# Patient Record
Sex: MALE | URBAN - METROPOLITAN AREA
[De-identification: names, ages, dates, MRNs, and addresses within clinical notes are randomized per-mention and may not be internally consistent; named-entity substitution may affect disease eponyms.]

---

## 2022-09-21 ENCOUNTER — LAB REQUISITION (OUTPATIENT)
Dept: LAB | Facility: CLINIC | Age: 62
End: 2022-09-21

## 2022-09-21 PROCEDURE — 80197 ASSAY OF TACROLIMUS: CPT

## 2022-09-22 LAB
TACROLIMUS BLD-MCNC: 6.6 UG/L (ref 5–15)
TME LAST DOSE: NORMAL H
TME LAST DOSE: NORMAL H

## 2022-09-26 ENCOUNTER — LAB REQUISITION (OUTPATIENT)
Dept: LAB | Facility: CLINIC | Age: 62
End: 2022-09-26

## 2022-09-26 PROCEDURE — 80197 ASSAY OF TACROLIMUS: CPT

## 2022-09-27 LAB
TACROLIMUS BLD-MCNC: 9.8 UG/L (ref 5–15)
TME LAST DOSE: NORMAL H
TME LAST DOSE: NORMAL H

## 2022-10-03 PROCEDURE — 80197 ASSAY OF TACROLIMUS: CPT

## 2022-10-04 ENCOUNTER — LAB REQUISITION (OUTPATIENT)
Dept: LAB | Facility: CLINIC | Age: 62
End: 2022-10-04

## 2022-10-05 LAB
TACROLIMUS BLD-MCNC: 6.5 UG/L (ref 5–15)
TME LAST DOSE: NORMAL H
TME LAST DOSE: NORMAL H

## 2022-10-11 ENCOUNTER — LAB REQUISITION (OUTPATIENT)
Dept: LAB | Facility: CLINIC | Age: 62
End: 2022-10-11

## 2022-10-11 PROCEDURE — 80197 ASSAY OF TACROLIMUS: CPT

## 2022-10-12 LAB
TACROLIMUS BLD-MCNC: 6.9 UG/L (ref 5–15)
TME LAST DOSE: NORMAL H
TME LAST DOSE: NORMAL H

## 2022-10-17 ENCOUNTER — LAB REQUISITION (OUTPATIENT)
Dept: LAB | Facility: CLINIC | Age: 62
End: 2022-10-17

## 2022-10-17 PROCEDURE — 80197 ASSAY OF TACROLIMUS: CPT

## 2022-10-18 LAB
TACROLIMUS BLD-MCNC: 10.2 UG/L (ref 5–15)
TME LAST DOSE: NORMAL H
TME LAST DOSE: NORMAL H

## 2022-10-24 ENCOUNTER — LAB REQUISITION (OUTPATIENT)
Dept: LAB | Facility: CLINIC | Age: 62
End: 2022-10-24

## 2022-10-24 LAB
TACROLIMUS BLD-MCNC: 9 UG/L (ref 5–15)
TME LAST DOSE: NORMAL H
TME LAST DOSE: NORMAL H

## 2022-10-24 PROCEDURE — 80197 ASSAY OF TACROLIMUS: CPT

## 2022-11-01 ENCOUNTER — LAB REQUISITION (OUTPATIENT)
Dept: LAB | Facility: CLINIC | Age: 62
End: 2022-11-01

## 2022-11-01 PROCEDURE — 80197 ASSAY OF TACROLIMUS: CPT

## 2022-11-02 LAB
TACROLIMUS BLD-MCNC: 8.1 UG/L (ref 5–15)
TME LAST DOSE: NORMAL H
TME LAST DOSE: NORMAL H

## 2022-11-07 ENCOUNTER — LAB REQUISITION (OUTPATIENT)
Dept: LAB | Facility: CLINIC | Age: 62
End: 2022-11-07

## 2022-11-07 LAB
TACROLIMUS BLD-MCNC: 6.8 UG/L (ref 5–15)
TME LAST DOSE: NORMAL H
TME LAST DOSE: NORMAL H

## 2022-11-07 PROCEDURE — 80197 ASSAY OF TACROLIMUS: CPT

## 2022-11-14 ENCOUNTER — LAB REQUISITION (OUTPATIENT)
Dept: LAB | Facility: CLINIC | Age: 62
End: 2022-11-14

## 2022-11-14 LAB
TACROLIMUS BLD-MCNC: 12.5 UG/L (ref 5–15)
TME LAST DOSE: NORMAL H
TME LAST DOSE: NORMAL H

## 2022-11-14 PROCEDURE — 80197 ASSAY OF TACROLIMUS: CPT

## 2022-11-21 ENCOUNTER — LAB REQUISITION (OUTPATIENT)
Dept: LAB | Facility: CLINIC | Age: 62
End: 2022-11-21

## 2022-11-21 PROCEDURE — 80197 ASSAY OF TACROLIMUS: CPT

## 2022-11-22 LAB
TACROLIMUS BLD-MCNC: 14 UG/L (ref 5–15)
TME LAST DOSE: NORMAL H
TME LAST DOSE: NORMAL H

## 2022-11-28 ENCOUNTER — LAB REQUISITION (OUTPATIENT)
Dept: LAB | Facility: CLINIC | Age: 62
End: 2022-11-28

## 2022-11-28 LAB
TACROLIMUS BLD-MCNC: 9 UG/L (ref 5–15)
TME LAST DOSE: NORMAL H
TME LAST DOSE: NORMAL H

## 2022-11-28 PROCEDURE — 80197 ASSAY OF TACROLIMUS: CPT

## 2022-12-05 ENCOUNTER — LAB REQUISITION (OUTPATIENT)
Dept: LAB | Facility: CLINIC | Age: 62
End: 2022-12-05

## 2022-12-05 LAB
TACROLIMUS BLD-MCNC: 6.2 UG/L (ref 5–15)
TME LAST DOSE: NORMAL H
TME LAST DOSE: NORMAL H

## 2022-12-05 PROCEDURE — 80197 ASSAY OF TACROLIMUS: CPT

## 2022-12-12 ENCOUNTER — LAB REQUISITION (OUTPATIENT)
Dept: LAB | Facility: CLINIC | Age: 62
End: 2022-12-12

## 2022-12-12 LAB
TACROLIMUS BLD-MCNC: 8 UG/L (ref 5–15)
TME LAST DOSE: NORMAL H
TME LAST DOSE: NORMAL H

## 2022-12-12 PROCEDURE — 80197 ASSAY OF TACROLIMUS: CPT

## 2022-12-19 ENCOUNTER — LAB REQUISITION (OUTPATIENT)
Dept: LAB | Facility: CLINIC | Age: 62
End: 2022-12-19

## 2022-12-19 PROCEDURE — 80197 ASSAY OF TACROLIMUS: CPT

## 2022-12-20 LAB
TACROLIMUS BLD-MCNC: 5.4 UG/L (ref 5–15)
TME LAST DOSE: NORMAL H
TME LAST DOSE: NORMAL H

## 2022-12-27 ENCOUNTER — LAB REQUISITION (OUTPATIENT)
Dept: LAB | Facility: CLINIC | Age: 62
End: 2022-12-27

## 2022-12-27 PROCEDURE — 80197 ASSAY OF TACROLIMUS: CPT

## 2022-12-28 LAB
TACROLIMUS BLD-MCNC: 5.2 UG/L (ref 5–15)
TME LAST DOSE: NORMAL H
TME LAST DOSE: NORMAL H

## 2023-01-03 ENCOUNTER — LAB REQUISITION (OUTPATIENT)
Dept: LAB | Facility: CLINIC | Age: 63
End: 2023-01-03

## 2023-01-03 LAB
TACROLIMUS BLD-MCNC: 6.4 UG/L (ref 5–15)
TME LAST DOSE: NORMAL H
TME LAST DOSE: NORMAL H

## 2023-01-03 PROCEDURE — 80197 ASSAY OF TACROLIMUS: CPT

## 2023-01-09 ENCOUNTER — LAB REQUISITION (OUTPATIENT)
Dept: LAB | Facility: CLINIC | Age: 63
End: 2023-01-09

## 2023-01-09 PROCEDURE — 80197 ASSAY OF TACROLIMUS: CPT

## 2023-01-10 LAB
TACROLIMUS BLD-MCNC: 7.9 UG/L (ref 5–15)
TME LAST DOSE: NORMAL H
TME LAST DOSE: NORMAL H

## 2023-01-17 ENCOUNTER — LAB REQUISITION (OUTPATIENT)
Dept: LAB | Facility: CLINIC | Age: 63
End: 2023-01-17

## 2023-01-17 PROCEDURE — 80197 ASSAY OF TACROLIMUS: CPT

## 2023-01-18 LAB
TACROLIMUS BLD-MCNC: 6.1 UG/L (ref 5–15)
TME LAST DOSE: NORMAL H
TME LAST DOSE: NORMAL H

## 2023-01-30 ENCOUNTER — LAB REQUISITION (OUTPATIENT)
Dept: LAB | Facility: CLINIC | Age: 63
End: 2023-01-30

## 2023-01-30 LAB
TACROLIMUS BLD-MCNC: 6.5 UG/L (ref 5–15)
TME LAST DOSE: NORMAL H
TME LAST DOSE: NORMAL H

## 2023-01-30 PROCEDURE — 80197 ASSAY OF TACROLIMUS: CPT

## 2023-02-06 ENCOUNTER — LAB REQUISITION (OUTPATIENT)
Dept: LAB | Facility: CLINIC | Age: 63
End: 2023-02-06

## 2023-02-06 PROCEDURE — 80197 ASSAY OF TACROLIMUS: CPT

## 2023-02-07 LAB
TACROLIMUS BLD-MCNC: 9.1 UG/L (ref 5–15)
TME LAST DOSE: NORMAL H
TME LAST DOSE: NORMAL H

## 2023-02-27 ENCOUNTER — LAB REQUISITION (OUTPATIENT)
Dept: LAB | Facility: CLINIC | Age: 63
End: 2023-02-27

## 2023-02-27 LAB
TACROLIMUS BLD-MCNC: 8 UG/L (ref 5–15)
TME LAST DOSE: NORMAL H
TME LAST DOSE: NORMAL H

## 2023-02-27 PROCEDURE — 80197 ASSAY OF TACROLIMUS: CPT

## 2023-03-20 ENCOUNTER — LAB REQUISITION (OUTPATIENT)
Dept: LAB | Facility: CLINIC | Age: 63
End: 2023-03-20

## 2023-03-20 PROCEDURE — 80197 ASSAY OF TACROLIMUS: CPT

## 2023-03-21 LAB
TACROLIMUS BLD-MCNC: 7.1 UG/L (ref 5–15)
TME LAST DOSE: NORMAL H
TME LAST DOSE: NORMAL H

## 2023-04-17 ENCOUNTER — LAB REQUISITION (OUTPATIENT)
Dept: LAB | Facility: CLINIC | Age: 63
End: 2023-04-17

## 2023-04-17 PROCEDURE — 80197 ASSAY OF TACROLIMUS: CPT

## 2023-04-18 LAB
TACROLIMUS BLD-MCNC: 9.6 UG/L (ref 5–15)
TME LAST DOSE: NORMAL H
TME LAST DOSE: NORMAL H

## 2023-05-15 ENCOUNTER — LAB REQUISITION (OUTPATIENT)
Dept: LAB | Facility: CLINIC | Age: 63
End: 2023-05-15

## 2023-05-15 PROCEDURE — 80197 ASSAY OF TACROLIMUS: CPT

## 2023-05-16 LAB
TACROLIMUS BLD-MCNC: 11.9 UG/L (ref 5–15)
TME LAST DOSE: NORMAL H
TME LAST DOSE: NORMAL H

## 2023-06-12 ENCOUNTER — LAB REQUISITION (OUTPATIENT)
Dept: LAB | Facility: CLINIC | Age: 63
End: 2023-06-12

## 2023-06-12 PROCEDURE — 80197 ASSAY OF TACROLIMUS: CPT

## 2023-06-13 LAB
TACROLIMUS BLD-MCNC: 6.7 UG/L (ref 5–15)
TME LAST DOSE: NORMAL H
TME LAST DOSE: NORMAL H

## 2023-07-24 ENCOUNTER — LAB REQUISITION (OUTPATIENT)
Dept: LAB | Facility: CLINIC | Age: 63
End: 2023-07-24

## 2023-07-24 PROCEDURE — 80197 ASSAY OF TACROLIMUS: CPT

## 2023-07-25 LAB
TACROLIMUS BLD-MCNC: 5.8 UG/L (ref 5–15)
TME LAST DOSE: NORMAL H
TME LAST DOSE: NORMAL H

## 2023-08-21 ENCOUNTER — LAB REQUISITION (OUTPATIENT)
Dept: LAB | Facility: CLINIC | Age: 63
End: 2023-08-21

## 2023-08-21 PROCEDURE — 80197 ASSAY OF TACROLIMUS: CPT

## 2023-08-22 LAB
TACROLIMUS BLD-MCNC: 11 UG/L (ref 5–15)
TME LAST DOSE: NORMAL H
TME LAST DOSE: NORMAL H

## 2023-08-31 ENCOUNTER — LAB REQUISITION (OUTPATIENT)
Dept: LAB | Facility: CLINIC | Age: 63
End: 2023-08-31

## 2023-08-31 PROCEDURE — 80197 ASSAY OF TACROLIMUS: CPT

## 2023-09-01 LAB
TACROLIMUS BLD-MCNC: 10.4 UG/L (ref 5–15)
TME LAST DOSE: NORMAL H
TME LAST DOSE: NORMAL H

## 2023-09-28 ENCOUNTER — LAB REQUISITION (OUTPATIENT)
Dept: LAB | Facility: CLINIC | Age: 63
End: 2023-09-28

## 2023-09-28 LAB
TACROLIMUS BLD-MCNC: 6.3 UG/L (ref 5–15)
TME LAST DOSE: NORMAL H
TME LAST DOSE: NORMAL H

## 2023-09-28 PROCEDURE — 80197 ASSAY OF TACROLIMUS: CPT

## 2023-10-26 ENCOUNTER — LAB REQUISITION (OUTPATIENT)
Dept: LAB | Facility: CLINIC | Age: 63
End: 2023-10-26

## 2023-10-26 PROCEDURE — 80197 ASSAY OF TACROLIMUS: CPT

## 2023-10-27 LAB
TACROLIMUS BLD-MCNC: 6.8 UG/L (ref 5–15)
TME LAST DOSE: NORMAL H
TME LAST DOSE: NORMAL H

## 2023-11-30 ENCOUNTER — LAB REQUISITION (OUTPATIENT)
Dept: LAB | Facility: CLINIC | Age: 63
End: 2023-11-30

## 2023-11-30 PROCEDURE — 80197 ASSAY OF TACROLIMUS: CPT

## 2023-12-01 LAB
TACROLIMUS BLD-MCNC: 6.4 UG/L (ref 5–15)
TME LAST DOSE: NORMAL H
TME LAST DOSE: NORMAL H

## 2024-01-31 ENCOUNTER — LAB REQUISITION (OUTPATIENT)
Dept: LAB | Facility: CLINIC | Age: 64
End: 2024-01-31

## 2024-01-31 LAB
TACROLIMUS BLD-MCNC: 5.6 UG/L (ref 5–15)
TME LAST DOSE: NORMAL H
TME LAST DOSE: NORMAL H

## 2024-01-31 PROCEDURE — 80197 ASSAY OF TACROLIMUS: CPT

## 2024-04-02 ENCOUNTER — LAB REQUISITION (OUTPATIENT)
Dept: LAB | Facility: CLINIC | Age: 64
End: 2024-04-02

## 2024-04-02 LAB
TACROLIMUS BLD-MCNC: 6.3 UG/L (ref 5–15)
TME LAST DOSE: NORMAL H
TME LAST DOSE: NORMAL H

## 2024-04-02 PROCEDURE — 80197 ASSAY OF TACROLIMUS: CPT

## 2024-05-06 ENCOUNTER — LAB REQUISITION (OUTPATIENT)
Dept: LAB | Facility: CLINIC | Age: 64
End: 2024-05-06

## 2024-05-06 PROCEDURE — 80197 ASSAY OF TACROLIMUS: CPT

## 2024-05-07 LAB
TACROLIMUS BLD-MCNC: 8.3 UG/L (ref 5–15)
TME LAST DOSE: NORMAL H
TME LAST DOSE: NORMAL H

## 2024-07-31 ENCOUNTER — LAB REQUISITION (OUTPATIENT)
Dept: LAB | Facility: CLINIC | Age: 64
End: 2024-07-31

## 2024-07-31 PROCEDURE — 80197 ASSAY OF TACROLIMUS: CPT

## 2024-08-01 LAB
TACROLIMUS BLD-MCNC: 4.3 UG/L (ref 5–15)
TME LAST DOSE: ABNORMAL H
TME LAST DOSE: ABNORMAL H

## 2024-10-01 ENCOUNTER — LAB REQUISITION (OUTPATIENT)
Dept: LAB | Facility: CLINIC | Age: 64
End: 2024-10-01

## 2024-10-01 LAB
TACROLIMUS BLD-MCNC: 5.9 UG/L (ref 5–15)
TME LAST DOSE: NORMAL H
TME LAST DOSE: NORMAL H

## 2024-10-01 PROCEDURE — 80197 ASSAY OF TACROLIMUS: CPT

## 2024-10-29 ENCOUNTER — LAB REQUISITION (OUTPATIENT)
Dept: LAB | Facility: CLINIC | Age: 64
End: 2024-10-29

## 2024-10-29 LAB
TACROLIMUS BLD-MCNC: 2 UG/L (ref 5–15)
TME LAST DOSE: ABNORMAL H
TME LAST DOSE: ABNORMAL H

## 2024-10-29 PROCEDURE — 80197 ASSAY OF TACROLIMUS: CPT

## 2025-01-19 PROCEDURE — 80197 ASSAY OF TACROLIMUS: CPT

## 2025-01-21 ENCOUNTER — LAB REQUISITION (OUTPATIENT)
Dept: LAB | Facility: CLINIC | Age: 65
End: 2025-01-21

## 2025-01-21 LAB
TACROLIMUS BLD-MCNC: 3.9 UG/L (ref 5–15)
TME LAST DOSE: ABNORMAL H
TME LAST DOSE: ABNORMAL H

## 2025-01-25 ENCOUNTER — LAB REQUISITION (OUTPATIENT)
Dept: LAB | Facility: CLINIC | Age: 65
End: 2025-01-25

## 2025-01-27 ENCOUNTER — LAB REQUISITION (OUTPATIENT)
Dept: LAB | Facility: CLINIC | Age: 65
End: 2025-01-27

## 2025-01-27 LAB
CMV DNA SPEC NAA+PROBE-ACNC: NOT DETECTED IU/ML
SPECIMEN TYPE: NORMAL

## 2025-01-27 PROCEDURE — 80197 ASSAY OF TACROLIMUS: CPT

## 2025-01-28 ENCOUNTER — TRANSFERRED RECORDS (OUTPATIENT)
Dept: HEALTH INFORMATION MANAGEMENT | Facility: CLINIC | Age: 65
End: 2025-01-28
Payer: MEDICARE

## 2025-01-28 ENCOUNTER — LAB REQUISITION (OUTPATIENT)
Dept: LAB | Facility: CLINIC | Age: 65
End: 2025-01-28

## 2025-01-28 LAB
CMV DNA SPEC NAA+PROBE-ACNC: NOT DETECTED IU/ML
SPECIMEN TYPE: NORMAL
TACROLIMUS BLD-MCNC: 5.8 UG/L (ref 5–15)
TACROLIMUS BLD-MCNC: 8.2 UG/L (ref 5–15)
TME LAST DOSE: NORMAL H

## 2025-01-28 PROCEDURE — 80197 ASSAY OF TACROLIMUS: CPT

## 2025-01-29 ENCOUNTER — TRANSFERRED RECORDS (OUTPATIENT)
Dept: HEALTH INFORMATION MANAGEMENT | Facility: CLINIC | Age: 65
End: 2025-01-29
Payer: MEDICARE

## 2025-01-29 ENCOUNTER — LAB REQUISITION (OUTPATIENT)
Dept: LAB | Facility: CLINIC | Age: 65
End: 2025-01-29

## 2025-01-29 PROCEDURE — 80197 ASSAY OF TACROLIMUS: CPT

## 2025-01-30 LAB
TACROLIMUS BLD-MCNC: 6.2 UG/L (ref 5–15)
TME LAST DOSE: NORMAL H
TME LAST DOSE: NORMAL H

## 2025-02-25 ENCOUNTER — HOSPITAL ENCOUNTER (INPATIENT)
Facility: CLINIC | Age: 65
End: 2025-02-25
Attending: EMERGENCY MEDICINE | Admitting: INTERNAL MEDICINE
Payer: MEDICARE

## 2025-02-25 ENCOUNTER — APPOINTMENT (OUTPATIENT)
Dept: GENERAL RADIOLOGY | Facility: CLINIC | Age: 65
DRG: 981 | End: 2025-02-25
Attending: EMERGENCY MEDICINE
Payer: MEDICARE

## 2025-02-25 DIAGNOSIS — N40.1 BENIGN LOCALIZED PROSTATIC HYPERPLASIA WITH LOWER URINARY TRACT SYMPTOMS (LUTS): ICD-10-CM

## 2025-02-25 DIAGNOSIS — I73.9 PAD (PERIPHERAL ARTERY DISEASE): ICD-10-CM

## 2025-02-25 DIAGNOSIS — Z94.0 RENAL TRANSPLANT, STATUS POST: ICD-10-CM

## 2025-02-25 DIAGNOSIS — R41.82 ALTERED MENTAL STATUS, UNSPECIFIED ALTERED MENTAL STATUS TYPE: ICD-10-CM

## 2025-02-25 DIAGNOSIS — R53.1 WEAKNESS GENERALIZED: ICD-10-CM

## 2025-02-25 DIAGNOSIS — E11.29 TYPE 2 DIABETES MELLITUS WITH OTHER DIABETIC KIDNEY COMPLICATION, WITHOUT LONG-TERM CURRENT USE OF INSULIN (H): ICD-10-CM

## 2025-02-25 DIAGNOSIS — T33.90XA FROSTBITE, INITIAL ENCOUNTER: Primary | ICD-10-CM

## 2025-02-25 DIAGNOSIS — D50.9 IRON DEFICIENCY ANEMIA, UNSPECIFIED IRON DEFICIENCY ANEMIA TYPE: ICD-10-CM

## 2025-02-25 DIAGNOSIS — Z01.818 PRE-OP EVALUATION: ICD-10-CM

## 2025-02-25 DIAGNOSIS — Z98.890 POSTOPERATIVE STATE: ICD-10-CM

## 2025-02-25 DIAGNOSIS — I96 GANGRENOUS TOE (H): ICD-10-CM

## 2025-02-25 LAB
ALBUMIN SERPL BCG-MCNC: 3.9 G/DL (ref 3.5–5.2)
ALP SERPL-CCNC: 122 U/L (ref 40–150)
ALT SERPL W P-5'-P-CCNC: 13 U/L (ref 0–70)
ANION GAP SERPL CALCULATED.3IONS-SCNC: 12 MMOL/L (ref 7–15)
AST SERPL W P-5'-P-CCNC: 19 U/L (ref 0–45)
ATRIAL RATE - MUSE: 277 BPM
BASOPHILS # BLD AUTO: 0 10E3/UL (ref 0–0.2)
BASOPHILS NFR BLD AUTO: 0 %
BILIRUB SERPL-MCNC: 0.4 MG/DL
BUN SERPL-MCNC: 83.5 MG/DL (ref 8–23)
CALCIUM SERPL-MCNC: 10.5 MG/DL (ref 8.8–10.4)
CHLORIDE SERPL-SCNC: 100 MMOL/L (ref 98–107)
CREAT SERPL-MCNC: 2.26 MG/DL (ref 0.67–1.17)
CYSTATIN C (ROCHE): 2 MG/L (ref 0.6–1)
DIASTOLIC BLOOD PRESSURE - MUSE: NORMAL MMHG
EGFRCR SERPLBLD CKD-EPI 2021: 31 ML/MIN/1.73M2
EOSINOPHIL # BLD AUTO: 0.1 10E3/UL (ref 0–0.7)
EOSINOPHIL NFR BLD AUTO: 1 %
ERYTHROCYTE [DISTWIDTH] IN BLOOD BY AUTOMATED COUNT: 14.6 % (ref 10–15)
FLUAV RNA SPEC QL NAA+PROBE: NEGATIVE
FLUBV RNA RESP QL NAA+PROBE: NEGATIVE
GFR/BSA.PRED SERPLBLD CYS-BASED-ARV: 30 ML/MIN/1.73M2
GLUCOSE SERPL-MCNC: 189 MG/DL (ref 70–99)
HCO3 SERPL-SCNC: 25 MMOL/L (ref 22–29)
HCT VFR BLD AUTO: 39.8 % (ref 40–53)
HGB BLD-MCNC: 12.3 G/DL (ref 13.3–17.7)
HOLD SPECIMEN: NORMAL
HOLD SPECIMEN: NORMAL
IMM GRANULOCYTES # BLD: 0.1 10E3/UL
IMM GRANULOCYTES NFR BLD: 1 %
INTERPRETATION ECG - MUSE: NORMAL
LACTATE SERPL-SCNC: 1.7 MMOL/L (ref 0.7–2)
LYMPHOCYTES # BLD AUTO: 0.5 10E3/UL (ref 0.8–5.3)
LYMPHOCYTES NFR BLD AUTO: 5 %
MCH RBC QN AUTO: 26.3 PG (ref 26.5–33)
MCHC RBC AUTO-ENTMCNC: 30.9 G/DL (ref 31.5–36.5)
MCV RBC AUTO: 85 FL (ref 78–100)
MONOCYTES # BLD AUTO: 0.9 10E3/UL (ref 0–1.3)
MONOCYTES NFR BLD AUTO: 9 %
NEUTROPHILS # BLD AUTO: 8.6 10E3/UL (ref 1.6–8.3)
NEUTROPHILS NFR BLD AUTO: 84 %
NRBC # BLD AUTO: 0 10E3/UL
NRBC BLD AUTO-RTO: 0 /100
NT-PROBNP SERPL-MCNC: ABNORMAL PG/ML (ref 0–900)
P AXIS - MUSE: NORMAL DEGREES
PLATELET # BLD AUTO: 350 10E3/UL (ref 150–450)
POTASSIUM SERPL-SCNC: 5.3 MMOL/L (ref 3.4–5.3)
PR INTERVAL - MUSE: NORMAL MS
PROT SERPL-MCNC: 7.7 G/DL (ref 6.4–8.3)
QRS DURATION - MUSE: 74 MS
QT - MUSE: 370 MS
QTC - MUSE: 452 MS
R AXIS - MUSE: -3 DEGREES
RBC # BLD AUTO: 4.67 10E6/UL (ref 4.4–5.9)
RSV RNA SPEC NAA+PROBE: NEGATIVE
SARS-COV-2 RNA RESP QL NAA+PROBE: NEGATIVE
SODIUM SERPL-SCNC: 137 MMOL/L (ref 135–145)
SYSTOLIC BLOOD PRESSURE - MUSE: NORMAL MMHG
T AXIS - MUSE: 213 DEGREES
TROPONIN T SERPL HS-MCNC: 218 NG/L
TROPONIN T SERPL HS-MCNC: 267 NG/L
VENTRICULAR RATE- MUSE: 90 BPM
WBC # BLD AUTO: 10.1 10E3/UL (ref 4–11)

## 2025-02-25 PROCEDURE — 71046 X-RAY EXAM CHEST 2 VIEWS: CPT | Mod: 26 | Performed by: RADIOLOGY

## 2025-02-25 PROCEDURE — 93010 ELECTROCARDIOGRAM REPORT: CPT | Performed by: INTERNAL MEDICINE

## 2025-02-25 PROCEDURE — 99285 EMERGENCY DEPT VISIT HI MDM: CPT | Performed by: EMERGENCY MEDICINE

## 2025-02-25 PROCEDURE — 82610 CYSTATIN C: CPT | Performed by: PHYSICIAN ASSISTANT

## 2025-02-25 PROCEDURE — 83605 ASSAY OF LACTIC ACID: CPT | Performed by: INTERNAL MEDICINE

## 2025-02-25 PROCEDURE — G0378 HOSPITAL OBSERVATION PER HR: HCPCS

## 2025-02-25 PROCEDURE — 36415 COLL VENOUS BLD VENIPUNCTURE: CPT | Performed by: EMERGENCY MEDICINE

## 2025-02-25 PROCEDURE — 71046 X-RAY EXAM CHEST 2 VIEWS: CPT

## 2025-02-25 PROCEDURE — 250N000013 HC RX MED GY IP 250 OP 250 PS 637: Performed by: PHYSICIAN ASSISTANT

## 2025-02-25 PROCEDURE — 93005 ELECTROCARDIOGRAM TRACING: CPT

## 2025-02-25 PROCEDURE — 83880 ASSAY OF NATRIURETIC PEPTIDE: CPT | Performed by: EMERGENCY MEDICINE

## 2025-02-25 PROCEDURE — 93005 ELECTROCARDIOGRAM TRACING: CPT | Performed by: EMERGENCY MEDICINE

## 2025-02-25 PROCEDURE — 36415 COLL VENOUS BLD VENIPUNCTURE: CPT | Performed by: INTERNAL MEDICINE

## 2025-02-25 PROCEDURE — 85025 COMPLETE CBC W/AUTO DIFF WBC: CPT | Performed by: EMERGENCY MEDICINE

## 2025-02-25 PROCEDURE — 99223 1ST HOSP IP/OBS HIGH 75: CPT | Performed by: PHYSICIAN ASSISTANT

## 2025-02-25 PROCEDURE — 82040 ASSAY OF SERUM ALBUMIN: CPT | Performed by: EMERGENCY MEDICINE

## 2025-02-25 PROCEDURE — 85018 HEMOGLOBIN: CPT | Performed by: EMERGENCY MEDICINE

## 2025-02-25 PROCEDURE — 250N000012 HC RX MED GY IP 250 OP 636 PS 637: Performed by: PHYSICIAN ASSISTANT

## 2025-02-25 PROCEDURE — 80197 ASSAY OF TACROLIMUS: CPT | Performed by: PHYSICIAN ASSISTANT

## 2025-02-25 PROCEDURE — 99418 PROLNG IP/OBS E/M EA 15 MIN: CPT | Performed by: PHYSICIAN ASSISTANT

## 2025-02-25 PROCEDURE — 87637 SARSCOV2&INF A&B&RSV AMP PRB: CPT | Performed by: EMERGENCY MEDICINE

## 2025-02-25 PROCEDURE — 84484 ASSAY OF TROPONIN QUANT: CPT | Performed by: EMERGENCY MEDICINE

## 2025-02-25 PROCEDURE — 87040 BLOOD CULTURE FOR BACTERIA: CPT | Performed by: EMERGENCY MEDICINE

## 2025-02-25 RX ORDER — AMOXICILLIN 250 MG
1 CAPSULE ORAL 2 TIMES DAILY PRN
Status: DISCONTINUED | OUTPATIENT
Start: 2025-02-25 | End: 2025-03-26 | Stop reason: HOSPADM

## 2025-02-25 RX ORDER — PANTOPRAZOLE SODIUM 40 MG/1
40 TABLET, DELAYED RELEASE ORAL
Status: DISCONTINUED | OUTPATIENT
Start: 2025-02-26 | End: 2025-02-28

## 2025-02-25 RX ORDER — ONDANSETRON 4 MG/1
4 TABLET, ORALLY DISINTEGRATING ORAL EVERY 6 HOURS PRN
Status: DISCONTINUED | OUTPATIENT
Start: 2025-02-25 | End: 2025-03-26 | Stop reason: HOSPADM

## 2025-02-25 RX ORDER — TORSEMIDE 10 MG/1
10 TABLET ORAL
Status: DISCONTINUED | OUTPATIENT
Start: 2025-02-25 | End: 2025-02-28

## 2025-02-25 RX ORDER — TORSEMIDE 10 MG/1
10 TABLET ORAL 2 TIMES DAILY
COMMUNITY
Start: 2025-02-20

## 2025-02-25 RX ORDER — SODIUM BICARBONATE 650 MG/1
1300 TABLET ORAL 2 TIMES DAILY
Status: ON HOLD | COMMUNITY
Start: 2024-04-29 | End: 2025-03-25

## 2025-02-25 RX ORDER — ATORVASTATIN CALCIUM 20 MG/1
20 TABLET, FILM COATED ORAL EVERY EVENING
Status: DISCONTINUED | OUTPATIENT
Start: 2025-02-25 | End: 2025-03-13

## 2025-02-25 RX ORDER — METOPROLOL SUCCINATE 25 MG/1
75 TABLET, EXTENDED RELEASE ORAL DAILY
Status: DISCONTINUED | OUTPATIENT
Start: 2025-02-26 | End: 2025-03-17

## 2025-02-25 RX ORDER — ACETAMINOPHEN 325 MG/1
650 TABLET ORAL EVERY 4 HOURS PRN
Status: DISCONTINUED | OUTPATIENT
Start: 2025-02-25 | End: 2025-03-20

## 2025-02-25 RX ORDER — PANTOPRAZOLE SODIUM 40 MG/1
40 TABLET, DELAYED RELEASE ORAL DAILY
COMMUNITY
Start: 2025-01-07

## 2025-02-25 RX ORDER — ATORVASTATIN CALCIUM 20 MG/1
1 TABLET, FILM COATED ORAL AT BEDTIME
Status: ON HOLD | COMMUNITY
Start: 2024-10-22 | End: 2025-03-25

## 2025-02-25 RX ORDER — MYCOPHENOLATE MOFETIL 500 MG/1
500 TABLET ORAL 2 TIMES DAILY
COMMUNITY

## 2025-02-25 RX ORDER — ACETAMINOPHEN 650 MG/1
650 SUPPOSITORY RECTAL EVERY 4 HOURS PRN
Status: DISCONTINUED | OUTPATIENT
Start: 2025-02-25 | End: 2025-03-20

## 2025-02-25 RX ORDER — SODIUM BICARBONATE 650 MG/1
1300 TABLET ORAL 2 TIMES DAILY
Status: DISCONTINUED | OUTPATIENT
Start: 2025-02-25 | End: 2025-03-21

## 2025-02-25 RX ORDER — METOPROLOL TARTRATE 25 MG/1
25 TABLET, FILM COATED ORAL 2 TIMES DAILY
Status: DISCONTINUED | OUTPATIENT
Start: 2025-02-25 | End: 2025-02-25

## 2025-02-25 RX ORDER — ONDANSETRON 2 MG/ML
4 INJECTION INTRAMUSCULAR; INTRAVENOUS EVERY 6 HOURS PRN
Status: DISCONTINUED | OUTPATIENT
Start: 2025-02-25 | End: 2025-03-26 | Stop reason: HOSPADM

## 2025-02-25 RX ORDER — TACROLIMUS 0.5 MG/1
1.5 CAPSULE ORAL 2 TIMES DAILY
Status: ON HOLD | COMMUNITY
End: 2025-03-25

## 2025-02-25 RX ORDER — MYCOPHENOLATE MOFETIL 500 MG/1
500 TABLET ORAL
Status: DISCONTINUED | OUTPATIENT
Start: 2025-02-25 | End: 2025-03-26 | Stop reason: HOSPADM

## 2025-02-25 RX ORDER — LIDOCAINE 40 MG/G
CREAM TOPICAL
Status: DISCONTINUED | OUTPATIENT
Start: 2025-02-25 | End: 2025-03-20

## 2025-02-25 RX ORDER — CALCITRIOL 0.25 UG/1
0.25 CAPSULE, LIQUID FILLED ORAL DAILY
Status: DISCONTINUED | OUTPATIENT
Start: 2025-02-25 | End: 2025-03-26 | Stop reason: HOSPADM

## 2025-02-25 RX ORDER — AMOXICILLIN 250 MG
2 CAPSULE ORAL 2 TIMES DAILY PRN
Status: DISCONTINUED | OUTPATIENT
Start: 2025-02-25 | End: 2025-03-26 | Stop reason: HOSPADM

## 2025-02-25 RX ORDER — METOPROLOL SUCCINATE 25 MG/1
75 TABLET, EXTENDED RELEASE ORAL DAILY
COMMUNITY
Start: 2025-02-20

## 2025-02-25 RX ORDER — CALCIUM CARBONATE 500 MG/1
1000 TABLET, CHEWABLE ORAL 4 TIMES DAILY PRN
Status: DISCONTINUED | OUTPATIENT
Start: 2025-02-25 | End: 2025-03-26 | Stop reason: HOSPADM

## 2025-02-25 RX ORDER — CEFDINIR 300 MG/1
300 CAPSULE ORAL 2 TIMES DAILY
Status: ON HOLD | COMMUNITY
Start: 2025-02-20 | End: 2025-03-25

## 2025-02-25 RX ORDER — CALCITRIOL 0.25 UG/1
0.25 CAPSULE, LIQUID FILLED ORAL
COMMUNITY
Start: 2024-08-01

## 2025-02-25 RX ORDER — LATANOPROST 50 UG/ML
1 SOLUTION/ DROPS OPHTHALMIC AT BEDTIME
Status: DISCONTINUED | OUTPATIENT
Start: 2025-02-25 | End: 2025-02-28

## 2025-02-25 RX ORDER — LIDOCAINE 4 G/G
1 PATCH TOPICAL
Status: DISCONTINUED | OUTPATIENT
Start: 2025-02-25 | End: 2025-03-26 | Stop reason: HOSPADM

## 2025-02-25 RX ADMIN — ATORVASTATIN CALCIUM 20 MG: 20 TABLET, FILM COATED ORAL at 21:05

## 2025-02-25 RX ADMIN — LIDOCAINE 4% 1 PATCH: 40 PATCH TOPICAL at 21:04

## 2025-02-25 RX ADMIN — LATANOPROST 1 DROP: 50 SOLUTION OPHTHALMIC at 22:14

## 2025-02-25 RX ADMIN — SODIUM BICARBONATE 650 MG TABLET 1300 MG: at 21:05

## 2025-02-25 RX ADMIN — MYCOPHENOLATE MOFETIL 500 MG: 500 TABLET, FILM COATED ORAL at 22:13

## 2025-02-25 RX ADMIN — METOPROLOL TARTRATE 25 MG: 25 TABLET, FILM COATED ORAL at 21:05

## 2025-02-25 ASSESSMENT — ACTIVITIES OF DAILY LIVING (ADL)
ADLS_ACUITY_SCORE: 41

## 2025-02-25 NOTE — ED NOTES
Bed: UUEDH-I  Expected date: 2/25/25  Expected time: 11:34 AM  Means of arrival: Ambulance  Comments:  [address]

## 2025-02-25 NOTE — LETTER
[facility] MED SURG  [facility] [id number] [phone]    FACSIMILE TRANSMITTAL SHEET    TO: Records  COMPANY: [facility]  FAX NUMBER: [phone]  PHONE NUMBER:      FROM: JOHN Martínez  PHONE: [phone] (unit phone [phone])  DATE: 02/26/25  NUMBER OF PAGES:     _____URGENT _____REVIEW ONLY _____PLEASE COMMENT____PLEASE REPLY    NOTES/COMMENTS: Patient is admitted to our hospital. We are requesting the most recent CT and MRI (any imaging) records from the last month. Please fax to [phone]. Or if you are able to push to [place] that would be extra helpful.    Thank you!  JOHN Martínez  [phone]                              IF YOU DID NOT RECEIVE THE CORRECT NUMBER OF PAGES OR THE FAX DID NOT COME THROUGH CLEARLY, PLEASE CALL THE SENDER     CONFIDENTIALITY STATEMENT: Confidential information that may accompany this transmission contains protected health information under state and federal law and is legally privileged. This information is intended only for the use of the individual or entity named above and may be used only for carrying out treatment, payment or other healthcare operations. The recipient or person responsible for delivering this information is prohibited by law from disclosing this information without proper authorization to any other party, unless required to do so by law or regulation. If you are not the intended recipient, you are hereby notified that any review, dissemination, distribution, or copying of this message is strictly prohibited. If you have received this communication in error, please destroy the materials and contact us immediately by calling the number listed above. No response indicates that the information was received by the appropriate authorized party

## 2025-02-25 NOTE — ED PROVIDER NOTES
[place] EMERGENCY DEPARTMENT ([facility])    2/25/25       ED PROVIDER NOTE   Hallway I  History     Chief Complaint   Patient presents with    Generalized Weakness     The history is provided by medical records, a relative and a significant other (Daughter via telephone). History limited by: Alert but noncontributory to history, preferring to have family/significant other to give history.     Tad Zaman is a 64 year old male with history of diabetes, hypertension, renal transplant 9/5/22 through [facility], left below-knee amputation who was brought to the emergency department by significant other with altered mental status.  Bulk of history was provided by patient's daughter via telephone.  Daughter states that she had noticed patient declined markedly from 1/13/25 onwards.  She brought him to the [facility] ED multiple times for evaluation, states that they were dismissive of her concerns.  Ultimately she told them that she would not take him back home which led to further workup and admission.  Per Care Everywhere records, patient was admitted at [facility] from 1/20/2025-2/21/25 and was evaluated with labs, EKG, lumbar puncture, EEG, imaging studies.  He was found to have heart failure exacerbation with 25-30 lbs fluid excess and was on Lasix for this, this was complicated by acute kidney injury.  States he also had a UTI while inpatient, just finished antibiotics yesterday morning.     Daughter states that at time of discharge patient was deemed to be recovered from acute kidney injury standpoint and was discharged back to home.  Daughter was not satisfied by his evaluation at [facility], states that he was diagnosed with failure to thrive and did not give a definitive diagnosis for why he suddenly became forgetful and unable to participate in daily life and activities. At home he has been withdrawn and not participating in self cares. Hasn't gone to  "restroom by himself and at times was confused, has been pressing on things like they're a button. Since he has been at home he has been \"increasingly aggressive,\" that his \"agitation is very high.\" Daughter states that they neglected to arrange for home health nurse follow-up, and today home health nurse came to evaluate patient.  His O2 sats per health nurse was 77%.  He was also not interactive with cares, was not answering questions, wouldn't talk to home health nurse.  Home health nurse did not feel that he was appropriate for their program.  Patient was brought in for further evaluation.    Daughter thinks he needs to be back in the hospital and that they came here instead of [facility] because she doesn't trust them and that she wanted a second opinion. Daughter asks us to evaluate him as if he is a brand-new patient to us.  Patient complains of back pain, which has been ongoing for a while. Patient's other daughter will be bringing medication list.     Jarrod (daughter) [phone]      CareEverywhere records reviewed including discharge summary from 2/21/2025:   #Altered mental status  #Cognitive decline  PCP 12/17/24 referred for cognitive screening 2/2 concern for dementia, does  not appear this occured. Patient poor historian, presenting with confusion.  Strange behaviors such as pouring milk into his salad, however oriented to  person, place, situation. Collateral per daughter is this was an acute   change, as he was completely independent and caring for his brother with   developmental disability and grandson prior to this admission. OT cognitive   assessment score 6/30. Neuro and ID consulted. No signs of infection:   syphilis, HIV, MAGNUS virus, serum crypto, CMV, EBV negative. No acute   electrolyte abnormalities, baseline Cr, elevated B12 and TSH. No acute   findings on CTH or MRI, although terminated early d/t pt grabbing coils. MRI   notable for mild microvascular disease, no acute infarct, " abscess, PML,   Wernicke's. LP  nonspecific. EEG w nonspecific encephalopathy.   Unlikely 2/2 tacrolimus toxicity as serum levels were not drastically high   and would expect peripheral neuropathy, seizure, PRES rather than   encephalopathy. Now over the past 2 weeks with increasingly avoidant and   withdrawn behavior, refusing PT and other cares. Re-evaluated by Psych ,   concern for depression but pt denies and refuses MH medications. Additionally   determined to lack capacity for abode, as unable to express   reason for hospitalization, nature of his deficits, or risks of going  home. Suspect overall cognitive decline with element of depression contributing.  - AMS stable but now with increasing withdrawn behavior   - Psych consult : lacks capacity for abode     2025 01:47 PM MRI-BRAIN (P):  TERESITA GRANDA  -MAY 18, 1960 M  Exm Date: 2025@13:47  Req Phys: KAREN LOPEZ Pat Loc: @15:35  Img Loc: MRI IMAGING  Service: PRIMARY CARE - [place] [address]  [phone]      (Case [id number] COMPLETE) MRI BRAINBRAINSTEM W & W/O CONTRA(MRI Detailed) CPT:07503  Contrast Media : Gadolinium  Reason for Study: MRI head for AMS    Clinical History:  Per Joint Commission Standards, by signing this diagnostic  imaging request the ordering provider confirms they have  considered patients age and recent imaging history.    Did the ordering provider speak with a consultant regarding this  imaging exam? No    Acute heart failure exacerbation, admission c/b worsening AMS  of unclear cause, LP with elevated, repeat MRI as contrast study  was not completed upon last scan Responsible provider name and  phone number to notify for critical findings if other than user  placing the order and pager listed below: User placing orders  pager: [phone]        LAST CREATININE 2.4 H (25)    Allergies:    PENICILLIN (Aug 08, 2018)    Report Status:  Verified Date Reported: JAN 29, 2025  Date Verified: JAN 29, 2025  [facility] E-Sig:/ES/AILEEN GARCIA MD    Report:  MRI of the brain with contrast    Comparison:MRI the brain on January 24, 2025..    HISTORY: Acute heart failure exacerbation and worsening acute  mental status    Technique: Routine multiplanar multisequence imaging precontrast  and routine multiplanar postcontrast imaging.    Contrast: 7.5 cc of Gadovist    Findings:    Brain parenchyma: No evidence of acute ischemia.    Punctate and patchy areas of abnormal white matter consistent  with chronic small vessel ischemia. This is stable.    Small chronic infarct at the right cerebellar hemisphere.    Remaining brain parenchyma is within normal limits.    No evidence of intracranial hemorrhage or intracranial mass.    CSF spaces: Within normal limits for patient's stated age. No  hydrocephalus.    Major intracranial arteries: Relatively unremarkable.    Major intracranial veins: Within normal limits.    Internal auditory canals: Within normal limits.    Orbits: Bilateral lens replacements but otherwise unremarkable.    Perinasal sinuses: Within normal limits.    Sella: Within normal limits.    Craniocervical junction: Within normal limits.    Imaged face: Within normal limits        Impression:        1. Stable mild amount of chronic small vessel ischemia.    2. Small chronic right cerebellar infarct.    Report Sign Date/Time: 1/29/2025 3:32 PM    Primary Interpreting Staff:  AILEEN GARCIA MD, RADIOLOGIST ([facility])  /LOLA       Past Medical History  Past Medical History:   Diagnosis Date    Chronic kidney disease     Diabetes (H)     Hypertension     Myocardial infarction (H)      History reviewed. No pertinent surgical history.  No current outpatient medications on file.    Allergies   Allergen Reactions    Penicillins Hives     Family History  History reviewed. No pertinent family history.  Social History       Past medical history, past  surgical history, medications, allergies, family history, and social history were reviewed with the patient. No additional pertinent items.   A medically appropriate review of systems was performed with pertinent positives and negatives noted in the HPI, and all other systems negative.    Physical Exam      Physical Exam  Constitutional:       General: He is not in acute distress.     Appearance: He is well-developed. He is not ill-appearing, toxic-appearing or diaphoretic.   HENT:      Head: Normocephalic and atraumatic.   Cardiovascular:      Rate and Rhythm: Normal rate. Rhythm irregular.      Heart sounds: Normal heart sounds.   Pulmonary:      Effort: Pulmonary effort is normal. No respiratory distress.      Breath sounds: Normal breath sounds. No stridor. No wheezing, rhonchi or rales.   Abdominal:      General: There is no distension.      Palpations: Abdomen is soft.      Tenderness: There is no abdominal tenderness. There is no rebound.   Musculoskeletal:      Cervical back: Normal range of motion and neck supple.      Comments: BKA; left   Skin:     General: Skin is warm.      Coloration: Skin is not jaundiced.      Findings: No bruising.   Neurological:      General: No focal deficit present.      Mental Status: He is alert and oriented to person, place, and time.   Psychiatric:         Behavior: Behavior normal.         Thought Content: Thought content normal.           ED Course, Procedures, & Data      Procedures         Results for orders placed or performed during the hospital encounter of 02/25/25   Comprehensive metabolic panel     Status: Abnormal   Result Value Ref Range    Sodium 137 135 - 145 mmol/L    Potassium 5.3 3.4 - 5.3 mmol/L    Carbon Dioxide (CO2) 25 22 - 29 mmol/L    Anion Gap 12 7 - 15 mmol/L    Urea Nitrogen 83.5 (H) 8.0 - 23.0 mg/dL    Creatinine 2.26 (H) 0.67 - 1.17 mg/dL    GFR Estimate 31 (L) >60 mL/min/1.73m2    Calcium 10.5 (H) 8.8 - 10.4 mg/dL    Chloride 100 98 - 107 mmol/L     Glucose 189 (H) 70 - 99 mg/dL    Alkaline Phosphatase 122 40 - 150 U/L    AST 19 0 - 45 U/L    ALT 13 0 - 70 U/L    Protein Total 7.7 6.4 - 8.3 g/dL    Albumin 3.9 3.5 - 5.2 g/dL    Bilirubin Total 0.4 <=1.2 mg/dL   CBC with platelets and differential     Status: Abnormal   Result Value Ref Range    WBC Count 10.1 4.0 - 11.0 10e3/uL    RBC Count 4.67 4.40 - 5.90 10e6/uL    Hemoglobin 12.3 (L) 13.3 - 17.7 g/dL    Hematocrit 39.8 (L) 40.0 - 53.0 %    MCV 85 78 - 100 fL    MCH 26.3 (L) 26.5 - 33.0 pg    MCHC 30.9 (L) 31.5 - 36.5 g/dL    RDW 14.6 10.0 - 15.0 %    Platelet Count 350 150 - 450 10e3/uL    % Neutrophils 84 %    % Lymphocytes 5 %    % Monocytes 9 %    % Eosinophils 1 %    % Basophils 0 %    % Immature Granulocytes 1 %    NRBCs per 100 WBC 0 <1 /100    Absolute Neutrophils 8.6 (H) 1.6 - 8.3 10e3/uL    Absolute Lymphocytes 0.5 (L) 0.8 - 5.3 10e3/uL    Absolute Monocytes 0.9 0.0 - 1.3 10e3/uL    Absolute Eosinophils 0.1 0.0 - 0.7 10e3/uL    Absolute Basophils 0.0 0.0 - 0.2 10e3/uL    Absolute Immature Granulocytes 0.1 <=0.4 10e3/uL    Absolute NRBCs 0.0 10e3/uL   [place] Draw     Status: None    Narrative    The following orders were created for panel order [place] Draw.  Procedure                               Abnormality         Status                     ---------                               -----------         ------                     Extra Blue Top Tube[[id number]]                              Final result               Extra Red Top Tube[[id number]]                               Final result                 Please view results for these tests on the individual orders.   Extra Blue Top Tube     Status: None   Result Value Ref Range    Hold Specimen JIC    Extra Red Top Tube     Status: None   Result Value Ref Range    Hold Specimen JIC    CBC with platelets differential     Status: Abnormal    Narrative    The following orders were created for panel order CBC with platelets differential.  Procedure                                Abnormality         Status                     ---------                               -----------         ------                     CBC with platelets and d...[[id number]]  Abnormal            Final result                 Please view results for these tests on the individual orders.     Medications   sodium chloride 0.9% BOLUS 250 mL (has no administration in time range)            No results found for any visits on 02/25/25.  Medications - No data to display  Labs Ordered and Resulted from Time of ED Arrival to Time of ED Departure - No data to display  No orders to display          Critical care was not performed.     Medical Decision Making  The patient's presentation was of moderate complexity (a chronic illness mild to moderate exacerbation, progression, or side effect of treatment).    The patient's evaluation involved:  review of external note(s) from [facility] ([facility])  review of 3+ test result(s) ordered prior to this encounter ([facility] from [facility]-CBC, BMP, MRI brain, )  ordering and/or review of 3+ test(s) in this encounter (see separate area of note for details)    The patient's management necessitated moderate risk (prescription drug management including medications given in the ED) and high risk (a decision regarding hospitalization).    Assessment & Plan    Patient is a 64-year-old male with a known history of a kidney transplant as well as a history of hypertension and diabetes who presents to the ER with daughters due to concern for his medical status.  They say for the past 4 to 5 weeks patient is not been acting at his baseline.  [facility] and some congestive heart failure.  They were able to diurese him.  Patient was recently discharged from the hospital 3 to 4 days ago.  They said that his home health nurse came and evaluated him today they found him to be hypoxic so they sent  him to the ER.  [facility] because they wanted to get a second opinion.  They state that patient gets agitated with her trying to change his dressing for him.  Patient denies.  Patient says that he is not having any acute pain and feels well.  I did review patient's MRI from 1 month prior.  Patient has mild MOY here but this is similar to his prior creatinine.  No signs of infection.  Afebrile.  Sats are 99 to 100% on room air.  I spoke to the patient after the CBC and CMP results are back.  Family is adamant that they cannot take him home because they are worried that there is something wrong.  Patient did notably have a lumbar puncture as well while [facility] last [facility].  This was also negative.  Appears that patient might be having some dementia or having sundowning which causes him to get agitated.  Since family do not feel comfortable discharging him home we will admit him to observation.  We will do a further workup looking for possible causes of heart failure.  Patient's most recent kidney ultrasound was reviewed from 2 weeks prior and it was stable.  Will be admitted to internal medicine for further care.    I have reviewed the nursing notes. I have reviewed the findings, diagnosis, plan and need for follow up with the patient.    New Prescriptions    No medications on file       Final diagnoses:   Weakness generalized   Altered mental status, unspecified altered mental status type       Alesia Flannery MD  [facility] EMERGENCY DEPARTMENT  2/25/2025     Alesia Flannery MD  02/25/25 [id number]

## 2025-02-25 NOTE — H&P
[facility]    History and Physical - Hospitalist Service, GOLD TEAM        Date of Admission:  2/25/2025    Assessment & Plan   Tad Zaman is a 64 year old male admitted on 2/25/2025 for AMS. He has a history of diabetes, HTN, CAD, HF, and renal transplant 9/2022, left AKA, CKD.      # AMS  Patient was brought in by family for AMS x 1-2 months.  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  Starting late December, daughters noticed that he had seemed confused and some texts to them.  Starting 1/13/2025, patient's family noticed a dramatic decline in patient's cognitive status.  He was confused, had impaired memory, and was unable to perform ADLs. He would frequently be pressing objects as if they had buttons to push, he would pour milk into his salad, he was withdrawn.   -- He was evaluated at the [facility] multiple times in ED then was evaluated during an admission 1/20-2/21/25 with very broad workup (see below) that did not amount to any type of notable etiology of his symptoms. During this stay he was also treated for HF exacerbation, MOY, and UTI which were all stable at time of discharge. He finished course of cefdinir for e coli UTI on 2/24. He was discharged with family being told that he has failure to thrive. He was discharged home, required 24 hr cares by daughter including changing him and preparation of food. He has also has had intermittent agitation and aggression. Home health nurse stated that he was not appropriate for their program.   -- [facility]:   [facility] head 1/18: no acute pathology  MRI 1/29: Stable mild amount of chronic small vessel ischemia. Small chronic R cerebellar infarct.   CT A/P without contrast 1/28/25: see below re renal findings. Read for emphysema and probable impacted secretions in distal small airways, cholelithiasis.   Psych consult 2/13: recommended  removing capacity for medical decision making, recommended palliative care. No medication changes recommended.   Neurology and ID also assessed, no notes available to review.   OT UMS 6/30  EEG: nonspecific encephalopathy.   Noted hx of seizure like activity in 2016 was prescribed Keppra, not a current medication; unclear reason for stopping.   LP: protein 173, glucose 110. Cell count with 21 WBC, 41 RBC, 52 lymph. Hazy appearance  Encephalopathy paraneoplastic evaluation (including Purkinje, ADI, antiglial nucl antibody, NMDA, BOSSMAN) 1/30/25 negative.   CSF flow cytometry: suboptimal specimen with low cellularity. No immunophenotypic evidence of involvement by lymphoma. [place] light chain 64.8, lambda light chain 35.1, kappa/lambda 1.85 all elevated. No monoclonals detected (1/22)  ELP/Immunofix, serum panel: total protein low, beta 1 fraction low (1/22/25)  Polyoma virus DNA of CSF, MAGNUS virus DNA (1/28) negative.   Meningitis / encephalitis panel (1/28) negative  CSF angiotensin converting enzyme (1/28) negative negative  Tac level during hospitalization checked 5 times - 3.9-8.9  CMV (1/28, 1/25) negative   BK virus (1/28) negative  EBV (1/25) negative  Cryptococcus (1/26) negative; Cryptococcus neoformans (1/28) negative  Syphilis (1/25) negative  HIV (1/25) negative  B12 (1/18) WNL  TSH (1/18) high side of normal 4.94  Drug screen negative. Patient has distant history of what sounds to be mild to moderate alcohol intake.   E coli UTI s/p cefdinir treatment. Enteric panel negative. Respiratory viral panel negative. Procalcitonin not elevated. CRP (1/18) 11.65, ESR (1/18) low at 4. CK (1/28) WNL  EKG, trops - not noted to be abnormal  -- Workup here: basic labwork not concerning. Fluvid RSV negative. CXR noncontributory  -- Exam: patient alert and interactive, appears tired. Answers ROS questions, but answers are unreliable per daughter. Oriented x 2 - knows birthday, location. States Sharan [place] is the  president and it is 2010, it is April and it is autumn. He states we are lying when we discuss his care. He is actively trying to push buttons on his bed. 5/5 strength to all 4 extremities symmetrically. No focal deficits. Pupils equal, constricted (difficult to evaluate for reactivity being in bright hallway).   Plan:   Neurology consult, they will see in am  Transplant nephrology consult  Specific question to help evaluate for tacrolimus toxicity  Checked cystatin C  Tacro level ordered for am  RNCC consult to help get imaging (MRI, CT head) sent here from the [facility]  UA  UDS  PT/OT    # Renal transplant 9/2022   # Presumed CKD  Had the transplant at the [facility]. [facility]. 1/27 renal US with doppler showed no stenosis of vessels. Resistive indices were increased, felt could be related to ureteric obstruction but not specific. Mild dilation of intrarenal collecting system and ureter was also seen. CT a/p 1/28 W/O contrast suggested mild hydro. Renal ultrasound 2/3/25 at [facility] showed no hydro or other abnormalities. FENA 4.3 (1/28) at [facility]. Creat 2.26 today. Creat recent baseline appears to be 1.8-2.3. Peak with recent MOY was up to 2.7, improved to 2.3 at time of discharge 2/21.  - transplant neph consult  - tac level in am  - PTA tacrolimus 1.5 mg BID  - PTA mycophenolate 500 mg BID  - PTA calcitriol and sodium bicarb  - PTA was patiromer 8.4 g once a week - will monitor K levels here daily    # Troponinemia, stable  # Lateral lead T wave inversions  # Atrial flutter  # HF  # HTN  # Hx MI  Limited cardiac history available. Per patient's daughter he may have had a stent placed at 1 point but is unsure.  Patient and daughters deny any history of cardiac arrhythmia.  However on exam today and on EKG evidence of atrial flutter, and patient also on Eliquis at least since December.  During [facility] recent [facility], he was found to have heart failure exacerbation  with 25-30 lbs fluid excess. BNP during hospitalization was 2868. His Lasix was switched to Torsemide. He has been taking his medications per his daughter.   - BNP 54453 here.   - trop 267>>218  - EKG with T wave inversions and mild ST depressions in lateral leads  - denies recent CP or SOB, no peripheral or abdominal edema  Plan:   - repeat EKG   - curbsided cardiology, recommend ECHO in am and holding Eliquis. If any abnormalities in am, would reach out to cardiology again  - ECHO in am  - cont PTA atorvastatin 20 mg nightly  - hold PTA Eliquis in case of procedure  - cont PTA Jardiance 12.5 mg daily  - continue PTA metoprolol 75 mg daily  - continue PTA torsemide 10 mg BID    # T2DM  Not on long-term insulin. Hemoglobin A1c 6.9% as of 7/2024  - cont PTA Jardiance 12.5 mg daily    # Mild normocytic anemia  # Hx hepatitis C  # Peripheral vascular disease  # S/p L AKA  # Glaucoma - cont PTA eyedrops  # Sickle cell trait          Diet: Combination Diet Regular Diet Adult  DVT Prophylaxis: Pneumatic Compression Devices while holding Eliquis  Zaman Catheter: Not present  Lines: None     Cardiac Monitoring: ACTIVE order. Indication: Tachyarrhythmias, acute (48 hours)  Code Status: Full Code    Clinically Significant Risk Factors Present on Admission                # Drug Induced Coagulation Defect: home medication list includes an anticoagulant medication                         Disposition Plan     Medically Ready for Discharge: Anticipated in 2-4 Days         The patient's care was discussed with the Attending Physician, Dr. Conner, Bedside Nurse, Patient, Patient's Family, and cardiology and neurology Consultant(s).    Becca Nino PA-C  Hospitalist Service, [facility]  Securely message with Context app (more info)  Text page via [facility] Paging/Directory   See signed in provider for up to date coverage  information    ______________________________________________________________________    Chief Complaint   AMS    History is obtained from the patient and the patient's family.     History of Present Illness   Tad Zaman is a 64 year old male who is seen for a medical evaluation.  For pertinent details of history, see above under each condition.  Patient denies any recent chest pain, shortness of breath, fevers, chills, abdominal pain, vomiting, diarrhea, headaches.  This is set in agreement with daughter.      Past Medical History    Past Medical History:   Diagnosis Date    Chronic kidney disease     Diabetes (H)     Hypertension     Myocardial infarction (H)        Past Surgical History   History reviewed. No pertinent surgical history.    Prior to Admission Medications   Prior to Admission Medications   Prescriptions Last Dose Informant Patient Reported? Taking?   Latanoprost PF 0.005 % SOLN   Yes Yes   Sig: Apply to eye at bedtime. INSTILL 1 DROP IN BOTH EYES AT BEDTIME FOR GLAUCOMA   apixaban ANTICOAGULANT (ELIQUIS) 5 MG tablet 2/25/2025 Morning  Yes Yes   Sig: Take 5 mg by mouth 2 times daily.   atorvastatin (LIPITOR) 20 MG tablet 2/25/2025 Morning  Yes Yes   Sig: Take 1 tablet by mouth at bedtime.   brinzolamide-brimonidine (SIMBRINZA) 1-0.2 % ophthalmic suspension   Yes Yes   Sig: Apply 1 drop to eye 2 times daily.   calcitRIOL (ROCALTROL) 0.25 MCG capsule   Yes Yes   Sig: Take 0.25 mcg by mouth. TAKE ONE CAPSULE BY MOUTH MONDAY THROUGH FRIDAY FOR BONE HEALTH ** DOSE INCREASE   cefdinir (OMNICEF) 300 MG capsule   Yes Yes   Sig: Take 300 mg by mouth 2 times daily.   empagliflozin (JARDIANCE) 25 MG TABS tablet   Yes Yes   Sig: Take 12.5 mg by mouth every morning. TAKE ONE-HALF TABLET BY MOUTH EVERY MORNING FOR DIABETES AND KIDNEY PROTECTION   metoprolol succinate ER (TOPROL XL) 25 MG 24 hr tablet 2/25/2025 Morning  Yes Yes   Sig: Take 75 mg by mouth daily. TAKE THREE TABLETS BY MOUTH EVERY DAY FOR HEART  RATE   mycophenolate (GENERIC EQUIVALENT) 500 MG tablet   Yes Yes   Sig: Take 500 mg by mouth 2 times daily.   pantoprazole (PROTONIX) 40 MG EC tablet   Yes Yes   Sig: Take 40 mg by mouth daily.  TAKE ONE TABLET BY MOUTH EVERY DAY PREVENT GI BLEED   patiromer (VELTASSA) 8.4 g packet   Yes Yes   Sig: Take 8.4 g by mouth once a week. Take 1 packet (8.4 grams) by mouth once weekly for high potassium   sodium bicarbonate 650 MG tablet   Yes Yes   Sig: Take 1,300 mg by mouth 2 times daily.   tacrolimus (GENERIC EQUIVALENT) 0.5 MG capsule   Yes Yes   Sig: Take 1.5 mg by mouth 2 times daily. Take three capsules by mouth twice a day for kidney transplant   torsemide (DEMADEX) 10 MG tablet 2/25/2025 Morning  Yes Yes   Sig: Take 10 mg by mouth 2 times daily.      Facility-Administered Medications: None        Review of Systems    The 5 point Review of Systems is negative other than noted in the HPI.     Physical Exam   Vital Signs: Temp: 98.4  F (36.9  C) Temp src: Oral BP: 112/66 Pulse: 81   Resp: 16 SpO2: 97 % O2 Device: None (Room air)    Weight: 135 lbs 12.85 oz    GENERAL: adult male seen resting supine in bed. NAD.   NEURO / PSYCH: Alert, interactive but does appear somewhat tired.  Answers some questions appropriately, others with evidence of confusion.  Seen trying to push buttons around bed.  Cranial nerves II through XII grossly intact.  5/5 strength to all 4 extremities symmetrically.  Pupils constricted bilaterally, difficult to fully assess for reactivity with being in bright hallway.  HEENT: Anicteric sclera.   CV: Irregularly irregular. S1, S2. No murmurs appreciated.  2+ left radial pulse.  RESPIRATORY: Effort normal. Lungs CTAB with no wheezing, rales, rhonchi.   GI: Abdomen soft and non distended with bowel sounds rare. No tenderness or guarding to palpation.   MSK: no gross deformities  EXTREMITIES: nonedematous  SKIN: No jaundice. No rashes or lesions to exposed areas.       Medical Decision Making        120 MINUTES SPENT BY ME on the date of service doing chart review, history, exam, documentation & further activities per the note.      Data     I have personally reviewed the following data over the past 24 hrs:    10.1  \   12.3 (L)   / 350     137 100 83.5 (H) /  189 (H)   5.3 25 2.26 (H) \     ALT: 13 AST: 19 AP: 122 TBILI: 0.4   ALB: 3.9 TOT PROTEIN: 7.7 LIPASE: N/A     Trop: 218 (HH) BNP: 21,239 (H)

## 2025-02-25 NOTE — LETTER
[facility]C MED SURG  [address]  [place] [address]  [phone]     FACSIMILE TRANSMITTAL SHEET    TO: Intake    COMPANY: [facility]  FAX NUMBER: [phone]  PHONE NUMBER: [phone]     FROM: JOHN Cardoza  PHONE: [phone]  DATE: 03/26/25  NUMBER OF PAGES: n/a    _____URGENT __x__REVIEW ONLY _____PLEASE COMMENT____PLEASE REPLY    NOTES/COMMENTS: VIRGEN Zaman ([birth year])    **For continued service/payer:  [facility]  [address] [place] [address]  [facility] liaison - [phone]   PCP - Dr. Ashley Garcia RN - [place] - [phone]                        IF YOU DID NOT RECEIVE THE CORRECT NUMBER OF PAGES OR THE FAX DID NOT COME THROUGH CLEARLY, PLEASE CALL THE SENDER     CONFIDENTIALITY STATEMENT: Confidential information that may accompany this transmission contains protected health information under state and federal law and is legally privileged. This information is intended only for the use of the individual or entity named above and may be used only for carrying out treatment, payment or other healthcare operations. The recipient or person responsible for delivering this information is prohibited by law from disclosing this information without proper authorization to any other party, unless required to do so by law or regulation. If you are not the intended recipient, you are hereby notified that any review, dissemination, distribution, or copying of this message is strictly prohibited. If you have received this communication in error, please destroy the materials and contact us immediately by calling the number listed above. No response indicates that the information was received by the appropriate authorized party

## 2025-02-26 ENCOUNTER — APPOINTMENT (OUTPATIENT)
Dept: CARDIOLOGY | Facility: CLINIC | Age: 65
DRG: 981 | End: 2025-02-26
Attending: PHYSICIAN ASSISTANT
Payer: MEDICARE

## 2025-02-26 LAB
ANION GAP SERPL CALCULATED.3IONS-SCNC: 14 MMOL/L (ref 7–15)
ATRIAL RATE - MUSE: 250 BPM
BUN SERPL-MCNC: 77.6 MG/DL (ref 8–23)
CALCIUM SERPL-MCNC: 10.3 MG/DL (ref 8.8–10.4)
CHLORIDE SERPL-SCNC: 99 MMOL/L (ref 98–107)
CREAT SERPL-MCNC: 2.13 MG/DL (ref 0.67–1.17)
DIASTOLIC BLOOD PRESSURE - MUSE: NORMAL MMHG
EGFRCR SERPLBLD CKD-EPI 2021: 34 ML/MIN/1.73M2
ERYTHROCYTE [DISTWIDTH] IN BLOOD BY AUTOMATED COUNT: 14.5 % (ref 10–15)
GLUCOSE SERPL-MCNC: 167 MG/DL (ref 70–99)
HCO3 SERPL-SCNC: 23 MMOL/L (ref 22–29)
HCT VFR BLD AUTO: 38.5 % (ref 40–53)
HGB BLD-MCNC: 12.4 G/DL (ref 13.3–17.7)
INTERPRETATION ECG - MUSE: NORMAL
LVEF ECHO: NORMAL
MCH RBC QN AUTO: 27 PG (ref 26.5–33)
MCHC RBC AUTO-ENTMCNC: 32.2 G/DL (ref 31.5–36.5)
MCV RBC AUTO: 84 FL (ref 78–100)
P AXIS - MUSE: NORMAL DEGREES
PLATELET # BLD AUTO: 312 10E3/UL (ref 150–450)
POTASSIUM SERPL-SCNC: 5.7 MMOL/L (ref 3.4–5.3)
PR INTERVAL - MUSE: NORMAL MS
QRS DURATION - MUSE: 76 MS
QT - MUSE: 388 MS
QTC - MUSE: 469 MS
R AXIS - MUSE: -23 DEGREES
RBC # BLD AUTO: 4.59 10E6/UL (ref 4.4–5.9)
SODIUM SERPL-SCNC: 136 MMOL/L (ref 135–145)
SYSTOLIC BLOOD PRESSURE - MUSE: NORMAL MMHG
T AXIS - MUSE: 180 DEGREES
TACROLIMUS BLD-MCNC: 7.8 UG/L (ref 5–15)
TME LAST DOSE: NORMAL H
TME LAST DOSE: NORMAL H
VENTRICULAR RATE- MUSE: 88 BPM
WBC # BLD AUTO: 10.8 10E3/UL (ref 4–11)

## 2025-02-26 PROCEDURE — C8929 TTE W OR WO FOL WCON,DOPPLER: HCPCS

## 2025-02-26 PROCEDURE — 255N000002 HC RX 255 OP 636: Performed by: INTERNAL MEDICINE

## 2025-02-26 PROCEDURE — 120N000002 HC R&B MED SURG/OB UMMC

## 2025-02-26 PROCEDURE — 93306 TTE W/DOPPLER COMPLETE: CPT | Mod: 26 | Performed by: INTERNAL MEDICINE

## 2025-02-26 PROCEDURE — 250N000011 HC RX IP 250 OP 636: Performed by: PHYSICIAN ASSISTANT

## 2025-02-26 PROCEDURE — 999N000208 ECHOCARDIOGRAM COMPLETE

## 2025-02-26 PROCEDURE — 85014 HEMATOCRIT: CPT | Performed by: PHYSICIAN ASSISTANT

## 2025-02-26 PROCEDURE — 99418 PROLNG IP/OBS E/M EA 15 MIN: CPT | Mod: FS | Performed by: STUDENT IN AN ORGANIZED HEALTH CARE EDUCATION/TRAINING PROGRAM

## 2025-02-26 PROCEDURE — 96374 THER/PROPH/DIAG INJ IV PUSH: CPT

## 2025-02-26 PROCEDURE — 250N000012 HC RX MED GY IP 250 OP 636 PS 637: Performed by: PHYSICIAN ASSISTANT

## 2025-02-26 PROCEDURE — 99233 SBSQ HOSP IP/OBS HIGH 50: CPT | Performed by: INTERNAL MEDICINE

## 2025-02-26 PROCEDURE — 250N000013 HC RX MED GY IP 250 OP 250 PS 637: Performed by: PHYSICIAN ASSISTANT

## 2025-02-26 PROCEDURE — 99223 1ST HOSP IP/OBS HIGH 75: CPT | Mod: FS | Performed by: STUDENT IN AN ORGANIZED HEALTH CARE EDUCATION/TRAINING PROGRAM

## 2025-02-26 PROCEDURE — 99223 1ST HOSP IP/OBS HIGH 75: CPT | Performed by: STUDENT IN AN ORGANIZED HEALTH CARE EDUCATION/TRAINING PROGRAM

## 2025-02-26 PROCEDURE — 80048 BASIC METABOLIC PNL TOTAL CA: CPT | Performed by: PHYSICIAN ASSISTANT

## 2025-02-26 PROCEDURE — 84295 ASSAY OF SERUM SODIUM: CPT | Performed by: PHYSICIAN ASSISTANT

## 2025-02-26 PROCEDURE — 82435 ASSAY OF BLOOD CHLORIDE: CPT | Performed by: PHYSICIAN ASSISTANT

## 2025-02-26 PROCEDURE — 93306 TTE W/DOPPLER COMPLETE: CPT

## 2025-02-26 PROCEDURE — G0378 HOSPITAL OBSERVATION PER HR: HCPCS

## 2025-02-26 PROCEDURE — 36415 COLL VENOUS BLD VENIPUNCTURE: CPT | Performed by: PHYSICIAN ASSISTANT

## 2025-02-26 RX ADMIN — CALCITRIOL CAPSULES 0.25 MCG 0.25 MCG: 0.25 CAPSULE ORAL at 08:22

## 2025-02-26 RX ADMIN — TACROLIMUS 1.5 MG: 1 CAPSULE ORAL at 17:37

## 2025-02-26 RX ADMIN — SODIUM BICARBONATE 650 MG TABLET 1300 MG: at 08:19

## 2025-02-26 RX ADMIN — TORSEMIDE 10 MG: 10 TABLET ORAL at 17:36

## 2025-02-26 RX ADMIN — ONDANSETRON 4 MG: 2 INJECTION INTRAMUSCULAR; INTRAVENOUS at 11:15

## 2025-02-26 RX ADMIN — TORSEMIDE 10 MG: 10 TABLET ORAL at 08:22

## 2025-02-26 RX ADMIN — METOPROLOL SUCCINATE 75 MG: 25 TABLET, EXTENDED RELEASE ORAL at 08:19

## 2025-02-26 RX ADMIN — TACROLIMUS 1.5 MG: 1 CAPSULE ORAL at 09:00

## 2025-02-26 RX ADMIN — MYCOPHENOLATE MOFETIL 500 MG: 500 TABLET, FILM COATED ORAL at 17:36

## 2025-02-26 RX ADMIN — PANTOPRAZOLE SODIUM 40 MG: 40 TABLET, DELAYED RELEASE ORAL at 08:19

## 2025-02-26 RX ADMIN — MYCOPHENOLATE MOFETIL 500 MG: 500 TABLET, FILM COATED ORAL at 08:20

## 2025-02-26 RX ADMIN — EMPAGLIFLOZIN 25 MG: 25 TABLET, FILM COATED ORAL at 08:19

## 2025-02-26 RX ADMIN — HUMAN ALBUMIN MICROSPHERES AND PERFLUTREN 6 ML: 10; .22 INJECTION, SOLUTION INTRAVENOUS at 14:36

## 2025-02-26 ASSESSMENT — ACTIVITIES OF DAILY LIVING (ADL)
ADLS_ACUITY_SCORE: 67
ADLS_ACUITY_SCORE: 41
ADLS_ACUITY_SCORE: 67
ADLS_ACUITY_SCORE: 67
ADLS_ACUITY_SCORE: 41
ADLS_ACUITY_SCORE: 67
DEPENDENT_IADLS:: CLEANING;COOKING;LAUNDRY;SHOPPING;MEAL PREPARATION;MEDICATION MANAGEMENT;TRANSPORTATION;INCONTINENCE
ADLS_ACUITY_SCORE: 67
ADLS_ACUITY_SCORE: 67
ADLS_ACUITY_SCORE: 41
ADLS_ACUITY_SCORE: 67
ADLS_ACUITY_SCORE: 67
ADLS_ACUITY_SCORE: 41
ADLS_ACUITY_SCORE: 41

## 2025-02-26 NOTE — MEDICATION SCRIBE - ADMISSION MEDICATION HISTORY
Medication Scribe Admission Medication History    Admission medication history is complete. The information provided in this note is only as accurate as the sources available at the time of the update.    Information Source(s): Patient via in-person    Pertinent Information: Tad reports having a batch of medications which include aspirin 81 mg tablets that he took this morning. Unable  Copy of medication list was obtained and list indicates that the aspirin 81 mg tablets are no longer needed. Unable to complete medication review as transportation came to take him over to [place].    Changes made to PTA medication list:  Added:   apixaban ANTICOAGULANT (ELIQUIS) 5 MG tablet  atorvastatin (LIPITOR) 20 MG tablet  brinzolamide-brimonidine (SIMBRINZA) 1-0.2 % ophthalmic suspension  calcitRIOL (ROCALTROL) 0.25 MCG capsule  cefdinir (OMNICEF) 300 MG capsule  empagliflozin (JARDIANCE) 25 MG TABS tablet  Latanoprost PF 0.005 % SOLN  metoprolol succinate ER (TOPROL XL) 25 MG 24 hr tablet  mycophenolate (GENERIC EQUIVALENT) 500 MG tablet  pantoprazole (PROTONIX) 40 MG EC tablet  patiromer (VELTASSA) 8.4 g packet  sodium bicarbonate 650 MG tablet  tacrolimus (GENERIC EQUIVALENT) 0.5 MG capsule  torsemide (DEMADEX) 10 MG tablet  Deleted: None  Changed: None    Allergies reviewed with patient and updates made in EHR: yes    Medication History Completed By: Monica Espinoza 2/25/2025 7:42 PM    PTA Med List   Medication Sig Last Dose/Taking    apixaban ANTICOAGULANT (ELIQUIS) 5 MG tablet Take 5 mg by mouth 2 times daily. 2/25/2025 Morning    atorvastatin (LIPITOR) 20 MG tablet Take 1 tablet by mouth at bedtime. 2/25/2025 Morning    brinzolamide-brimonidine (SIMBRINZA) 1-0.2 % ophthalmic suspension Apply 1 drop to eye 2 times daily. Taking    calcitRIOL (ROCALTROL) 0.25 MCG capsule Take 0.25 mcg by mouth. TAKE ONE CAPSULE BY MOUTH MONDAY THROUGH FRIDAY FOR BONE HEALTH ** DOSE INCREASE Taking    cefdinir (OMNICEF) 300 MG capsule  Take 300 mg by mouth 2 times daily. Taking    empagliflozin (JARDIANCE) 25 MG TABS tablet Take 12.5 mg by mouth every morning. TAKE ONE-HALF TABLET BY MOUTH EVERY MORNING FOR DIABETES AND KIDNEY PROTECTION Taking    Latanoprost PF 0.005 % SOLN Apply to eye at bedtime. INSTILL 1 DROP IN BOTH EYES AT BEDTIME FOR GLAUCOMA Taking    metoprolol succinate ER (TOPROL XL) 25 MG 24 hr tablet Take 75 mg by mouth daily. TAKE THREE TABLETS BY MOUTH EVERY DAY FOR HEART RATE 2/25/2025 Morning    mycophenolate (GENERIC EQUIVALENT) 500 MG tablet Take 500 mg by mouth 2 times daily. Taking    pantoprazole (PROTONIX) 40 MG EC tablet Take 40 mg by mouth daily.  TAKE ONE TABLET BY MOUTH EVERY DAY PREVENT GI BLEED Taking    patiromer (VELTASSA) 8.4 g packet Take 8.4 g by mouth once a week. Take 1 packet (8.4 grams) by mouth once weekly for high potassium Taking    sodium bicarbonate 650 MG tablet Take 1,300 mg by mouth 2 times daily. Taking    tacrolimus (GENERIC EQUIVALENT) 0.5 MG capsule Take 1.5 mg by mouth 2 times daily. Take three capsules by mouth twice a day for kidney transplant Taking    torsemide (DEMADEX) 10 MG tablet Take 10 mg by mouth 2 times daily. 2/25/2025 Morning

## 2025-02-26 NOTE — CONSULTS
[facility]  Neurology Consultation    Patient Name:  Tad Zaman  MRN:  [id number]    :  1960  Date of Service:  2025  Primary care provider:  Julius Cesar      Neurology consultation service was asked to see Tad Zaman by Dr. Martines to evaluate persistent encephalopathy.    Chief Complaint:  persistent encephalopathy    History of Present Illness:   Tad Zaman is a 64 year old male with history of diabetes, HTN, CAD, HF, and renal transplant 2022, CKD who presents with altered mental status of 1-2 months.     Collateral obtained from daughter. Last time functioning normal wason . At this time he was driving, living independently, and performing own and family members ADL's at this time. On the , daughter noted frostbite on patients finger as well some confusion and shortness of breath. [facility] were [facility] and instructed to follow up with primary care provider. Over the next couple of days, he started to become less active, speaking nonsense, and sleeping more. He started to improve some from a mentation aspect after receiving lasix though one day he again worsened where he unable to care for himself.     Patient has very broad workup (as noted below) completed at [facility] during admission from -25.   CT head : no acute pathology  MRI : Stable mild amount of chronic small vessel ischemia. Small chronic R cerebellar infarct.   CT A/P without contrast 25: see below re renal findings. Read for emphysema and probable impacted secretions in distal small airways, cholelithiasis.   Psych consult : recommended removing capacity for medical decision making, recommended palliative care. No medication changes recommended.   Neurology and ID also assessed, no notes available to review.   OT SLUMS   EEG: nonspecific encephalopathy.   Noted hx of seizure like activity in  was  prescribed Keppra, not a current medication; unclear reason for stopping.   LP: protein 173, glucose 110. Cell count with 21 WBC, 41 RBC, 52 lymph. Hazy appearance  Encephalopathy paraneoplastic evaluation (including Purkinje, ADI, antiglial nucl antibody, NMDA, BOSSMAN) 1/30/25 negative.   CSF flow cytometry: suboptimal specimen with low cellularity. No immunophenotypic evidence of involvement by lymphoma. Hysham light chain 64.8, lambda light chain 35.1, kappa/lambda 1.85 all elevated. No monoclonals detected (1/22)  ELP/Immunofix, serum panel: total protein low, beta 1 fraction low (1/22/25)  Polyoma virus DNA of CSF, MAGNUS virus DNA (1/28) negative.   Meningitis / encephalitis panel (1/28) negative  CSF angiotensin converting enzyme (1/28) negative negative  Tac level during hospitalization checked 5 times - 3.9-8.9  CMV (1/28, 1/25) negative   BK virus (1/28) negative  EBV (1/25) negative  Cryptococcus (1/26) negative; Cryptococcus neoformans (1/28) negative  Syphilis (1/25) negative  HIV (1/25) negative  B12 (1/18) WNL  TSH (1/18) high side of normal 4.94  Drug screen negative. Patient has distant history of what sounds to be mild to moderate alcohol intake.   E coli UTI s/p cefdinir treatment. Enteric panel negative. Respiratory viral panel negative. Procalcitonin not elevated. CRP (1/18) 11.65, ESR (1/18) low at 4. CK (1/28) WNL  EKG, trops - not noted to be abnormal    [facility] failure to [facility]. There was never a clear answer to why patient cognition has declined. Family has been caring for patient as he needs 24-hour care. Daughter feels that patient is more agitated and verbally aggressive. Feels that his thinking is more delusional. Daughter denies any visual or auditory hallucinations for patient.     ROS  A comprehensive ROS was performed and pertinent findings were included in HPI.     PMH  Past Medical History:   Diagnosis Date    Chronic kidney disease      "Diabetes (H)     Hypertension     Myocardial infarction (H)      History reviewed. No pertinent surgical history.    Medications   I have personally reviewed the patient's medication list.     Allergies  I have personally reviewed the patient's allergy list.       Physical Examination   Vitals: /80 (BP Location: Right arm)   Pulse 81   Temp 98.5  F (36.9  C) (Oral)   Resp 16   Ht 1.829 m (6')   Wt 61.6 kg (135 lb 12.9 oz)   SpO2 97%   BMI 18.42 kg/m    General: Lying in bed, NAD  Head: NC/AT  Eyes: no icterus, op pink and moist  Cardiac: Extremities warm, no edema.   Respiratory: non-labored on RA  Skin: No rash or lesion on exposed skin  Psych: Mood pleasant, affect congruent  Neuro:  Mental status: Awake, alert, attentive, oriented to self. Told me it was 2060 and the \"10th month\", states we are at [facility], and believes he is in hospital for frostbite. Believes Bryan Valentin is the president. Naming of thumb, knuckles, and badge are intact. Repetition intact. Can calculate quarters in $1.75. Some trouble with following simple and two-step commands as he seems easily distractible but able to complete with coercing. There was a instance (~5 seconds) during the exam where patient seemed to zone off into space and was not responding despite verbal stimuli, then returned back to tracking me.   Cranial nerves: PERRL, conjugate gaze, EOMI, facial sensation intact, face symmetric, shoulder shrug strong, tongue/uvula midline, no dysarthria.   Motor: Normal bulk and tone. No abnormal movements. 5/5 strength bilaterally in deltoids, biceps, triceps, hand , hip flexors, right knee flexion, right knee extension, right plantarflexion, right dorsiflexion.   Reflexes: Normo-reflexic and symmetric biceps, brachioradialis, right patellae and right achilles. Right toe mute.   Sensory: Deferred as patient refused exam  Coordination: FNF without ataxia or dysmetria.  Gait: Deferred given concern for " ambulation    Investigations   I have personally reviewed pertinent labs, tests, and radiological imaging. Discussion of notable findings is included under Impression.     Was patient transferred from outside hospital?   No    Impression  Tad Zaman is a 64 year old male with history of diabetes, HTN, CAD, HF, and renal transplant 9/2022, CKD who presents with worsening altered mental status over the last 1-2 months. Extensive workup completed at [facility]. His previous LP and CSF studies are notable for elevated protein (173), 21 WBC, and 55 lymph.There is concern right now for previous viral encephalitis with previous LP results vs rapidly progressive dementia. I am currently unable to view images of MR Brain therefore would recommend repeating here, would be useful in evaluating whether or not there is change from 1/29. Recommend also repeating LP to evaluate for improvement or worsening of CSF studies. Will continue to follow and give recommendations pending results.     Recommendations  -Recommend repeat MR Brain w/wo  -Recommend repeat LP   >CSF studies - cell count, flow cytometry, glucose, protein, RT-QuIC, meningitis/encephalitis panel  -Will continue to follow    Thank you for involving Neurology in the care of Tad Zaman.  Please do not hesitate to call with questions/concerns (consult pager [phone]).      Patient was seen and discussed with Dr. Quezada.    Gudelia Hernandez PA-C    70 MINUTES SPENT BY ME on the date of service doing chart review, history, exam, documentation & further activities per the note.

## 2025-02-26 NOTE — PROGRESS NOTES
[facility]    Medicine Progress Note - Hospitalist Service, GOLD TEAM 16    Date of Admission:  2/25/2025    Assessment & Plan     Tad Zaman is a 64 year old male admitted on 2/25/2025 for AMS. He has a history of diabetes, HTN, CAD, HF, and renal transplant 9/2022, left AKA, CKD.    Patient was brought in by family for AMS x 1-2 months.   He was evaluated at the [facility] multiple times in ED then was evaluated during an admission 1/20-2/21/25 with very broad workup (see below) that did not amount to any type of notable etiology of his symptoms. During this stay he was also treated for HF exacerbation, MOY, and UTI which were all stable at time of discharge. He finished course of cefdinir for e coli UTI on 2/24. He was discharged with family being told that he has failure to thrive. He was discharged home, required 24 hr cares by daughter including changing him and preparation of food. He has also has had intermittent agitation and aggression. Home health nurse stated that he was not appropriate for their program.   No clear etiology for confusion.     Today's update   -No acute events overnight.   -Patient was laying on bed and looked comfortable.   -As soon as I entered the room he closed his eyes and didn't want to answer my questions. He refused physical examination. I was not able to obtain ROS or significant hx.   -No family at bedside.   -Waiting for Neurology evaluation.     AMS  Plan:   Waiting for Neurology evaluation.   Transplant nephrology consult  Specific question to help evaluate for tacrolimus toxicity  Checked cystatin C  Tacro level ordered for am  RNCC consult to help get imaging (MRI, CT head) [facility]  UA  UDS  PT/OT     # Renal transplant 9/2022   # Presumed CKD  Had the transplant at [facility]. [facility]. 1/27 renal US with doppler showed no stenosis of vessels.  Resistive indices were increased, felt could be related to ureteric obstruction but not specific. Mild dilation of intrarenal collecting system and ureter was also seen. CT a/p 1/28 W/O contrast suggested mild hydro. [facility] at [facility] showed no hydro or other abnormalities. FENA 4.3 (1/28) at [facility]. Creat 2.26 today. Creat recent baseline appears to be 1.8-2.3. Peak with recent MOY was up to 2.7, improved to 2.3 at time of discharge 2/21.  - transplant neph consult  - tac level in am  - PTA tacrolimus 1.5 mg BID  - PTA mycophenolate 500 mg BID  - PTA calcitriol and sodium bicarb  - PTA was patiromer 8.4 g once a week - will monitor K levels here daily     # Troponinemia, stable  # Lateral lead T wave inversions  # Atrial flutter  # HF  # HTN  # Hx MI  Limited cardiac history available. Per patient's daughter he may have had a stent placed at 1 point but is unsure.  Patient and daughters deny any history of cardiac arrhythmia.  However on exam today and on EKG evidence of atrial flutter, and patient also on Eliquis at least since December.  [facility], he was found to have heart failure exacerbation with 25-30 lbs fluid excess. BNP during hospitalization was 2868. His Lasix was switched to Torsemide. He has been taking his medications per his daughter.   - BNP 34357 here.   - trop 267>>218  - EKG with T wave inversions and mild ST depressions in lateral leads  - denies recent CP or SOB, no peripheral or abdominal edema  Plan:   - repeat EKG   - waiting for new TTE  - cont PTA atorvastatin 20 mg nightly  - hold PTA Eliquis in case of procedure  - cont PTA Jardiance 12.5 mg daily  - continue PTA metoprolol 75 mg daily  - continue PTA torsemide 10 mg BID     # T2DM  Not on long-term insulin. Hemoglobin A1c 6.9% as of 7/2024  - cont PTA Jardiance 12.5 mg daily     # Mild normocytic anemia  # Hx hepatitis C  # Peripheral vascular disease  # S/p L AKA  # Glaucoma - cont PTA  eyedrops  # Sickle cell trait          Diet: Combination Diet Regular Diet Adult    DVT Prophylaxis: Pneumatic Compression Devices and Anti-embolisim stockings (TEDs)  Zaman Catheter: Not present  Lines: None     Cardiac Monitoring: ACTIVE order. Indication: Tachyarrhythmias, acute (48 hours)  Code Status: Full Code      Clinically Significant Risk Factors Present on Admission        # Hyperkalemia: Highest K = 5.7 mmol/L in last 2 days, will monitor as appropriate         # Drug Induced Coagulation Defect: home medication list includes an anticoagulant medication                         Social Drivers of Health    Tobacco Use: Medium Risk (7/26/2024)    Received from [employer]    Patient History     Smoking Tobacco Use: Former     Smokeless Tobacco Use: Never          Disposition Plan     Medically Ready for Discharge: Anticipated in 5+ Days             Leif Francis MD  Hospitalist Service, GOLD TEAM 16  [facility]  Securely message with [employer] (more info)  Text page via [employer] Paging/Directory   See signed in provider for up to date coverage information  ______________________________________________________________________    Interval History     Acute events as above.     Physical Exam   Vital Signs: Temp: 97.8  F (36.6  C) Temp src: Oral BP: 103/73 Pulse: 87   Resp: 18 SpO2: 96 % O2 Device: None (Room air)    Weight: 135 lbs 12.85 oz    General Appearance: Alert, eyes closed, no acute distress.   Patient refused rest of physical examination    Medical Decision Making       55 MINUTES SPENT BY ME on the date of service doing chart review, history, exam, documentation & further activities per the note.      Data     I have personally reviewed the following data over the past 24 hrs:    10.8  \   12.4 (L)   / 312     136 99 77.6 (H) /  167 (H)   5.7 (H) 23 2.13 (H) \     ALT: N/A AST: N/A AP: N/A TBILI: N/A   ALB: N/A TOT PROTEIN: N/A LIPASE: N/A     Trop:  218 () BNP: N/A     Procal: N/A CRP: N/A Lactic Acid: 1.7         Imaging results reviewed over the past 24 hrs:   Recent Results (from the past 24 hours)   XR Chest 2 Views    Narrative    EXAM: XR CHEST 2 VIEWS  LOCATION: [facility]  DATE: 2/25/2025    INDICATION: sob  COMPARISON: None.      Impression    IMPRESSION: Prominent right nipple shadow. Shallow inspiration. Negative chest.

## 2025-02-26 NOTE — PLAN OF CARE
Goal Outcome Evaluation:      Plan of Care Reviewed With: patient    Overall Patient Progress: no changeOverall Patient Progress: no change    Discharge plan: TBD       VS: /66 (BP Location: Left arm)   Pulse 77   Temp 98.1  F (36.7  C) (Oral)   Resp 16   Ht 1.829 m (6')   Wt 61.6 kg (135 lb 12.9 oz)   SpO2 97%   BMI 18.42 kg/m       O2: Sating >95% on RA, denies SOB/Chest pain. C/o nausea, prn iv Zofran given. No emesis. Afebrile this shift.    Output: Voids spontaneously in bathroom   Last BM: LBM 2/26, bowel sounds normoactive x4   Activity: Not oob, trun/repo   Up for meals? No   Skin: All visible skin intact.    Pain: Denies pain    CMS: AO to self,   Dressing: None   Diet: Regular diet, thin liquids    LDA: L. PIV/SL   Equipment:  IV pole, personal belongings   Plan: Call light within reach, bed in a low position. Able to make needs known. Continue to monitor with POC.   Additional Info:           MRI safety sheet completed. Pt's daughter answer the questions

## 2025-02-26 NOTE — CONSULTS
"Care Management Initial Consult    General Information  Assessment completed with: Patient, Children, Tad Zaman; Nadine Zaman (daughter)  Type of CM/SW Visit: Initial Assessment    Primary Care Provider verified and updated as needed: Yes   Readmission within the last 30 days: no previous admission in last 30 days      Reason for Consult: discharge planning  Advance Care Planning: Advance Care Planning Reviewed: no concerns identified (Patient states that he has an advanced directive at home. Writer requested a copy from daughter.)          Communication Assessment  Patient's communication style: spoken language (English or Bilingual)             Cognitive  Cognitive/Neuro/Behavioral: .WDL except, orientation  Level of Consciousness: alert, confused  Arousal Level: opens eyes spontaneously  Orientation: disoriented to, place, time, situation (stated \" i don't know\")  Mood/Behavior: flat affect  Best Language: 0 - No aphasia  Speech: clear, spontaneous    Living Environment:   People in home: alone     Current living Arrangements: house      Able to return to prior arrangements: yes       Family/Social Support:  Care provided by: child(gina)  Provides care for: no one, unable/limited ability to care for self  Marital Status:   Support system: Children          Description of Support System: Supportive, Involved    Support Assessment: Adequate family and caregiver support, Adequate social supports    Current Resources:   Patient receiving home care services: No (Was set up with home care services but home care declined d/t pt's level of care needed.)        Community Resources: None  Equipment currently used at home: cane, straight, grab bar, tub/shower, prosthesis, walker, rolling, wheelchair, manual  Supplies currently used at home: Incontinence Supplies    Employment/Financial:  Employment Status: retired        Financial Concerns: none   Referral to Financial Worker: No       Does the patient's " insurance plan have a 3 day qualifying hospital stay waiver?  No    Lifestyle & Psychosocial Needs:  Social Drivers of Health     Food Insecurity: Not on file   Depression: Not at risk (7/26/2024)    Received from [employer]    PHQ-2     PHQ-2 Score: 0   Housing Stability: Not on file   Tobacco Use: Medium Risk (7/26/2024)    Received from [employer]    Patient History     Smoking Tobacco Use: Former     Smokeless Tobacco Use: Never     Passive Exposure: Not on file   Financial Resource Strain: Not on file   Alcohol Use: Not on file   Transportation Needs: Not on file   Physical Activity: Not on file   Interpersonal Safety: Not on file   Stress: Not on file   Social Connections: Not on file   Health Literacy: Not on file       Functional Status:  Prior to admission patient needed assistance:   Dependent ADLs:: Ambulation-cane, Ambulation-walker, Bathing, Dressing, Eating, Grooming, Incontinence, Transfers, Wheelchair-with assist, Toileting  Dependent IADLs:: Cleaning, Cooking, Laundry, Shopping, Meal Preparation, Medication Management, Transportation, Incontinence  Assesssment of Functional Status: Not at baseline with ADL Functioning, Not at baseline with mobility, Not at  functional baseline    Mental Health Status:  Mental Health Status: No Current Concerns  Mental Health Management:  (N/A)    Chemical Dependency Status:  Chemical Dependency Status: No Current Concerns  Chemical Dependency Management:  (N/A)          Values/Beliefs:  Spiritual, Cultural Beliefs, Cheondoism Practices, Values that affect care: no       Cultural/Cheondoism Practices Patient Routinely Participates In:  (N/A)       Discussed  Partnership in Safe Discharge Planning  document with patient/family: No    Additional Information:  Writer met with patient in room to discuss discharge planning. Introduced self and RNCC role. Verified address, phone number, PCP, patient contacts, and health insurance. Added pt's home number to his  "contacts and updated his marital status. Pt states that he has an advanced directive, which can be obtained from his daughter, Nadine. He requested that writer continue this conversation with his daughter.    Writer called daughter, Nadine. She states that she is the pt's POA. Requested copy of advanced directive for our records. She also explained that pt has [facility] insurance. She will look for this information so that we can update our records.    Patient lives alone in a house. He was independent of all ADLs and IADLs up until 1/13/25. He was hospitalized at the [facility]. Since January his altered mental status worsened and he became dependent of all ADLs and IADLs. Pt's daughters have been caring for him. Nadine states that he was set up with home care [facility] but they did not sign him on d/t level of care needed.    Nadine explained that pt's cognition has significantly changed and he was diagnosed with \"a failure to thrive.\" She does not believe that pt fully understands what is happening and she is \"concerned that he will get worse.\"    Family to provide transportation at time of discharge.    Care management was also consulted to find imaging records from the [facility]. Writer called [facility] lia[facility] ([phone]). She advised that writer fax a request to [phone]. Received 1 month worth of imaging results via fax. Also, requested that they push the images to [place]. Writer notified the film room to accept the pushed images. Writer updated the provider. Hard copy of [facility] the [facility] sent to HIM and placed in the paper chart.    Next Steps:   Care management to follow for any discharge needs.    Shelbi Mccloud, MSN, APRN, [facility]   Helping 6MS [phone]  Nurse Coordinator  Securely message with [facility], [facility] Med Surg [facility]  "

## 2025-02-26 NOTE — PLAN OF CARE
Goal Outcome Evaluation:      Plan of Care Reviewed With: patient    Overall Patient Progress: no changeOverall Patient Progress: no change     Shift: 2300 - 0730     VS: Blood pressure 122/80, pulse 81, temperature 98.5  F (36.9  C), temperature source Oral, resp. rate 16, height 1.829 m (6'), weight 61.6 kg (135 lb 12.9 oz), SpO2 97%.     Pain:  Pt denies any pain this shift.   Neuro: Alert with intermittent confusion   Resp: Pt refused   Diet: Regular diet  Skin: Left AKA  LDA: L-PIV  Activity: Assist of 1  Output: Uses urinal at bedside   Additional Info/shift updates: Pt is on cardiac monitoring. Pt refused all assessments and wanted to sleep. Patient was Afib most of the night. Pt HR got up to 122 and provider was notified.   Call light within reach     Plan of care ongoing

## 2025-02-26 NOTE — PROGRESS NOTES
6MS ADMISSION    D: Patient admitted/transferred from [facility] via stretcher for generalized weakness.     I: Upon arrival to the unit patient was oriented to room, unit, and call light. Patient s height, weight, and vital signs were obtained. Allergies reviewed and allergy band applied. Provider notified of patient s arrival on the unit. Adult AVS completed. Head to toe assessment completed. Education assessment completed. Care plan initiated.    A: Vital signs stable upon admission. Patient rates pain at 2/10. Two RN skin assessment completed Yes. Second RN was Cess B. Significant Skin Findings include Left AKA. [facility] Nurse Consult Ordered  No. Bed Algorithm can be found in PCS flow sheets (Support Surface Algorithm) and on IP [facility] NURSE RESOURCE TAB, was this used during this assessment?  Yes. Was a pulsate mattress ordered No.    P: Continue to monitor patient and intervene as needed. Continue with plan of care. Notify provider with any concerns or changes in patient status.

## 2025-02-26 NOTE — CONSULTS
[facility]  Transplant Nephrology Consult Note  Date of Admission:  2/25/2025  Today's Date: 02/26/2025  Requesting physician: Basilio Martines MD    Reason for Consult:  Management of IS    Recommendations:   - Continue mycophenolate 500 mg twice a day.  - Continue tacrolimus 1.5 mg twice a day.  - Repeat a level tomorrow morning.  - With up-trend in K, can start Lokelma 10 G three times a week.  - Hold calcitriol given borderline high calcium.  - Urinalysis and UPCR.  - [facility], I would repeat it inpatient given audible bruit.   - Check hepatitis panel.   - Accurate I/O and daily weight given recent heart failure exacerbation.    Assessment & Plan   # DDKT: Stable from presumed baseline of 1.8 to 2 mg/dL.    - Baseline Creatinine: ~ 1.8-2 as per inpatient notes.    - Proteinuria:  Unknown   - DSA Hx: Unknown due to being transplanted at another Transplant Center   - Last cPRA: Unknown/   - BK Viremia: Not checked recently due to time from transplant   - Kidney Tx Biopsy Hx:  Unknown .    # Immunosuppression: Tacrolimus immediate release (goal 4-6) and Mycophenolate mofetil (dose 500 mg every 12 hours)   - Induction with Recent Transplant:  Not known due to time from transplant   - Continue with intensive monitoring of immunosuppression for efficacy and toxicity.   - Historical Changes in Immunosuppression: None   - Changes: No    # Infection Prevention:   Last CD4 Level: Unknown  - PJP: None      - CMV IgG Ab High Risk Discordance (D+/R-) at time of transplant: Unknown  Present CMV Serostatus: Unknown  - EBV IgG Ab High Risk Discordance (D+/R-) at time of transplant: Unknown  Present EBV Serostatus: Unknown    # Blood Pressure / volume: Controlled;  Goal BP: > 100, but < 130 systolic   - Euvolemic on exam, though with RAP of 8 and mild pulmonary hypertension.   - Continue Toprol, Jardiance and Torsemide.   - Changes: No    #  Diabetes: Unknown control  Last HbA1c: Unknown.  - Glucose at goal for inpatient.    # Anemia in Chronic Renal Disease: Hgb: Stable, near normal      SIOBHAN: No   - Iron studies: Not checked recently    # Mineral Bone Disorder:    - Secondary renal hyperparathyroidism; PTH level: Not checked recently        On treatment: Calcitriol  - Vitamin D; level: Not checked recently        On supplement: No  - Calcium; level: High normal        On supplement: No  - Phosphorus; level: Not checked recently        On supplement: No    # Electrolytes:   - Potassium; level: High        On binder: No  - Magnesium; level: Not checked recently        On supplement: No  - Bicarbonate; level: Normal        On supplement: No    # AMS: [facility] as [facility]y's note. Plan for MRI brain today.    # Other Significant PMH:   - Atrial flutter: On Eliquis.   - HFrEF: presumed ischemic given past maricel pre-transplant with area of ischemia and possible history of stent. On Toprol, jardiance and torsemide. With new drop in LVEF to ~ 45%, he may need further work-up.    - CAD: Possible history of stent.    - PVD s/p left AKA   - Sickle cell trait   - History of hepatitis C.     # Transplant History:  Etiology of Kidney Failure: Hypertension and diabetes.  Tx: DDKT  Transplant: N/A  Significant transplant-related complications: None    Recommendations were communicated to the primary team via this note.    Milind Piper MD  Transplant Nephrology  Contact information via Vocera Web Console or via [facility] Paging/Directory    History of Present Illness  History obtained from daughter at bedside.  Mr. Zaman is a 64-year-old gentleman with end-stage kidney disease from presumed hypertension and diabetes status post DDKT in September 2022 who [facility].    Daughter reports that in December he was independent.  He cooked dinner for Ravi, was able to shovel the snow in the drive  [address], manage his finances and drive.  The only noticeable change was a decrease in weight compared to prior months.  Afterwards, there was a progressive decline with weeks where he was unable to take care of himself for which she was taken to the ER multiple times in mid January.  Additionally, he presented with worsening fluid retention, with possible increase of 10-20 pounds.  As per daughter, [facility], he was on diuretics after which she developed an MOY, that resolved after decreasing the dose of diuretics.  He was also treated for a transplant UTI, finishing antibiotics this Monday.    In regard to his kidney transplant, she reports no past history of rejection or issues with viral infections.  She does not know patient's baseline creatinine, but as per notes it ranges from 1.8-2 mg/dL.  I am unable to open the [facility] notes to confirm this.    Review of Systems   Unable because patient is confused    MEDICATIONS:  Current Facility-Administered Medications   Medication Dose Route Frequency Provider Last Rate Last Admin    [Held by provider] apixaban ANTICOAGULANT (ELIQUIS) tablet 5 mg  5 mg Oral BID Becca Nino PA-C        atorvastatin (LIPITOR) tablet 20 mg  20 mg Oral QPM Becca Nino PA-C   20 mg at 02/25/25 2105    calcitRIOL (ROCALTROL) capsule 0.25 mcg  0.25 mcg Oral Daily Becca Nnio PA-C   0.25 mcg at 02/26/25 0822    empagliflozin (JARDIANCE) tablet 25 mg  25 mg Oral Daily Becca Nino PA-C   25 mg at 02/26/25 0819    latanoprost (XALATAN) 0.005 % ophthalmic solution 1 drop  1 drop Both Eyes At Bedtime Becca Nino PA-C   1 drop at 02/25/25 2214    Lidocaine (LIDOCARE) 4 % Patch 1 patch  1 patch Transdermal Q24h Becca Nino PA-C   1 patch at 02/25/25 2104    metoprolol succinate ER (TOPROL XL) 24 hr tablet 75 mg  75 mg Oral Daily Becca Nino PA-C   75 mg at 02/26/25 0819    mycophenolate (GENERIC EQUIVALENT) tablet 500 mg  500 mg Oral BID IS Becca Nino PA-C   500 mg at 02/26/25 0820    pantoprazole  (PROTONIX) EC tablet 40 mg  40 mg Oral QAM AC Becca Nino PA-C   40 mg at 25 0819    sodium bicarbonate tablet 1,300 mg  1,300 mg Oral BID Becca Nino PA-C   1,300 mg at 25 0819    sodium chloride (PF) 0.9% PF flush 3 mL  3 mL Intracatheter Q8H Becca Nino PA-C   3 mL at 25 0901    sodium chloride 0.9% BOLUS 250 mL  250 mL Intravenous Once Becca Nino PA-C        tacrolimus (GENERIC EQUIVALENT) capsule 1.5 mg  1.5 mg Oral BID IS Becca Nino PA-C   1.5 mg at 25 0900    torsemide (DEMADEX) tablet 10 mg  10 mg Oral BID Becca Nino PA-C   10 mg at 25 0822     Current Facility-Administered Medications   Medication Dose Route Frequency Provider Last Rate Last Admin    Patient is already receiving anticoagulation with heparin, enoxaparin (LOVENOX), warfarin (COUMADIN)  or other anticoagulant medication   Does not apply Continuous PRN Becac Nino PA-C           Physical Exam   Temp  Av.2  F (36.8  C)  Min: 97.8  F (36.6  C)  Max: 98.5  F (36.9  C)      Pulse  Av.2  Min: 81  Max: 95 Resp  Av.7  Min: 16  Max: 18  SpO2  Av.3 %  Min: 96 %  Max: 99 %     /73 (BP Location: Left arm, Patient Position: Left side)   Pulse 91   Temp 97.8  F (36.6  C) (Oral)   Resp 18   Ht 1.829 m (6')   Wt 61.6 kg (135 lb 12.9 oz)   SpO2 97%   BMI 18.42 kg/m     Date 25 0700 - 25 0659   Shift 9806-3960 1660-9353 6177-5119 24 Hour Total   INTAKE   P.O. 480   480   Shift Total(mL/kg) 480(7.79)   480(7.79)   OUTPUT   Shift Total(mL/kg)       Weight (kg) 61.6 61.6 61.6 61.6      Admit Weight: 61.6 kg (135 lb 12.9 oz)     GENERAL APPEARANCE: alert and no distress  HENT: mouth without ulcers or lesions  RESP: lungs clear to auscultation - no rales, rhonchi or wheezes  CV: regular rhythm, normal rate, no rub, no murmur  EDEMA: no LE edema bilaterally  ABDOMEN: soft, nondistended, nontender, bowel sounds normal  MS: LEFT AKA. Cold hands.  Vascular: faint radial pulses.  Bilateral femoral bruit.  SKIN: no rash  TX KIDNEY: with audible bruit.  DIALYSIS ACCESS:  RUE AV fistula with bruit and thrill.    Data   All labs reviewed by me.  CMP  Recent Labs   Lab 02/26/25  0723 02/25/25  1232    137   POTASSIUM 5.7* 5.3   CHLORIDE 99 100   CO2 23 25   ANIONGAP 14 12   * 189*   BUN 77.6* 83.5*   CR 2.13* 2.26*   GFRESTIMATED 34* 31*   QUE 10.3 10.5*   PROTTOTAL  --  7.7   ALBUMIN  --  3.9   BILITOTAL  --  0.4   ALKPHOS  --  122   AST  --  19   ALT  --  13     CBC  Recent Labs   Lab 02/26/25  0723 02/25/25  1232   HGB 12.4* 12.3*   WBC 10.8 10.1   RBC 4.59 4.67   HCT 38.5* 39.8*   MCV 84 85   MCH 27.0 26.3*   MCHC 32.2 30.9*   RDW 14.5 14.6    350     INRNo lab results found in last 7 days.  ABGNo lab results found in last 7 days.   Urine Studies  No lab results found.  No lab results found.  PTH  No lab results found.  Iron Studies  No lab results found.    IMAGING:  All imaging studies reviewed by me.    Past Medical History    I have reviewed this patient's medical history and updated it with pertinent information if needed.   Past Medical History:   Diagnosis Date    Chronic kidney disease     Diabetes (H)     Hypertension     Myocardial infarction (H)        Past Surgical History   I have reviewed this patient's surgical history and updated it with pertinent information if needed.  History reviewed. No pertinent surgical history.    Family History   I have reviewed this patient's family history and updated it with pertinent information if needed.   History reviewed. No pertinent family history.    Social History   I have reviewed this patient's social history and updated it with pertinent information if needed. Tad Zaman      Prior to Admission Medications   Medications Prior to Admission   Medication Sig Dispense Refill Last Dose/Taking    apixaban ANTICOAGULANT (ELIQUIS) 5 MG tablet Take 5 mg by mouth 2 times daily.   2/25/2025 Morning    atorvastatin  (LIPITOR) 20 MG tablet Take 1 tablet by mouth at bedtime.   2/25/2025 Morning    brinzolamide-brimonidine (SIMBRINZA) 1-0.2 % ophthalmic suspension Apply 1 drop to eye 2 times daily.   Taking    calcitRIOL (ROCALTROL) 0.25 MCG capsule Take 0.25 mcg by mouth. TAKE ONE CAPSULE BY MOUTH MONDAY THROUGH FRIDAY FOR BONE HEALTH ** DOSE INCREASE   Taking    cefdinir (OMNICEF) 300 MG capsule Take 300 mg by mouth 2 times daily.   Taking    empagliflozin (JARDIANCE) 25 MG TABS tablet Take 12.5 mg by mouth every morning. TAKE ONE-HALF TABLET BY MOUTH EVERY MORNING FOR DIABETES AND KIDNEY PROTECTION   Taking    Latanoprost PF 0.005 % SOLN Apply to eye at bedtime. INSTILL 1 DROP IN BOTH EYES AT BEDTIME FOR GLAUCOMA   Taking    metoprolol succinate ER (TOPROL XL) 25 MG 24 hr tablet Take 75 mg by mouth daily. TAKE THREE TABLETS BY MOUTH EVERY DAY FOR HEART RATE   2/25/2025 Morning    mycophenolate (GENERIC EQUIVALENT) 500 MG tablet Take 500 mg by mouth 2 times daily.   Taking    pantoprazole (PROTONIX) 40 MG EC tablet Take 40 mg by mouth daily.  TAKE ONE TABLET BY MOUTH EVERY DAY PREVENT GI BLEED   Taking    patiromer (VELTASSA) 8.4 g packet Take 8.4 g by mouth once a week. Take 1 packet (8.4 grams) by mouth once weekly for high potassium   Taking    sodium bicarbonate 650 MG tablet Take 1,300 mg by mouth 2 times daily.   Taking    tacrolimus (GENERIC EQUIVALENT) 0.5 MG capsule Take 1.5 mg by mouth 2 times daily. Take three capsules by mouth twice a day for kidney transplant   Taking    torsemide (DEMADEX) 10 MG tablet Take 10 mg by mouth 2 times daily.   2/25/2025 Morning       PA Yañez

## 2025-02-26 NOTE — PLAN OF CARE
Goal Outcome Evaluation:      Plan of Care Reviewed With: patient, child    Overall Patient Progress: no changeOverall Patient Progress: no change    Outcome Evaluation: Writer spoke to patient's daughter, Nadine, on phone to discuss discharge planning.    Shelbi Mccloud, MSN, APRN, [facility]-BC   Assisting 6MS [phone]  Nurse Coordinator  Securely message with Rodriguez, 5 Med Surg Vocera

## 2025-02-27 ENCOUNTER — APPOINTMENT (OUTPATIENT)
Dept: PHYSICAL THERAPY | Facility: CLINIC | Age: 65
DRG: 981 | End: 2025-02-27
Payer: MEDICARE

## 2025-02-27 VITALS
HEART RATE: 82 BPM | TEMPERATURE: 97.8 F | HEIGHT: 72 IN | SYSTOLIC BLOOD PRESSURE: 104 MMHG | WEIGHT: 135.8 LBS | OXYGEN SATURATION: 93 % | RESPIRATION RATE: 16 BRPM | DIASTOLIC BLOOD PRESSURE: 62 MMHG | BODY MASS INDEX: 18.39 KG/M2

## 2025-02-27 LAB
ANION GAP SERPL CALCULATED.3IONS-SCNC: 15 MMOL/L (ref 7–15)
BACTERIA BLD CULT: NORMAL
BUN SERPL-MCNC: 84.6 MG/DL (ref 8–23)
CALCIUM SERPL-MCNC: 9.8 MG/DL (ref 8.8–10.4)
CHLORIDE SERPL-SCNC: 98 MMOL/L (ref 98–107)
CREAT SERPL-MCNC: 2.5 MG/DL (ref 0.67–1.17)
EGFRCR SERPLBLD CKD-EPI 2021: 28 ML/MIN/1.73M2
ERYTHROCYTE [DISTWIDTH] IN BLOOD BY AUTOMATED COUNT: 14.4 % (ref 10–15)
GLUCOSE SERPL-MCNC: 305 MG/DL (ref 70–99)
HCO3 SERPL-SCNC: 22 MMOL/L (ref 22–29)
HCT VFR BLD AUTO: 34.2 % (ref 40–53)
HGB BLD-MCNC: 10.6 G/DL (ref 13.3–17.7)
MCH RBC QN AUTO: 25.9 PG (ref 26.5–33)
MCHC RBC AUTO-ENTMCNC: 31 G/DL (ref 31.5–36.5)
MCV RBC AUTO: 84 FL (ref 78–100)
PLATELET # BLD AUTO: 295 10E3/UL (ref 150–450)
POTASSIUM SERPL-SCNC: 4.6 MMOL/L (ref 3.4–5.3)
RBC # BLD AUTO: 4.09 10E6/UL (ref 4.4–5.9)
SODIUM SERPL-SCNC: 135 MMOL/L (ref 135–145)
WBC # BLD AUTO: 11.4 10E3/UL (ref 4–11)

## 2025-02-27 PROCEDURE — 250N000012 HC RX MED GY IP 250 OP 636 PS 637: Performed by: PHYSICIAN ASSISTANT

## 2025-02-27 PROCEDURE — 99232 SBSQ HOSP IP/OBS MODERATE 35: CPT | Performed by: INTERNAL MEDICINE

## 2025-02-27 PROCEDURE — 120N000002 HC R&B MED SURG/OB UMMC

## 2025-02-27 PROCEDURE — 999N000007 HC SITE CHECK

## 2025-02-27 PROCEDURE — 36415 COLL VENOUS BLD VENIPUNCTURE: CPT

## 2025-02-27 PROCEDURE — 85027 COMPLETE CBC AUTOMATED: CPT | Performed by: INTERNAL MEDICINE

## 2025-02-27 PROCEDURE — 36415 COLL VENOUS BLD VENIPUNCTURE: CPT | Performed by: INTERNAL MEDICINE

## 2025-02-27 PROCEDURE — 97530 THERAPEUTIC ACTIVITIES: CPT | Mod: GP

## 2025-02-27 PROCEDURE — 250N000013 HC RX MED GY IP 250 OP 250 PS 637: Performed by: PHYSICIAN ASSISTANT

## 2025-02-27 PROCEDURE — 82310 ASSAY OF CALCIUM: CPT | Performed by: INTERNAL MEDICINE

## 2025-02-27 PROCEDURE — 97162 PT EVAL MOD COMPLEX 30 MIN: CPT | Mod: GP

## 2025-02-27 PROCEDURE — 80048 BASIC METABOLIC PNL TOTAL CA: CPT | Performed by: INTERNAL MEDICINE

## 2025-02-27 RX ADMIN — SODIUM BICARBONATE 650 MG TABLET 1300 MG: at 19:53

## 2025-02-27 RX ADMIN — MYCOPHENOLATE MOFETIL 500 MG: 500 TABLET, FILM COATED ORAL at 17:26

## 2025-02-27 RX ADMIN — LIDOCAINE 4% 1 PATCH: 40 PATCH TOPICAL at 19:51

## 2025-02-27 RX ADMIN — ACETAMINOPHEN 650 MG: 325 TABLET, FILM COATED ORAL at 16:27

## 2025-02-27 RX ADMIN — EMPAGLIFLOZIN 25 MG: 25 TABLET, FILM COATED ORAL at 09:09

## 2025-02-27 RX ADMIN — TACROLIMUS 1.5 MG: 1 CAPSULE ORAL at 17:26

## 2025-02-27 RX ADMIN — LATANOPROST 1 DROP: 50 SOLUTION OPHTHALMIC at 21:05

## 2025-02-27 RX ADMIN — TORSEMIDE 10 MG: 10 TABLET ORAL at 09:09

## 2025-02-27 RX ADMIN — TORSEMIDE 10 MG: 10 TABLET ORAL at 16:27

## 2025-02-27 RX ADMIN — PANTOPRAZOLE SODIUM 40 MG: 40 TABLET, DELAYED RELEASE ORAL at 09:10

## 2025-02-27 RX ADMIN — TACROLIMUS 1.5 MG: 1 CAPSULE ORAL at 09:09

## 2025-02-27 RX ADMIN — SODIUM BICARBONATE 650 MG TABLET 1300 MG: at 09:09

## 2025-02-27 RX ADMIN — METOPROLOL SUCCINATE 75 MG: 25 TABLET, EXTENDED RELEASE ORAL at 09:10

## 2025-02-27 RX ADMIN — MYCOPHENOLATE MOFETIL 500 MG: 500 TABLET, FILM COATED ORAL at 09:09

## 2025-02-27 RX ADMIN — ATORVASTATIN CALCIUM 20 MG: 20 TABLET, FILM COATED ORAL at 19:50

## 2025-02-27 ASSESSMENT — ACTIVITIES OF DAILY LIVING (ADL)
ADLS_ACUITY_SCORE: 67
ADLS_ACUITY_SCORE: 66
ADLS_ACUITY_SCORE: 67
ADLS_ACUITY_SCORE: 63
ADLS_ACUITY_SCORE: 67
ADLS_ACUITY_SCORE: 63
ADLS_ACUITY_SCORE: 67
ADLS_ACUITY_SCORE: 66
ADLS_ACUITY_SCORE: 63
ADLS_ACUITY_SCORE: 67
ADLS_ACUITY_SCORE: 63
ADLS_ACUITY_SCORE: 67
ADLS_ACUITY_SCORE: 66
ADLS_ACUITY_SCORE: 63
ADLS_ACUITY_SCORE: 67

## 2025-02-27 NOTE — PLAN OF CARE
[id number]    Goal Outcome Evaluation:     Plan of Care Reviewed With: patient     Overall Patient Progress: no change     Outcome Evaluation:     VS: Blood pressure 104/62, pulse 82, temperature 97.8  F (36.6  C), temperature source Oral, resp. rate 16, height 1.829 m (6'), weight 61.6 kg (135 lb 12.9 oz), SpO2 93%.   O2: SpO2 > 93% and stable on RA. LS clear and equal bilaterally. Denies chest pain and SOB.    Output: Voids spontaneously without difficulty to bathroom.   Last BM:  denies abdominal discomfort. BS active / passing flatus.    Activity:  Assist of one with transfer   Skin: WDL    Pain: Pain managed with Tylenol   CMS: Intact, AOx4. Denies numbness and tingling.   Dressing: NA   Diet: Regular diet. Denies nausea/vomiting.    LDA:  L PIV SL   Equipment: IV pole, personal belongings,    Plan: Contact  precautions maintained / Continue with plan of care. Call light within reach, pt able to make needs known.    Additional Info: Patient potassium value 5.7 provider aware.

## 2025-02-27 NOTE — PLAN OF CARE
Goal Outcome Evaluation:      Plan of Care Reviewed With: patient    Overall Patient Progress: no changeOverall Patient Progress: no change     Pt is alert to self, and was very aggressive, abusive and rude this shift, refused all his meds and even MRI procedure and cares this shift. Pt did not was anyone in his room. Pt is continent of bowel and bladder, last BM was on the 2/26/25, on a regular diet, med whole, on a continuous pulse and Tele monitoring. We were not able to know anything concerning pt's pain and how he felt because he refused to cooperate with anything or anybody, even the charge nurse. Continue pt's plan of care.

## 2025-02-27 NOTE — PROGRESS NOTES
"[facility]    Medicine Progress Note - Hospitalist Service, GOLD TEAM 16    Date of Admission:  2/25/2025    Assessment & Plan     Tad Zaman is a 64 year old male admitted on 2/25/2025 for AMS. He has a history of diabetes, HTN, CAD, HF, and renal transplant 9/2022, left AKA, CKD.    Patient was brought in by family for AMS x 1-2 months.   He was evaluated at the [facility] multiple times in ED then was evaluated during an admission 1/20-2/21/25 with very broad workup (see below) that did not amount to any type of notable etiology of his symptoms. During this stay he was also treated for HF exacerbation, MOY, and UTI which were all stable at time of discharge. He finished course of cefdinir for e coli UTI on 2/24. He was discharged with family being told that he has failure to thrive. He was discharged home, required 24 hr cares by daughter including changing him and preparation of food. He has also has had intermittent agitation and aggression. Home health nurse stated that he was not appropriate for their program.   No clear etiology for confusion.     Today's update   - Patient continues with behavior issues. When I entered his room he refused to answer questions and told me \"do what you have to do\" and refused to answer other questions. I tried to contact his daughter multiple time but sent me to voice mail.     AMS  Plan:   Neurology following.      # Renal transplant 9/2022   # Presumed CKD  [facility] 1[facility] renal US with doppler showed no stenosis of vessels. Resistive indices were increased, felt could be related to ureteric obstruction but not specific. Mild dilation of intrarenal collecting system and ureter was also seen. CT a/p 1/28 W/O contrast suggested mild hydro. [facility] showed no hydro or other abnormalities. FENA 4.3 (1/28) at [facility]. " Creat 2.26 today. Creat recent baseline appears to be 1.8-2.3. Peak with recent MOY was up to 2.7, improved to 2.3 at time of discharge 2/21.  - transplant neph consult  - tac level in am  - PTA tacrolimus 1.5 mg BID  - PTA mycophenolate 500 mg BID  - PTA calcitriol and sodium bicarb  - PTA was patiromer 8.4 g once a week - will monitor K levels here daily     # Troponinemia, stable  # Lateral lead T wave inversions  # Atrial flutter  # HF  # HTN  # Hx MI  Limited cardiac history available. Per patient's daughter he may have had a stent placed at 1 point but is unsure.  Patient and daughters deny any history of cardiac arrhythmia.  However on exam today and on EKG evidence of atrial flutter, and patient also on Eliquis at least since December.  [facility], he was found to have heart failure exacerbation with 25-30 lbs fluid excess. BNP during hospitalization was 2868. His Lasix was switched to Torsemide. He has been taking his medications per his daughter.   - BNP 16245 here.   - trop 267>>218  - EKG with T wave inversions and mild ST depressions in lateral leads  - denies recent CP or SOB, no peripheral or abdominal edema  Plan:   - repeat EKG   - waiting for new TTE  - cont PTA atorvastatin 20 mg nightly  - hold PTA Eliquis in case of procedure  - cont PTA Jardiance 12.5 mg daily  - continue PTA metoprolol 75 mg daily  - continue PTA torsemide 10 mg BID     # T2DM  Not on long-term insulin. Hemoglobin A1c 6.9% as of 7/2024  - cont PTA Jardiance 12.5 mg daily     # Mild normocytic anemia  # Hx hepatitis C  # Peripheral vascular disease  # S/p L AKA  # Glaucoma - cont PTA eyedrops  # Sickle cell trait          Diet: Combination Diet Regular Diet Adult    DVT Prophylaxis: Pneumatic Compression Devices and Anti-embolisim stockings (TEDs)  Zaman Catheter: Not present  Lines: None     Cardiac Monitoring: ACTIVE order. Indication: Tachyarrhythmias, acute (48 hours)  Code Status: Full Code       Clinically Significant Risk Factors Present on Admission        # Hyperkalemia: Highest K = 5.7 mmol/L in last 2 days, will monitor as appropriate           # Drug Induced Coagulation Defect: home medication list includes an anticoagulant medication                  # Financial/Environmental Concerns: none         Social Drivers of Health    Tobacco Use: Medium Risk (2024)    Received from [employer]    Patient History     Smoking Tobacco Use: Former     Smokeless Tobacco Use: Never          Disposition Plan     Medically Ready for Discharge: Anticipated in 5+ Days             Leif Francis MD  Hospitalist Service, GOLD TEAM 16  [facility]  Securely message with Send the Trend (more info)  Text page via [facility] Paging/Directory   See signed in provider for up to date coverage information  ______________________________________________________________________    Interval History     Acute events as above.     Physical Exam   Vital Signs: Temp: 98.7  F (37.1  C) Temp src: Oral BP: 101/66 Pulse: 76   Resp: 17 SpO2: 96 % O2 Device: None (Room air)    Weight: 135 lbs 12.85 oz    General Appearance: Alert, eyes closed, no acute distress.   Patient refused rest of physical examination    Medical Decision Making       55 MINUTES SPENT BY ME on the date of service doing chart review, history, exam, documentation & further activities per the note.      Data         Imaging results reviewed over the past 24 hrs:   Recent Results (from the past 24 hours)   Echo Complete   Result Value    LVEF  40-45% (mildly reduced)    Narrative    [id number]  [id number]  [id number]  [id number]^AMBER^ELIZABETH^WIN     [facility],[place]  Echocardiography Laboratory  [address] [place] [id number]     Name: TERESITA GRANDA  MRN: [id number]  : 1960  Study Date: 2025 02:02 PM  Age: 64 yrs  Gender: Male  Patient Location: [facility]  Reason For Study: Abn  EKG  Ordering Physician: ELIZABETH CLARK  Performed By: Ashlee Bullard     BSA: 1.8 m2  Height: 72 in  Weight: 135 lb  HR: 99  BP: 122/80 mmHg  ______________________________________________________________________________  Procedure  Echocardiogram with two-dimensional, color and spectral Doppler. Contrast  Optison. Optison (NDC #7951-3791-49) given intravenously. Patient was given 6  ml mixture of 3 ml Optison and 6 ml saline. 3 ml wasted.  ______________________________________________________________________________  Interpretation Summary  Left ventricular function is decreased. The ejection fraction is 40-45%  (mildly reduced).  Diastolic function not assessed due to arrhythmia.  Right ventricular function, chamber size, wall motion, and thickness are  normal.  Mild tricuspid insufficiency is present.  Mild (pulmonary artery systolic pressure<50mmHg) pulmonary hypertension is  present.  IVC diameter and respiratory changes fall into an intermediate range  suggesting an RA pressure of 8 mmHg.  Trivial pericardial effusion is present.  There is no prior study for direct comparison.  ______________________________________________________________________________  Left Ventricle  Left ventricular size is normal. Left ventricular wall thickness is normal.  Left ventricular function is decreased. The ejection fraction is 40-45%  (mildly reduced). Diastolic function not assessed due to arrhythmia. No  regional wall motion abnormalities are seen.     Right Ventricle  Right ventricular function, chamber size, wall motion, and thickness are  normal.     Atria  Mild to moderate biatrial enlargement is present. The atrial septum is intact  as assessed by color Doppler .     Mitral Valve  Mild mitral annular calcification is present.     Aortic Valve  Aortic valve sclerosis is present. The mean AoV pressure gradient is 1.7 mmHg.  The calculated aortic valve are is 2.8 cm^2.     Tricuspid Valve  Mild tricuspid insufficiency is  present. The right ventricular systolic  pressure is approximated at 33.9 mmHg plus the right atrial pressure. Mild  (pulmonary artery systolic pressure<50mmHg) pulmonary hypertension is present.     Pulmonic Valve  The pulmonic valve is normal. Trace pulmonic insufficiency is present.     Vessels  The thoracic aorta is normal. The pulmonary artery is normal. IVC diameter and  respiratory changes fall into an intermediate range suggesting an RA pressure  of 8 mmHg.     Pericardium  Trivial pericardial effusion is present.     Compared to Previous Study  There is no prior study for direct comparison.  ______________________________________________________________________________  MMode/2D Measurements & Calculations     IVSd: 1.2 cm  LVIDd: 4.9 cm  LVIDs: 3.7 cm  LVPWd: 1.1 cm  FS: 23.4 %  LV mass(C)d: 204.2 grams  LV mass(C)dI: 113.3 grams/m2  Ao root diam: 3.4 cm  asc Aorta Diam: 3.4 cm  LVOT diam: 2.2 cm  LVOT area: 3.8 cm2     EF(MOD-bp): 38.3 %  Ao root diam index Ht(cm/m): 1.9  Ao root diam index BSA (cm/m2): 1.9  Asc Ao diam index BSA (cm/m2): 1.9  Asc Ao diam index Ht(cm/m): 1.9  RWT: 0.45  TAPSE: 1.7 cm     Doppler Measurements & Calculations  Ao V2 max: 102.0 cm/sec  Ao max P.2 mmHg  Ao V2 mean: 66.4 cm/sec  Ao mean P.7 mmHg  Ao V2 VTI: 16.9 cm  AZALIA(I,D): 2.8 cm2  AZALIA(V,D): 2.7 cm2  LV V1 max P.2 mmHg  LV V1 max: 73.7 cm/sec  LV V1 VTI: 12.6 cm  SV(LVOT): 47.9 ml  SI(LVOT): 26.6 ml/m2  TR max vitaly: 291.0 cm/sec  TR max P.9 mmHg  AV Vitaly Ratio (DI): 0.72  AZALIA Index (cm2/m2): 1.6     ______________________________________________________________________________  Report approved by: YUMIKO Darby MD on 2025 03:07 PM

## 2025-02-27 NOTE — PROGRESS NOTES
"CARE MANAGEMENT QUICK NOTE  Writer received in-basket message from Emiliana Cerda with [facility]. Pt was supposed to start home care SNV/PT/OT/HHA/HM services with [facility] after his stay at [facility]. But they visited him and sent him to the ER. \"We are happy to take him back once he is ready for dc. Please give us a call if you have any questions.\"    Accepting Home Care Agency  [facility]  Phone: [phone]    Shelbi Mccloud, MSN, APRN, AGCNS-BC   Assisting 6MS [phone]  Nurse Coordinator  Securely message with Vocera, 5 Med Surg Vocera  "

## 2025-02-27 NOTE — PROGRESS NOTES
02/27/25 1123   Appointment Info   Signing Clinician's Name / Credentials (PT) Sandy Devine DPT   Rehab Comments (PT) monitor cognition, assess need for OT orders; L BKA, prosthesis in room   Living Environment   People in Home child(gina), dependent   Current Living Arrangements house   Home Accessibility no concerns   Transportation Anticipated family or friend will provide   Living Environment Comments Pt lives in home, has ramp access through back door, then all needs met on main level.   Self-Care   Usual Activity Tolerance moderate   Current Activity Tolerance fair   Equipment Currently Used at Home cane, straight;grab bar, tub/shower;prosthesis;walker, rolling   Fall history within last six months yes   Number of times patient has fallen within last six months 1  (reports one fall when working in the yard; was able to get up without assist)   Activity/Exercise/Self-Care Comment Pt baseline prior to Jan hospitalization, pt was completely IND with ADLs, lived alone, used SEC for mobility, no cog concerns per daughter. On recent return home from hospital, pt able to sit EOB and stand with walker, but unable to walk, dependent for cares by daughters. Daughters would provide 24/7 assist, able to continue 24/7 assist at d/c. Notes does not have w[facility]c, but [facility]   General Information   Onset of Illness/Injury or Date of Surgery 02/25/25   Referring Physician Becca Nino, RAYMOND   Pertinent History of Current Problem (include personal factors and/or comorbidities that impact the POC) per chart; Tad Zaman is a 64 year old male admitted on 2/25/2025 for AMS. He has a history of diabetes, HTN, CAD, HF, and renal transplant 9/2022, left AKA, CKD.    Patient was brought in by family for AMS x 1-2 months.   He was evaluated at the [facility] multiple times in ED then was evaluated during an admission 1/20-2/21/25 with very broad workup (see below) that did not amount to any type of  notable etiology of his symptoms. During this stay he was also treated for HF exacerbation, MOY, and UTI which were all stable at time of discharge. He finished course of cefdinir for e coli UTI on 2/24. He was discharged with family being told that he has failure to thrive. He was discharged home, required 24 hr cares by daughter including changing him and preparation of food. He has also has had intermittent agitation and aggression. Home health nurse stated that he was not appropriate for their program.   No clear etiology for confusion.   Existing Precautions/Restrictions fall   Weight-Bearing Status - LLE other (see comments)  (L BKA)   Weight-Bearing Status - RLE full weight-bearing   General Observations Supine in bed, agreeable to PT eval   Cognition   Affect/Mental Status (Cognition) WFL   Orientation Status (Cognition) disoriented to;time;place  (reports august of 2020, states location is [facility] [place])   Follows Commands (Cognition) follows multi-step commands;75-90% accuracy;increased processing time needed;delayed response/completion;repetition of directions required;verbal cues/prompting required   Cognitive Status Comments Pt with demo of impaired cogition and mild confusion. Unable to accurately recall date or location. Pt demo signficant need for increased processing with poor motor planning/sequencing. Needed tactile and v/c for donning prosthesis - unable to sequence without assistance. Pt dgtr reports that this is new to the patient as he was IND prior to most recent admission   Pain Assessment   Patient Currently in Pain Yes, see Vital Sign flowsheet   Integumentary/Edema   Integumentary/Edema Comments L BKA   Posture    Posture Not impaired   Range of Motion (ROM)   Range of Motion ROM is WFL   Strength (Manual Muscle Testing)   Strength (Manual Muscle Testing) strength is WFL   Bed Mobility   Comment, (Bed Mobility) close SBA d/t confusion   Transfers   Comment, (Transfers) STS from EOB  without prosthesis donned with close SBA, pt using bed rail and table for support   Gait/Stairs (Locomotion)   Comment, (Gait/Stairs) NT - unsafe d/t impaired cognition and pt impuslively returning to EOB   Balance   Balance Comments impaired dyn balance   Coordination   Coordination Comments significant impairments demo with inability to sequence donning of prosthesis, pt unable to problem solve steps, pt also demo significant impairment with coordinating reutrning to supine   Clinical Impression   Criteria for Skilled Therapeutic Intervention Yes, treatment indicated   PT Diagnosis (PT) impaired functional mobility   Influenced by the following impairments impaired cognition, motor planning, impaired coordination, deconditioning, increased risk for falls   Functional limitations due to impairments bed mobility, transfers, ambulation, stairs, community integration   Clinical Presentation (PT Evaluation Complexity) evolving   Clinical Presentation Rationale clinical reasoning   Clinical Decision Making (Complexity) moderate complexity   Planned Therapy Interventions (PT) balance training;bed mobility training;gait training;home exercise program;manual therapy techniques;neuromuscular re-education;patient/family education;prosthetic fitting/training;ROM (range of motion);stair training;strengthening;transfer training;wheelchair management/propulsion training;progressive activity/exercise;risk factor education;home program guidelines   Risk & Benefits of therapy have been explained evaluation/treatment results reviewed;care plan/treatment goals reviewed;current/potential barriers reviewed;risks/benefits reviewed;participants voiced agreement with care plan;participants included;patient;daughter   Clinical Impression Comments Pt with significant change from dgtr report; per conversation with dgtr will continue to be able to provide pt with 24/7 support. Pt dgtr does not appear interested in [facility] at this time and states  "\"I do not want to take my dad from his home, its his house\".   PT Total Evaluation Time   PT Eval, Moderate Complexity Minutes (16500) 10   Physical Therapy Goals   PT Frequency 5x/week   PT Predicted Duration/Target Date for Goal Attainment 03/22/25   PT Goals Bed Mobility;Transfers;Gait;Wheelchair Mobility   PT: Bed Mobility Independent;Supine to/from sit   PT: Transfers Modified independent;Sit to/from stand;Assistive device  (LRAD and prothesis donned)   PT: Gait Supervision/stand-by assist;Assistive device;100 feet  (LRAD and prosthesis donned)   PT: Wheelchair Mobility 50 feet;Caregiver SBA;manual wheelchair   Interventions   Interventions Quick Adds Therapeutic Activity   Therapeutic Activity   Therapeutic Activities: dynamic activities to improve functional performance Minutes (79607) 23   Symptoms Noted During/After Treatment Fatigue   Treatment Detail/Skilled Intervention treatment indicated; pt needing assist with orientation; reoriented to location in hospital and the date. Pt with increased time throughout session for sequencing and motor planning. Once at EOB, provided pt with prothesis. Pt placing near his RLE, not donning on the L like intended. Pt completed STS from EOB leaning onto the L prosthesis and reaching for table. Pt stating \"I was going to use it like a cane\" - redirected that this is his prothesis and writer wanted him to put it on to try walking. Pt asks to get into recliner for easier donning. Placed recliner on R for pivot to strong side. Pt completed SPT with SBA and good use of arm rest vs bed rail for support. Once in recliner, pt still unable to sequence donning. Assist with donning base layer then handing prothesis to pt, able to place onto residual limb with set up assist. Needed DEP for donning R shoe. STS with limb donned with close SBA, pt reaching for additional support from bed rail in stance, then pivoting impulsively back to EOB. Assist with doffing prosthesis. Pt with " increased time attempting to return to supine, in partial sidelying with inability to sequence bringing RLE onto bed and repositioning - declining assist from writer for this. Family entering at end of session, and pt agreeable to have them assist with returning to bed, bed alarm set.   PT Discharge Planning   PT Plan motor planning with donning L prosthesis, ambulation with prothesis and FWW, bed mobility   PT Discharge Recommendation (DC Rec) home with assist;home with home care physical therapy   PT Rationale for DC Rec Pt is below his baseline; demo impaired cognition and impaired motor planning. Pt has been recieveing increased assist from daughters since [facility]. Per conversation with pt dgtr, they remain onboard for assisting with 24/7 assist. Would recommend HH PT/OT for home safety assessment and assist with remaining deficits. Will update recs pending pt progress   PT Brief overview of current status close SBA with pivot to R; needs bed/chair alarm   PT Total Distance Amb During Session (feet) 0   Physical Therapy Time and Intention   Timed Code Treatment Minutes 23   Total Session Time (sum of timed and untimed services) 33

## 2025-02-27 NOTE — PROGRESS NOTES
Overall no new change.    Pt is alert orient , uncooperative with care. Refused all assessment and MRI

## 2025-02-28 LAB
ANION GAP SERPL CALCULATED.3IONS-SCNC: 12 MMOL/L (ref 7–15)
BUN SERPL-MCNC: 80.6 MG/DL (ref 8–23)
CALCIUM SERPL-MCNC: 10.1 MG/DL (ref 8.8–10.4)
CHLORIDE SERPL-SCNC: 99 MMOL/L (ref 98–107)
CREAT SERPL-MCNC: 2.29 MG/DL (ref 0.67–1.17)
EGFRCR SERPLBLD CKD-EPI 2021: 31 ML/MIN/1.73M2
ERYTHROCYTE [DISTWIDTH] IN BLOOD BY AUTOMATED COUNT: 14.3 % (ref 10–15)
GLUCOSE SERPL-MCNC: 217 MG/DL (ref 70–99)
HCO3 SERPL-SCNC: 24 MMOL/L (ref 22–29)
HCT VFR BLD AUTO: 34.3 % (ref 40–53)
HGB BLD-MCNC: 10.9 G/DL (ref 13.3–17.7)
MCH RBC QN AUTO: 26.3 PG (ref 26.5–33)
MCHC RBC AUTO-ENTMCNC: 31.8 G/DL (ref 31.5–36.5)
MCV RBC AUTO: 83 FL (ref 78–100)
PLATELET # BLD AUTO: 282 10E3/UL (ref 150–450)
POTASSIUM SERPL-SCNC: 4.6 MMOL/L (ref 3.4–5.3)
RBC # BLD AUTO: 4.14 10E6/UL (ref 4.4–5.9)
SODIUM SERPL-SCNC: 135 MMOL/L (ref 135–145)
TACROLIMUS BLD-MCNC: 5.7 UG/L (ref 5–15)
TME LAST DOSE: NORMAL H
TME LAST DOSE: NORMAL H
WBC # BLD AUTO: 10.6 10E3/UL (ref 4–11)

## 2025-02-28 PROCEDURE — 120N000002 HC R&B MED SURG/OB UMMC

## 2025-02-28 PROCEDURE — 250N000013 HC RX MED GY IP 250 OP 250 PS 637: Performed by: PHYSICIAN ASSISTANT

## 2025-02-28 PROCEDURE — 80197 ASSAY OF TACROLIMUS: CPT | Performed by: INTERNAL MEDICINE

## 2025-02-28 PROCEDURE — 80048 BASIC METABOLIC PNL TOTAL CA: CPT | Performed by: INTERNAL MEDICINE

## 2025-02-28 PROCEDURE — 99232 SBSQ HOSP IP/OBS MODERATE 35: CPT | Performed by: INTERNAL MEDICINE

## 2025-02-28 PROCEDURE — 36415 COLL VENOUS BLD VENIPUNCTURE: CPT | Performed by: INTERNAL MEDICINE

## 2025-02-28 PROCEDURE — 85014 HEMATOCRIT: CPT | Performed by: INTERNAL MEDICINE

## 2025-02-28 PROCEDURE — 250N000013 HC RX MED GY IP 250 OP 250 PS 637: Performed by: STUDENT IN AN ORGANIZED HEALTH CARE EDUCATION/TRAINING PROGRAM

## 2025-02-28 PROCEDURE — 250N000012 HC RX MED GY IP 250 OP 636 PS 637: Performed by: PHYSICIAN ASSISTANT

## 2025-02-28 PROCEDURE — 84132 ASSAY OF SERUM POTASSIUM: CPT | Performed by: INTERNAL MEDICINE

## 2025-02-28 RX ORDER — LATANOPROST 50 UG/ML
1 SOLUTION/ DROPS OPHTHALMIC AT BEDTIME
Status: DISCONTINUED | OUTPATIENT
Start: 2025-03-01 | End: 2025-03-26 | Stop reason: HOSPADM

## 2025-02-28 RX ORDER — DIAZEPAM 5 MG/1
5 TABLET ORAL ONCE
Status: DISCONTINUED | OUTPATIENT
Start: 2025-02-28 | End: 2025-02-28

## 2025-02-28 RX ORDER — DIAZEPAM 5 MG/1
5 TABLET ORAL
Status: COMPLETED | OUTPATIENT
Start: 2025-02-28 | End: 2025-03-01

## 2025-02-28 RX ORDER — TORSEMIDE 10 MG/1
10 TABLET ORAL
Status: DISCONTINUED | OUTPATIENT
Start: 2025-03-01 | End: 2025-03-23

## 2025-02-28 RX ORDER — PANTOPRAZOLE SODIUM 40 MG/1
40 TABLET, DELAYED RELEASE ORAL
Status: DISCONTINUED | OUTPATIENT
Start: 2025-03-01 | End: 2025-03-26 | Stop reason: HOSPADM

## 2025-02-28 RX ADMIN — MYCOPHENOLATE MOFETIL 500 MG: 500 TABLET, FILM COATED ORAL at 13:06

## 2025-02-28 RX ADMIN — TACROLIMUS 1.5 MG: 1 CAPSULE ORAL at 13:08

## 2025-02-28 RX ADMIN — ACETAMINOPHEN 650 MG: 325 TABLET, FILM COATED ORAL at 14:42

## 2025-02-28 RX ADMIN — EMPAGLIFLOZIN 10 MG: 10 TABLET, FILM COATED ORAL at 13:06

## 2025-02-28 RX ADMIN — TACROLIMUS 1.5 MG: 1 CAPSULE ORAL at 17:44

## 2025-02-28 RX ADMIN — TORSEMIDE 10 MG: 10 TABLET ORAL at 13:07

## 2025-02-28 RX ADMIN — SODIUM BICARBONATE 650 MG TABLET 1300 MG: at 13:07

## 2025-02-28 RX ADMIN — PANTOPRAZOLE SODIUM 40 MG: 40 TABLET, DELAYED RELEASE ORAL at 13:07

## 2025-02-28 RX ADMIN — MYCOPHENOLATE MOFETIL 500 MG: 500 TABLET, FILM COATED ORAL at 17:44

## 2025-02-28 RX ADMIN — METOPROLOL SUCCINATE 75 MG: 25 TABLET, EXTENDED RELEASE ORAL at 13:07

## 2025-02-28 RX ADMIN — TORSEMIDE 10 MG: 10 TABLET ORAL at 17:44

## 2025-02-28 ASSESSMENT — ACTIVITIES OF DAILY LIVING (ADL)
ADLS_ACUITY_SCORE: 67
ADLS_ACUITY_SCORE: 67
ADLS_ACUITY_SCORE: 77
ADLS_ACUITY_SCORE: 77
ADLS_ACUITY_SCORE: 67
ADLS_ACUITY_SCORE: 77
ADLS_ACUITY_SCORE: 67
ADLS_ACUITY_SCORE: 77
ADLS_ACUITY_SCORE: 67
ADLS_ACUITY_SCORE: 67
ADLS_ACUITY_SCORE: 77
ADLS_ACUITY_SCORE: 77
ADLS_ACUITY_SCORE: 67
ADLS_ACUITY_SCORE: 77
ADLS_ACUITY_SCORE: 67
ADLS_ACUITY_SCORE: 77
ADLS_ACUITY_SCORE: 77
ADLS_ACUITY_SCORE: 67
ADLS_ACUITY_SCORE: 77
ADLS_ACUITY_SCORE: 77

## 2025-02-28 NOTE — PROGRESS NOTES
[facility]    Medicine Progress Note - Hospitalist Service, GOLD TEAM 16    Date of Admission:  2/25/2025    Assessment & Plan   Tad Zaman is a 64 year old male admitted on 2/25/2025 for AMS. He has a history of diabetes, HTN, CAD, HF, and renal transplant 9/2022, left AKA, CKD.    Patient was brought in by family for AMS x 1-2 months.   He was evaluated at the [facility] multiple times in ED then was evaluated during an admission 1/20-2/21/25 with very broad workup (see below) that did not amount to any type of notable etiology of his symptoms. During this stay he was also treated for HF exacerbation, MOY, and UTI which were all stable at time of discharge. He finished course of cefdinir for e coli UTI on 2/24. He was discharged with family being told that he has failure to thrive. He was discharged home, required 24 hr cares by daughter including changing him and preparation of food. He has also has had intermittent agitation and aggression. Home health nurse stated that he was not appropriate for their program.   No clear etiology for confusion.     Today's update   - No acute events overnight. Pt's daughter at bedside. Patient is still refusing to talk with me and he covered his head with his blanket as soon as I entered his room. I had an extensive conversation with his daughter at bedside. Daughter will talk with the patient and encouraged him to let us continue with workup that includes Brain MRI and LP.   I will order one dose of Valium to be given before MRI.     AMS  Plan:   Neurology following:       Mr. Zaman appears to be continuing to refuse bedside examinations as well as medical workup.      From the neurology perspective we continue to recommend MRI brain and LP outlined in the note from 2/26.            # Renal transplant 9/2022   # CKD Stage 3b   Had the transplant at [facility]. [facility] 1/27 renal US with doppler showed no stenosis of vessels. Resistive indices were increased, felt could be related to ureteric obstruction but not specific. Mild dilation of intrarenal collecting system and ureter was also seen. CT a/p 1/28 W/O contrast suggested mild hydro. [facility]/25 at [facility] showed no hydro or other abnormalities. FENA 4.3 (1/28) at [facility]. Creat 2.26 today. Creat recent baseline appears to be 1.8-2.3. Peak with recent MOY was up to 2.7, improved to 2.3 at time of discharge 2/21.  - transplant neph following the patient  - tac level 5.7  - PTA tacrolimus 1.5 mg BID  - PTA mycophenolate 500 mg BID  - PTA calcitriol and sodium bicarb  - PTA was patiromer 8.4 g once a week - will monitor K levels here daily     # Troponinemia, stable  # Lateral lead T wave inversions  # Atrial flutter  # HF  # HTN  # Hx MI  Limited cardiac history available. Per patient's daughter he may have had a stent placed at 1 point but is unsure.  Patient and daughters deny any history of cardiac arrhythmia.  However on exam today and on EKG evidence of atrial flutter, and patient also on Eliquis at least since December.  [facility], he was found to have heart failure exacerbation with 25-30 lbs fluid excess. BNP during hospitalization was 2868. His Lasix was switched to Torsemide. He has been taking his medications per his daughter.   - BNP 67413 here.   - trop 267>>218  - EKG with T wave inversions and mild ST depressions in lateral leads  - denies recent CP or SOB, no peripheral or abdominal edema  Plan:   - TTE as below.   - No report of chest pain. Troponin trended down   - cont PTA atorvastatin 20 mg nightly  - hold PTA Eliquis in case of procedure  - cont PTA Jardiance 12.5 mg daily  - continue PTA metoprolol 75 mg daily  - continue PTA torsemide 10 mg BID    TTE  Interpretation Summary  Left ventricular function is decreased. The ejection fraction is 40-45%  (mildly  reduced).  Diastolic function not assessed due to arrhythmia.  Right ventricular function, chamber size, wall motion, and thickness are  normal.  Mild tricuspid insufficiency is present.  Mild (pulmonary artery systolic pressure<50mmHg) pulmonary hypertension is  present.  IVC diameter and respiratory changes fall into an intermediate range  suggesting an RA pressure of 8 mmHg.  Trivial pericardial effusion is present.  There is no prior study for direct comparison.     # T2DM  Not on long-term insulin. Hemoglobin A1c 6.9% as of 7/2024  - cont PTA Jardiance 12.5 mg daily     # Mild normocytic anemia  # Hx hepatitis C  # Peripheral vascular disease  # S/p L AKA  # Glaucoma - cont PTA eyedrops  # Sickle cell trait          Diet: Combination Diet Regular Diet Adult    DVT Prophylaxis: Pneumatic Compression Devices and Anti-embolisim stockings (TEDs)  Zaman Catheter: Not present  Lines: None     Cardiac Monitoring: None  Code Status: Full Code      Clinically Significant Risk Factors                                    # Financial/Environmental Concerns: none         Social Drivers of Health    Tobacco Use: Medium Risk (7/26/2024)    Received from [employer]    Patient History     Smoking Tobacco Use: Former     Smokeless Tobacco Use: Never          Disposition Plan     Medically Ready for Discharge: Anticipated in 5+ Days             Leif Francis MD  Hospitalist Service, GOLD TEAM 16  [facility]  Securely message with [employer] (more info)  Text page via [employer] Paging/Directory   See signed in provider for up to date coverage information  ______________________________________________________________________    Interval History     Acute events as above.     Physical Exam   Vital Signs: Temp: 97.6  F (36.4  C) Temp src: Oral BP: 107/73 Pulse: 82   Resp: 16 SpO2: 99 % O2 Device: None (Room air)    Weight: 152 lbs 8.93 oz    General Appearance: Alert, room air, no  acute distress.   Patient refused rest of physical examination    Medical Decision Making       55 MINUTES SPENT BY ME on the date of service doing chart review, history, exam, documentation & further activities per the note.      Data     I have personally reviewed the following data over the past 24 hrs:    11.4 (H)  \   10.6 (L)   / 295     135 98 84.6 (H) /  305 (H)   4.6 22 2.50 (H) \       Imaging results reviewed over the past 24 hrs:   No results found for this or any previous visit (from the past 24 hours).

## 2025-02-28 NOTE — PLAN OF CARE
Problem: Delirium  Goal: Improved Attention and Thought Clarity  Outcome: Not Progressing    VS:  Temp: 97.6  F (36.4  C) Temp src: Oral BP: 107/73 Pulse: 82   Resp: 16 SpO2: 99 % O2 Device: None (Room air)      O2: SpO2 > 99 and stable on RA.    Output:  Incontinent   Last BM: 02/28 - incontinent, denies abdominal discomfort. BS active / passing flatus.    Activity:  On bed, assist of 1 per report   Skin:  Refused skin assessment   Pain:  0/10    CMS:  Confused   Aloof  PATIENT TURNED HIS BACK DURING CONVERSATION WITH THE NURSE   Dressing:  None    Diet: Combination Regular diet. Denies nausea/vomiting.    LDA: L PIV SL    Equipment: IV pole, personal belongings,    Plan: CONTACT precautions maintained / Continue with plan of care. Call light within reach, pt able to make needs known.   Plan: MRI    Additional Info:  Known to refuse cares     0730 explained to the patient that MRI would like to schedule him today but patient refused. Relayed to Carole that patient would like to do the MRI tomorrow instead    0904 check the patient four times already. Patient refusing cares      BED ALARM ON      1052 asked help from Aman the daughter to help in explaining             Refused diaper changed from the nurse   1312 patient kept under the sheets during the conversation with the

## 2025-02-28 NOTE — PLAN OF CARE
[id number] - [id number]    Problem: Adult Inpatient Plan of Care  Goal: Plan of Care Review  Description: The Plan of Care Review/Shift note should be completed every shift.  The Outcome Evaluation is a brief statement about your assessment that the patient is improving, declining, or no change.  This information will be displayed automatically on your shift  note.  Flowsheets (Taken 2/28/2025 0208)  Plan of Care Reviewed With: patient  Overall Patient Progress: no change  Goal: Absence of Hospital-Acquired Illness or Injury  Intervention: Identify and Manage Fall Risk  Recent Flowsheet Documentation  Taken 2/28/2025 0200 by Amparo Tidwell, RN  Safety Promotion/Fall Prevention:   activity supervised   clutter free environment maintained   nonskid shoes/slippers when out of bed   increased rounding and observation   room near nurse's station   increase visualization of patient  Intervention: Prevent Skin Injury  Recent Flowsheet Documentation  Taken 2/28/2025 0200 by Amparo Tidwell RN  Body Position:   left   turned   legs elevated   position changed independently  Goal: Optimal Comfort and Wellbeing  Outcome: Progressing     Problem: Delirium  Goal: Improved Sleep  Outcome: Progressing       Goal Outcome Evaluation:    Plan of Care Reviewed With: patient  Overall Patient Progress: no change       Outcome Evaluation:     O2: stable on RA.   Denies chest pain and SOB.    Output: Incontinent   Last BM: 5/28   denies abdominal discomfort.    Activity: Up with Assist   Skin: Visible skin intact   Pain: Denies   CMS: AO to self  Denies numbness and tingling.   Dressing:    Diet: Regular diet.   Denies nausea/vomiting.    LDA: L PIV SL   Equipment: IV pole, personal belongings,    Plan: Contact precautions maintained  Call light within reach   Continue with plan of care.   Additional Info: Frequent check done

## 2025-03-01 ENCOUNTER — APPOINTMENT (OUTPATIENT)
Dept: MRI IMAGING | Facility: CLINIC | Age: 65
DRG: 981 | End: 2025-03-01
Attending: INTERNAL MEDICINE
Payer: MEDICARE

## 2025-03-01 PROCEDURE — 70551 MRI BRAIN STEM W/O DYE: CPT | Mod: 26 | Performed by: RADIOLOGY

## 2025-03-01 PROCEDURE — 99232 SBSQ HOSP IP/OBS MODERATE 35: CPT | Performed by: INTERNAL MEDICINE

## 2025-03-01 PROCEDURE — 250N000013 HC RX MED GY IP 250 OP 250 PS 637: Performed by: INTERNAL MEDICINE

## 2025-03-01 PROCEDURE — 250N000013 HC RX MED GY IP 250 OP 250 PS 637: Performed by: STUDENT IN AN ORGANIZED HEALTH CARE EDUCATION/TRAINING PROGRAM

## 2025-03-01 PROCEDURE — 70551 MRI BRAIN STEM W/O DYE: CPT

## 2025-03-01 PROCEDURE — 120N000002 HC R&B MED SURG/OB UMMC

## 2025-03-01 PROCEDURE — 250N000012 HC RX MED GY IP 250 OP 636 PS 637: Performed by: PHYSICIAN ASSISTANT

## 2025-03-01 PROCEDURE — 250N000013 HC RX MED GY IP 250 OP 250 PS 637: Performed by: PHYSICIAN ASSISTANT

## 2025-03-01 RX ADMIN — TACROLIMUS 1.5 MG: 1 CAPSULE ORAL at 20:50

## 2025-03-01 RX ADMIN — TORSEMIDE 10 MG: 10 TABLET ORAL at 20:50

## 2025-03-01 RX ADMIN — TORSEMIDE 10 MG: 10 TABLET ORAL at 09:10

## 2025-03-01 RX ADMIN — MYCOPHENOLATE MOFETIL 500 MG: 500 TABLET, FILM COATED ORAL at 09:10

## 2025-03-01 RX ADMIN — EMPAGLIFLOZIN 10 MG: 10 TABLET, FILM COATED ORAL at 09:10

## 2025-03-01 RX ADMIN — SENNOSIDES AND DOCUSATE SODIUM 2 TABLET: 50; 8.6 TABLET ORAL at 22:17

## 2025-03-01 RX ADMIN — MYCOPHENOLATE MOFETIL 500 MG: 500 TABLET, FILM COATED ORAL at 20:50

## 2025-03-01 RX ADMIN — TACROLIMUS 1.5 MG: 1 CAPSULE ORAL at 09:10

## 2025-03-01 RX ADMIN — SODIUM BICARBONATE 650 MG TABLET 1300 MG: at 09:10

## 2025-03-01 RX ADMIN — DIAZEPAM 5 MG: 5 TABLET ORAL at 08:10

## 2025-03-01 RX ADMIN — PANTOPRAZOLE SODIUM 40 MG: 40 TABLET, DELAYED RELEASE ORAL at 09:10

## 2025-03-01 RX ADMIN — ATORVASTATIN CALCIUM 20 MG: 20 TABLET, FILM COATED ORAL at 20:50

## 2025-03-01 RX ADMIN — SODIUM BICARBONATE 650 MG TABLET 1300 MG: at 20:50

## 2025-03-01 ASSESSMENT — ACTIVITIES OF DAILY LIVING (ADL)
ADLS_ACUITY_SCORE: 78
ADLS_ACUITY_SCORE: 77
ADLS_ACUITY_SCORE: 78
ADLS_ACUITY_SCORE: 77
ADLS_ACUITY_SCORE: 78
ADLS_ACUITY_SCORE: 77
ADLS_ACUITY_SCORE: 78

## 2025-03-01 NOTE — PLAN OF CARE
Problem: Adult Inpatient Plan of Care  Goal: Plan of Care Review  Description: The Plan of Care Review/Shift note should be completed every shift.  The Outcome Evaluation is a brief statement about your assessment that the patient is improving, declining, or no change.  This information will be displayed automatically on your shift  note.  Outcome: Not Progressing  Goal: Readiness for Transition of Care  Outcome: Not Progressing  Flowsheets (Taken 3/1/2025 0351)  Transportation Anticipated: family or friend will provide  Concerns to be Addressed: discharge planning  Intervention: Mutually Develop Transition Plan  Recent Flowsheet Documentation  Taken 3/1/2025 0351 by Kobe Trejo RN  Transportation Anticipated: family or friend will provide  Concerns to be Addressed: discharge planning     Problem: Delirium  Goal: Optimal Coping  Outcome: Not Progressing  Goal: Improved Behavioral Control  Outcome: Not Progressing  Intervention: Minimize Safety Risk  Recent Flowsheet Documentation  Taken 2/28/2025 2130 by Kobe Trejo RN  Trust Relationship/Rapport: care explained  Goal: Improved Attention and Thought Clarity  Outcome: Not Progressing  Goal: Improved Sleep  Outcome: Not Progressing     Problem: Adult Inpatient Plan of Care  Goal: Absence of Hospital-Acquired Illness or Injury  Outcome: Progressing  Intervention: Prevent Skin Injury  Recent Flowsheet Documentation  Taken 3/1/2025 0000 by Kobe Trejo RN  Body Position: position changed independently  Taken 2/28/2025 2130 by Kobe Trejo RN  Body Position: position changed independently  Goal: Optimal Comfort and Wellbeing  Outcome: Progressing  Intervention: Provide Person-Centered Care  Recent Flowsheet Documentation  Taken 2/28/2025 2130 by Kobe Trejo RN  Trust Relationship/Rapport: care explained   Goal Outcome Evaluation: Alert, unable to assess orientation Mr. Zaman refused to answer any questions. Patient does easily awake to voice.  Very irritable and hostile, would not allow full assessment and refused evening medications. Provider and family notified. The daughter suggested we change the scheduling of his medication to match his home routine because then family could plan to be here at those times to help with communication. Used the call light to asked to be changed after urinary incontinence. No acute changes over night, plan of care on going.

## 2025-03-01 NOTE — PLAN OF CARE
Problem: Delirium  Goal: Improved Behavioral Control  Outcome: Not Progressing        VS:  Temp: 97.7  F (36.5  C) Temp src: Oral BP: 101/70 Pulse: 58   Resp: 16 SpO2: 95 % O2 Device: None (Room air)     O2: SpO2 > 95 and stable on RA.    Output:  Incontinent   Last BM: 02/28 - incontinent, denies abdominal discomfort. BS active / passing flatus.    Activity:  assist of 1 per report - could pivot   Could use thw walker - Assist of 1   Skin:  Refused skin assessment   Pain:  0/10    CMS:  Confused   Aloof  PATIENT TURNED HIS BACK DURING CONVERSATION WITH THE NURSE; DOES NOT WANT TO HAVE CONVERSATION WITH THE NURSE    Dressing:  None    Diet: Combination Regular diet. Denies nausea/vomiting.    LDA: L PIV SL    Equipment: IV pole, personal belongings,    Plan: CONTACT precautions maintained / Continue with plan of care. Call light within reach, pt able to make needs known.   Plan: MRI    Additional Info:  Known to refuse cares      Rude to the nurse, patient turns his back whenever nurse starts to have conversation      BED ALARM ON            1457 refused PT    8113 ambulated the hallway with the help with the daughters and used walker

## 2025-03-01 NOTE — PROGRESS NOTES
"[facility]    Medicine Progress Note - Hospitalist Service, GOLD TEAM 16    Date of Admission:  2/25/2025    Assessment & Plan   Tad Zaman is a 64 year old male admitted on 2/25/2025 for AMS. He has a history of diabetes, HTN, CAD, HF, and renal transplant 9/2022, left AKA, CKD.    Patient was brought in by family for AMS x 1-2 months.   He was evaluated at the [facility] multiple times in ED then was evaluated during an admission 1/20-2/21/25 with very broad workup (see below) that did not amount to any type of notable etiology of his symptoms. During this stay he was also treated for HF exacerbation, MOY, and UTI which were all stable at time of discharge. He finished course of cefdinir for e coli UTI on 2/24. He was discharged with family being told that he has failure to thrive. He was discharged home, required 24 hr cares by daughter including changing him and preparation of food. He has also has had intermittent agitation and aggression. Home health nurse stated that he was not appropriate for their program.   No clear etiology for confusion.     Today's update   - No acute events overnight. Patient had Brain MRI this morning, follow-up results.   - No change is behavior. He refused to talk with me and just said \"talk to Nella\".     AMS  Plan:   Neurology following:       Mr. Zaman appears to be continuing to refuse bedside examinations as well as medical workup.      From the neurology perspective we continue to recommend MRI brain and LP outlined in the note from 2/26.            # Renal transplant 9/2022   # CKD Stage 3b   Had the transplant at the [facility]. [facility]. 1/27 renal US with doppler showed no stenosis of vessels. Resistive indices were increased, felt could be related to ureteric obstruction but not specific. Mild dilation of intrarenal collecting system and ureter was also seen. " CT a/p 1/28 W/O contrast suggested mild hydro. Renal ultrasound 2/3/25 at [facility] showed no hydro or other abnormalities. FENA 4.3 (1/28) at [facility]. Creat 2.26 today. Creat recent baseline appears to be 1.8-2.3. Peak with recent MOY was up to 2.7, improved to 2.3 at time of discharge 2/21.  - transplant neph following the patient  - tac level 5.7  - PTA tacrolimus 1.5 mg BID  - PTA mycophenolate 500 mg BID  - PTA calcitriol and sodium bicarb  - PTA was patiromer 8.4 g once a week - will monitor K levels here daily     # Troponinemia, stable  # Lateral lead T wave inversions  # Atrial flutter  # HF  # HTN  # Hx MI  Limited cardiac history available. Per patient's daughter he may have had a stent placed at 1 point but is unsure.  Patient and daughters deny any history of cardiac arrhythmia.  However on exam today and on EKG evidence of atrial flutter, and patient also on Eliquis at least since December.  [facility], he was found to have heart failure exacerbation with 25-30 lbs fluid excess. BNP during hospitalization was 2868. His Lasix was switched to Torsemide. He has been taking his medications per his daughter.   - BNP 61971 here.   - trop 267>>218  - EKG with T wave inversions and mild ST depressions in lateral leads  - denies recent CP or SOB, no peripheral or abdominal edema  Plan:   - TTE as below.   - No report of chest pain. Troponin trended down   - cont PTA atorvastatin 20 mg nightly  - hold PTA Eliquis in case of procedure  - cont PTA Jardiance 12.5 mg daily  - continue PTA metoprolol 75 mg daily  - continue PTA torsemide 10 mg BID    TTE  Interpretation Summary  Left ventricular function is decreased. The ejection fraction is 40-45%  (mildly reduced).  Diastolic function not assessed due to arrhythmia.  Right ventricular function, chamber size, wall motion, and thickness are  normal.  Mild tricuspid insufficiency is present.  Mild (pulmonary artery systolic pressure<50mmHg)  pulmonary hypertension is  present.  IVC diameter and respiratory changes fall into an intermediate range  suggesting an RA pressure of 8 mmHg.  Trivial pericardial effusion is present.  There is no prior study for direct comparison.     # T2DM  Not on long-term insulin. Hemoglobin A1c 6.9% as of 7/2024  - cont PTA Jardiance 12.5 mg daily     # Mild normocytic anemia  # Hx hepatitis C  # Peripheral vascular disease  # S/p L AKA  # Glaucoma - cont PTA eyedrops  # Sickle cell trait          Diet: Combination Diet Regular Diet Adult    DVT Prophylaxis: Pneumatic Compression Devices and Anti-embolisim stockings (TEDs)  Zaman Catheter: Not present  Lines: None     Cardiac Monitoring: None  Code Status: Full Code      Clinically Significant Risk Factors                                    # Financial/Environmental Concerns: none         Social Drivers of Health    Tobacco Use: Medium Risk (7/26/2024)    Received from [employer]    Patient History     Smoking Tobacco Use: Former     Smokeless Tobacco Use: Never          Disposition Plan     Medically Ready for Discharge: Anticipated in 5+ Days             Leif Francis MD  Hospitalist Service, GOLD TEAM 16  [facility]  Securely message with [facility] (more info)  Text page via [employer] Paging/Directory   See signed in provider for up to date coverage information  ______________________________________________________________________    Interval History     Acute events as above.     Physical Exam   Vital Signs: Temp: 97.7  F (36.5  C) Temp src: Oral BP: 101/70 Pulse: 58   Resp: 16 SpO2: 95 % O2 Device: None (Room air)    Weight: 147 lbs 11.33 oz    General Appearance: Alert, room air, no acute distress, laying on bed.   Patient refused rest of physical examination    Medical Decision Making       55 MINUTES SPENT BY ME on the date of service doing chart review, history, exam, documentation & further activities per the note.       Data         Imaging results reviewed over the past 24 hrs:   No results found for this or any previous visit (from the past 24 hours).

## 2025-03-02 LAB
BACTERIA BLD CULT: NO GROWTH
GLUCOSE BLDC GLUCOMTR-MCNC: 294 MG/DL (ref 70–99)

## 2025-03-02 PROCEDURE — 250N000012 HC RX MED GY IP 250 OP 636 PS 637: Performed by: PHYSICIAN ASSISTANT

## 2025-03-02 PROCEDURE — 120N000002 HC R&B MED SURG/OB UMMC

## 2025-03-02 PROCEDURE — 250N000013 HC RX MED GY IP 250 OP 250 PS 637: Performed by: STUDENT IN AN ORGANIZED HEALTH CARE EDUCATION/TRAINING PROGRAM

## 2025-03-02 PROCEDURE — 99233 SBSQ HOSP IP/OBS HIGH 50: CPT | Performed by: INTERNAL MEDICINE

## 2025-03-02 PROCEDURE — 250N000013 HC RX MED GY IP 250 OP 250 PS 637: Performed by: INTERNAL MEDICINE

## 2025-03-02 PROCEDURE — 250N000013 HC RX MED GY IP 250 OP 250 PS 637: Performed by: PHYSICIAN ASSISTANT

## 2025-03-02 RX ADMIN — LATANOPROST 1 DROP: 50 SOLUTION OPHTHALMIC at 20:19

## 2025-03-02 RX ADMIN — APIXABAN 5 MG: 5 TABLET, FILM COATED ORAL at 20:11

## 2025-03-02 RX ADMIN — METOPROLOL SUCCINATE 75 MG: 25 TABLET, EXTENDED RELEASE ORAL at 08:07

## 2025-03-02 RX ADMIN — TORSEMIDE 10 MG: 10 TABLET ORAL at 08:08

## 2025-03-02 RX ADMIN — MYCOPHENOLATE MOFETIL 500 MG: 500 TABLET, FILM COATED ORAL at 20:11

## 2025-03-02 RX ADMIN — TACROLIMUS 1.5 MG: 1 CAPSULE ORAL at 08:07

## 2025-03-02 RX ADMIN — LIDOCAINE 4% 1 PATCH: 40 PATCH TOPICAL at 20:11

## 2025-03-02 RX ADMIN — MYCOPHENOLATE MOFETIL 500 MG: 500 TABLET, FILM COATED ORAL at 08:07

## 2025-03-02 RX ADMIN — ACETAMINOPHEN 650 MG: 325 TABLET, FILM COATED ORAL at 08:08

## 2025-03-02 RX ADMIN — TORSEMIDE 10 MG: 10 TABLET ORAL at 20:10

## 2025-03-02 RX ADMIN — SODIUM BICARBONATE 650 MG TABLET 1300 MG: at 20:10

## 2025-03-02 RX ADMIN — EMPAGLIFLOZIN 10 MG: 10 TABLET, FILM COATED ORAL at 08:07

## 2025-03-02 RX ADMIN — SODIUM BICARBONATE 650 MG TABLET 1300 MG: at 08:07

## 2025-03-02 RX ADMIN — ATORVASTATIN CALCIUM 20 MG: 20 TABLET, FILM COATED ORAL at 20:11

## 2025-03-02 RX ADMIN — PANTOPRAZOLE SODIUM 40 MG: 40 TABLET, DELAYED RELEASE ORAL at 08:08

## 2025-03-02 RX ADMIN — TACROLIMUS 1.5 MG: 1 CAPSULE ORAL at 20:10

## 2025-03-02 ASSESSMENT — ACTIVITIES OF DAILY LIVING (ADL)
ADLS_ACUITY_SCORE: 72
ADLS_ACUITY_SCORE: 73
ADLS_ACUITY_SCORE: 72
ADLS_ACUITY_SCORE: 73
ADLS_ACUITY_SCORE: 72
ADLS_ACUITY_SCORE: 73
ADLS_ACUITY_SCORE: 72
ADLS_ACUITY_SCORE: 73
ADLS_ACUITY_SCORE: 72
ADLS_ACUITY_SCORE: 73
ADLS_ACUITY_SCORE: 72
ADLS_ACUITY_SCORE: 73
ADLS_ACUITY_SCORE: 72
ADLS_ACUITY_SCORE: 73

## 2025-03-02 NOTE — PLAN OF CARE
Problem: Delirium  Goal: Improved Behavioral Control  Outcome: Progressing     Problem: Delirium  Goal: Improved Sleep  Outcome: Progressing     Problem: Delirium  Goal: Optimal Coping  Outcome: Not Progressing  Goal: Improved Attention and Thought Clarity  Outcome: Not Progressing     Problem: Adult Inpatient Plan of Care  Goal: Absence of Hospital-Acquired Illness or Injury  Outcome: Progressing     Problem: Adult Inpatient Plan of Care  Goal: Optimal Comfort and Wellbeing  Outcome: Progressing   Goal Outcome Evaluation:Alert, unable to assess orientation Mr. Zaman refused to answer any questions. Patient does easily awake to voice. Very irritable and hostile, would not allow full assessment. Agreed to take evening medications but once presented with them insisted that he had already taken them and refused. Family contacted me shortly after and said they would come to assist. I returned with the medication and he did take his medication once his daughter was present. Used the call light to asked to be changed after urinary incontinence. No acute changes over night, plan of care on going.

## 2025-03-02 NOTE — PROGRESS NOTES
[facility]    Medicine Progress Note - Hospitalist Service, GOLD TEAM 16    Date of Admission:  2/25/2025    Assessment & Plan   Tad Zaman is a 64 year old male admitted on 2/25/2025 for AMS. He has a history of diabetes, HTN, CAD, HF, and renal transplant 9/2022, left AKA, CKD.    Patient was brought in by family for AMS x 1-2 months.   He was evaluated at the [facility] multiple times in ED then was evaluated during an admission 1/20-2/21/25 with very broad workup (see below) that did not amount to any type of notable etiology of his symptoms. During this stay he was also treated for HF exacerbation, MOY, and UTI which were all stable at time of discharge. He finished course of cefdinir for e coli UTI on 2/24. He was discharged with family being told that he has failure to thrive. He was discharged home, required 24 hr cares by daughter including changing him and preparation of food. He has also has had intermittent agitation and aggression. Home health nurse stated that he was not appropriate for their program.   No clear etiology for confusion.     Today's update   - No acute events overnight.   - Brain MRI in process.   - Consult for procedure team placed in the chart for LP. CSF studies ordered.   - No family at bedside.   - Patient not interacting with as documented before.   - He is laying on bed and appears comfortable.      AMS  Plan:   Neurology following:       Mr. Zaman appears to be continuing to refuse bedside examinations as well as medical workup.      From the neurology perspective we continue to recommend MRI brain and LP outlined in the note from 2/26.            # Renal transplant 9/2022   # CKD Stage 3b   Had the transplant at the [facility]. [facility]. 1/27 renal US with doppler showed no stenosis of vessels. Resistive indices were increased, felt could be related to ureteric  obstruction but not specific. Mild dilation of intrarenal collecting system and ureter was also seen. CT a/p 1/28 W/O contrast suggested mild hydro. [facility] showed no hydro or other abnormalities. FENA 4.3 (1/28) at [facility]. Creat 2.26 today. Creat recent baseline appears to be 1.8-2.3. Peak with recent MOY was up to 2.7, improved to 2.3 at time of discharge 2/21.  - transplant neph following the patient  - tac level 5.7  - PTA tacrolimus 1.5 mg BID  - PTA mycophenolate 500 mg BID  - PTA calcitriol and sodium bicarb  - Hyperkalemia resolved. Continue monitoring K.      # Troponinemia, stable  # Lateral lead T wave inversions  # Atrial flutter  # HF  # HTN  # Hx MI  Limited cardiac history available. Per patient's daughter he may have had a stent placed at 1 point but is unsure.  Patient and daughters deny any history of cardiac arrhythmia.  However on exam today and on EKG evidence of atrial flutter, and patient also on Eliquis at least since December.  [facility], he was found to have heart failure exacerbation with 25-30 lbs fluid excess. BNP during hospitalization was 2868. His Lasix was switched to Torsemide. He has been taking his medications per his daughter.   - BNP 24327 here.   - trop 267>>218  - EKG with T wave inversions and mild ST depressions in lateral leads  - denies recent CP or SOB, no peripheral or abdominal edema  Plan:   - TTE as below.   - No report of chest pain. Troponin trended down   - cont PTA atorvastatin 20 mg nightly  - restart PTA Eliquis 5 mg BID   - cont PTA Jardiance 12.5 mg daily  - continue PTA metoprolol 75 mg daily  - continue PTA torsemide 10 mg BID    TTE  Interpretation Summary  Left ventricular function is decreased. The ejection fraction is 40-45%  (mildly reduced).  Diastolic function not assessed due to arrhythmia.  Right ventricular function, chamber size, wall motion, and thickness are  normal.  Mild tricuspid insufficiency  is present.  Mild (pulmonary artery systolic pressure<50mmHg) pulmonary hypertension is  present.  IVC diameter and respiratory changes fall into an intermediate range  suggesting an RA pressure of 8 mmHg.  Trivial pericardial effusion is present.  There is no prior study for direct comparison.     # T2DM  Not on long-term insulin. Hemoglobin A1c 6.9% as of 7/2024  - cont PTA Jardiance 12.5 mg daily     # Mild normocytic anemia  # Hx hepatitis C  # Peripheral vascular disease  # S/p L AKA  # Glaucoma - cont PTA eyedrops  # Sickle cell trait          Diet: Combination Diet Regular Diet Adult    DVT Prophylaxis: Pneumatic Compression Devices and Anti-embolisim stockings (TEDs)  Zaman Catheter: Not present  Lines: None     Cardiac Monitoring: None  Code Status: Full Code      Clinically Significant Risk Factors                                    # Financial/Environmental Concerns: none         Social Drivers of Health    Tobacco Use: Medium Risk (7/26/2024)    Received from [employer]    Patient History     Smoking Tobacco Use: Former     Smokeless Tobacco Use: Never          Disposition Plan     Medically Ready for Discharge: Anticipated in 5+ Days           Leif Francis MD  Hospitalist Service, GOLD TEAM 16  [facility]  Securely message with [employer] (more info)  Text page via [employer] Paging/Directory   See signed in provider for up to date coverage information  ______________________________________________________________________    Interval History     Acute events as above.     Physical Exam   Vital Signs: Temp: 98.6  F (37  C) Temp src: Oral BP: (!) 112/92 Pulse: 91   Resp: 16 SpO2: 96 % O2 Device: None (Room air)    Weight: 147 lbs 11.33 oz    General Appearance: Alert, room air, no acute distress, laying on bed.   Patient refused rest of physical examination    Medical Decision Making       55 MINUTES SPENT BY ME on the date of service doing chart review,  history, exam, documentation & further activities per the note.      Data         Imaging results reviewed over the past 24 hrs:   No results found for this or any previous visit (from the past 24 hours).

## 2025-03-02 NOTE — PLAN OF CARE
Problem: Adult Inpatient Plan of Care  Goal: Optimal Comfort and Wellbeing  Intervention: Monitor Pain and Promote Comfort  Recent Flowsheet Documentation  Taken 3/2/2025 0830 by Julius Zurita, RN  Pain Management Interventions: medication (see MAR)     Problem: Delirium  Goal: Improved Behavioral Control  Outcome: Not Progressing              VS:  Temp: 98.6  F (37  C) Temp src: Oral BP: (!) 112/92 Pulse: 91   Resp: 16 SpO2: 96 % O2 Device: None (Room air)     O2: SpO2 > 95 and stable on RA.    Output:  Incontinent   Last BM:  incontinent, denies abdominal discomfort. BS active / passing flatus.    Activity:  assist of 1 per report - could pivot   Could use the walker - Assist of 1   Skin:  Refused skin assessment   Pain:  0/10    CMS:  Aloof   Dressing:  None    Diet: Combination Regular diet. Denies nausea/vomiting.    LDA: L PIV SL    Equipment: IV pole, personal belongings,    Plan: CONTACT precautions maintained / Continue with plan of care. Call light within reach, pt able to make needs known.   Plan: possible LP, pending MRI    Additional Info:  Known to refuse cares     BED ALARM ON     On chair while eating breakfast       0353 refusing check and change

## 2025-03-03 ENCOUNTER — APPOINTMENT (OUTPATIENT)
Dept: ULTRASOUND IMAGING | Facility: CLINIC | Age: 65
DRG: 981 | End: 2025-03-03
Attending: STUDENT IN AN ORGANIZED HEALTH CARE EDUCATION/TRAINING PROGRAM
Payer: MEDICARE

## 2025-03-03 LAB
ALBUMIN MFR UR ELPH: 8.3 MG/DL
ALBUMIN UR-MCNC: NEGATIVE MG/DL
AMPHETAMINES UR QL SCN: ABNORMAL
APPEARANCE UR: CLEAR
BARBITURATES UR QL SCN: ABNORMAL
BENZODIAZ UR QL SCN: ABNORMAL
BILIRUB UR QL STRIP: NEGATIVE
BZE UR QL SCN: ABNORMAL
CANNABINOIDS UR QL SCN: ABNORMAL
COLOR UR AUTO: ABNORMAL
CREAT UR-MCNC: 34.7 MG/DL
CREAT UR-MCNC: 38.1 MG/DL
ERYTHROCYTE [DISTWIDTH] IN BLOOD BY AUTOMATED COUNT: 14.4 % (ref 10–15)
FENTANYL UR QL: ABNORMAL
GLUCOSE UR STRIP-MCNC: >=1000 MG/DL
HCT VFR BLD AUTO: 34.9 % (ref 40–53)
HGB BLD-MCNC: 11 G/DL (ref 13.3–17.7)
HGB UR QL STRIP: NEGATIVE
KETONES UR STRIP-MCNC: NEGATIVE MG/DL
LEUKOCYTE ESTERASE UR QL STRIP: ABNORMAL
MCH RBC QN AUTO: 25.9 PG (ref 26.5–33)
MCHC RBC AUTO-ENTMCNC: 31.5 G/DL (ref 31.5–36.5)
MCV RBC AUTO: 82 FL (ref 78–100)
MICROALBUMIN UR-MCNC: <12 MG/L
MICROALBUMIN/CREAT UR: NORMAL MG/G{CREAT}
NITRATE UR QL: NEGATIVE
OPIATES UR QL SCN: ABNORMAL
PCP QUAL URINE (ROCHE): ABNORMAL
PH UR STRIP: 6.5 [PH] (ref 5–7)
PLATELET # BLD AUTO: 292 10E3/UL (ref 150–450)
PROT/CREAT 24H UR: 0.22 MG/MG CR (ref 0–0.2)
RBC # BLD AUTO: 4.24 10E6/UL (ref 4.4–5.9)
RBC URINE: 3 /HPF
SP GR UR STRIP: 1.02 (ref 1–1.03)
SQUAMOUS EPITHELIAL: <1 /HPF
UROBILINOGEN UR STRIP-MCNC: NORMAL MG/DL
WBC # BLD AUTO: 8.7 10E3/UL (ref 4–11)
WBC URINE: 8 /HPF

## 2025-03-03 PROCEDURE — 99233 SBSQ HOSP IP/OBS HIGH 50: CPT | Performed by: STUDENT IN AN ORGANIZED HEALTH CARE EDUCATION/TRAINING PROGRAM

## 2025-03-03 PROCEDURE — 80307 DRUG TEST PRSMV CHEM ANLYZR: CPT | Performed by: PHYSICIAN ASSISTANT

## 2025-03-03 PROCEDURE — 250N000013 HC RX MED GY IP 250 OP 250 PS 637: Performed by: INTERNAL MEDICINE

## 2025-03-03 PROCEDURE — 250N000013 HC RX MED GY IP 250 OP 250 PS 637: Performed by: STUDENT IN AN ORGANIZED HEALTH CARE EDUCATION/TRAINING PROGRAM

## 2025-03-03 PROCEDURE — 84156 ASSAY OF PROTEIN URINE: CPT | Performed by: STUDENT IN AN ORGANIZED HEALTH CARE EDUCATION/TRAINING PROGRAM

## 2025-03-03 PROCEDURE — 76776 US EXAM K TRANSPL W/DOPPLER: CPT | Mod: 26 | Performed by: STUDENT IN AN ORGANIZED HEALTH CARE EDUCATION/TRAINING PROGRAM

## 2025-03-03 PROCEDURE — 76776 US EXAM K TRANSPL W/DOPPLER: CPT

## 2025-03-03 PROCEDURE — 85014 HEMATOCRIT: CPT | Performed by: INTERNAL MEDICINE

## 2025-03-03 PROCEDURE — 82043 UR ALBUMIN QUANTITATIVE: CPT | Performed by: STUDENT IN AN ORGANIZED HEALTH CARE EDUCATION/TRAINING PROGRAM

## 2025-03-03 PROCEDURE — 99207 PR NO BILLABLE SERVICE THIS VISIT: CPT | Performed by: STUDENT IN AN ORGANIZED HEALTH CARE EDUCATION/TRAINING PROGRAM

## 2025-03-03 PROCEDURE — 120N000002 HC R&B MED SURG/OB UMMC

## 2025-03-03 PROCEDURE — 36415 COLL VENOUS BLD VENIPUNCTURE: CPT | Performed by: INTERNAL MEDICINE

## 2025-03-03 PROCEDURE — 250N000013 HC RX MED GY IP 250 OP 250 PS 637: Performed by: PHYSICIAN ASSISTANT

## 2025-03-03 PROCEDURE — 81001 URINALYSIS AUTO W/SCOPE: CPT | Performed by: STUDENT IN AN ORGANIZED HEALTH CARE EDUCATION/TRAINING PROGRAM

## 2025-03-03 PROCEDURE — 250N000012 HC RX MED GY IP 250 OP 636 PS 637: Performed by: PHYSICIAN ASSISTANT

## 2025-03-03 PROCEDURE — 83036 HEMOGLOBIN GLYCOSYLATED A1C: CPT | Performed by: STUDENT IN AN ORGANIZED HEALTH CARE EDUCATION/TRAINING PROGRAM

## 2025-03-03 PROCEDURE — 99233 SBSQ HOSP IP/OBS HIGH 50: CPT | Performed by: INTERNAL MEDICINE

## 2025-03-03 PROCEDURE — 82570 ASSAY OF URINE CREATININE: CPT | Performed by: STUDENT IN AN ORGANIZED HEALTH CARE EDUCATION/TRAINING PROGRAM

## 2025-03-03 RX ADMIN — LIDOCAINE 4% 1 PATCH: 40 PATCH TOPICAL at 19:49

## 2025-03-03 RX ADMIN — MYCOPHENOLATE MOFETIL 500 MG: 500 TABLET, FILM COATED ORAL at 08:15

## 2025-03-03 RX ADMIN — SENNOSIDES AND DOCUSATE SODIUM 2 TABLET: 50; 8.6 TABLET ORAL at 09:32

## 2025-03-03 RX ADMIN — EMPAGLIFLOZIN 10 MG: 10 TABLET, FILM COATED ORAL at 08:15

## 2025-03-03 RX ADMIN — LATANOPROST 1 DROP: 50 SOLUTION OPHTHALMIC at 22:40

## 2025-03-03 RX ADMIN — MYCOPHENOLATE MOFETIL 500 MG: 500 TABLET, FILM COATED ORAL at 19:47

## 2025-03-03 RX ADMIN — TORSEMIDE 10 MG: 10 TABLET ORAL at 19:47

## 2025-03-03 RX ADMIN — ATORVASTATIN CALCIUM 20 MG: 20 TABLET, FILM COATED ORAL at 19:47

## 2025-03-03 RX ADMIN — SODIUM BICARBONATE 650 MG TABLET 1300 MG: at 19:46

## 2025-03-03 RX ADMIN — SODIUM BICARBONATE 650 MG TABLET 1300 MG: at 08:15

## 2025-03-03 RX ADMIN — TACROLIMUS 1.5 MG: 1 CAPSULE ORAL at 08:15

## 2025-03-03 RX ADMIN — METOPROLOL SUCCINATE 75 MG: 25 TABLET, EXTENDED RELEASE ORAL at 08:15

## 2025-03-03 RX ADMIN — APIXABAN 5 MG: 5 TABLET, FILM COATED ORAL at 08:15

## 2025-03-03 RX ADMIN — PANTOPRAZOLE SODIUM 40 MG: 40 TABLET, DELAYED RELEASE ORAL at 08:15

## 2025-03-03 RX ADMIN — TACROLIMUS 1.5 MG: 1 CAPSULE ORAL at 19:46

## 2025-03-03 ASSESSMENT — ACTIVITIES OF DAILY LIVING (ADL)
ADLS_ACUITY_SCORE: 74
ADLS_ACUITY_SCORE: 74
ADLS_ACUITY_SCORE: 73
ADLS_ACUITY_SCORE: 73
ADLS_ACUITY_SCORE: 74
ADLS_ACUITY_SCORE: 73
ADLS_ACUITY_SCORE: 74
ADLS_ACUITY_SCORE: 73
ADLS_ACUITY_SCORE: 74
ADLS_ACUITY_SCORE: 74
ADLS_ACUITY_SCORE: 73

## 2025-03-03 NOTE — PROGRESS NOTES
[facility]  Transplant Nephrology Consult Note  Date of Admission:  2/25/2025  Today's Date: 03/03/2025  Requesting physician: Basilio Martines MD    Reason for Consult:  Management of IS    Recommendations:   - Continue mycophenolate 500 mg twice a day.  - Continue tacrolimus 1.5 mg twice a day.  - Tacro level ordered for 3/4/2025  - Follow K trends to guide further Lokelma dosing (renal panel ordered)  - Hold calcitriol given borderline high calcium. Will follow up calcium levels on next renal panel (ordered)  - Ordered UA and UPCR/UACR 3/3/2025  - Ordered Kidney Transplant US 3/3/2025 (routine)  - Check hepatitis panel (ordered for 3/4/2025)  - Accurate I/O and daily weight given recent heart failure exacerbation.    *The above have been ordered but have had issues in the past with patient declining routine labs and testing    Assessment & Plan   # DDKT: Stable from presumed baseline of 1.8 to 2 mg/dL.    - Baseline Creatinine: ~ 1.8-2 as per inpatient notes.    - Proteinuria:  Unknown   - DSA Hx: Unknown due to being transplanted at another Transplant Center   - Last cPRA: Unknown/   - BK Viremia: Not checked recently due to time from transplant   - Kidney Tx Biopsy Hx:  Unknown .    # Immunosuppression: Tacrolimus immediate release (goal 4-6) and Mycophenolate mofetil (dose 500 mg every 12 hours)   - Induction with Recent Transplant:  Not known due to time from transplant   - Continue with intensive monitoring of immunosuppression for efficacy and toxicity.   - Historical Changes in Immunosuppression: None   - Changes: No    # Infection Prevention:   Last CD4 Level: Unknown  - PJP: None      - CMV IgG Ab High Risk Discordance (D+/R-) at time of transplant: Unknown  Present CMV Serostatus: Unknown  - EBV IgG Ab High Risk Discordance (D+/R-) at time of transplant: Unknown  Present EBV Serostatus: Unknown    # Blood Pressure / volume: Controlled;  Goal BP: > 100,  but < 130 systolic   - Euvolemic on exam, though with RAP of 8 and mild pulmonary hypertension.   - Continue Toprol, Jardiance and Torsemide.   - Changes: No    # Diabetes: Unknown control  Last HbA1c: Unknown.  - Glucose at goal for inpatient.    # Anemia in Chronic Renal Disease: Hgb: Stable, near normal      SIOBHAN: No   - Iron studies: Not checked recently    # Mineral Bone Disorder:    - Secondary renal hyperparathyroidism; PTH level: Not checked recently        On treatment: Calcitriol  - Vitamin D; level: Not checked recently        On supplement: No  - Calcium; level: High normal        On supplement: No  - Phosphorus; level: Not checked recently        On supplement: No    # Electrolytes:   - Potassium; level: High        On binder: No  - Magnesium; level: Not checked recently        On supplement: No  - Bicarbonate; level: Normal        On supplement: No    # AMS: [facility]y's [facility]. MRI brain without acute findings (although degraded by motion artifact). Planning for LP per documentation 3/2/2025.    # Other Significant PMH:   - Atrial flutter: On Eliquis.   - HFrEF: presumed ischemic given past maricel pre-transplant with area of ischemia and possible history of stent. On Toprol, jardiance and torsemide. With new drop in LVEF to ~ 45%, he may need further work-up.    - CAD: Possible history of stent.    - PVD s/p left AKA   - Sickle cell trait   - History of hepatitis C.     # Transplant History:  Etiology of Kidney Failure: Hypertension and diabetes.  Tx: DDKT  Transplant: N/A  Significant transplant-related complications: None    Weston Hill MD  Transplant Nephrology  Contact information via Vocera Web Console or via [facility] Paging/Directory    Interval History:  -Patient resting comfortably at the time of my eval. Did not want to communicate with me but did communicate with family at bedside (family also on phone at time of my eval  -Looks like last BMP was 2/28/2025.  "Will update today (follow renal function and calcium trends)  -Tacro level ordered for tomorrow AM (12 hour trough)  -Brain MRI w/ motion artifact but doesn't look like there are acute findings per radiologist read  -Patient continues on Tacro 1.5mg BID and MMF 500mg BID  -I's and O's not captured  -Weight (bed scale): 67.2kg from 67kg    History of Present Illness  From Dr See Piper Note 2/25/2025:   \"History obtained from daughter at bedside.  Mr. Zaman is a 64-year-old gentleman with end-stage kidney disease from presumed hypertension and diabetes status post DDKT in September 2022 who [facility].    Daughter reports that in December he was independent.  He cooked dinner for Ravi, was able to shovel the snow in the drive way, manage his finances and drive.  The only noticeable change was a decrease in weight compared to prior months.  Afterwards, there was a progressive decline with weeks where he was unable to take care of himself for which she was taken to the ER multiple times in mid January.  Additionally, he presented with worsening fluid retention, with possible increase of 10-20 pounds.  As per daughter, [facility], he [facility] di[facility] after which she developed an MOY, that resolved after decreasing the dose of diuretics.  He was also treated for a transplant UTI, finishing antibiotics this Monday.    In regard to his kidney transplant, she reports no past history of rejection or issues with viral infections.  She does not know patient's baseline creatinine, but as per notes it ranges from 1.8-2 mg/dL.  I am unable to open the [facility] notes to confirm this.\"    MEDICATIONS:  Current Facility-Administered Medications   Medication Dose Route Frequency Provider Last Rate Last Admin    apixaban ANTICOAGULANT (ELIQUIS) tablet 5 mg  5 mg Oral BID Leif Francis MD   5 mg at 03/03/25 0815    atorvastatin (LIPITOR) tablet 20 mg  20 mg Oral QPM Becca Nino PA-C "   20 mg at 25    [Held by provider] calcitRIOL (ROCALTROL) capsule 0.25 mcg  0.25 mcg Oral Daily Becca Nino PA-C   0.25 mcg at 25 0822    empagliflozin (JARDIANCE) tablet 10 mg  10 mg Oral Daily Basilio Martines MD   10 mg at 25 0815    latanoprost (XALATAN) 0.005 % ophthalmic solution 1 drop  1 drop Both Eyes At Bedtime Foster Cruz MD   1 drop at 25    Lidocaine (LIDOCARE) 4 % Patch 1 patch  1 patch Transdermal Q24h Becca Nino PA-C   1 patch at 25    metoprolol succinate ER (TOPROL XL) 24 hr tablet 75 mg  75 mg Oral Daily Becca Nino PA-C   75 mg at 25 0815    mycophenolate (GENERIC EQUIVALENT) tablet 500 mg  500 mg Oral BID IS Becca Nino PA-C   500 mg at 25 0815    pantoprazole (PROTONIX) EC tablet 40 mg  40 mg Oral QAM AC Foster Cruz MD   40 mg at 25 0815    sodium bicarbonate tablet 1,300 mg  1,300 mg Oral BID Becca Nino PA-C   1,300 mg at 25 0815    sodium chloride (PF) 0.9% PF flush 3 mL  3 mL Intracatheter Q8H Becca Nino PA-C   3 mL at 25 1024    tacrolimus (GENERIC EQUIVALENT) capsule 1.5 mg  1.5 mg Oral BID IS Becca Nino PA-C   1.5 mg at 25 0815    torsemide (DEMADEX) tablet 10 mg  10 mg Oral BID Foster Cruz MD   10 mg at 25     Current Facility-Administered Medications   Medication Dose Route Frequency Provider Last Rate Last Admin    Patient is already receiving anticoagulation with heparin, enoxaparin (LOVENOX), warfarin (COUMADIN)  or other anticoagulant medication   Does not apply Continuous PRN Becca Nino PA-C           Physical Exam   Temp  Av.2  F (36.8  C)  Min: 97.8  F (36.6  C)  Max: 98.5  F (36.9  C)      Pulse  Av.2  Min: 81  Max: 95 Resp  Av.7  Min: 16  Max: 18  SpO2  Av.3 %  Min: 96 %  Max: 99 %     BP 97/77 (BP Location: Left arm)   Pulse 92   Temp 98  F (36.7  C) (Oral)   Resp 16   Ht 1.829 m (6')   Wt 67.2 kg (148 lb 2.4 oz)   SpO2 98%   BMI 20.09  kg/m     Date 02/26/25 0700 - 02/27/25 0659   Shift 7200-0258 9937-2073 2559-7055 24 Hour Total   INTAKE   P.O. 480   480   Shift Total(mL/kg) 480(7.79)   480(7.79)   OUTPUT   Shift Total(mL/kg)       Weight (kg) 61.6 61.6 61.6 61.6      Admit Weight: 61.6 kg (135 lb 12.9 oz)     GENERAL APPEARANCE: alert  but did not want to interact during my visit  EDEMA: no LE edema  ABDOMEN: soft, nondistended, nontender, bowel sounds normal  MS: LEFT AKA.   Vascular: faint radial pulses. Bilateral femoral bruit.  SKIN: no rash  TX KIDNEY: with audible bruit.  DIALYSIS ACCESS:  RUE AV fistula with bruit and thrill.    Data   All labs reviewed by me.  CMP  Recent Labs   Lab 03/02/25  0208 02/28/25  1242 02/27/25 2124 02/26/25  0723 02/25/25  1232   NA  --  135 135 136 137   POTASSIUM  --  4.6 4.6 5.7* 5.3   CHLORIDE  --  99 98 99 100   CO2  --  24 22 23 25   ANIONGAP  --  12 15 14 12   * 217* 305* 167* 189*   BUN  --  80.6* 84.6* 77.6* 83.5*   CR  --  2.29* 2.50* 2.13* 2.26*   GFRESTIMATED  --  31* 28* 34* 31*   QUE  --  10.1 9.8 10.3 10.5*   PROTTOTAL  --   --   --   --  7.7   ALBUMIN  --   --   --   --  3.9   BILITOTAL  --   --   --   --  0.4   ALKPHOS  --   --   --   --  122   AST  --   --   --   --  19   ALT  --   --   --   --  13     CBC  Recent Labs   Lab 03/03/25  0939 02/28/25  1242 02/27/25 2124 02/26/25 0723   HGB 11.0* 10.9* 10.6* 12.4*   WBC 8.7 10.6 11.4* 10.8   RBC 4.24* 4.14* 4.09* 4.59   HCT 34.9* 34.3* 34.2* 38.5*   MCV 82 83 84 84   MCH 25.9* 26.3* 25.9* 27.0   MCHC 31.5 31.8 31.0* 32.2   RDW 14.4 14.3 14.4 14.5    282 295 312     INRNo lab results found in last 7 days.  ABGNo lab results found in last 7 days.   Urine Studies  No lab results found.  No lab results found.  PTH  No lab results found.  Iron Studies  No lab results found.    Past Medical History    Past Medical History:   Diagnosis Date    Chronic kidney disease     Diabetes (H)     Hypertension     Myocardial infarction (H)           Prior to Admission Medications   Medications Prior to Admission   Medication Sig Dispense Refill Last Dose/Taking    apixaban ANTICOAGULANT (ELIQUIS) 5 MG tablet Take 5 mg by mouth 2 times daily.   2/25/2025 Morning    atorvastatin (LIPITOR) 20 MG tablet Take 1 tablet by mouth at bedtime.   2/25/2025 Morning    brinzolamide-brimonidine (SIMBRINZA) 1-0.2 % ophthalmic suspension Apply 1 drop to eye 2 times daily.   Taking    calcitRIOL (ROCALTROL) 0.25 MCG capsule Take 0.25 mcg by mouth. TAKE ONE CAPSULE BY MOUTH MONDAY THROUGH FRIDAY FOR BONE HEALTH ** DOSE INCREASE   Taking    cefdinir (OMNICEF) 300 MG capsule Take 300 mg by mouth 2 times daily.   Taking    empagliflozin (JARDIANCE) 25 MG TABS tablet Take 12.5 mg by mouth every morning. TAKE ONE-HALF TABLET BY MOUTH EVERY MORNING FOR DIABETES AND KIDNEY PROTECTION   Taking    Latanoprost PF 0.005 % SOLN Apply to eye at bedtime. INSTILL 1 DROP IN BOTH EYES AT BEDTIME FOR GLAUCOMA   Taking    metoprolol succinate ER (TOPROL XL) 25 MG 24 hr tablet Take 75 mg by mouth daily. TAKE THREE TABLETS BY MOUTH EVERY DAY FOR HEART RATE   2/25/2025 Morning    mycophenolate (GENERIC EQUIVALENT) 500 MG tablet Take 500 mg by mouth 2 times daily.   Taking    pantoprazole (PROTONIX) 40 MG EC tablet Take 40 mg by mouth daily.  TAKE ONE TABLET BY MOUTH EVERY DAY PREVENT GI BLEED   Taking    patiromer (VELTASSA) 8.4 g packet Take 8.4 g by mouth once a week. Take 1 packet (8.4 grams) by mouth once weekly for high potassium   Taking    sodium bicarbonate 650 MG tablet Take 1,300 mg by mouth 2 times daily.   Taking    tacrolimus (GENERIC EQUIVALENT) 0.5 MG capsule Take 1.5 mg by mouth 2 times daily. Take three capsules by mouth twice a day for kidney transplant   Taking    torsemide (DEMADEX) 10 MG tablet Take 10 mg by mouth 2 times daily.   2/25/2025 Morning

## 2025-03-03 NOTE — PLAN OF CARE
Goal Outcome Evaluation:      Plan of Care Reviewed With: patient    Overall Patient Progress: no changeOverall Patient Progress: no change         VS: BP 97/77 (BP Location: Left arm)   Pulse 92   Temp 98  F (36.7  C) (Oral)   Resp 16   Ht 1.829 m (6')   Wt 67.2 kg (148 lb 2.4 oz)   SpO2 98%   BMI 20.09 kg/m      O2: RA, denies cough or SOB, denies chest pain   Cardiac Radial and dorsal pulse regular, CMS intact   Neuro: Ax1, denies N/T in hands or right foot, no edema noted   Bowel/Bladder: Incontinent, using brief, reports hard stools, received PRN senna   LBM: 3/3/25   Diet: Regular   Skin: Redness noted to top of right foot, bruising on toes of right foot   Pain: Denies pain   Activity: Variable, able to stand and pivot but will decline to assist in transfers or leaving bed at times   Dressings: NA   LDA: L-PIV-SL   Equipment: Prosthetic, walker   Plan: Lumbar puncture scheduled 3/5   Additional Info: Pt intermittently refusing cares, renal ultrasound noted over 700ml in bladder, UA sent down and straight cathed at 1500       Shari Sosa RN on 3/3/2025 at 1:53 PM

## 2025-03-03 NOTE — CARE PLAN
[id number]      /64 (BP Location: Left arm, Patient Position: Semi-Osorio's, Cuff Size: Adult Regular)   Pulse 92   Temp 98.8  F (37.1  C) (Oral)   Resp 18   Ht 1.829 m (6')   Wt 67 kg (147 lb 11.3 oz)   SpO2 98%   BMI 20.03 kg/m      Pt is A&O to self and Place, disoriented to time and situation. Was calm and cooperative this shift. Able to make needs known.VSS. Denies SOB and CP, N/V and pain. L PIV flushed,patent and SL. Not oob during this shift.   Incontinent bowel and bladder,LBM 3/1.  Pt was able to take meds from the RN this shift without rejection,  willing to answer questions (daughters were at bedside) .  Call  light within reach. Bed alarm on for safety.  No other acute concerns this shift pt changed and repositioned asked the writer to turn off the lights ready to sleep. Continue with POC.    Bernice Marquis RN

## 2025-03-03 NOTE — PROGRESS NOTES
[facility]    Medicine Progress Note - Hospitalist Service, GOLD TEAM 16    Date of Admission:  2/25/2025    Assessment & Plan   Tad Zaman is a 64 year old male admitted on 2/25/2025 for AMS. He has a history of diabetes, HTN, CAD, HF, and renal transplant 9/2022, left AKA, CKD.    Patient was brought in by family for AMS x 1-2 months.   He was evaluated at the [facility] multiple times in ED then was evaluated during an admission 1/20-2/21/25 with very broad workup (see below) that did not amount to any type of notable etiology of his symptoms. During this stay he was also treated for HF exacerbation, MOY, and UTI which were all stable at time of discharge. He finished course of cefdinir for e coli UTI on 2/24. He was discharged with family being told that he has failure to thrive. He was discharged home, required 24 hr cares by daughter including changing him and preparation of food. He has also has had intermittent agitation and aggression. Home health nurse stated that he was not appropriate for their program.   No clear etiology for confusion.     Today's update   - No acute events overnight.   - Patient's wife at bedside.   - Brain MRI negative for acute intracranial pathology, old occipital stroke.    - Case discussed with procedure team, pt needs to be off AC for 48 hrs before LP. Hold DOAC and LP will done on Wednesday.    - He is laying on bed and appears comfortable.    - He is still having erratic behavior refusing to talk to me.   - Psychiatry consult.     AMS  Plan:   Neurology following:       Mr. Zaman appears to be continuing to refuse bedside examinations as well as medical workup.      From the neurology perspective we continue to recommend MRI brain and LP outlined in the note from 2/26.         - LP on 3/5/25. CSF studies ordered.   - Brain MRI as above.        # Renal transplant 9/2022   # CKD Stage 3b   Had the transplant at the  [facility]. [facility] 1/27 renal US with doppler showed no stenosis of vessels. Resistive indices were increased, felt could be related to ureteric obstruction but not specific. Mild dilation of intrarenal collecting system and ureter was also seen. CT a/p 1/28 W/O contrast suggested mild hydro. [facility]/25 at [facility] showed no hydro or other abnormalities. FENA 4.3 (1/28) at [facility]. Creat 2.26 today. Creat recent baseline appears to be 1.8-2.3. Peak with recent MOY was up to 2.7, improved to 2.3 at time of discharge 2/21.  - transplant neph following the patient  Recommendations:   - Continue mycophenolate 500 mg twice a day.  - Continue tacrolimus 1.5 mg twice a day.  - Tacro level ordered for 3/4/2025  - Follow K trends to guide further Lokelma dosing (renal panel ordered)  - Hold calcitriol given borderline high calcium. Will follow up calcium levels on next renal panel (ordered)  - Ordered UA and UPCR/UACR 3/3/2025  - Ordered Kidney Transplant US 3/3/2025 (routine)  - Check hepatitis panel (ordered for 3/4/2025)  - Accurate I/O and daily weight given recent heart failure exacerbation.     # Troponinemia, stable  # Lateral lead T wave inversions  # Atrial flutter  # HF  # HTN  # Hx MI  Limited cardiac history available. Per patient's daughter he may have had a stent placed at 1 point but is unsure.  Patient and daughters deny any history of cardiac arrhythmia.  However on exam today and on EKG evidence of atrial flutter, and patient also on Eliquis at least since December.  [facility], he was found to have heart failure exacerbation with 25-30 lbs fluid excess. BNP during hospitalization was 2868. His Lasix was switched to Torsemide. He has been taking his medications per his daughter.   - BNP 75115 here.   - trop 267>>218  - EKG with T wave inversions and mild ST depressions in lateral leads  - denies recent CP or SOB, no  peripheral or abdominal edema  Plan:   - TTE as below.   - No report of chest pain. Troponin trended down   - cont PTA atorvastatin 20 mg nightly  - restart PTA Eliquis 5 mg BID   - cont PTA Jardiance 12.5 mg daily  - continue PTA metoprolol 75 mg daily  - continue PTA torsemide 10 mg BID    TTE  Interpretation Summary  Left ventricular function is decreased. The ejection fraction is 40-45%  (mildly reduced).  Diastolic function not assessed due to arrhythmia.  Right ventricular function, chamber size, wall motion, and thickness are  normal.  Mild tricuspid insufficiency is present.  Mild (pulmonary artery systolic pressure<50mmHg) pulmonary hypertension is  present.  IVC diameter and respiratory changes fall into an intermediate range  suggesting an RA pressure of 8 mmHg.  Trivial pericardial effusion is present.  There is no prior study for direct comparison.     # T2DM  Not on long-term insulin. Hemoglobin A1c 6.9% as of 7/2024  - cont PTA Jardiance 12.5 mg daily     # Mild normocytic anemia  # Hx hepatitis C  # Peripheral vascular disease  # S/p L AKA  # Glaucoma - cont PTA eyedrops  # Sickle cell trait          Diet: Combination Diet Regular Diet Adult    DVT Prophylaxis: Pneumatic Compression Devices and Anti-embolisim stockings (TEDs)  Zaman Catheter: Not present  Lines: None     Cardiac Monitoring: None  Code Status: Full Code      Clinically Significant Risk Factors                                    # Financial/Environmental Concerns: none         Social Drivers of Health    Tobacco Use: Medium Risk (7/26/2024)    Received from [employer]    Patient History     Smoking Tobacco Use: Former     Smokeless Tobacco Use: Never          Disposition Plan     Medically Ready for Discharge: Anticipated in 5+ Days           Leif Francis MD  Hospitalist Service, GOLD TEAM 16  [facility]  Securely message with [employer] (more info)  Text page via [employer]  Paging/Directory   See signed in provider for up to date coverage information  ______________________________________________________________________    Interval History     Acute events as above.     Physical Exam   Vital Signs: Temp: 98  F (36.7  C) Temp src: Oral BP: 97/77 Pulse: 92   Resp: 16 SpO2: 98 % O2 Device: None (Room air)    Weight: 148 lbs 2.39 oz    General Appearance: Alert, room air, no acute distress, laying on bed.   Patient refused rest of physical examination    Medical Decision Making       55 MINUTES SPENT BY ME on the date of service doing chart review, history, exam, documentation & further activities per the note.      Data     I have personally reviewed the following data over the past 24 hrs:    8.7  \   11.0 (L)   / 292     N/A N/A N/A /  N/A   N/A N/A N/A \       Imaging results reviewed over the past 24 hrs:   No results found for this or any previous visit (from the past 24 hours).

## 2025-03-03 NOTE — PROGRESS NOTES
[facility] Medicine Cross Cover Note    March 3, 2025 12:24 AM    I was notified by bedside RN that this patient had requested melatonin for help with sleep.     Action taken:   -PRN melatonin ordered    Clem Sears MD on 3/3/2025 at 12:24 AM

## 2025-03-03 NOTE — PROGRESS NOTES
Shift 5259-3046      /78 (BP Location: Left arm)   Pulse 92   Temp 98.9  F (37.2  C) (Oral)   Resp 18   Ht 1.829 m (6')   Wt 67 kg (147 lb 11.3 oz)   SpO2 97%   BMI 20.03 kg/m      O2: Stable on RA. Denies SOB.    Output: Incontinent of bowel and bladder.    Last BM: 3/3 - tan, large and hard.    Activity: Ax1 when OOB and can use walker. Ax2 in bed with cares.    Pain: Denies pain.    CMS: Pt is confused. Confused regarding the time - pt thought it was daytime when its night. He called twice at the nursing station looking for his daughter. Aloof. Denies numbness and tingling.   Diet: Regular diet.    LDA: L PIV SL   Equipment: IV pole, personal belongings, and call light in reach.    Plan: Contact precautions maintained / Continue with plan of care. Call light within reach, pt able to make needs known.    Additional Info: No other acute concerns this shift pt changed and repositioned. Refused repositioning once around 0400.

## 2025-03-04 ENCOUNTER — APPOINTMENT (OUTPATIENT)
Dept: PHYSICAL THERAPY | Facility: CLINIC | Age: 65
End: 2025-03-04
Payer: MEDICARE

## 2025-03-04 LAB
ALBUMIN SERPL BCG-MCNC: 3.4 G/DL (ref 3.5–5.2)
ANION GAP SERPL CALCULATED.3IONS-SCNC: 11 MMOL/L (ref 7–15)
BUN SERPL-MCNC: 64.8 MG/DL (ref 8–23)
CALCIUM SERPL-MCNC: 10 MG/DL (ref 8.8–10.4)
CHLORIDE SERPL-SCNC: 103 MMOL/L (ref 98–107)
CREAT SERPL-MCNC: 1.82 MG/DL (ref 0.67–1.17)
EGFRCR SERPLBLD CKD-EPI 2021: 40 ML/MIN/1.73M2
EST. AVERAGE GLUCOSE BLD GHB EST-MCNC: 180 MG/DL
GLUCOSE BLDC GLUCOMTR-MCNC: 400 MG/DL (ref 70–99)
GLUCOSE BLDC GLUCOMTR-MCNC: 406 MG/DL (ref 70–99)
GLUCOSE BLDC GLUCOMTR-MCNC: 414 MG/DL (ref 70–99)
GLUCOSE BLDC GLUCOMTR-MCNC: 437 MG/DL (ref 70–99)
GLUCOSE SERPL-MCNC: 296 MG/DL (ref 70–99)
HAV IGM SERPL QL IA: NONREACTIVE
HBA1C MFR BLD: 7.9 %
HBV CORE IGM SERPL QL IA: NONREACTIVE
HBV SURFACE AG SERPL QL IA: NONREACTIVE
HCO3 SERPL-SCNC: 23 MMOL/L (ref 22–29)
HCV AB SERPL QL IA: REACTIVE
PHOSPHATE SERPL-MCNC: 3.2 MG/DL (ref 2.5–4.5)
POTASSIUM SERPL-SCNC: 5.1 MMOL/L (ref 3.4–5.3)
SODIUM SERPL-SCNC: 137 MMOL/L (ref 135–145)
TACROLIMUS BLD-MCNC: 3.9 UG/L (ref 5–15)
TME LAST DOSE: ABNORMAL H
TME LAST DOSE: ABNORMAL H

## 2025-03-04 PROCEDURE — 120N000002 HC R&B MED SURG/OB UMMC

## 2025-03-04 PROCEDURE — 99233 SBSQ HOSP IP/OBS HIGH 50: CPT | Performed by: STUDENT IN AN ORGANIZED HEALTH CARE EDUCATION/TRAINING PROGRAM

## 2025-03-04 PROCEDURE — 82040 ASSAY OF SERUM ALBUMIN: CPT | Performed by: STUDENT IN AN ORGANIZED HEALTH CARE EDUCATION/TRAINING PROGRAM

## 2025-03-04 PROCEDURE — 250N000013 HC RX MED GY IP 250 OP 250 PS 637: Performed by: PHYSICIAN ASSISTANT

## 2025-03-04 PROCEDURE — 97530 THERAPEUTIC ACTIVITIES: CPT | Mod: GP

## 2025-03-04 PROCEDURE — 250N000012 HC RX MED GY IP 250 OP 636 PS 637: Performed by: PHYSICIAN ASSISTANT

## 2025-03-04 PROCEDURE — 250N000012 HC RX MED GY IP 250 OP 636 PS 637: Performed by: NURSE PRACTITIONER

## 2025-03-04 PROCEDURE — 80197 ASSAY OF TACROLIMUS: CPT | Performed by: STUDENT IN AN ORGANIZED HEALTH CARE EDUCATION/TRAINING PROGRAM

## 2025-03-04 PROCEDURE — 82310 ASSAY OF CALCIUM: CPT | Performed by: STUDENT IN AN ORGANIZED HEALTH CARE EDUCATION/TRAINING PROGRAM

## 2025-03-04 PROCEDURE — 250N000013 HC RX MED GY IP 250 OP 250 PS 637: Performed by: STUDENT IN AN ORGANIZED HEALTH CARE EDUCATION/TRAINING PROGRAM

## 2025-03-04 PROCEDURE — 80074 ACUTE HEPATITIS PANEL: CPT | Performed by: STUDENT IN AN ORGANIZED HEALTH CARE EDUCATION/TRAINING PROGRAM

## 2025-03-04 PROCEDURE — 36415 COLL VENOUS BLD VENIPUNCTURE: CPT | Performed by: STUDENT IN AN ORGANIZED HEALTH CARE EDUCATION/TRAINING PROGRAM

## 2025-03-04 PROCEDURE — 250N000013 HC RX MED GY IP 250 OP 250 PS 637: Performed by: INTERNAL MEDICINE

## 2025-03-04 PROCEDURE — 99232 SBSQ HOSP IP/OBS MODERATE 35: CPT | Performed by: STUDENT IN AN ORGANIZED HEALTH CARE EDUCATION/TRAINING PROGRAM

## 2025-03-04 PROCEDURE — 99222 1ST HOSP IP/OBS MODERATE 55: CPT

## 2025-03-04 RX ORDER — DEXTROSE MONOHYDRATE 25 G/50ML
25-50 INJECTION, SOLUTION INTRAVENOUS
Status: DISCONTINUED | OUTPATIENT
Start: 2025-03-04 | End: 2025-03-22 | Stop reason: DRUGHIGH

## 2025-03-04 RX ORDER — TAMSULOSIN HYDROCHLORIDE 0.4 MG/1
0.4 CAPSULE ORAL DAILY
Status: DISCONTINUED | OUTPATIENT
Start: 2025-03-04 | End: 2025-03-22

## 2025-03-04 RX ORDER — NICOTINE POLACRILEX 4 MG
15-30 LOZENGE BUCCAL
Status: DISCONTINUED | OUTPATIENT
Start: 2025-03-04 | End: 2025-03-22 | Stop reason: DRUGHIGH

## 2025-03-04 RX ADMIN — MYCOPHENOLATE MOFETIL 500 MG: 500 TABLET, FILM COATED ORAL at 08:35

## 2025-03-04 RX ADMIN — PANTOPRAZOLE SODIUM 40 MG: 40 TABLET, DELAYED RELEASE ORAL at 08:36

## 2025-03-04 RX ADMIN — SODIUM BICARBONATE 650 MG TABLET 1300 MG: at 21:11

## 2025-03-04 RX ADMIN — EMPAGLIFLOZIN 10 MG: 10 TABLET, FILM COATED ORAL at 08:35

## 2025-03-04 RX ADMIN — LATANOPROST 1 DROP: 50 SOLUTION OPHTHALMIC at 21:25

## 2025-03-04 RX ADMIN — MYCOPHENOLATE MOFETIL 500 MG: 500 TABLET, FILM COATED ORAL at 21:12

## 2025-03-04 RX ADMIN — ATORVASTATIN CALCIUM 20 MG: 20 TABLET, FILM COATED ORAL at 21:12

## 2025-03-04 RX ADMIN — TORSEMIDE 10 MG: 10 TABLET ORAL at 21:13

## 2025-03-04 RX ADMIN — TACROLIMUS 1.5 MG: 1 CAPSULE ORAL at 08:36

## 2025-03-04 RX ADMIN — TACROLIMUS 1.5 MG: 1 CAPSULE ORAL at 21:11

## 2025-03-04 RX ADMIN — METOPROLOL SUCCINATE 75 MG: 25 TABLET, EXTENDED RELEASE ORAL at 08:35

## 2025-03-04 RX ADMIN — LIDOCAINE 4% 1 PATCH: 40 PATCH TOPICAL at 21:12

## 2025-03-04 RX ADMIN — INSULIN ASPART 6 UNITS: 100 INJECTION, SOLUTION INTRAVENOUS; SUBCUTANEOUS at 21:24

## 2025-03-04 RX ADMIN — TAMSULOSIN HYDROCHLORIDE 0.4 MG: 0.4 CAPSULE ORAL at 10:36

## 2025-03-04 RX ADMIN — TORSEMIDE 10 MG: 10 TABLET ORAL at 08:36

## 2025-03-04 RX ADMIN — SODIUM BICARBONATE 650 MG TABLET 1300 MG: at 08:36

## 2025-03-04 ASSESSMENT — ACTIVITIES OF DAILY LIVING (ADL)
ADLS_ACUITY_SCORE: 73
ADLS_ACUITY_SCORE: 73
ADLS_ACUITY_SCORE: 74
ADLS_ACUITY_SCORE: 74
ADLS_ACUITY_SCORE: 73
ADLS_ACUITY_SCORE: 74
ADLS_ACUITY_SCORE: 73
ADLS_ACUITY_SCORE: 73
ADLS_ACUITY_SCORE: 74
ADLS_ACUITY_SCORE: 73
ADLS_ACUITY_SCORE: 74
ADLS_ACUITY_SCORE: 73
ADLS_ACUITY_SCORE: 74
ADLS_ACUITY_SCORE: 73

## 2025-03-04 NOTE — PROGRESS NOTES
[facility]    Medicine Progress Note - Hospitalist Service, GOLD TEAM 22    Date of Admission:  2/25/2025    Assessment & Plan Tad Zaman is a 64 year old male admitted on 2/25/2025 for AMS. He has a history of diabetes, HTN, CAD, HF, and renal transplant 9/2022, left AKA, CKD.    Patient was brought in by family for AMS x 1-2 months. He has been evaluated at the [facility] multiple times in ED then was evaluated during an admission 1/20-2/21/25 with very broad workup (see below) that did not amount to any type of notable etiology of his symptoms. During this stay he was also treated for HF exacerbation, MOY, and UTI which were all stable at time of discharge. He finished course of cefdinir for e coli UTI on 2/24. He was discharged with family being told that he has failure to thrive. He was discharged home, required 24 hr cares by daughter including changing him and preparation of food. He has also has had intermittent agitation and aggression. Home health nurse stated that he was not appropriate for their program.   No clear etiology for confusion.      Plan today:   - Neurology with recommendation for repeat LP to reevaluate pleocytosis on prior. Holding DOAC with plan for LP per CAPS on 3/5. Discussed with CAPS provider today and he does appear to be appropriate for bedside procedure currently. Discussed plan for LP with the patient and he is agreeable at this time but defers decisions to his wife.   - Elevated sugar today, unable to consistently monitor given his intermittent agitation and refusal of cares. Will plan for TID checks today and low threshold for sliding scale insulin, if he remains amenable.   - Noted urinary retention per bedside nursing, not on tamsulosin previously. Will trial this as long as his blood pressure tolerates.        AMS  Plan:   Neurology following:   - LP on 3/5/25. CSF studies ordered.   - Brain MRI as above.   - Delirium  precautions as able.      # Renal transplant 9/2022   # CKD Stage 3b   Had the [facility] at [facility] renal US with doppler showed no stenosis of vessels. Resistive indices were increased, felt could be related to ureteric obstruction but not specific. Mild dilation of intrarenal collecting system and ureter was also seen. CT a/p 1/28 W/O contrast suggested mild hydro. [facility] showed no hydro or other abnormalities. FENA 4.3 (1/28) at [facility]. Creat 2.26 today. Creat recent baseline appears to be 1.8-2.3. Peak with recent MOY was up to 2.7, improved to 2.3 at time of discharge 2/21.  - transplant neph following the patient  Recommendations:   - Continue mycophenolate 500 mg twice a day.  - Continue tacrolimus 1.5 mg twice a day.  - Tacro level ordered for 3/4/2025  - Follow K trends to guide further Lokelma dosing (renal panel ordered)  - Hold calcitriol given borderline high calcium. Will follow up calcium levels on next renal panel (ordered)  - Ordered UA and UPCR/UACR 3/3/2025  - Ordered Kidney Transplant US 3/3/2025 (routine)  - Check hepatitis panel (ordered for 3/4/2025)  - Accurate I/O and daily weight given recent heart failure exacerbation.  - Plan for urinary retention per below    #Urinary retention  Noted to have ongoing retention requiring straight cath. Unclear whether this is a new issue for him. No significant anticholinergics on his current medication list.   - Tamsulosin 0.4mg PO initiated on 3/4; as tolerated   - Avoid anticholinergic medications as able.      # Troponinemia, stable  # Lateral lead T wave inversions  # Atrial flutter  # HF  # HTN  # Hx MI  Limited cardiac history available. Per patient's daughter he may have had a stent placed at 1 point but is unsure.  Patient and daughters deny any history of cardiac arrhythmia.  However on exam today and on EKG evidence of atrial flutter, and patient  also on Eliquis at least since December.  [facility], he was found to have heart failure exacerbation with 25-30 lbs fluid excess. BNP during hospitalization was 2868. His Lasix was switched to Torsemide. He has been taking his medications per his daughter.   - BNP 80038 here.   - trop 267>>218  - EKG with T wave inversions and mild ST depressions in lateral leads  - denies recent CP or SOB, no peripheral or abdominal edema  Plan:   - TTE as below.   - No report of chest pain. Troponin trended down   - cont PTA atorvastatin 20 mg nightly  - restart PTA Eliquis 5 mg BID   - cont PTA Jardiance 12.5 mg daily  - continue PTA metoprolol 75 mg daily  - continue PTA torsemide 10 mg BID     TTE  Interpretation Summary  Left ventricular function is decreased. The ejection fraction is 40-45%  (mildly reduced).  Diastolic function not assessed due to arrhythmia.  Right ventricular function, chamber size, wall motion, and thickness are  normal.  Mild tricuspid insufficiency is present.  Mild (pulmonary artery systolic pressure<50mmHg) pulmonary hypertension is  present.  IVC diameter and respiratory changes fall into an intermediate range  suggesting an RA pressure of 8 mmHg.  Trivial pericardial effusion is present.  There is no prior study for direct comparison.     # T2DM  Not on long-term insulin. Hemoglobin A1c 6.9% as of 7/2024. Hyperglycemia when he allowed check on 3/4. Unclear whether he would be amenable for ongoing checks required for insulin use.   - cont PTA Jardiance 12.5 mg daily  - Repeat Hemoglobin A1c   - BID HS checks today as tolerated - if ongoing elevation, can consider sliding scale insulin vs low dose long acting basal dosing      # Mild normocytic anemia  # Hx hepatitis C  # Peripheral vascular disease  # S/p L BKA  # Glaucoma - cont PTA eyedrops  # Sickle cell trait             Diet: Combination Diet Regular Diet Adult    DVT Prophylaxis: Pneumatic Compression  "Devices  Zaman Catheter: Not present  Lines: None     Cardiac Monitoring: None  Code Status: Full Code      Clinically Significant Risk Factors               # Hypoalbuminemia: Lowest albumin = 3.4 g/dL at 3/4/2025  5:40 AM, will monitor as appropriate                    # Financial/Environmental Concerns: none         Social Drivers of Health    Tobacco Use: Medium Risk (7/26/2024)    Received from [employer]    Patient History     Smoking Tobacco Use: Former     Smokeless Tobacco Use: Never          Disposition Plan     Medically Ready for Discharge: Anticipated in 5+ Days             Lucía Chapman DO  Hospitalist Service, GOLD TEAM 22  [facility]  Securely message with [employer] (more info)  Text page via [facility] Paging/Directory   See signed in provider for up to date coverage information  ______________________________________________________________________    Interval History   No acute events overnight. He feels well this morning. Explains he is in the hospital after \"being started on a 9 day patch\" [facility] but is unable to otherwise provide information regarding specifics of his hospitalization. He feels he is at his baseline, without confusion, denies headache, nausea, emesis, chest pain, dyspnea, abdominal pain, focal extremity numbness, tingling or weakness or other concerns. He denies suprapubic pain or urinary symptoms. Does not recall urinary retention or needing straight cath this admission.     Physical Exam   Vital Signs: Temp: 98  F (36.7  C) Temp src: Oral BP: 112/71 Pulse: 90   Resp: 18 SpO2: 96 % O2 Device: None (Room air)    Weight: 146 lbs 6.17 oz    General: resting comfortably in bed, no acute distress, pleasant   HEENT: sclera clear, MMM  NECK: Supple, with no masses, no rigidity   CV: RRR, no m/r/g. Extremities are warm and well perfused.   LUNGS: CTAB, no w/r/c.  ABD: Soft, NT/ND, no suprapubic tenderness.   SKIN: Warm, well " perfused. No skin rashes or abnormal lesions.  MSK: S/p L BKA, No clubbing, cyanosis, or edema.  NEURO: Alert and interactive, disoriented to place and time, confused regarding events this admission, No focal deficits.    Medical Decision Making           Data     I have personally reviewed the following data over the past 24 hrs:    N/A  \   N/A   / N/A     137 103 64.8 (H) /  296 (H)   5.1 23 1.82 (H) \     ALT: N/A AST: N/A AP: N/A TBILI: N/A   ALB: 3.4 (L) TOT PROTEIN: N/A LIPASE: N/A       Imaging results reviewed over the past 24 hrs:   Recent Results (from the past 24 hours)   US Renal Transplant with Doppler    Impression    RESIDENT PRELIMINARY INTERPRETATION  IMPRESSION:  1. Mild hydronephrosis of the transplanted kidney.  2. No significant change between prevoid and postvoid bladder volumes  suggesting obstruction is distal to the bladder.  3. Torturous renal artery with mildly elevated velocity at the  anastomosis, likely chronic.

## 2025-03-04 NOTE — PROGRESS NOTES
"[facility]  Neurology Consultation - Progress Note    Patient Name:  Tad Zaman  Date of Service:  March 3, 2025    Subjective:    No changes. Consistently not cooperating with examinations by doctor or nursing teams.     Objective:    Vitals: /71 (BP Location: Right arm)   Pulse 60   Temp 98.1  F (36.7  C) (Oral)   Resp 17   Ht 1.829 m (6')   Wt 67.2 kg (148 lb 2.4 oz)   SpO2 96%   BMI 20.09 kg/m    General: Lying in bed, NAD  Head: Atraumatic, normocephalic   Cardiac: no lower extremity edema  Neurologic:  Mental Status: In response to any of my questions the patient points his finger in his significant other and states \"ask her.\"  He tells me that everyone is asking the same questions, and I attempt to ask novel questions, or simply engage in a discussion about his life when he was still working, he refuses to engage.  And refuses to comply with the rest of the neurologic examination.    Pertinent Investigations:    I have personally reviewed most recent and pertinent labs, tests, and radiological images.     Assessment  #Cognitive impairment vs encephalopathy   Tad Zaman is a 64 year old male with history of diabetes, HTN, CAD, HF, and renal transplant 9/2022, CKD who presents with worsening altered mental status over the last 1-2 months.   At this point Mr. Zaman seems to have a fairly dramatic degree of cognitive impairment. According to family, this happened acutely, and given the prior lumbar puncture showing a pleocytosis, I wonder if he had a viral encephalitis that has resolved though left him with a degree of cognitive impairment. To assess for this we will recommend repeating his workup to look for resolution of pleocytosis.  If the pleocytosis is ongoing, I have ordered cytology and cytometry to assess for any atypical cells, possible neoplastic cells.    The differential is relatively limited since he continues not to participate in " neurologic examination, so I am not able to identify any subtleties in examination.  If this is representing a progression of cognitive impairment, I am not able to assess for whether this is more behavioral such as an FTD kind of picture of cognitive impairment, versus an amnestic pattern of encephalopathy/cognitive impairment.      Recommendations:   -MRI brain completed  - Follow-up LP pending, labs ordered including cell count, West Nile, VDRL, meningitis encephalitis panel    Thank you for involving Neurology in the care of Tad Zaman.  Please do not hesitate to call with questions.    Ousmane Quezada MD     Billed as a level 3 service due to patient presenting with an acute illness posing a severe threat to bodily function.  I have reviewed notes from nursing, and internal medicine.  And I have reviewed labs above including CBC, BMP, [facility].

## 2025-03-04 NOTE — PLAN OF CARE
Goal Outcome Evaluation:      Plan of Care Reviewed With: patient    Overall Patient Progress: improvingOverall Patient Progress: improving    Shift: 4009-7059  VS: BP 99/56 (BP Location: Left arm)   Pulse 70   Temp 98.6  F (37  C) (Oral)   Resp 17   Ht 1.829 m (6')   Wt 66.4 kg (146 lb 6.2 oz)   SpO2 100%   BMI 19.85 kg/m      Pain:  Denies pain  Neuro: A&Ox3-4, intermittently forgetful about situation  Resp: WDL, no SOB or CP reported. Stable on RA  Diet: Reg, needs help ordering when family is not here   Skin: WDL  LDA: L PIV. R arm Limb alert- fisula.   Activity: Assist of 1 with walker & GB   Output: on bladder protocol, straight cath done twice  Additional Info/shift updates: Pt retaining urine, straight cathed twice this shift. provider made aware; flomax started. BID and bedtime blood sugar check started. Alerted cross cover @0923 about a dinner time BG of 437, sliding scale insulin ordered.   Call light within reach     Plan of care ongoing

## 2025-03-04 NOTE — CONSULTS
"      Initial Psychiatric Consult   Consult date: March 4, 2025         Reason for Consult, requesting source:    Persistent confusion and erratic behavior   [facility]     Labs and imaging reviewed. Patient seen and evaluated by RUFINO John CNP          HPI:   Tad Zaman is a 64 year old male with history of diabetes, HTN, CAD, HF, and renal transplant 9/2022, CKD who presents with worsening altered mental status over the last 1-2 months. Neurology has been involved and noted pt not consistently cooperating with examinations. Neuro got MRI and also recommend follow up LP. Psychiatry was consulted 3/4 for persistent confusion and erratic behavior.     On exam, patient was with his wife in the room. He states he has no concerns and his mood is \"fine.\" His wife was at bedside and called in the daughter who also spends a lot of time with him. They described pt as sharp, veracious reader, calm, and not easily angered before his transplant. After transplant and being on tacro, they have seem some cognitive changes but the last week has been the worst. Stated [facility] and upset with his kids, which is out of character. They report the last 2 days have been better and he has been more cooperative and engaged with therapies.           Past Psychiatric History:   Patient and family denies         Substance Use and History:   Denies        Past Medical History:   PAST MEDICAL HISTORY:   Past Medical History:   Diagnosis Date    Chronic kidney disease     Diabetes (H)     Hypertension     Myocardial infarction (H)        PAST SURGICAL HISTORY: History reviewed. No pertinent surgical history.          Family History:   FAMILY HISTORY: History reviewed. No pertinent family history.    Family Psychiatric History: denies        Social History:   SOCIAL HISTORY:   Social History     Tobacco Use    Smoking status: Not on file    Smokeless tobacco: Not on file   Substance Use " Topics    Alcohol use: Not on file       Station[facility] in [facility]. [facility]          Physical ROS:   The 10 point Review of Systems is negative other than noted in the HPI or here.           Medications:     Current Facility-Administered Medications   Medication Dose Route Frequency Provider Last Rate Last Admin    [Held by provider] apixaban ANTICOAGULANT (ELIQUIS) tablet 5 mg  5 mg Oral BID Leif Francis MD   5 mg at 03/03/25 0815    atorvastatin (LIPITOR) tablet 20 mg  20 mg Oral QPM Becca Nino PA-C   20 mg at 03/03/25 1947    [Held by provider] calcitRIOL (ROCALTROL) capsule 0.25 mcg  0.25 mcg Oral Daily Becca Nino PA-C   0.25 mcg at 02/26/25 0822    empagliflozin (JARDIANCE) tablet 10 mg  10 mg Oral Daily Basilio Martines MD   10 mg at 03/04/25 0835    latanoprost (XALATAN) 0.005 % ophthalmic solution 1 drop  1 drop Both Eyes At Bedtime Foster Cruz MD   1 drop at 03/03/25 2240    Lidocaine (LIDOCARE) 4 % Patch 1 patch  1 patch Transdermal Q24h Becca Nino PA-C   1 patch at 03/03/25 1949    metoprolol succinate ER (TOPROL XL) 24 hr tablet 75 mg  75 mg Oral Daily Becca Nino PA-C   75 mg at 03/04/25 0835    mycophenolate (GENERIC EQUIVALENT) tablet 500 mg  500 mg Oral BID IS Becca Nino PA-C   500 mg at 03/04/25 0835    pantoprazole (PROTONIX) EC tablet 40 mg  40 mg Oral QAM AC Foster Cruz MD   40 mg at 03/04/25 0836    sodium bicarbonate tablet 1,300 mg  1,300 mg Oral BID Becca Nino PA-C   1,300 mg at 03/04/25 0836    sodium chloride (PF) 0.9% PF flush 3 mL  3 mL Intracatheter Q8H Becca Nino PA-C   3 mL at 03/04/25 0839    tacrolimus (GENERIC EQUIVALENT) capsule 1.5 mg  1.5 mg Oral BID IS Becca Nino PA-C   1.5 mg at 03/04/25 0836    tamsulosin (FLOMAX) capsule 0.4 mg  0.4 mg Oral Daily Lucía Chapman DO   0.4 mg at 03/04/25 1036    torsemide (DEMADEX) tablet 10 mg  10 mg Oral BID Foster Cruz MD   10 mg at 03/04/25 0836               Allergies:     Allergies   Allergen Reactions    Penicillins Hives          Labs:     Recent Results (from the past 48 hours)   CBC with platelets    Collection Time: 03/03/25  9:39 AM   Result Value Ref Range    WBC Count 8.7 4.0 - 11.0 10e3/uL    RBC Count 4.24 (L) 4.40 - 5.90 10e6/uL    Hemoglobin 11.0 (L) 13.3 - 17.7 g/dL    Hematocrit 34.9 (L) 40.0 - 53.0 %    MCV 82 78 - 100 fL    MCH 25.9 (L) 26.5 - 33.0 pg    MCHC 31.5 31.5 - 36.5 g/dL    RDW 14.4 10.0 - 15.0 %    Platelet Count 292 150 - 450 10e3/uL   Hemoglobin A1c    Collection Time: 03/03/25  9:39 AM   Result Value Ref Range    Estimated Average Glucose 180 (H) <117 mg/dL    Hemoglobin A1C 7.9 (H) <5.7 %   UA with Microscopic reflex to Culture    Collection Time: 03/03/25  3:08 PM    Specimen: Urine, NOS   Result Value Ref Range    Color Urine Light Yellow Colorless, Straw, Light Yellow, Yellow    Appearance Urine Clear Clear    Glucose Urine >=1000 (A) Negative mg/dL    Bilirubin Urine Negative Negative    Ketones Urine Negative Negative mg/dL    Specific Gravity Urine 1.016 1.003 - 1.035    Blood Urine Negative Negative    pH Urine 6.5 5.0 - 7.0    Protein Albumin Urine Negative Negative mg/dL    Urobilinogen Urine Normal Normal, 2.0 mg/dL    Nitrite Urine Negative Negative    Leukocyte Esterase Urine Trace (A) Negative    RBC Urine 3 (H) <=2 /HPF    WBC Urine 8 (H) <=5 /HPF    Squamous Epithelials Urine <1 <=1 /HPF   Albumin Random Urine Quantitative with Creat Ratio    Collection Time: 03/03/25  3:08 PM   Result Value Ref Range    Creatinine Urine mg/dL 34.7 mg/dL    Albumin Urine mg/L <12.0 mg/L    Albumin Urine mg/g Cr     Protein  random urine    Collection Time: 03/03/25  3:08 PM   Result Value Ref Range    Total Protein Urine mg/dL 8.3   mg/dL    Total Protein Urine mg/mg Creat 0.22 (H) 0.00 - 0.20 mg/mg Cr    Creatinine Urine mg/dL 38.1 mg/dL   Urine Drug Screen Panel    Collection Time: 03/03/25  3:08 PM   Result Value Ref Range     Amphetamines Urine Screen Negative Screen Negative    Barbituates Urine Screen Negative Screen Negative    Benzodiazepine Urine Screen Positive (A) Screen Negative    Cannabinoids Urine Screen Negative Screen Negative    Cocaine Urine Screen Negative Screen Negative    Fentanyl Qual Urine Screen Negative Screen Negative    Opiates Urine Screen Negative Screen Negative    PCP Urine Screen Negative Screen Negative   Renal panel    Collection Time: 03/04/25  5:40 AM   Result Value Ref Range    Sodium 137 135 - 145 mmol/L    Potassium 5.1 3.4 - 5.3 mmol/L    Chloride 103 98 - 107 mmol/L    Carbon Dioxide (CO2) 23 22 - 29 mmol/L    Anion Gap 11 7 - 15 mmol/L    Glucose 296 (H) 70 - 99 mg/dL    Urea Nitrogen 64.8 (H) 8.0 - 23.0 mg/dL    Creatinine 1.82 (H) 0.67 - 1.17 mg/dL    GFR Estimate 40 (L) >60 mL/min/1.73m2    Calcium 10.0 8.8 - 10.4 mg/dL    Albumin 3.4 (L) 3.5 - 5.2 g/dL    Phosphorus 3.2 2.5 - 4.5 mg/dL   Acute hepatitis panel    Collection Time: 03/04/25  5:40 AM   Result Value Ref Range    Hepatitis A Antibody IgM Nonreactive Nonreactive    Hepatitis B Core Antibody IgM Nonreactive Nonreactive    Hepatitis C Antibody Reactive (A) Nonreactive    Hepatitis B Surface Antigen Nonreactive Nonreactive          Physical and Psychiatric Examination:     /70 (BP Location: Left arm)   Pulse 75   Temp 98.2  F (36.8  C) (Oral)   Resp 17   Ht 1.829 m (6')   Wt 66.4 kg (146 lb 6.2 oz)   SpO2 100%   BMI 19.85 kg/m    Weight is 146 lbs 6.17 oz  Body mass index is 19.85 kg/m .    Physical Exam:  I have reviewed the physical exam as documented by by the medical team and agree with findings and assessment and have no additional findings to add at this time.    Mental Status Exam:  Appearance: awake, alert and adequately groomed  Attitude:  cooperative  Eye Contact:  good  Mood:  fine   Affect:  appropriate and in normal range and mood congruent  Speech:  clear, coherent  Language: Fluent in english   Psychomotor  Behavior:  no evidence of tardive dyskinesia, dystonia, or tics  Thought Process:  logical, linear, and goal oriented  Associations:  no loose associations  Thought Content:  no evidence of suicidal ideation or homicidal ideation and no evidence of psychotic thought  Insight:  fair  Judgement:  fair  Oriented to:  time, person, and place  Attention Span and Concentration:  intact  Recent and Remote Memory:  intact  Fund of Knowledge: Appropriate   Gait and Station: baseline                  DSM-5 Diagnosis:   Unspecified encephalopathy           Assessment:   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 They have noticed worsening cognition the last few months, an acute change and worsening the last week with agitation. Wife notes today and yesterday he has been more calm and more agreeable to cares. He had no symptoms of depression or anxiety or impaired sleep that need acute psychotropic intervention at this time. I did discuss the case with neuro. Consider possibility that some of the presentation is related to neuropsychiatric effects of tacro. I think we can rule out primary psychiatric presentation at this point - no evidence of psychosis, ranjit, severe depression, anxiety.           Summary of Recommendations:     Consider Ramelteon 8mg at bedtime first line for sleep if sleep is impaired   If need for agitation, can use Zyprexa 2.5mg BID PRN agitation/aggression (has not required any thus far)        Juanita Mckeon, JESSIKAP-BC  Consult/Liaison Psychiatry   [facility]

## 2025-03-04 NOTE — PLAN OF CARE
Shift 3816-7367     VS: /71 (BP Location: Right arm)   Pulse 90   Temp 98  F (36.7  C) (Oral)   Resp 18   Ht 1.829 m (6')   Wt 67.2 kg (148 lb 2.4 oz)   SpO2 96%   BMI 20.09 kg/m         O2: On RA, denies chest pain or SOB   Output:  Incontinent with retention  Bladder scan done x3  and stright x1    Last BM:  Incontinent x2- soft and medium x2     Denies abdominal discomfort. BS active / passing flatus.    Activity:  Assist of 1-2 with check and changes - not oob during shfit    Skin:   L BKA   Pain:  Denies    CMS:  A&OX3 to person, place ,and,  time  Disoriented to situation    Dressing:  None    Diet: Combination Regular diet   LDA: L PIV SL    Equipment: IV pole, personal belongings,    Plan: CONTACT precautions maintained  Plan: lumbar puncture  on 3/5    Additional Info:  Pt pleasant and cooperative with care. Bed in lowest position bed alarm on. Frequent used items within reach. Call light within reach.     Bladder scan x3  Incontinent  of bladder x1     Stright cath x1      ult Inpatient Plan of Care  Goal: Optimal Comfort and Wellbeing  Intervention: Provide Person-Centered Care  Recent Flowsheet Documentation  Taken 3/4/2025 0400 by Du Valentino RN  Trust Relationship/Rapport: care explained     Problem: Adult Inpatient Plan of Care  Goal: Absence of Hospital-Acquired Illness or Injury  Intervention: Prevent Infection  Recent Flowsheet Documentation  Taken 3/4/2025 0400 by Du Valentino RN  Infection Prevention: rest/sleep promoted  Goal: Optimal Comfort and Wellbeing  Intervention: Provide Person-Centered Care  Recent Flowsheet Documentation  Taken 3/4/2025 0400 by Du Valentino RN  Trust Relationship/Rapport: care explained     Problem: Delirium  Goal: Improved Behavioral Control  Intervention: Minimize Safety Risk  Recent Flowsheet Documentation  Taken 3/4/2025 0400 by Du Valentino RN  Trust Relationship/Rapport: care explained   Goal Outcome Evaluation:

## 2025-03-04 NOTE — PROGRESS NOTES
[facility]  Transplant Nephrology Consult Note  Date of Admission:  2/25/2025  Today's Date: 03/04/2025  Requesting physician: Basilio Martines MD    Reason for Consult:  Management of IS    Recommendations:   - Continue mycophenolate 500 mg twice a day.  - Continue tacrolimus 1.5 mg twice a day.  - Follow up Tacro level ordered for 3/4/2025  - Okay to hold Lokelma for now  - Hold calcitriol given borderline high calcium.   - Follow up LP results 3/5/2025  - Accurate I/O and daily weight  - Agree with addition of Tamsulosin 0.4mg qDay (monitor for orthostatic hypotension first few days). Please Bladder scan at least qShift and I/O cath as needed. If having consistent retention he will need a mg for decompression and should have Urology consulted for evaluation while in house    Assessment & Plan   # DDKT: Stable from presumed baseline of 1.8 to 2 mg/dL.    - Baseline Creatinine: ~ 1.8-2 as per inpatient notes.    - Proteinuria:  Unknown   - DSA Hx: Unknown due to being transplanted at another Transplant Center   - Last cPRA: Unknown/   - BK Viremia: Not checked recently due to time from transplant   - Kidney Tx Biopsy Hx:  Unknown .    # Immunosuppression: Tacrolimus immediate release (goal 4-6) and Mycophenolate mofetil (dose 500 mg every 12 hours)   - Induction with Recent Transplant:  Not known due to time from transplant   - Continue with intensive monitoring of immunosuppression for efficacy and toxicity.   - Historical Changes in Immunosuppression: None   - Changes: No, but will follow up level 3/4/2025 (not a true 12 hour trough)    # Infection Prevention:   Last CD4 Level: Unknown  - PJP: None      - CMV IgG Ab High Risk Discordance (D+/R-) at time of transplant: Unknown  Present CMV Serostatus: Unknown  - EBV IgG Ab High Risk Discordance (D+/R-) at time of transplant: Unknown  Present EBV Serostatus: Unknown    # Blood Pressure / volume: Controlled;  Goal  BP: > 100, but < 130 systolic   - Euvolemic on exam   - Continue Toprol, Jardiance and Torsemide.   - Changes: No    # Diabetes: Borderline control (HbA1c 7-9%)  Last HbA1c: 7.9 (3/3/2025).  - Blood sugars running high 3/4/2025 AM. He does report getting some outside food jeanne in which may be playing a role. Management per primary team.     # Anemia in Chronic Renal Disease: Hgb: Stable, near normal      SIOBHAN: No   - Iron studies: Not checked recently    # Mineral Bone Disorder:    - Secondary renal hyperparathyroidism; PTH level: Not checked recently        On treatment:  Calcitriol (currently on hold for borderline hypercalcemia)  - Vitamin D; level: Not checked recently        On supplement: No  - Calcium; level: High normal        On supplement: No  - Phosphorus; level: Normal        On supplement: No    # Electrolytes:   - Potassium; level: Normal        On binder: No  - Magnesium; level: Not checked recently        On supplement: No  - Bicarbonate; level: Normal        On supplement: No    # AMS: [facility]y's [facility]. MRI brain without acute findings (although degraded by motion artifact). Planning for LP on 3/5/2025.    # Other Significant PMH:   - Atrial flutter: On Eliquis.   - HFrEF: presumed ischemic given past maricel pre-transplant with area of ischemia and possible history of stent. On Toprol, jardiance and torsemide. With new drop in LVEF to ~ 45%, he may need further work-up.    - CAD: Possible history of stent.    - PVD s/p left AKA   - Sickle cell trait   - History of hepatitis C.    - Urinary Retention: Agree with addition of Tamsulosin 0.4mg qDay (monitor for orthostatic hypotension first few days). Please Bladder scan at least qShift and I/O cath as needed. If having consistent retention he will need a mg for decompression and should have Urology consulted for evaluation while in house    # Transplant History:  Etiology of Kidney Failure: Hypertension and  "diabetes.  Tx: DDKT  Transplant: N/A  Significant transplant-related complications: None    Weston Hill MD  Transplant Nephrology  Contact information via [employer] Web Console or via [facility] Paging/Directory    Interval History:  -Patient resting comfortably at the time of my eval. Much more alert and communicative today!  -Tacro level ordered this AM pending (Drawn at 5:40AM. Last Tacro dose 7:45PM)  -Patient continues on Tacro 1.5mg BID and MMF 500mg BID  -Plan for LP 3/5/2025 from chart documentation.   -I's and O's w/ 1.85L UOP (straight cath).   -Transplant US w/ Mild [place]. Discussed urinary retention symptoms with him (he denies). Discussed history of Flomax use (he thinks maybe in the past but not sure about recently). Discussed importance of trying to empty bladder fully and need for bladder scanning routinely + I/O cath +/- mg/urology eval if he continues to retain  -Weight (bed scale): 66.4kg from 67.2kg    History of Present Illness  From Dr See Piper Note 2/25/2025:   \"History obtained from daughter at bedside.  Mr. Zaman is a 64-year-old gentleman with end-stage kidney disease from presumed hypertension and diabetes status post DDKT in September 2022 [facility].    Daughter reports that in December he was independent.  He cooked dinner for [employer], was able to shovel the snow in the drive way, manage his finances and drive.  The only noticeable change was a decrease in weight compared to prior months.  Afterwards, there was a progressive decline with weeks where he was unable to take care of himself for which she was taken to the ER multiple times in mid January.  Additionally, he presented with worsening fluid retention, with possible increase of 10-20 pounds.  As per daughter, [facility], he [facility] after which she developed an MOY, that resolved after decreasing the dose of diuretics.  He was also treated for a transplant UTI, finishing " "antibiotics this Monday.    In regard to his kidney transplant, she reports no past history of rejection or issues with viral infections.  She does not know patient's baseline creatinine, but as per notes it ranges from 1.8-2 mg/dL.  I am unable to [facility] the [facility] to confirm this.\"    MEDICATIONS:  Current Facility-Administered Medications   Medication Dose Route Frequency Provider Last Rate Last Admin    [Held by provider] apixaban ANTICOAGULANT (ELIQUIS) tablet 5 mg  5 mg Oral BID Leif Francis MD   5 mg at 03/03/25 0815    atorvastatin (LIPITOR) tablet 20 mg  20 mg Oral QPM Becca Nino PA-C   20 mg at 03/03/25 1947    [Held by provider] calcitRIOL (ROCALTROL) capsule 0.25 mcg  0.25 mcg Oral Daily Becca Nino PA-C   0.25 mcg at 02/26/25 0822    empagliflozin (JARDIANCE) tablet 10 mg  10 mg Oral Daily Basilio Martines MD   10 mg at 03/04/25 0835    latanoprost (XALATAN) 0.005 % ophthalmic solution 1 drop  1 drop Both Eyes At Bedtime Foster Cruz MD   1 drop at 03/03/25 2240    Lidocaine (LIDOCARE) 4 % Patch 1 patch  1 patch Transdermal Q24h Becca Nino PA-C   1 patch at 03/03/25 1949    metoprolol succinate ER (TOPROL XL) 24 hr tablet 75 mg  75 mg Oral Daily Becca Nino PA-C   75 mg at 03/04/25 0835    mycophenolate (GENERIC EQUIVALENT) tablet 500 mg  500 mg Oral BID IS Becca Nino PA-C   500 mg at 03/04/25 0835    pantoprazole (PROTONIX) EC tablet 40 mg  40 mg Oral QAM AC Foster Cruz MD   40 mg at 03/04/25 0836    sodium bicarbonate tablet 1,300 mg  1,300 mg Oral BID Becca Nino PA-C   1,300 mg at 03/04/25 0836    sodium chloride (PF) 0.9% PF flush 3 mL  3 mL Intracatheter Q8H Becca Nino PA-C   3 mL at 03/04/25 0839    tacrolimus (GENERIC EQUIVALENT) capsule 1.5 mg  1.5 mg Oral BID IS Becca Nino PA-C   1.5 mg at 03/04/25 0836    tamsulosin (FLOMAX) capsule 0.4 mg  0.4 mg Oral Daily Lucía Chapman DO   0.4 mg at 03/04/25 1036    torsemide (DEMADEX) tablet 10 mg  10 mg Oral BID " Foster Cruz MD   10 mg at 25 0836     Current Facility-Administered Medications   Medication Dose Route Frequency Provider Last Rate Last Admin    Patient is already receiving anticoagulation with heparin, enoxaparin (LOVENOX), warfarin (COUMADIN)  or other anticoagulant medication   Does not apply Continuous PRN Becca Nino PA-C           Physical Exam   Temp  Av.2  F (36.8  C)  Min: 97.8  F (36.6  C)  Max: 98.5  F (36.9  C)      Pulse  Av.2  Min: 81  Max: 95 Resp  Av.7  Min: 16  Max: 18  SpO2  Av.3 %  Min: 96 %  Max: 99 %     /70 (BP Location: Left arm)   Pulse 75   Temp 98.2  F (36.8  C) (Oral)   Resp 17   Ht 1.829 m (6')   Wt 66.4 kg (146 lb 6.2 oz)   SpO2 100%   BMI 19.85 kg/m     Date 25 0700 - 25 0659   Shift 4718-8153 7811-3754 2868-6118 24 Hour Total   INTAKE   P.O. 480   480   Shift Total(mL/kg) 480(7.79)   480(7.79)   OUTPUT   Shift Total(mL/kg)       Weight (kg) 61.6 61.6 61.6 61.6      Admit Weight: 61.6 kg (135 lb 12.9 oz)     GENERAL APPEARANCE: alert  but did not want to interact during my visit  EDEMA: no LE edema  ABDOMEN: soft, nondistended, nontender, bowel sounds normal  MS: LEFT AKA.   Vascular: faint radial pulses. Bilateral femoral bruit.  SKIN: no rash  TX KIDNEY: with audible bruit.  DIALYSIS ACCESS:  RUE AV fistula with bruit and thrill.    Data   All labs reviewed by me.  CMP  Recent Labs   Lab 25  0540 25  0208 25  1242 25  2124 25  0723 25  1232     --  135 135 136 137   POTASSIUM 5.1  --  4.6 4.6 5.7* 5.3   CHLORIDE 103  --  99 98 99 100   CO2 23  --  24 22 23 25   ANIONGAP 11  --  12 15 14 12   * 294* 217* 305* 167* 189*   BUN 64.8*  --  80.6* 84.6* 77.6* 83.5*   CR 1.82*  --  2.29* 2.50* 2.13* 2.26*   GFRESTIMATED 40*  --  31* 28* 34* 31*   QUE 10.0  --  10.1 9.8 10.3 10.5*   PHOS 3.2  --   --   --   --   --    PROTTOTAL  --   --   --   --   --  7.7   ALBUMIN 3.4*  --   --   --   --   3.9   BILITOTAL  --   --   --   --   --  0.4   ALKPHOS  --   --   --   --   --  122   AST  --   --   --   --   --  19   ALT  --   --   --   --   --  13     CBC  Recent Labs   Lab 03/03/25  0939 02/28/25  1242 02/27/25  2124 02/26/25  0723   HGB 11.0* 10.9* 10.6* 12.4*   WBC 8.7 10.6 11.4* 10.8   RBC 4.24* 4.14* 4.09* 4.59   HCT 34.9* 34.3* 34.2* 38.5*   MCV 82 83 84 84   MCH 25.9* 26.3* 25.9* 27.0   MCHC 31.5 31.8 31.0* 32.2   RDW 14.4 14.3 14.4 14.5    282 295 312     INRNo lab results found in last 7 days.  ABGNo lab results found in last 7 days.   Urine Studies  Recent Labs   Lab Test 03/03/25  1508   COLOR Light Yellow   APPEARANCE Clear   URINEGLC >=1000*   URINEBILI Negative   URINEKETONE Negative   SG 1.016   UBLD Negative   URINEPH 6.5   PROTEIN Negative   NITRITE Negative   LEUKEST Trace*   RBCU 3*   WBCU 8*     No lab results found.  PTH  No lab results found.  Iron Studies  No lab results found.    Past Medical History    Past Medical History:   Diagnosis Date    Chronic kidney disease     Diabetes (H)     Hypertension     Myocardial infarction (H)          Prior to Admission Medications   Medications Prior to Admission   Medication Sig Dispense Refill Last Dose/Taking    apixaban ANTICOAGULANT (ELIQUIS) 5 MG tablet Take 5 mg by mouth 2 times daily.   2/25/2025 Morning    atorvastatin (LIPITOR) 20 MG tablet Take 1 tablet by mouth at bedtime.   2/25/2025 Morning    brinzolamide-brimonidine (SIMBRINZA) 1-0.2 % ophthalmic suspension Apply 1 drop to eye 2 times daily.   Taking    calcitRIOL (ROCALTROL) 0.25 MCG capsule Take 0.25 mcg by mouth. TAKE ONE CAPSULE BY MOUTH MONDAY THROUGH FRIDAY FOR BONE HEALTH ** DOSE INCREASE   Taking    cefdinir (OMNICEF) 300 MG capsule Take 300 mg by mouth 2 times daily.   Taking    empagliflozin (JARDIANCE) 25 MG TABS tablet Take 12.5 mg by mouth every morning. TAKE ONE-HALF TABLET BY MOUTH EVERY MORNING FOR DIABETES AND KIDNEY PROTECTION   Taking    Latanoprost PF  0.005 % SOLN Apply to eye at bedtime. INSTILL 1 DROP IN BOTH EYES AT BEDTIME FOR GLAUCOMA   Taking    metoprolol succinate ER (TOPROL XL) 25 MG 24 hr tablet Take 75 mg by mouth daily. TAKE THREE TABLETS BY MOUTH EVERY DAY FOR HEART RATE   2/25/2025 Morning    mycophenolate (GENERIC EQUIVALENT) 500 MG tablet Take 500 mg by mouth 2 times daily.   Taking    pantoprazole (PROTONIX) 40 MG EC tablet Take 40 mg by mouth daily.  TAKE ONE TABLET BY MOUTH EVERY DAY PREVENT GI BLEED   Taking    patiromer (VELTASSA) 8.4 g packet Take 8.4 g by mouth once a week. Take 1 packet (8.4 grams) by mouth once weekly for high potassium   Taking    sodium bicarbonate 650 MG tablet Take 1,300 mg by mouth 2 times daily.   Taking    tacrolimus (GENERIC EQUIVALENT) 0.5 MG capsule Take 1.5 mg by mouth 2 times daily. Take three capsules by mouth twice a day for kidney transplant   Taking    torsemide (DEMADEX) 10 MG tablet Take 10 mg by mouth 2 times daily.   2/25/2025 Morning

## 2025-03-04 NOTE — PLAN OF CARE
Goal Outcome Evaluation:    Care provided: [id number]    VS: /71 (BP Location: Right arm)   Pulse 60   Temp 98.1  F (36.7  C) (Oral)   Resp 17   Ht 1.829 m (6')   Wt 67.2 kg (148 lb 2.4 oz)   SpO2 96%   BMI 20.09 kg/m      O2: VSS on RA   Output: Incontinent B/B, I&Os   Last BM: 3/1   Activity: A1-2 w/GBW and cares   Skin: Right arm limb alert- fistula. Bruised r. toes and redness on top of r. foot. L BKA   Pain: No pain reported this shift   CMS: AOx1, intermittent confusion   Diet: Regular   LDA: L PIV SL   Equipment: Walker, belongings in patient's room, call light within reach   Plan: POC ongoing   Additional Info: Contact precautions maintained  Lumbar puncture scheduled 3/5  Patient has aflutter

## 2025-03-04 NOTE — PROGRESS NOTES
Brief Care Management Note:    Per provider rounds, pt not medically ready for discharge. Awaiting LP tomorrow and psych consult. PT/OT rec home with home care and 24/7 supervision/assist. Per PT documentation, this has been discussed with family and they are agreeable. Care Management continues to follow.     Next Steps: PADMINI order for home care, standard discharge practices.     Home Care Agency  [facility]  Phone: [phone]  [facility]    Services Available   Home Health Services      Address   [address], [address]  [facility] [address]             Contact Information    [phone]      PTA Services: RN/PT/OT/HHA/HM    Payal Covington RN   Nurse Coordinator    6 Med/Surg  Phone: [phone]    Social Work and Care Management Department     SEARCHABLE in [facility] - search CARE COORDINATOR   [facility] ([id number]) Saturday & Sunday; ([phone])  Recognized Holidays   Units: 6 Med Surg Vocera & 8 Med Surg Vocera Pager [phone]

## 2025-03-05 ENCOUNTER — APPOINTMENT (OUTPATIENT)
Dept: PHYSICAL THERAPY | Facility: CLINIC | Age: 65
End: 2025-03-05
Payer: MEDICARE

## 2025-03-05 LAB
ALBUMIN SERPL BCG-MCNC: 3.4 G/DL (ref 3.5–5.2)
ANION GAP SERPL CALCULATED.3IONS-SCNC: 11 MMOL/L (ref 7–15)
APPEARANCE CSF: CLEAR
BUN SERPL-MCNC: 63.4 MG/DL (ref 8–23)
C GATTII+NEOFOR DNA CSF QL NAA+NON-PROBE: NEGATIVE
CALCIUM SERPL-MCNC: 9.9 MG/DL (ref 8.8–10.4)
CHLORIDE SERPL-SCNC: 99 MMOL/L (ref 98–107)
CMV DNA CSF QL NAA+NON-PROBE: NEGATIVE
COLOR CSF: COLORLESS
CREAT SERPL-MCNC: 2 MG/DL (ref 0.67–1.17)
E COLI K1 AG CSF QL: NEGATIVE
EGFRCR SERPLBLD CKD-EPI 2021: 36 ML/MIN/1.73M2
ERYTHROCYTE [DISTWIDTH] IN BLOOD BY AUTOMATED COUNT: 14.1 % (ref 10–15)
EV RNA SPEC QL NAA+PROBE: NEGATIVE
GLUCOSE BLDC GLUCOMTR-MCNC: 210 MG/DL (ref 70–99)
GLUCOSE BLDC GLUCOMTR-MCNC: 249 MG/DL (ref 70–99)
GLUCOSE BLDC GLUCOMTR-MCNC: 299 MG/DL (ref 70–99)
GLUCOSE BLDC GLUCOMTR-MCNC: 352 MG/DL (ref 70–99)
GLUCOSE BLDC GLUCOMTR-MCNC: 399 MG/DL (ref 70–99)
GLUCOSE CSF-MCNC: 182 MG/DL (ref 40–70)
GLUCOSE SERPL-MCNC: 330 MG/DL (ref 70–99)
GP B STREP DNA CSF QL NAA+NON-PROBE: NEGATIVE
HAEM INFLU DNA CSF QL NAA+NON-PROBE: NEGATIVE
HCO3 SERPL-SCNC: 25 MMOL/L (ref 22–29)
HCT VFR BLD AUTO: 30.9 % (ref 40–53)
HGB BLD-MCNC: 9.7 G/DL (ref 13.3–17.7)
HHV6 DNA CSF QL NAA+NON-PROBE: NEGATIVE
HSV1 DNA CSF QL NAA+NON-PROBE: NEGATIVE
HSV2 DNA CSF QL NAA+NON-PROBE: NEGATIVE
INR PPP: 1.29 (ref 0.85–1.15)
L MONOCYTOG DNA CSF QL NAA+NON-PROBE: NEGATIVE
MCH RBC QN AUTO: 25.9 PG (ref 26.5–33)
MCHC RBC AUTO-ENTMCNC: 31.4 G/DL (ref 31.5–36.5)
MCV RBC AUTO: 82 FL (ref 78–100)
N MEN DNA CSF QL NAA+NON-PROBE: NEGATIVE
PARECHOVIRUS A RNA CSF QL NAA+NON-PROBE: NEGATIVE
PHOSPHATE SERPL-MCNC: 2.5 MG/DL (ref 2.5–4.5)
PLATELET # BLD AUTO: 259 10E3/UL (ref 150–450)
POTASSIUM SERPL-SCNC: 4.9 MMOL/L (ref 3.4–5.3)
PROT CSF-MCNC: 161.7 MG/DL (ref 15–45)
RBC # BLD AUTO: 3.75 10E6/UL (ref 4.4–5.9)
RBC # CSF MANUAL: 0 /UL (ref 0–2)
S PNEUM DNA CSF QL NAA+NON-PROBE: NEGATIVE
SODIUM SERPL-SCNC: 135 MMOL/L (ref 135–145)
T4 FREE SERPL-MCNC: 1.03 NG/DL (ref 0.9–1.7)
TACROLIMUS BLD-MCNC: 4.2 UG/L (ref 5–15)
TME LAST DOSE: ABNORMAL H
TME LAST DOSE: ABNORMAL H
TSH SERPL DL<=0.005 MIU/L-ACNC: 4.23 UIU/ML (ref 0.3–4.2)
TUBE # CSF: 2
VZV DNA CSF QL NAA+NON-PROBE: NEGATIVE
WBC # BLD AUTO: 7.4 10E3/UL (ref 4–11)
WBC # CSF MANUAL: 0 /UL (ref 0–5)

## 2025-03-05 PROCEDURE — 62270 DX LMBR SPI PNXR: CPT | Performed by: STUDENT IN AN ORGANIZED HEALTH CARE EDUCATION/TRAINING PROGRAM

## 2025-03-05 PROCEDURE — 250N000013 HC RX MED GY IP 250 OP 250 PS 637: Performed by: PHYSICIAN ASSISTANT

## 2025-03-05 PROCEDURE — 88108 CYTOPATH CONCENTRATE TECH: CPT | Mod: TC | Performed by: STUDENT IN AN ORGANIZED HEALTH CARE EDUCATION/TRAINING PROGRAM

## 2025-03-05 PROCEDURE — 87070 CULTURE OTHR SPECIMN AEROBIC: CPT | Performed by: INTERNAL MEDICINE

## 2025-03-05 PROCEDURE — 80197 ASSAY OF TACROLIMUS: CPT | Performed by: STUDENT IN AN ORGANIZED HEALTH CARE EDUCATION/TRAINING PROGRAM

## 2025-03-05 PROCEDURE — 99232 SBSQ HOSP IP/OBS MODERATE 35: CPT | Performed by: STUDENT IN AN ORGANIZED HEALTH CARE EDUCATION/TRAINING PROGRAM

## 2025-03-05 PROCEDURE — 250N000013 HC RX MED GY IP 250 OP 250 PS 637: Performed by: STUDENT IN AN ORGANIZED HEALTH CARE EDUCATION/TRAINING PROGRAM

## 2025-03-05 PROCEDURE — 88188 FLOWCYTOMETRY/READ 9-15: CPT | Mod: GC | Performed by: PATHOLOGY

## 2025-03-05 PROCEDURE — 87529 HSV DNA AMP PROBE: CPT | Performed by: INTERNAL MEDICINE

## 2025-03-05 PROCEDURE — 250N000012 HC RX MED GY IP 250 OP 636 PS 637: Performed by: PHYSICIAN ASSISTANT

## 2025-03-05 PROCEDURE — 89050 BODY FLUID CELL COUNT: CPT | Performed by: INTERNAL MEDICINE

## 2025-03-05 PROCEDURE — 88185 FLOWCYTOMETRY/TC ADD-ON: CPT | Performed by: STUDENT IN AN ORGANIZED HEALTH CARE EDUCATION/TRAINING PROGRAM

## 2025-03-05 PROCEDURE — 88184 FLOWCYTOMETRY/ TC 1 MARKER: CPT | Performed by: PATHOLOGY

## 2025-03-05 PROCEDURE — 88108 CYTOPATH CONCENTRATE TECH: CPT | Mod: 26 | Performed by: PATHOLOGY

## 2025-03-05 PROCEDURE — 009U3ZX DRAINAGE OF SPINAL CANAL, PERCUTANEOUS APPROACH, DIAGNOSTIC: ICD-10-PCS | Performed by: STUDENT IN AN ORGANIZED HEALTH CARE EDUCATION/TRAINING PROGRAM

## 2025-03-05 PROCEDURE — 86789 WEST NILE VIRUS ANTIBODY: CPT | Performed by: INTERNAL MEDICINE

## 2025-03-05 PROCEDURE — 87205 SMEAR GRAM STAIN: CPT | Performed by: INTERNAL MEDICINE

## 2025-03-05 PROCEDURE — 99233 SBSQ HOSP IP/OBS HIGH 50: CPT | Mod: 25 | Performed by: STUDENT IN AN ORGANIZED HEALTH CARE EDUCATION/TRAINING PROGRAM

## 2025-03-05 PROCEDURE — 82040 ASSAY OF SERUM ALBUMIN: CPT | Performed by: STUDENT IN AN ORGANIZED HEALTH CARE EDUCATION/TRAINING PROGRAM

## 2025-03-05 PROCEDURE — 36415 COLL VENOUS BLD VENIPUNCTURE: CPT | Performed by: STUDENT IN AN ORGANIZED HEALTH CARE EDUCATION/TRAINING PROGRAM

## 2025-03-05 PROCEDURE — 84443 ASSAY THYROID STIM HORMONE: CPT | Performed by: STUDENT IN AN ORGANIZED HEALTH CARE EDUCATION/TRAINING PROGRAM

## 2025-03-05 PROCEDURE — 84439 ASSAY OF FREE THYROXINE: CPT | Performed by: STUDENT IN AN ORGANIZED HEALTH CARE EDUCATION/TRAINING PROGRAM

## 2025-03-05 PROCEDURE — 87075 CULTR BACTERIA EXCEPT BLOOD: CPT | Performed by: INTERNAL MEDICINE

## 2025-03-05 PROCEDURE — 82945 GLUCOSE OTHER FLUID: CPT | Performed by: INTERNAL MEDICINE

## 2025-03-05 PROCEDURE — 87483 CNS DNA AMP PROBE TYPE 12-25: CPT | Performed by: INTERNAL MEDICINE

## 2025-03-05 PROCEDURE — 250N000013 HC RX MED GY IP 250 OP 250 PS 637: Performed by: INTERNAL MEDICINE

## 2025-03-05 PROCEDURE — 85027 COMPLETE CBC AUTOMATED: CPT | Performed by: STUDENT IN AN ORGANIZED HEALTH CARE EDUCATION/TRAINING PROGRAM

## 2025-03-05 PROCEDURE — 85610 PROTHROMBIN TIME: CPT | Performed by: STUDENT IN AN ORGANIZED HEALTH CARE EDUCATION/TRAINING PROGRAM

## 2025-03-05 PROCEDURE — 97530 THERAPEUTIC ACTIVITIES: CPT | Mod: GP

## 2025-03-05 PROCEDURE — 88184 FLOWCYTOMETRY/ TC 1 MARKER: CPT | Performed by: STUDENT IN AN ORGANIZED HEALTH CARE EDUCATION/TRAINING PROGRAM

## 2025-03-05 PROCEDURE — 84157 ASSAY OF PROTEIN OTHER: CPT | Performed by: INTERNAL MEDICINE

## 2025-03-05 PROCEDURE — 86592 SYPHILIS TEST NON-TREP QUAL: CPT | Performed by: INTERNAL MEDICINE

## 2025-03-05 PROCEDURE — 120N000002 HC R&B MED SURG/OB UMMC

## 2025-03-05 RX ORDER — LIDOCAINE HYDROCHLORIDE 20 MG/ML
JELLY TOPICAL
Status: DISCONTINUED | OUTPATIENT
Start: 2025-03-05 | End: 2025-03-26 | Stop reason: HOSPADM

## 2025-03-05 RX ORDER — LORAZEPAM 2 MG/ML
0.5 INJECTION INTRAMUSCULAR
Status: DISCONTINUED | OUTPATIENT
Start: 2025-03-05 | End: 2025-03-26 | Stop reason: HOSPADM

## 2025-03-05 RX ADMIN — TORSEMIDE 10 MG: 10 TABLET ORAL at 09:28

## 2025-03-05 RX ADMIN — EMPAGLIFLOZIN 10 MG: 10 TABLET, FILM COATED ORAL at 09:28

## 2025-03-05 RX ADMIN — PANTOPRAZOLE SODIUM 40 MG: 40 TABLET, DELAYED RELEASE ORAL at 09:28

## 2025-03-05 RX ADMIN — SODIUM BICARBONATE 650 MG TABLET 1300 MG: at 20:26

## 2025-03-05 RX ADMIN — MYCOPHENOLATE MOFETIL 500 MG: 500 TABLET, FILM COATED ORAL at 09:28

## 2025-03-05 RX ADMIN — INSULIN ASPART 2 UNITS: 100 INJECTION, SOLUTION INTRAVENOUS; SUBCUTANEOUS at 17:05

## 2025-03-05 RX ADMIN — TACROLIMUS 1.5 MG: 1 CAPSULE ORAL at 20:25

## 2025-03-05 RX ADMIN — LIDOCAINE 4% 1 PATCH: 40 PATCH TOPICAL at 20:25

## 2025-03-05 RX ADMIN — METOPROLOL SUCCINATE 75 MG: 25 TABLET, EXTENDED RELEASE ORAL at 09:28

## 2025-03-05 RX ADMIN — SODIUM BICARBONATE 650 MG TABLET 1300 MG: at 09:28

## 2025-03-05 RX ADMIN — INSULIN ASPART 4 UNITS: 100 INJECTION, SOLUTION INTRAVENOUS; SUBCUTANEOUS at 13:53

## 2025-03-05 RX ADMIN — TACROLIMUS 1.5 MG: 1 CAPSULE ORAL at 09:27

## 2025-03-05 RX ADMIN — ACETAMINOPHEN 650 MG: 325 TABLET, FILM COATED ORAL at 21:13

## 2025-03-05 RX ADMIN — ACETAMINOPHEN 650 MG: 325 TABLET, FILM COATED ORAL at 14:11

## 2025-03-05 RX ADMIN — INSULIN ASPART 3 UNITS: 100 INJECTION, SOLUTION INTRAVENOUS; SUBCUTANEOUS at 09:26

## 2025-03-05 RX ADMIN — TAMSULOSIN HYDROCHLORIDE 0.4 MG: 0.4 CAPSULE ORAL at 09:28

## 2025-03-05 RX ADMIN — ATORVASTATIN CALCIUM 20 MG: 20 TABLET, FILM COATED ORAL at 20:26

## 2025-03-05 RX ADMIN — LATANOPROST 1 DROP: 50 SOLUTION OPHTHALMIC at 21:10

## 2025-03-05 RX ADMIN — MYCOPHENOLATE MOFETIL 500 MG: 500 TABLET, FILM COATED ORAL at 20:26

## 2025-03-05 ASSESSMENT — ACTIVITIES OF DAILY LIVING (ADL)
ADLS_ACUITY_SCORE: 73
ADLS_ACUITY_SCORE: 68
ADLS_ACUITY_SCORE: 73
ADLS_ACUITY_SCORE: 68
ADLS_ACUITY_SCORE: 73
ADLS_ACUITY_SCORE: 73

## 2025-03-05 NOTE — PLAN OF CARE
Goal Outcome Evaluation:      Plan of Care Reviewed With: patient    Overall Patient Progress: improving     Outcome Evaluation: Pt is calm and cooperative, with a flat affect and somber mood. Pt is able to make his needs known, has call light within reach and uses it appropriately.    Pt has been unable to void and at 1600     VS: /55 (BP Location: Left arm)   Pulse 82   Temp 97.4  F (36.3  C) (Oral)   Resp 16   Ht 1.829 m (6')   Wt 66.5 kg (146 lb 9.7 oz)   SpO2 97%   BMI 19.88 kg/m     O2: Room Air   Output: Incontinent w/ urine retention - Zaman placed at 1640 today   Last BM: 3/5/25   Activity: Assist x 1-2 if OOB, cares is assist x 1; Left BKA   Up for meals? Fair - Good appetite which comes and goes; encourage pt to sit upright to eat meals   Skin: Intact   Pain: Minimal 2-3/10 declines medication, but be sure to evaluate and offer   CMS: A&O x 3, disoriented to situation at times; Unstable gait; Denies numbness & tingling in extremities   Dressing: None   Diet: Regular; Thin; Pills whole   LDA: PIV left upper forearm - SL; Zaman catheter   Plan: Lumbar puncture 3/5/25; Continue POC & evaluations   Additional Info:

## 2025-03-05 NOTE — PROGRESS NOTES
S: Toddler left at patient bedside for extended period.  B: Charge RN notified Nurse leader of child in carriage left at the bedside  unattended. Pt's daughter visiting.   A: Leader follow up with assigned RN. RN reported patient's daughter was visiting and stated she was stepping away to use the bathroom. RN reported it had been almost 2 hours since daughter left. RN had reached out to family members on file to inquire about daughter's return because toddler should not be left alone a the patient's bedside unattended.    R: During inquiry with bedside RN, daughter walked back to unit. Nurse Leader  informed daughter that toddler cannot be left alone at the bedside for any given period of time.  Leder emphasized that that pt is unable to care for child during hospitalization.  Daughter acknowledged understanding.

## 2025-03-05 NOTE — PROCEDURES
[facility]  CAPS PROCEDURE NOTE  Date of Admission:  2/25/2025  Consult Requested by: Medicine  Reason for Consult: diagnostic lumbar puncture    Indication/HPI: Evaluate AMS     Pre-Procedure Diagnosis: Encephalopathy    Post-Procedure Diagnosis: Encephalopathy    Risk Assessment: Average risk, Technically straightforward; patient's anticoagulation has been held according to guidelines based on the agent and platelets and coags are within guidelines    Procedure Outcome:  Successful lumbar puncture at L3 - L4 for 10 mL of CSF using LP Approach: Ultrasound assisted paramedian approach. OP at 17     Took multiple attempts although anatomy looked well. Needle needed to be more angled at 45 degrees and it was a success. I did try one of them as midline approach but eventually jumped back to paramedian using US and it worked   See additional procedure details below.    The primary covering service should follow up and address any lab results as appropriate.    LEIF MARCANO MD  [facility]  Securely message with [employer] (more info)  Text page via [facility] Paging/Directory   See signed in provider for up to date coverage information      [facility]    Lumbar puncture    Date/Time: 3/5/2025 1:53 PM    Performed by: Leif Marcano MD  Authorized by: Leif Marcano MD  Indications: evaluation for infection and evaluation for altered mental status  Preparation: Patient was prepped and draped in the usual sterile fashion.      UNIVERSAL PROTOCOL   Site Marked: Yes  Prior Images Obtained and Reviewed:  Yes  Required items: Required blood products, implants, devices and special equipment available    Patient identity confirmed:  Verbally with patient, arm band, provided demographic data and hospital-assigned identification number  NA - No sedation, light sedation, or local anesthesia  Confirmation  Checklist:  Patient's identity using two indicators, relevant allergies, procedure was appropriate and matched the consent or emergent situation and correct equipment/implants were available  Time out: Immediately prior to the procedure a time out was called    Universal Protocol: the Joint Commission Universal Protocol was followed    Preparation: Patient was prepped and draped in usual sterile fashion    ESBL (mL):  1     ANESTHESIA    Anesthesia:  Local infiltration  Local Anesthetic:  Lidocaine 1% without epinephrine  Anesthetic Total (mL):  5      SEDATION    Patient Sedated: No      PROCEDURE DETAILS  Lumbar space: L3-L4 interspace  Patient's position: right lateral decubitus  Needle gauge: 22  Needle type: spinal needle - Quincke tip  Needle length: 3.5 in  Number of attempts: 5 or more  Opening pressure: 17 cm H2O  Fluid appearance: clear  Tubes of fluid: 4  Total volume: 10 ml  Puncture site post-procedure: Sterile Bandaid.      PROCEDURE    Patient Tolerance:  Patient tolerated the procedure well with no immediate complications  Length of time physician/provider present for 1:1 monitoring during sedation: 0        No results found for this or any previous visit from the past 1 day.

## 2025-03-05 NOTE — PROGRESS NOTES
[facility]  Transplant Nephrology Consult Note  Date of Admission:  2/25/2025  Today's Date: 03/05/2025  Requesting physician: Basilio Martines MD    Reason for Consult:  Management of IS    Recommendations:   - Continue mycophenolate 500 mg twice a day.  - Continue tacrolimus 1.5 mg twice a day.  - I held his Torsemide with his borderline Bps and decent UOP now that we are routinely cathing him (and now that he has mg). I worry about his PO intake and that he may be trending towards volume depletion. Will adjust as needed (resume torsemide vs giving a bit of fluid back)  - Follow up Tacro level ordered for 3/5/2025 -> 4.2.   - Okay to hold Lokelma for now  - Hold calcitriol given borderline high calcium.   - Follow up LP results 3/5/2025  - Accurate I/O and daily weight  - Agree with Mg placement    Assessment & Plan   # DDKT: Stable from presumed baseline of 1.8 to 2 mg/dL.    - Baseline Creatinine: ~ 1.8-2 as per inpatient notes.    - Proteinuria:  Unknown   - DSA Hx: Unknown due to being transplanted at another Transplant Center   - Last cPRA: Unknown/   - BK Viremia: Not checked recently due to time from transplant   - Kidney Tx Biopsy Hx:  Unknown .    # Immunosuppression: Tacrolimus immediate release (goal 4-6) and Mycophenolate mofetil (dose 500 mg every 12 hours)   - Induction with Recent Transplant:  Not known due to time from transplant   - Continue with intensive monitoring of immunosuppression for efficacy and toxicity.   - Historical Changes in Immunosuppression: None   - Changes: No, but will follow up level 3/5/2025 (not a true 12 hour trough)    # Infection Prevention:   Last CD4 Level: Unknown  - PJP: None      - CMV IgG Ab High Risk Discordance (D+/R-) at time of transplant: Unknown  Present CMV Serostatus: Unknown  - EBV IgG Ab High Risk Discordance (D+/R-) at time of transplant: Unknown  Present EBV Serostatus: Unknown    # Blood Pressure /  volume: Borderline control;  Goal BP: > 100, but < 130 systolic   - May be trending towards hypovolemia   - Continue Toprol, Jardiance and Torsemide 10mg BID.   - Changes: Yes - Holding home Torsemide 3/5/2025 PM and 3/6/2025 AM (ordered)    # Diabetes: Borderline control (HbA1c 7-9%)  Last HbA1c: 7.9 (3/3/2025).  - Blood sugars running high 3/5/2025 AM. He does report getting some outside food jeanne in which may be playing a role. Management per primary team.     # Anemia in Chronic Renal Disease: Hgb: Stable, near normal      SIOBHAN: No   - Iron studies: Not checked recently    # Mineral Bone Disorder:    - Secondary renal hyperparathyroidism; PTH level: Not checked recently        On treatment:  Calcitriol (currently on hold for borderline hypercalcemia)  - Vitamin D; level: Not checked recently        On supplement: No  - Calcium; level: High normal        On supplement: No  - Phosphorus; level: Normal        On supplement: No    # Electrolytes:   - Potassium; level: Normal        On binder: No  - Magnesium; level: Not checked recently        On supplement: No  - Bicarbonate; level: Normal        On supplement: No    # AMS: [facility]y's [facility]. MRI brain without acute findings (although degraded by motion artifact). Planning for LP on 3/5/2025.    # Other Significant PMH:   - Atrial flutter: On Eliquis.   - HFrEF: presumed ischemic given past maricel pre-transplant with area of ischemia and possible history of stent. On Toprol, jardiance and torsemide. With new drop in LVEF to ~ 45%, he may need further work-up.    - CAD: Possible history of stent.    - PVD s/p left AKA   - Sickle cell trait   - History of hepatitis C.    - Urinary Retention: Agree with addition of Tamsulosin 0.4mg qDay (monitor for orthostatic hypotension first few days). Please Bladder scan at least qShift and I/O cath as needed. If having consistent retention he will need a mg for decompression and should  "have Urology consulted for evaluation while in house (if he still requires frequent I/O cathing by 3/6/2025, I would recommend consulting Urology to eval him)    # Transplant History:  Etiology of Kidney Failure: Hypertension and diabetes.  Tx: DDKT  Transplant: N/A  Significant transplant-related complications: None    Weston Hill MD  Transplant Nephrology  Contact information via Vocera Web Console or via [facility] [employer] Paging/Directory    Interval History:  -Patient resting comfortably at the time of my eval.   -Tacro level ordered this AM pending (Drawn at 6:30AM. Last Tacro dose 8PM)  -Patient continues on Tacro 1.5mg BID and MMF 500mg BID  -LP 3/5/2025. Follow up results  -I's and O's w/ 2.85L UOP (straight cath required multiple times yesterday). He's also on Torsemide 10mg BID   -Transplant US w/ Mild [place]. Now s/p mg placement  -Weight (bed scale): 66.5 (3/5/2025) from 69.2 (2/28/2025)    History of Present Illness  From Dr See Piper Note 2/25/2025:   \"History obtained from daughter at bedside.  Mr. Zaman is a 64-year-old gentleman with end-stage kidney disease from presumed hypertension and diabetes status post DDKT in September 2022 who [facility].    Daughter reports that in December he was independent.  He cooked dinner for Ravi, was able to shovel the snow in the drive way, manage his finances and drive.  The only noticeable change was a decrease in weight compared to prior months.  Afterwards, there was a progressive decline with weeks where he was unable to take care of himself for which she was taken to the ER multiple times in mid January.  Additionally, he presented with worsening fluid retention, with possible increase of 10-20 pounds.  As per daughter, [facility], he [facility] after which she developed an MOY, that resolved after decreasing the dose of diuretics.  He was also treated for a transplant UTI, finishing antibiotics this " "Monday.    In regard to his kidney transplant, she reports no past history of rejection or issues with viral infections.  She does not know patient's baseline creatinine, but as per notes it ranges from 1.8-2 mg/dL.  I am unable to open the [facility] notes to confirm this.\"    MEDICATIONS:  Current Facility-Administered Medications   Medication Dose Route Frequency Provider Last Rate Last Admin    [Held by provider] apixaban ANTICOAGULANT (ELIQUIS) tablet 5 mg  5 mg Oral BID Leif Francis MD   5 mg at 03/03/25 0815    atorvastatin (LIPITOR) tablet 20 mg  20 mg Oral QPM Becca Nino PA-C   20 mg at 03/04/25 2112    [Held by provider] calcitRIOL (ROCALTROL) capsule 0.25 mcg  0.25 mcg Oral Daily Becca Nino PA-C   0.25 mcg at 02/26/25 0822    empagliflozin (JARDIANCE) tablet 10 mg  10 mg Oral Daily Basilio Martines MD   10 mg at 03/05/25 0928    insulin aspart (NovoLOG) injection (RAPID ACTING)  1-7 Units Subcutaneous TID  Hayder Dubose APRN CNP   3 Units at 03/05/25 0926    insulin aspart (NovoLOG) injection (RAPID ACTING)  1-5 Units Subcutaneous At Bedtime Hayder Dubose APRN CNP   4 Units at 03/04/25 2257    latanoprost (XALATAN) 0.005 % ophthalmic solution 1 drop  1 drop Both Eyes At Bedtime Foster Cruz MD   1 drop at 03/04/25 2125    Lidocaine (LIDOCARE) 4 % Patch 1 patch  1 patch Transdermal Q24h Becca Nino PA-C   1 patch at 03/04/25 2112    metoprolol succinate ER (TOPROL XL) 24 hr tablet 75 mg  75 mg Oral Daily Becca Nino PA-C   75 mg at 03/05/25 0928    mycophenolate (GENERIC EQUIVALENT) tablet 500 mg  500 mg Oral BID IS Becca Nino PA-C   500 mg at 03/05/25 0928    pantoprazole (PROTONIX) EC tablet 40 mg  40 mg Oral QAM AC Foster Cruz MD   40 mg at 03/05/25 0928    sodium bicarbonate tablet 1,300 mg  1,300 mg Oral BID Becca Nino PA-C   1,300 mg at 03/05/25 0928    sodium chloride (PF) 0.9% PF flush 3 mL  3 mL Intracatheter Q8H Becca Nino PA-C   3 mL at 03/05/25 0928    " tacrolimus (GENERIC EQUIVALENT) capsule 1.5 mg  1.5 mg Oral BID IS Becca Nino PA-C   1.5 mg at 25 09    tamsulosin (FLOMAX) capsule 0.4 mg  0.4 mg Oral Daily Lucía Chapman DO   0.4 mg at 25 09    torsemide (DEMADEX) tablet 10 mg  10 mg Oral BID Foster Cruz MD   10 mg at 25 0928     Current Facility-Administered Medications   Medication Dose Route Frequency Provider Last Rate Last Admin    Patient is already receiving anticoagulation with heparin, enoxaparin (LOVENOX), warfarin (COUMADIN)  or other anticoagulant medication   Does not apply Continuous PRN Becca Nino PA-C           Physical Exam   Temp  Av.2  F (36.8  C)  Min: 97.8  F (36.6  C)  Max: 98.5  F (36.9  C)      Pulse  Av.2  Min: 81  Max: 95 Resp  Av.7  Min: 16  Max: 18  SpO2  Av.3 %  Min: 96 %  Max: 99 %     BP 97/61 (BP Location: Left arm)   Pulse 67   Temp 97.4  F (36.3  C) (Oral)   Resp 16   Ht 1.829 m (6')   Wt 66.5 kg (146 lb 9.7 oz)   SpO2 98%   BMI 19.88 kg/m     Date 25 07 - 25 0659   Shift 6824-6195 1912-0169 2829-0702 24 Hour Total   INTAKE   P.O. 480   480   Shift Total(mL/kg) 480(7.79)   480(7.79)   OUTPUT   Shift Total(mL/kg)       Weight (kg) 61.6 61.6 61.6 61.6      Admit Weight: 61.6 kg (135 lb 12.9 oz)     GENERAL APPEARANCE: alert and interactive. Sitting in bed.   EDEMA: no LE edema  ABDOMEN: soft, nondistended, nontender, bowel sounds normal  MS: LEFT AKA.   Vascular: faint radial pulses. Bilateral femoral bruit.  SKIN: no rash  TX KIDNEY: with audible bruit.  DIALYSIS ACCESS:  RUE AV fistula with bruit and thrill.    Data   All labs reviewed by me.  CMP  Recent Labs   Lab 25  0636 25  0305 25  2249 25  1837 25  0540 25  0208 25  1242 25  9904   NA  --  135  --   --   --  137  --  135 135   POTASSIUM  --  4.9  --   --   --  5.1  --  4.6 4.6   CHLORIDE  --  99  --   --   --  103  --  99 98   CO2  --  25   --   --   --  23  --  24 22   ANIONGAP  --  11  --   --   --  11  --  12 15   * 330* 399* 406*   < > 296*   < > 217* 305*   BUN  --  63.4*  --   --   --  64.8*  --  80.6* 84.6*   CR  --  2.00*  --   --   --  1.82*  --  2.29* 2.50*   GFRESTIMATED  --  36*  --   --   --  40*  --  31* 28*   QUE  --  9.9  --   --   --  10.0  --  10.1 9.8   PHOS  --  2.5  --   --   --  3.2  --   --   --    ALBUMIN  --  3.4*  --   --   --  3.4*  --   --   --     < > = values in this interval not displayed.     CBC  Recent Labs   Lab 03/05/25  0636 03/03/25  0939 02/28/25  1242 02/27/25  2124   HGB 9.7* 11.0* 10.9* 10.6*   WBC 7.4 8.7 10.6 11.4*   RBC 3.75* 4.24* 4.14* 4.09*   HCT 30.9* 34.9* 34.3* 34.2*   MCV 82 82 83 84   MCH 25.9* 25.9* 26.3* 25.9*   MCHC 31.4* 31.5 31.8 31.0*   RDW 14.1 14.4 14.3 14.4    292 282 295     INR  Recent Labs   Lab 03/05/25  0636   INR 1.29*     ABGNo lab results found in last 7 days.   Urine Studies  Recent Labs   Lab Test 03/03/25  1508   COLOR Light Yellow   APPEARANCE Clear   URINEGLC >=1000*   URINEBILI Negative   URINEKETONE Negative   SG 1.016   UBLD Negative   URINEPH 6.5   PROTEIN Negative   NITRITE Negative   LEUKEST Trace*   RBCU 3*   WBCU 8*     No lab results found.  PTH  No lab results found.  Iron Studies  No lab results found.    Past Medical History    Past Medical History:   Diagnosis Date    Chronic kidney disease     Diabetes (H)     Hypertension     Myocardial infarction (H)          Prior to Admission Medications   Medications Prior to Admission   Medication Sig Dispense Refill Last Dose/Taking    apixaban ANTICOAGULANT (ELIQUIS) 5 MG tablet Take 5 mg by mouth 2 times daily.   2/25/2025 Morning    atorvastatin (LIPITOR) 20 MG tablet Take 1 tablet by mouth at bedtime.   2/25/2025 Morning    brinzolamide-brimonidine (SIMBRINZA) 1-0.2 % ophthalmic suspension Apply 1 drop to eye 2 times daily.   Taking    calcitRIOL (ROCALTROL) 0.25 MCG capsule Take 0.25 mcg by mouth. TAKE  ONE CAPSULE BY MOUTH MONDAY THROUGH FRIDAY FOR BONE HEALTH ** DOSE INCREASE   Taking    cefdinir (OMNICEF) 300 MG capsule Take 300 mg by mouth 2 times daily.   Taking    empagliflozin (JARDIANCE) 25 MG TABS tablet Take 12.5 mg by mouth every morning. TAKE ONE-HALF TABLET BY MOUTH EVERY MORNING FOR DIABETES AND KIDNEY PROTECTION   Taking    Latanoprost PF 0.005 % SOLN Apply to eye at bedtime. INSTILL 1 DROP IN BOTH EYES AT BEDTIME FOR GLAUCOMA   Taking    metoprolol succinate ER (TOPROL XL) 25 MG 24 hr tablet Take 75 mg by mouth daily. TAKE THREE TABLETS BY MOUTH EVERY DAY FOR HEART RATE   2/25/2025 Morning    mycophenolate (GENERIC EQUIVALENT) 500 MG tablet Take 500 mg by mouth 2 times daily.   Taking    pantoprazole (PROTONIX) 40 MG EC tablet Take 40 mg by mouth daily.  TAKE ONE TABLET BY MOUTH EVERY DAY PREVENT GI BLEED   Taking    patiromer (VELTASSA) 8.4 g packet Take 8.4 g by mouth once a week. Take 1 packet (8.4 grams) by mouth once weekly for high potassium   Taking    sodium bicarbonate 650 MG tablet Take 1,300 mg by mouth 2 times daily.   Taking    tacrolimus (GENERIC EQUIVALENT) 0.5 MG capsule Take 1.5 mg by mouth 2 times daily. Take three capsules by mouth twice a day for kidney transplant   Taking    torsemide (DEMADEX) 10 MG tablet Take 10 mg by mouth 2 times daily.   2/25/2025 Morning

## 2025-03-05 NOTE — PLAN OF CARE
Goal Outcome Evaluation:  Shift 1900-0730        /54 (BP Location: Left arm)   Pulse 92   Temp 97.4  F (36.3  C) (Oral)   Resp 16   Ht 1.829 m (6')   Wt 66.4 kg (146 lb 6.2 oz)   SpO2 98%   BMI 19.85 kg/m           O2: On RA, denies chest pain or SOB   Output:  Incontinent with retention  Bladder scan done x2 and stright cath x1     Last BM:  Incontinent 3/4      Denies abdominal discomfort. BS active / passing flatus.    Activity:  Assist of 1-2 with check and changes - not oob during shfit    Skin:   L BKA   Pain:  Denies    CMS:  A&OX3 to person, place ,and,  time  Disoriented to situation   Forgetful   Dressing:  None    Diet: Combination Regular diet   LDA: L PIV SL    Equipment: IV pole, personal belongings,    Plan: CONTACT precautions maintained  Plan: lumbar puncture  on 3/5    Additional Info:  Calm  and cooperative with care. Bed in lowest position bed alarm on. Frequent used items within reach. Call light within reach.      Bladder scan x2   Incontinent  of bladder x1      Stright cath x1                      Problem: Adult Inpatient Plan of Care  Goal: Absence of Hospital-Acquired Illness or Injury  Intervention: Prevent and Manage VTE (Venous Thromboembolism) Risk  Recent Flowsheet Documentation  Taken 3/5/2025 0425 by Du Valentino RN  VTE Prevention/Management: other (see comments)  Intervention: Prevent Infection  Recent Flowsheet Documentation  Taken 3/5/2025 0425 by Du Valentino RN  Infection Prevention: hand hygiene promoted  Goal: Optimal Comfort and Wellbeing  Intervention: Provide Person-Centered Care  Recent Flowsheet Documentation  Taken 3/5/2025 0425 by Du Valentino RN  Trust Relationship/Rapport:   care explained   choices provided     Problem: Adult Inpatient Plan of Care  Goal: Plan of Care Review  Description: The Plan of Care Review/Shift note should be completed every shift.  The Outcome Evaluation is a brief statement about your assessment that the patient is  "improving, declining, or no change.  This information will be displayed automatically on your shift  note.  Outcome: Progressing  Goal: Patient-Specific Goal (Individualized)  Description: You can add care plan individualizations to a care plan. Examples of Individualization might be:  \"Parent requests to be called daily at 9am for status\", \"I have a hard time hearing out of my right ear\", or \"Do not touch me to wake me up as it startles  me\".  Outcome: Progressing  Goal: Absence of Hospital-Acquired Illness or Injury  Outcome: Progressing  Intervention: Prevent and Manage VTE (Venous Thromboembolism) Risk  Recent Flowsheet Documentation  Taken 3/5/2025 0425 by Du Valentino RN  VTE Prevention/Management: other (see comments)  Intervention: Prevent Infection  Recent Flowsheet Documentation  Taken 3/5/2025 0425 by Du Valentino RN  Infection Prevention: hand hygiene promoted  Goal: Optimal Comfort and Wellbeing  Outcome: Progressing  Intervention: Provide Person-Centered Care  Recent Flowsheet Documentation  Taken 3/5/2025 0425 by Du Valentino RN  Trust Relationship/Rapport:   care explained   choices provided  Goal: Readiness for Transition of Care  Outcome: Progressing     Problem: Delirium  Goal: Improved Behavioral Control  Intervention: Minimize Safety Risk  Recent Flowsheet Documentation  Taken 3/5/2025 0425 by Du Valentino RN  Trust Relationship/Rapport:   care explained   choices provided     Problem: Fall Injury Risk  Goal: Absence of Fall and Fall-Related Injury  Outcome: Progressing     "

## 2025-03-05 NOTE — PROGRESS NOTES
[facility]    Medicine Progress Note - Hospitalist Service, GOLD TEAM 22    Date of Admission:  2/25/2025    Assessment & Plan   Tad Zaman is a 64 year old male admitted on 2/25/2025 for AMS. He has a history of diabetes, HTN, CAD, HF, and renal transplant 9/2022, left AKA, CKD.    Patient was brought in by family for AMS x 1-2 months. He has been evaluated at the [facility] multiple times in ED then was evaluated during an admission 1/20-2/21/25 with very broad workup (see below) that did not amount to any type of notable etiology of his symptoms. During this stay he was also treated for HF exacerbation, MOY, and UTI which were all stable at time of discharge. He finished course of cefdinir for e coli UTI on 2/24. He was discharged with family being told that he has failure to thrive. He was discharged home, required 24 hr cares by daughter including changing him and preparation of food. He has also has had intermittent agitation and aggression. Home health nurse stated that he was not appropriate for their program.   No clear etiology for confusion.      Plan today:   - LP per CAPS today at the bedside   - sliding scale insulin started overnight, will continue as long as he remains amenable to checks.      AMS  Cognitive impairment vs encephalopathy   Presented with 1-2 months of progressive encephalopathy and agitation with aggressive behavior toward family who is caring for him at home. [facility] Initial LP with pleocytosis, otherwise unrevealing. MRI brain without acute pathology. Neurology consulted. Some concern for hx of viral encephalitis that has since resolved leaving him with a degree of impairment. He otherwise remains hemodynamically stable without clinical evidence of infection, no gross electrolyte abnormality, stable glucose (elevated but without acidosis), BUN is improving, tacrolimus levels WNL.   [facility]:    - MRI 1/29: Stable mild amount of chronic small vessel ischemia. Small chronic R cerebellar infarct.   -Psych consult 2/13: recommended removing capacity for medical decision making, recommended palliative care. No medication changes   -OT SLUMS 6/30   -EEG: nonspecific encephalopathy.   -LP: protein 173, glucose 110. Cell count with 21 WBC, 41 RBC, 52 lymph. Hazy appearance  -Encephalopathy paraneoplastic evaluation (including Purkinje, ADI, antiglial nucl antibody, NMDA, BOSSMAN) 1/30/25 negative.   -CSF flow cytometry: suboptimal specimen with low cellularity. No immunophenotypic evidence of involvement by lymphoma. [place] light chain 64.8, lambda light chain 35.1, kappa/lambda 1.85 all elevated. No monoclonals detected (1/22)  -ELP/Immunofix, serum panel: total protein low, beta 1 fraction low (1/22/25)  -Polyoma virus DNA of CSF, MAGNUS virus DNA (1/28) negative.   -Meningitis / encephalitis panel (1/28) negative  -CSF angiotensin converting enzyme (1/28) negative negative  -CMV (1/28, 1/25) negative   -BK virus (1/28) negative  -EBV (1/25) negative  -Cryptococcus (1/26) negative; Cryptococcus neoformans (1/28) negative  -Syphilis (1/25) negative  -HIV (1/25) negative  -B12 (1/18) WNL  -TSH (1/18) high side of normal 4.94  - Drug screen negative. Patient has distant history of what sounds to be mild to moderate alcohol intake.     Plan:   Neurology following:   - Brain MRI without evidence of acute infarction or other acute intracranial pathology. Stable chronic R cerebellar infarct.   - Delirium precautions as able.   - repeat LP per CAPs on 3/5 completed - repeat CSF studies are pending.      # Renal transplant 9/2022   # CKD Stage 3b   Had the [facility] 1/27 renal US with doppler showed no stenosis of vessels. Resistive indices were increased, felt could be related to ureteric obstruction but not specific. Mild dilation of  intrarenal collecting system and ureter was also seen. CT a/p 1/28 W/O contrast suggested mild hydro. [facility] showed no hydro or other abnormalities. FENA 4.3 (1/28) at [facility]. Creat 2.26 today. Creat recent baseline appears to be 1.8-2.3. Peak with recent MOY was up to 2.7, improved to 2.3 at time of discharge 2/21.  - transplant neph following the patient  Recommendations:   - Continue mycophenolate 500 mg twice a day.  - Continue tacrolimus 1.5 mg twice a day.  - Tacro level ordered for 3/4/2025  - Follow K trends to guide further Lokelma dosing (renal panel ordered)  - Hold calcitriol given borderline high calcium. Will follow up calcium levels on next renal panel (ordered)  - Ordered UA and UPCR/UACR 3/3/2025  - Ordered Kidney Transplant US 3/3/2025 (routine)  - Check hepatitis panel (ordered for 3/4/2025)  - Accurate I/O and daily weight given recent heart failure exacerbation.  - Plan for urinary retention per below    #Urinary retention  Noted to have ongoing retention requiring straight cath. Unclear whether this is a new issue for him. No significant anticholinergics on his current medication list.   - Tamsulosin 0.4mg PO initiated on 3/4; as tolerated   - Avoid anticholinergic medications as able.      # Troponinemia, stable  # Lateral lead T wave inversions  # Atrial flutter  # HF  # HTN  # Hx MI  Limited cardiac history available. Per patient's daughter he may have had a stent placed at 1 point but is unsure.  Patient and daughters deny any history of cardiac arrhythmia.  However on exam today and on EKG evidence of atrial flutter, and patient also on Eliquis at least since December.  [facility], he was found to have heart failure exacerbation with 25-30 lbs fluid excess. BNP during hospitalization was 2868. His Lasix was switched to Torsemide. He has been taking his medications per his daughter.   - BNP 77843 here.   - trop 267>>218  - EKG with T wave  inversions and mild ST depressions in lateral leads  - denies recent CP or SOB, no peripheral or abdominal edema  Plan:   - TTE as below.   - No report of chest pain. Troponin trended down   - cont PTA atorvastatin 20 mg nightly  - restart PTA Eliquis 5 mg BID on 3/6  - cont PTA Jardiance 12.5 mg daily  - continue PTA metoprolol 75 mg daily  - continue PTA torsemide 10 mg BID     TTE  Interpretation Summary  Left ventricular function is decreased. The ejection fraction is 40-45%  (mildly reduced).  Diastolic function not assessed due to arrhythmia.  Right ventricular function, chamber size, wall motion, and thickness are  normal.  Mild tricuspid insufficiency is present.  Mild (pulmonary artery systolic pressure<50mmHg) pulmonary hypertension is  present.  IVC diameter and respiratory changes fall into an intermediate range  suggesting an RA pressure of 8 mmHg.  Trivial pericardial effusion is present.  There is no prior study for direct comparison.     # T2DM  Not on long-term insulin. Hemoglobin A1c 6.9% as of 7/2024. Hyperglycemia when he allowed check on 3/4. Unclear whether he would be amenable for ongoing checks required for insulin use.   - cont PTA Jardiance 12.5 mg daily  - Repeat Hemoglobin A1c   - Initiated sliding scale insulin on 3/4 with TID AC and 0200 glucose checks.      # Mild normocytic anemia  # Hx hepatitis C, s/p treatment with cure   # Peripheral vascular disease  # S/p L BKA  # Glaucoma - cont PTA eyedrops  # Sickle cell trait             Diet: Combination Diet Regular Diet Adult  Snacks/Supplements Adult: Nepro Oral Supplement; With Meals    DVT Prophylaxis: holding DOAC with plan for LP on 3/5  Zaman Catheter: Not present  Lines: None     Cardiac Monitoring: None  Code Status: Full Code      Clinically Significant Risk Factors               # Hypoalbuminemia: Lowest albumin = 3.4 g/dL at 3/5/2025  6:36 AM, will monitor as appropriate  # Coagulation Defect: INR = 1.29 (Ref range: 0.85 -  1.15) and/or PTT = N/A, will monitor for bleeding              # DMII: A1C = 7.9 % (Ref range: <5.7 %) within past 6 months       # Financial/Environmental Concerns: none         Social Drivers of Health    Tobacco Use: Medium Risk (7/26/2024)    Received from [employer]    Patient History     Smoking Tobacco Use: Former     Smokeless Tobacco Use: Never          Disposition Plan     Medically Ready for Discharge: Anticipated in 2-4 Days             Lucía Chapman DO  Hospitalist Service, GOLD TEAM 22  [facility]  Securely message with [employer] (more info)  Text page via [facility] Paging/Directory   See signed in provider for up to date coverage information  ______________________________________________________________________    Interval History   No acute events overnight. He continues to feel well today. Initially states R index finger hurts but then states it is the oximetry that is bothersome.     Physical Exam   Vital Signs: Temp: 97.4  F (36.3  C) Temp src: Oral BP: 109/54 Pulse: 92   Resp: 16 SpO2: 98 % O2 Device: None (Room air)    Weight: 146 lbs 9.69 oz    General: resting comfortably in bed, no acute distress, pleasant   HEENT: sclera clear, MMM  NECK: Supple, with no masses, no rigidity   CV: RRR, no m/r/g. Extremities are warm and well perfused.   LUNGS: CTAB, no w/r/c.  SKIN: Warm, well perfused. No skin rashes or abnormal lesions.  MSK: S/p L BKA, No clubbing, cyanosis, or edema.  NEURO: Alert and interactive, disoriented to place and time, confused. No focal deficits.    Medical Decision Making           Data     I have personally reviewed the following data over the past 24 hrs:    7.4  \   9.7 (L)   / 259     135 99 63.4 (H) /  299 (H)   4.9 25 2.00 (H) \     ALT: N/A AST: N/A AP: N/A TBILI: N/A   ALB: 3.4 (L) TOT PROTEIN: N/A LIPASE: N/A     INR:  1.29 (H) PTT:  N/A   D-dimer:  N/A Fibrinogen:  N/A

## 2025-03-06 ENCOUNTER — APPOINTMENT (OUTPATIENT)
Dept: PHYSICAL THERAPY | Facility: CLINIC | Age: 65
DRG: 981 | End: 2025-03-06
Payer: MEDICARE

## 2025-03-06 LAB
ALBUMIN SERPL BCG-MCNC: 3.2 G/DL (ref 3.5–5.2)
ANION GAP SERPL CALCULATED.3IONS-SCNC: 13 MMOL/L (ref 7–15)
BACTERIA CSF CULT: NORMAL
BACTERIA CSF CULT: NORMAL
BUN SERPL-MCNC: 63.7 MG/DL (ref 8–23)
CALCIUM SERPL-MCNC: 9.7 MG/DL (ref 8.8–10.4)
CHLORIDE SERPL-SCNC: 97 MMOL/L (ref 98–107)
CREAT SERPL-MCNC: 1.78 MG/DL (ref 0.67–1.17)
EGFRCR SERPLBLD CKD-EPI 2021: 42 ML/MIN/1.73M2
GLUCOSE BLDC GLUCOMTR-MCNC: 227 MG/DL (ref 70–99)
GLUCOSE BLDC GLUCOMTR-MCNC: 245 MG/DL (ref 70–99)
GLUCOSE BLDC GLUCOMTR-MCNC: 269 MG/DL (ref 70–99)
GLUCOSE BLDC GLUCOMTR-MCNC: 277 MG/DL (ref 70–99)
GLUCOSE BLDC GLUCOMTR-MCNC: 332 MG/DL (ref 70–99)
GLUCOSE SERPL-MCNC: 238 MG/DL (ref 70–99)
GRAM STAIN RESULT: NORMAL
GRAM STAIN RESULT: NORMAL
HCO3 SERPL-SCNC: 23 MMOL/L (ref 22–29)
HSV1 DNA CSF QL NAA+PROBE: NOT DETECTED
HSV2 DNA CSF QL NAA+PROBE: NOT DETECTED
PATH REPORT.COMMENTS IMP SPEC: NORMAL
PATH REPORT.FINAL DX SPEC: NORMAL
PATH REPORT.FINAL DX SPEC: NORMAL
PATH REPORT.GROSS SPEC: NORMAL
PATH REPORT.MICROSCOPIC SPEC OTHER STN: NORMAL
PATH REPORT.MICROSCOPIC SPEC OTHER STN: NORMAL
PATH REPORT.RELEVANT HX SPEC: NORMAL
PATH REPORT.RELEVANT HX SPEC: NORMAL
PHOSPHATE SERPL-MCNC: 2.6 MG/DL (ref 2.5–4.5)
POTASSIUM SERPL-SCNC: 4.9 MMOL/L (ref 3.4–5.3)
SODIUM SERPL-SCNC: 133 MMOL/L (ref 135–145)
TACROLIMUS BLD-MCNC: 3.5 UG/L (ref 5–15)
TME LAST DOSE: ABNORMAL H
TME LAST DOSE: ABNORMAL H

## 2025-03-06 PROCEDURE — 97530 THERAPEUTIC ACTIVITIES: CPT | Mod: GP

## 2025-03-06 PROCEDURE — 250N000012 HC RX MED GY IP 250 OP 636 PS 637: Performed by: PHYSICIAN ASSISTANT

## 2025-03-06 PROCEDURE — 250N000013 HC RX MED GY IP 250 OP 250 PS 637: Performed by: INTERNAL MEDICINE

## 2025-03-06 PROCEDURE — 80197 ASSAY OF TACROLIMUS: CPT | Performed by: STUDENT IN AN ORGANIZED HEALTH CARE EDUCATION/TRAINING PROGRAM

## 2025-03-06 PROCEDURE — 99232 SBSQ HOSP IP/OBS MODERATE 35: CPT | Performed by: STUDENT IN AN ORGANIZED HEALTH CARE EDUCATION/TRAINING PROGRAM

## 2025-03-06 PROCEDURE — 120N000002 HC R&B MED SURG/OB UMMC

## 2025-03-06 PROCEDURE — 250N000013 HC RX MED GY IP 250 OP 250 PS 637: Performed by: STUDENT IN AN ORGANIZED HEALTH CARE EDUCATION/TRAINING PROGRAM

## 2025-03-06 PROCEDURE — 80069 RENAL FUNCTION PANEL: CPT | Performed by: STUDENT IN AN ORGANIZED HEALTH CARE EDUCATION/TRAINING PROGRAM

## 2025-03-06 PROCEDURE — 36415 COLL VENOUS BLD VENIPUNCTURE: CPT | Performed by: STUDENT IN AN ORGANIZED HEALTH CARE EDUCATION/TRAINING PROGRAM

## 2025-03-06 PROCEDURE — 250N000013 HC RX MED GY IP 250 OP 250 PS 637: Performed by: PHYSICIAN ASSISTANT

## 2025-03-06 PROCEDURE — 99233 SBSQ HOSP IP/OBS HIGH 50: CPT

## 2025-03-06 RX ADMIN — TAMSULOSIN HYDROCHLORIDE 0.4 MG: 0.4 CAPSULE ORAL at 08:58

## 2025-03-06 RX ADMIN — SODIUM BICARBONATE 650 MG TABLET 1300 MG: at 08:58

## 2025-03-06 RX ADMIN — TACROLIMUS 1.5 MG: 1 CAPSULE ORAL at 20:46

## 2025-03-06 RX ADMIN — APIXABAN 5 MG: 5 TABLET, FILM COATED ORAL at 08:58

## 2025-03-06 RX ADMIN — SODIUM BICARBONATE 650 MG TABLET 1300 MG: at 20:46

## 2025-03-06 RX ADMIN — LATANOPROST 1 DROP: 50 SOLUTION OPHTHALMIC at 20:46

## 2025-03-06 RX ADMIN — LIDOCAINE 4% 1 PATCH: 40 PATCH TOPICAL at 20:46

## 2025-03-06 RX ADMIN — MYCOPHENOLATE MOFETIL 500 MG: 500 TABLET, FILM COATED ORAL at 20:47

## 2025-03-06 RX ADMIN — MYCOPHENOLATE MOFETIL 500 MG: 500 TABLET, FILM COATED ORAL at 08:58

## 2025-03-06 RX ADMIN — INSULIN ASPART 3 UNITS: 100 INJECTION, SOLUTION INTRAVENOUS; SUBCUTANEOUS at 12:59

## 2025-03-06 RX ADMIN — PANTOPRAZOLE SODIUM 40 MG: 40 TABLET, DELAYED RELEASE ORAL at 08:58

## 2025-03-06 RX ADMIN — EMPAGLIFLOZIN 10 MG: 10 TABLET, FILM COATED ORAL at 08:59

## 2025-03-06 RX ADMIN — METOPROLOL SUCCINATE 75 MG: 25 TABLET, EXTENDED RELEASE ORAL at 08:58

## 2025-03-06 RX ADMIN — ATORVASTATIN CALCIUM 20 MG: 20 TABLET, FILM COATED ORAL at 20:47

## 2025-03-06 RX ADMIN — INSULIN ASPART 4 UNITS: 100 INJECTION, SOLUTION INTRAVENOUS; SUBCUTANEOUS at 18:44

## 2025-03-06 RX ADMIN — APIXABAN 5 MG: 5 TABLET, FILM COATED ORAL at 20:46

## 2025-03-06 RX ADMIN — INSULIN ASPART 3 UNITS: 100 INJECTION, SOLUTION INTRAVENOUS; SUBCUTANEOUS at 08:57

## 2025-03-06 RX ADMIN — TACROLIMUS 1.5 MG: 1 CAPSULE ORAL at 08:58

## 2025-03-06 ASSESSMENT — ACTIVITIES OF DAILY LIVING (ADL)
ADLS_ACUITY_SCORE: 72
ADLS_ACUITY_SCORE: 68
ADLS_ACUITY_SCORE: 72
ADLS_ACUITY_SCORE: 73
ADLS_ACUITY_SCORE: 72
ADLS_ACUITY_SCORE: 73
ADLS_ACUITY_SCORE: 68
ADLS_ACUITY_SCORE: 72
ADLS_ACUITY_SCORE: 68
ADLS_ACUITY_SCORE: 72
ADLS_ACUITY_SCORE: 73
ADLS_ACUITY_SCORE: 72
ADLS_ACUITY_SCORE: 68
ADLS_ACUITY_SCORE: 73
ADLS_ACUITY_SCORE: 73
ADLS_ACUITY_SCORE: 72
ADLS_ACUITY_SCORE: 72
ADLS_ACUITY_SCORE: 68
ADLS_ACUITY_SCORE: 72
ADLS_ACUITY_SCORE: 72

## 2025-03-06 NOTE — CARE PLAN
Brief Nephro Note:  -Chart reviewed.  -Transplant function overall stable  -UOP 2.25L now that he is s/p mg placement  -Tacro level pending  -Na corrects to normal for hyperglycemia  -Bps intermittently a bit soft but 106/72 on last check  -Weight is stable despite holding his Torsemide (66.3 from 66.5)  -Continues on Flomax (has mg now)  -CSF studies either pending or unrevealing of acute process.     Reach out at any time with questions or concerns.    Weston Hill MD

## 2025-03-06 NOTE — PLAN OF CARE
Goal Outcome Evaluation:          VS: /64 (BP Location: Left arm)   Pulse 70   Temp 98.4  F (36.9  C) (Oral)   Resp 18   Ht 1.829 m (6')   Wt 66.3 kg (146 lb 2.6 oz)   SpO2 99%   BMI 19.82 kg/m      O2: Sating >95% on RA, denies SOB/Chest pain   Output: Zaman in place and draining adequately   Last BM: 3/6, bowel sounds normoactive x4   Activity: Ax1-2 when OOB, has not been OOB this shift, Ax1 with cares.    Up for meals? Fair appetite. May need to be reminded to eat    Skin: All visible skin intact   Pain: Denies pain   CMS: A&O x 3, disoriented to situation at times    Dressing: Lumbar puncture covered with band aid   Diet: Regular diet   LDA: PIV to  LUFA, SL   Equipment:  personal belongings   Plan: Call light within reach, bed in a low position. Able to make needs known. Continue to monitor with POC.   Additional Info:  Daughter at bedside.

## 2025-03-06 NOTE — PLAN OF CARE
Goal Outcome Evaluation: 1900 to 0730      Plan of Care Reviewed With: patient    Overall Patient Progress: no changeOverall Patient Progress: no change    Alert and orientated x3- intermittently disorientated to situation. VSS on RA. Denies SOB, CP, and N/V. Denies N/T. C/o pain being 2/10 in L arm, pain managed with PRN tylenol. Incontinent of bowel, Last BM on 3/6/25. Mg catheter in due to retention, voiding adequately, catheter cares performed. Up Ax1-2 when OOB, has not been OOB this shift, Ax1 with cares. Frequently provided Q2hr turns. Has Lumbar puncture that is covered with a band aid and L BKA. On regular diet and takes medications whole with thin liquids. Has L PIV-SL. BG at bedtime was 352, 4 Units given per sliding scale, 0200 was 227.  Call light in reach, bed alarm on for safety. Plan is to continue urinary rentention medications, proceed to pull mg at 48 hours and trial to void while awaiting lumbar puncture results.      Vital signs:  Temp: 98.5  F (36.9  C) Temp src: Oral BP: 90/65 Pulse: 72   Resp: 16 SpO2: 98 % O2 Device: None (Room air)

## 2025-03-06 NOTE — PROGRESS NOTES
Care Management Follow Up    Length of Stay (days): 8    Expected Discharge Date: 03/07/2025     Concerns to be Addressed: Discharge Planning       Patient plan of care discussed at interdisciplinary rounds: Yes    Anticipated Discharge Disposition: Home     Anticipated Discharge Services: Home Care    Anticipated Discharge DME: None    Patient/family educated on Medicare website which has current facility and service quality ratings: N/A     Education Provided on the Discharge Plan: Yes     Patient/Family in Agreement with the Plan: Yes    Referrals Placed by CM/SW: Home Care    Private pay costs discussed: Not applicable    Discussed  Partnership in Safe Discharge Planning  document with patient/family: No     Handoff Completed: No, handoff not indicated or clinically appropriate    Additional Information:    Patient will discharge to home with home health care when medically stable. He is not yet medically ready, per MD in rounds. Patient will have follow up with Neurology.    Next Steps:     PADMINI for home care  Discharge Note  EHO  IMM    Contacts    [facility]  [address]  [place] [address]  P: [phone]  P: [phone]  F: [phone]   PTA Services: RN/PT/OT/HHA/MARICRUZ Neil, KEN, MSW  6th Floor Medical Surgical Unit  Phone [phone]      Message me securely in [place]

## 2025-03-06 NOTE — PROGRESS NOTES
[facility]    Medicine Progress Note - Hospitalist Service, GOLD TEAM 22    Date of Admission:  2/25/2025    Assessment & Plan   Tad Zaman is a 64 year old male admitted on 2/25/2025 for AMS. He has a history of diabetes, HTN, CAD, HF, and renal transplant 9/2022, left AKA, CKD.    Patient was brought in by family for AMS x 1-2 months. He has been evaluated at the [facility] multiple times in ED then was evaluated during an admission 1/20-2/21/25 with very broad workup (see below) that did not amount to any type of notable etiology of his symptoms. During this stay he was also treated for HF exacerbation, MOY, and UTI which were all stable at time of discharge. He finished course of cefdinir for e coli UTI on 2/24. He was discharged with family being told that he has failure to thrive. He was discharged home, required 24 hr cares by daughter including changing him and preparation of food. He has also has had intermittent agitation and aggression. Home health nurse stated that he was not appropriate for their program.     No clear etiology for confusion but the aggression and refusal of cares have clinically improved without intervention.      Plan today:   - Neurology to reevaluate now that LP is back - awaiting CSF studies   - Discuss dispo planning with family     AMS  Cognitive impairment vs encephalopathy   Presented with 1-2 months of progressive encephalopathy and agitation with aggressive behavior toward family who is caring for him at home. [facility] Initial LP with pleocytosis, otherwise unrevealing. MRI brain without acute pathology. Neurology consulted. Some concern for hx of viral encephalitis that has since resolved leaving him with a degree of impairment. He otherwise remains hemodynamically stable without clinical evidence of infection, no gross electrolyte abnormality, stable glucose (elevated but without acidosis), BUN  is improving, tacrolimus levels WNL.   [facility]:   - MRI 1/29: Stable mild amount of chronic small vessel ischemia. Small chronic R cerebellar infarct.   -Psych consult 2/13: recommended removing capacity for medical decision making, recommended palliative care. No medication changes   -OT SLUMS 6/30   -EEG: nonspecific encephalopathy.   -LP: protein 173, glucose 110. Cell count with 21 WBC, 41 RBC, 52 lymph. Hazy appearance  -Encephalopathy paraneoplastic evaluation (including Purkinje, ADI, antiglial nucl antibody, NMDA, BOSSMAN) 1/30/25 negative.   -CSF flow cytometry: suboptimal specimen with low cellularity. No immunophenotypic evidence of involvement by lymphoma. [place] light chain 64.8, lambda light chain 35.1, kappa/lambda 1.85 all elevated. No monoclonals detected (1/22)  -ELP/Immunofix, serum panel: total protein low, beta 1 fraction low (1/22/25)  -Polyoma virus DNA of CSF, MAGNUS virus DNA (1/28) negative.   -Meningitis / encephalitis panel (1/28) negative  -CSF angiotensin converting enzyme (1/28) negative negative  -CMV (1/28, 1/25) negative   -BK virus (1/28) negative  -EBV (1/25) negative  -Cryptococcus (1/26) negative; Cryptococcus neoformans (1/28) negative  -Syphilis (1/25) negative  -HIV (1/25) negative  -B12 (1/18) WNL  -TSH (1/18) high side of normal 4.94  - Drug screen negative. Patient has distant history of what sounds to be mild to moderate alcohol intake.     Plan:   Neurology following:   - Brain MRI without evidence of acute infarction or other acute intracranial pathology. Stable chronic R cerebellar infarct.   - Delirium precautions as able.   - repeat LP per CAPs on 3/5 completed - repeat CSF with unremarkable cell count and diff, prelim elevated protein, negative encephalitis panel. Remainder of studies including flow and cytology are pending.      # Renal transplant 9/2022   # CKD Stage 3b   Had the transplant at the [facility]. [facility] 1/27 renal US with doppler showed no stenosis of vessels. Resistive indices were increased, felt could be related to ureteric obstruction but not specific. Mild dilation of intrarenal collecting system and ureter was also seen. CT a/p 1/28 W/O contrast suggested mild hydro. [facility] showed no hydro or other abnormalities. FENA 4.3 (1/28) at [facility]. Creat 2.26 today. Creat recent baseline appears to be 1.8-2.3. Peak with recent MOY was up to 2.7, improved to 2.3 at time of discharge 2/21.  - transplant neph following the patient  Recommendations:   - Continue mycophenolate 500 mg twice a day.  - Continue tacrolimus 1.5 mg twice a day.  - Tacro level ordered for 3/4/2025  - Hold calcitriol given borderline high calcium. Will follow up calcium levels on next renal panel (ordered)  - Ordered UA and UPCR/UACR 3/3/2025 - trace leuk esterase and glucose present. No evidence of infection.   - Ordered Kidney Transplant US 3/3/2025 (routine) - mild hydronephrosis of transplanted kidney, torturous renal artery with mildly elevated velocity at the anastomosis, likely chronic.   - Check hepatitis panel (ordered for 3/4/2025) - HCV positive, known history and cured, otherwise negative.   - Accurate I/O and daily weight given recent heart failure exacerbation.  - Plan for urinary retention per below    #Urinary retention  Noted to have ongoing retention requiring straight cath. Unclear whether this is a new issue for him. No significant anticholinergics on his current medication list.   - Tamsulosin 0.4mg PO initiated on 3/4; as tolerated   - Avoid anticholinergic medications as able.   - TOV in 48 hours (3/7)     # Troponinemia, stable  # Lateral lead T wave inversions  # Atrial flutter  # HF  # HTN  # Hx MI  Limited cardiac history available. Per patient's daughter he may have had a stent placed at 1 point but is unsure.  Patient and daughters deny any history of cardiac arrhythmia.  However on exam  today and on EKG evidence of atrial flutter, and patient also on Eliquis at least since December.  [facility], he was found to have heart failure exacerbation with 25-30 lbs fluid excess. BNP during hospitalization was 2868. His Lasix was switched to Torsemide. He has been taking his medications per his daughter.   - BNP 10666 here.   - trop 267>>218  - EKG with T wave inversions and mild ST depressions in lateral leads  - denies recent CP or SOB, no peripheral or abdominal edema  Plan:   - TTE as below.   - No report of chest pain. Troponin trended down   - cont PTA atorvastatin 20 mg nightly  - restart PTA Eliquis 5 mg BID on 3/6  - cont PTA Jardiance 12.5 mg daily  - continue PTA metoprolol 75 mg daily  - continue PTA torsemide 10 mg BID     TTE  Interpretation Summary  Left ventricular function is decreased. The ejection fraction is 40-45%  (mildly reduced).  Diastolic function not assessed due to arrhythmia.  Right ventricular function, chamber size, wall motion, and thickness are  normal.  Mild tricuspid insufficiency is present.  Mild (pulmonary artery systolic pressure<50mmHg) pulmonary hypertension is  present.  IVC diameter and respiratory changes fall into an intermediate range  suggesting an RA pressure of 8 mmHg.  Trivial pericardial effusion is present.  There is no prior study for direct comparison.     # T2DM  Not on long-term insulin. Hemoglobin A1c 6.9% as of 7/2024. Hyperglycemia when he allowed check on 3/4. Unclear whether he would be amenable for ongoing checks required for insulin use.   - cont PTA Jardiance 12.5 mg daily  - Repeat Hemoglobin A1c   - Initiated sliding scale insulin on 3/4 with TID AC and 0200 glucose checks.   - Carb consistent diet      # Mild normocytic anemia  # Hx hepatitis C, s/p treatment with cure   # Peripheral vascular disease  # S/p L BKA  # Glaucoma - cont PTA eyedrops  # Sickle cell trait             Diet: Combination Diet Regular Diet  Adult  Snacks/Supplements Adult: Nepro Oral Supplement; With Meals    DVT Prophylaxis: DOAC  Zaman Catheter: PRESENT, indication: Acute retention or obstruction  Lines: None     Cardiac Monitoring: None  Code Status: Full Code      Clinically Significant Risk Factors               # Hypoalbuminemia: Lowest albumin = 3.4 g/dL at 3/5/2025  6:36 AM, will monitor as appropriate  # Coagulation Defect: INR = 1.29 (Ref range: 0.85 - 1.15) and/or PTT = N/A, will monitor for bleeding              # DMII: A1C = 7.9 % (Ref range: <5.7 %) within past 6 months       # Financial/Environmental Concerns: none         Social Drivers of Health    Tobacco Use: Medium Risk (7/26/2024)    Received from [employer]    Patient History     Smoking Tobacco Use: Former     Smokeless Tobacco Use: Never          Disposition Plan     Medically Ready for Discharge: Anticipated Tomorrow             Lucía Chapman DO  Hospitalist Service, GOLD TEAM 22  [facility]  Securely message with [employer] (more info)  Text page via [facility] Paging/Directory   See signed in provider for up to date coverage information  ______________________________________________________________________    Interval History   No acute events overnight. He feels well today. Denies back pain, no concerns.    Spoke with the patient's daughter, who is at the bedside, who expresses ongoing concern about their ability to care for him at home. He is better, but this remains far from his baseline prior to onset of this change about one month ago.     Physical Exam   Vital Signs: Temp: 98.4  F (36.9  C) Temp src: Oral BP: 106/72 Pulse: 73   Resp: 17 SpO2: 99 % O2 Device: None (Room air)    Weight: 146 lbs 2.64 oz    General: thin male, resting comfortably in bed, no acute distress.   HEENT: sclera clear, MMM  CV: RRR, no m/r/g. Extremities are warm and well perfused.   LUNGS: CTAB, no w/r/c.  SKIN: Warm, well perfused. No skin rashes  or abnormal lesions.  MSK: L BKA, RLE without edema.   NEURO: Alert and answering questions, remains confused. Following commands. No focal deficits.      Medical Decision Making           Data     I have personally reviewed the following data over the past 24 hrs:    N/A  \   N/A   / N/A     133 (L) 97 (L) 63.7 (H) /  277 (H)   4.9 23 1.78 (H) \     ALT: N/A AST: N/A AP: N/A TBILI: N/A   ALB: 3.2 (L) TOT PROTEIN: N/A LIPASE: N/A     TSH: N/A T4: N/A A1C: N/A

## 2025-03-06 NOTE — PROGRESS NOTES
[facility]  Neurology Consultation - Progress Note    Patient Name:  Tad Zaman  Date of Service:  March 6, 2025    Subjective:    No changes. LP completed yesterday, 3/5. Patient feeling good today, no concerns. Tells me that everything is in our hands now going forward. He would like to return home when he leaves the hospital. Daughter feels that since this hospital admission he has improved more so from a nutrition and energy standpoint, cognitively about the same. Daughter denies any recent agitation.     Objective:    Vitals: /72 (BP Location: Left arm)   Pulse 73   Temp 98.4  F (36.9  C) (Oral)   Resp 17   Ht 1.829 m (6')   Wt 66.3 kg (146 lb 2.6 oz)   SpO2 99%   BMI 19.82 kg/m    General: Lying in bed, NAD  Head: Atraumatic, normocephalic   Cardiac: no lower extremity edema  Neurologic:  Mental status: Awake, alert, attentive, oriented to self. Told me it was 2030 and August, states we are at [facility], and unsure why he is in hospital. He began to reference Nella a lot whom he tells me is his wife though daughter tells me they are . Able to tell that he lives in [place], helps take care of handicapped brother, which is correct per daughter. Naming intact. Can calculate quarters in $1.75.   Cranial nerves: PERRL, conjugate gaze, EOMI, facial sensation intact, face symmetric, shoulder shrug strong, tongue/uvula midline, no dysarthria.   Motor: Normal bulk and tone. No abnormal movements. 5/5 strength bilaterally in deltoids, biceps, triceps, hand , hip flexors, right knee flexion, right knee extension, right plantarflexion, right dorsiflexion.   Reflexes: Normo-reflexic and symmetric biceps, brachioradialis, right patellae and right achilles. Right toe mute.     Pertinent Investigations:    I have personally reviewed most recent and pertinent labs, tests, and radiological images.     [facility]  LP: protein  173, glucose 110. Cell count with 21 WBC, 41 RBC, 52 lymph. Hazy appearance  Encephalopathy paraneoplastic evaluation (including Purkinje, ADI, antiglial nucl antibody, NMDA, BOSSMAN) 1/30/25 negative.   CSF flow cytometry: suboptimal specimen with low cellularity. No immunophenotypic evidence of involvement by lymphoma. [place] light chain 64.8, lambda light chain 35.1, kappa/lambda 1.85 all elevated. No monoclonals detected (1/22)  ELP/Immunofix, serum panel: total protein low, beta 1 fraction low (1/22/25)  Polyoma virus DNA of CSF, MAGNUS virus DNA (1/28) negative.   Meningitis / encephalitis panel (1/28) negative  CSF angiotensin converting enzyme (1/28) negative negative    MR Brain 3/1  IMPRESSION:  1. Exam significantly limited by motion artifact.  2. No evidence of acute infarction or other acute intracranial  pathology on available images.  3. Stable chronic right cerebellar infarct.    Assessment  #Cognitive impairment vs encephalopathy   Tad Zaman is a 64 year old male with history of diabetes, HTN, CAD, HF, and renal transplant 9/2022, CKD who presents with worsening altered mental status over the last 1-2 months.     At this point Mr. Zaman seems to have a fairly dramatic degree of cognitive impairment. According to family, this happened acutely, and given the prior lumbar puncture showing a pleocytosis, there is suspicion that he suffered from a viral encephalitis that has resolved though left him with a degree of cognitive impairment. To assess for this we recommended repeating his workup to look for resolution of pleocytosis.  If the pleocytosis is ongoing, cytology and cytometry will help assess for any atypical cells. Repeat LP 3/5 thus far with clear appearance, 0 nucleated cells, 0 RBC, elevated protein, elevated glucose, meningitis/encephalitis negative, cytology negative for malignancy, and flow cytometry pending. There is a resolution of previously noted pleocytosis and it is reassuring that  cytology is negative. With that being said, it is very possible that his acute decline in cognition could be explained by a previous viral encephalitis. At this point, I don't recommend any further workup or treatment options while inpatient. He should follow up outpatient with neurology and it would be helpful to complete neuropsychology testing for further evaluation of cognition. Overall, his exam has been stable (though limited due to participation) for me though if it were to worsen in the future from cognition standpoint, could consider repeating an EEG though not necessary at this time while in hospital.    Recommendations:   -MRI brain completed, motion degraded though no significant changes  -Follow-up LP complete, flow cytometry pending  -Please place follow up with outpatient neurology and consider neuropsychology testing  -Can consider EEG in outpatient setting if cognition were to worsen  -Will sign off    Thank you for involving Neurology in the care of Tad Zaman.  Please do not hesitate to call with questions.    Patient was discussed with Dr. Driscoll.    Gudelia Hernandez PA-C    60 MINUTES SPENT BY ME on the date of service doing chart review, history, exam, documentation & further activities per the note.

## 2025-03-07 VITALS
RESPIRATION RATE: 18 BRPM | BODY MASS INDEX: 19.8 KG/M2 | TEMPERATURE: 98.9 F | SYSTOLIC BLOOD PRESSURE: 117 MMHG | OXYGEN SATURATION: 95 % | WEIGHT: 146.16 LBS | DIASTOLIC BLOOD PRESSURE: 78 MMHG | HEIGHT: 72 IN | HEART RATE: 73 BPM

## 2025-03-07 LAB
ALBUMIN SERPL BCG-MCNC: 3.4 G/DL (ref 3.5–5.2)
ANION GAP SERPL CALCULATED.3IONS-SCNC: 11 MMOL/L (ref 7–15)
BUN SERPL-MCNC: 60.5 MG/DL (ref 8–23)
CALCIUM SERPL-MCNC: 9.6 MG/DL (ref 8.8–10.4)
CHLORIDE SERPL-SCNC: 103 MMOL/L (ref 98–107)
CREAT SERPL-MCNC: 1.68 MG/DL (ref 0.67–1.17)
EGFRCR SERPLBLD CKD-EPI 2021: 45 ML/MIN/1.73M2
GLUCOSE BLDC GLUCOMTR-MCNC: 188 MG/DL (ref 70–99)
GLUCOSE BLDC GLUCOMTR-MCNC: 259 MG/DL (ref 70–99)
GLUCOSE BLDC GLUCOMTR-MCNC: 281 MG/DL (ref 70–99)
GLUCOSE BLDC GLUCOMTR-MCNC: 303 MG/DL (ref 70–99)
GLUCOSE BLDC GLUCOMTR-MCNC: 319 MG/DL (ref 70–99)
GLUCOSE SERPL-MCNC: 248 MG/DL (ref 70–99)
HCO3 SERPL-SCNC: 24 MMOL/L (ref 22–29)
PHOSPHATE SERPL-MCNC: 2.5 MG/DL (ref 2.5–4.5)
POTASSIUM SERPL-SCNC: 4.8 MMOL/L (ref 3.4–5.3)
SODIUM SERPL-SCNC: 138 MMOL/L (ref 135–145)
TACROLIMUS BLD-MCNC: 3.6 UG/L (ref 5–15)
TME LAST DOSE: ABNORMAL H
TME LAST DOSE: ABNORMAL H
VDRL CSF QL: NON REACTIVE

## 2025-03-07 PROCEDURE — 120N000002 HC R&B MED SURG/OB UMMC

## 2025-03-07 PROCEDURE — 250N000012 HC RX MED GY IP 250 OP 636 PS 637: Performed by: STUDENT IN AN ORGANIZED HEALTH CARE EDUCATION/TRAINING PROGRAM

## 2025-03-07 PROCEDURE — 36415 COLL VENOUS BLD VENIPUNCTURE: CPT | Performed by: STUDENT IN AN ORGANIZED HEALTH CARE EDUCATION/TRAINING PROGRAM

## 2025-03-07 PROCEDURE — 250N000013 HC RX MED GY IP 250 OP 250 PS 637: Performed by: STUDENT IN AN ORGANIZED HEALTH CARE EDUCATION/TRAINING PROGRAM

## 2025-03-07 PROCEDURE — 82040 ASSAY OF SERUM ALBUMIN: CPT | Performed by: STUDENT IN AN ORGANIZED HEALTH CARE EDUCATION/TRAINING PROGRAM

## 2025-03-07 PROCEDURE — 250N000013 HC RX MED GY IP 250 OP 250 PS 637: Performed by: PHYSICIAN ASSISTANT

## 2025-03-07 PROCEDURE — 250N000013 HC RX MED GY IP 250 OP 250 PS 637: Performed by: INTERNAL MEDICINE

## 2025-03-07 PROCEDURE — 80197 ASSAY OF TACROLIMUS: CPT | Performed by: STUDENT IN AN ORGANIZED HEALTH CARE EDUCATION/TRAINING PROGRAM

## 2025-03-07 PROCEDURE — 250N000012 HC RX MED GY IP 250 OP 636 PS 637: Performed by: PHYSICIAN ASSISTANT

## 2025-03-07 PROCEDURE — 99233 SBSQ HOSP IP/OBS HIGH 50: CPT | Performed by: STUDENT IN AN ORGANIZED HEALTH CARE EDUCATION/TRAINING PROGRAM

## 2025-03-07 PROCEDURE — 99232 SBSQ HOSP IP/OBS MODERATE 35: CPT | Performed by: STUDENT IN AN ORGANIZED HEALTH CARE EDUCATION/TRAINING PROGRAM

## 2025-03-07 RX ADMIN — APIXABAN 5 MG: 5 TABLET, FILM COATED ORAL at 21:36

## 2025-03-07 RX ADMIN — PANTOPRAZOLE SODIUM 40 MG: 40 TABLET, DELAYED RELEASE ORAL at 08:02

## 2025-03-07 RX ADMIN — ATORVASTATIN CALCIUM 20 MG: 20 TABLET, FILM COATED ORAL at 21:36

## 2025-03-07 RX ADMIN — INSULIN ASPART 4 UNITS: 100 INJECTION, SOLUTION INTRAVENOUS; SUBCUTANEOUS at 13:51

## 2025-03-07 RX ADMIN — EMPAGLIFLOZIN 10 MG: 10 TABLET, FILM COATED ORAL at 08:02

## 2025-03-07 RX ADMIN — MYCOPHENOLATE MOFETIL 500 MG: 500 TABLET, FILM COATED ORAL at 21:36

## 2025-03-07 RX ADMIN — INSULIN GLARGINE 5 UNITS: 100 INJECTION, SOLUTION SUBCUTANEOUS at 17:38

## 2025-03-07 RX ADMIN — TAMSULOSIN HYDROCHLORIDE 0.4 MG: 0.4 CAPSULE ORAL at 08:02

## 2025-03-07 RX ADMIN — SODIUM BICARBONATE 650 MG TABLET 1300 MG: at 08:02

## 2025-03-07 RX ADMIN — LIDOCAINE 4% 1 PATCH: 40 PATCH TOPICAL at 21:40

## 2025-03-07 RX ADMIN — TACROLIMUS 1.5 MG: 1 CAPSULE ORAL at 10:20

## 2025-03-07 RX ADMIN — SODIUM BICARBONATE 650 MG TABLET 1300 MG: at 21:36

## 2025-03-07 RX ADMIN — APIXABAN 5 MG: 5 TABLET, FILM COATED ORAL at 08:02

## 2025-03-07 RX ADMIN — SENNOSIDES AND DOCUSATE SODIUM 2 TABLET: 50; 8.6 TABLET ORAL at 03:33

## 2025-03-07 RX ADMIN — INSULIN ASPART 3 UNITS: 100 INJECTION, SOLUTION INTRAVENOUS; SUBCUTANEOUS at 07:59

## 2025-03-07 RX ADMIN — METOPROLOL SUCCINATE 75 MG: 25 TABLET, EXTENDED RELEASE ORAL at 08:01

## 2025-03-07 RX ADMIN — TACROLIMUS 1.5 MG: 1 CAPSULE ORAL at 21:36

## 2025-03-07 RX ADMIN — INSULIN ASPART 1 UNITS: 100 INJECTION, SOLUTION INTRAVENOUS; SUBCUTANEOUS at 17:38

## 2025-03-07 RX ADMIN — MYCOPHENOLATE MOFETIL 500 MG: 500 TABLET, FILM COATED ORAL at 08:02

## 2025-03-07 RX ADMIN — LATANOPROST 1 DROP: 50 SOLUTION OPHTHALMIC at 21:36

## 2025-03-07 ASSESSMENT — ACTIVITIES OF DAILY LIVING (ADL)
ADLS_ACUITY_SCORE: 72
ADLS_ACUITY_SCORE: 70
ADLS_ACUITY_SCORE: 72
ADLS_ACUITY_SCORE: 74
ADLS_ACUITY_SCORE: 72
ADLS_ACUITY_SCORE: 74
ADLS_ACUITY_SCORE: 72
ADLS_ACUITY_SCORE: 70
ADLS_ACUITY_SCORE: 72
ADLS_ACUITY_SCORE: 70
ADLS_ACUITY_SCORE: 74
ADLS_ACUITY_SCORE: 70
ADLS_ACUITY_SCORE: 72
ADLS_ACUITY_SCORE: 74
ADLS_ACUITY_SCORE: 72
ADLS_ACUITY_SCORE: 74
ADLS_ACUITY_SCORE: 72

## 2025-03-07 NOTE — PROGRESS NOTES
[facility]    Medicine Progress Note - Hospitalist Service, GOLD TEAM 22    Date of Admission:  2/25/2025    Assessment & Plan   Tad Zaman is a 64 year old male admitted on 2/25/2025 for AMS. He has a history of diabetes, HTN, CAD, HF, and renal transplant 9/2022, left AKA, CKD.    Patient was brought in by family for AMS x 1-2 months. He has been evaluated at the [facility] multiple times in ED then was evaluated during an admission 1/20-2/21/25 with very broad workup (see below) that did not amount to any type of notable etiology of his symptoms. During this stay he was also treated for HF exacerbation, MOY, and UTI which were all stable at time of discharge. He finished course of cefdinir for e coli UTI on 2/24. He was discharged with family being told that he has failure to thrive. He was discharged home, required 24 hr cares by daughter including changing him and preparation of food. He has also has had intermittent agitation and aggression. Home health nurse stated that he was not appropriate for their program.     No clear etiology for confusion but the aggression and refusal of cares have clinically improved without intervention.      Plan today:   - Discuss dispo planning with family     AMS  Cognitive impairment vs encephalopathy   Presented with 1-2 months of progressive encephalopathy and agitation with aggressive behavior toward family who is caring for him at home. [facility] Initial LP with pleocytosis, otherwise unrevealing. MRI brain without acute pathology. Neurology consulted. Some concern for hx of viral encephalitis that has since resolved leaving him with a degree of impairment. He otherwise remains hemodynamically stable without clinical evidence of infection, no gross electrolyte abnormality, stable glucose (elevated but without acidosis), BUN is improving, tacrolimus levels WNL.   [facility]:   - MRI 1/29:  Stable mild amount of chronic small vessel ischemia. Small chronic R cerebellar infarct.   -Psych consult 2/13: recommended removing capacity for medical decision making, recommended palliative care. No medication changes   -OT SLUMS 6/30   -EEG: nonspecific encephalopathy.   -LP: protein 173, glucose 110. Cell count with 21 WBC, 41 RBC, 52 lymph. Hazy appearance  -Encephalopathy paraneoplastic evaluation (including Purkinje, ADI, antiglial nucl antibody, NMDA, BOSSMAN) 1/30/25 negative.   -CSF flow cytometry: suboptimal specimen with low cellularity. No immunophenotypic evidence of involvement by lymphoma. [place] light chain 64.8, lambda light chain 35.1, kappa/lambda 1.85 all elevated. No monoclonals detected (1/22)  -ELP/Immunofix, serum panel: total protein low, beta 1 fraction low (1/22/25)  -Polyoma virus DNA of CSF, MGANUS virus DNA (1/28) negative.   -Meningitis / encephalitis panel (1/28) negative  -CSF angiotensin converting enzyme (1/28) negative negative  -CMV (1/28, 1/25) negative   -BK virus (1/28) negative  -EBV (1/25) negative  -Cryptococcus (1/26) negative; Cryptococcus neoformans (1/28) negative  -Syphilis (1/25) negative  -HIV (1/25) negative  -B12 (1/18) WNL  -TSH (1/18) high side of normal 4.94  - Drug screen negative. Patient has distant history of what sounds to be mild to moderate alcohol intake.     Plan:   Neurology following:   - Brain MRI without evidence of acute infarction or other acute intracranial pathology. Stable chronic R cerebellar infarct.   - Delirium precautions as able.   - repeat LP per CAPs on 3/5 completed - repeat CSF unremarkable (negative cytometry, cytology negative).    - pending studies include: West Nile virus, VDRL, aerobic and anaerobic cultures   - He will need follow up with outpatient neurology and consider neuropsychology testing  - Per neuro: can consider EEG in outpatient setting if cognition were to worsen     # Renal transplant 9/2022   # CKD Stage 3b   Had the  trans[facility] 1/27 renal US with doppler showed no stenosis of vessels. Resistive indices were increased, felt could be related to ureteric obstruction but not specific. Mild dilation of intrarenal collecting system and ureter was also seen. CT a/p 1/28 W/O contrast suggested mild hydro. [facility]/25 at [facility] showed no hydro or other abnormalities. FENA 4.3 (1/28) at [facility]. Creat 2.26 today. Creat recent baseline appears to be 1.8-2.3. Peak with recent MOY was up to 2.7, improved to 2.3 at time of discharge 2/21.  - transplant neph following the patient  Recommendations:   - Continue mycophenolate 500 mg twice a day.  - Continue tacrolimus 1.5 mg twice a day.  - Tacro level ordered for 3/4/2025  - Hold calcitriol given borderline high calcium. Will follow up calcium levels on next renal panel (ordered)  - Ordered UA and UPCR/UACR 3/3/2025 - trace leuk esterase and glucose present. No evidence of infection.   - Ordered Kidney Transplant US 3/3/2025 (routine) - mild hydronephrosis of transplanted kidney, torturous renal artery with mildly elevated velocity at the anastomosis, likely chronic.   - Check hepatitis panel (ordered for 3/4/2025) - HCV positive, known history and cured, otherwise negative.   - Accurate I/O and daily weight given recent heart failure exacerbation.  - Plan for urinary retention per below    #Urinary retention  Noted to have ongoing retention requiring straight cath. Unclear whether this is a new issue for him. No significant anticholinergics on his current medication list.   - Tamsulosin 0.4mg PO initiated on 3/4; as tolerated   - Avoid anticholinergic medications as able.   - TOV in 48 hours (3/7)     # Troponinemia, stable  # Lateral lead T wave inversions  # Atrial flutter  # HFrEF  # HTN  # Hx MI  Limited cardiac history available. Per patient's daughter he may have had a stent placed at 1 point but is  unsure.  Patient and daughters deny any history of cardiac arrhythmia.  However on exam today and on EKG evidence of atrial flutter, and patient also on Eliquis at least since December.  [facility], he was found to have heart failure exacerbation with 25-30 lbs fluid excess. BNP during hospitalization was 2868. His Lasix was switched to Torsemide. He has been taking his medications per his daughter.   - BNP 91938 here.   - trop 267>>218  - EKG with T wave inversions and mild ST depressions in lateral leads  - denies recent CP or SOB, no peripheral or abdominal edema  Plan:   - TTE as below.   - No report of chest pain. Troponin trended down   - cont PTA atorvastatin 20 mg nightly  - restart PTA Eliquis 5 mg BID on 3/6  - cont PTA Jardiance 12.5 mg daily  - continue PTA metoprolol 75 mg daily  - continue PTA torsemide 10 mg BID     TTE  Interpretation Summary  Left ventricular function is decreased. The ejection fraction is 40-45%  (mildly reduced).  Diastolic function not assessed due to arrhythmia.  Right ventricular function, chamber size, wall motion, and thickness are  normal.  Mild tricuspid insufficiency is present.  Mild (pulmonary artery systolic pressure<50mmHg) pulmonary hypertension is  present.  IVC diameter and respiratory changes fall into an intermediate range  suggesting an RA pressure of 8 mmHg.  Trivial pericardial effusion is present.  There is no prior study for direct comparison.     # T2DM  Not on long-term insulin. Hemoglobin A1c 6.9% as of 7/2024. Hyperglycemia when he allowed check on 3/4. He has been amenable to glucose checks and insulin this admission.   - cont PTA Jardiance 12.5 mg daily  - Repeat Hemoglobin A1c 7.9  - Initiated sliding scale insulin on 3/4 with TID AC and 0200 glucose checks.   - Carb consistent diet   - Given sliding scale insulin needs but would like to simplify this on discharge and will start basal Lantus at 5 units (half of what would  be calculated 0.2units/kg) and uptitrate based on fasting AM sugars.      # Mild normocytic anemia  # Hx hepatitis C, s/p treatment with cure   # Peripheral vascular disease  # S/p L BKA  # Glaucoma - cont PTA eyedrops  # Sickle cell trait             Diet: Snacks/Supplements Adult: Nepro Oral Supplement; With Meals  Moderate Consistent Carb (60 g CHO per Meal) Diet    DVT Prophylaxis: DOAC  Zaman Catheter: PRESENT, indication: Acute retention or obstruction  Lines: None     Cardiac Monitoring: None  Code Status: Full Code      Clinically Significant Risk Factors         # Hyponatremia: Lowest Na = 133 mmol/L in last 2 days, will monitor as appropriate  # Hypochloremia: Lowest Cl = 97 mmol/L in last 2 days, will monitor as appropriate      # Hypoalbuminemia: Lowest albumin = 3.2 g/dL at 3/6/2025 10:23 AM, will monitor as appropriate  # Coagulation Defect: INR = 1.29 (Ref range: 0.85 - 1.15) and/or PTT = N/A, will monitor for bleeding              # DMII: A1C = 7.9 % (Ref range: <5.7 %) within past 6 months       # Financial/Environmental Concerns: none         Social Drivers of Health    Tobacco Use: Medium Risk (7/26/2024)    Received from [facility]    Patient History     Smoking Tobacco Use: Former     Smokeless Tobacco Use: Never          Disposition Plan     Medically Ready for Discharge: Ready Now             Lucía Chapman DO  Hospitalist Service, GOLD TEAM 22  [facility]  Securely message with [employer] (more info)  Text page via [facility] Paging/Directory   See signed in provider for up to date coverage information  ______________________________________________________________________    Interval History   No acute events overnight. He is sleeping after eating breakfast on my evaluation. No subjective history obtained today.     Physical Exam   Vital Signs: Temp: 98.1  F (36.7  C) Temp src: Oral BP: 117/73 Pulse: 104   Resp: 18 SpO2: 98 % O2 Device: None  (Room air)    Weight: 149 lbs 7.55 oz    General: sleeping, no acute distress.   LUNGS: no increased WOB.   SKIN: Warm, well perfused. No skin rashes or abnormal lesions.  MSK: L BKA, no edema    Medical Decision Making           Data     I have personally reviewed the following data over the past 24 hrs:    N/A  \   N/A   / N/A     138 103 60.5 (H) /  259 (H)   4.8 24 1.68 (H) \     ALT: N/A AST: N/A AP: N/A TBILI: N/A   ALB: 3.4 (L) TOT PROTEIN: N/A LIPASE: N/A       Imaging results reviewed over the past 24 hrs:   No results found for this or any previous visit (from the past 24 hours).

## 2025-03-07 NOTE — PROGRESS NOTES
[facility]  Transplant Nephrology Consult Note  Date of Admission:  2/25/2025  Today's Date: 03/07/2025  Requesting physician: Basilio Martines MD    Reason for Consult:  Management of IS    Recommendations:   - Continue mycophenolate 500 mg twice a day.  - Continue tacrolimus 1.5 mg twice a day.  - I held his Torsemide with his borderline Bps and decent UOP now that we are routinely cathing him (and now that he has mg). I worry about his PO intake and that he may be trending towards volume depletion. Will adjust as needed (resume torsemide vs giving a bit of fluid back)  - Follow up Tacro level ordered for 3/7/2025  - Okay to hold Lokelma for now  - Hold calcitriol for now. Calcium still acceptable.   - Accurate I/O and daily weight  - Agree with Mg placement. Will need Urology outpatient follow up for TOV and further management.     Assessment & Plan   # DDKT: Stable from presumed baseline of 1.8 to 2 mg/dL.    - Baseline Creatinine: ~ 1.8-2 as per inpatient notes.    - Proteinuria:  Unknown   - DSA Hx: Unknown due to being transplanted at another Transplant Center   - Last cPRA: Unknown/   - BK Viremia: Not checked recently due to time from transplant   - Kidney Tx Biopsy Hx:  Unknown .    # Immunosuppression: Tacrolimus immediate release (goal 4-6) and Mycophenolate mofetil (dose 500 mg every 12 hours)   - Induction with Recent Transplant:  Not known due to time from transplant   - Continue with intensive monitoring of immunosuppression for efficacy and toxicity.   - Historical Changes in Immunosuppression: None   - Changes: No    # Infection Prevention:   Last CD4 Level: Unknown  - PJP: None      - CMV IgG Ab High Risk Discordance (D+/R-) at time of transplant: Unknown  Present CMV Serostatus: Unknown  - EBV IgG Ab High Risk Discordance (D+/R-) at time of transplant: Unknown  Present EBV Serostatus: Unknown    # Blood Pressure / volume: Borderline control;   Goal BP: > 100, but < 130 systolic   - May be trending towards hypovolemia   - Continue Toprol, Jardiance and Torsemide 10mg BID.   - Changes: Yes - Holding home Torsemide 3/5/2025 PM and 3/6/2025 AM (ordered)    # Diabetes: Borderline control (HbA1c 7-9%)  Last HbA1c: 7.9 (3/3/2025).  - Blood sugars running high 3/5/2025 AM. He does report getting some outside food jeanne in which may be playing a role. Management per primary team.     # Anemia in Chronic Renal Disease: Hgb: Stable, near normal      SIOBHAN: No   - Iron studies: Not checked recently    # Mineral Bone Disorder:    - Secondary renal hyperparathyroidism; PTH level: Not checked recently        On treatment:  Calcitriol (currently on hold for borderline hypercalcemia)  - Vitamin D; level: Not checked recently        On supplement: No  - Calcium; level: Normal        On supplement: No  - Phosphorus; level: Normal        On supplement: No    # Electrolytes:   - Potassium; level: Normal        On binder: No  - Magnesium; level: Not checked recently        On supplement: No  - Bicarbonate; level: Normal        On supplement: No    # AMS: [facility]'s [facility]. MRI brain without acute findings (although degraded by motion artifact). Planning for LP on 3/5/2025.    # Other Significant PMH:   - Atrial flutter: On Eliquis.   - HFrEF: presumed ischemic given past maricel pre-transplant with area of ischemia and possible history of stent. On Toprol, jardiance and torsemide. With new drop in LVEF to ~ 45%, he may need further work-up.    - CAD: Possible history of stent.    - PVD s/p left AKA   - Sickle cell trait   - History of hepatitis C.    - Urinary Retention: Agree with addition of Tamsulosin 0.4mg qDay. Agree with mg. Needs Urology for TOV and outpatient management.    # Transplant History:  Etiology of Kidney Failure: Hypertension and diabetes.  Tx: DDKT  Transplant: N/A  Significant transplant-related complications:  "None    Weston Hill MD  Transplant Nephrology  Contact information via Vocera Web Console or via [facility] Paging/Directory    Interval History:  -Patient resting comfortably at the time of my eval.   -Patient continues on Tacro 1.5mg BID and MMF 500mg BID  -LP 3/5/2025. Negative.  -I's and O's w/ 3L UOP (mg). Holding Torsemide 10mg BID. Doesn't look to need volume back at the moment (weight is up and looks euvolemic on exam)  -Transplant US w/ Mild Austin. Now s/p mg placement  -Weight (bed scale): 67.8kg 3/7/2025 from 66.3kg (3/6/2025)    History of Present Illness  From Dr See Piper Note 2/25/2025:   \"History obtained from daughter at bedside.  Mr. Zaman is a 64-year-old gentleman with end-stage kidney disease from presumed hypertension and diabetes status post DDKT in September 2022 [facility].    Daughter reports that in December he was independent.  He cooked dinner for [employer], was able to shovel the snow in the drive way, manage his finances and drive.  The only noticeable change was a decrease in weight compared to prior months.  Afterwards, there was a progressive decline with weeks where he was unable to take care of himself for which she was taken to the ER multiple times in mid January.  Additionally, he presented with worsening fluid retention, with possible increase of 10-20 pounds.  As per daughter, [facility], he [facility] after which she developed an MOY, that resolved after decreasing the dose of diuretics.  He was also treated for a transplant UTI, finishing antibiotics this Monday.    In regard to his kidney transplant, she reports no past history of rejection or issues with viral infections.  She does not know patient's baseline creatinine, but as per notes it ranges from 1.8-2 mg/dL.  I am unable to open the [facility] notes to confirm this.\"    MEDICATIONS:  Current Facility-Administered Medications   Medication Dose Route Frequency " Provider Last Rate Last Admin    apixaban ANTICOAGULANT (ELIQUIS) tablet 5 mg  5 mg Oral BID Lucía Chapman DO   5 mg at 03/07/25 0802    atorvastatin (LIPITOR) tablet 20 mg  20 mg Oral QPM Becca Nino PA-C   20 mg at 03/06/25 2047    [Held by provider] calcitRIOL (ROCALTROL) capsule 0.25 mcg  0.25 mcg Oral Daily Becca Nino PA-C   0.25 mcg at 02/26/25 0822    empagliflozin (JARDIANCE) tablet 10 mg  10 mg Oral Daily Basilio Martines MD   10 mg at 03/07/25 0802    insulin aspart (NovoLOG) injection (RAPID ACTING)  1-7 Units Subcutaneous TID AC Hayder Dubose APRN CNP   3 Units at 03/07/25 0759    insulin aspart (NovoLOG) injection (RAPID ACTING)  1-5 Units Subcutaneous At Bedtime Hayder Dubose APRN CNP   2 Units at 03/06/25 2257    latanoprost (XALATAN) 0.005 % ophthalmic solution 1 drop  1 drop Both Eyes At Bedtime Foster Cruz MD   1 drop at 03/06/25 2046    Lidocaine (LIDOCARE) 4 % Patch 1 patch  1 patch Transdermal Q24h Becca Nino PA-C   1 patch at 03/06/25 2046    metoprolol succinate ER (TOPROL XL) 24 hr tablet 75 mg  75 mg Oral Daily Becca Nino PA-C   75 mg at 03/07/25 0801    mycophenolate (GENERIC EQUIVALENT) tablet 500 mg  500 mg Oral BID IS Becca Nino PA-C   500 mg at 03/07/25 0802    pantoprazole (PROTONIX) EC tablet 40 mg  40 mg Oral QAM AC Foster Cruz MD   40 mg at 03/07/25 0802    sodium bicarbonate tablet 1,300 mg  1,300 mg Oral BID Becca Nino PA-C   1,300 mg at 03/07/25 0802    sodium chloride (PF) 0.9% PF flush 3 mL  3 mL Intracatheter Q8H Becca Nino PA-C   3 mL at 03/07/25 0805    tacrolimus (GENERIC EQUIVALENT) capsule 1.5 mg  1.5 mg Oral BID IS Becca Nino PA-C   1.5 mg at 03/06/25 2046    tamsulosin (FLOMAX) capsule 0.4 mg  0.4 mg Oral Daily Lucía Chapman DO   0.4 mg at 03/07/25 0802    [Held by provider] torsemide (DEMADEX) tablet 10 mg  10 mg Oral BID Foster Cruz MD   10 mg at 03/05/25 0928     Current Facility-Administered Medications    Medication Dose Route Frequency Provider Last Rate Last Admin    Patient is already receiving anticoagulation with heparin, enoxaparin (LOVENOX), warfarin (COUMADIN)  or other anticoagulant medication   Does not apply Continuous PRN Becca Nino PA-C           Physical Exam   Temp  Av.2  F (36.8  C)  Min: 97.8  F (36.6  C)  Max: 98.5  F (36.9  C)      Pulse  Av.2  Min: 81  Max: 95 Resp  Av.7  Min: 16  Max: 18  SpO2  Av.3 %  Min: 96 %  Max: 99 %     /73 (BP Location: Left arm)   Pulse 104   Temp 98.1  F (36.7  C) (Oral)   Resp 18   Ht 1.829 m (6')   Wt 67.8 kg (149 lb 7.6 oz)   SpO2 98%   BMI 20.27 kg/m     Date 25 0700 - 25 0659   Shift 0769-6015 3828-3399 6579-8682 24 Hour Total   INTAKE   P.O. 480   480   Shift Total(mL/kg) 480(7.79)   480(7.79)   OUTPUT   Shift Total(mL/kg)       Weight (kg) 61.6 61.6 61.6 61.6      Admit Weight: 61.6 kg (135 lb 12.9 oz)     GENERAL APPEARANCE: alert and interactive. Sitting in bed.   EDEMA: no LE edema  ABDOMEN: soft, nondistended, nontender, bowel sounds normal  MS: LEFT AKA.   Vascular: faint radial pulses. Bilateral femoral bruit.  SKIN: no rash  TX KIDNEY: with audible bruit.  DIALYSIS ACCESS:  RUE AV fistula with bruit and thrill.    Data   All labs reviewed by me.  CMP  Recent Labs   Lab 25  0744 25  0711 25  0226 25  2205 25  1241 25  1023 25  0925 25  0636 25  1837 25  0540   NA  --  138  --   --   --  133*  --  135  --  137   POTASSIUM  --  4.8  --   --   --  4.9  --  4.9  --  5.1   CHLORIDE  --  103  --   --   --  97*  --  99  --  103   CO2  --  24  --   --   --  23  --  25  --  23   ANIONGAP  --  11  --   --   --  13  --  11  --  11   * 248* 281* 269*   < > 238*   < > 330*   < > 296*   BUN  --  60.5*  --   --   --  63.7*  --  63.4*  --  64.8*   CR  --  1.68*  --   --   --  1.78*  --  2.00*  --  1.82*   GFRESTIMATED  --  45*  --   --   --  42*  --  36*  --   40*   QUE  --  9.6  --   --   --  9.7  --  9.9  --  10.0   PHOS  --  2.5  --   --   --  2.6  --  2.5  --  3.2   ALBUMIN  --  3.4*  --   --   --  3.2*  --  3.4*  --  3.4*    < > = values in this interval not displayed.     CBC  Recent Labs   Lab 03/05/25  0636 03/03/25  0939 02/28/25  1242   HGB 9.7* 11.0* 10.9*   WBC 7.4 8.7 10.6   RBC 3.75* 4.24* 4.14*   HCT 30.9* 34.9* 34.3*   MCV 82 82 83   MCH 25.9* 25.9* 26.3*   MCHC 31.4* 31.5 31.8   RDW 14.1 14.4 14.3    292 282     INR  Recent Labs   Lab 03/05/25  0636   INR 1.29*     ABGNo lab results found in last 7 days.   Urine Studies  Recent Labs   Lab Test 03/03/25  1508   COLOR Light Yellow   APPEARANCE Clear   URINEGLC >=1000*   URINEBILI Negative   URINEKETONE Negative   SG 1.016   UBLD Negative   URINEPH 6.5   PROTEIN Negative   NITRITE Negative   LEUKEST Trace*   RBCU 3*   WBCU 8*     No lab results found.  PTH  No lab results found.  Iron Studies  No lab results found.    Past Medical History    Past Medical History:   Diagnosis Date    Chronic kidney disease     Diabetes (H)     Hypertension     Myocardial infarction (H)          Prior to Admission Medications   Medications Prior to Admission   Medication Sig Dispense Refill Last Dose/Taking    apixaban ANTICOAGULANT (ELIQUIS) 5 MG tablet Take 5 mg by mouth 2 times daily.   2/25/2025 Morning    atorvastatin (LIPITOR) 20 MG tablet Take 1 tablet by mouth at bedtime.   2/25/2025 Morning    brinzolamide-brimonidine (SIMBRINZA) 1-0.2 % ophthalmic suspension Apply 1 drop to eye 2 times daily.   Taking    calcitRIOL (ROCALTROL) 0.25 MCG capsule Take 0.25 mcg by mouth. TAKE ONE CAPSULE BY MOUTH MONDAY THROUGH FRIDAY FOR BONE HEALTH ** DOSE INCREASE   Taking    cefdinir (OMNICEF) 300 MG capsule Take 300 mg by mouth 2 times daily.   Taking    empagliflozin (JARDIANCE) 25 MG TABS tablet Take 12.5 mg by mouth every morning. TAKE ONE-HALF TABLET BY MOUTH EVERY MORNING FOR DIABETES AND KIDNEY PROTECTION   Taking     Latanoprost PF 0.005 % SOLN Apply to eye at bedtime. INSTILL 1 DROP IN BOTH EYES AT BEDTIME FOR GLAUCOMA   Taking    metoprolol succinate ER (TOPROL XL) 25 MG 24 hr tablet Take 75 mg by mouth daily. TAKE THREE TABLETS BY MOUTH EVERY DAY FOR HEART RATE   2/25/2025 Morning    mycophenolate (GENERIC EQUIVALENT) 500 MG tablet Take 500 mg by mouth 2 times daily.   Taking    pantoprazole (PROTONIX) 40 MG EC tablet Take 40 mg by mouth daily.  TAKE ONE TABLET BY MOUTH EVERY DAY PREVENT GI BLEED   Taking    patiromer (VELTASSA) 8.4 g packet Take 8.4 g by mouth once a week. Take 1 packet (8.4 grams) by mouth once weekly for high potassium   Taking    sodium bicarbonate 650 MG tablet Take 1,300 mg by mouth 2 times daily.   Taking    tacrolimus (GENERIC EQUIVALENT) 0.5 MG capsule Take 1.5 mg by mouth 2 times daily. Take three capsules by mouth twice a day for kidney transplant   Taking    torsemide (DEMADEX) 10 MG tablet Take 10 mg by mouth 2 times daily.   2/25/2025 Morning

## 2025-03-07 NOTE — PLAN OF CARE
"Goal Outcome Evaluation:         VS: /78 (BP Location: Left arm)   Pulse 73   Temp 98.9  F (37.2  C) (Oral)   Resp 18   Ht 1.829 m (6')   Wt 66.3 kg (146 lb 2.6 oz)   SpO2 95%   BMI 19.82 kg/m       O2: on RA, denies SOB/Chest pain   Output: Zaman in place and draining adequately   Last BM: 3/6, bowel sounds normoactive x4   Activity: Ax1-2 when OOB. Not OOB during shift. Check and changes x1        Skin: All visible skin intact   Pain: Denies pain   CMS: A&O x 3, disoriented to situation  Intermittent confusion    Dressing: Lumbar puncture site  covered with band aid   Diet: Regular diet , needs set up and cues    LDA: L PIV-SL   Equipment:  personal belongings   Plan: Call light within reach, bed in a low position. Bed alarm on    Additional Info:                         Problem: Adult Inpatient Plan of Care  Goal: Absence of Hospital-Acquired Illness or Injury  Intervention: Prevent Infection  Recent Flowsheet Documentation  Taken 3/7/2025 0213 by Du Valentino RN  Infection Prevention: hand hygiene promoted  Goal: Optimal Comfort and Wellbeing  Intervention: Provide Person-Centered Care  Recent Flowsheet Documentation  Taken 3/7/2025 0213 by Du Valentino RN  Trust Relationship/Rapport:   care explained   choices provided   emotional support provided   empathic listening provided     Problem: Adult Inpatient Plan of Care  Goal: Plan of Care Review  Description: The Plan of Care Review/Shift note should be completed every shift.  The Outcome Evaluation is a brief statement about your assessment that the patient is improving, declining, or no change.  This information will be displayed automatically on your shift  note.  Outcome: Progressing  Goal: Patient-Specific Goal (Individualized)  Description: You can add care plan individualizations to a care plan. Examples of Individualization might be:  \"Parent requests to be called daily at 9am for status\", \"I have a hard time hearing out of my right ear\", " "or \"Do not touch me to wake me up as it startles  me\".  Outcome: Progressing  Goal: Absence of Hospital-Acquired Illness or Injury  Outcome: Progressing  Intervention: Prevent Infection  Recent Flowsheet Documentation  Taken 3/7/2025 0213 by Du Valentino RN  Infection Prevention: hand hygiene promoted  Goal: Optimal Comfort and Wellbeing  Outcome: Progressing  Intervention: Provide Person-Centered Care  Recent Flowsheet Documentation  Taken 3/7/2025 0213 by Du Valentino RN  Trust Relationship/Rapport:   care explained   choices provided   emotional support provided   empathic listening provided  Goal: Readiness for Transition of Care  Outcome: Progressing     Problem: Delirium  Goal: Optimal Coping  Outcome: Progressing  Intervention: Optimize Psychosocial Adjustment to Delirium  Recent Flowsheet Documentation  Taken 3/7/2025 0213 by Du Valentino RN  Family/Support System Care: self-care encouraged  Goal: Improved Behavioral Control  Outcome: Progressing  Intervention: Minimize Safety Risk  Recent Flowsheet Documentation  Taken 3/7/2025 0213 by Du Valentino RN  Trust Relationship/Rapport:   care explained   choices provided   emotional support provided   empathic listening provided  Goal: Improved Attention and Thought Clarity  Outcome: Progressing  Goal: Improved Sleep  Outcome: Progressing     Problem: Delirium  Goal: Optimal Coping  Intervention: Optimize Psychosocial Adjustment to Delirium  Recent Flowsheet Documentation  Taken 3/7/2025 0213 by Du Valentino RN  Family/Support System Care: self-care encouraged  Goal: Improved Behavioral Control  Intervention: Minimize Safety Risk  Recent Flowsheet Documentation  Taken 3/7/2025 0213 by Du Valentino RN  Trust Relationship/Rapport:   care explained   choices provided   emotional support provided   empathic listening provided     Problem: Fall Injury Risk  Goal: Absence of Fall and Fall-Related Injury  Outcome: Progressing     "

## 2025-03-07 NOTE — PLAN OF CARE
Goal Outcome Evaluation:     Plan of Care Reviewed With: patient     Overall Patient Progress: improving    Shift: 7988-5020    VS: /73 (BP Location: Left arm)   Pulse 104   Temp 98.1  F (36.7  C) (Oral)   Resp 18   Ht 1.829 m (6')   Wt 67.8 kg (149 lb 7.6 oz)   SpO2 98%   BMI 20.27 kg/m       Patient A&Ox3; intermittent confusion. No episodes of aggression throughout shift. On RA. Denies sob/chest pain. Ax1 with walker/gait belt. Zaman cath in place with good output. LBM 3/7. No acute concerns this shift. Call light within reach; able to make needs known. Plan of care ongoing

## 2025-03-07 NOTE — PROGRESS NOTES
Brief Care Management Note:    Per provider rounds, pt medically stable enough for discharge home with 24/7 supervision. Writer spoke with pt's daughter, Jarrod, who had questions regarding outcome of tests. Provider notified. Jarrod also expressed concern that her father will deteriorate at home and end up right back in the hospital. Writer discussed home care and what they can offer as well as applying for Waiver services and [facility] to get [facility] additional supports. Information on how to apply for Elderly Waiver and Senior Linkage Line provided. Jarrod stated family was providing 24/7 assist (for 3-4 days) between this hospitalization and hospitalization early February 2025. Care management will continue to follow.     Next Steps:      PADMINI for home care  Discharge Note  EHO  IMM     Contacts     [facility]  [address]  [address]  [facility] [address]  P: [phone]  P: [phone]  F: [phone]   PTA Services: RN/PT/OT/HHA/HM      Payal Covington RN   Nurse Coordinator    6 Med/Surg  Phone: [phone]    Social Work and Care Management Department     SEARCHABLE in [facility] - search CARE COORDINATOR   [facility] ([phone]) Saturday & Sunday; ([phone]) [facility] Recognized Holidays   Units: 6 Med Surg Vocera & 8 Med Surg Vocera Pager [phone]

## 2025-03-08 ENCOUNTER — APPOINTMENT (OUTPATIENT)
Dept: PHYSICAL THERAPY | Facility: CLINIC | Age: 65
DRG: 981 | End: 2025-03-08
Payer: MEDICARE

## 2025-03-08 LAB
ALBUMIN SERPL BCG-MCNC: 3.3 G/DL (ref 3.5–5.2)
ANION GAP SERPL CALCULATED.3IONS-SCNC: 9 MMOL/L (ref 7–15)
BUN SERPL-MCNC: 55.9 MG/DL (ref 8–23)
CALCIUM SERPL-MCNC: 9.8 MG/DL (ref 8.8–10.4)
CHLORIDE SERPL-SCNC: 104 MMOL/L (ref 98–107)
CREAT SERPL-MCNC: 1.6 MG/DL (ref 0.67–1.17)
EGFRCR SERPLBLD CKD-EPI 2021: 47 ML/MIN/1.73M2
GLUCOSE BLDC GLUCOMTR-MCNC: 204 MG/DL (ref 70–99)
GLUCOSE BLDC GLUCOMTR-MCNC: 234 MG/DL (ref 70–99)
GLUCOSE BLDC GLUCOMTR-MCNC: 283 MG/DL (ref 70–99)
GLUCOSE BLDC GLUCOMTR-MCNC: 310 MG/DL (ref 70–99)
GLUCOSE BLDC GLUCOMTR-MCNC: 312 MG/DL (ref 70–99)
GLUCOSE SERPL-MCNC: 222 MG/DL (ref 70–99)
HCO3 SERPL-SCNC: 24 MMOL/L (ref 22–29)
PHOSPHATE SERPL-MCNC: 2.5 MG/DL (ref 2.5–4.5)
POTASSIUM SERPL-SCNC: 4.5 MMOL/L (ref 3.4–5.3)
SODIUM SERPL-SCNC: 137 MMOL/L (ref 135–145)
TACROLIMUS BLD-MCNC: 4.4 UG/L (ref 5–15)
TME LAST DOSE: ABNORMAL H
TME LAST DOSE: ABNORMAL H

## 2025-03-08 PROCEDURE — 250N000012 HC RX MED GY IP 250 OP 636 PS 637: Performed by: PHYSICIAN ASSISTANT

## 2025-03-08 PROCEDURE — 82040 ASSAY OF SERUM ALBUMIN: CPT | Performed by: STUDENT IN AN ORGANIZED HEALTH CARE EDUCATION/TRAINING PROGRAM

## 2025-03-08 PROCEDURE — 250N000013 HC RX MED GY IP 250 OP 250 PS 637: Performed by: STUDENT IN AN ORGANIZED HEALTH CARE EDUCATION/TRAINING PROGRAM

## 2025-03-08 PROCEDURE — 99232 SBSQ HOSP IP/OBS MODERATE 35: CPT | Performed by: STUDENT IN AN ORGANIZED HEALTH CARE EDUCATION/TRAINING PROGRAM

## 2025-03-08 PROCEDURE — 250N000013 HC RX MED GY IP 250 OP 250 PS 637: Performed by: PHYSICIAN ASSISTANT

## 2025-03-08 PROCEDURE — 120N000002 HC R&B MED SURG/OB UMMC

## 2025-03-08 PROCEDURE — 97530 THERAPEUTIC ACTIVITIES: CPT | Mod: GP

## 2025-03-08 PROCEDURE — 36415 COLL VENOUS BLD VENIPUNCTURE: CPT | Performed by: STUDENT IN AN ORGANIZED HEALTH CARE EDUCATION/TRAINING PROGRAM

## 2025-03-08 PROCEDURE — 80197 ASSAY OF TACROLIMUS: CPT | Performed by: STUDENT IN AN ORGANIZED HEALTH CARE EDUCATION/TRAINING PROGRAM

## 2025-03-08 PROCEDURE — 99233 SBSQ HOSP IP/OBS HIGH 50: CPT | Performed by: INTERNAL MEDICINE

## 2025-03-08 PROCEDURE — 250N000013 HC RX MED GY IP 250 OP 250 PS 637: Performed by: INTERNAL MEDICINE

## 2025-03-08 RX ADMIN — LIDOCAINE 4% 1 PATCH: 40 PATCH TOPICAL at 21:27

## 2025-03-08 RX ADMIN — TACROLIMUS 1.5 MG: 1 CAPSULE ORAL at 21:28

## 2025-03-08 RX ADMIN — ATORVASTATIN CALCIUM 20 MG: 20 TABLET, FILM COATED ORAL at 21:28

## 2025-03-08 RX ADMIN — TAMSULOSIN HYDROCHLORIDE 0.4 MG: 0.4 CAPSULE ORAL at 07:33

## 2025-03-08 RX ADMIN — PANTOPRAZOLE SODIUM 40 MG: 40 TABLET, DELAYED RELEASE ORAL at 07:32

## 2025-03-08 RX ADMIN — TACROLIMUS 1.5 MG: 1 CAPSULE ORAL at 07:32

## 2025-03-08 RX ADMIN — APIXABAN 5 MG: 5 TABLET, FILM COATED ORAL at 07:32

## 2025-03-08 RX ADMIN — EMPAGLIFLOZIN 10 MG: 10 TABLET, FILM COATED ORAL at 07:32

## 2025-03-08 RX ADMIN — SODIUM BICARBONATE 650 MG TABLET 1300 MG: at 07:32

## 2025-03-08 RX ADMIN — INSULIN ASPART 4 UNITS: 100 INJECTION, SOLUTION INTRAVENOUS; SUBCUTANEOUS at 16:39

## 2025-03-08 RX ADMIN — INSULIN ASPART 2 UNITS: 100 INJECTION, SOLUTION INTRAVENOUS; SUBCUTANEOUS at 06:37

## 2025-03-08 RX ADMIN — MYCOPHENOLATE MOFETIL 500 MG: 500 TABLET, FILM COATED ORAL at 21:28

## 2025-03-08 RX ADMIN — INSULIN ASPART 4 UNITS: 100 INJECTION, SOLUTION INTRAVENOUS; SUBCUTANEOUS at 12:02

## 2025-03-08 RX ADMIN — LATANOPROST 1 DROP: 50 SOLUTION OPHTHALMIC at 21:27

## 2025-03-08 RX ADMIN — SODIUM BICARBONATE 650 MG TABLET 1300 MG: at 21:28

## 2025-03-08 RX ADMIN — MYCOPHENOLATE MOFETIL 500 MG: 500 TABLET, FILM COATED ORAL at 07:32

## 2025-03-08 RX ADMIN — METOPROLOL SUCCINATE 75 MG: 25 TABLET, EXTENDED RELEASE ORAL at 07:32

## 2025-03-08 RX ADMIN — INSULIN GLARGINE 5 UNITS: 100 INJECTION, SOLUTION SUBCUTANEOUS at 18:14

## 2025-03-08 RX ADMIN — APIXABAN 5 MG: 5 TABLET, FILM COATED ORAL at 21:28

## 2025-03-08 ASSESSMENT — ACTIVITIES OF DAILY LIVING (ADL)
ADLS_ACUITY_SCORE: 72

## 2025-03-08 NOTE — PROGRESS NOTES
[facility]  Transplant Nephrology Consult Note  Date of Admission:  2/25/2025  Today's Date: 03/08/2025  Requesting physician: Basilio Martines MD    Reason for Consult:  Management of IS    Recommendations:   - Continue mycophenolate 500 mg twice a day.  -Continue to hold torsemide, calcitriol and lokelma  - Continue tacrolimus 1.5 mg twice a day.  -Tacro level on 3/7/2025 is 4.4  - Accurate I/O and daily weight  - Will need Urology outpatient follow up for TOV and further management.     Assessment & Plan   # DDKT: Stable from presumed baseline of 1.8 to 2 mg/dL.    - Baseline Creatinine: ~ 1.8-2 as per inpatient notes.    - Proteinuria:  Unknown   - DSA Hx: Unknown due to being transplanted at another Transplant Center   - Last cPRA: Unknown/   - BK Viremia: Not checked recently due to time from transplant   - Kidney Tx Biopsy Hx:  Unknown .    # Immunosuppression: Tacrolimus immediate release (goal 4-6) and Mycophenolate mofetil (dose 500 mg every 12 hours)   - Induction with Recent Transplant:  Not known due to time from transplant   - Continue with intensive monitoring of immunosuppression for efficacy and toxicity.   - Historical Changes in Immunosuppression: None   - Changes: No    # Infection Prevention:   Last CD4 Level: Unknown  - PJP: None      - CMV IgG Ab High Risk Discordance (D+/R-) at time of transplant: Unknown  Present CMV Serostatus: Unknown  - EBV IgG Ab High Risk Discordance (D+/R-) at time of transplant: Unknown  Present EBV Serostatus: Unknown    # Blood Pressure / volume: Borderline control;  Goal BP: > 100, but < 130 systolic   - May be trending towards hypovolemia   - Continue Toprol, Jardiance    - Changes: Yes - Holding home Torsemide 3/5/2025 PM and 3/6/2025 AM (ordered)    # Diabetes: Borderline control (HbA1c 7-9%)  Last HbA1c: 7.9 (3/3/2025).  - Blood sugars running high   He does report getting some outside food jeanne in which may  be playing a role. Management per primary team.     # Anemia in Chronic Renal Disease: Hgb: Stable, near normal      SIOBHAN: No   - Iron studies: Not checked recently    # Mineral Bone Disorder:    - Secondary renal hyperparathyroidism; PTH level: Not checked recently        On treatment:  Calcitriol (currently on hold for borderline hypercalcemia)  - Vitamin D; level: Not checked recently        On supplement: No  - Calcium; level: Normal        On supplement: No  - Phosphorus; level: Normal        On supplement: No    # Electrolytes:   - Potassium; level: Normal        On binder: No  - Magnesium; level: Not checked recently        On supplement: No  - Bicarbonate; level: Normal        On supplement: No    # AMS: [facility]'s [facility]. MRI brain without acute findings (although degraded by motion artifact). Planning for LP on 3/5/2025.    # Other Significant PMH:   - Atrial flutter: On Eliquis.   - HFrEF: presumed ischemic given past maricel pre-transplant with area of ischemia and possible history of stent. On Toprol, jardiance and torsemide. With new drop in LVEF to ~ 45%, he may need further work-up.    - CAD: Possible history of stent.    - PVD s/p left AKA   - Sickle cell trait   - History of hepatitis C.    - Urinary Retention: - Tamsulosin 0.4mg PO initiated on 3/4; trial of mg removal today with low threshold to replace  # Transplant History:  Etiology of Kidney Failure: Hypertension and diabetes.  Tx: DDKT  Transplant: N/A  Significant transplant-related complications: None    Leidy Buenrostro MD  Contact information via [employer] Web Console or via [facility] Paging/Directory    Interval History:  -Patient resting comfortably at the time of my eval.   -Patient continues on Tacro 1.5mg BID and MMF 500mg BID  -LP 3/5/2025. Negative.  -Transplant US w/ Mild Coram. Now s/p mg placement with improved kidney function    History of Present Illness  From Dr See Piper Note 2/25/2025:  "  \"History obtained from daughter at bedside.  Mr. Zaman is a 64-year-old gentleman with end-stage kidney disease from presumed hypertension and diabetes status post DDKT in September 2022 who [facility].    Daughter reports that in December he was independent.  He cooked dinner for [place], was able to shovel the snow in the drive way, manage his finances and drive.  The only noticeable change was a decrease in weight compared to prior months.  Afterwards, there was a progressive decline with weeks where he was unable to take care of himself for which she was taken to the ER multiple times in mid January.  Additionally, he presented with worsening fluid retention, with possible increase of 10-20 pounds.  As per daughter, [facility], he was [facility] di[facility] after which she developed an MOY, that resolved after decreasing the dose of diuretics.  He was also treated for a transplant UTI, finishing antibiotics this Monday.    In regard to his kidney transplant, she reports no past history of rejection or issues with viral infections.  She does not know patient's baseline creatinine, but as per notes it ranges from 1.8-2 mg/dL.  I am unable to open the [facility] notes to confirm this.\"    MEDICATIONS:  Current Facility-Administered Medications   Medication Dose Route Frequency Provider Last Rate Last Admin    apixaban ANTICOAGULANT (ELIQUIS) tablet 5 mg  5 mg Oral BID Lucía Chapman DO   5 mg at 03/08/25 0732    atorvastatin (LIPITOR) tablet 20 mg  20 mg Oral QPM Becca Nino PA-C   20 mg at 03/07/25 2136    [Held by provider] calcitRIOL (ROCALTROL) capsule 0.25 mcg  0.25 mcg Oral Daily Becca Nino PA-C   0.25 mcg at 02/26/25 0822    empagliflozin (JARDIANCE) tablet 10 mg  10 mg Oral Daily Basilio Martines MD   10 mg at 03/08/25 0732    insulin aspart (NovoLOG) injection (RAPID ACTING)  1-7 Units Subcutaneous TID Hayder Xavier APRN CNP   2 Units at 03/08/25 0637    " insulin aspart (NovoLOG) injection (RAPID ACTING)  1-5 Units Subcutaneous At Bedtime GracyHayder harvey Mario, APRN CNP   3 Units at 25    insulin glargine (LANTUS PEN) injection 5 Units  5 Units Subcutaneous At Bedtime Lucía Chapman DO   5 Units at 25    latanoprost (XALATAN) 0.005 % ophthalmic solution 1 drop  1 drop Both Eyes At Bedtime Foster Cruz MD   1 drop at 25    Lidocaine (LIDOCARE) 4 % Patch 1 patch  1 patch Transdermal Q24h Becca Nino PA-C   1 patch at 25    metoprolol succinate ER (TOPROL XL) 24 hr tablet 75 mg  75 mg Oral Daily Becca Nino PA-C   75 mg at 25 0732    mycophenolate (GENERIC EQUIVALENT) tablet 500 mg  500 mg Oral BID IS Becca Nino PA-C   500 mg at 25 0732    pantoprazole (PROTONIX) EC tablet 40 mg  40 mg Oral QAM AC Foster Cruz MD   40 mg at 25 0732    sodium bicarbonate tablet 1,300 mg  1,300 mg Oral BID Becca Nino PA-C   1,300 mg at 25 0732    sodium chloride (PF) 0.9% PF flush 3 mL  3 mL Intracatheter Q8H Becca Nino PA-C   3 mL at 25 0734    tacrolimus (GENERIC EQUIVALENT) capsule 1.5 mg  1.5 mg Oral BID IS Becca Nino PA-C   1.5 mg at 25 0732    tamsulosin (FLOMAX) capsule 0.4 mg  0.4 mg Oral Daily Lucía Chapman DO   0.4 mg at 25 0733    [Held by provider] torsemide (DEMADEX) tablet 10 mg  10 mg Oral BID Foster Cruz MD   10 mg at 25 0928     Current Facility-Administered Medications   Medication Dose Route Frequency Provider Last Rate Last Admin    Patient is already receiving anticoagulation with heparin, enoxaparin (LOVENOX), warfarin (COUMADIN)  or other anticoagulant medication   Does not apply Continuous PRN Nino, Becca C, PA-C           Physical Exam   Temp  Av.2  F (36.8  C)  Min: 97.8  F (36.6  C)  Max: 98.5  F (36.9  C)      Pulse  Av.2  Min: 81  Max: 95 Resp  Av.7  Min: 16  Max: 18  SpO2  Av.3 %  Min: 96 %  Max: 99 %     /76   Pulse 73    Temp 98.2  F (36.8  C) (Oral)   Resp 16   Ht 1.829 m (6')   Wt 67.8 kg (149 lb 7.6 oz)   SpO2 97%   BMI 20.27 kg/m     Date 02/26/25 0700 - 02/27/25 0659   Shift 3621-3307 0416-0212 7298-2714 24 Hour Total   INTAKE   P.O. 480   480   Shift Total(mL/kg) 480(7.79)   480(7.79)   OUTPUT   Shift Total(mL/kg)       Weight (kg) 61.6 61.6 61.6 61.6      Admit Weight: 61.6 kg (135 lb 12.9 oz)     GENERAL APPEARANCE: alert and interactive. Sitting in bed.   EDEMA: no LE edema  ABDOMEN: soft, nondistended, nontender, bowel sounds normal  MS: LEFT AKA.   Vascular: faint radial pulses. Bilateral femoral bruit.  SKIN: no rash  TX KIDNEY: with audible bruit.  DIALYSIS ACCESS:  RUE AV fistula with bruit and thrill.    Data   All labs reviewed by me.  CMP  Recent Labs   Lab 03/08/25  0653 03/08/25  0637 03/08/25  0157 03/07/25  2135 03/07/25  0744 03/07/25  0711 03/06/25  1241 03/06/25  1023 03/05/25  0925 03/05/25  0636     --   --   --   --  138  --  133*  --  135   POTASSIUM 4.5  --   --   --   --  4.8  --  4.9  --  4.9   CHLORIDE 104  --   --   --   --  103  --  97*  --  99   CO2 24  --   --   --   --  24  --  23  --  25   ANIONGAP 9  --   --   --   --  11  --  13  --  11   * 204* 234* 303*   < > 248*   < > 238*   < > 330*   BUN 55.9*  --   --   --   --  60.5*  --  63.7*  --  63.4*   CR 1.60*  --   --   --   --  1.68*  --  1.78*  --  2.00*   GFRESTIMATED 47*  --   --   --   --  45*  --  42*  --  36*   QUE 9.8  --   --   --   --  9.6  --  9.7  --  9.9   PHOS 2.5  --   --   --   --  2.5  --  2.6  --  2.5   ALBUMIN 3.3*  --   --   --   --  3.4*  --  3.2*  --  3.4*    < > = values in this interval not displayed.     CBC  Recent Labs   Lab 03/05/25  0636 03/03/25  0939   HGB 9.7* 11.0*   WBC 7.4 8.7   RBC 3.75* 4.24*   HCT 30.9* 34.9*   MCV 82 82   MCH 25.9* 25.9*   MCHC 31.4* 31.5   RDW 14.1 14.4    292     INR  Recent Labs   Lab 03/05/25  0636   INR 1.29*     ABGNo lab results found in last 7 days.    Urine Studies  Recent Labs   Lab Test 03/03/25  1508   COLOR Light Yellow   APPEARANCE Clear   URINEGLC >=1000*   URINEBILI Negative   URINEKETONE Negative   SG 1.016   UBLD Negative   URINEPH 6.5   PROTEIN Negative   NITRITE Negative   LEUKEST Trace*   RBCU 3*   WBCU 8*     No lab results found.  PTH  No lab results found.  Iron Studies  No lab results found.    Past Medical History    Past Medical History:   Diagnosis Date    Chronic kidney disease     Diabetes (H)     Hypertension     Myocardial infarction (H)          Prior to Admission Medications   Medications Prior to Admission   Medication Sig Dispense Refill Last Dose/Taking    apixaban ANTICOAGULANT (ELIQUIS) 5 MG tablet Take 5 mg by mouth 2 times daily.   2/25/2025 Morning    atorvastatin (LIPITOR) 20 MG tablet Take 1 tablet by mouth at bedtime.   2/25/2025 Morning    brinzolamide-brimonidine (SIMBRINZA) 1-0.2 % ophthalmic suspension Apply 1 drop to eye 2 times daily.   Taking    calcitRIOL (ROCALTROL) 0.25 MCG capsule Take 0.25 mcg by mouth. TAKE ONE CAPSULE BY MOUTH MONDAY THROUGH FRIDAY FOR BONE HEALTH ** DOSE INCREASE   Taking    cefdinir (OMNICEF) 300 MG capsule Take 300 mg by mouth 2 times daily.   Taking    empagliflozin (JARDIANCE) 25 MG TABS tablet Take 12.5 mg by mouth every morning. TAKE ONE-HALF TABLET BY MOUTH EVERY MORNING FOR DIABETES AND KIDNEY PROTECTION   Taking    Latanoprost PF 0.005 % SOLN Apply to eye at bedtime. INSTILL 1 DROP IN BOTH EYES AT BEDTIME FOR GLAUCOMA   Taking    metoprolol succinate ER (TOPROL XL) 25 MG 24 hr tablet Take 75 mg by mouth daily. TAKE THREE TABLETS BY MOUTH EVERY DAY FOR HEART RATE   2/25/2025 Morning    mycophenolate (GENERIC EQUIVALENT) 500 MG tablet Take 500 mg by mouth 2 times daily.   Taking    pantoprazole (PROTONIX) 40 MG EC tablet Take 40 mg by mouth daily.  TAKE ONE TABLET BY MOUTH EVERY DAY PREVENT GI BLEED   Taking    patiromer (VELTASSA) 8.4 g packet Take 8.4 g by mouth once a week. Take 1  packet (8.4 grams) by mouth once weekly for high potassium   Taking    sodium bicarbonate 650 MG tablet Take 1,300 mg by mouth 2 times daily.   Taking    tacrolimus (GENERIC EQUIVALENT) 0.5 MG capsule Take 1.5 mg by mouth 2 times daily. Take three capsules by mouth twice a day for kidney transplant   Taking    torsemide (DEMADEX) 10 MG tablet Take 10 mg by mouth 2 times daily.   2/25/2025 Morning

## 2025-03-08 NOTE — PLAN OF CARE
[id number]    Goal Outcome Evaluation:      Plan of Care Reviewed With: patient    Overall Patient Progress: no changeOverall Patient Progress: no change    Patient is A&O x4, intermittent confusion noted, calm and cooperative. Able to make needs known. Not OOB during the shift. Zaman in place draining clear yellow urine. LBM: 03/08.    RA. Moderate consistent carb diet.  L PIV, SL. Denies pain and other discomfort. L BKA. Bed alarm on for safety. Contact precaution maintained. Continue with POC.    /66 (BP Location: Left arm, Patient Position: Semi-Osorio's, Cuff Size: Adult Regular)   Pulse 74   Temp 98.6  F (37  C) (Oral)   Resp 16   Ht 1.829 m (6')   Wt 67.8 kg (149 lb 7.6 oz)   SpO2 98%   BMI 20.27 kg/m      Herb Stuart RN on 3/8/2025 at 7:14 AM

## 2025-03-08 NOTE — PROGRESS NOTES
[facility]    Medicine Progress Note - Hospitalist Service, GOLD TEAM 22    Date of Admission:  2/25/2025    Assessment & Plan   Tad Zaman is a 64 year old male admitted on 2/25/2025 for AMS. He has a history of diabetes, HTN, CAD, HF, and renal transplant 9/2022, left AKA, CKD.    Patient was brought in by family for AMS x 1-2 months. He has been evaluated at the [facility] multiple times in ED then was evaluated during an admission 1/20-2/21/25 with very broad workup (see below) that did not amount to any type of notable etiology of his symptoms. During this stay he was also treated for HF exacerbation, MOY, and UTI which were all stable at time of discharge. He finished course of cefdinir for e coli UTI on 2/24. He was discharged with family being told that he has failure to thrive. He was discharged home, required 24 hr cares by daughter including changing him and preparation of food. He has also has had intermittent agitation and aggression. Home health nurse stated that he was not appropriate for their program.     No clear etiology for confusion but the aggression and refusal of cares have clinically improved without intervention.      Plan today:   - Discuss dispo planning with family  - Will repeat TOV given he has not been on tamsulosin for several days and he will be inpatient through Monday. This is both the patient and family's preference. Low threshold for Zaman replacement and home with catheter and urology follow up.      AMS  Cognitive impairment vs encephalopathy   Presented with 1-2 months of progressive encephalopathy and agitation with aggressive behavior toward family who is caring for him at home. [facility] Initial LP with pleocytosis, otherwise unrevealing. MRI brain without acute pathology. Neurology consulted. Some concern for hx of viral encephalitis that has since resolved leaving him with a degree of  impairment. He otherwise remains hemodynamically stable without clinical evidence of infection, no gross electrolyte abnormality, stable glucose (elevated but without acidosis), BUN is improving, tacrolimus levels WNL.   [facility]:   - MRI 1/29: Stable mild amount of chronic small vessel ischemia. Small chronic R cerebellar infarct.   -Psych consult 2/13: recommended removing capacity for medical decision making, recommended palliative care. No medication changes   -OT SLUMS 6/30   -EEG: nonspecific encephalopathy.   -LP: protein 173, glucose 110. Cell count with 21 WBC, 41 RBC, 52 lymph. Hazy appearance  -Encephalopathy paraneoplastic evaluation (including Purkinje, ADI, antiglial nucl antibody, NMDA, BOSSMAN) 1/30/25 negative.   -CSF flow cytometry: suboptimal specimen with low cellularity. No immunophenotypic evidence of involvement by lymphoma. [place] light chain 64.8, lambda light chain 35.1, kappa/lambda 1.85 all elevated. No monoclonals detected (1/22)  -ELP/Immunofix, serum panel: total protein low, beta 1 fraction low (1/22/25)  -Polyoma virus DNA of CSF, MAGNUS virus DNA (1/28) negative.   -Meningitis / encephalitis panel (1/28) negative  -CSF angiotensin converting enzyme (1/28) negative negative  -CMV (1/28, 1/25) negative   -BK virus (1/28) negative  -EBV (1/25) negative  -Cryptococcus (1/26) negative; Cryptococcus neoformans (1/28) negative  -Syphilis (1/25) negative  -HIV (1/25) negative  -B12 (1/18) WNL  -TSH (1/18) high side of normal 4.94  - Drug screen negative. Patient has distant history of what sounds to be mild to moderate alcohol intake.     Plan:   Neurology following:   - Brain MRI without evidence of acute infarction or other acute intracranial pathology. Stable chronic R cerebellar infarct.   - Delirium precautions as able.   - repeat LP per CAPs on 3/5 completed - repeat CSF unremarkable (negative cytometry, cytology negative).    - pending studies include: West Nile virus, VDRL, aerobic  and anaerobic cultures   - He will need follow up with outpatient neurology and consider neuropsychology testing  - Per neuro: can consider EEG in outpatient setting if cognition were to worsen     # Renal transplant 9/2022   # CKD Stage 3b   Had the [facility]. [facility] 1/27 renal US with doppler showed no stenosis of vessels. Resistive indices were increased, felt could be related to ureteric obstruction but not specific. Mild dilation of intrarenal collecting system and ureter was also seen. CT a/p 1/28 W/O contrast suggested mild hydro. [facility]/25 at [facility] showed no hydro or other abnormalities. FENA 4.3 (1/28) at [facility]. Creat 2.26 today. Creat recent baseline appears to be 1.8-2.3. Peak with recent MOY was up to 2.7, improved to 2.3 at time of discharge 2/21.  - transplant neph following the patient  Recommendations:   - Continue mycophenolate 500 mg twice a day.  - Continue tacrolimus 1.5 mg twice a day.  - Tacro level ordered for 3/4/2025  - Hold calcitriol given borderline high calcium. Will follow up calcium levels on next renal panel (ordered)  - Ordered UA and UPCR/UACR 3/3/2025 - trace leuk esterase and glucose present. No evidence of infection.   - Ordered Kidney Transplant US 3/3/2025 (routine) - mild hydronephrosis of transplanted kidney, torturous renal artery with mildly elevated velocity at the anastomosis, likely chronic.   - Check hepatitis panel (ordered for 3/4/2025) - HCV positive, known history and cured, otherwise negative.   - Accurate I/O and daily weight given recent heart failure exacerbation.  - Plan for urinary retention per below    #Urinary retention  Noted to have ongoing retention requiring straight cath. Unclear whether this is a new issue for him. No significant anticholinergics on his current medication list.   - Tamsulosin 0.4mg PO initiated on 3/4; as tolerated   - Avoid anticholinergic medications  as able.   - TOV on 3/8 with close monitoring and low threshold to replace      # Troponinemia, stable  # Lateral lead T wave inversions  # Atrial flutter  # HFrEF  # HTN  # Hx MI  Limited cardiac history available. Per patient's daughter he may have had a stent placed at 1 point but is unsure.  Patient and daughters deny any history of cardiac arrhythmia.  However on exam today and on EKG evidence of atrial flutter, and patient also on Eliquis at least since December.  [facility], he was found to have heart failure exacerbation with 25-30 lbs fluid excess. BNP during hospitalization was 2868. His Lasix was switched to Torsemide. He has been taking his medications per his daughter.   - BNP 79793 here.   - trop 267>>218  - EKG with T wave inversions and mild ST depressions in lateral leads  - denies recent CP or SOB, no peripheral or abdominal edema  Plan:   - TTE as below.   - No report of chest pain. Troponin trended down   - cont PTA atorvastatin 20 mg nightly  - restart PTA Eliquis 5 mg BID on 3/6  - cont PTA Jardiance 12.5 mg daily  - continue PTA metoprolol 75 mg daily  - continue PTA torsemide 10 mg BID     TTE  Interpretation Summary  Left ventricular function is decreased. The ejection fraction is 40-45%  (mildly reduced).  Diastolic function not assessed due to arrhythmia.  Right ventricular function, chamber size, wall motion, and thickness are  normal.  Mild tricuspid insufficiency is present.  Mild (pulmonary artery systolic pressure<50mmHg) pulmonary hypertension is  present.  IVC diameter and respiratory changes fall into an intermediate range  suggesting an RA pressure of 8 mmHg.  Trivial pericardial effusion is present.  There is no prior study for direct comparison.     # T2DM  Not on long-term insulin. Hemoglobin A1c 6.9% as of 7/2024. Hyperglycemia when he allowed check on 3/4. He has been amenable to glucose checks and insulin this admission.   - cont PTA Jardiance  12.5 mg daily  - Repeat Hemoglobin A1c 7.9  - Initiated sliding scale insulin on 3/4 with TID AC and 0200 glucose checks.   - Carb consistent diet   - Given sliding scale insulin needs but would like to simplify this on discharge and will start basal Lantus at 5 units (half of what would be calculated 0.2units/kg) and uptitrate based on fasting AM sugars.      # Mild normocytic anemia  # Hx hepatitis C, s/p treatment with cure   # Peripheral vascular disease  # S/p L BKA  # Glaucoma - cont PTA eyedrops  # Sickle cell trait             Diet: Snacks/Supplements Adult: Nepro Oral Supplement; With Meals  Moderate Consistent Carb (60 g CHO per Meal) Diet    DVT Prophylaxis: DOAC  Zaman Catheter: PRESENT, indication: Acute retention or obstruction  Lines: None     Cardiac Monitoring: None  Code Status: Full Code      Clinically Significant Risk Factors         # Hyponatremia: Lowest Na = 133 mmol/L in last 2 days, will monitor as appropriate  # Hypochloremia: Lowest Cl = 97 mmol/L in last 2 days, will monitor as appropriate      # Hypoalbuminemia: Lowest albumin = 3.2 g/dL at 3/6/2025 10:23 AM, will monitor as appropriate               # DMII: A1C = 7.9 % (Ref range: <5.7 %) within past 6 months       # Financial/Environmental Concerns: none         Social Drivers of Health    Tobacco Use: Medium Risk (7/26/2024)    Received from [facility]    Patient History     Smoking Tobacco Use: Former     Smokeless Tobacco Use: Never          Disposition Plan     Medically Ready for Discharge: Anticipated Tomorrow             Lucía Chapman DO  Hospitalist Service, GOLD TEAM 22  [facility]  Securely message with [employer] (more info)  Text page via [facility] Paging/Directory   See signed in provider for up to date coverage information  ______________________________________________________________________    Interval History   No acute events overnight. Antonio feels well today. He denies  pain or other concerns.     Physical Exam   Vital Signs: Temp: 98.2  F (36.8  C) Temp src: Oral BP: 123/76 Pulse: 73   Resp: 16 SpO2: 97 % O2 Device: None (Room air)    Weight: 149 lbs 7.55 oz    General: sitting upright in bed, watching television, no acute distress.   LUNGS: no increased WOB.   : mg in place with clear urine in the bag.   SKIN: Warm, well perfused. No skin rashes or abnormal lesions.  MSK: L BKA, no edema    Medical Decision Making           Data     I have personally reviewed the following data over the past 24 hrs:    N/A  \   N/A   / N/A     137 104 55.9 (H) /  312 (H)   4.5 24 1.60 (H) \     ALT: N/A AST: N/A AP: N/A TBILI: N/A   ALB: 3.3 (L) TOT PROTEIN: N/A LIPASE: N/A

## 2025-03-08 NOTE — PLAN OF CARE
Goal Outcome Evaluation:     Plan of Care Reviewed With: patient     Overall Patient Progress: improving     Shift: 8804-1888     VS: /76   Pulse 73   Temp 98.2  F (36.8  C) (Oral)   Resp 16   Ht 1.829 m (6')   Wt 67.8 kg (149 lb 7.6 oz)   SpO2 97%   BMI 20.27 kg/m        Patient A&Ox3; intermittent confusion. No episodes of aggression throughout shift. On RA. Denies sob/chest pain. Ax1 with walker/gait belt. Removed mg cath per shift.   Patient unable to void after catheter removal; Bladder scan 381mL @ 1505 and 611mL @ 1822. Straight cath; 600mL. LBM 3/8. Bed alarm on for safety. Contact precautions maintained.     Family at the bedside informed writer that the patient's 1st to 3rd toes appear black. The family states these are new findings. The hospitalist was notified, and a picture of the patient's right foot/toes was sent to the hospitalist.     Call light within reach; able to make needs known. Plan of care ongoing

## 2025-03-09 LAB
ALBUMIN SERPL BCG-MCNC: 3.3 G/DL (ref 3.5–5.2)
ANION GAP SERPL CALCULATED.3IONS-SCNC: 10 MMOL/L (ref 7–15)
BUN SERPL-MCNC: 57.7 MG/DL (ref 8–23)
CALCIUM SERPL-MCNC: 9.5 MG/DL (ref 8.8–10.4)
CHLORIDE SERPL-SCNC: 105 MMOL/L (ref 98–107)
CREAT SERPL-MCNC: 1.55 MG/DL (ref 0.67–1.17)
EGFRCR SERPLBLD CKD-EPI 2021: 49 ML/MIN/1.73M2
GLUCOSE BLDC GLUCOMTR-MCNC: 164 MG/DL (ref 70–99)
GLUCOSE BLDC GLUCOMTR-MCNC: 204 MG/DL (ref 70–99)
GLUCOSE BLDC GLUCOMTR-MCNC: 256 MG/DL (ref 70–99)
GLUCOSE BLDC GLUCOMTR-MCNC: 285 MG/DL (ref 70–99)
GLUCOSE BLDC GLUCOMTR-MCNC: 312 MG/DL (ref 70–99)
GLUCOSE SERPL-MCNC: 202 MG/DL (ref 70–99)
HCO3 SERPL-SCNC: 23 MMOL/L (ref 22–29)
PHOSPHATE SERPL-MCNC: 2 MG/DL (ref 2.5–4.5)
POTASSIUM SERPL-SCNC: 5 MMOL/L (ref 3.4–5.3)
SODIUM SERPL-SCNC: 138 MMOL/L (ref 135–145)
TACROLIMUS BLD-MCNC: 3.6 UG/L (ref 5–15)
TME LAST DOSE: ABNORMAL H
TME LAST DOSE: ABNORMAL H

## 2025-03-09 PROCEDURE — 82310 ASSAY OF CALCIUM: CPT | Performed by: STUDENT IN AN ORGANIZED HEALTH CARE EDUCATION/TRAINING PROGRAM

## 2025-03-09 PROCEDURE — 250N000013 HC RX MED GY IP 250 OP 250 PS 637: Performed by: STUDENT IN AN ORGANIZED HEALTH CARE EDUCATION/TRAINING PROGRAM

## 2025-03-09 PROCEDURE — 80197 ASSAY OF TACROLIMUS: CPT | Performed by: STUDENT IN AN ORGANIZED HEALTH CARE EDUCATION/TRAINING PROGRAM

## 2025-03-09 PROCEDURE — 82565 ASSAY OF CREATININE: CPT | Performed by: STUDENT IN AN ORGANIZED HEALTH CARE EDUCATION/TRAINING PROGRAM

## 2025-03-09 PROCEDURE — 120N000002 HC R&B MED SURG/OB UMMC

## 2025-03-09 PROCEDURE — 250N000012 HC RX MED GY IP 250 OP 636 PS 637: Performed by: PHYSICIAN ASSISTANT

## 2025-03-09 PROCEDURE — 250N000013 HC RX MED GY IP 250 OP 250 PS 637: Performed by: INTERNAL MEDICINE

## 2025-03-09 PROCEDURE — 99232 SBSQ HOSP IP/OBS MODERATE 35: CPT | Performed by: STUDENT IN AN ORGANIZED HEALTH CARE EDUCATION/TRAINING PROGRAM

## 2025-03-09 PROCEDURE — 99233 SBSQ HOSP IP/OBS HIGH 50: CPT | Performed by: INTERNAL MEDICINE

## 2025-03-09 PROCEDURE — 82040 ASSAY OF SERUM ALBUMIN: CPT | Performed by: STUDENT IN AN ORGANIZED HEALTH CARE EDUCATION/TRAINING PROGRAM

## 2025-03-09 PROCEDURE — 250N000013 HC RX MED GY IP 250 OP 250 PS 637: Performed by: PHYSICIAN ASSISTANT

## 2025-03-09 PROCEDURE — 36415 COLL VENOUS BLD VENIPUNCTURE: CPT | Performed by: STUDENT IN AN ORGANIZED HEALTH CARE EDUCATION/TRAINING PROGRAM

## 2025-03-09 RX ADMIN — INSULIN GLARGINE 5 UNITS: 100 INJECTION, SOLUTION SUBCUTANEOUS at 17:47

## 2025-03-09 RX ADMIN — MYCOPHENOLATE MOFETIL 500 MG: 500 TABLET, FILM COATED ORAL at 21:11

## 2025-03-09 RX ADMIN — SODIUM BICARBONATE 650 MG TABLET 1300 MG: at 21:10

## 2025-03-09 RX ADMIN — INSULIN ASPART 3 UNITS: 100 INJECTION, SOLUTION INTRAVENOUS; SUBCUTANEOUS at 16:28

## 2025-03-09 RX ADMIN — EMPAGLIFLOZIN 10 MG: 10 TABLET, FILM COATED ORAL at 08:03

## 2025-03-09 RX ADMIN — ATORVASTATIN CALCIUM 20 MG: 20 TABLET, FILM COATED ORAL at 21:11

## 2025-03-09 RX ADMIN — PANTOPRAZOLE SODIUM 40 MG: 40 TABLET, DELAYED RELEASE ORAL at 08:05

## 2025-03-09 RX ADMIN — TACROLIMUS 1.5 MG: 1 CAPSULE ORAL at 21:10

## 2025-03-09 RX ADMIN — APIXABAN 5 MG: 5 TABLET, FILM COATED ORAL at 21:11

## 2025-03-09 RX ADMIN — MYCOPHENOLATE MOFETIL 500 MG: 500 TABLET, FILM COATED ORAL at 08:03

## 2025-03-09 RX ADMIN — INSULIN ASPART 4 UNITS: 100 INJECTION, SOLUTION INTRAVENOUS; SUBCUTANEOUS at 11:49

## 2025-03-09 RX ADMIN — INSULIN ASPART 2 UNITS: 100 INJECTION, SOLUTION INTRAVENOUS; SUBCUTANEOUS at 06:24

## 2025-03-09 RX ADMIN — LIDOCAINE 4% 1 PATCH: 40 PATCH TOPICAL at 21:09

## 2025-03-09 RX ADMIN — TACROLIMUS 1.5 MG: 1 CAPSULE ORAL at 08:03

## 2025-03-09 RX ADMIN — TAMSULOSIN HYDROCHLORIDE 0.4 MG: 0.4 CAPSULE ORAL at 08:03

## 2025-03-09 RX ADMIN — SODIUM BICARBONATE 650 MG TABLET 1300 MG: at 08:03

## 2025-03-09 RX ADMIN — APIXABAN 5 MG: 5 TABLET, FILM COATED ORAL at 08:04

## 2025-03-09 RX ADMIN — LATANOPROST 1 DROP: 50 SOLUTION OPHTHALMIC at 21:09

## 2025-03-09 RX ADMIN — METOPROLOL SUCCINATE 75 MG: 25 TABLET, EXTENDED RELEASE ORAL at 08:03

## 2025-03-09 ASSESSMENT — ACTIVITIES OF DAILY LIVING (ADL)
ADLS_ACUITY_SCORE: 72
ADLS_ACUITY_SCORE: 72
ADLS_ACUITY_SCORE: 73
ADLS_ACUITY_SCORE: 72
ADLS_ACUITY_SCORE: 73
ADLS_ACUITY_SCORE: 72
ADLS_ACUITY_SCORE: 73
ADLS_ACUITY_SCORE: 72
ADLS_ACUITY_SCORE: 73
ADLS_ACUITY_SCORE: 72
ADLS_ACUITY_SCORE: 73
ADLS_ACUITY_SCORE: 72

## 2025-03-09 NOTE — PLAN OF CARE
[id number]    Goal Outcome Evaluation:      Plan of Care Reviewed With: patient    Overall Patient Progress: no changeOverall Patient Progress: no change    Patient is A&O x3, intermittent confusion noted, calm and cooperative. Able to make needs known. Not OOB during the shift. Primofit in place. Bladder scan protocol done, see flowsheet for I&O. LBM: 03/08.    RA. Moderate consistent carb diet. L PIV, SL. Denies pain and other discomfort. L BKA. Dark black spots on the tips of 1st-3rd toe on R foot, seen and examined by MD. Bed alarm on for safety. Contact precaution maintained. Continue with POC.     /66 (BP Location: Left arm, Patient Position: Semi-Osorio's, Cuff Size: Adult Regular)   Pulse 77   Temp 99  F (37.2  C) (Oral)   Resp 17   Ht 1.829 m (6')   Wt 67.8 kg (149 lb 7.6 oz)   SpO2 97%   BMI 20.27 kg/m      Herb Stuart RN on 3/9/2025 at 7:02 AM

## 2025-03-09 NOTE — PROGRESS NOTES
[facility]  Transplant Nephrology Consult Note  Date of Admission:  2/25/2025  Today's Date: 03/09/2025  Requesting physician: Basilio Martines MD    Reason for Consult:  Management of IS    Recommendations:   - Continue mycophenolate 500 mg twice a day.  -Continue to hold torsemide, calcitriol and lokelma  - Continue tacrolimus 1.5 mg twice a day.  -Tacro level on 3/9/2025 is 3.6  - Accurate I/O and daily weight  - Will need Urology outpatient follow up for TOV and further management.     Assessment & Plan   # DDKT: Stable from presumed baseline of 1.8 to 2 mg/dL.    - Baseline Creatinine: ~ 1.8-2 as per inpatient notes.    - Proteinuria:  Unknown   - DSA Hx: Unknown due to being transplanted at another Transplant Center   - Last cPRA: Unknown/   - BK Viremia: Not checked recently due to time from transplant   - Kidney Tx Biopsy Hx:  Unknown .    # Immunosuppression: Tacrolimus immediate release (goal 4-6) and Mycophenolate mofetil (dose 500 mg every 12 hours)   - Induction with Recent Transplant:  Not known due to time from transplant   - Continue with intensive monitoring of immunosuppression for efficacy and toxicity.   - Historical Changes in Immunosuppression: None   - Changes: No    # Infection Prevention:   Last CD4 Level: Unknown  - PJP: None      - CMV IgG Ab High Risk Discordance (D+/R-) at time of transplant: Unknown  Present CMV Serostatus: Unknown  - EBV IgG Ab High Risk Discordance (D+/R-) at time of transplant: Unknown  Present EBV Serostatus: Unknown    # Blood Pressure / volume: Borderline control;  Goal BP: > 100, but < 130 systolic   - May be trending towards hypovolemia   - Continue Toprol, Jardiance    - Changes: Yes - Holding home Torsemide 3/5/2025 PM and 3/6/2025 AM (ordered)    # Diabetes: Borderline control (HbA1c 7-9%)  Last HbA1c: 7.9 (3/3/2025).  - Blood sugars running high   He does report getting some outside food jeanne in which may  "be playing a role. Management per primary team.     # Anemia in Chronic Renal Disease: Hgb: Stable, near normal      SIOBHAN: No   - Iron studies: Not checked recently    # Mineral Bone Disorder:    - Secondary renal hyperparathyroidism; PTH level: Not checked recently        On treatment:  Calcitriol (currently on hold for borderline hypercalcemia)  - Vitamin D; level: Not checked recently        On supplement: No  - Calcium; level: Normal        On supplement: No  - Phosphorus; level: Normal        On supplement: No    # Electrolytes:   - Potassium; level: Normal        On binder: No  - Magnesium; level: Not checked recently        On supplement: No  - Bicarbonate; level: Normal        On supplement: No    # AMS: [facility]y's [facility]. MRI brain without acute findings (although degraded by motion artifact). Planning for LP on 3/5/2025.    # Other Significant PMH:   - Atrial flutter: On Eliquis.   - HFrEF: presumed ischemic given past maricel pre-transplant with area of ischemia and possible history of stent. On Toprol, jardiance and torsemide. With new drop in LVEF to ~ 45%, he may need further work-up.    - CAD: Possible history of stent.    - PVD s/p left AKA   - Sickle cell trait   - History of hepatitis C.    - Urinary Retention: - Tamsulosin 0.4mg PO initiated on 3/4; trial of mg removal today with low threshold to replace  # Transplant History:  Etiology of Kidney Failure: Hypertension and diabetes.  Tx: DDKT  Transplant: N/A  Significant transplant-related complications: None    Leidy Buenrostro MD  Contact information via [employer] [facility] Web Console or via [facility] Paging/Directory    Interval History:  -Patient resting comfortably at the time of my eval.   -Patient continues on Tacro 1.5mg BID and MMF 500mg BID      History of Present Illness  From Dr See Piper Note 2/25/2025:   \"History obtained from daughter at bedside.  Mr. Zaman is a 64-year-old gentleman with end-stage kidney " "disease from presumed hypertension and diabetes status post DDKT in September 2022 who [facility].    Daughter reports that in December he was independent.  He cooked dinner for [employer], was able to shovel the snow in the drive way, manage his finances and drive.  The only noticeable change was a decrease in weight compared to prior months.  Afterwards, there was a progressive decline with weeks where he was unable to take care of himself for which she was taken to the ER multiple times in mid January.  Additionally, he presented with worsening fluid retention, with possible increase of 10-20 pounds.  As per daughter, [facility], he [facility] after which she developed an MOY, that resolved after decreasing the dose of diuretics.  He was also treated for a transplant UTI, finishing antibiotics this Monday.    In regard to his kidney transplant, she reports no past history of rejection or issues with viral infections.  She does not know patient's baseline creatinine, but as per notes it ranges from 1.8-2 mg/dL.  I am unable to open the [facility] notes to confirm this.\"    MEDICATIONS:  Current Facility-Administered Medications   Medication Dose Route Frequency Provider Last Rate Last Admin    apixaban ANTICOAGULANT (ELIQUIS) tablet 5 mg  5 mg Oral BID Lucía Chapman DO   5 mg at 03/09/25 0804    atorvastatin (LIPITOR) tablet 20 mg  20 mg Oral QPM Becca Nino PA-C   20 mg at 03/08/25 2128    [Held by provider] calcitRIOL (ROCALTROL) capsule 0.25 mcg  0.25 mcg Oral Daily Becca Nino PA-C   0.25 mcg at 02/26/25 0822    empagliflozin (JARDIANCE) tablet 10 mg  10 mg Oral Daily Basilio Martines MD   10 mg at 03/09/25 0803    insulin aspart (NovoLOG) injection (RAPID ACTING)  1-7 Units Subcutaneous TID AC Hayder Dubose APRN CNP   4 Units at 03/09/25 1149    insulin aspart (NovoLOG) injection (RAPID ACTING)  1-5 Units Subcutaneous At Bedtime Hayder Dubose, " APRN CNP   2 Units at 25    insulin glargine (LANTUS PEN) injection 5 Units  5 Units Subcutaneous At Bedtime Lucía Chapman DO   5 Units at 25    latanoprost (XALATAN) 0.005 % ophthalmic solution 1 drop  1 drop Both Eyes At Bedtime Foster Cruz MD   1 drop at 25    Lidocaine (LIDOCARE) 4 % Patch 1 patch  1 patch Transdermal Q24h Becca Nino PA-C   1 patch at 25    metoprolol succinate ER (TOPROL XL) 24 hr tablet 75 mg  75 mg Oral Daily Becca Nino PA-C   75 mg at 25    mycophenolate (GENERIC EQUIVALENT) tablet 500 mg  500 mg Oral BID IS Becca Nino PA-C   500 mg at 25    pantoprazole (PROTONIX) EC tablet 40 mg  40 mg Oral QAM AC Foster Cruz MD   40 mg at 25 0805    sodium bicarbonate tablet 1,300 mg  1,300 mg Oral BID Becca Nino PA-C   1,300 mg at 25 08    sodium chloride (PF) 0.9% PF flush 3 mL  3 mL Intracatheter Q8H Becca Nino PA-C   3 mL at 25    tacrolimus (GENERIC EQUIVALENT) capsule 1.5 mg  1.5 mg Oral BID IS Becca Nino PA-C   1.5 mg at 25 08    tamsulosin (FLOMAX) capsule 0.4 mg  0.4 mg Oral Daily Lucía Chapman DO   0.4 mg at 25 08    [Held by provider] torsemide (DEMADEX) tablet 10 mg  10 mg Oral BID Foster Cruz MD   10 mg at 25 0928     Current Facility-Administered Medications   Medication Dose Route Frequency Provider Last Rate Last Admin    Patient is already receiving anticoagulation with heparin, enoxaparin (LOVENOX), warfarin (COUMADIN)  or other anticoagulant medication   Does not apply Continuous PRN Becca Nino PA-C           Physical Exam   Temp  Av.2  F (36.8  C)  Min: 97.8  F (36.6  C)  Max: 98.5  F (36.9  C)      Pulse  Av.2  Min: 81  Max: 95 Resp  Av.7  Min: 16  Max: 18  SpO2  Av.3 %  Min: 96 %  Max: 99 %     /77 (BP Location: Left arm)   Pulse 67   Temp 98.4  F (36.9  C) (Oral)   Resp 17   Ht 1.829 m (6')   Wt 67.8 kg (149  lb 7.6 oz)   SpO2 (!) 91%   BMI 20.27 kg/m     Date 02/26/25 0700 - 02/27/25 0659   Shift 9477-5634 1834-4416 7290-6702 24 Hour Total   INTAKE   P.O. 480   480   Shift Total(mL/kg) 480(7.79)   480(7.79)   OUTPUT   Shift Total(mL/kg)       Weight (kg) 61.6 61.6 61.6 61.6      Admit Weight: 61.6 kg (135 lb 12.9 oz)     GENERAL APPEARANCE: alert and interactive. Sitting in bed.   EDEMA: no LE edema  ABDOMEN: soft, nondistended, nontender, bowel sounds normal  MS: LEFT AKA.   Vascular: faint radial pulses. Bilateral femoral bruit.  SKIN: no rash  TX KIDNEY: with audible bruit.  DIALYSIS ACCESS:  RUE AV fistula with bruit and thrill.    Data   All labs reviewed by me.  CMP  Recent Labs   Lab 03/09/25  1149 03/09/25  0720 03/09/25  0610 03/09/25  0108 03/08/25  1202 03/08/25  0653 03/07/25  0744 03/07/25  0711 03/06/25  1241 03/06/25  1023   NA  --  138  --   --   --  137  --  138  --  133*   POTASSIUM  --  5.0  --   --   --  4.5  --  4.8  --  4.9   CHLORIDE  --  105  --   --   --  104  --  103  --  97*   CO2  --  23  --   --   --  24  --  24  --  23   ANIONGAP  --  10  --   --   --  9  --  11  --  13   * 202* 204* 164*   < > 222*   < > 248*   < > 238*   BUN  --  57.7*  --   --   --  55.9*  --  60.5*  --  63.7*   CR  --  1.55*  --   --   --  1.60*  --  1.68*  --  1.78*   GFRESTIMATED  --  49*  --   --   --  47*  --  45*  --  42*   QUE  --  9.5  --   --   --  9.8  --  9.6  --  9.7   PHOS  --  2.0*  --   --   --  2.5  --  2.5  --  2.6   ALBUMIN  --  3.3*  --   --   --  3.3*  --  3.4*  --  3.2*    < > = values in this interval not displayed.     CBC  Recent Labs   Lab 03/05/25  0636 03/03/25  0939   HGB 9.7* 11.0*   WBC 7.4 8.7   RBC 3.75* 4.24*   HCT 30.9* 34.9*   MCV 82 82   MCH 25.9* 25.9*   MCHC 31.4* 31.5   RDW 14.1 14.4    292     INR  Recent Labs   Lab 03/05/25  0636   INR 1.29*     ABGNo lab results found in last 7 days.   Urine Studies  Recent Labs   Lab Test 03/03/25  1508   COLOR Light Yellow    APPEARANCE Clear   URINEGLC >=1000*   URINEBILI Negative   URINEKETONE Negative   SG 1.016   UBLD Negative   URINEPH 6.5   PROTEIN Negative   NITRITE Negative   LEUKEST Trace*   RBCU 3*   WBCU 8*     No lab results found.  PTH  No lab results found.  Iron Studies  No lab results found.    Past Medical History    Past Medical History:   Diagnosis Date    Chronic kidney disease     Diabetes (H)     Hypertension     Myocardial infarction (H)          Prior to Admission Medications   Medications Prior to Admission   Medication Sig Dispense Refill Last Dose/Taking    apixaban ANTICOAGULANT (ELIQUIS) 5 MG tablet Take 5 mg by mouth 2 times daily.   2/25/2025 Morning    atorvastatin (LIPITOR) 20 MG tablet Take 1 tablet by mouth at bedtime.   2/25/2025 Morning    brinzolamide-brimonidine (SIMBRINZA) 1-0.2 % ophthalmic suspension Apply 1 drop to eye 2 times daily.   Taking    calcitRIOL (ROCALTROL) 0.25 MCG capsule Take 0.25 mcg by mouth. TAKE ONE CAPSULE BY MOUTH MONDAY THROUGH FRIDAY FOR BONE HEALTH ** DOSE INCREASE   Taking    cefdinir (OMNICEF) 300 MG capsule Take 300 mg by mouth 2 times daily.   Taking    empagliflozin (JARDIANCE) 25 MG TABS tablet Take 12.5 mg by mouth every morning. TAKE ONE-HALF TABLET BY MOUTH EVERY MORNING FOR DIABETES AND KIDNEY PROTECTION   Taking    Latanoprost PF 0.005 % SOLN Apply to eye at bedtime. INSTILL 1 DROP IN BOTH EYES AT BEDTIME FOR GLAUCOMA   Taking    metoprolol succinate ER (TOPROL XL) 25 MG 24 hr tablet Take 75 mg by mouth daily. TAKE THREE TABLETS BY MOUTH EVERY DAY FOR HEART RATE   2/25/2025 Morning    mycophenolate (GENERIC EQUIVALENT) 500 MG tablet Take 500 mg by mouth 2 times daily.   Taking    pantoprazole (PROTONIX) 40 MG EC tablet Take 40 mg by mouth daily.  TAKE ONE TABLET BY MOUTH EVERY DAY PREVENT GI BLEED   Taking    patiromer (VELTASSA) 8.4 g packet Take 8.4 g by mouth once a week. Take 1 packet (8.4 grams) by mouth once weekly for high potassium   Taking    sodium  bicarbonate 650 MG tablet Take 1,300 mg by mouth 2 times daily.   Taking    tacrolimus (GENERIC EQUIVALENT) 0.5 MG capsule Take 1.5 mg by mouth 2 times daily. Take three capsules by mouth twice a day for kidney transplant   Taking    torsemide (DEMADEX) 10 MG tablet Take 10 mg by mouth 2 times daily.   2/25/2025 Morning

## 2025-03-09 NOTE — PROGRESS NOTES
[facility]    Medicine Progress Note - Hospitalist Service, GOLD TEAM 22    Date of Admission:  2/25/2025    Assessment & Plan   Tad Zaman is a 64 year old male admitted on 2/25/2025 for AMS. He has a history of diabetes, HTN, CAD, HF, and renal transplant 9/2022, left AKA, CKD.    Patient was brought in by family for AMS x 1-2 months. He has been evaluated at the [facility] multiple times in ED then was evaluated during an admission 1/20-2/21/25 with very broad workup (see below) that did not amount to any type of notable etiology of his symptoms. During this stay he was also treated for HF exacerbation, MOY, and UTI which were all stable at time of discharge. He finished course of cefdinir for e coli UTI on 2/24. He was discharged with family being told that he has failure to thrive. He was discharged home, required 24 hr cares by daughter including changing him and preparation of food. He has also has had intermittent agitation and aggression. Home health nurse stated that he was not appropriate for their program.     No clear etiology for confusion but the aggression and refusal of cares have clinically improved without intervention.      Plan today:   - Mention of dark areas of the distal R goes. This is in the setting of known PAD.   - Continue to monitor closely for urinary retention - low threshold to replace Zaman.      AMS  Cognitive impairment vs encephalopathy   Presented with 1-2 months of progressive encephalopathy and agitation with aggressive behavior toward family who is caring for him at home. [facility] Initial LP with pleocytosis, otherwise unrevealing. MRI brain without acute pathology. Neurology consulted. Some concern for hx of viral encephalitis that has since resolved leaving him with a degree of impairment. He otherwise remains hemodynamically stable without clinical evidence of infection, no gross electrolyte  abnormality, stable glucose (elevated but without acidosis), BUN is improving, tacrolimus levels WNL.   [facility]:   - MRI 1/29: Stable mild amount of chronic small vessel ischemia. Small chronic R cerebellar infarct.   -Psych consult 2/13: recommended removing capacity for medical decision making, recommended palliative care. No medication changes   -OT SLUMS 6/30   -EEG: nonspecific encephalopathy.   -LP: protein 173, glucose 110. Cell count with 21 WBC, 41 RBC, 52 lymph. Hazy appearance  -Encephalopathy paraneoplastic evaluation (including Purkinje, ADI, antiglial nucl antibody, NMDA, BOSSMAN) 1/30/25 negative.   -CSF flow cytometry: suboptimal specimen with low cellularity. No immunophenotypic evidence of involvement by lymphoma. [place] light chain 64.8, lambda light chain 35.1, kappa/lambda 1.85 all elevated. No monoclonals detected (1/22)  -ELP/Immunofix, serum panel: total protein low, beta 1 fraction low (1/22/25)  -Polyoma virus DNA of CSF, MAGNUS virus DNA (1/28) negative.   -Meningitis / encephalitis panel (1/28) negative  -CSF angiotensin converting enzyme (1/28) negative negative  -CMV (1/28, 1/25) negative   -BK virus (1/28) negative  -EBV (1/25) negative  -Cryptococcus (1/26) negative; Cryptococcus neoformans (1/28) negative  -Syphilis (1/25) negative  -HIV (1/25) negative  -B12 (1/18) WNL  -TSH (1/18) high side of normal 4.94  - Drug screen negative. Patient has distant history of what sounds to be mild to moderate alcohol intake.     Plan:   Neurology following:   - Brain MRI without evidence of acute infarction or other acute intracranial pathology. Stable chronic R cerebellar infarct.   - Delirium precautions as able.   - repeat LP per CAPs on 3/5 completed - repeat CSF unremarkable (negative cytometry, cytology negative).    - pending studies include: West Nile virus, VDRL, aerobic and anaerobic cultures   - He will need follow up with outpatient neurology and consider neuropsychology testing  -  Per neuro: can consider EEG in outpatient setting if cognition were to worsen     # Renal transplant 9/2022   # CKD Stage 3b   Had the transplant at [facility]. [facility]. 1/27 renal US with doppler showed no stenosis of vessels. Resistive indices were increased, felt could be related to ureteric obstruction but not specific. Mild dilation of intrarenal collecting system and ureter was also seen. CT a/p 1/28 W/O contrast suggested mild hydro. [facility]/3/25 at [facility] showed no hydro or other abnormalities. FENA 4.3 (1/28) at [facility]. Creat 2.26 today. Creat recent baseline appears to be 1.8-2.3. Peak with recent MOY was up to 2.7, improved to 2.3 at time of discharge 2/21.  - transplant neph following the patient  Recommendations:   - Continue mycophenolate 500 mg twice a day.  - Continue tacrolimus 1.5 mg twice a day.  - Tacro level ordered for 3/4/2025  - Hold calcitriol given borderline high calcium. Will follow up calcium levels on next renal panel (ordered)  - Ordered UA and UPCR/UACR 3/3/2025 - trace leuk esterase and glucose present. No evidence of infection.   - Ordered Kidney Transplant US 3/3/2025 (routine) - mild hydronephrosis of transplanted kidney, torturous renal artery with mildly elevated velocity at the anastomosis, likely chronic.   - Check hepatitis panel (ordered for 3/4/2025) - HCV positive, known history and cured, otherwise negative.   - Accurate I/O and daily weight given recent heart failure exacerbation.  - Plan for urinary retention per below    #Urinary retention  Noted to have ongoing retention requiring straight cath. Unclear whether this is a new issue for him. No significant anticholinergics on his current medication list.   - Tamsulosin 0.4mg PO initiated on 3/4; as tolerated   - Avoid anticholinergic medications as able.   - TOV on 3/8 with close monitoring and low threshold to replace      # Troponinemia, stable  # Lateral  lead T wave inversions  # Atrial flutter  # HFrEF  # HTN  # Hx MI  Limited cardiac history available. Per patient's daughter he may have had a stent placed at 1 point but is unsure.  Patient and daughters deny any history of cardiac arrhythmia.  However on exam today and on EKG evidence of atrial flutter, and patient also on Eliquis at least since December.  [facility], he was found to have heart failure exacerbation with 25-30 lbs fluid excess. BNP during hospitalization was 2868. His Lasix was switched to Torsemide. He has been taking his medications per his daughter.   - BNP 92025 here.   - trop 267>>218  - EKG with T wave inversions and mild ST depressions in lateral leads  - denies recent CP or SOB, no peripheral or abdominal edema  Plan:   - TTE as below.   - No report of chest pain. Troponin trended down   - cont PTA atorvastatin 20 mg nightly  - restart PTA Eliquis 5 mg BID on 3/6  - cont PTA Jardiance 12.5 mg daily  - continue PTA metoprolol 75 mg daily  - continue PTA torsemide 10 mg BID     TTE  Interpretation Summary  Left ventricular function is decreased. The ejection fraction is 40-45%  (mildly reduced).  Diastolic function not assessed due to arrhythmia.  Right ventricular function, chamber size, wall motion, and thickness are  normal.  Mild tricuspid insufficiency is present.  Mild (pulmonary artery systolic pressure<50mmHg) pulmonary hypertension is  present.  IVC diameter and respiratory changes fall into an intermediate range  suggesting an RA pressure of 8 mmHg.  Trivial pericardial effusion is present.  There is no prior study for direct comparison.     # T2DM  Not on long-term insulin. Hemoglobin A1c 6.9% as of 7/2024. Hyperglycemia when he allowed check on 3/4. He has been amenable to glucose checks and insulin this admission.   - cont PTA Jardiance 12.5 mg daily  - Repeat Hemoglobin A1c 7.9  - Initiated sliding scale insulin on 3/4 with TID AC and 0200 glucose  checks.   - Carb consistent diet   - Given sliding scale insulin needs but would like to simplify this on discharge and will start basal Lantus at 5 units (half of what would be calculated 0.2units/kg) and uptitrate based on fasting AM sugars.      # Mild normocytic anemia  # Hx hepatitis C, s/p treatment with cure   # Peripheral vascular disease: Vascular follow up on discharge.   # S/p L BKA  # Glaucoma - cont PTA eyedrops  # Sickle cell trait             Diet: Moderate Consistent Carb (60 g CHO per Meal) Diet  Snacks/Supplements Adult: Nepro Oral Supplement; With Meals    DVT Prophylaxis: DOAC  Zaman Catheter: Not present  Lines: None     Cardiac Monitoring: None  Code Status: Full Code      Clinically Significant Risk Factors               # Hypoalbuminemia: Lowest albumin = 3.2 g/dL at 3/6/2025 10:23 AM, will monitor as appropriate               # DMII: A1C = 7.9 % (Ref range: <5.7 %) within past 6 months       # Financial/Environmental Concerns: none         Social Drivers of Health    Tobacco Use: Medium Risk (7/26/2024)    Received from [employer]    Patient History     Smoking Tobacco Use: Former     Smokeless Tobacco Use: Never          Disposition Plan     Medically Ready for Discharge: Anticipated Tomorrow             Lucía Chapman DO  Hospitalist Service, GOLD TEAM 22  [facility]  Securely message with [employer] (more info)  Text page via [employer] Paging/Directory   See signed in provider for up to date coverage information  ______________________________________________________________________    Interval History   Noted darkening of toes on the R foot on 3/8. He does not believe this is new or changed. No right foot or toe pain. He otherwise feels well. Thinks he has been urinating. Noted subjective history is limited secondary to ongoing confusion.     Physical Exam   Vital Signs: Temp: 99  F (37.2  C) Temp src: Oral BP: 114/66 Pulse: 77   Resp: 17  SpO2: 97 % O2 Device: None (Room air)    Weight: 149 lbs 7.55 oz    General: sitting upright in bed, watching television, no acute distress.   CV: RRR, no murmurs appreciated.   LUNGS: no increased WOB.   ABD: soft, nontender.   SKIN: Warm, well perfused. No skin rashes or abnormal lesions.  MSK: L BKA. R foot is warm to the touch, DP pulse is faint. F      Neuro: alert and interactive, confused. No gross deficits.     Medical Decision Making           Data     I have personally reviewed the following data over the past 24 hrs:    N/A  \   N/A   / N/A     138 105 57.7 (H) /  312 (H)   5.0 23 1.55 (H) \     ALT: N/A AST: N/A AP: N/A TBILI: N/A   ALB: 3.3 (L) TOT PROTEIN: N/A LIPASE: N/A       Imaging results reviewed over the past 24 hrs:   No results found for this or any previous visit (from the past 24 hours).

## 2025-03-09 NOTE — PROGRESS NOTES
Care Management Follow Up    Length of Stay (days): 11    Expected Discharge Date: 03/10/2025     Concerns to be Addressed: Discharge Planning       Patient plan of care discussed at interdisciplinary rounds: Yes    Anticipated Discharge Disposition: Home     Anticipated Discharge Services: Home Health Care    Anticipated Discharge DME: None    Patient/family educated on Medicare website which has current facility and service quality ratings: N/A     Education Provided on the Discharge Plan: Yes     Patient/Family in Agreement with the Plan: Yes    Referrals Placed by CM/SW:    Private pay costs discussed: Not applicable    Discussed  Partnership in Safe Discharge Planning  document with patient/family: No     Handoff Completed: No, handoff not indicated or clinically appropriate    Additional Information:    Writer was informed that patient may be ready for discharge tomorrow, March 10. Writer discussed Important Message from Medicare ([facility]) with patient's daughter Jarrod via phone due to patient's request. Daughter stated understanding and acceptance of form. Form faxed to HIM, documented in Epic, original in chart and copy left in patient's room. Daughter requested call from doctor regarding discoloration on patient's limbs. Writer relayed this request to MD.    Next Steps:     Transportation  [facility]I for home care  Discharge Note  KEN Aleman, MSW  6th Floor Medical Surgical Unit  Phone [phone]      Message me securely in [employer]

## 2025-03-09 NOTE — PLAN OF CARE
Goal Outcome Evaluation:     Plan of Care Reviewed With: patient     Overall Patient Progress: improving     Shift: 5015-5645     VS: /77 (BP Location: Left arm)   Pulse 67   Temp 98.4  F (36.9  C) (Oral)   Resp 17   Ht 1.829 m (6')   Wt 67.8 kg (149 lb 7.6 oz)   SpO2 (!) 91%   BMI 20.27 kg/m       Patient A&Ox3; intermittent confusion.  On RA. Denies sob/chest pain. Not oob this shift. Patient voided 210ml @ 0710, 300ml @ 1147, and 200ml @ 1751. Patient tolerating meals well. LBM 3/9. Call light within reach; able to make needs known. Plan of care ongoing.

## 2025-03-09 NOTE — PROGRESS NOTES
Medicine cross cover note -    I was asked to evaluate the patient's right leg due to blackened toes.    The patient has dark black spots on the tips of 1st-3rd toes consistent with vascular disease.  The foot itself is warm and has faint palpable pulses.  He has no pain and has good sensation in his feet.    - I suspect this represents arterial insufficiency given his history of PAD an lack of lesions on his hands.    I see ABIs from 2024 in his chart:  1. Right lower extremity:  AYAN: Non-compressible. When the arteries are not  compressible, ABIs are not accurate for the diagnosis of peripheral arterial disease.  TBI: 0.74. Normal.  PVRs: Suggestive of infra-popliteal arterial disease.  First toe PPG: Diminished.     I think given his overall clinical course we should likely discuss options with his family before consulting vascular surgery.

## 2025-03-10 ENCOUNTER — APPOINTMENT (OUTPATIENT)
Dept: ULTRASOUND IMAGING | Facility: CLINIC | Age: 65
DRG: 981 | End: 2025-03-10
Attending: NURSE PRACTITIONER
Payer: MEDICARE

## 2025-03-10 ENCOUNTER — APPOINTMENT (OUTPATIENT)
Dept: PHYSICAL THERAPY | Facility: CLINIC | Age: 65
End: 2025-03-10
Payer: MEDICARE

## 2025-03-10 LAB
ALBUMIN SERPL BCG-MCNC: 3.3 G/DL (ref 3.5–5.2)
ANION GAP SERPL CALCULATED.3IONS-SCNC: 9 MMOL/L (ref 7–15)
BACTERIA CSF CULT: NO GROWTH
BUN SERPL-MCNC: 54.3 MG/DL (ref 8–23)
CALCIUM SERPL-MCNC: 9.6 MG/DL (ref 8.8–10.4)
CHLORIDE SERPL-SCNC: 104 MMOL/L (ref 98–107)
CREAT SERPL-MCNC: 1.47 MG/DL (ref 0.67–1.17)
EGFRCR SERPLBLD CKD-EPI 2021: 52 ML/MIN/1.73M2
ERYTHROCYTE [DISTWIDTH] IN BLOOD BY AUTOMATED COUNT: 14.6 % (ref 10–15)
FERRITIN SERPL-MCNC: 1062 NG/ML (ref 31–409)
GLUCOSE BLDC GLUCOMTR-MCNC: 166 MG/DL (ref 70–99)
GLUCOSE BLDC GLUCOMTR-MCNC: 182 MG/DL (ref 70–99)
GLUCOSE BLDC GLUCOMTR-MCNC: 187 MG/DL (ref 70–99)
GLUCOSE BLDC GLUCOMTR-MCNC: 214 MG/DL (ref 70–99)
GLUCOSE BLDC GLUCOMTR-MCNC: 216 MG/DL (ref 70–99)
GLUCOSE BLDC GLUCOMTR-MCNC: 230 MG/DL (ref 70–99)
GLUCOSE BLDC GLUCOMTR-MCNC: 259 MG/DL (ref 70–99)
GLUCOSE SERPL-MCNC: 207 MG/DL (ref 70–99)
GRAM STAIN RESULT: NORMAL
GRAM STAIN RESULT: NORMAL
HCO3 SERPL-SCNC: 23 MMOL/L (ref 22–29)
HCT VFR BLD AUTO: 30.8 % (ref 40–53)
HGB BLD-MCNC: 9.7 G/DL (ref 13.3–17.7)
IRON BINDING CAPACITY (ROCHE): 137 UG/DL (ref 240–430)
IRON SATN MFR SERPL: 17 % (ref 15–46)
IRON SERPL-MCNC: 23 UG/DL (ref 61–157)
MCH RBC QN AUTO: 25.9 PG (ref 26.5–33)
MCHC RBC AUTO-ENTMCNC: 31.5 G/DL (ref 31.5–36.5)
MCV RBC AUTO: 82 FL (ref 78–100)
PHOSPHATE SERPL-MCNC: 2.3 MG/DL (ref 2.5–4.5)
PLATELET # BLD AUTO: 282 10E3/UL (ref 150–450)
POTASSIUM SERPL-SCNC: 5.2 MMOL/L (ref 3.4–5.3)
RBC # BLD AUTO: 3.75 10E6/UL (ref 4.4–5.9)
SODIUM SERPL-SCNC: 136 MMOL/L (ref 135–145)
TACROLIMUS BLD-MCNC: 3 UG/L (ref 5–15)
TME LAST DOSE: ABNORMAL H
TME LAST DOSE: ABNORMAL H
WBC # BLD AUTO: 7 10E3/UL (ref 4–11)

## 2025-03-10 PROCEDURE — 99233 SBSQ HOSP IP/OBS HIGH 50: CPT

## 2025-03-10 PROCEDURE — 97116 GAIT TRAINING THERAPY: CPT | Mod: GP

## 2025-03-10 PROCEDURE — 82565 ASSAY OF CREATININE: CPT | Performed by: STUDENT IN AN ORGANIZED HEALTH CARE EDUCATION/TRAINING PROGRAM

## 2025-03-10 PROCEDURE — G0463 HOSPITAL OUTPT CLINIC VISIT: HCPCS

## 2025-03-10 PROCEDURE — 82947 ASSAY GLUCOSE BLOOD QUANT: CPT | Performed by: STUDENT IN AN ORGANIZED HEALTH CARE EDUCATION/TRAINING PROGRAM

## 2025-03-10 PROCEDURE — 97530 THERAPEUTIC ACTIVITIES: CPT | Mod: GP

## 2025-03-10 PROCEDURE — 99232 SBSQ HOSP IP/OBS MODERATE 35: CPT | Performed by: STUDENT IN AN ORGANIZED HEALTH CARE EDUCATION/TRAINING PROGRAM

## 2025-03-10 PROCEDURE — 93926 LOWER EXTREMITY STUDY: CPT | Mod: RT

## 2025-03-10 PROCEDURE — 250N000013 HC RX MED GY IP 250 OP 250 PS 637: Performed by: STUDENT IN AN ORGANIZED HEALTH CARE EDUCATION/TRAINING PROGRAM

## 2025-03-10 PROCEDURE — 120N000002 HC R&B MED SURG/OB UMMC

## 2025-03-10 PROCEDURE — 82040 ASSAY OF SERUM ALBUMIN: CPT | Performed by: STUDENT IN AN ORGANIZED HEALTH CARE EDUCATION/TRAINING PROGRAM

## 2025-03-10 PROCEDURE — 36415 COLL VENOUS BLD VENIPUNCTURE: CPT | Performed by: STUDENT IN AN ORGANIZED HEALTH CARE EDUCATION/TRAINING PROGRAM

## 2025-03-10 PROCEDURE — 83540 ASSAY OF IRON: CPT

## 2025-03-10 PROCEDURE — 250N000012 HC RX MED GY IP 250 OP 636 PS 637: Performed by: PHYSICIAN ASSISTANT

## 2025-03-10 PROCEDURE — 82728 ASSAY OF FERRITIN: CPT

## 2025-03-10 PROCEDURE — 84132 ASSAY OF SERUM POTASSIUM: CPT | Performed by: STUDENT IN AN ORGANIZED HEALTH CARE EDUCATION/TRAINING PROGRAM

## 2025-03-10 PROCEDURE — 80197 ASSAY OF TACROLIMUS: CPT | Performed by: STUDENT IN AN ORGANIZED HEALTH CARE EDUCATION/TRAINING PROGRAM

## 2025-03-10 PROCEDURE — 250N000013 HC RX MED GY IP 250 OP 250 PS 637: Performed by: PHYSICIAN ASSISTANT

## 2025-03-10 PROCEDURE — 99221 1ST HOSP IP/OBS SF/LOW 40: CPT | Performed by: NURSE PRACTITIONER

## 2025-03-10 PROCEDURE — 250N000013 HC RX MED GY IP 250 OP 250 PS 637: Performed by: INTERNAL MEDICINE

## 2025-03-10 PROCEDURE — 83550 IRON BINDING TEST: CPT

## 2025-03-10 PROCEDURE — 85018 HEMOGLOBIN: CPT | Performed by: STUDENT IN AN ORGANIZED HEALTH CARE EDUCATION/TRAINING PROGRAM

## 2025-03-10 PROCEDURE — 93926 LOWER EXTREMITY STUDY: CPT | Mod: 26 | Performed by: STUDENT IN AN ORGANIZED HEALTH CARE EDUCATION/TRAINING PROGRAM

## 2025-03-10 RX ADMIN — MYCOPHENOLATE MOFETIL 500 MG: 500 TABLET, FILM COATED ORAL at 09:00

## 2025-03-10 RX ADMIN — INSULIN ASPART 3 UNITS: 100 INJECTION, SOLUTION INTRAVENOUS; SUBCUTANEOUS at 15:18

## 2025-03-10 RX ADMIN — LIDOCAINE 4% 1 PATCH: 40 PATCH TOPICAL at 20:36

## 2025-03-10 RX ADMIN — EMPAGLIFLOZIN 10 MG: 10 TABLET, FILM COATED ORAL at 08:59

## 2025-03-10 RX ADMIN — TAMSULOSIN HYDROCHLORIDE 0.4 MG: 0.4 CAPSULE ORAL at 08:59

## 2025-03-10 RX ADMIN — APIXABAN 5 MG: 5 TABLET, FILM COATED ORAL at 08:58

## 2025-03-10 RX ADMIN — APIXABAN 5 MG: 5 TABLET, FILM COATED ORAL at 20:34

## 2025-03-10 RX ADMIN — ATORVASTATIN CALCIUM 20 MG: 20 TABLET, FILM COATED ORAL at 20:34

## 2025-03-10 RX ADMIN — MYCOPHENOLATE MOFETIL 500 MG: 500 TABLET, FILM COATED ORAL at 20:34

## 2025-03-10 RX ADMIN — PANTOPRAZOLE SODIUM 40 MG: 40 TABLET, DELAYED RELEASE ORAL at 08:58

## 2025-03-10 RX ADMIN — METOPROLOL SUCCINATE 75 MG: 25 TABLET, EXTENDED RELEASE ORAL at 08:58

## 2025-03-10 RX ADMIN — TACROLIMUS 1.5 MG: 1 CAPSULE ORAL at 19:19

## 2025-03-10 RX ADMIN — SODIUM BICARBONATE 650 MG TABLET 1300 MG: at 20:34

## 2025-03-10 RX ADMIN — LATANOPROST 1 DROP: 50 SOLUTION OPHTHALMIC at 20:35

## 2025-03-10 RX ADMIN — TACROLIMUS 1.5 MG: 1 CAPSULE ORAL at 08:56

## 2025-03-10 RX ADMIN — INSULIN GLARGINE 5 UNITS: 100 INJECTION, SOLUTION SUBCUTANEOUS at 19:16

## 2025-03-10 RX ADMIN — INSULIN ASPART 2 UNITS: 100 INJECTION, SOLUTION INTRAVENOUS; SUBCUTANEOUS at 06:36

## 2025-03-10 RX ADMIN — SODIUM BICARBONATE 650 MG TABLET 1300 MG: at 08:54

## 2025-03-10 ASSESSMENT — ACTIVITIES OF DAILY LIVING (ADL)
ADLS_ACUITY_SCORE: 73

## 2025-03-10 NOTE — PLAN OF CARE
[id number]    Goal Outcome Evaluation:      Plan of Care Reviewed With: patient    Overall Patient Progress: no changeOverall Patient Progress: no change    Patient is A&O x3, intermittent confusion noted, calm and cooperative. Able to make needs known. Not OOB during the shift. Primofit in place draining clear yellow urine, patient had mixed urinary incontinence at times. LBM: 03/10, pale brown soft stool.    RA. Moderate consistent carb diet. L PIV, SL. Denies pain and other discomfort. L BKA, Dark black spots on 1st-3rd toe on R foot, elevated with pillows. Redness on scrotal area, barrier cream applied. Bed alarm on for safety. Contact precaution maintained. Continue with POC.    /71 (BP Location: Left arm, Patient Position: Semi-Osorio's, Cuff Size: Adult Regular)   Pulse 74   Temp 98.8  F (37.1  C) (Oral)   Resp 19   Ht 1.829 m (6')   Wt 67.8 kg (149 lb 7.6 oz)   SpO2 94%   BMI 20.27 kg/m      Herb Stuart RN on 3/10/2025 at 7:01 AM

## 2025-03-10 NOTE — CONSULTS
[facility]  WO Nurse Inpatient Assessment     Consulted for: right 3rd digit    Summary: hx of frost bite per pt report, no date of initial injury given     WO nurse follow-up plan: signing off    Patient History (according to provider note(s):      Tad Zaman is a 64 year old male admitted on 2/25/2025 for AMS. He has a history of diabetes, HTN, CAD, HF, and renal transplant 9/2022, left AKA, CKD.    Patient was brought in by family for AMS x 1-2 months. He has been evaluated at the [facility] multiple times in ED then was evaluated during an admission 1/20-2/21/25 with very broad workup (see below) that did not amount to any type of notable etiology of his symptoms. During this stay he was also treated for HF exacerbation, MOY, and UTI which were all stable at time of discharge. He finished course of cefdinir for e coli UTI on 2/24. He was discharged with family being told that he has failure to thrive. He was discharged home, required 24 hr cares by daughter including changing him and preparation of food. He has also has had intermittent agitation and aggression. Home health nurse stated that he was not appropriate for their program.      Regarding the R third finger, he has some edema over the pip extending to the finger tip with some skin breakdown where the skin appears to dehisced. He apparently had an area of frost bite to this finger previously. No erythema, purulence or decreased ROM of the finger on exam.   - WOCN consult for wound care recommendations     Assessment:      Areas visualized during today's visit: Focused: right hand     Wound location: right hand             Last photo: 3/10  Wound due to:  frost bite   Wound history/plan of care: family has been caring for at home using sween cream/ Aquaphor   Wound base: 100 % Epidermis     Palpation of the wound bed: normal      Drainage: none     Description of drainage: none     Measurements  (length x width x depth, in cm): small red/ purple connor on christianson surface. Possible deroofed blister with dry epidermis vs scabbing.      Tunneling: N/A     Undermining: N/A  Periwound skin: Intact      Color: normal and consistent with surrounding tissue      Temperature: normal   Odor: none  Pain: mild,  numbness or tingling on left side of finger nail  Pain interventions prior to dressing change: N/A  Treatment goal: Infection control/prevention and Protection  STATUS: initial assessment  Supplies ordered: at bedside, discussed with RN, and discussed with patient        Treatment Plan:     Right hand  wound(s): BID and PRN cleanse with soap and water and pat dry. Apply sween cream/ Aquaphor over area. Okay to leave IAN.      Orders: Written    RECOMMEND PRIMARY TEAM ORDER: None, at this time if pt is concerned with numbness following discharge would defer to possible Ortho vs other specialty specializing in frost bite to hands..   Education provided: plan of care  Discussed plan of care with: Patient, Family, and Nurse  Notify WOC if wound(s) deteriorate.  Nursing to notify the Provider(s) and re-consult the WOC Nurse if new skin concern.    DATA:     Current support surface: Standard  Standard gel mattress (Isoflex)  Containment of urine/stool: Incontinent pad in bed  BMI: Body mass index is 19.85 kg/m .   Active diet order: Orders Placed This Encounter      Moderate Consistent Carb (60 g CHO per Meal) Diet     Output: I/O last 3 completed shifts:  In: 1031 [P.O.:1011; I.V.:20]  Out: 1760 [Urine:1760]     Labs:   Recent Labs   Lab 03/10/25  0744 03/06/25  1023 03/05/25  0636 03/04/25  0540 03/03/25  0939   ALBUMIN 3.3*   < > 3.4*   < >  --    HGB 9.7*  --  9.7*  --  11.0*   INR  --   --  1.29*  --   --    WBC 7.0  --  7.4  --  8.7   A1C  --   --   --   --  7.9*    < > = values in this interval not displayed.     Pressure injury risk assessment:   Sensory Perception: 3-->slightly limited  Moisture:  3-->occasionally moist  Activity: 3-->walks occasionally  Mobility: 3-->slightly limited  Nutrition: 3-->adequate  Friction and Shear: 2-->potential problem  Blair Score: 17    Marielle Velazquez RN CWOCN   Pager no longer is use, please contact through [employer] group: [facility] Nurse [facility]  Dept. Office Number: [phone]

## 2025-03-10 NOTE — PLAN OF CARE
Goal Outcome Evaluation:           Overall Patient Progress: improvingOverall Patient Progress: improving     Shift:2321-9836    Pt is calm and cooperative, with a flat affect and irritable mood. Patient is alert and oriented x 3-4.On RA stable. LBM 03/10. Pt is able to make his needs known, has call light within reach and uses it appropriately. Denies pain. US scheduled for ischemia on R foot completed this afternoon. Possible d/c pending results. Family at bedside. Continue with POC.      /70 (BP Location: Left arm)   Pulse 72   Temp 98.7  F (37.1  C) (Oral)   Resp 18   Ht 1.829 m (6')   Wt 66.4 kg (146 lb 6.2 oz)   SpO2 98%   BMI 19.85 kg/m

## 2025-03-10 NOTE — H&P (VIEW-ONLY)
VASCULAR SURGERY INPATIENT CONSULTATION / Initial In-Patient visit    VASCULAR SURGEON: Dr. Kidd    LOCATION: [facility]    Tad Zaman  Medical Record #:  [id number]  YOB: 1960  Age:  64 year old     Date of Service: 3/10/25    PRIMARY CARE PROVIDER: Julius Cesar    Reason for consultation: PAD, potentially worsening ischemia on Right Foot    IMPRESSION / RECOMMENDATION:   Tad Zaman is a 64-year-old male with PMH significant for T2DM (A1C 6.9% in 2024), HFrEF, HTN, MI, HLD, ESRD s/p renal transplant (9/2022), CKD stage 3b (creat baseline 1.8-2.3), left BKA, and PAD recently admitted with altered mental status found to have blue toes on RLE, concern for worsening PAD.     Mr. Zaman is found to have motor and sensory function intact on RLE, with palpable 2+ popliteal, 2+ femoral pulse, faint Doppler DP and brisk Doppler PT. Denies claudication, denies rest pain.    -Recommend ABIs and arterial duplex of RLE (have been ordered on 3/9, on 3/10 still not done, changed order to STAT)  -Best medical therapy for peripheral arterial disease includes aspirin (<= 81 mg daily) as antiplatelet therapy if no medical contraindications exist, high-dose statin with goal LDL < 70 mg/dL, hypertension management with < 130/80 mmHg, and strict glycemic management with goal Hgb A1C < 7.0%  -Keep right foot warm with warming blankets  -Neurovascular checks per unit protocol  -Vascular surgery will review arterial studies and ABIs once completed and will connect with primary team.    Discussed pt history, exam, assessment and plan with vascular surgery staff who is in agreement with the above.     Thank you for involving vascular surgery in the care of Tad Zaman. Should any questions or concerns arise, please don't hesitate to contact us.    Piedad Lloyd CNP  Vascular Surgery  Pager: [phone]  [email or web address]  Send message or 10 digit call back number  Securely via [employer] with the [employer] Web Console (learn more here)    Please page me directly with any questions/concerns during regular weekday hours before 3PM. If there is no response, if it is a weekend, or if it is during evening hours, then please use the TradingScreen system to page the first-call resident on call for vascular surgery.         HPI:  Tad Zamna is a 64 year old male who was seen today in consultation for bluish discoloration on right foot concerning for PAD. Upon extensive chart review, [facility], demonstrating TBI of 0.74, diminished PPG's and noncompressible ABIs. Arterial duplex from April 2, 2024 was significant for tardus parvus monophasic waveforms at the peroneal-PT trunk, the rest of the vessels had multiphasic waveforms. The PT at ankle was 80 cm/s and AT at ankle was 57 cm/s. Today, the Doppler DP signal is faint, and Doppler PT is brisk. Patient has been off aspirin/antiplatelet therapy during this hospitalization for unknown reasons. Family noted that his right foot toe discoloration has been present for a couple of days, patient is on anticoagulation for nonvalvular A-fib. Denies rest pain, denies claudication. Patient has prosthesis of left BKA, notes that he walks around the hospital without difficulty. Denies recent fevers/chills, he has no leukocytosis. Motor and sensory function intact, dorsiflexion plantarflexion 5/5. Denies accidental injury to right foot. He does have frostbite injuries to right hand, this occurred in January 2025. No frost-bite to foot.     PHH:    Past Medical History:   Diagnosis Date    Chronic kidney disease     Diabetes (H)     Hypertension     Myocardial infarction (H)        History reviewed. No pertinent surgical history.     ALLERGIES:     Allergies   Allergen Reactions    Penicillins Hives        MEDS:    Current Facility-Administered Medications:     acetaminophen (TYLENOL) tablet 650 mg, 650 mg, Oral, Q4H PRN, 650  mg at 03/05/25 2113 **OR** acetaminophen (TYLENOL) Suppository 650 mg, 650 mg, Rectal, Q4H PRN, Becca Nino PA-C    apixaban ANTICOAGULANT (ELIQUIS) tablet 5 mg, 5 mg, Oral, BID, Lucía Chapman, DO, 5 mg at 03/10/25 0858    atorvastatin (LIPITOR) tablet 20 mg, 20 mg, Oral, QPM, Becca Nino PA-C, 20 mg at 03/09/25 2111    [Held by provider] calcitRIOL (ROCALTROL) capsule 0.25 mcg, 0.25 mcg, Oral, Daily, Becca Nino PA-C, 0.25 mcg at 02/26/25 0822    calcium carbonate (TUMS) chewable tablet 1,000 mg, 1,000 mg, Oral, 4x Daily PRN, Becca Nino PA-C    glucose gel 15-30 g, 15-30 g, Oral, Q15 Min PRN **OR** dextrose 50 % injection 25-50 mL, 25-50 mL, Intravenous, Q15 Min PRN **OR** glucagon injection 1 mg, 1 mg, Subcutaneous, Q15 Min PRN, Hayder Dubose APRN CNP    empagliflozin (JARDIANCE) tablet 10 mg, 10 mg, Oral, Daily, Basilio Martines MD, 10 mg at 03/10/25 0859    insulin aspart (NovoLOG) injection (RAPID ACTING), 1-7 Units, Subcutaneous, TID AC, Hayder Dubose APRN CNP, 2 Units at 03/10/25 0636    insulin aspart (NovoLOG) injection (RAPID ACTING), 1-5 Units, Subcutaneous, At Bedtime, Hayder Dubose APRN CNP, 2 Units at 03/09/25 2108    insulin glargine (LANTUS PEN) injection 5 Units, 5 Units, Subcutaneous, At Bedtime, Lucía Chapman, DO, 5 Units at 03/09/25 1747    latanoprost (XALATAN) 0.005 % ophthalmic solution 1 drop, 1 drop, Both Eyes, At Bedtime, Foster Cruz MD, 1 drop at 03/09/25 2109    Lidocaine (LIDOCARE) 4 % Patch 1 patch, 1 patch, Transdermal, Q24h, eBcca Nino PA-C, 1 patch at 03/09/25 2109    lidocaine (LMX4) cream, , Topical, Q1H PRN, Becca Nino PA-C    lidocaine (XYLOCAINE) 2 % external gel, , Urethral, Once PRN, Lucía Chapman, DO    lidocaine 1 % 0.1-1 mL, 0.1-1 mL, Other, Q1H PRN, Becca Nino PA-C    LORazepam (ATIVAN) injection 0.5 mg, 0.5 mg, Intravenous, Once PRN, Lucía Chapman, DO    melatonin tablet 3 mg, 3 mg, Oral, At Bedtime PRN,  Clem Sears MD    metoprolol succinate ER (TOPROL XL) 24 hr tablet 75 mg, 75 mg, Oral, Daily, Becca Nino PA-C, 75 mg at 03/10/25 0858    mycophenolate (GENERIC EQUIVALENT) tablet 500 mg, 500 mg, Oral, BID IS, Becca Nino, PA-C, 500 mg at 03/10/25 0900    ondansetron (ZOFRAN ODT) ODT tab 4 mg, 4 mg, Oral, Q6H PRN **OR** ondansetron (ZOFRAN) injection 4 mg, 4 mg, Intravenous, Q6H PRN, Becca Nino, PA-C, 4 mg at 02/26/25 1115    pantoprazole (PROTONIX) EC tablet 40 mg, 40 mg, Oral, On license of [facility], MD, 40 mg at 03/10/25 0858    Patient is already receiving anticoagulation with heparin, enoxaparin (LOVENOX), warfarin (COUMADIN)  or other anticoagulant medication, , Does not apply, Continuous PRN, Becca Nino PA-C    senna-docusate (SENOKOT-S/PERICOLACE) 8.6-50 MG per tablet 1 tablet, 1 tablet, Oral, BID PRN **OR** senna-docusate (SENOKOT-S/PERICOLACE) 8.6-50 MG per tablet 2 tablet, 2 tablet, Oral, BID PRN, Becca Nino PA-C, 2 tablet at 03/07/25 0333    sodium bicarbonate tablet 1,300 mg, 1,300 mg, Oral, BID, Becca Nino PA-C, 1,300 mg at 03/10/25 0854    sodium chloride (PF) 0.9% PF flush 3 mL, 3 mL, Intracatheter, Q8H, Becca Nino PA-C, 3 mL at 03/10/25 0901    sodium chloride (PF) 0.9% PF flush 3 mL, 3 mL, Intracatheter, q1 min prn, Becca Nino PA-C, 3 mL at 03/01/25 2050    tacrolimus (GENERIC EQUIVALENT) capsule 1.5 mg, 1.5 mg, Oral, BID IS, Nino, Becca WALDEN PA-C, 1.5 mg at 03/10/25 0856    tamsulosin (FLOMAX) capsule 0.4 mg, 0.4 mg, Oral, Daily, Lucía Chapman DO, 0.4 mg at 03/10/25 0859    [Held by provider] torsemide (DEMADEX) tablet 10 mg, 10 mg, Oral, BID, Foster Cruz MD, 10 mg at 03/05/25 0928     SOCIAL HABITS:    Tobacco Use      Smoking status: None      Smokeless tobacco: None     Social History    Substance and Sexual Activity      Alcohol use: Not on file       History   Drug Use Not on file        FAMILY HISTORY:  History reviewed. No pertinent family history.    REVIEW OF SYSTEMS:     A 12 point ROS was reviewed and except for what is listed in the HPI above, all others are negative    PE:    Vital signs:  Temp: 98.8  F (37.1  C) Temp src: Oral BP: 119/71 Pulse: 74   Resp: 19 SpO2: 94 % O2 Device: None (Room air)   Height: 182.9 cm (6') Weight: 66.4 kg (146 lb 6.2 oz)  Estimated body mass index is 19.85 kg/m  as calculated from the following:    Height as of this encounter: 1.829 m (6').    Weight as of this encounter: 66.4 kg (146 lb 6.2 oz).       Wt Readings from Last 1 Encounters:   03/10/25 66.4 kg (146 lb 6.2 oz)     Body mass index is 19.85 kg/m .    EXAM:  GENERAL: This is a well-developed 64 year old male who appears his stated age  CARDIAC:   Irregularly irregular  CHEST/LUNG:  Clear lung fields bilaterally  GASTROINTESINAL (ABDOMEN):Soft, non-tender, no pulsatile mass   MUSCULOSKELETAL: Grossly normal and both lower extremities are intact.  HEME/LYMPH: No lymphedema  NEUROLOGIC: Focally intact, Alert and oriented x 3.   PSYCH: appropriate affect  INTEGUMENT: No open lesions or ulcers  VASCULAR: Motor and sensory function intact on RLE, with palpable 2+ popliteal, 2+ femoral pulse, faint Doppler DP and brisk Doppler PT.    DIAGNOSTIC STUDIES:     Images:  Arterial duplex and ABIs are pending    LABS:      Sodium   Date Value Ref Range Status   03/10/2025 136 135 - 145 mmol/L Final   03/09/2025 138 135 - 145 mmol/L Final   03/08/2025 137 135 - 145 mmol/L Final     Urea Nitrogen   Date Value Ref Range Status   03/10/2025 54.3 (H) 8.0 - 23.0 mg/dL Final   03/09/2025 57.7 (H) 8.0 - 23.0 mg/dL Final   03/08/2025 55.9 (H) 8.0 - 23.0 mg/dL Final     Hemoglobin   Date Value Ref Range Status   03/10/2025 9.7 (L) 13.3 - 17.7 g/dL Final   03/05/2025 9.7 (L) 13.3 - 17.7 g/dL Final   03/03/2025 11.0 (L) 13.3 - 17.7 g/dL Final     Platelet Count   Date Value Ref Range Status   03/10/2025 282 150 - 450 10e3/uL Final   03/05/2025 259 150 - 450 10e3/uL Final   03/03/2025 292 150 - 450 10e3/uL Final      INR   Date Value Ref Range Status   03/05/2025 1.29 (H) 0.85 - 1.15 Final

## 2025-03-10 NOTE — PROGRESS NOTES
[facility]    Medicine Progress Note - Hospitalist Service, GOLD TEAM 22    Date of Admission:  2/25/2025    Assessment & Plan   Tad Zaman is a 64 year old male admitted on 2/25/2025 for AMS. He has a history of diabetes, HTN, CAD, HF, and renal transplant 9/2022, left AKA, CKD.    Patient was brought in by family for AMS x 1-2 months. He has been evaluated at the [facility] multiple times in ED then was evaluated during an admission 1/20-2/21/25 with very broad workup (see below) that did not amount to any type of notable etiology of his symptoms. During this stay he was also treated for HF exacerbation, MOY, and UTI which were all stable at time of discharge. He finished course of cefdinir for e coli UTI on 2/24. He was discharged with family being told that he has failure to thrive. He was discharged home, required 24 hr cares by daughter including changing him and preparation of food. He has also has had intermittent agitation and aggression. Home health nurse stated that he was not appropriate for their program.     No clear etiology for confusion but the aggression and refusal of cares have clinically improved without intervention. Course complicated by new cyanotic areas on the R toes concerning for progression of PAD. Vascular surgery consulted and arterial imaging studies pending.      Plan today:   - Vascular surgery consulted, ordered AYAN and arterial dopplers   - WOCN consult for R third finger wound     AMS  Cognitive impairment vs encephalopathy   Presented with 1-2 months of progressive encephalopathy and agitation with aggressive behavior toward family who is caring for him at home. [facility] Initial LP with pleocytosis, otherwise unrevealing. MRI brain without acute pathology. Neurology consulted. Some concern for hx of viral encephalitis that has since resolved leaving him with a degree of impairment. He otherwise  remains hemodynamically stable without clinical evidence of infection, no gross electrolyte abnormality, stable glucose (elevated but without acidosis), BUN is improving, tacrolimus levels WNL.   [facility]:   - MRI 1/29: Stable mild amount of chronic small vessel ischemia. Small chronic R cerebellar infarct.   -Psych consult 2/13: recommended removing capacity for medical decision making, recommended palliative care. No medication changes   -OT SLUMS 6/30   -EEG: nonspecific encephalopathy.   -LP: protein 173, glucose 110. Cell count with 21 WBC, 41 RBC, 52 lymph. Hazy appearance  -Encephalopathy paraneoplastic evaluation (including Purkinje, ADI, antiglial nucl antibody, NMDA, BOSSMAN) 1/30/25 negative.   -CSF flow cytometry: suboptimal specimen with low cellularity. No immunophenotypic evidence of involvement by lymphoma. Kensal light chain 64.8, lambda light chain 35.1, kappa/lambda 1.85 all elevated. No monoclonals detected (1/22)  -ELP/Immunofix, serum panel: total protein low, beta 1 fraction low (1/22/25)  -Polyoma virus DNA of CSF, MAGNUS virus DNA (1/28) negative.   -Meningitis / encephalitis panel (1/28) negative  -CSF angiotensin converting enzyme (1/28) negative negative  -CMV (1/28, 1/25) negative   -BK virus (1/28) negative  -EBV (1/25) negative  -Cryptococcus (1/26) negative; Cryptococcus neoformans (1/28) negative  -Syphilis (1/25) negative  -HIV (1/25) negative  -B12 (1/18) WNL  -TSH (1/18) high side of normal 4.94  - Drug screen negative. Patient has distant history of what sounds to be mild to moderate alcohol intake.     Plan:   Neurology following:   - Brain MRI without evidence of acute infarction or other acute intracranial pathology. Stable chronic R cerebellar infarct.   - Delirium precautions as able.   - repeat LP per CAPs on 3/5 completed - repeat CSF unremarkable (negative cytometry, cytology negative).    - pending studies include: West Nile virus, VDRL, aerobic and anaerobic cultures    - He will need follow up with outpatient neurology and consider neuropsychology testing  - Per neuro: can consider EEG in outpatient setting if cognition were to worsen     # Renal transplant 9/2022   # CKD Stage 3b   [facility] 1/27 renal US with doppler showed no stenosis of vessels. Resistive indices were increased, felt could be related to ureteric obstruction but not specific. Mild dilation of intrarenal collecting system and ureter was also seen. CT a/p 1/28 W/O contrast suggested mild hydro. [facility]/25 at [facility] showed no hydro or other abnormalities. FENA 4.3 (1/28) at [facility]. Creat 2.26 today. Creat recent baseline appears to be 1.8-2.3. Peak with recent MOY was up to 2.7, improved to 2.3 at time of discharge 2/21.  - transplant neph following the patient  Recommendations:   - Continue mycophenolate 500 mg twice a day.  - Continue tacrolimus 1.5 mg twice a day.  - Tacro level ordered for 3/4/2025  - Hold calcitriol given borderline high calcium. Will follow up calcium levels on next renal panel (ordered)  - Ordered UA and UPCR/UACR 3/3/2025 - trace leuk esterase and glucose present. No evidence of infection.   - Ordered Kidney Transplant US 3/3/2025 (routine) - mild hydronephrosis of transplanted kidney, torturous renal artery with mildly elevated velocity at the anastomosis, likely chronic.   - Check hepatitis panel (ordered for 3/4/2025) - HCV positive, known history and cured, otherwise negative.   - Accurate I/O and daily weight given recent heart failure exacerbation.  - Plan for urinary retention per below    #Urinary retention  Noted to have ongoing retention requiring straight cath. Unclear whether this is a new issue for him. No significant anticholinergics on his current medication list.   - Tamsulosin 0.4mg PO initiated on 3/4; as tolerated   - Avoid anticholinergic medications as able.   - TOV on  3/8 with close monitoring and low threshold to replace      # Troponinemia, stable  # Lateral lead T wave inversions  # Atrial flutter  # HFrEF  # HTN  # Hx MI  Limited cardiac history available. Per patient's daughter he may have had a stent placed at 1 point but is unsure.  Patient and daughters deny any history of cardiac arrhythmia.  However on exam today and on EKG evidence of atrial flutter, and patient also on Eliquis at least since December.  [facility], he was found to have heart failure exacerbation with 25-30 lbs fluid excess. BNP during hospitalization was 2868. His Lasix was switched to Torsemide. He has been taking his medications per his daughter.   - BNP 47278 here.   - trop 267>>218  - EKG with T wave inversions and mild ST depressions in lateral leads  - denies recent CP or SOB, no peripheral or abdominal edema  Plan:   - TTE as below.   - No report of chest pain. Troponin trended down   - cont PTA atorvastatin 20 mg nightly  - restart PTA Eliquis 5 mg BID on 3/6  - cont PTA Jardiance 12.5 mg daily  - continue PTA metoprolol 75 mg daily  - continue PTA torsemide 10 mg BID     TTE  Interpretation Summary  Left ventricular function is decreased. The ejection fraction is 40-45%  (mildly reduced).  Diastolic function not assessed due to arrhythmia.  Right ventricular function, chamber size, wall motion, and thickness are  normal.  Mild tricuspid insufficiency is present.  Mild (pulmonary artery systolic pressure<50mmHg) pulmonary hypertension is  present.  IVC diameter and respiratory changes fall into an intermediate range  suggesting an RA pressure of 8 mmHg.  Trivial pericardial effusion is present.  There is no prior study for direct comparison.     # T2DM  Not on long-term insulin. Hemoglobin A1c 6.9% as of 7/2024. Hyperglycemia when he allowed check on 3/4. He has been amenable to glucose checks and insulin this admission.   - cont PTA Jardiance 12.5 mg daily  -  Repeat Hemoglobin A1c 7.9  - Initiated sliding scale insulin on 3/4 with TID AC and 0200 glucose checks -> increased to HDSSI on 3/10   - Will discuss with family whether they feel they would be able to manage sliding scale insulin at home.   - Carb consistent diet   - Given sliding scale insulin needs but would like to simplify this on discharge and will start basal Lantus at 5 units (half of what would be calculated 0.2units/kg) and uptitrate based on fasting AM sugars.      # Mild normocytic anemia  # Hx hepatitis C, s/p treatment with cure   # Peripheral vascular disease: Vascular follow up on discharge.   # S/p L BKA  # Glaucoma - cont PTA eyedrops  # Sickle cell trait             Diet: Moderate Consistent Carb (60 g CHO per Meal) Diet  Snacks/Supplements Adult: Nepro Oral Supplement; With Meals    DVT Prophylaxis: DOAC  Zaman Catheter: Not present  Lines: None     Cardiac Monitoring: None  Code Status: Full Code      Clinically Significant Risk Factors               # Hypoalbuminemia: Lowest albumin = 3.2 g/dL at 3/6/2025 10:23 AM, will monitor as appropriate               # DMII: A1C = 7.9 % (Ref range: <5.7 %) within past 6 months       # Financial/Environmental Concerns: none         Social Drivers of Health    Tobacco Use: Medium Risk (7/26/2024)    Received from [facility]    Patient History     Smoking Tobacco Use: Former     Smokeless Tobacco Use: Never          Disposition Plan     Medically Ready for Discharge: Anticipated Tomorrow             Lucía Chapman DO  Hospitalist Service, GOLD TEAM 22  [facility]  Securely message with [employer] (more info)  Text page via [facility] Paging/Directory   See signed in provider for up to date coverage information  ______________________________________________________________________    Interval History   No acute events overnight. He feels well today but is more frustrated that he is still in the hospital. His MICKI  third finger and right foot feel unchanged. He otherwise feels well, no concerns.     Physical Exam   Vital Signs: Temp: 98.8  F (37.1  C) Temp src: Oral BP: 119/71 Pulse: 74   Resp: 19 SpO2: 94 % O2 Device: None (Room air)    Weight: 146 lbs 6.17 oz    General: lying in bed with sheet over his head, removes with voice and pleasant, no acute distress.    CV: RRR, no murmurs appreciated.   LUNGS: no increased WOB.   ABD: soft, nontender.   SKIN: Warm, well perfused. Evidence of cyanosis distal toes 1-3 on the R. Faint erythematous changes over he dorsal foot and ankle.   MSK: L BKA. R foot is warm to the touch, DP pulse is faint.   Neuro: alert and interactive, confused. No gross deficits.     Medical Decision Making           Data     I have personally reviewed the following data over the past 24 hrs:    7.0  \   9.7 (L)   / 282     136 104 54.3 (H) /  187 (H)   5.2 23 1.47 (H) \     ALT: N/A AST: N/A AP: N/A TBILI: N/A   ALB: 3.3 (L) TOT PROTEIN: N/A LIPASE: N/A        Was The Patient On Physician Recommended Anticoagulation Therapy?: Please Select the Appropriate Response

## 2025-03-10 NOTE — CONSULTS
VASCULAR SURGERY INPATIENT CONSULTATION / Initial In-Patient visit    VASCULAR SURGEON: Dr. Kidd    LOCATION: [facility]    Tad Zaman  Medical Record #:  [id number]  YOB: 1960  Age:  64 year old     Date of Service: 3/10/25    PRIMARY CARE PROVIDER: Julius Cesar    Reason for consultation: PAD, potentially worsening ischemia on Right Foot    IMPRESSION / RECOMMENDATION:   Tad Zaman is a 64-year-old male with PMH significant for T2DM (A1C 6.9% in 2024), HFrEF, HTN, MI, HLD, ESRD s/p renal transplant (9/2022), CKD stage 3b (creat baseline 1.8-2.3), left BKA, and PAD recently admitted with altered mental status found to have blue toes on RLE, concern for worsening PAD.     Mr. Zaman is found to have motor and sensory function intact on RLE, with palpable 2+ popliteal, 2+ femoral pulse, faint Doppler DP and brisk Doppler PT. Denies claudication, denies rest pain.    -Recommend ABIs and arterial duplex of RLE (have been ordered on 3/9, on 3/10 still not done, changed order to STAT)  -Best medical therapy for peripheral arterial disease includes aspirin (<= 81 mg daily) as antiplatelet therapy if no medical contraindications exist, high-dose statin with goal LDL < 70 mg/dL, hypertension management with < 130/80 mmHg, and strict glycemic management with goal Hgb A1C < 7.0%  -Keep right foot warm with warming blankets  -Neurovascular checks per unit protocol  -Vascular surgery will review arterial studies and ABIs once completed and will connect with primary team.    Discussed pt history, exam, assessment and plan with vascular surgery staff who is in agreement with the above.     Thank you for involving vascular surgery in the care of Tad Zaman. Should any questions or concerns arise, please don't hesitate to contact us.    Piedad Lloyd CNP  Vascular Surgery  Pager: [phone]  [email or web address]  Send message or 10 digit call back number  Securely via Flickme with the Flickme Web Console (learn more here)    Please page me directly with any questions/concerns during regular weekday hours before 3PM. If there is no response, if it is a weekend, or if it is during evening hours, then please use the [facility] system to page the first-call resident on call for vascular surgery.         HPI:  Tad Zaman is a 64 year old male who was seen today in consultation for bluish discoloration on right foot concerning for PAD. Upon extensive chart review, [facility] demonstrating TBI of 0.74, diminished PPG's and noncompressible ABIs. Arterial duplex from April 2, 2024 was significant for tardus parvus monophasic waveforms at the peroneal-PT trunk, the rest of the vessels had multiphasic waveforms. The PT at ankle was 80 cm/s and AT at ankle was 57 cm/s. Today, the Doppler DP signal is faint, and Doppler PT is brisk. Patient has been off aspirin/antiplatelet therapy during this hospitalization for unknown reasons. Family noted that his right foot toe discoloration has been present for a couple of days, patient is on anticoagulation for nonvalvular A-fib. Denies rest pain, denies claudication. Patient has prosthesis of left BKA, notes that he walks around the hospital without difficulty. Denies recent fevers/chills, he has no leukocytosis. Motor and sensory function intact, dorsiflexion plantarflexion 5/5. Denies accidental injury to right foot. He does have frostbite injuries to right hand, this occurred in January 2025. No frost-bite to foot.     PHH:    Past Medical History:   Diagnosis Date    Chronic kidney disease     Diabetes (H)     Hypertension     Myocardial infarction (H)        History reviewed. No pertinent surgical history.     ALLERGIES:     Allergies   Allergen Reactions    Penicillins Hives        MEDS:    Current Facility-Administered Medications:     acetaminophen (TYLENOL) tablet 650 mg, 650 mg, Oral, Q4H PRN, 650  mg at 03/05/25 2113 **OR** acetaminophen (TYLENOL) Suppository 650 mg, 650 mg, Rectal, Q4H PRN, Becca Nino PA-C    apixaban ANTICOAGULANT (ELIQUIS) tablet 5 mg, 5 mg, Oral, BID, Lucía Chapman, DO, 5 mg at 03/10/25 0858    atorvastatin (LIPITOR) tablet 20 mg, 20 mg, Oral, QPM, Becca Nino PA-C, 20 mg at 03/09/25 2111    [Held by provider] calcitRIOL (ROCALTROL) capsule 0.25 mcg, 0.25 mcg, Oral, Daily, Becca Nino PA-C, 0.25 mcg at 02/26/25 0822    calcium carbonate (TUMS) chewable tablet 1,000 mg, 1,000 mg, Oral, 4x Daily PRN, Becca Nino PA-C    glucose gel 15-30 g, 15-30 g, Oral, Q15 Min PRN **OR** dextrose 50 % injection 25-50 mL, 25-50 mL, Intravenous, Q15 Min PRN **OR** glucagon injection 1 mg, 1 mg, Subcutaneous, Q15 Min PRN, Hayder Dubose APRN CNP    empagliflozin (JARDIANCE) tablet 10 mg, 10 mg, Oral, Daily, Basilio Martines MD, 10 mg at 03/10/25 0859    insulin aspart (NovoLOG) injection (RAPID ACTING), 1-7 Units, Subcutaneous, TID AC, Hayder Dubose APRN CNP, 2 Units at 03/10/25 0636    insulin aspart (NovoLOG) injection (RAPID ACTING), 1-5 Units, Subcutaneous, At Bedtime, Hayder Dubose APRN CNP, 2 Units at 03/09/25 2108    insulin glargine (LANTUS PEN) injection 5 Units, 5 Units, Subcutaneous, At Bedtime, Lucía Chapman, DO, 5 Units at 03/09/25 1747    latanoprost (XALATAN) 0.005 % ophthalmic solution 1 drop, 1 drop, Both Eyes, At Bedtime, Foster Cruz MD, 1 drop at 03/09/25 2109    Lidocaine (LIDOCARE) 4 % Patch 1 patch, 1 patch, Transdermal, Q24h, Becca Nino PA-C, 1 patch at 03/09/25 2109    lidocaine (LMX4) cream, , Topical, Q1H PRN, Becca Nino PA-C    lidocaine (XYLOCAINE) 2 % external gel, , Urethral, Once PRN, Lucía Chapman, DO    lidocaine 1 % 0.1-1 mL, 0.1-1 mL, Other, Q1H PRN, Becca Nino PA-C    LORazepam (ATIVAN) injection 0.5 mg, 0.5 mg, Intravenous, Once PRN, Lucía Chapman, DO    melatonin tablet 3 mg, 3 mg, Oral, At Bedtime PRN,  Clem Sears MD    metoprolol succinate ER (TOPROL XL) 24 hr tablet 75 mg, 75 mg, Oral, Daily, Becca Nino PA-C, 75 mg at 03/10/25 0858    mycophenolate (GENERIC EQUIVALENT) tablet 500 mg, 500 mg, Oral, BID IS, Becca Nino, PA-C, 500 mg at 03/10/25 0900    ondansetron (ZOFRAN ODT) ODT tab 4 mg, 4 mg, Oral, Q6H PRN **OR** ondansetron (ZOFRAN) injection 4 mg, 4 mg, Intravenous, Q6H PRN, Becca Nino, PA-C, 4 mg at 02/26/25 1115    pantoprazole (PROTONIX) EC tablet 40 mg, 40 mg, Oral, [facility] Hospital[facility] Kerwin, MD, 40 mg at 03/10/25 0858    Patient is already receiving anticoagulation with heparin, enoxaparin (LOVENOX), warfarin (COUMADIN)  or other anticoagulant medication, , Does not apply, Continuous PRN, Becca Nino PA-C    senna-docusate (SENOKOT-S/PERICOLACE) 8.6-50 MG per tablet 1 tablet, 1 tablet, Oral, BID PRN **OR** senna-docusate (SENOKOT-S/PERICOLACE) 8.6-50 MG per tablet 2 tablet, 2 tablet, Oral, BID PRN, Becca Nino PA-C, 2 tablet at 03/07/25 0333    sodium bicarbonate tablet 1,300 mg, 1,300 mg, Oral, BID, Becca Nino PA-C, 1,300 mg at 03/10/25 0854    sodium chloride (PF) 0.9% PF flush 3 mL, 3 mL, Intracatheter, Q8H, Becca Nino PA-C, 3 mL at 03/10/25 0901    sodium chloride (PF) 0.9% PF flush 3 mL, 3 mL, Intracatheter, q1 min prn, Becca Nino PA-C, 3 mL at 03/01/25 2050    tacrolimus (GENERIC EQUIVALENT) capsule 1.5 mg, 1.5 mg, Oral, BID IS, Nino, Becca WALDEN PA-C, 1.5 mg at 03/10/25 0856    tamsulosin (FLOMAX) capsule 0.4 mg, 0.4 mg, Oral, Daily, Lucía Chapman DO, 0.4 mg at 03/10/25 0859    [Held by provider] torsemide (DEMADEX) tablet 10 mg, 10 mg, Oral, BID, Foster Cruz MD, 10 mg at 03/05/25 0928     SOCIAL HABITS:    Tobacco Use      Smoking status: None      Smokeless tobacco: None     Social History    Substance and Sexual Activity      Alcohol use: Not on file       History   Drug Use Not on file        FAMILY HISTORY:  History reviewed. No pertinent family history.    REVIEW OF SYSTEMS:     A 12 point ROS was reviewed and except for what is listed in the HPI above, all others are negative    PE:    Vital signs:  Temp: 98.8  F (37.1  C) Temp src: Oral BP: 119/71 Pulse: 74   Resp: 19 SpO2: 94 % O2 Device: None (Room air)   Height: 182.9 cm (6') Weight: 66.4 kg (146 lb 6.2 oz)  Estimated body mass index is 19.85 kg/m  as calculated from the following:    Height as of this encounter: 1.829 m (6').    Weight as of this encounter: 66.4 kg (146 lb 6.2 oz).       Wt Readings from Last 1 Encounters:   03/10/25 66.4 kg (146 lb 6.2 oz)     Body mass index is 19.85 kg/m .    EXAM:  GENERAL: This is a well-developed 64 year old male who appears his stated age  CARDIAC:   Irregularly irregular  CHEST/LUNG:  Clear lung fields bilaterally  GASTROINTESINAL (ABDOMEN):Soft, non-tender, no pulsatile mass   MUSCULOSKELETAL: Grossly normal and both lower extremities are intact.  HEME/LYMPH: No lymphedema  NEUROLOGIC: Focally intact, Alert and oriented x 3.   PSYCH: appropriate affect  INTEGUMENT: No open lesions or ulcers  VASCULAR: Motor and sensory function intact on RLE, with palpable 2+ popliteal, 2+ femoral pulse, faint Doppler DP and brisk Doppler PT.    DIAGNOSTIC STUDIES:     Images:  Arterial duplex and ABIs are pending    LABS:      Sodium   Date Value Ref Range Status   03/10/2025 136 135 - 145 mmol/L Final   03/09/2025 138 135 - 145 mmol/L Final   03/08/2025 137 135 - 145 mmol/L Final     Urea Nitrogen   Date Value Ref Range Status   03/10/2025 54.3 (H) 8.0 - 23.0 mg/dL Final   03/09/2025 57.7 (H) 8.0 - 23.0 mg/dL Final   03/08/2025 55.9 (H) 8.0 - 23.0 mg/dL Final     Hemoglobin   Date Value Ref Range Status   03/10/2025 9.7 (L) 13.3 - 17.7 g/dL Final   03/05/2025 9.7 (L) 13.3 - 17.7 g/dL Final   03/03/2025 11.0 (L) 13.3 - 17.7 g/dL Final     Platelet Count   Date Value Ref Range Status   03/10/2025 282 150 - 450 10e3/uL Final   03/05/2025 259 150 - 450 10e3/uL Final   03/03/2025 292 150 - 450 10e3/uL Final      INR   Date Value Ref Range Status   03/05/2025 1.29 (H) 0.85 - 1.15 Final

## 2025-03-11 ENCOUNTER — APPOINTMENT (OUTPATIENT)
Dept: ULTRASOUND IMAGING | Facility: CLINIC | Age: 65
End: 2025-03-11
Attending: NURSE PRACTITIONER
Payer: MEDICARE

## 2025-03-11 ENCOUNTER — APPOINTMENT (OUTPATIENT)
Dept: ULTRASOUND IMAGING | Facility: CLINIC | Age: 65
DRG: 981 | End: 2025-03-11
Attending: NURSE PRACTITIONER
Payer: MEDICARE

## 2025-03-11 LAB
ALBUMIN SERPL BCG-MCNC: 3 G/DL (ref 3.5–5.2)
ANION GAP SERPL CALCULATED.3IONS-SCNC: 11 MMOL/L (ref 7–15)
APTT PPP: 35 SECONDS (ref 22–38)
BUN SERPL-MCNC: 51.9 MG/DL (ref 8–23)
CALCIUM SERPL-MCNC: 9.5 MG/DL (ref 8.8–10.4)
CHLORIDE SERPL-SCNC: 106 MMOL/L (ref 98–107)
CREAT SERPL-MCNC: 1.54 MG/DL (ref 0.67–1.17)
EGFRCR SERPLBLD CKD-EPI 2021: 49 ML/MIN/1.73M2
ERYTHROCYTE [DISTWIDTH] IN BLOOD BY AUTOMATED COUNT: 14.6 % (ref 10–15)
GLUCOSE BLDC GLUCOMTR-MCNC: 168 MG/DL (ref 70–99)
GLUCOSE BLDC GLUCOMTR-MCNC: 205 MG/DL (ref 70–99)
GLUCOSE BLDC GLUCOMTR-MCNC: 214 MG/DL (ref 70–99)
GLUCOSE BLDC GLUCOMTR-MCNC: 237 MG/DL (ref 70–99)
GLUCOSE BLDC GLUCOMTR-MCNC: 243 MG/DL (ref 70–99)
GLUCOSE SERPL-MCNC: 200 MG/DL (ref 70–99)
HCO3 SERPL-SCNC: 22 MMOL/L (ref 22–29)
HCT VFR BLD AUTO: 32.9 % (ref 40–53)
HGB BLD-MCNC: 10.2 G/DL (ref 13.3–17.7)
MCH RBC QN AUTO: 25.5 PG (ref 26.5–33)
MCHC RBC AUTO-ENTMCNC: 31 G/DL (ref 31.5–36.5)
MCV RBC AUTO: 82 FL (ref 78–100)
PHOSPHATE SERPL-MCNC: 2.6 MG/DL (ref 2.5–4.5)
PLATELET # BLD AUTO: 266 10E3/UL (ref 150–450)
POTASSIUM SERPL-SCNC: 4.9 MMOL/L (ref 3.4–5.3)
RBC # BLD AUTO: 4 10E6/UL (ref 4.4–5.9)
SODIUM SERPL-SCNC: 139 MMOL/L (ref 135–145)
TACROLIMUS BLD-MCNC: 3.7 UG/L (ref 5–15)
TME LAST DOSE: ABNORMAL H
TME LAST DOSE: ABNORMAL H
WBC # BLD AUTO: 6.9 10E3/UL (ref 4–11)
WNV IGG CSF IA-ACNC: 0.23 IV
WNV IGM CSF IA-ACNC: 0.06 IV

## 2025-03-11 PROCEDURE — 99233 SBSQ HOSP IP/OBS HIGH 50: CPT

## 2025-03-11 PROCEDURE — 85730 THROMBOPLASTIN TIME PARTIAL: CPT | Performed by: STUDENT IN AN ORGANIZED HEALTH CARE EDUCATION/TRAINING PROGRAM

## 2025-03-11 PROCEDURE — 80197 ASSAY OF TACROLIMUS: CPT | Performed by: STUDENT IN AN ORGANIZED HEALTH CARE EDUCATION/TRAINING PROGRAM

## 2025-03-11 PROCEDURE — 250N000013 HC RX MED GY IP 250 OP 250 PS 637: Performed by: INTERNAL MEDICINE

## 2025-03-11 PROCEDURE — 93971 EXTREMITY STUDY: CPT | Mod: RT

## 2025-03-11 PROCEDURE — 250N000013 HC RX MED GY IP 250 OP 250 PS 637: Performed by: PHYSICIAN ASSISTANT

## 2025-03-11 PROCEDURE — 120N000002 HC R&B MED SURG/OB UMMC

## 2025-03-11 PROCEDURE — 250N000013 HC RX MED GY IP 250 OP 250 PS 637

## 2025-03-11 PROCEDURE — 250N000013 HC RX MED GY IP 250 OP 250 PS 637: Performed by: STUDENT IN AN ORGANIZED HEALTH CARE EDUCATION/TRAINING PROGRAM

## 2025-03-11 PROCEDURE — 93971 EXTREMITY STUDY: CPT | Mod: 26 | Performed by: RADIOLOGY

## 2025-03-11 PROCEDURE — 250N000012 HC RX MED GY IP 250 OP 636 PS 637: Performed by: PHYSICIAN ASSISTANT

## 2025-03-11 PROCEDURE — 93922 UPR/L XTREMITY ART 2 LEVELS: CPT | Mod: 26 | Performed by: RADIOLOGY

## 2025-03-11 PROCEDURE — 250N000011 HC RX IP 250 OP 636: Performed by: INTERNAL MEDICINE

## 2025-03-11 PROCEDURE — 85018 HEMOGLOBIN: CPT | Performed by: INTERNAL MEDICINE

## 2025-03-11 PROCEDURE — 36415 COLL VENOUS BLD VENIPUNCTURE: CPT | Performed by: STUDENT IN AN ORGANIZED HEALTH CARE EDUCATION/TRAINING PROGRAM

## 2025-03-11 PROCEDURE — 93922 UPR/L XTREMITY ART 2 LEVELS: CPT

## 2025-03-11 PROCEDURE — 99233 SBSQ HOSP IP/OBS HIGH 50: CPT | Performed by: INTERNAL MEDICINE

## 2025-03-11 PROCEDURE — 80069 RENAL FUNCTION PANEL: CPT | Performed by: STUDENT IN AN ORGANIZED HEALTH CARE EDUCATION/TRAINING PROGRAM

## 2025-03-11 PROCEDURE — 36415 COLL VENOUS BLD VENIPUNCTURE: CPT | Performed by: INTERNAL MEDICINE

## 2025-03-11 RX ORDER — HEPARIN SODIUM 10000 [USP'U]/100ML
0-5000 INJECTION, SOLUTION INTRAVENOUS CONTINUOUS
Status: DISCONTINUED | OUTPATIENT
Start: 2025-03-11 | End: 2025-03-17

## 2025-03-11 RX ORDER — HEPARIN SODIUM 10000 [USP'U]/100ML
0-5000 INJECTION, SOLUTION INTRAVENOUS CONTINUOUS
Status: DISCONTINUED | OUTPATIENT
Start: 2025-03-11 | End: 2025-03-11

## 2025-03-11 RX ORDER — FERROUS SULFATE 325(65) MG
325 TABLET ORAL DAILY
Status: DISCONTINUED | OUTPATIENT
Start: 2025-03-11 | End: 2025-03-26 | Stop reason: HOSPADM

## 2025-03-11 RX ADMIN — ATORVASTATIN CALCIUM 20 MG: 20 TABLET, FILM COATED ORAL at 19:44

## 2025-03-11 RX ADMIN — INSULIN ASPART 2 UNITS: 100 INJECTION, SOLUTION INTRAVENOUS; SUBCUTANEOUS at 22:36

## 2025-03-11 RX ADMIN — APIXABAN 5 MG: 5 TABLET, FILM COATED ORAL at 08:32

## 2025-03-11 RX ADMIN — METOPROLOL SUCCINATE 75 MG: 25 TABLET, EXTENDED RELEASE ORAL at 08:32

## 2025-03-11 RX ADMIN — INSULIN GLARGINE 5 UNITS: 100 INJECTION, SOLUTION SUBCUTANEOUS at 17:54

## 2025-03-11 RX ADMIN — LATANOPROST 1 DROP: 50 SOLUTION OPHTHALMIC at 19:44

## 2025-03-11 RX ADMIN — LIDOCAINE 4% 1 PATCH: 40 PATCH TOPICAL at 19:44

## 2025-03-11 RX ADMIN — HEPARIN SODIUM 800 UNITS/HR: 10000 INJECTION, SOLUTION INTRAVENOUS at 21:22

## 2025-03-11 RX ADMIN — TAMSULOSIN HYDROCHLORIDE 0.4 MG: 0.4 CAPSULE ORAL at 08:32

## 2025-03-11 RX ADMIN — SODIUM BICARBONATE 650 MG TABLET 1300 MG: at 19:44

## 2025-03-11 RX ADMIN — SODIUM BICARBONATE 650 MG TABLET 1300 MG: at 08:32

## 2025-03-11 RX ADMIN — TACROLIMUS 1.5 MG: 1 CAPSULE ORAL at 08:32

## 2025-03-11 RX ADMIN — MYCOPHENOLATE MOFETIL 500 MG: 500 TABLET, FILM COATED ORAL at 19:44

## 2025-03-11 RX ADMIN — MYCOPHENOLATE MOFETIL 500 MG: 500 TABLET, FILM COATED ORAL at 08:32

## 2025-03-11 RX ADMIN — TACROLIMUS 1.5 MG: 1 CAPSULE ORAL at 18:17

## 2025-03-11 RX ADMIN — EMPAGLIFLOZIN 10 MG: 10 TABLET, FILM COATED ORAL at 08:32

## 2025-03-11 RX ADMIN — FERROUS SULFATE TAB 325 MG (65 MG ELEMENTAL FE) 325 MG: 325 (65 FE) TAB at 14:08

## 2025-03-11 RX ADMIN — PANTOPRAZOLE SODIUM 40 MG: 40 TABLET, DELAYED RELEASE ORAL at 08:32

## 2025-03-11 ASSESSMENT — ACTIVITIES OF DAILY LIVING (ADL)
ADLS_ACUITY_SCORE: 73

## 2025-03-11 NOTE — PLAN OF CARE
Goal Outcome Evaluation:     Plan of Care Reviewed With: patient     Overall Patient Progress: improving    Shift: 1427-8202    VS: /67 (BP Location: Left arm)   Pulse 67   Temp 99.2  F (37.3  C) (Oral)   Resp 18   Ht 1.829 m (6')   Wt 71.1 kg (156 lb 12 oz)   SpO2 95%   BMI 21.26 kg/m       Patient A&Ox2-3; intermittent confusion.  On RA. Denies sob/chest pain. Left PIV; on continuous heparin drip. Not oob this shift; refused to sit in recliner when offered. Patient is incontinent of bowel and bladder. Voiding spontaneously throughout shift. LBM 3/12. Patient tolerating meals well. Bed alarm on for safety. L BKA, dark black spots on right toes; placed warm blankets and socks on right foot. Possible transfer to [facility] TBD; waiting for bed to be available; daughter is aware. Call light within reach; able to make needs known. Plan of care ongoing.

## 2025-03-11 NOTE — PLAN OF CARE
Goal Outcome Evaluation:      Plan of Care Reviewed With: patient    Overall Patient Progress: improvingOverall Patient Progress: improving    VS: /70 (BP Location: Left arm)   Pulse 73   Temp 98.7  F (37.1  C) (Oral)   Resp 16   Ht 1.829 m (6')   Wt 66.4 kg (146 lb 6.2 oz)   SpO2 95%   BMI 19.85 kg/m       O2: SPO2> 95% and stable on RA. Denies chest pain or SOB   Output: Primofit  in place and drainage adequately    Last BM: 3/11/2025   Activity: AX1-2 with walker and gait belt        Skin: All visible skin , L BKA, barried cream applied scrotal area,   Pain: Denies pain    CMS: A&OX3 intermittent confusion    Dressing:    Diet: Regular diet    LDA: L PIV SL    Equipment: IV pole, personal belongings    Plan: CONTACT precaution is maintained    Additional Info: Pt request to take a shower at 0030 and mentioned that he can able to take by him self . After we helped the pt to shower room pt able to take shower and when pt done got help to be bed , Pt was bale to sleep. No acute change during the shift. Pt was irritable beginning of the shift to take shower.

## 2025-03-11 NOTE — PROGRESS NOTES
Nephrology Progress Note  03/11/2025       Tad Zaman is a 64 year old male admitted on 2/25/2025 for AMS. He has a history of diabetes, HTN, CAD3b, HF, and renal transplant 9/2022, left BKA, CKD.    Patient was brought in by family for AMS x 1-2 months. He has been evaluated at the [facility] multiple times in ED then was evaluated during an admission 1/20-2/21/25 with very broad workup (see below) that did not amount to any type of notable etiology of his symptoms  Baseline creat 1.8-2.0    Assessment & Recommendations:     MOY/CKD3b (Renal transplant) - Resolved. Creat 1.5 from peak of 2.5 on 2/27/25. Baseline creat 1.8-2.0   -  He is non oliguric    - B/ps well controlled   - IS regimen: MMF/TAC. Tac level 3.0 on 3/10/25   - It appears patient had renal transplant 3 years ago and [facility] manages all of his medications/labs per his PCP note ( Dr Cesar, [facility] 7/26/24). He was transplanted in [place].     - Does not have a mg and appears to be voiding w/o difficulty. On Flomax   - Continue to monitor renal function with daily chemistry labs, strict I/O and daily weights    2. CV/Volume - No edema/dyspnea. Bp teens/   Intake 246, UO 2600. Admission weight 61.6 kg. Weight 66.4 kg on 3/10/25  Current regimen: Jardiance 10 mg every day, Toprol XL 75 mg every day. Torsemide on hold   - Continue current regimen    - Avoid hypotension    - Continue strict I/O and daily weights    3. Electrolytes/acid base - No acute concerns. K 4.9 Na 139 bicarb 22   - Continue Bicarb 1300 mg bid   - Continue to hold Veltassa    4. BMD - Ca 9.5 Phos 2.6 albumin 3.0   - Holding Calcitriol for borderline hypercalcemia    5. DM2 - Borderline controlled with A1c 7.9% ( 3/3/25). On Jardiance and insulin    6. Anemia - Hgb 9.7   - Iron studies 3/25: Ferritin 1062, Fe 23 IS 17   - Begin Iron tab qd    Recommendations were communicated to primary team via progress note    Carolina Diego, NP   Division of Nephrology  and Hypertension  Vocera Web Console    Interval History :   Nursing and provider notes from last 24 hours reviewed.  Creat stable  UO increasing    Review of Systems:   I reviewed the following systems:  GI: Tolerating diet.   Neuro:  Cognition slightly improved from admission per family  Constitutional:  no fever or chills  CV: denies dyspnea/CP or edema  : Voiding w/o mg but has a external cath in place    Physical Exam:   I/O last 3 completed shifts:  In: -   Out: 1550 [Urine:1550]   /69 (BP Location: Left arm)   Pulse 73   Temp 98.5  F (36.9  C) (Oral)   Resp 18   Ht 1.829 m (6')   Wt 66.4 kg (146 lb 6.2 oz)   SpO2 97%   BMI 19.85 kg/m       GENERAL APPEARANCE: NAD.   EYES:  no scleral icterus, pupils equal  PULM: lungs CTA. Not on supplemental oxygen   CV: regular rhythm, normal rate     -edema Left BKA   GI: soft, NT, non distended  NEURO:  alert/confused  Access Right AVF + bruit    Labs:   All labs reviewed by me  Electrolytes/Renal -   Recent Labs   Lab Test 03/11/25  1141 03/11/25  0814 03/11/25  0555 03/10/25  0802 03/10/25  0744 03/09/25  1149 03/09/25  0720   NA  --   --  139  --  136  --  138   POTASSIUM  --   --  4.9  --  5.2  --  5.0   CHLORIDE  --   --  106  --  104  --  105   CO2  --   --  22  --  23  --  23   BUN  --   --  51.9*  --  54.3*  --  57.7*   CR  --   --  1.54*  --  1.47*  --  1.55*   * 168* 200*   < > 207*   < > 202*   QUE  --   --  9.5  --  9.6  --  9.5   PHOS  --   --  2.6  --  2.3*  --  2.0*    < > = values in this interval not displayed.       CBC -   Recent Labs   Lab Test 03/10/25  0744 03/05/25  0636 03/03/25  0939   WBC 7.0 7.4 8.7   HGB 9.7* 9.7* 11.0*    259 292       LFTs -   Recent Labs   Lab Test 03/11/25  0555 03/10/25  0744 03/09/25  0720 03/04/25  0540 02/25/25  1232   ALKPHOS  --   --   --   --  122   BILITOTAL  --   --   --   --  0.4   ALT  --   --   --   --  13   AST  --   --   --   --  19   PROTTOTAL  --   --   --   --  7.7   ALBUMIN  3.0* 3.3* 3.3*   < > 3.9    < > = values in this interval not displayed.       Iron Panel -   Recent Labs   Lab Test 03/10/25  0744   IRON 23*   IRONSAT 17   HAMMAD 1,062*         Imaging:      Current Medications:  Current Facility-Administered Medications   Medication Dose Route Frequency Provider Last Rate Last Admin    [Held by provider] apixaban ANTICOAGULANT (ELIQUIS) tablet 5 mg  5 mg Oral BID Lucía Chapman DO   5 mg at 03/11/25 0832    atorvastatin (LIPITOR) tablet 20 mg  20 mg Oral QPM Becca Nino PA-C   20 mg at 03/10/25 2034    [Held by provider] calcitRIOL (ROCALTROL) capsule 0.25 mcg  0.25 mcg Oral Daily Becca Nino PA-C   0.25 mcg at 02/26/25 0822    empagliflozin (JARDIANCE) tablet 10 mg  10 mg Oral Daily Basilio Martines MD   10 mg at 03/11/25 0832    insulin aspart (NovoLOG) injection (RAPID ACTING)  1-10 Units Subcutaneous TID  Lucía Chapman DO   3 Units at 03/11/25 1217    insulin aspart (NovoLOG) injection (RAPID ACTING)  1-7 Units Subcutaneous At Bedtime Lucía Chapman, DO        insulin glargine (LANTUS PEN) injection 5 Units  5 Units Subcutaneous At Bedtime Lucía Chapman, DO   5 Units at 03/10/25 1916    latanoprost (XALATAN) 0.005 % ophthalmic solution 1 drop  1 drop Both Eyes At Bedtime Foster Cruz MD   1 drop at 03/10/25 2035    Lidocaine (LIDOCARE) 4 % Patch 1 patch  1 patch Transdermal Q24h Becca Nino PA-C   1 patch at 03/10/25 2036    metoprolol succinate ER (TOPROL XL) 24 hr tablet 75 mg  75 mg Oral Daily Becca Nino PA-C   75 mg at 03/11/25 0832    mycophenolate (GENERIC EQUIVALENT) tablet 500 mg  500 mg Oral BID IS Becca Nino PA-C   500 mg at 03/11/25 0832    pantoprazole (PROTONIX) EC tablet 40 mg  40 mg Oral QAM AC Foster Cruz MD   40 mg at 03/11/25 0832    sodium bicarbonate tablet 1,300 mg  1,300 mg Oral BID Becca Nino PA-C   1,300 mg at 03/11/25 0832    sodium chloride (PF) 0.9% PF flush 3 mL  3 mL Intracatheter Q8H Becca Nino PA-C   3 mL at  03/11/25 0841    tacrolimus (GENERIC EQUIVALENT) capsule 1.5 mg  1.5 mg Oral BID IS Becca Nino PA-C   1.5 mg at 03/11/25 0832    tamsulosin (FLOMAX) capsule 0.4 mg  0.4 mg Oral Daily Lucía Chapman DO   0.4 mg at 03/11/25 0832    [Held by provider] torsemide (DEMADEX) tablet 10 mg  10 mg Oral BID Foster Cruz MD   10 mg at 03/05/25 0928     Current Facility-Administered Medications   Medication Dose Route Frequency Provider Last Rate Last Admin    Patient is already receiving anticoagulation with heparin, enoxaparin (LOVENOX), warfarin (COUMADIN)  or other anticoagulant medication   Does not apply Continuous PRN Becca Nino PA-C Deborah L. Lacroix, NP

## 2025-03-11 NOTE — PROGRESS NOTES
Brief vascular surgery note:    Tad Zaman is a pleasant 64-year-old gentleman with PMH significant for T2DM (A1C 6.9% in 2024), HFrEF, HTN, MI, HLD, ESRD s/p renal transplant (9/2022), CKD stage 3b (creat baseline 1.8-2.3), left BKA, and PAD recently admitted with altered mental status (resolved) found to have blue toes on RLE, concern for worsening PAD.     Arterial duplex was completed last night, demonstrating monophasic poststenotic waveforms in the PT, AT and DP, velocities are 17-22, however likely falsely elevated due to monophasic waveform. AYAN has not been completed yet. The dusky/gangrenous toes of the only remaining foot in setting of monophasic flow from knee to foot is identified on arterial duplex late last night, are potentially concerning for worsening PAD.    Considering the above, patient will likely benefit from an angiogram with CO2, given CKD stage IIIb with creatinine around 1.6-2. Thus we recommend:    -Please transfer patient to [place] today  -ABIs with toe pressures  -Vein mapping   -Hold Eliquis starting today, okay to anticoagulate with heparin drip. Because of patient's Eliquis, we will wait approximately 3 days to do the procedure. However patient needs transfer to the [place] for ABIs, vein mapping (which cannot be done on the [facility]), and close monitoring from our team.  -Consider aspirin 81mg, and continue with statin  -Our team will work on scheduling potential upcoming procedure  -Keep right foot warm with warming blankets   -Please ensure adequate blood glucose control  -Neurovascular checks per unit protocol (patient has brisk Doppler PT and faint monophasic DP).    ADDENDUM: Patient will need restratification given complex cardiac history.    Of note, I did not see the patient on this day.    Patient was extensively discussed with fellow, Dr. Lozano and Dr. Coon of the vascular surgery.     Piedad Lloyd, JANNETH  Vascular Surgery  Pager:  [phone]  [email or web address]  Send message or 10 digit call back number Securely via Physitrack with the Physitrack Web Console (learn more here)    Please page me directly with any questions/concerns during regular weekday hours before 3PM. If there is no response, if it is a weekend, or if it is during evening hours, then please use the [facility] system to page the first-call resident on call for vascular surgery.

## 2025-03-11 NOTE — PROGRESS NOTES
[facility]    Medicine Progress Note - Hospitalist Service, GOLD TEAM 19    Date of Admission:  2/25/2025    Assessment & Plan        Tad Zaman is a 64 year old male admitted on 2/25/2025 for AMS. He has a history of diabetes, HTN, CAD, HF, and renal transplant 9/2022, left AKA, CKD.    Patient was brought in by family for AMS x 1-2 months. He has been evaluated at the [facility] multiple times in ED then was evaluated during an admission 1/20-2/21/25 with very broad workup (see below) that did not amount to any type of notable etiology of his symptoms. During this stay he was also treated for HF exacerbation, MOY, and UTI which were all stable at time of discharge. He finished course of cefdinir for e coli UTI on 2/24. He was discharged with family being told that he has failure to thrive. He was discharged home, required 24 hr cares by daughter including changing him and preparation of food. He has also has had intermittent agitation and aggression. Home health nurse stated that he was not appropriate for their program.      No clear etiology for confusion but the aggression and refusal of cares have clinically improved without intervention. Course complicated by new cyanotic areas on the R toes concerning for progression of PAD. Vascular surgery consulted and plan is to transfer to [place] for further evaluation and treatment       3/11   - hold eliquis ( was already given at 8:32 this AM ) seems on it for atrial flutter ,  discussed with  vascular surgery and will use  IV heparin for vascular indications  surgery     -  Vascular surgery would like  patient  transferred  over to [place] latest by tomorrow , I did discussed with  patient  and was ok with plans   - asked psychiatry to see reg decision making capacity and was fel he  needed surrogate decision maker  for  consent for procedures         AMS  Cognitive impairment vs encephalopathy    Presented with 1-2 months of progressive encephalopathy and agitation with aggressive behavior toward family who is caring for him at home. [facility]  - Initial LP with pleocytosis, otherwise unrevealing.   -MRI brain without acute pathology.   -Neurology consulted. Some concern for hx of viral encephalitis that has since resolved leaving him with a degree of impairment. He otherwise remains hemodynamically stable without clinical evidence of infection, no gross electrolyte abnormality, stable glucose (elevated but without acidosis), BUN is improving, tacrolimus levels was  WNL.   [facility]:   - MRI 1/29: Stable mild amount of chronic small vessel ischemia. Small chronic R cerebellar infarct.   -Psych consult 2/13: recommended removing capacity for medical decision making, recommended palliative care. No medication changes   -3/11 psych recommended surrogate decision maker   -OT TONY 6/30   -EEG: nonspecific encephalopathy.   -LP: protein 173, glucose 110. Cell count with 21 WBC, 41 RBC, 52 lymph. Hazy appearance  -Encephalopathy paraneoplastic evaluation (including Purkinje, ADI, antiglial nucl antibody, NMDA, BOSSMAN) 1/30/25 negative.   -CSF flow cytometry: suboptimal specimen with low cellularity. No immunophenotypic evidence of involvement by lymphoma. Peerless light chain 64.8, lambda light chain 35.1, kappa/lambda 1.85 all elevated. No monoclonals detected (1/22)  -ELP/Immunofix, serum panel: total protein low, beta 1 fraction low (1/22/25)  -Polyoma virus DNA of CSF, MAGNUS virus DNA (1/28) negative.   -Meningitis / encephalitis panel (1/28) negative  -CSF angiotensin converting enzyme (1/28) negative negative  -CMV (1/28, 1/25) negative   -BK virus (1/28) negative  -EBV (1/25) negative  -Cryptococcus (1/26) negative; Cryptococcus neoformans (1/28) negative  -Syphilis (1/25) negative  -HIV (1/25) negative  -B12 (1/18) WNL  -TSH (1/18) high side of normal 4.94  - CSF flow cytometry from  "3/5/25  rare to absent B cells , no malignant cells seen   - Drug screen negative. Patient has distant history of what sounds to be mild to moderate alcohol intake.   - needs out patient  neuropsych testing as well a sop EEG if cognition worsens    - repeat MRI of brain 3/1/25 limited by motion artifact  but no evidence of acute infarct or acute intracranial pathology   - Delirium precautions as able.   - repeat LP per CAPs on 3/5 completed - repeat CSF unremarkable (negative cytometry, cytology negative),  West Nile virus negative , VDRL non reactive , aerobic and anaerobic cultures so far no growth   - He will need follow up with outpatient neurology and consider neuropsychology testing  - Per neuro: can consider EEG in outpatient setting if cognition were to worsen      Peripheral vascular disease.    S/p L BKA  - seen by vascular surgery and recommendations 3/11  \"-Please transfer patient to [facility] today  -ABIs with toe pressures  -Vein mapping   -Hold Eliquis starting today, okay to anticoagulate with heparin drip. Because of patient's Eliquis, we will wait approximately 3 days to do the procedure. However patient needs transfer to the [facility] for ABIs, vein mapping (which cannot be done on the [facility]), and close monitoring from our team.  -Consider aspirin 81mg, and continue with statin  -Our team will work on scheduling potential upcoming procedure  -Keep right foot warm with warming blankets   -Please ensure adequate blood glucose control  -Neurovascular checks per unit protocol (patient has brisk Doppler PT and faint monophasic DP)\"       Renal transplant 9/2022    CKD Stage 3b   [facility] the [facility] at [facility]. [facility] 1/27 renal US with doppler showed no stenosis of vessels. Resistive indices were increased, felt could be related to ureteric obstruction but not specific. Mild dilation of intrarenal collecting system and ureter was also " seen. CT a/p 1/28 W/O contrast suggested mild hydro.  - [facility]/3/25 at [facility] showed no hydro or other abnormalities. FENA 4.3 (1/28) at [facility].   -  Creat recent baseline appears to be 1.8-2.3  - transplant neph following the patient  Recommendations:   - Continue mycophenolate 500 mg twice a day.  -Continue to hold torsemide, calcitriol and lokelma  - Continue tacrolimus 1.5 mg twice a day.  -Tacro level on 3/9/2025 is 3.6  - Accurate I/O and daily weight  - Will need Urology outpatient follow up for TOV and further management.     - Hold calcitriol given borderline high calcium. Will follow up calcium levels on next renal panel (ordered)  - Ordered UA and UPCR/UACR 3/3/2025 - trace leuk esterase and glucose present. No evidence of infection.   - Ordered Kidney Transplant US 3/3/2025 (routine) - mild hydronephrosis of transplanted kidney, torturous renal artery with mildly elevated velocity at the anastomosis, likely chronic.   - Check hepatitis panel (ordered for 3/4/2025) - HCV positive, known history and cured, otherwise negative.   - Accurate I/O and daily weight given recent heart failure exacerbation.  - Plan for urinary retention per below     Urinary retention  Noted to have ongoing retention requiring straight cath. Unclear whether this is a new issue for him. No significant anticholinergics on his current medication list.   - Tamsulosin 0.4mg PO initiated on 3/4; as tolerated   - Avoid anticholinergic medications as able.   - TOV on 3/8 with close monitoring and low threshold to replace       Troponinemia, stable   Lateral lead T wave inversions   Atrial flutter   HFrEF last echo 2/2025 with EF 40-45%   HTN   Hx MI  Limited cardiac history available. Per patient's daughter he may have had a stent placed at 1 point but is unsure.  Patient and daughters deny any history of cardiac arrhythmia.  However on exam and on EKG evidence of atrial flutter, and patient also on Eliquis at least since December.   [facility], he was found to have heart failure exacerbation with 25-30 lbs fluid excess. BNP during hospitalization was 2868. His Lasix was switched to Torsemide. He has been taking his medications per his daughter.   - per cardiology note I found in EPIC form 2018 he has had  medium size ischemic area  in inferior wall  ( had similar but smaller in 2013)   - BNP 92675 here.   - trop 267>>218  - EKG with T wave inversions and mild ST depressions in lateral leads  - denies recent CP or SOB, no peripheral or abdominal edema  Plan:   - TTE as below.   - No report of chest pain. Troponin trended down   - cont PTA atorvastatin 20 mg nightly  - restarted PTA Eliquis 5 mg BID on 3/6  but now on hold as of 3/11 and iv heparin started    - cont PTA Jardiance 12.5 mg daily  - continue PTA metoprolol 75 mg daily  - continue PTA torsemide 10 mg BID  - consider cardiology  to see especially if he needs surgery      TTE  Interpretation Summary  Left ventricular function is decreased. The ejection fraction is 40-45%  (mildly reduced).  Diastolic function not assessed due to arrhythmia.  Right ventricular function, chamber size, wall motion, and thickness are  normal.  Mild tricuspid insufficiency is present.  Mild (pulmonary artery systolic pressure<50mmHg) pulmonary hypertension is  present.  IVC diameter and respiratory changes fall into an intermediate range  suggesting an RA pressure of 8 mmHg.  Trivial pericardial effusion is present.  There is no prior study for direct comparison.     # T2DM  Not on long-term insulin. Hemoglobin A1c 6.9% as of 7/2024. Hyperglycemia when he allowed check on 3/4. He has been amenable to glucose checks and insulin this admission.   - cont PTA Jardiance 12.5 mg daily  - Repeat Hemoglobin A1c 7.9  - Initiated sliding scale insulin on 3/4 with TID AC and 0200 glucose checks -> increased to HDSSI on 3/10              - Will need to discuss with family whether they feel  they would be able to manage sliding scale insulin at home.   - Carb consistent diet   - Given sliding scale insulin needs but would like to simplify this on discharge and will start basal Lantus at 5 units (half of what would be calculated 0.2units/kg) and uptitrate based on fasting AM sugars.      # Mild normocytic anemia  - Hg stable, no signs of bleed, monitor on AC     # Hx hepatitis C,  - s/p treatment with cure       # Glaucoma - cont PTA eyedrops  # Sickle cell trait       3/11 I spoke with his  daughter  Jarrod Ma and updated on [place] transfer and vascular surgical recommendation          Diet: Moderate Consistent Carb (60 g CHO per Meal) Diet  Snacks/Supplements Adult: Nepro Oral Supplement; With Meals    DVT Prophylaxis: DOAC  Zaman Catheter: Not present  Lines: None     Cardiac Monitoring: None  Code Status: Full Code      Clinically Significant Risk Factors               # Hypoalbuminemia: Lowest albumin = 3 g/dL at 3/11/2025  5:55 AM, will monitor as appropriate               # DMII: A1C = 7.9 % (Ref range: <5.7 %) within past 6 months       # Financial/Environmental Concerns: none         Social Drivers of Health    Tobacco Use: Medium Risk (7/26/2024)    Received from [facility]    Patient History     Smoking Tobacco Use: Former     Smokeless Tobacco Use: Never          Disposition Plan     Medically Ready for Discharge: Anticipated in 5+ Days             Joselin Guerrero MD  Hospitalist Service, GOLD TEAM 19  [facility]  Securely message with [employer] (more info)  Text page via [facility] Paging/Directory   See signed in provider for up to date coverage information  ______________________________________________________________________    Interval History   In bed , denies any pain no chest pain  no shortness of breath  no nausea vomiting, denies pain in toes     Physical Exam   Vital Signs: Temp: 98.5  F (36.9  C) Temp src: Oral BP: 108/69  Pulse: 73   Resp: 18 SpO2: 97 % O2 Device: None (Room air)    Weight: 146 lbs 6.17 oz  General appearence: awake alert  in  no apparent distress    RESPIRATORY: lungs clear   CARDIOVASCULAR:S1 S2 regular rate and rhythm,   GASTROINTESTINAL:soft, non-distended , non-tender , + bowel sounds,   SKIN: warm and dry,  toes as bellow   NEUROLOGIC; awake alert  speech clear   EXTREMITIES: no clubbing,  or edema , left BKA right toes cyanotic , I do feel pedal pulse                   Data     I have personally reviewed the following data over the past 24 hrs:    N/A  \   N/A   / N/A     139 106 51.9 (H) /  205 (H)   4.9 22 1.54 (H) \     ALT: N/A AST: N/A AP: N/A TBILI: N/A   ALB: 3.0 (L) TOT PROTEIN: N/A LIPASE: N/A     Ferritin:  N/A % Retic:  N/A LDH:  N/A       Imaging results reviewed over the past 24 hrs:   Recent Results (from the past 24 hours)   US Lower Extremity Arterial Duplex Right    Narrative    Exam: Duplex ultrasound of right lower extremity arteries dated  3/10/2025 6:19 PM     Clinical information: Know PAD, R foot with skin changes concerning  for worsening ischemia.     Comparison: None    Technique: Grayscale (B-mode), color Doppler, and duplex spectral  Doppler ultrasound of the lower extremity arteries. Velocity  measurements obtained with angle correction of 60 degrees or less.    Ordering provider: Lucía Chapman    Findings:     Right lower extremity:   Common femoral artery: Velocity: 44 cm/sec.   Profunda femoral artery: Velocity: 43 cm/sec.   Proximal SFA: Velocity: 43 cm/sec.   Mid SFA:Velocity:  50 cm/sec.   Distal SFA: Velocity: 25 cm/sec.     Popliteal artery, proximal: Velocity: 20 cm/sec.     PTA ankle: Velocity: 22 cm/sec.   Peroneal artery and ankle: 17.1 cm/s.   MARYELLEN ankle: Velocity: 17 cm/sec.   DPA ankle: Velocity: 22 cm/s.     Triphasic waveforms the sharp upstroke from the common femoral artery  to the popliteal artery. Monophasic waveform of the of the lower leg  arteries.    Left  lower extremity:    Common femoral artery: Velocity: 36 cm/sec. Waveforms: Triphasic with  sharp upstroke.      Impression    Impression:     1. Right leg: Triphasic sharp upstroke waveforms of the upper right  leg from the common femoral artery through the popliteal artery with  low peak systolic velocities. The lower leg arteries demonstrate  patent monophasic post stenotic waveforms.     I have personally reviewed the examination and initial interpretation  and I agree with the findings.    ROSAS HOLLIDAY DO         SYSTEM ID:  [id number]

## 2025-03-11 NOTE — PROGRESS NOTES
Nephrology Progress Note  03/10/2025       Tad Zaman is a 64 year old male admitted on 2/25/2025 for AMS. He has a history of diabetes, HTN, CAD3b, HF, and renal transplant 9/2022, left BKA, CKD.    Patient was brought in by family for AMS x 1-2 months. He has been evaluated at the [facility] multiple times in ED then was evaluated during an admission 1/20-2/21/25 with very broad workup (see below) that did not amount to any type of notable etiology of his symptoms  Baseline creat 1.8-2.0    Assessment & Recommendations:     MOY/CKD3b (Renal transplant) - In recovery. Creat 1.4 from peak of 2.5 on 2/27/25.    -  He is non oliguric with UO 1330 ml    - B/ps well controlled   - IS regimen: MMF/TAC. Tac level 3.0 on 3/10/25   - Not sure who is managing his IS regimen in the OP world ( [facility]?) or when he had his transplant. He was transplanted in [place]. Will try to clarify tomorrow.     - Does not have a mg and appears to be voiding w/o difficulty   - Continue to monitor renal function with daily chemistry labs, strict I/O and daily weights    2. CV/Volume - No edema/dyspnea. Bp teens/   Intake 1265, UO 1330. Weight 66.4 kg. Admission weight 61.6 kg.   Current regimen: Jardiance 10 mg every day, Toprol XL 75 mg every day. Torsemide on hold   - Continue current regimen    - Avoid hypotension    - Continue strict I/O and daily weights    3. Electrolytes/acid base - No acute concerns. K 5.2 Na 136 bicarb 23   - Continue Bicarb 1300 mg bid   - Continue to hold V[place]    4. BMD - Ca 9.6 Phos 2.3 albumin 3.3   - Holding Calcitriol for borderline hypercalcemia    5. DM2 - Borderline controlled with A1c 7.9% ( 3/3/25). On Jardiance and insulin    6. Anemia - Hgb 9.7   - Will check iron studies    Recommendations were communicated to primary team via progress note    Carolina Diego NP   Division of Nephrology and Hypertension  Vocera Web Console    Interval History :   Nursing and provider notes from  last 24 hours reviewed.  Creat improved  UO increasing    Review of Systems:   I reviewed the following systems:  GI: Tolerating diet. Being fed  Neuro:  Cognition slightly improved from admission per family  Constitutional:  no fever or chills  CV: denies dyspnea/CP or edema  : Voiding w/o mg    Physical Exam:   I/O last 3 completed shifts:  In: 256 [P.O.:236; I.V.:20]  Out: 1250 [Urine:1250]   /70 (BP Location: Left arm)   Pulse 72   Temp 98.7  F (37.1  C) (Oral)   Resp 18   Ht 1.829 m (6')   Wt 66.4 kg (146 lb 6.2 oz)   SpO2 98%   BMI 19.85 kg/m       GENERAL APPEARANCE: NAD. Family present  EYES:  no scleral icterus, pupils equal  PULM: lungs CTA. Not on supplemental oxygen   CV: regular rhythm, normal rate     -edema Left BKA   GI: soft, NT, non distended  NEURO:  alert/defers to daughter for history  Access Right AVF + bruit    Labs:   All labs reviewed by me  Electrolytes/Renal -   Recent Labs   Lab Test 03/10/25  1900 03/10/25  1509 03/10/25  1051 03/10/25  0802 03/10/25  0744 03/09/25  1149 03/09/25  0720 03/08/25  1202 03/08/25  0653   NA  --   --   --   --  136  --  138  --  137   POTASSIUM  --   --   --   --  5.2  --  5.0  --  4.5   CHLORIDE  --   --   --   --  104  --  105  --  104   CO2  --   --   --   --  23  --  23  --  24   BUN  --   --   --   --  54.3*  --  57.7*  --  55.9*   CR  --   --   --   --  1.47*  --  1.55*  --  1.60*   * 259* 214*   < > 207*   < > 202*   < > 222*   QUE  --   --   --   --  9.6  --  9.5  --  9.8   PHOS  --   --   --   --  2.3*  --  2.0*  --  2.5    < > = values in this interval not displayed.       CBC -   Recent Labs   Lab Test 03/10/25  0744 03/05/25  0636 03/03/25  0939   WBC 7.0 7.4 8.7   HGB 9.7* 9.7* 11.0*    259 292       LFTs -   Recent Labs   Lab Test 03/10/25  0744 03/09/25  0720 03/08/25  0653 03/04/25  0540 02/25/25  1232   ALKPHOS  --   --   --   --  122   BILITOTAL  --   --   --   --  0.4   ALT  --   --   --   --  13   AST  --    --   --   --  19   PROTTOTAL  --   --   --   --  7.7   ALBUMIN 3.3* 3.3* 3.3*   < > 3.9    < > = values in this interval not displayed.       Iron Panel - No lab results found.      Imaging:      Current Medications:  Current Facility-Administered Medications   Medication Dose Route Frequency Provider Last Rate Last Admin    apixaban ANTICOAGULANT (ELIQUIS) tablet 5 mg  5 mg Oral BID Lucía Chapman DO   5 mg at 03/10/25 0858    atorvastatin (LIPITOR) tablet 20 mg  20 mg Oral QPM Becca Nino PA-C   20 mg at 03/09/25 2111    [Held by provider] calcitRIOL (ROCALTROL) capsule 0.25 mcg  0.25 mcg Oral Daily Becca Nino PA-C   0.25 mcg at 02/26/25 0822    empagliflozin (JARDIANCE) tablet 10 mg  10 mg Oral Daily Basilio Martines MD   10 mg at 03/10/25 0859    insulin aspart (NovoLOG) injection (RAPID ACTING)  1-10 Units Subcutaneous TID  Lucía Chapman DO   4 Units at 03/10/25 1915    insulin aspart (NovoLOG) injection (RAPID ACTING)  1-7 Units Subcutaneous At Bedtime Lucía Chapman, DO        insulin glargine (LANTUS PEN) injection 5 Units  5 Units Subcutaneous At Bedtime Lucía Chapman, DO   5 Units at 03/10/25 1916    latanoprost (XALATAN) 0.005 % ophthalmic solution 1 drop  1 drop Both Eyes At Bedtime Foster Cruz MD   1 drop at 03/09/25 2109    Lidocaine (LIDOCARE) 4 % Patch 1 patch  1 patch Transdermal Q24h Becca Nino PA-C   1 patch at 03/09/25 2109    metoprolol succinate ER (TOPROL XL) 24 hr tablet 75 mg  75 mg Oral Daily Becca Nino PA-C   75 mg at 03/10/25 0858    mycophenolate (GENERIC EQUIVALENT) tablet 500 mg  500 mg Oral BID IS Becca Nino PA-C   500 mg at 03/10/25 0900    pantoprazole (PROTONIX) EC tablet 40 mg  40 mg Oral QAM AC Foster Cruz MD   40 mg at 03/10/25 0858    sodium bicarbonate tablet 1,300 mg  1,300 mg Oral BID Becca Nino PA-C   1,300 mg at 03/10/25 0854    sodium chloride (PF) 0.9% PF flush 3 mL  3 mL Intracatheter Q8H Becca Nino PA-C   3 mL at 03/10/25 1919     tacrolimus (GENERIC EQUIVALENT) capsule 1.5 mg  1.5 mg Oral BID IS Becca Nino PA-C   1.5 mg at 03/10/25 1919    tamsulosin (FLOMAX) capsule 0.4 mg  0.4 mg Oral Daily Lucía Chapman DO   0.4 mg at 03/10/25 0859    [Held by provider] torsemide (DEMADEX) tablet 10 mg  10 mg Oral BID Foster Cruz MD   10 mg at 03/05/25 0928     Current Facility-Administered Medications   Medication Dose Route Frequency Provider Last Rate Last Admin    Patient is already receiving anticoagulation with heparin, enoxaparin (LOVENOX), warfarin (COUMADIN)  or other anticoagulant medication   Does not apply Continuous PRN Becca Nino PA-C Deborah L. Lacroix, NP

## 2025-03-11 NOTE — CONSULTS
"      Follow up Psychiatric Consult   Consult date: March 11, 2025         Reason for Consult, requesting source:    Capacity   Requesting source: Joselin Guerrero MD    Labs and imaging reviewed. Patient seen and evaluated by RUFINO John CNP          HPI:   Tad Zaman is a 64 year old male with history of diabetes, HTN, CAD, HF, and renal transplant 9/2022, CKD who presents with worsening altered mental status over the last 1-2 months. Neurology has been involved and noted pt not consistently cooperating with examinations. Neuro got MRI and also recommend follow up LP. Psychiatry was consulted 3/4 for persistent confusion and erratic behavior and discussed with neurology. Recommended Ramelteon as first line for sleep and low dose Zyprexa PRN for agitation.     Psychiatry consulted again 3/11 for capacity evaluation given vascular recommendation for transfer to [place] for close monitoring, ABIs, vein mapping, and potential upcoming procedure. On interview with Antonio today, he believed he was in [place] and that the year was 2022. He was unaware of vascular teams recommendations or concerns for his blue toes. He states he has been walking a lot and is tired of \"jumping through hoops trying to prove himself.\" Per nursing, he is intermittently disoriented and confused, but never combative and pretty easily redirectable.           Past Psychiatric History:   Patient and family denies         Substance Use and History:   Denies        Past Medical History:   PAST MEDICAL HISTORY:   Past Medical History:   Diagnosis Date    Chronic kidney disease     Diabetes (H)     Hypertension     Myocardial infarction (H)        PAST SURGICAL HISTORY: History reviewed. No pertinent surgical history.          Family History:   FAMILY HISTORY: History reviewed. No pertinent family history.    Family Psychiatric History: denies        Social History:   SOCIAL HISTORY:   Social History     Tobacco Use    Smoking status: Not " on file    Smokeless tobacco: Not on file   Substance Use Topics    Alcohol use: Not on file       [facility]          Physical ROS:   The 10 point Review of Systems is negative other than noted in the HPI or here.           Medications:     Current Facility-Administered Medications   Medication Dose Route Frequency Provider Last Rate Last Admin    [Held by provider] apixaban ANTICOAGULANT (ELIQUIS) tablet 5 mg  5 mg Oral BID Lucía Chapman DO   5 mg at 03/11/25 0832    atorvastatin (LIPITOR) tablet 20 mg  20 mg Oral QPM Becca Nino PA-C   20 mg at 03/10/25 2034    [Held by provider] calcitRIOL (ROCALTROL) capsule 0.25 mcg  0.25 mcg Oral Daily Becca Nino PA-C   0.25 mcg at 02/26/25 0822    empagliflozin (JARDIANCE) tablet 10 mg  10 mg Oral Daily Basilio Martines MD   10 mg at 03/11/25 0832    ferrous sulfate (FEROSUL) tablet 325 mg  325 mg Oral Daily Carolina Diego, NP        insulin aspart (NovoLOG) injection (RAPID ACTING)  1-10 Units Subcutaneous TID AC Lucía Chapman DO   3 Units at 03/11/25 1217    insulin aspart (NovoLOG) injection (RAPID ACTING)  1-7 Units Subcutaneous At Bedtime Lucía Chapman,         insulin glargine (LANTUS PEN) injection 5 Units  5 Units Subcutaneous At Bedtime Lucía Chapman,    5 Units at 03/10/25 1916    latanoprost (XALATAN) 0.005 % ophthalmic solution 1 drop  1 drop Both Eyes At Bedtime Foster Cruz MD   1 drop at 03/10/25 2035    Lidocaine (LIDOCARE) 4 % Patch 1 patch  1 patch Transdermal Q24h Becca Nino PA-C   1 patch at 03/10/25 2036    metoprolol succinate ER (TOPROL XL) 24 hr tablet 75 mg  75 mg Oral Daily Becca Nino PA-C   75 mg at 03/11/25 0832    mycophenolate (GENERIC EQUIVALENT) tablet 500 mg  500 mg Oral BID IS Becca Nino PA-C   500 mg at 03/11/25 0832    pantoprazole (PROTONIX) EC tablet 40 mg  40 mg Oral ARUN Foster Dumont MD   40 mg at 03/11/25 0833     sodium bicarbonate tablet 1,300 mg  1,300 mg Oral BID Becca Nino PA-C   1,300 mg at 03/11/25 0832    sodium chloride (PF) 0.9% PF flush 3 mL  3 mL Intracatheter Q8H Becca Nino PA-C   3 mL at 03/11/25 0841    tacrolimus (GENERIC EQUIVALENT) capsule 1.5 mg  1.5 mg Oral BID IS Becca Nino PA-C   1.5 mg at 03/11/25 0832    tamsulosin (FLOMAX) capsule 0.4 mg  0.4 mg Oral Daily Lucía Chapman DO   0.4 mg at 03/11/25 0832    [Held by provider] torsemide (DEMADEX) tablet 10 mg  10 mg Oral BID Foster Cruz MD   10 mg at 03/05/25 0928              Allergies:     Allergies   Allergen Reactions    Penicillins Hives          Labs:     Recent Results (from the past 48 hours)   Glucose by meter    Collection Time: 03/09/25  3:59 PM   Result Value Ref Range    GLUCOSE BY METER POCT 256 (H) 70 - 99 mg/dL   Glucose by meter    Collection Time: 03/09/25  9:07 PM   Result Value Ref Range    GLUCOSE BY METER POCT 285 (H) 70 - 99 mg/dL   Glucose by meter    Collection Time: 03/10/25  1:54 AM   Result Value Ref Range    GLUCOSE BY METER POCT 182 (H) 70 - 99 mg/dL   Glucose by meter    Collection Time: 03/10/25  6:27 AM   Result Value Ref Range    GLUCOSE BY METER POCT 216 (H) 70 - 99 mg/dL   Tacrolimus by Tandem Mass Spectrometry    Collection Time: 03/10/25  7:44 AM   Result Value Ref Range    Tacrolimus by Tandem Mass Spectrometry 3.0 (L) 5.0 - 15.0 ug/L    Tacrolimus Last Dose Date      Tacrolimus Last Dose Time     Renal panel    Collection Time: 03/10/25  7:44 AM   Result Value Ref Range    Sodium 136 135 - 145 mmol/L    Potassium 5.2 3.4 - 5.3 mmol/L    Chloride 104 98 - 107 mmol/L    Carbon Dioxide (CO2) 23 22 - 29 mmol/L    Anion Gap 9 7 - 15 mmol/L    Glucose 207 (H) 70 - 99 mg/dL    Urea Nitrogen 54.3 (H) 8.0 - 23.0 mg/dL    Creatinine 1.47 (H) 0.67 - 1.17 mg/dL    GFR Estimate 52 (L) >60 mL/min/1.73m2    Calcium 9.6 8.8 - 10.4 mg/dL    Albumin 3.3 (L) 3.5 - 5.2 g/dL    Phosphorus 2.3 (L) 2.5 - 4.5 mg/dL   CBC with  platelets    Collection Time: 03/10/25  7:44 AM   Result Value Ref Range    WBC Count 7.0 4.0 - 11.0 10e3/uL    RBC Count 3.75 (L) 4.40 - 5.90 10e6/uL    Hemoglobin 9.7 (L) 13.3 - 17.7 g/dL    Hematocrit 30.8 (L) 40.0 - 53.0 %    MCV 82 78 - 100 fL    MCH 25.9 (L) 26.5 - 33.0 pg    MCHC 31.5 31.5 - 36.5 g/dL    RDW 14.6 10.0 - 15.0 %    Platelet Count 282 150 - 450 10e3/uL   Iron and iron binding capacity    Collection Time: 03/10/25  7:44 AM   Result Value Ref Range    Iron 23 (L) 61 - 157 ug/dL    Iron Binding Capacity 137 (L) 240 - 430 ug/dL    Iron Sat Index 17 15 - 46 %   Ferritin    Collection Time: 03/10/25  7:44 AM   Result Value Ref Range    Ferritin 1,062 (H) 31 - 409 ng/mL   Glucose by meter    Collection Time: 03/10/25  8:02 AM   Result Value Ref Range    GLUCOSE BY METER POCT 187 (H) 70 - 99 mg/dL   Glucose by meter    Collection Time: 03/10/25 10:51 AM   Result Value Ref Range    GLUCOSE BY METER POCT 214 (H) 70 - 99 mg/dL   Glucose by meter    Collection Time: 03/10/25  3:09 PM   Result Value Ref Range    GLUCOSE BY METER POCT 259 (H) 70 - 99 mg/dL   Glucose by meter    Collection Time: 03/10/25  7:00 PM   Result Value Ref Range    GLUCOSE BY METER POCT 230 (H) 70 - 99 mg/dL   Glucose by meter    Collection Time: 03/10/25 10:33 PM   Result Value Ref Range    GLUCOSE BY METER POCT 166 (H) 70 - 99 mg/dL   Glucose by meter    Collection Time: 03/11/25  2:12 AM   Result Value Ref Range    GLUCOSE BY METER POCT 214 (H) 70 - 99 mg/dL   Tacrolimus by Tandem Mass Spectrometry    Collection Time: 03/11/25  5:55 AM   Result Value Ref Range    Tacrolimus by Tandem Mass Spectrometry 3.7 (L) 5.0 - 15.0 ug/L    Tacrolimus Last Dose Date      Tacrolimus Last Dose Time     Renal panel    Collection Time: 03/11/25  5:55 AM   Result Value Ref Range    Sodium 139 135 - 145 mmol/L    Potassium 4.9 3.4 - 5.3 mmol/L    Chloride 106 98 - 107 mmol/L    Carbon Dioxide (CO2) 22 22 - 29 mmol/L    Anion Gap 11 7 - 15 mmol/L     Glucose 200 (H) 70 - 99 mg/dL    Urea Nitrogen 51.9 (H) 8.0 - 23.0 mg/dL    Creatinine 1.54 (H) 0.67 - 1.17 mg/dL    GFR Estimate 49 (L) >60 mL/min/1.73m2    Calcium 9.5 8.8 - 10.4 mg/dL    Albumin 3.0 (L) 3.5 - 5.2 g/dL    Phosphorus 2.6 2.5 - 4.5 mg/dL   Glucose by meter    Collection Time: 03/11/25  8:14 AM   Result Value Ref Range    GLUCOSE BY METER POCT 168 (H) 70 - 99 mg/dL   Glucose by meter    Collection Time: 03/11/25 11:41 AM   Result Value Ref Range    GLUCOSE BY METER POCT 205 (H) 70 - 99 mg/dL          Physical and Psychiatric Examination:     /69 (BP Location: Left arm)   Pulse 73   Temp 98.5  F (36.9  C) (Oral)   Resp 18   Ht 1.829 m (6')   Wt 66.4 kg (146 lb 6.2 oz)   SpO2 97%   BMI 19.85 kg/m    Weight is 146 lbs 6.17 oz  Body mass index is 19.85 kg/m .    Physical Exam:  I have reviewed the physical exam as documented by by the medical team and agree with findings and assessment and have no additional findings to add at this time.    Mental Status Exam:  Appearance: awake, alert and adequately groomed  Attitude:  cooperative  Eye Contact:  good  Mood:  fine   Affect:  appropriate and in normal range and mood congruent  Speech:  clear, coherent  Language: Fluent in english   Psychomotor Behavior:  no evidence of tardive dyskinesia, dystonia, or tics  Thought Process:  logical, linear, and goal oriented  Associations:  no loose associations  Thought Content:  no evidence of suicidal ideation or homicidal ideation and no evidence of psychotic thought  Insight:  Poor  Judgement:  Poor  Oriented to:  time, person, and place  Attention Span and Concentration:  intact  Recent and Remote Memory:  intact  Fund of Knowledge: Appropriate   Gait and Station: baseline                  DSM-5 Diagnosis:   Unspecified encephalopathy           Assessment:   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 They have noticed worsening  cognition the last few months, an acute change and worsening the last week with agitation. Family notes he has been more calm and more agreeable to cares. He had no symptoms of depression or anxiety or impaired sleep that need acute psychotropic intervention at this time. I did discuss the case with neuro. Consider possibility that some of the presentation is related to neuropsychiatric effects of tacro. I think we can rule out primary psychiatric presentation at this point - no evidence of psychosis, ranjit, severe depression, anxiety.     On follow up 3/11 - he continues to have intermittent encephalopathy and forgetfulness. He believed he was in [place] and it was 2022. He was unaware of vascular recommendations or the concern with his toes, unable to comprehend current concerns from team, recommendations, risks of following recs vs. Not, and is unable to make a clear choice given his waxing and waning mental status. This evaluation of his capacity 3/11 may wax and wane along with his mental status, but as of this evaluation he lacks the capacity to consent for vascular procedures/monitoring.           Summary of Recommendations:     Consider Ramelteon 8mg at bedtime first line for sleep if sleep is impaired   If need for agitation, can use Zyprexa 2.5mg BID PRN agitation/aggression (has not required any thus far)  Recommend instituting surrogate decision maker for consent for vascular procedures given assessment 3/11 pt lacked capacity to consent         Juanita Mckeon, MIGUEL-BC  Consult/Liaison Psychiatry   [facility]

## 2025-03-11 NOTE — PROGRESS NOTES
Brief Care Management Note:    Per provider rounds, pt now not medically ready due to worsening PAD. Pt will need special testing on [place] and a transfer has been requested.     Writer received [facility] paperwork for pt's significant other, Nella. Will complete and have provider sign and return. Care Management continues to follow.     Next Steps:      PADMINI for home care  Discharge Note  EHO  IMM     Contacts     [facility]  [address]  [place] [address]  P: [phone]  P: [phone]  F: [phone]   PTA Services: RN/PT/OT/HHA/HM        Payal Covington RN   Nurse Coordinator    6 Med/Surg  Phone: [phone]     Social Work and Care Management Department      SEARCHABLE in [facility] - search CARE COORDINATOR   [facility] [phone]) Saturday & Sunday; [phone]) [facility] Recognized Holidays   Units: 6 Med Surg Vocera & 8 Med Surg Vocera Pager [phone]

## 2025-03-12 LAB
ALBUMIN SERPL BCG-MCNC: 3.1 G/DL (ref 3.5–5.2)
ANION GAP SERPL CALCULATED.3IONS-SCNC: 9 MMOL/L (ref 7–15)
APTT PPP: 43 SECONDS (ref 22–38)
APTT PPP: 48 SECONDS (ref 22–38)
APTT PPP: 56 SECONDS (ref 22–38)
BUN SERPL-MCNC: 51.1 MG/DL (ref 8–23)
CALCIUM SERPL-MCNC: 9.3 MG/DL (ref 8.8–10.4)
CHLORIDE SERPL-SCNC: 106 MMOL/L (ref 98–107)
CREAT SERPL-MCNC: 1.57 MG/DL (ref 0.67–1.17)
EGFRCR SERPLBLD CKD-EPI 2021: 48 ML/MIN/1.73M2
GLUCOSE BLDC GLUCOMTR-MCNC: 178 MG/DL (ref 70–99)
GLUCOSE BLDC GLUCOMTR-MCNC: 207 MG/DL (ref 70–99)
GLUCOSE BLDC GLUCOMTR-MCNC: 214 MG/DL (ref 70–99)
GLUCOSE BLDC GLUCOMTR-MCNC: 222 MG/DL (ref 70–99)
GLUCOSE BLDC GLUCOMTR-MCNC: 256 MG/DL (ref 70–99)
GLUCOSE SERPL-MCNC: 191 MG/DL (ref 70–99)
HCO3 SERPL-SCNC: 22 MMOL/L (ref 22–29)
PHOSPHATE SERPL-MCNC: 2.4 MG/DL (ref 2.5–4.5)
POTASSIUM SERPL-SCNC: 5.1 MMOL/L (ref 3.4–5.3)
SODIUM SERPL-SCNC: 137 MMOL/L (ref 135–145)
TACROLIMUS BLD-MCNC: 3 UG/L (ref 5–15)
TME LAST DOSE: ABNORMAL H
TME LAST DOSE: ABNORMAL H

## 2025-03-12 PROCEDURE — 250N000013 HC RX MED GY IP 250 OP 250 PS 637: Performed by: PHYSICIAN ASSISTANT

## 2025-03-12 PROCEDURE — 36415 COLL VENOUS BLD VENIPUNCTURE: CPT | Performed by: STUDENT IN AN ORGANIZED HEALTH CARE EDUCATION/TRAINING PROGRAM

## 2025-03-12 PROCEDURE — 250N000012 HC RX MED GY IP 250 OP 636 PS 637: Performed by: PHYSICIAN ASSISTANT

## 2025-03-12 PROCEDURE — 80197 ASSAY OF TACROLIMUS: CPT | Performed by: STUDENT IN AN ORGANIZED HEALTH CARE EDUCATION/TRAINING PROGRAM

## 2025-03-12 PROCEDURE — 82040 ASSAY OF SERUM ALBUMIN: CPT | Performed by: STUDENT IN AN ORGANIZED HEALTH CARE EDUCATION/TRAINING PROGRAM

## 2025-03-12 PROCEDURE — 99233 SBSQ HOSP IP/OBS HIGH 50: CPT | Performed by: INTERNAL MEDICINE

## 2025-03-12 PROCEDURE — 250N000011 HC RX IP 250 OP 636: Performed by: INTERNAL MEDICINE

## 2025-03-12 PROCEDURE — 250N000013 HC RX MED GY IP 250 OP 250 PS 637: Performed by: INTERNAL MEDICINE

## 2025-03-12 PROCEDURE — 120N000002 HC R&B MED SURG/OB UMMC

## 2025-03-12 PROCEDURE — 85730 THROMBOPLASTIN TIME PARTIAL: CPT | Performed by: STUDENT IN AN ORGANIZED HEALTH CARE EDUCATION/TRAINING PROGRAM

## 2025-03-12 PROCEDURE — 85730 THROMBOPLASTIN TIME PARTIAL: CPT | Performed by: INTERNAL MEDICINE

## 2025-03-12 PROCEDURE — 250N000013 HC RX MED GY IP 250 OP 250 PS 637: Performed by: STUDENT IN AN ORGANIZED HEALTH CARE EDUCATION/TRAINING PROGRAM

## 2025-03-12 PROCEDURE — 250N000013 HC RX MED GY IP 250 OP 250 PS 637

## 2025-03-12 PROCEDURE — 36415 COLL VENOUS BLD VENIPUNCTURE: CPT | Performed by: INTERNAL MEDICINE

## 2025-03-12 RX ADMIN — TACROLIMUS 1.5 MG: 1 CAPSULE ORAL at 18:15

## 2025-03-12 RX ADMIN — FERROUS SULFATE TAB 325 MG (65 MG ELEMENTAL FE) 325 MG: 325 (65 FE) TAB at 07:50

## 2025-03-12 RX ADMIN — METOPROLOL SUCCINATE 75 MG: 25 TABLET, EXTENDED RELEASE ORAL at 07:50

## 2025-03-12 RX ADMIN — SODIUM BICARBONATE 650 MG TABLET 1300 MG: at 20:26

## 2025-03-12 RX ADMIN — MYCOPHENOLATE MOFETIL 500 MG: 500 TABLET, FILM COATED ORAL at 20:26

## 2025-03-12 RX ADMIN — INSULIN GLARGINE 5 UNITS: 100 INJECTION, SOLUTION SUBCUTANEOUS at 18:06

## 2025-03-12 RX ADMIN — EMPAGLIFLOZIN 10 MG: 10 TABLET, FILM COATED ORAL at 07:50

## 2025-03-12 RX ADMIN — HEPARIN SODIUM 1100 UNITS/HR: 10000 INJECTION, SOLUTION INTRAVENOUS at 18:44

## 2025-03-12 RX ADMIN — TAMSULOSIN HYDROCHLORIDE 0.4 MG: 0.4 CAPSULE ORAL at 07:50

## 2025-03-12 RX ADMIN — ATORVASTATIN CALCIUM 20 MG: 20 TABLET, FILM COATED ORAL at 20:26

## 2025-03-12 RX ADMIN — PANTOPRAZOLE SODIUM 40 MG: 40 TABLET, DELAYED RELEASE ORAL at 07:50

## 2025-03-12 RX ADMIN — TACROLIMUS 1.5 MG: 1 CAPSULE ORAL at 07:50

## 2025-03-12 RX ADMIN — MYCOPHENOLATE MOFETIL 500 MG: 500 TABLET, FILM COATED ORAL at 07:49

## 2025-03-12 RX ADMIN — SODIUM BICARBONATE 650 MG TABLET 1300 MG: at 07:50

## 2025-03-12 ASSESSMENT — ACTIVITIES OF DAILY LIVING (ADL)
ADLS_ACUITY_SCORE: 78
ADLS_ACUITY_SCORE: 73
ADLS_ACUITY_SCORE: 78
ADLS_ACUITY_SCORE: 73
ADLS_ACUITY_SCORE: 78
ADLS_ACUITY_SCORE: 73
ADLS_ACUITY_SCORE: 78
ADLS_ACUITY_SCORE: 78
ADLS_ACUITY_SCORE: 73
ADLS_ACUITY_SCORE: 73
ADLS_ACUITY_SCORE: 78
ADLS_ACUITY_SCORE: 73

## 2025-03-12 NOTE — PROGRESS NOTES
[facility]    Medicine Progress Note - Hospitalist Service, GOLD TEAM 19    Date of Admission:  2/25/2025    Assessment & Plan        Tad Zaman is a 64 year old male admitted on 2/25/2025 for AMS. He has a history of diabetes, HTN, CAD, HF, and renal transplant 9/2022, left AKA, CKD.    Patient was brought in by family for AMS x 1-2 months. He has been evaluated at the [facility] multiple times in ED then was evaluated during an admission 1/20-2/21/25 with very broad workup (see below) that did not amount to any type of notable etiology of his symptoms. During this stay he was also treated for HF exacerbation, MOY, and UTI which were all stable at time of discharge. He finished course of cefdinir for e coli UTI on 2/24. He was discharged with family being told that he has failure to thrive. He was discharged home, required 24 hr cares by daughter including changing him and preparation of food. He has also has had intermittent agitation and aggression. Home health nurse stated that he was not appropriate for their program.      No clear etiology for confusion but the aggression and refusal of cares have clinically improved without intervention. Course complicated by new cyanotic areas on the R toes concerning for progression of PAD. Vascular surgery consulted and plan is to transfer to [place] for further evaluation and treatment       3/12  - awaiting transfer to [place], called over and still no bed available yet  - continue iv heparin  and hold eliquis   -   Vascular surgery would like  patient  transferred  over to [place] ASAP   - seems like plans are for OR on Monday , will ask cardiology  to see for preop eval /optimization        AMS  Cognitive impairment vs encephalopathy   - 3/11 reseen by  psychiatry to see reg decision making capacity and was fel he needed surrogate decision maker for consent for procedures   -Presented with 1-2 months of  progressive encephalopathy and agitation with aggressive behavior toward family who is caring for him at home. [facility]  - Initial LP with pleocytosis, otherwise unrevealing.   -MRI brain without acute pathology.   -Neurology consulted. Some concern for hx of viral encephalitis that has since resolved leaving him with a degree of impairment. He otherwise remains hemodynamically stable without clinical evidence of infection, no gross electrolyte abnormality, stable glucose (elevated but without acidosis), BUN is improving, tacrolimus levels was  WNL.   [facility]:   - MRI 1/29: Stable mild amount of chronic small vessel ischemia. Small chronic R cerebellar infarct.   -OT SLUMS 6/30   -EEG: nonspecific encephalopathy.   -LP: protein 173, glucose 110. Cell count with 21 WBC, 41 RBC, 52 lymph. Hazy appearance  -Encephalopathy paraneoplastic evaluation (including Purkinje, ADI, antiglial nucl antibody, NMDA, BOSSMAN) 1/30/25 negative.   -CSF flow cytometry: suboptimal specimen with low cellularity. No immunophenotypic evidence of involvement by lymphoma. [place] light chain 64.8, lambda light chain 35.1, kappa/lambda 1.85 all elevated. No monoclonals detected (1/22)  -ELP/Immunofix, serum panel: total protein low, beta 1 fraction low (1/22/25)  -Polyoma virus DNA of CSF, MAGNUS virus DNA (1/28) negative.   -Meningitis / encephalitis panel (1/28) negative  -CSF angiotensin converting enzyme (1/28) negative negative  -CMV (1/28, 1/25) negative   -BK virus (1/28) negative  -EBV (1/25) negative  -Cryptococcus (1/26) negative; Cryptococcus neoformans (1/28) negative  -Syphilis (1/25) negative  -HIV (1/25) negative  -B12 (1/18) WNL  -TSH (1/18) high side of normal 4.94  - CSF flow cytometry from 3/5/25  rare to absent B cells , no malignant cells seen   - Drug screen negative. Patient has distant history of what sounds to be mild to moderate alcohol intake.   - needs out patient  neuropsych testing as well  "a sop EEG if cognition worsens    - repeat MRI of brain 3/1/25 limited by motion artifact  but no evidence of acute infarct or acute intracranial pathology   - Delirium precautions as able.   - repeat LP per CAPs on 3/5 completed - repeat CSF unremarkable (negative cytometry, cytology negative),  West Nile virus negative , VDRL non reactive , aerobic and anaerobic cultures so far no growth   - He will need follow up with outpatient neurology and consider neuropsychology testing  - Per neuro: can consider EEG in outpatient setting if cognition were to worsen      Peripheral vascular disease.    S/p L BKA  - seen by vascular surgery and recommendations 3/11  \"-Please transfer patient to [place] today  -ABIs with toe pressures  -Vein mapping   -Hold Eliquis starting today, okay to anticoagulate with heparin drip. Because of patient's Eliquis, we will wait approximately 3 days to do the procedure. However patient needs transfer to the [place] for ABIs, vein mapping (which cannot be done on the [facility]), and close monitoring from our team.  -Consider aspirin 81mg, and continue with statin  -Our team will work on scheduling potential upcoming procedure  -Keep right foot warm with warming blankets   -Please ensure adequate blood glucose control  -Neurovascular checks per unit protocol (patient has brisk Doppler PT and faint monophasic DP)\"       Renal transplant 9/2022    CKD Stage 3b   Had the transplant at the [facility]. [facility]. 1/27 renal US with doppler showed no stenosis of vessels. Resistive indices were increased, felt could be related to ureteric obstruction but not specific. Mild dilation of intrarenal collecting system and ureter was also seen. CT a/p 1/28 W/O contrast suggested mild hydro.  - Ren[facility]/3/25 at [facility] showed no hydro or other abnormalities. FENA 4.3 (1/28) at [facility].   -  Creat recent baseline appears to be 1.8-2.3  - transplant neph " following the patient  Recommendations:   - Continue mycophenolate 500 mg twice a day.  -Continue to hold torsemide, calcitriol and lokelma  - Continue tacrolimus 1.5 mg twice a day.  -Tacro level on 3/9/2025 is 3.6  - Accurate I/O and daily weight  - Will need Urology outpatient follow up for TOV and further management.     - Hold calcitriol given borderline high calcium. Will follow up calcium levels on next renal panel (ordered)  - Ordered UA and UPCR/UACR 3/3/2025 - trace leuk esterase and glucose present. No evidence of infection.   - Ordered Kidney Transplant US 3/3/2025 (routine) - mild hydronephrosis of transplanted kidney, torturous renal artery with mildly elevated velocity at the anastomosis, likely chronic.   - Check hepatitis panel (ordered for 3/4/2025) - HCV positive, known history and cured, otherwise negative.   - Accurate I/O and daily weight given recent heart failure exacerbation.  - Plan for urinary retention per below     Urinary retention  Noted to have ongoing retention requiring straight cath. Unclear whether this is a new issue for him. No significant anticholinergics on his current medication list.   - Tamsulosin 0.4mg PO initiated on 3/4; as tolerated   - Avoid anticholinergic medications as able.   - TOV on 3/8 with close monitoring and low threshold to replace       Troponinemia, stable   Lateral lead T wave inversions   Atrial flutter   HFrEF last echo 2/2025 with EF 40-45%   HTN   Hx MI  Limited cardiac history available. Per patient's daughter he may have had a stent placed at 1 point but is unsure.  Patient and daughters deny any history of cardiac arrhythmia.  However on exam and on EKG evidence of atrial flutter, and patient also on Eliquis at least since December.  [facility], he was found to have heart failure exacerbation with 25-30 lbs fluid excess. BNP during hospitalization was 2868. His Lasix was switched to Torsemide. He has been taking his  medications per his daughter.   - per cardiology note I found in EPIC form 2018 he has had  medium size ischemic area  in inferior wall  ( had similar but smaller in 2013)   - BNP 89737 here.   - trop 267>>218  - EKG with T wave inversions and mild ST depressions in lateral leads  - denies recent CP or SOB, no peripheral or abdominal edema  Plan:   - TTE as below.   - No report of chest pain. Troponin trended down   - cont PTA atorvastatin 20 mg nightly  - restarted PTA Eliquis 5 mg BID on 3/6  but now on hold as of 3/11 and iv heparin started    - cont PTA Jardiance 12.5 mg daily  - continue PTA metoprolol 75 mg daily  - continue PTA torsemide 10 mg BID  - consider cardiology  to see especially if he needs surgery      TTE  Interpretation Summary  Left ventricular function is decreased. The ejection fraction is 40-45%  (mildly reduced).  Diastolic function not assessed due to arrhythmia.  Right ventricular function, chamber size, wall motion, and thickness are  normal.  Mild tricuspid insufficiency is present.  Mild (pulmonary artery systolic pressure<50mmHg) pulmonary hypertension is  present.  IVC diameter and respiratory changes fall into an intermediate range  suggesting an RA pressure of 8 mmHg.  Trivial pericardial effusion is present.  There is no prior study for direct comparison.     # T2DM  Not on long-term insulin. Hemoglobin A1c 6.9% as of 7/2024. Hyperglycemia when he allowed check on 3/4. He has been amenable to glucose checks and insulin this admission.   - cont PTA Jardiance 12.5 mg daily  - Repeat Hemoglobin A1c 7.9  - Initiated sliding scale insulin on 3/4 with TID AC and 0200 glucose checks -> increased to HDSSI on 3/10              - Will need to discuss with family whether they feel they would be able to manage sliding scale insulin at home.   - Carb consistent diet   - Given sliding scale insulin needs but would like to simplify this on discharge and will start basal Lantus at 5 units (half of  what would be calculated 0.2units/kg) and uptitrate based on fasting AM sugars.      # Mild normocytic anemia  - Hg stable, no signs of bleed, monitor on AC     # Hx hepatitis C,  - s/p treatment with cure       # Glaucoma - cont PTA eyedrops  # Sickle cell trait       3/11 I spoke with his  daughter  Jarrod Ma and updated on [place] transfer and vascular surgical recommendation          Diet: Moderate Consistent Carb (60 g CHO per Meal) Diet  Snacks/Supplements Adult: Nepro Oral Supplement; With Meals    DVT Prophylaxis: DOAC  Zaman Catheter: Not present  Lines: None     Cardiac Monitoring: None  Code Status: Full Code      Clinically Significant Risk Factors               # Hypoalbuminemia: Lowest albumin = 3 g/dL at 3/11/2025  5:55 AM, will monitor as appropriate               # DMII: A1C = 7.9 % (Ref range: <5.7 %) within past 6 months         # Financial/Environmental Concerns: none         Social Drivers of Health    Tobacco Use: Medium Risk (7/26/2024)    Received from [email or web address]    Patient History     Smoking Tobacco Use: Former     Smokeless Tobacco Use: Never          Disposition Plan     Medically Ready for Discharge: Anticipated in 5+ Days             Joselin Guerrero MD  Hospitalist Service, GOLD TEAM 19  [facility]  Securely message with [employer] (more info)  Text page via [employer] Paging/Directory   See signed in provider for up to date coverage information  ______________________________________________________________________    Interval History   Doing ok, denies chest pain  no shortness of breath  no nausea vomiting, denies pain in toes, I came back later to speak  with SO and daughter   Physical Exam   Vital Signs: Temp: 99.2  F (37.3  C) Temp src: Oral BP: 102/67 Pulse: 67   Resp: 18 SpO2: 95 % O2 Device: None (Room air)    Weight: 156 lbs 11.95 oz  General appearence: awake alert  in  no apparent distress    RESPIRATORY: lungs clear  "  CARDIOVASCULAR:S1 S2 regular rate and rhythm,   GASTROINTESTINAL:soft, non-distended , non-tender , + bowel sounds,   SKIN: warm and dry,  toes as bellow   NEUROLOGIC; awake alert  speech clear   EXTREMITIES: no clubbing,  or edema , left BKA right toes cyanotic , I do feel pedal pulse                   Data     I have personally reviewed the following data over the past 24 hrs:    6.9  \   10.2 (L)   / 266     137 106 51.1 (H) /  178 (H)   5.1 22 1.57 (H) \     ALT: N/A AST: N/A AP: N/A TBILI: N/A   ALB: 3.1 (L) TOT PROTEIN: N/A LIPASE: N/A     INR:  N/A PTT:  43 (H)   D-dimer:  N/A Fibrinogen:  N/A       Imaging results reviewed over the past 24 hrs:   Recent Results (from the past 24 hours)   US AYAN Doppler No Exercise    Narrative    Right AYAN and 1st digit PPG 3/11/2025 3:13 PM    CLINICAL HISTORY: Know PAD, R foot with skin changes concerning for  worsening ischemia.. Left below knee amputation.    COMPARISONS: None available.    REFERRING PROVIDER: JENNIFER GONZALEZ    TECHNIQUE: Right AYAN and 1st digit PPG obtained.    FINDINGS: Technologist note: \"left upper arm fistula-per patient,  right arm fistula-visualized. arm pressure recorded at left radial\".    RIGHT:       Brachial: > 254 mmHg - ? left radial pressure       Ankle (PT): > 254 mmHg - non compressible       Ankle (DP): > 254 mmHg - non compressible          AYAN: non compressible         1st Digit PPG: flat occlusive      Impression    IMPRESSION:  1. RIGHT:       A. Resting AYAN is non compressible.       B. Flat occlusive 1st digit PPG.    2. LEFT: Below knee amputation.  -------------    AYAN Interpretation:     Normal: 1.00 - 1.40     Borderline: 0.91 - 0.99     Abnormal: ? 0.90     Noncompressible: > 1.40    TBI Interpretation:     Normal: > 0.70     Abnormal: ? 0.70    Brian HL, Alyx HD, Isaac PP, Nikko S, et al.; Peer Review  Committee Members. 2024  ACC/AHA/AACVPR/APMA/ABC/SCAI/SVM/SVN/SVS/SIR/VESS Guideline for the  Management of Lower " Extremity Peripheral Artery Disease: A Report of  the American College of Cardiology/American Heart Association Joint  Committee on Clinical Practice Guidelines. Circulation. 2024 Jun 11;149(24):g2498-s6313. doi: 10.1161/CIR.[id number]. Epub 2024  May 14. PMID: [id number].    MANUELITO DE LEÓN MD         SYSTEM ID:  [id number]   US Lower Extremity Venous Mapping Right    Narrative    ULTRASOUND LOWER EXTREMITY VENOUS MAPPING BILATERAL 3/11/2025 3:32 PM    CLINICAL HISTORY: Potential need for vascular bypass on RLE, please  complete vein mapping for potential vein harvest.     COMPARISONS: None available.    REFERRING PROVIDER: JENNIFER GONZALEZ    TECHNIQUE: Right common femoral and great saphenous veins evaluated  with grayscale imaging and compression.    FINDINGS: Right common femoral vein is fully compressible.    Saphenofemoral junction is partially compressible.    Great saphenous vein is partially compressible in the proximal thigh.  Great saphenous vein otherwise fully compressible to the ankle.    RIGHT great saphenous vein:   Saphenofemoral junction: 6.3 mm - partially compressible  Proximal thigh: 5.9 mm - partially compressible  Mid thigh: 3.0 mm  Distal thigh: 3.7 mm  Knee: 1.6 mm  Proximal calf: 2.9 mm  Mid calf: 2.6 mm  Distal calf: 2.4 mm  Ankle: 1.3 mm    Branch veins from the great saphenous vein in the proximal thigh, and  proximal and mid calf.      Impression    IMPRESSION:  1. No deep venous thrombosis in the right common femoral vein.    2. Non occlusive superficial venous thrombosis or chronic venous  changes in the right great saphenous vein in the proximal thigh to  saphenofemoral junction.    3. Right great saphenous vein less than 2 mm in diameter in the knee  and ankle.    MANUELITO DE LEÓN MD         SYSTEM ID:  [id number]

## 2025-03-12 NOTE — PROGRESS NOTES
Nephrology Chart Review 3/12/25    Patient's renal function is at baseline. Creat has been 1.4-1.5 all week. Baseline 1.8-2.0. ( he's doing better off Torsemide)  He is non oliguric  Labs are fine today  Bp 102/67    - Plan: Continue to hold Torsemide and Calcitriol. The rest of his current meds look fine  - He should follow up with his provider at [facility] for IS adjustment   - Nephrology will sign off     Carolina Sharpe CNP  Nephrology

## 2025-03-12 NOTE — PLAN OF CARE
Goal Outcome Evaluation:           Overall Patient Progress: improvingOverall Patient Progress: improving    VS: /67 (BP Location: Left arm)   Pulse 78   Temp 100.8  F (38.2  C) (Oral)   Resp 18   Ht 1.829 m (6')   Wt 71.1 kg (156 lb 12 oz)   SpO2 98%   BMI 21.26 kg/m       O2: SPO2> 998% and stable on RA. Denies chest pain or SOB   Output: Incontinent of bowl and bladder   Last BM: 3/12/2025   Activity: Assist of 1 with walker and gait belt        Skin: All visible skin, L BKA,   Pain: Denies pain    CMS: A&OX3-4   Dressing: None    Diet: Regular diet    LDA: L PIV SL    Equipment: Iv pole, personal belongings    Plan: CONTACT precaution is maintained, Call light with in reach, bed in a low position   Additional Info: Pt was taking Heparin drip infusing. No acute event during the night

## 2025-03-13 VITALS
HEIGHT: 72 IN | OXYGEN SATURATION: 100 % | WEIGHT: 146.3 LBS | TEMPERATURE: 99 F | RESPIRATION RATE: 16 BRPM | BODY MASS INDEX: 19.82 KG/M2 | DIASTOLIC BLOOD PRESSURE: 57 MMHG | SYSTOLIC BLOOD PRESSURE: 104 MMHG | HEART RATE: 70 BPM

## 2025-03-13 LAB
ALBUMIN SERPL BCG-MCNC: 3.2 G/DL (ref 3.5–5.2)
ANION GAP SERPL CALCULATED.3IONS-SCNC: 11 MMOL/L (ref 7–15)
APTT PPP: 48 SECONDS (ref 22–38)
APTT PPP: 54 SECONDS (ref 22–38)
APTT PPP: 56 SECONDS (ref 22–38)
APTT PPP: 63 SECONDS (ref 22–38)
ATRIAL RATE - MUSE: 72 BPM
BACTERIA CSF CULT: NORMAL
BUN SERPL-MCNC: 49.3 MG/DL (ref 8–23)
CALCIUM SERPL-MCNC: 9.4 MG/DL (ref 8.8–10.4)
CHLORIDE SERPL-SCNC: 106 MMOL/L (ref 98–107)
CREAT SERPL-MCNC: 1.52 MG/DL (ref 0.67–1.17)
DIASTOLIC BLOOD PRESSURE - MUSE: NORMAL MMHG
EGFRCR SERPLBLD CKD-EPI 2021: 50 ML/MIN/1.73M2
GLUCOSE BLDC GLUCOMTR-MCNC: 161 MG/DL (ref 70–99)
GLUCOSE BLDC GLUCOMTR-MCNC: 172 MG/DL (ref 70–99)
GLUCOSE BLDC GLUCOMTR-MCNC: 248 MG/DL (ref 70–99)
GLUCOSE BLDC GLUCOMTR-MCNC: 263 MG/DL (ref 70–99)
GLUCOSE BLDC GLUCOMTR-MCNC: 265 MG/DL (ref 70–99)
GLUCOSE SERPL-MCNC: 192 MG/DL (ref 70–99)
HCO3 SERPL-SCNC: 21 MMOL/L (ref 22–29)
INTERPRETATION ECG - MUSE: NORMAL
P AXIS - MUSE: 82 DEGREES
PHOSPHATE SERPL-MCNC: 3 MG/DL (ref 2.5–4.5)
POTASSIUM SERPL-SCNC: 5 MMOL/L (ref 3.4–5.3)
PR INTERVAL - MUSE: 194 MS
QRS DURATION - MUSE: 78 MS
QT - MUSE: 434 MS
QTC - MUSE: 475 MS
R AXIS - MUSE: 6 DEGREES
SODIUM SERPL-SCNC: 138 MMOL/L (ref 135–145)
SYSTOLIC BLOOD PRESSURE - MUSE: NORMAL MMHG
T AXIS - MUSE: 125 DEGREES
TACROLIMUS BLD-MCNC: 2.7 UG/L (ref 5–15)
TME LAST DOSE: ABNORMAL H
TME LAST DOSE: ABNORMAL H
VENTRICULAR RATE- MUSE: 72 BPM

## 2025-03-13 PROCEDURE — 250N000013 HC RX MED GY IP 250 OP 250 PS 637: Performed by: STUDENT IN AN ORGANIZED HEALTH CARE EDUCATION/TRAINING PROGRAM

## 2025-03-13 PROCEDURE — 36415 COLL VENOUS BLD VENIPUNCTURE: CPT | Performed by: STUDENT IN AN ORGANIZED HEALTH CARE EDUCATION/TRAINING PROGRAM

## 2025-03-13 PROCEDURE — 85730 THROMBOPLASTIN TIME PARTIAL: CPT | Performed by: INTERNAL MEDICINE

## 2025-03-13 PROCEDURE — 250N000012 HC RX MED GY IP 250 OP 636 PS 637: Performed by: STUDENT IN AN ORGANIZED HEALTH CARE EDUCATION/TRAINING PROGRAM

## 2025-03-13 PROCEDURE — 250N000013 HC RX MED GY IP 250 OP 250 PS 637: Performed by: PHYSICIAN ASSISTANT

## 2025-03-13 PROCEDURE — 250N000013 HC RX MED GY IP 250 OP 250 PS 637: Performed by: INTERNAL MEDICINE

## 2025-03-13 PROCEDURE — 36415 COLL VENOUS BLD VENIPUNCTURE: CPT | Performed by: INTERNAL MEDICINE

## 2025-03-13 PROCEDURE — 99222 1ST HOSP IP/OBS MODERATE 55: CPT | Mod: GC | Performed by: INTERNAL MEDICINE

## 2025-03-13 PROCEDURE — 85730 THROMBOPLASTIN TIME PARTIAL: CPT | Performed by: STUDENT IN AN ORGANIZED HEALTH CARE EDUCATION/TRAINING PROGRAM

## 2025-03-13 PROCEDURE — 99233 SBSQ HOSP IP/OBS HIGH 50: CPT | Performed by: STUDENT IN AN ORGANIZED HEALTH CARE EDUCATION/TRAINING PROGRAM

## 2025-03-13 PROCEDURE — 99231 SBSQ HOSP IP/OBS SF/LOW 25: CPT | Performed by: NURSE PRACTITIONER

## 2025-03-13 PROCEDURE — 250N000013 HC RX MED GY IP 250 OP 250 PS 637

## 2025-03-13 PROCEDURE — 250N000011 HC RX IP 250 OP 636: Performed by: INTERNAL MEDICINE

## 2025-03-13 PROCEDURE — 82310 ASSAY OF CALCIUM: CPT | Performed by: STUDENT IN AN ORGANIZED HEALTH CARE EDUCATION/TRAINING PROGRAM

## 2025-03-13 PROCEDURE — 80197 ASSAY OF TACROLIMUS: CPT | Performed by: STUDENT IN AN ORGANIZED HEALTH CARE EDUCATION/TRAINING PROGRAM

## 2025-03-13 PROCEDURE — 93010 ELECTROCARDIOGRAM REPORT: CPT | Performed by: INTERNAL MEDICINE

## 2025-03-13 PROCEDURE — 82565 ASSAY OF CREATININE: CPT | Performed by: STUDENT IN AN ORGANIZED HEALTH CARE EDUCATION/TRAINING PROGRAM

## 2025-03-13 PROCEDURE — 250N000012 HC RX MED GY IP 250 OP 636 PS 637: Performed by: PHYSICIAN ASSISTANT

## 2025-03-13 PROCEDURE — 120N000002 HC R&B MED SURG/OB UMMC

## 2025-03-13 PROCEDURE — 93005 ELECTROCARDIOGRAM TRACING: CPT

## 2025-03-13 RX ORDER — ASPIRIN 81 MG/1
81 TABLET, CHEWABLE ORAL DAILY
Status: DISCONTINUED | OUTPATIENT
Start: 2025-03-13 | End: 2025-03-26 | Stop reason: HOSPADM

## 2025-03-13 RX ORDER — ATORVASTATIN CALCIUM 40 MG/1
40 TABLET, FILM COATED ORAL EVERY EVENING
Status: DISCONTINUED | OUTPATIENT
Start: 2025-03-13 | End: 2025-03-26 | Stop reason: HOSPADM

## 2025-03-13 RX ADMIN — LATANOPROST 1 DROP: 50 SOLUTION OPHTHALMIC at 19:52

## 2025-03-13 RX ADMIN — METOPROLOL SUCCINATE 75 MG: 25 TABLET, EXTENDED RELEASE ORAL at 08:38

## 2025-03-13 RX ADMIN — TACROLIMUS 1.5 MG: 1 CAPSULE ORAL at 19:54

## 2025-03-13 RX ADMIN — FERROUS SULFATE TAB 325 MG (65 MG ELEMENTAL FE) 325 MG: 325 (65 FE) TAB at 08:39

## 2025-03-13 RX ADMIN — INSULIN ASPART 2 UNITS: 100 INJECTION, SOLUTION INTRAVENOUS; SUBCUTANEOUS at 22:14

## 2025-03-13 RX ADMIN — SODIUM BICARBONATE 650 MG TABLET 1300 MG: at 08:39

## 2025-03-13 RX ADMIN — TAMSULOSIN HYDROCHLORIDE 0.4 MG: 0.4 CAPSULE ORAL at 08:40

## 2025-03-13 RX ADMIN — LIDOCAINE 4% 1 PATCH: 40 PATCH TOPICAL at 19:52

## 2025-03-13 RX ADMIN — INSULIN GLARGINE 7 UNITS: 100 INJECTION, SOLUTION SUBCUTANEOUS at 19:52

## 2025-03-13 RX ADMIN — TACROLIMUS 1.5 MG: 1 CAPSULE ORAL at 08:40

## 2025-03-13 RX ADMIN — ATORVASTATIN CALCIUM 40 MG: 40 TABLET, FILM COATED ORAL at 19:54

## 2025-03-13 RX ADMIN — MYCOPHENOLATE MOFETIL 500 MG: 500 TABLET, FILM COATED ORAL at 08:41

## 2025-03-13 RX ADMIN — EMPAGLIFLOZIN 10 MG: 10 TABLET, FILM COATED ORAL at 08:38

## 2025-03-13 RX ADMIN — PANTOPRAZOLE SODIUM 40 MG: 40 TABLET, DELAYED RELEASE ORAL at 08:39

## 2025-03-13 RX ADMIN — ASPIRIN 81 MG CHEWABLE TABLET 81 MG: 81 TABLET CHEWABLE at 17:26

## 2025-03-13 RX ADMIN — HEPARIN SODIUM 1400 UNITS/HR: 10000 INJECTION, SOLUTION INTRAVENOUS at 15:29

## 2025-03-13 RX ADMIN — MYCOPHENOLATE MOFETIL 500 MG: 500 TABLET, FILM COATED ORAL at 19:54

## 2025-03-13 RX ADMIN — SODIUM BICARBONATE 650 MG TABLET 1300 MG: at 19:54

## 2025-03-13 ASSESSMENT — ACTIVITIES OF DAILY LIVING (ADL)
ADLS_ACUITY_SCORE: 73

## 2025-03-13 NOTE — CONSULTS
Cardiology Consultation Note    Date of Service: 3/13/2025            ASSESSMENT / RECOMMENDATIONS     The patient is a 64-year-old male patient with history of type 2 diabetes, hypertension, mild cardiomyopathy, ESRD s/p renal transplant in 2022 being transferred from [facility] to [place] for peripheral vascular intervention by vascular surgery.  Cardiology is consulted for assistance with medications preoperatively.    Unfortunately, majority of his medical records are not available to us.  From ischemic heart disease assessment standpoint, he has a coronary angiogram at [facility] in 2018 which did not demonstrate any coronary artery disease.  He does not endorse any current or prior episodes of chest pain.  Given this is a none suprainguinal vascular intervention, this procedure will be of mild to moderate risk, in the absence symptoms and with a normal coronary angiogram in 2018, reasonable to proceed with the procedure as planned at this time.    From cardiomyopathy standpoint, his EF is 40 to 45% due to unclear cause.  [facility], therefore we will defer further workup of this to outside cardiology.  At this time he does not have any signs or symptoms of clinical heart failure, he is euvolemic on exam.  With the current information available for my review, I would classify his condition as mild cardiomyopathy instead of heart failure with reduced ejection fraction in the absence of signs and symptoms of heart failure.  Reasonable to continue his metoprolol perioperatively, including the day of procedure.  Would hold Jardiance starting today and restart after procedure.    His EKG from 12/25/2024 demonstrated AFL with variable block.  I could not verify whether or not he was compliant with his anticoagulation.  His last rhythm is normal sinus rhythm on today's EKG.  I would continue anticoagulation with heparin at this time and would switch back to his DOAC after vascular  procedure.    #Hypertension  #Type 2 diabetes  #Paroxysmal atrial flutter  #Peripheral artery disease  #ESRD s/p kidney transplantation    - Continue atorvastatin (consider increasing to high intensity if not contraindicated by transplant nephrology), metoprolol succinate 75 mg (including the day of procedure)  - Continue heparin drip, switch back to DOAC after procedure if possible from surgical standpoint  - No further ischemic workup at this time  - Hold Jardiance and restart after procedure  - He will need to arrange follow-up with [facility] after [facility]      Thank you for allowing us to participate in the care of this patient.   The patient was discussed w/ Dr. Gutierrez.  Cardiology will sign off at this time, please call with questions.    Arianne Cox MD  Cardiovascular Disease Fellow             HISTORY OF PRESENT ILLNESS     The patient is a 64-year-old male patient with history of type 2 diabetes, hypertension, mild cardiomyopathy, ESRD s/p renal transplant in 2022 being transferred from [facility] to [place] for peripheral vascular intervention by vascular surgery.  Cardiology is consulted for assistance with medications preoperatively.    [facility], therefore unfortunately all of his medical records are not available.  Per chart, he had a coronary angiogram in 2018 which  did not demonstrate any coronary artery disease.     Today, he was confused on exam.  He tells me that he does not experience any chest pain or shortness of breath, however this was limited due to his left BKA.  He denies any orthopnea, leg swelling, PND.  He smoked for 50 years but he no longer smokes.  He cannot tell me if he was compliant fully with his medications    ROS:   A comprehensive 10 point ROS was negative other than as mentioned in HPI.           CARDIAC HISTORY AND IMAGING     EKG: Normal sinus rhythm, nonspecific ST-T changes  12/25/25: AFL with variable  block    ECHO:     2/25/2025  Left ventricular function is decreased. The ejection fraction is 40-45%  (mildly reduced).  Diastolic function not assessed due to arrhythmia.  Right ventricular function, chamber size, wall motion, and thickness are  normal.  Mild tricuspid insufficiency is present.  Mild (pulmonary artery systolic pressure<50mmHg) pulmonary hypertension is  present.  IVC diameter and respiratory changes fall into an intermediate range  suggesting an RA pressure of 8 mmHg.  Trivial pericardial effusion is present.  There is no prior study for direct comparison.    Imaging/Angiography:     Coronary angiogram 2/20/2018 (outside hospital)  Normal coronaries without coronary artery disease           PAST MEDICAL HISTORY     Past Medical History:   Past Medical History:   Diagnosis Date    Chronic kidney disease     Diabetes (H)     Hypertension     Myocardial infarction (H)      Past Surgical History:   History reviewed. No pertinent surgical history.  Allergies:     Allergies   Allergen Reactions    Penicillins Hives     Medications:   No current outpatient medications on file.     Family History:   History reviewed. No pertinent family history.  Social History:   Social History     Tobacco Use    Smoking status: Not on file    Smokeless tobacco: Not on file   Substance Use Topics    Alcohol use: Not on file              PHYSICAL EXAM     /72 (BP Location: Left arm, Patient Position: Semi-Osorio's, Cuff Size: Adult Regular)   Pulse 72   Temp 98.9  F (37.2  C) (Oral)   Resp 16   Ht 1.829 m (6')   Wt 70.5 kg (155 lb 6.8 oz)   SpO2 99%   BMI 21.08 kg/m       Intake/Output Summary (Last 24 hours) at 3/13/2025 0927  Last data filed at 3/13/2025 0850  Gross per 24 hour   Intake 658 ml   Output --   Net 658 ml     Wt Readings from Last 1 Encounters:   03/12/25 70.5 kg (155 lb 6.8 oz)       General: Alert and oriented; no acute distress  Neck: JVP is 5 cm  CV: S1 S2 regular; no m/r/g  Lungs: CTABL,  no rhonchi or wheezing  Extremities: No pretibial edema, left BKA, right foot wrapped, second and third toes with skin changes concerning for ischemia  Skin: warm, no rashes  Neuro: alert and oriented  Psych: congruent affect          DIAGNOSTICS     Labs:  Recent Labs   Lab 03/11/25  1533 03/10/25  0744   WBC 6.9 7.0   HGB 10.2* 9.7*   HCT 32.9* 30.8*   MCV 82 82    282     Recent Labs   Lab 03/12/25  0533 03/11/25  0555    139   POTASSIUM 5.1 4.9   CHLORIDE 106 106   CO2 22 22   ANIONGAP 9 11   BUN 51.1* 51.9*   CR 1.57* 1.54*   QUE 9.3 9.5       I saw and evaluated patient with CV fellow. I examined patient with CV fellow. I discussed the results with patient and CV fellow. I discussed our plan with patient and CV fellow.  I agree with CV fellow's note and I edited the CV fellow's note to make it a more comprehensive document.    I spent 45 min directly with patient and CV fellow.    Bahman Gutierrez MD, PhD  Professor of Medicine  Division of Cardiology

## 2025-03-13 NOTE — PLAN OF CARE
Goal Outcome Evaluation:  Shift: [id number]      Plan of Care Reviewed With: patient    Overall Patient Progress: no changeOverall Patient Progress: no change    Temp: 99.3  F (37.4  C) Temp src: Oral BP: 98/59 Pulse: 70   Resp: 16 SpO2: 98 % O2 Device: None (Room air)      Pt is alert and oriented to only self. Able to follow commands and communicate need if staff present in room. VSS, on RA. Denies SOB, chest pain, and all pain in general. On heparin gtt, 1400 PTT level outside of therapeutic range. Heparin gtt increased to 1400 units/hr. Pt incontinence of bowel and bladder, Primofit placed. Denies nausea and tolerating moderate carb diet. RLE pulses ausculted using doppler. Foot wrapped in warm blanket throughout the shift per order. Plan for OR on 3/17 with vascular surgery for angiogram w/ angioplasty vs vascular bypass.

## 2025-03-13 NOTE — PROGRESS NOTES
Brief Progress Note  March 13, 2025 2:18 AM      Subjective  Paged concerning pt arrival to  from [facility] - vascular team had seen him on [facility] yesterday. I assessed the pt at bedside. Pt had no complaints, denied CP, SOB, N/V/D, dizziness, HA, light headedness. All questions or concerns were addressed at this time.     Objective  /73 (BP Location: Left arm)   Pulse 82   Temp 98.2  F (36.8  C) (Oral)   Resp 20   Wt 55.7 kg (122 lb 14.4 oz)   SpO2 100%   BMI 20.77 kg/m      Gen: A&O in NAD  Pulm: Nonlabored Breathing on RA  CV: RR  Abd: soft, ND, NT, no guarding, no rebound tenderness   LLE: BKA, well healed  RLE: small areas of black necrosis to distal aspects of toes 2-4; faintly palpable DP    A&P  - Continue rest of cares per primary team    Tristan Sevilla DO [place]  PGY-1 Resident, General Surgery

## 2025-03-13 NOTE — PROGRESS NOTES
Code status: Full code    Admission: 2/25/25 w/ AMS x1-2 months. Brought in by family.   History: diabetes, HTN, CAD, HF, and renal transplant 9/2022, left AKA, CKD     VS: VSS on RA  Neuro: A&Ox3. Disoriented to situation. Intermittently confused. Pleasant, cooperative.   Cardiac: WDL  Respiratory: WDL. Denies SOB.   Vascular Access: L PIV infusing heparin gtt at 1400 units/ hour per protocol  Skin: Rt 2nd & 3rd toe necrosis.   Drains: None present.   GI/: X- primofit. Reports passing gas. LBM: 3/11/25  Pain: Denies. Denies nausea.  Mobility: A1-2 w/ GB & walker. Has LLE prosthetic.   Diet: Moderate consistent carb. Monitoring BG.     On insulin glargine- unable to locate pen, pen requested from pharmacy.    Plan: vascular sugery consulted, remains on heparin gtt, pending Monday (3/17) for angiogram w/ angioplasty vs vascular bypass     RLE pulses ausculted using doppler.         Call light within reach. Bed locked, lowered. ID band on patient. Continue with plan of care.

## 2025-03-13 NOTE — PROGRESS NOTES
Vascular Surgery Progress Note  03/13/2025       Subjective/Interval: Transferred from the [facility] to the [place], on heparin, Eliquis on hold, pending risk stratification with cardiac.     Objective:  Temp:  [98  F (36.7  C)-99.7  F (37.6  C)] 98.9  F (37.2  C)  Pulse:  [67-75] 72  Resp:  [16-18] 16  BP: (100-126)/(49-75) 122/72  SpO2:  [99 %-100 %] 99 %    I/O last 3 completed shifts:  In: 958 [P.O.:958]  Out: 650 [Urine:650]      Gen: Awake, alert, NAD  CV: RRR per peripheral pulses  Resp: NLB on room air  Abd:  soft, nondistended, nontender  Ext: Warm, no edema  Vascular: Faint monophasic Doppler DP and PT on RLE, palpable popliteal. Right foot toes with dry gangrene. Motor and sensory function intact. Dorsiflexion/plantarflexion 5/5 on RLE. With L BKA.      Labs: Reviewed  Imaging: Reviewed     Assessment/Plan:   Tad Zaman is a pleasant 64-year-old gentleman with PMH significant for T2DM (A1C 6.9% in 2024), HFrEF, HTN, MI, HLD, ESRD s/p renal transplant (9/2022), CKD stage 3b (creat baseline 1.8-2.3), left BKA, and PAD recently admitted with altered mental status (resolved) found to have blue toes on RLE, concern for worsening PAD.      Arterial duplex demonstrates monophasic poststenotic waveforms in the PT, AT and DP, velocities are 17-22, however likely falsely elevated due to monophasic waveform. AYAN h are noncompressible. The dusky/gangrenous toes of the only remaining foot, in setting of monophasic flow from knee to foot identified on arterial duplex, are potentially concerning for worsening PAD.     Considering the above, patient will likely benefit from an angiogram with CO2, given CKD stage IIIb with creatinine around 1.6-2. Thus we recommend:     -Vein mapping (done 3/11/2025)  -Hold Eliquis, okay to anticoagulate with heparin drip.   -Our team is working with scheduling the patient for potentially Monday for angiogram with angioplasty vs vascular bypass.  -Please consult cardiology for  restratification given extensive cardiac disease  -Please start aspirin 81mg if no medical contraindications exist for antiplatelet therapy, and continue with statin  -Keep right foot warm with warming blankets   -Please ensure adequate blood glucose control  -Neurovascular checks per unit protocol (patient has brisk Doppler PT and faint monophasic DP).    Piedad Lloyd CNP  Vascular Surgery  Pager: [phone]  [email or web address]  Send message or 10 digit call back number Securely via [employer] with the Vocera Web Console (learn more here)    Please page me directly with any questions/concerns during regular weekday hours before 3PM. If there is no response, if it is a weekend, or if it is during evening hours, then please use the [email or web address] system to page the first-call resident on call for vascular surgery.

## 2025-03-13 NOTE — PROGRESS NOTES
Medical Student Practice Cardiology Consult Note          Cardiology Consultation Note    Date of Service: 3/13/2025   Patient: Tad Zaman  MRN: [id number]          ASSESSMENT / RECOMMENDATIONS     Cardiology consulted for preoperative optimization for Mr. Tad Zaman, 63 y/o M w/ Hx of T2DM, HTN, HFrEF, s/p renal transplant . Vascular surgery planned for Monday 3/17/2025. I saw Mr. Zaman this morning at around 0830, he was alert and cooperative but oriented only to self. Reported no chest pain or dyspnea. Warm and well perfused on exam, other than cyanosis of right toes most likely from progression of PAD.     # PAD  # AMS  # T2DM  # HFrEF    Recommendations per  AHA Perioperative CV Managment Guidelines Section 7:   -Continue atorvastatin 20 mg.  -Continue metoprolol 75 mg.  -Agree with vascular surgery plan to hold apixaban and use IV heparin for vascular indications.  -Hold empagliflozen for 3 days prior to operation.  -No indication for additional cardiac medications at this time.   -Cardiology has ordered a new EKG.    Thank you for allowing us to participate in the care of this patient.   The patient was discussed w/ Dr. Gutierrez.     Kwesi Nayak, MS3  [facility] Medical Student             HISTORY OF PRESENT ILLNESS     Tad Zaman is a 65 yo M w/ h/o T2DM, HTN, CAD, HFrEF, CKD, and renal transplant . [facility]-2 months of AMS, received a broad workup without notable findings, and was discharged with failure to thrive, requiring 24 hour home care from family. He has new cyanotic areas on his R toes concerning for progression of PAD. He was evaluated by the [facility] Vascular Team and transferred to [facility] this morning at 0700, with plans for vascular surgery.    ROS:   A comprehensive 10 point ROS was negative other than as mentioned in HPI.           CARDIAC HISTORY AND IMAGING     [id number] EKG  demonstrated atrial flutter with variable A-V block, T-wave inversion in lead 1, potential lateral ischemia. No EKG for current hospitalization.    ECHO: TTE 2/26/2025  Interpretation Summary  Left ventricular function is decreased. The ejection fraction is 40-45%  (mildly reduced).  Diastolic function not assessed due to arrhythmia.  Right ventricular function, chamber size, wall motion, and thickness are  normal.  Mild tricuspid insufficiency is present.  Mild (pulmonary artery systolic pressure<50mmHg) pulmonary hypertension is  present.  IVC diameter and respiratory changes fall into an intermediate range  suggesting an RA pressure of 8 mmHg.  Trivial pericardial effusion is present.  There is no prior study for direct comparison.  ______________________________________________________________________________  Left Ventricle  Left ventricular size is normal. Left ventricular wall thickness is normal.  Left ventricular function is decreased. The ejection fraction is 40-45%  (mildly reduced). Diastolic function not assessed due to arrhythmia. No  regional wall motion abnormalities are seen.     Right Ventricle  Right ventricular function, chamber size, wall motion, and thickness are  normal.     Atria  Mild to moderate biatrial enlargement is present. The atrial septum is intact  as assessed by color Doppler .     Mitral Valve  Mild mitral annular calcification is present.     Aortic Valve  Aortic valve sclerosis is present. The mean AoV pressure gradient is 1.7 mmHg.  The calculated aortic valve are is 2.8 cm^2.     Tricuspid Valve  Mild tricuspid insufficiency is present. The right ventricular systolic  pressure is approximated at 33.9 mmHg plus the right atrial pressure. Mild  (pulmonary artery systolic pressure<50mmHg) pulmonary hypertension is present.     Pulmonic Valve  The pulmonic valve is normal. Trace pulmonic insufficiency is present.     Vessels  The thoracic aorta is normal. The pulmonary artery is  normal. IVC diameter and  respiratory changes fall into an intermediate range suggesting an RA pressure  of 8 mmHg.     Pericardium  Trivial pericardial effusion is present.     Compared to Previous Study  There is no prior study for direct comparison.  ______________________________________________________________________________  MMode/2D Measurements & Calculations     IVSd: 1.2 cm  LVIDd: 4.9 cm  LVIDs: 3.7 cm  LVPWd: 1.1 cm  FS: 23.4 %  LV mass(C)d: 204.2 grams  LV mass(C)dI: 113.3 grams/m2  Ao root diam: 3.4 cm  asc Aorta Diam: 3.4 cm  LVOT diam: 2.2 cm  LVOT area: 3.8 cm2     EF(MOD-bp): 38.3 %  Ao root diam index Ht(cm/m): 1.9  Ao root diam index BSA (cm/m2): 1.9  Asc Ao diam index BSA (cm/m2): 1.9  Asc Ao diam index Ht(cm/m): 1.9  RWT: 0.45  TAPSE: 1.7 cm     Doppler Measurements & Calculations  Ao V2 max: 102.0 cm/sec  Ao max P.2 mmHg  Ao V2 mean: 66.4 cm/sec  Ao mean P.7 mmHg  Ao V2 VTI: 16.9 cm  AZALIA(I,D): 2.8 cm2  AZALIA(V,D): 2.7 cm2  LV V1 max P.2 mmHg  LV V1 max: 73.7 cm/sec  LV V1 VTI: 12.6 cm  SV(LVOT): 47.9 ml  SI(LVOT): 26.6 ml/m2  TR max vitaly: 291.0 cm/sec  TR max P.9 mmHg  AV Vitaly Ratio (DI): 0.72  AZALIA Index (cm2/m2): 1.6     ______________________________________________________________________________  Report approved by: YUMIKO Darby MD on 2025 03:07 PM    Imaging/Angiography: None recent.           PAST MEDICAL HISTORY     Past Medical History:   Past Medical History:   Diagnosis Date    Chronic kidney disease     Diabetes (H)     Hypertension     Myocardial infarction (H)      Past Surgical History:   History reviewed. No pertinent surgical history.  Allergies:     Allergies   Allergen Reactions    Penicillins Hives     Outpatient Medications:   Apixaban 5mg BID  Atorvastatin 20mg  Brinzolamide-brimonidine 1-0.2% opthalamic suspension  Calcitriol 0.25mcg Mon-Fri  Cefdinir 300mg BID  Empagliflozen 25mg, 12.5mg half-tablet BID  Latanoprost 0.005% solution  Metoprolol 25mg  "extended release  Mycophenolate 500mg BID  Pantoprazole 40mg  Patiromer 8.4g weekly  Sodium bicarbonate 650mg  Tacrolimus 0.5mg BID  Torsemide 10mg BID      Family History:   History reviewed. No pertinent family history.  Social History:   Social History     Tobacco Use    Smoking status: Not on file    Smokeless tobacco: Not on file   Substance Use Topics    Alcohol use: Not on file              PHYSICAL EXAM     /72 (BP Location: Left arm, Patient Position: Semi-Osorio's, Cuff Size: Adult Regular)   Pulse 72   Temp 98.9  F (37.2  C) (Oral)   Resp 16   Ht 1.829 m (6')   Wt 70.5 kg (155 lb 6.8 oz)   SpO2 99%   BMI 21.08 kg/m       Intake/Output Summary (Last 24 hours) at 3/13/2025 0918  Last data filed at 3/13/2025 0850  Gross per 24 hour   Intake 658 ml   Output --   Net 658 ml     Wt Readings from Last 1 Encounters:   03/12/25 70.5 kg (155 lb 6.8 oz)       General: Alert, oriented to self.  Neck: JVP is 5 cm  CV: S1 S2 regular; no m/r/g  Lungs: CTABL, no rhonchi or wheezing  Extremities: No pretibial edema, warm and well perfused other than cyanosis to right toes.  Skin: warm, no rashes  Neuro: alert, oriented to self.  Psych: congruent affect          DIAGNOSTICS     Labs:  Recent Labs   Lab 03/11/25  1533 03/10/25  0744   WBC 6.9 7.0   HGB 10.2* 9.7*   HCT 32.9* 30.8*   MCV 82 82    282     Recent Labs   Lab 03/12/25  0533 03/11/25  0555    139   POTASSIUM 5.1 4.9   CHLORIDE 106 106   CO2 22 22   ANIONGAP 9 11   BUN 51.1* 51.9*   CR 1.57* 1.54*   QUE 9.3 9.5       Troponin:   NT-proBNP:    No results for input(s): \"CHOL\", \"HDL\", \"LDL\", \"TRIG\" in the last 19568 hours.   "

## 2025-03-13 NOTE — PLAN OF CARE
Goal Outcome Evaluation:      Plan of Care Reviewed With: patient    Overall Patient Progress: no change      VSS on RA. A&Ox3. L PIV infusing heparin gtt @1250. Denies pain. Incontinent of b&b. L BKA. R foot kept wrapped in warm blankets- doppler q4. Continue to monitor.

## 2025-03-13 NOTE — PROGRESS NOTES
[facility]    Medicine Progress Note - Hospitalist Service, GOLD TEAM 7    Date of Admission:  2/25/2025    Assessment & Plan   Tad Zaman is a 64 year old male admitted on 2/25/2025 for AMS. He has a history of diabetes, HTN, CAD, HF, and renal transplant 9/2022, left AKA, CKD.  Patient was brought in by family for AMS x 1-2 months. He has been evaluated at the [facility] multiple times in ED then was evaluated during an admission 1/20-2/21/25 with very broad workup (see below) that did not amount to any type of notable etiology of his symptoms. During this stay he was also treated for HF exacerbation, MOY, and UTI which were all stable at time of discharge. He finished course of cefdinir for e coli UTI on 2/24. He was discharged with family being told that he has failure to thrive. He was discharged home, required 24 hr cares by daughter including changing him and preparation of food. He has also has had intermittent agitation and aggression. Home health nurse stated that he was not appropriate for their program.      No clear etiology for confusion but the aggression and refusal of cares have clinically improved without intervention. Course complicated by new cyanotic areas on the R toes concerning for progression of PAD. Vascular surgery consulted, details per below for work up of PAD    3/13  - Cardiology; cleared for preop eval  - vascular sugery consulted, remains on heparin gtt, pending Monday (3/17) for angiogram w/ angioplasty vs vascular bypass.       AMS  Cognitive impairment vs encephalopathy   3/11 reseen by  psychiatry to see reg decision making capacity and was fel he needed surrogate decision maker for consent for procedures. Presented with 1-2 months of progressive encephalopathy and agitation with aggressive behavior toward family who is caring for him at home. [facility]  -Initial LP with pleocytosis, otherwise  unrevealing.   -MRI brain without acute pathology.   -Neurology consulted. Some concern for hx of viral encephalitis that has since resolved leaving him with a degree of impairment. He otherwise remains hemodynamically stable without clinical evidence of infection, no gross electrolyte abnormality, stable glucose (elevated but without acidosis), BUN is improving, tacrolimus levels was  WNL.   [facility]:   - MRI 1/29: Stable mild amount of chronic small vessel ischemia. Small chronic R cerebellar infarct.   -OT SLUMS 6/30   -EEG: nonspecific encephalopathy.   -LP: protein 173, glucose 110. Cell count with 21 WBC, 41 RBC, 52 lymph. Hazy appearance  -Encephalopathy paraneoplastic evaluation (including Purkinje, ADI, antiglial nucl antibody, NMDA, BOSSMAN) 1/30/25 negative.   -CSF flow cytometry: suboptimal specimen with low cellularity. No immunophenotypic evidence of involvement by lymphoma. [place] light chain 64.8, lambda light chain 35.1, kappa/lambda 1.85 all elevated. No monoclonals detected (1/22)  -ELP/Immunofix, serum panel: total protein low, beta 1 fraction low (1/22/25)  -Polyoma virus DNA of CSF, MAGNUS virus DNA (1/28) negative.   -Meningitis / encephalitis panel (1/28) negative  -CSF angiotensin converting enzyme (1/28) negative negative  -CMV (1/28, 1/25) negative   -BK virus (1/28) negative  -EBV (1/25) negative  -Cryptococcus (1/26) negative; Cryptococcus neoformans (1/28) negative  -Syphilis (1/25) negative  -HIV (1/25) negative  -B12 (1/18) WNL  -TSH (1/18) high side of normal 4.94  - CSF flow cytometry from 3/5/25  rare to absent B cells , no malignant cells seen   - Drug screen negative. Patient has distant history of what sounds to be mild to moderate alcohol intake.   - needs out patient  neuropsych testing as well as op EEG if cognition worsens    - repeat MRI of brain 3/1/25 limited by motion artifact  but no evidence of acute infarct or acute intracranial pathology   - Delirium precautions as  able.   - repeat LP per CAPs on 3/5 completed - repeat CSF unremarkable (negative cytometry, cytology negative),  West Nile virus negative , VDRL non reactive , aerobic and anaerobic cultures so far no growth   - He will need follow up with outpatient neurology and consider neuropsychology testing  - Per neuro: can consider EEG in outpatient setting if cognition were to worsen      Peripheral vascular disease/ PAD    S/p L BKA  Seen by vascular surgery   -Vein mapping (done 3/11/2025)  -Hold Eliquis, okay to anticoagulate with heparin drip.   -Our team is working with scheduling the patient for potentially Monday for angiogram with angioplasty vs vascular bypass.  -Please consult cardiology for restratification given extensive cardiac disease  -Please start aspirin 81mg if no medical contraindications exist for antiplatelet therapy, and continue with statin  -Keep right foot warm with warming blankets   -Please ensure adequate blood glucose control  -Neurovascular checks per unit protocol (patient has brisk Doppler PT and faint monophasic DP).  -Consider aspirin 81mg (started 3/13), and continue with statin (discussing with transplant neph if atorvastatin can be increased)        Renal transplant 9/2022    CKD Stage 3b   Had the transplant [facility]. [facility]. 1/27 renal US with doppler showed no stenosis of vessels. Resistive indices were increased, felt could be related to ureteric obstruction but not specific. Mild dilation of intrarenal collecting system and ureter was also seen. CT a/p 1/28 W/O contrast suggested mild hydro.  - [facility]/3/25 at [facility] showed no hydro or other abnormalities. FENA 4.3 (1/28) at [facility].   -  Creat recent baseline appears to be 1.8-2.3  - transplant neph following the patient  Recommendations:   - Continue mycophenolate 500 mg twice a day.  -Continue to hold torsemide, calcitriol and lokelma  - Continue tacrolimus 1.5  mg twice a day.  -Tacro level on 3/9/2025 is 3.6  - Accurate I/O and daily weight  - Will need Urology outpatient follow up for TOV and further management.    - Hold calcitriol given borderline high calcium. Will follow up calcium levels on next renal panel (ordered)  - Ordered UA and UPCR/UACR 3/3/2025 - trace leuk esterase and glucose present. No evidence of infection.   - Ordered Kidney Transplant US 3/3/2025 (routine) - mild hydronephrosis of transplanted kidney, torturous renal artery with mildly elevated velocity at the anastomosis, likely chronic.   - Check hepatitis panel (ordered for 3/4/2025) - HCV positive, known history and cured, otherwise negative.   - Accurate I/O and daily weight given recent heart failure exacerbation.  - Plan for urinary retention per below     Urinary retention  Noted to have ongoing retention requiring straight cath. Unclear whether this is a new issue for him. No significant anticholinergics on his current medication list.   - Tamsulosin 0.4mg PO initiated on 3/4; as tolerated   - Avoid anticholinergic medications as able.       Troponinemia, stable   Lateral lead T wave inversions   Atrial flutter   HFrEF last echo 2/2025 with EF 40-45%   HTN   Hx MI  Limited cardiac history available. Per patient's daughter he may have had a stent placed at 1 point but is unsure.  Patient and daughters deny any history of cardiac arrhythmia.  However on exam and on EKG evidence of atrial flutter, and patient also on Eliquis at least since December.  [facility], he was found to have heart failure exacerbation with 25-30 lbs fluid excess. BNP during hospitalization was 2868. His Lasix was switched to Torsemide. He has been taking his medications per his daughter.   - per cardiology note I found in EPIC form 2018 he has had  medium size ischemic area  in inferior wall  ( had similar but smaller in 2013)   - BNP 90272 here.   - trop 267>>218  - EKG with T wave  inversions and mild ST depressions in lateral leads  - denies recent CP or SOB, no peripheral or abdominal edema  Plan:   - TTE 2/26/25: LV function decreased w/ EF 40-45%   - No report of chest pain. Troponin trended down   - cont PTA atorvastatin 20 mg nightly  - restarted PTA Eliquis 5 mg BID on 3/6  but now on hold as of 3/11 and iv heparin started    - HOLD  PTA Jardiance 12.5 mg daily per cardiology pre-op  - continue PTA metoprolol 75 mg daily  - HOLD PTA torsemide 10 mg BID  - cardiology consulted and saw pt for pre-op; cleared  - He will need to arrange follow-up with [facility] after his discharge     # T2DM  Not on long-term insulin. Hemoglobin A1c 6.9% as of 7/2024. Hyperglycemia when he allowed check on 3/4. He has been amenable to glucose checks and insulin this admission.   - HOLD PTA Jardiance 12.5 mg daily  - Repeat Hemoglobin A1c 7.9  - Initiated sliding scale insulin on 3/4 with TID AC and 0200 glucose checks -> increased to HDSSI on 3/10              - Will need to discuss with family whether they feel they would be able to manage sliding scale insulin at home.   - Carb consistent diet   - Given sliding scale insulin needs but would like to simplify this on discharge and will start basal Lantus at 5 units (half of what would be calculated 0.2units/kg) and uptitrate based on fasting AM sugars. Increased to lantus 7 units 3/13     # Mild normocytic anemia  - Hg stable, no signs of bleed, monitor on AC      # Hx hepatitis C,  - s/p treatment with cure        # Glaucoma - cont PTA eyedrops  # Sickle cell trait        Diet: Moderate Consistent Carb (60 g CHO per Meal) Diet  Snacks/Supplements Adult: Nepro Oral Supplement; With Meals    DVT Prophylaxis: heparin gtt  Zaman Catheter: Not present  Lines: None     Cardiac Monitoring: None  Code Status: Full Code      Clinically Significant Risk Factors               # Hypoalbuminemia: Lowest albumin = 3 g/dL at 3/11/2025  5:55  AM, will monitor as appropriate               # DMII: A1C = 7.9 % (Ref range: <5.7 %) within past 6 months       # Financial/Environmental Concerns: none         Social Drivers of Health    Tobacco Use: Medium Risk (7/26/2024)    Received from [facility]    Patient History     Smoking Tobacco Use: Former     Smokeless Tobacco Use: Never          Disposition Plan     Medically Ready for Discharge: Anticipated in 2-4 Days             Sonia Pham MD  Hospitalist Service, GOLD TEAM 7  [facility]  Securely message with [employer]FLOORS (more info)  Text page via [facility] Paging/Directory   See signed in provider for up to date coverage information  ______________________________________________________________________    Interval History   [place] over head. Did talk to patient, a bit grumpy. Refusing to answer orienting questions, thinks today is Monday. Has no questions. Denies any pain. Talked to wife, and daughters at bedside in the afternoon and provided update.     Physical Exam   Vital Signs: Temp: 99.3  F (37.4  C) Temp src: Oral BP: 98/59 Pulse: 70   Resp: 16 SpO2: 98 % O2 Device: None (Room air)    Weight: 146 lbs 4.8 oz    Gen: no acute distress, alert answering questions but not oriented  HEENT: normal conjunctivae, EOMI  CV: RRR, no murmus  Pulm: CTBA, no crackles or wheezing  Abd: soft, nl BS  Extremities: s/p L AKA. RLE DPs on doppler per vascular      Medical Decision Making       60 MINUTES SPENT BY ME on the date of service doing chart review, history, exam, documentation & further activities per the note.      Data     I have personally reviewed the following data over the past 24 hrs:    N/A  \   N/A   / N/A     138 106 49.3 (H) /  265 (H)   5.0 21 (L) 1.52 (H) \     ALT: N/A AST: N/A AP: N/A TBILI: N/A   ALB: 3.2 (L) TOT PROTEIN: N/A LIPASE: N/A     INR:  N/A PTT:  48 (H)   D-dimer:  N/A Fibrinogen:  N/A       Imaging results reviewed over the past 24 hrs:   No  results found for this or any previous visit (from the past 24 hours).

## 2025-03-13 NOTE — PLAN OF CARE
Transfer  Transferred from: [facility]  Via:bed  Reason for transfer: Pt appropriate for 7C- improved patient condition  Family: Aware of transfer  Belongings: Received with pt  Chart: Received with pt  Medications: Meds received from old unit with pt  Code Status verified on armband: yes/no  2 RN Skin Assessment Completed By: Kilo JENSEN RN, Leonela RN  Med rec completed: yes  Suction/Ambu bag/Flowmeter at bedside: yes    Report received from:   Pt status: alert and oriented, acceptance, asked to call daughter upon arrival, compliant with medications, calls appropriately.

## 2025-03-13 NOTE — SUMMARY OF CARE
PT ARRIVED ON UNIT 7C in room 7521 with    1 Robe  1 personal cellphone  1   A pair of shoes   A pair of pants  2 room sprays  Prostethic leg      Feroz GILL

## 2025-03-14 ENCOUNTER — ANESTHESIA EVENT (OUTPATIENT)
Dept: SURGERY | Facility: CLINIC | Age: 65
End: 2025-03-14
Payer: MEDICARE

## 2025-03-14 ENCOUNTER — APPOINTMENT (OUTPATIENT)
Dept: ULTRASOUND IMAGING | Facility: CLINIC | Age: 65
DRG: 981 | End: 2025-03-14
Attending: STUDENT IN AN ORGANIZED HEALTH CARE EDUCATION/TRAINING PROGRAM
Payer: MEDICARE

## 2025-03-14 LAB
ALBUMIN SERPL BCG-MCNC: 3.2 G/DL (ref 3.5–5.2)
ANION GAP SERPL CALCULATED.3IONS-SCNC: 8 MMOL/L (ref 7–15)
APTT PPP: 62 SECONDS (ref 22–38)
BUN SERPL-MCNC: 47.3 MG/DL (ref 8–23)
CALCIUM SERPL-MCNC: 9.5 MG/DL (ref 8.8–10.4)
CHLORIDE SERPL-SCNC: 105 MMOL/L (ref 98–107)
CREAT SERPL-MCNC: 1.53 MG/DL (ref 0.67–1.17)
EGFRCR SERPLBLD CKD-EPI 2021: 50 ML/MIN/1.73M2
ERYTHROCYTE [DISTWIDTH] IN BLOOD BY AUTOMATED COUNT: 14.6 % (ref 10–15)
GLUCOSE BLDC GLUCOMTR-MCNC: 139 MG/DL (ref 70–99)
GLUCOSE BLDC GLUCOMTR-MCNC: 157 MG/DL (ref 70–99)
GLUCOSE BLDC GLUCOMTR-MCNC: 175 MG/DL (ref 70–99)
GLUCOSE BLDC GLUCOMTR-MCNC: 210 MG/DL (ref 70–99)
GLUCOSE BLDC GLUCOMTR-MCNC: 262 MG/DL (ref 70–99)
GLUCOSE SERPL-MCNC: 204 MG/DL (ref 70–99)
HCO3 SERPL-SCNC: 23 MMOL/L (ref 22–29)
HCT VFR BLD AUTO: 31.2 % (ref 40–53)
HGB BLD-MCNC: 9.6 G/DL (ref 13.3–17.7)
MCH RBC QN AUTO: 25.7 PG (ref 26.5–33)
MCHC RBC AUTO-ENTMCNC: 30.8 G/DL (ref 31.5–36.5)
MCV RBC AUTO: 84 FL (ref 78–100)
PHOSPHATE SERPL-MCNC: 3 MG/DL (ref 2.5–4.5)
PLATELET # BLD AUTO: 272 10E3/UL (ref 150–450)
POTASSIUM SERPL-SCNC: 5.1 MMOL/L (ref 3.4–5.3)
RBC # BLD AUTO: 3.73 10E6/UL (ref 4.4–5.9)
SODIUM SERPL-SCNC: 136 MMOL/L (ref 135–145)
TACROLIMUS BLD-MCNC: 3.8 UG/L (ref 5–15)
TME LAST DOSE: ABNORMAL H
TME LAST DOSE: ABNORMAL H
WBC # BLD AUTO: 4.6 10E3/UL (ref 4–11)

## 2025-03-14 PROCEDURE — 82040 ASSAY OF SERUM ALBUMIN: CPT | Performed by: STUDENT IN AN ORGANIZED HEALTH CARE EDUCATION/TRAINING PROGRAM

## 2025-03-14 PROCEDURE — 85730 THROMBOPLASTIN TIME PARTIAL: CPT | Performed by: STUDENT IN AN ORGANIZED HEALTH CARE EDUCATION/TRAINING PROGRAM

## 2025-03-14 PROCEDURE — 93971 EXTREMITY STUDY: CPT | Mod: LT

## 2025-03-14 PROCEDURE — 99232 SBSQ HOSP IP/OBS MODERATE 35: CPT | Performed by: STUDENT IN AN ORGANIZED HEALTH CARE EDUCATION/TRAINING PROGRAM

## 2025-03-14 PROCEDURE — 82310 ASSAY OF CALCIUM: CPT | Performed by: STUDENT IN AN ORGANIZED HEALTH CARE EDUCATION/TRAINING PROGRAM

## 2025-03-14 PROCEDURE — 250N000013 HC RX MED GY IP 250 OP 250 PS 637: Performed by: INTERNAL MEDICINE

## 2025-03-14 PROCEDURE — 250N000012 HC RX MED GY IP 250 OP 636 PS 637: Performed by: PHYSICIAN ASSISTANT

## 2025-03-14 PROCEDURE — 36415 COLL VENOUS BLD VENIPUNCTURE: CPT | Performed by: STUDENT IN AN ORGANIZED HEALTH CARE EDUCATION/TRAINING PROGRAM

## 2025-03-14 PROCEDURE — 250N000013 HC RX MED GY IP 250 OP 250 PS 637: Performed by: PHYSICIAN ASSISTANT

## 2025-03-14 PROCEDURE — 250N000013 HC RX MED GY IP 250 OP 250 PS 637

## 2025-03-14 PROCEDURE — 85027 COMPLETE CBC AUTOMATED: CPT | Performed by: STUDENT IN AN ORGANIZED HEALTH CARE EDUCATION/TRAINING PROGRAM

## 2025-03-14 PROCEDURE — 80197 ASSAY OF TACROLIMUS: CPT | Performed by: STUDENT IN AN ORGANIZED HEALTH CARE EDUCATION/TRAINING PROGRAM

## 2025-03-14 PROCEDURE — 82565 ASSAY OF CREATININE: CPT | Performed by: STUDENT IN AN ORGANIZED HEALTH CARE EDUCATION/TRAINING PROGRAM

## 2025-03-14 PROCEDURE — 250N000013 HC RX MED GY IP 250 OP 250 PS 637: Performed by: STUDENT IN AN ORGANIZED HEALTH CARE EDUCATION/TRAINING PROGRAM

## 2025-03-14 PROCEDURE — 93971 EXTREMITY STUDY: CPT | Mod: 26 | Performed by: RADIOLOGY

## 2025-03-14 PROCEDURE — 120N000002 HC R&B MED SURG/OB UMMC

## 2025-03-14 PROCEDURE — 250N000011 HC RX IP 250 OP 636: Performed by: INTERNAL MEDICINE

## 2025-03-14 RX ADMIN — TAMSULOSIN HYDROCHLORIDE 0.4 MG: 0.4 CAPSULE ORAL at 08:47

## 2025-03-14 RX ADMIN — TACROLIMUS 1.5 MG: 1 CAPSULE ORAL at 18:49

## 2025-03-14 RX ADMIN — INSULIN GLARGINE 7 UNITS: 100 INJECTION, SOLUTION SUBCUTANEOUS at 18:49

## 2025-03-14 RX ADMIN — INSULIN ASPART 3 UNITS: 100 INJECTION, SOLUTION INTRAVENOUS; SUBCUTANEOUS at 20:22

## 2025-03-14 RX ADMIN — FERROUS SULFATE TAB 325 MG (65 MG ELEMENTAL FE) 325 MG: 325 (65 FE) TAB at 08:47

## 2025-03-14 RX ADMIN — ATORVASTATIN CALCIUM 40 MG: 40 TABLET, FILM COATED ORAL at 20:18

## 2025-03-14 RX ADMIN — SODIUM BICARBONATE 650 MG TABLET 1300 MG: at 08:47

## 2025-03-14 RX ADMIN — SODIUM BICARBONATE 650 MG TABLET 1300 MG: at 20:18

## 2025-03-14 RX ADMIN — PANTOPRAZOLE SODIUM 40 MG: 40 TABLET, DELAYED RELEASE ORAL at 08:46

## 2025-03-14 RX ADMIN — ASPIRIN 81 MG CHEWABLE TABLET 81 MG: 81 TABLET CHEWABLE at 08:47

## 2025-03-14 RX ADMIN — HEPARIN SODIUM 1400 UNITS/HR: 10000 INJECTION, SOLUTION INTRAVENOUS at 11:29

## 2025-03-14 RX ADMIN — MYCOPHENOLATE MOFETIL 500 MG: 500 TABLET, FILM COATED ORAL at 20:18

## 2025-03-14 RX ADMIN — TACROLIMUS 1.5 MG: 1 CAPSULE ORAL at 08:47

## 2025-03-14 RX ADMIN — MYCOPHENOLATE MOFETIL 500 MG: 500 TABLET, FILM COATED ORAL at 08:46

## 2025-03-14 ASSESSMENT — ACTIVITIES OF DAILY LIVING (ADL)
ADLS_ACUITY_SCORE: 75
ADLS_ACUITY_SCORE: 73
ADLS_ACUITY_SCORE: 75
ADLS_ACUITY_SCORE: 73
ADLS_ACUITY_SCORE: 75
ADLS_ACUITY_SCORE: 73
ADLS_ACUITY_SCORE: 75
ADLS_ACUITY_SCORE: 73
ADLS_ACUITY_SCORE: 75
ADLS_ACUITY_SCORE: 73
ADLS_ACUITY_SCORE: 75
ADLS_ACUITY_SCORE: 73
ADLS_ACUITY_SCORE: 73

## 2025-03-14 NOTE — PROGRESS NOTES
[facility]    Medicine Progress Note - Hospitalist Service, GOLD TEAM 7    Date of Admission:  2/25/2025    Assessment & Plan   Tad Zaman is a 64 year old male admitted on 2/25/2025 for AMS. He has a history of diabetes, HTN, CAD, HF, and renal transplant 9/2022, left AKA, CKD.  Patient was brought in by family for AMS x 1-2 months. He has been evaluated at the [facility] multiple times in ED then was evaluated during an admission 1/20-2/21/25 with very broad workup (see below) that did not amount to any type of notable etiology of his symptoms. During this stay he was also treated for HF exacerbation, MOY, and UTI which were all stable at time of discharge. He finished course of cefdinir for e coli UTI on 2/24. He was discharged with family being told that he has failure to thrive. He was discharged home, required 24 hr cares by daughter including changing him and preparation of food. He has also has had intermittent agitation and aggression. Home health nurse stated that he was not appropriate for their program.      No clear etiology for confusion but the aggression and refusal of cares have clinically improved without intervention. Course complicated by new cyanotic areas on the R toes concerning for progression of PAD. Vascular surgery consulted, details per below for work up of PAD    3/14  - remains on heparin gtt, pending Monday (3/17) for angiogram w/ angioplasty vs vascular bypass w vascular surgery  - daughter Jarrod updated on the phone     AMS  Cognitive impairment vs encephalopathy   3/11 reseen by  psychiatry to see reg decision making capacity and was fel he needed surrogate decision maker for consent for procedures. Presented with 1-2 months of progressive encephalopathy and agitation with aggressive behavior toward family who is caring for him at home. [facility]  -Initial LP with pleocytosis, otherwise unrevealing.    -MRI brain without acute pathology.   -Neurology consulted. Some concern for hx of viral encephalitis that has since resolved leaving him with a degree of impairment. He otherwise remains hemodynamically stable without clinical evidence of infection, no gross electrolyte abnormality, stable glucose (elevated but without acidosis), BUN is improving, tacrolimus levels was  WNL.   [facility]:   - MRI 1/29: Stable mild amount of chronic small vessel ischemia. Small chronic R cerebellar infarct.   -OT SLUMS 6/30   -EEG: nonspecific encephalopathy.   -LP: protein 173, glucose 110. Cell count with 21 WBC, 41 RBC, 52 lymph. Hazy appearance  -Encephalopathy paraneoplastic evaluation (including Purkinje, ADI, antiglial nucl antibody, NMDA, BOSSMAN) 1/30/25 negative.   -CSF flow cytometry: suboptimal specimen with low cellularity. No immunophenotypic evidence of involvement by lymphoma. [place] light chain 64.8, lambda light chain 35.1, kappa/lambda 1.85 all elevated. No monoclonals detected (1/22)  -ELP/Immunofix, serum panel: total protein low, beta 1 fraction low (1/22/25)  -Polyoma virus DNA of CSF, MAGNUS virus DNA (1/28) negative.   -Meningitis / encephalitis panel (1/28) negative  -CSF angiotensin converting enzyme (1/28) negative negative  -CMV (1/28, 1/25) negative   -BK virus (1/28) negative  -EBV (1/25) negative  -Cryptococcus (1/26) negative; Cryptococcus neoformans (1/28) negative  -Syphilis (1/25) negative  -HIV (1/25) negative  -B12 (1/18) WNL  -TSH (1/18) high side of normal 4.94  - CSF flow cytometry from 3/5/25  rare to absent B cells , no malignant cells seen   - Drug screen negative. Patient has distant history of what sounds to be mild to moderate alcohol intake.   - needs out patient  neuropsych testing as well as op EEG if cognition worsens    - repeat MRI of brain 3/1/25 limited by motion artifact  but no evidence of acute infarct or acute intracranial pathology   - Delirium precautions as able.   -  repeat LP per CAPs on 3/5 completed - repeat CSF unremarkable (negative cytometry, cytology negative),  West Nile virus negative , VDRL non reactive , aerobic and anaerobic cultures so far no growth   - He will need follow up with outpatient neurology and consider neuropsychology testing  - Per neuro: can consider EEG in outpatient setting if cognition were to worsen      Peripheral vascular disease/ PAD    S/p L BKA  Seen by vascular surgery   -Vein mapping (done 3/11/2025)  -Hold Eliquis, okay to anticoagulate with heparin drip.   -Our team is working with scheduling the patient for potentially Monday for angiogram with angioplasty vs vascular bypass.  -Please consult cardiology for restratification given extensive cardiac disease  -Please start aspirin 81mg if no medical contraindications exist for antiplatelet therapy, and continue with statin  -Keep right foot warm with warming blankets   -Please ensure adequate blood glucose control  -Neurovascular checks per unit protocol (patient has brisk Doppler PT and faint monophasic DP).  -Consider aspirin 81mg (started 3/13), and continue with statin (discussing with transplant neph if atorvastatin can be increased)        Renal transplant 9/2022    CKD Stage 3b   [facility] the transplant at [facility]. [facility]. 1/27 renal US with doppler showed no stenosis of vessels. Resistive indices were increased, felt could be related to ureteric obstruction but not specific. Mild dilation of intrarenal collecting system and ureter was also seen. CT a/p 1/28 W/O contrast suggested mild hydro.  - Ren[facility]/3/25 at [facility] showed no hydro or other abnormalities. FENA 4.3 (1/28) at [facility].   -  Creat recent baseline appears to be 1.8-2.3  - transplant neph following the patient  Recommendations:   - Continue mycophenolate 500 mg twice a day.  -Continue to hold torsemide, calcitriol and lokelma  - Continue tacrolimus 1.5 mg twice a  day.  -Tacro level on 3/9/2025 is 3.6  - Accurate I/O and daily weight  - Will need Urology outpatient follow up for TOV and further management.    - Hold calcitriol given borderline high calcium. Will follow up calcium levels on next renal panel (ordered)  - Ordered UA and UPCR/UACR 3/3/2025 - trace leuk esterase and glucose present. No evidence of infection.   - Ordered Kidney Transplant US 3/3/2025 (routine) - mild hydronephrosis of transplanted kidney, torturous renal artery with mildly elevated velocity at the anastomosis, likely chronic.   - Check hepatitis panel (ordered for 3/4/2025) - HCV positive, known history and cured, otherwise negative.   - Accurate I/O and daily weight given recent heart failure exacerbation.  - Plan for urinary retention per below     Urinary retention  Noted to have ongoing retention requiring straight cath. Unclear whether this is a new issue for him. No significant anticholinergics on his current medication list.   - Tamsulosin 0.4mg PO initiated on 3/4; as tolerated   - Avoid anticholinergic medications as able.       Troponinemia, stable   Lateral lead T wave inversions   Atrial flutter   HFrEF last echo 2/2025 with EF 40-45%   HTN   Hx MI  Limited cardiac history available. Per patient's daughter he may have had a stent placed at 1 point but is unsure.  Patient and daughters deny any history of cardiac arrhythmia.  However on exam and on EKG evidence of atrial flutter, and patient also on Eliquis at least since December.  [facility], he was found to have heart failure exacerbation with 25-30 lbs fluid excess. BNP during hospitalization was 2868. His Lasix was switched to Torsemide. He has been taking his medications per his daughter.   - per cardiology note I found in EPIC form 2018 he has had  medium size ischemic area  in inferior wall  ( had similar but smaller in 2013)   - BNP 39264 here.   - trop 267>>218  - EKG with T wave inversions and  mild ST depressions in lateral leads  - denies recent CP or SOB, no peripheral or abdominal edema  Plan:   - TTE 2/26/25: LV function decreased w/ EF 40-45%   - No report of chest pain. Troponin trended down   - cont PTA atorvastatin 20 mg nightly  - restarted PTA Eliquis 5 mg BID on 3/6  but now on hold as of 3/11 and iv heparin started    - HOLD  PTA Jardiance 12.5 mg daily per cardiology pre-op  - continue PTA metoprolol 75 mg daily  - HOLD PTA torsemide 10 mg BID  - cardiology consulted and saw pt for pre-op; cleared  - He will need to arrange follow-up with [facility] after [facility]     # T2DM  Not on long-term insulin. Hemoglobin A1c 6.9% as of 7/2024. Hyperglycemia when he allowed check on 3/4. He has been amenable to glucose checks and insulin this admission.   - HOLD PTA Jardiance 12.5 mg daily  - Repeat Hemoglobin A1c 7.9  - Initiated sliding scale insulin on 3/4 with TID AC and 0200 glucose checks -> increased to HDSSI on 3/10              - Will need to discuss with family whether they feel they would be able to manage sliding scale insulin at home.   - Carb consistent diet   - Given sliding scale insulin needs but would like to simplify this on discharge and will start basal Lantus at 5 units (half of what would be calculated 0.2units/kg) and uptitrate based on fasting AM sugars. Increased to lantus 7 units 3/13     # Mild normocytic anemia  - Hg stable, no signs of bleed, monitor on AC      # Hx hepatitis C,  - s/p treatment with cure        # Glaucoma - cont PTA eyedrops  # Sickle cell trait          Diet: Moderate Consistent Carb (60 g CHO per Meal) Diet  Snacks/Supplements Adult: Nepro Oral Supplement; With Meals  NPO for Procedure/Surgery per Anesthesia Guidelines Except for: Meds; Clear liquids before procedure/surgery: ADULT (Age GREATER than or Equal to 18 years) - Clear liquids 2 hours before procedure/surgery    DVT Prophylaxis: heparin gtt  Zaman Catheter: Not  present  Lines: None     Cardiac Monitoring: None  Code Status: Full Code      Clinically Significant Risk Factors               # Hypoalbuminemia: Lowest albumin = 3 g/dL at 3/11/2025  5:55 AM, will monitor as appropriate               # DMII: A1C = 7.9 % (Ref range: <5.7 %) within past 6 months       # Financial/Environmental Concerns: none         Social Drivers of Health    Tobacco Use: Medium Risk (7/26/2024)    Received from [facility]    Patient History     Smoking Tobacco Use: Former     Smokeless Tobacco Use: Never          Disposition Plan     Medically Ready for Discharge: Anticipated in 2-4 Days             Sonia Pham MD  Hospitalist Service, GOLD TEAM 7  [facility]  Securely message with [employer] (more info)  Text page via [facility] Paging/Directory   See signed in provider for up to date coverage information  ______________________________________________________________________    Interval History   Pt denies any pain. Sleeping with covers above his head, but was agreeable to talking when I asked. He states he is eating well and not having any problems going to the bathroom.     Physical Exam   Vital Signs: Temp: 99  F (37.2  C) Temp src: Oral BP: 92/48 Pulse: 72   Resp: 16 SpO2: 98 % O2 Device: None (Room air)    Weight: 146 lbs 4.8 oz    Gen: no acute distress, alert answering questions   HEENT: normal conjunctivae, EOMI  CV: RRR, no murmus  Pulm: CTBA, no crackles or wheezing  Abd: soft, nl BS  Extremities: s/p L AKA. RLE DPs palpated+1       Medical Decision Making       45 MINUTES SPENT BY ME on the date of service doing chart review, history, exam, documentation & further activities per the note.      Data     I have personally reviewed the following data over the past 24 hrs:    4.6  \   9.6 (L)   / 272     136 105 47.3 (H) /  210 (H)   5.1 23 1.53 (H) \     ALT: N/A AST: N/A AP: N/A TBILI: N/A   ALB: 3.2 (L) TOT PROTEIN: N/A LIPASE: N/A     INR:   N/A PTT:  62 (H)   D-dimer:  N/A Fibrinogen:  N/A       Imaging results reviewed over the past 24 hrs:   No results found for this or any previous visit (from the past 24 hours).

## 2025-03-14 NOTE — PLAN OF CARE
Goal Outcome Evaluation:      Plan of Care Reviewed With: patient    Overall Patient Progress: no changeOverall Patient Progress: no change    Outcome Evaluation: Pt alert and oriented x3 , no complains of pain , dopple done every 4 hrs .Bmx1 .Pt with heparin drip at 1400 For PTT recheck tomorrow AM.For ultrasound lower extremity venous mapping left today . Continue plan of care.

## 2025-03-14 NOTE — PROGRESS NOTES
CLINICAL NUTRITION SERVICES - ASSESSMENT NOTE    RECOMMENDATIONS FOR MDs/PROVIDERS TO ORDER:  Diet as tolerated per team    Malnutrition Status:    Severe malnutrition in the context of acute illness or injury    Registered Dietitian Interventions:  On Nepro, will continue     Future/Additional Recommendations:  PO adequacy         REASON FOR ASSESSMENT  LOS x 14 ( prolonged LOS)  Transferred overnight from [facility]    Chart reviewed:  Admitted on 2025 for AMS.   PMH: DM2, HTN, CAD, HF ( EF 40-45%), and renal transplant 2022, left AKA, CKD.     - Patient was brought in by family for AMS x 1-2 months. He has been evaluated at the [facility] multiple times in ED then was evaluated during an admission -25 with very broad workup that did not amount to any type of notable etiology of his symptoms. During this stay he was also treated for HF exacerbation, MOY, and UTI which were all stable   - Peripheral vascular disease/ PAD , S/p L BKA  - Vascular sugery consulted, remains on heparin gtt, pending Monday (3/17) for angiogram w/ angioplasty vs vascular bypass.         SUBJECTIVE INFORMATION  Assessed patient in room.    NUTRITION HISTORY  Patient was quiet and not talking. Overall nodding yes to having a good appetite prior to admit and was not following any specific dietary restrictions at home.   No family at bedside to obtain more info.       CURRENT NUTRITION ORDERS  Diet: Moderate Consistent Carbohydrate  Snacks/Supplements Adult: Nepro Oral Supplement; With Meals once per day       CURRENT INTAKE/TOLERANCE  Per flow sheet review, patient is consuming a variable oral intake, mostly % meals.   Patient had a lunch tray sitting by him. He finished drinking Nepro whole carton. Nodding that he likes oral supplements. Will continue with nepro daily.       LABS  Nutrition-relevant labs: Reviewed  BUN: 47.3, Cr: 1.53, GFR: 50   B, A1C: 7.9 -> started on insulin inpatient  "      MEDICATIONS  Nutrition-relevant medications: Reviewed  Mycophenolate 500 mg twice a day, Tacrolimus 1.5 mg twice a day.  sliding scale insulin       ANTHROPOMETRICS  Height: 182.9 cm (6' 0\")  Most Recent Weight: 66.3 kg (146 lb 2.6 oz) standing wt on 3/6/25   IBW: 80.9 kg - 5.9% for Left BKA -> 76 kg adjusted IBW   % IBW: 87% of the adjusted IbW   BMI (kg/m ): Normal BMI  Weight History:   Wt Readings from Last 10 Encounters:   03/13/25 66.4 kg (146 lb 4.8 oz)   Admit wt: 61.6 kg (135 lb 12.9 oz) bed scale  Most recent wt: 66.4 kg (146 lb 4.8 oz) from 3/13      Dosing Weight: 66 kg, based on actual wt / standing wt on 3/6    ASSESSED NUTRITION NEEDS  Estimated Energy Needs: 1980- 2310 kcals/day (30-35 kcal/kg )   Justification: repletion   Estimated Protein Needs: 79- 99 grams protein/day (1.2 - 1.5 grams of pro/kg)  Justification: Post-op  Estimated Fluid Needs: HF history   Justification: Per provider pending fluid status      SYSTEM FINDINGS    Skin/wounds: WOC RN note from 3/10 ( signed off)  Right 3rd digit: Wound due to:  frost bite   STATUS: initial assessment       GI symptoms:   2x BM today, 2x BM yesterday       MALNUTRITION  % Intake: Decreased intake does not meet criteria  % Weight Loss: Unable to assess   Subcutaneous Fat Loss: Orbital: Mild and Triceps: Moderate  Muscle Loss: Temples (temporalis muscle): Mild and Clavicles (pectoralis and deltoids): Moderate  Fluid Accumulation/Edema: Moderate, 2+  Malnutrition Diagnosis: Severe malnutrition in the context of acute illness or injury  Malnutrition Present on Admission: Yes      NUTRITION DIAGNOSIS  Predicted inadequate oral intake related prolonged LOS and AMS     INTERVENTIONS  Medical food supplement therapy: continue with Nepro   Not appropriate for diabetic diet education due to low concentration and not engaging in conversation    Goals  Patient to consume % of nutritionally adequate meal trays TID, or the equivalent with " supplements/snacks.       Monitoring/Evaluation  Progress toward goals will be monitored and evaluated per policy.      Joanne Fairbanks RD/LD  Unit 7C ([id number]) and ([phone]),  Monday-Friday: Vocera -> (7C clinical Dietitian),   Weekend/Holiday: Vocera -> (Weekend Clinical Dietitian)

## 2025-03-14 NOTE — CONSULTS
Care Management Follow Up    Length of Stay (days): 16  Expected Discharge Date: 03/19/2025  Concerns to be Addressed: discharge planning     Patient plan of care discussed at interdisciplinary rounds: Yes  Anticipated Discharge Disposition: Home  Anticipated Discharge Services: Home care  Anticipated Discharge DME: TBD  Patient/family educated on Medicare website which has current facility and service quality ratings:  n/a  Education Provided on the Discharge Plan:  yes  Patient/Family in Agreement with the Plan: yes    Referrals Placed by CM/SW:      [facility]  [address]  [place] [address]  P: [phone]  P: [phone]  F: [phone]   Services: RN/PT/OT/HHA/HM    Private pay costs discussed: Not applicable  Discussed  Partnership in Safe Discharge Planning  document with patient/family: Yes:    Handoff Completed: No, handoff not indicated or clinically appropriate    Additional Information:  RNCC acknowledging new SW Consult order; IP CM/SW continues to follow and be available for safe discharge planning.    Ongoing POC; Pt remains in need of acute care with plans for OR with vascular surgery. Resumption order for PTA home care place at this time.     Next Steps: RNCC to continue to follow and support safe discharge planning.       Hung Krause RN BSN  7C RNCC - Phone: [phone]  [facility]    Secure message via [employer] (Search Name or 7C Med Surg [id number] RNCC)  For Weekend & Holiday on call RN Care Coordinator:  * [place] (0800 - 1630) Saturday and Sunday    Units: 5A, 5B, & 5C     Units: 6B, 6C, 6D     Units: 7A, 7B, 7C, 7D     Units: 6A/ICU      * [facility] [place] ([phone]) Saturday and Sunday    Units: 6 Med/Surg, 8A, 10 ICU, & Pediatric Units    Units: 5 Ortho, 5Med/Surg & WB ED                        Hung Krause RN BSN  7C RNCC - Phone: [phone]  [facility]    Secure message via [employer] (Search Name or 7C  Med Surg [id number] RNCC)  For Weekend & Holiday on call RN Care Coordinator:  * [place] (0800 - 1630) Saturday and Sunday    Units: 5A, 5B, & 5C     Units: 6B, 6C, 6D     Units: 7A, 7B, 7C, 7D     Units: 6A/ICU      * [facility] [place] ([phone]) Saturday and Sunday    Units: 6 Med/Surg, 8A, 10 ICU, & Pediatric Units    Units: 5 Ortho, 5Med/Surg & WB ED

## 2025-03-14 NOTE — PLAN OF CARE
Goal Outcome Evaluation:      Plan of Care Reviewed With: patient    Overall Patient Progress: no change    Assumed cares [id number]    Patient is alert and oriented, can be disoriented to situation. VSS on RA. Denies pain. No BM. Assist x1 with gait belt walker, left BKA. ACHS with moderate consistent carb diet. Heparin drip running at 1400 units/hr through left piv. Next recheck in AM. Right hand wound care done, next care due in evening. AM metoprolol held due to low BP. Pedal pulses checked with doppler x2, pulses present.

## 2025-03-15 LAB
APTT PPP: 45 SECONDS (ref 22–38)
APTT PPP: 73 SECONDS (ref 22–38)
ERYTHROCYTE [DISTWIDTH] IN BLOOD BY AUTOMATED COUNT: 14.7 % (ref 10–15)
GLUCOSE BLDC GLUCOMTR-MCNC: 179 MG/DL (ref 70–99)
GLUCOSE BLDC GLUCOMTR-MCNC: 188 MG/DL (ref 70–99)
GLUCOSE BLDC GLUCOMTR-MCNC: 202 MG/DL (ref 70–99)
GLUCOSE BLDC GLUCOMTR-MCNC: 266 MG/DL (ref 70–99)
GLUCOSE BLDC GLUCOMTR-MCNC: 300 MG/DL (ref 70–99)
HCT VFR BLD AUTO: 33.1 % (ref 40–53)
HGB BLD-MCNC: 10.1 G/DL (ref 13.3–17.7)
MCH RBC QN AUTO: 25.8 PG (ref 26.5–33)
MCHC RBC AUTO-ENTMCNC: 30.5 G/DL (ref 31.5–36.5)
MCV RBC AUTO: 84 FL (ref 78–100)
PLATELET # BLD AUTO: 298 10E3/UL (ref 150–450)
RBC # BLD AUTO: 3.92 10E6/UL (ref 4.4–5.9)
TACROLIMUS BLD-MCNC: 3.1 UG/L (ref 5–15)
TME LAST DOSE: ABNORMAL H
TME LAST DOSE: ABNORMAL H
WBC # BLD AUTO: 5 10E3/UL (ref 4–11)

## 2025-03-15 PROCEDURE — 250N000013 HC RX MED GY IP 250 OP 250 PS 637: Performed by: PHYSICIAN ASSISTANT

## 2025-03-15 PROCEDURE — 120N000002 HC R&B MED SURG/OB UMMC

## 2025-03-15 PROCEDURE — 85014 HEMATOCRIT: CPT | Performed by: STUDENT IN AN ORGANIZED HEALTH CARE EDUCATION/TRAINING PROGRAM

## 2025-03-15 PROCEDURE — 250N000013 HC RX MED GY IP 250 OP 250 PS 637

## 2025-03-15 PROCEDURE — 36415 COLL VENOUS BLD VENIPUNCTURE: CPT | Performed by: STUDENT IN AN ORGANIZED HEALTH CARE EDUCATION/TRAINING PROGRAM

## 2025-03-15 PROCEDURE — 250N000013 HC RX MED GY IP 250 OP 250 PS 637: Performed by: STUDENT IN AN ORGANIZED HEALTH CARE EDUCATION/TRAINING PROGRAM

## 2025-03-15 PROCEDURE — 80197 ASSAY OF TACROLIMUS: CPT | Performed by: STUDENT IN AN ORGANIZED HEALTH CARE EDUCATION/TRAINING PROGRAM

## 2025-03-15 PROCEDURE — 85730 THROMBOPLASTIN TIME PARTIAL: CPT | Performed by: STUDENT IN AN ORGANIZED HEALTH CARE EDUCATION/TRAINING PROGRAM

## 2025-03-15 PROCEDURE — 250N000012 HC RX MED GY IP 250 OP 636 PS 637: Performed by: NURSE PRACTITIONER

## 2025-03-15 PROCEDURE — 99232 SBSQ HOSP IP/OBS MODERATE 35: CPT | Performed by: STUDENT IN AN ORGANIZED HEALTH CARE EDUCATION/TRAINING PROGRAM

## 2025-03-15 PROCEDURE — 250N000013 HC RX MED GY IP 250 OP 250 PS 637: Performed by: INTERNAL MEDICINE

## 2025-03-15 PROCEDURE — 80069 RENAL FUNCTION PANEL: CPT | Performed by: STUDENT IN AN ORGANIZED HEALTH CARE EDUCATION/TRAINING PROGRAM

## 2025-03-15 PROCEDURE — 250N000012 HC RX MED GY IP 250 OP 636 PS 637: Performed by: PHYSICIAN ASSISTANT

## 2025-03-15 PROCEDURE — 250N000011 HC RX IP 250 OP 636: Performed by: INTERNAL MEDICINE

## 2025-03-15 RX ORDER — TACROLIMUS 1 MG/1
2 CAPSULE ORAL
Status: DISCONTINUED | OUTPATIENT
Start: 2025-03-15 | End: 2025-03-26 | Stop reason: HOSPADM

## 2025-03-15 RX ADMIN — METOPROLOL SUCCINATE 75 MG: 25 TABLET, EXTENDED RELEASE ORAL at 08:03

## 2025-03-15 RX ADMIN — HEPARIN SODIUM 1400 UNITS/HR: 10000 INJECTION, SOLUTION INTRAVENOUS at 05:32

## 2025-03-15 RX ADMIN — PANTOPRAZOLE SODIUM 40 MG: 40 TABLET, DELAYED RELEASE ORAL at 08:03

## 2025-03-15 RX ADMIN — ATORVASTATIN CALCIUM 40 MG: 40 TABLET, FILM COATED ORAL at 21:16

## 2025-03-15 RX ADMIN — TACROLIMUS 1.5 MG: 1 CAPSULE ORAL at 08:03

## 2025-03-15 RX ADMIN — HEPARIN SODIUM 1550 UNITS/HR: 10000 INJECTION, SOLUTION INTRAVENOUS at 22:19

## 2025-03-15 RX ADMIN — MYCOPHENOLATE MOFETIL 500 MG: 500 TABLET, FILM COATED ORAL at 08:03

## 2025-03-15 RX ADMIN — TACROLIMUS 2 MG: 1 CAPSULE ORAL at 18:18

## 2025-03-15 RX ADMIN — INSULIN GLARGINE 7 UNITS: 100 INJECTION, SOLUTION SUBCUTANEOUS at 18:19

## 2025-03-15 RX ADMIN — SODIUM BICARBONATE 650 MG TABLET 1300 MG: at 21:16

## 2025-03-15 RX ADMIN — SODIUM BICARBONATE 650 MG TABLET 1300 MG: at 08:03

## 2025-03-15 RX ADMIN — FERROUS SULFATE TAB 325 MG (65 MG ELEMENTAL FE) 325 MG: 325 (65 FE) TAB at 08:03

## 2025-03-15 RX ADMIN — ASPIRIN 81 MG CHEWABLE TABLET 81 MG: 81 TABLET CHEWABLE at 08:04

## 2025-03-15 RX ADMIN — TAMSULOSIN HYDROCHLORIDE 0.4 MG: 0.4 CAPSULE ORAL at 08:04

## 2025-03-15 RX ADMIN — MYCOPHENOLATE MOFETIL 500 MG: 500 TABLET, FILM COATED ORAL at 21:16

## 2025-03-15 ASSESSMENT — ACTIVITIES OF DAILY LIVING (ADL)
ADLS_ACUITY_SCORE: 79
ADLS_ACUITY_SCORE: 75
ADLS_ACUITY_SCORE: 75
ADLS_ACUITY_SCORE: 79
ADLS_ACUITY_SCORE: 75
ADLS_ACUITY_SCORE: 79
ADLS_ACUITY_SCORE: 79
ADLS_ACUITY_SCORE: 75
ADLS_ACUITY_SCORE: 79
ADLS_ACUITY_SCORE: 79
ADLS_ACUITY_SCORE: 75

## 2025-03-15 NOTE — PLAN OF CARE
Goal Outcome Evaluation:      Plan of Care Reviewed With: patient    Overall Patient Progress: no changeOverall Patient Progress: no change    Outcome Evaluation: Pt alert and oriented x3 ,flat affect no complains of pain , doppler done with pulse at right foot . Pt with continuous heparin drip at 1400 change to a new bag , PTT recheck in AM .1 BM .  For surgery on Monday. Continue plan of care.

## 2025-03-15 NOTE — PLAN OF CARE
"Goal Outcome Evaluation:         VSS on RA. Disorientated to situation. Denied pain, nausea, and vomiting. Heparin gtt infusing at 1400 units/hr. One BM on shift. Pulses on RLE present with doppler. Primofit changed and in place. Refused R hand wound care d/t writer \"already doing it 10 minutes ago\". Writer had not done those cares and explained this, however, patient was not receptive to education.                "

## 2025-03-15 NOTE — PROGRESS NOTES
[facility]    Medicine Progress Note - Hospitalist Service, GOLD TEAM 7    Date of Admission:  2/25/2025    Assessment & Plan   Tad Zaman is a 64 year old male admitted on 2/25/2025 for AMS. He has a history of diabetes, HTN, CAD, HF, and renal transplant 9/2022, left AKA, CKD.  Patient was brought in by family for AMS x 1-2 months. He has been evaluated at the [facility] multiple times in ED then was evaluated during an admission 1/20-2/21/25 with very broad workup (see below) that did not amount to any type of notable etiology of his symptoms. During this stay he was also treated for HF exacerbation, MOY, and UTI which were all stable at time of discharge. He finished course of cefdinir for e coli UTI on 2/24. He was discharged with family being told that he has failure to thrive. He was discharged home, required 24 hr cares by daughter including changing him and preparation of food. He has also has had intermittent agitation and aggression. Home health nurse stated that he was not appropriate for their program.      No clear etiology for confusion but the aggression and refusal of cares have clinically improved without intervention. Course complicated by new cyanotic areas on the R toes concerning for progression of PAD. Vascular surgery consulted, details per below for work up of PAD    Today  - remains on heparin gtt, pending Monday (3/17) for angiogram w/ angioplasty vs vascular bypass w vascular surgery  - daughter Jarrod updated on the phone. She reports she works on Monday so if anything needs to be consented for she would like a phone call prior to Mon. Passed this on the vascular team.      AMS  Cognitive impairment vs encephalopathy   3/11 reseen by  psychiatry to see reg decision making capacity and was fel he needed surrogate decision maker for consent for procedures. Presented with 1-2 months of progressive encephalopathy and agitation with  aggressive behavior toward family who is caring for him at home. [facility]  -Initial LP with pleocytosis, otherwise unrevealing.   -MRI brain without acute pathology.   -Neurology consulted. Some concern for hx of viral encephalitis that has since resolved leaving him with a degree of impairment. He otherwise remains hemodynamically stable without clinical evidence of infection, no gross electrolyte abnormality, stable glucose (elevated but without acidosis), BUN is improving, tacrolimus levels was  WNL.   [facility]:   - MRI 1/29: Stable mild amount of chronic small vessel ischemia. Small chronic R cerebellar infarct.   -OT SLUMS 6/30   -EEG: nonspecific encephalopathy.   -LP: protein 173, glucose 110. Cell count with 21 WBC, 41 RBC, 52 lymph. Hazy appearance  -Encephalopathy paraneoplastic evaluation (including Purkinje, ADI, antiglial nucl antibody, NMDA, BOSSMAN) 1/30/25 negative.   -CSF flow cytometry: suboptimal specimen with low cellularity. No immunophenotypic evidence of involvement by lymphoma. [place] light chain 64.8, lambda light chain 35.1, kappa/lambda 1.85 all elevated. No monoclonals detected (1/22)  -ELP/Immunofix, serum panel: total protein low, beta 1 fraction low (1/22/25)  -Polyoma virus DNA of CSF, MAGNUS virus DNA (1/28) negative.   -Meningitis / encephalitis panel (1/28) negative  -CSF angiotensin converting enzyme (1/28) negative negative  -CMV (1/28, 1/25) negative   -BK virus (1/28) negative  -EBV (1/25) negative  -Cryptococcus (1/26) negative; Cryptococcus neoformans (1/28) negative  -Syphilis (1/25) negative  -HIV (1/25) negative  -B12 (1/18) WNL  -TSH (1/18) high side of normal 4.94  - CSF flow cytometry from 3/5/25  rare to absent B cells , no malignant cells seen   - Drug screen negative. Patient has distant history of what sounds to be mild to moderate alcohol intake.   - needs out patient  neuropsych testing as well as op EEG if cognition worsens    - repeat MRI  of brain 3/1/25 limited by motion artifact  but no evidence of acute infarct or acute intracranial pathology   - Delirium precautions as able.   - repeat LP per CAPs on 3/5 completed - repeat CSF unremarkable (negative cytometry, cytology negative),  West Nile virus negative , VDRL non reactive , aerobic and anaerobic cultures so far no growth   - He will need follow up with outpatient neurology and consider neuropsychology testing  - Per neuro: can consider EEG in outpatient setting if cognition were to worsen      Peripheral vascular disease/ PAD    S/p L BKA  Seen by vascular surgery   -Vein mapping (done 3/11/2025)  -Hold Eliquis, okay to anticoagulate with heparin drip.   -Our team is working with scheduling the patient for potentially Monday for angiogram with angioplasty vs vascular bypass.  -Please consult cardiology for restratification given extensive cardiac disease  -Please start aspirin 81mg if no medical contraindications exist for antiplatelet therapy, and continue with statin  -Keep right foot warm with warming blankets   -Please ensure adequate blood glucose control  -Neurovascular checks per unit protocol (patient has brisk Doppler PT and faint monophasic DP).  -Consider aspirin 81mg (started 3/13), and continue with statin (discussing with transplant neph if atorvastatin can be increased). Per Transplant neph, OK to go up to 40 atorvastatin.        Renal transplant 9/2022    CKD Stage 3b   [facility] the [facility]. [facility]. 1/27 renal US with doppler showed no stenosis of vessels. Resistive indices were increased, felt could be related to ureteric obstruction but not specific. Mild dilation of intrarenal collecting system and ureter was also seen. CT a/p 1/28 W/O contrast suggested mild hydro.  - [facility]/3/25 at [facility] showed no hydro or other abnormalities. FENA 4.3 (1/28) at [facility].   -  Creat recent baseline appears to be  1.8-2.3  - transplant neph following the patient  Recommendations:   - Continue mycophenolate 500 mg twice a day.  -Continue to hold torsemide, calcitriol and lokelma  - Continue tacrolimus 1.5 mg twice a day.  -Tacro level on 3/9/2025 is 3.6  - Accurate I/O and daily weight  - Will need Urology outpatient follow up for TOV and further management.    - Hold calcitriol given borderline high calcium. Will follow up calcium levels on next renal panel (ordered)  - Ordered UA and UPCR/UACR 3/3/2025 - trace leuk esterase and glucose present. No evidence of infection.   - Ordered Kidney Transplant US 3/3/2025 (routine) - mild hydronephrosis of transplanted kidney, torturous renal artery with mildly elevated velocity at the anastomosis, likely chronic.   - Check hepatitis panel (ordered for 3/4/2025) - HCV positive, known history and cured, otherwise negative.   - Accurate I/O and daily weight given recent heart failure exacerbation.  - Plan for urinary retention per below     Urinary retention  Noted to have ongoing retention requiring straight cath. Unclear whether this is a new issue for him. No significant anticholinergics on his current medication list.   - Tamsulosin 0.4mg PO initiated on 3/4; as tolerated   - Avoid anticholinergic medications as able.       Troponinemia, stable   Lateral lead T wave inversions   Atrial flutter   HFrEF last echo 2/2025 with EF 40-45%   HTN   Hx MI  Limited cardiac history available. Per patient's daughter he may have had a stent placed at 1 point but is unsure.  Patient and daughters deny any history of cardiac arrhythmia.  However on exam and on EKG evidence of atrial flutter, and patient also on Eliquis at least since December.  [facility], he was found to have heart failure exacerbation with 25-30 lbs fluid excess. BNP during hospitalization was 2868. His Lasix was switched to Torsemide. He has been taking his medications per his daughter.   - per  cardiology note I found in EPIC form 2018 he has had  medium size ischemic area  in inferior wall  ( had similar but smaller in 2013)   - BNP 65720 here.   - trop 267>>218  - EKG with T wave inversions and mild ST depressions in lateral leads  - denies recent CP or SOB, no peripheral or abdominal edema  Plan:   - TTE 2/26/25: LV function decreased w/ EF 40-45%   - No report of chest pain. Troponin trended down   - cont PTA atorvastatin 40 mg nightly  - restarted PTA Eliquis 5 mg BID on 3/6  but now on hold as of 3/11 and iv heparin started    - HOLD  PTA Jardiance 12.5 mg daily per cardiology pre-op  - continue PTA metoprolol 75 mg daily  - HOLD PTA torsemide 10 mg BID  - cardiology consulted and saw pt for pre-op; cleared  - He will need to arrange follow-up with [facility] after his discharge     # T2DM  Not on long-term insulin. Hemoglobin A1c 6.9% as of 7/2024. Hyperglycemia when he allowed check on 3/4. He has been amenable to glucose checks and insulin this admission.   - HOLD PTA Jardiance 12.5 mg daily  - Repeat Hemoglobin A1c 7.9  - Initiated sliding scale insulin on 3/4 with TID AC and 0200 glucose checks -> increased to HDSSI on 3/10              - Will need to discuss with family whether they feel they would be able to manage sliding scale insulin at home.   - Carb consistent diet   - Given sliding scale insulin needs but would like to simplify this on discharge and will start basal Lantus at 5 units (half of what would be calculated 0.2units/kg) and uptitrate based on fasting AM sugars. Increased to lantus 7 units 3/13     # Mild normocytic anemia  - Hg stable, no signs of bleed, monitor on AC      # Hx hepatitis C,  - s/p treatment with cure        # Glaucoma - cont PTA eyedrops  # Sickle cell trait          Diet: Moderate Consistent Carb (60 g CHO per Meal) Diet  Snacks/Supplements Adult: Nepro Oral Supplement; With Meals  NPO for Procedure/Surgery per Anesthesia Guidelines  Except for: Meds; Clear liquids before procedure/surgery: ADULT (Age GREATER than or Equal to 18 years) - Clear liquids 2 hours before procedure/surgery    DVT Prophylaxis: heparin gtt  Zaman Catheter: Not present  Lines: None     Cardiac Monitoring: None  Code Status: Full Code      Clinically Significant Risk Factors               # Hypoalbuminemia: Lowest albumin = 3 g/dL at 3/11/2025  5:55 AM, will monitor as appropriate               # DMII: A1C = 7.9 % (Ref range: <5.7 %) within past 6 months    # Severe Malnutrition: based on nutrition assessment and treatment provided per dietitian's recommendations.    # Financial/Environmental Concerns: none         Social Drivers of Health    Tobacco Use: Medium Risk (7/26/2024)    Received from [employer]    Patient History     Smoking Tobacco Use: Former     Smokeless Tobacco Use: Never          Disposition Plan     Medically Ready for Discharge: Anticipated in 2-4 Days             Sonia Pham MD  Hospitalist Service, GOLD TEAM 7  [facility]  Securely message with [employer] (more info)  Text page via "[employer]" Paging/Directory   See signed in provider for up to date coverage information  ______________________________________________________________________    Interval History   NAEO. Pt reports no pain or any other complaints. Updated daughter Eulogio on the phone.     Physical Exam   Vital Signs: Temp: 98.5  F (36.9  C) Temp src: Oral BP: 128/67 Pulse: 73   Resp: 16 SpO2: 97 % O2 Device: None (Room air)    Weight: 154 lbs 8.68 oz    Gen: no acute distress, alert answering questions   HEENT: normal conjunctivae, EOMI  CV: RRR, no murmus  Pulm: CTBA, no crackles or wheezing  Abd: soft, nl BS  Extremities: s/p L AKA. RLE DP present w / dopplers       Medical Decision Making       45 MINUTES SPENT BY ME on the date of service doing chart review, history, exam, documentation & further activities per the note.      Data     I have  personally reviewed the following data over the past 24 hrs:    5.0  \   10.1 (L)   / 298     137 105 44.4 (H) /  202 (H)   4.9 24 1.55 (H) \     ALT: N/A AST: N/A AP: N/A TBILI: N/A   ALB: 3.3 (L) TOT PROTEIN: N/A LIPASE: N/A     INR:  N/A PTT:  45 (H)   D-dimer:  N/A Fibrinogen:  N/A       .  Imaging results reviewed over the past 24 hrs:   Recent Results (from the past 24 hours)   US Lower Extremity Venous Mapping Left    Narrative    ULTRASOUND LOWER EXTREMITY VENOUS MAPPING LEFT 3/14/2025 2:07 PM    CLINICAL HISTORY: Bypass candidate. Left below knee amputation.    COMPARISONS: None available.    REFERRING PROVIDER: SOFIYA ARREDONDO KYU    TECHNIQUE: Left great saphenous vein evaluated with grayscale imaging  and compression.    FINDINGS: Visualized left great saphenous vein is fully compressible.    LEFT great saphenous vein:  Groin: 4.0 mm  Mid thigh: 1.3 mm  Lower thigh: 1.0 mm  Knee: 0.7 mm      Impression    IMPRESSION: Left below knee amputation. Remaining left great saphenous  vein is patent. Less than 2 mm in diameter below the groin.    MANUELITO DE LEÓN MD         SYSTEM ID:  [id number]

## 2025-03-15 NOTE — PLAN OF CARE
Goal Outcome Evaluation:      Plan of Care Reviewed With: patient    Overall Patient Progress: no change    Assumed cares [id number]    Patient is alert and oriented with intermittent disorientation to situation. Heparin drip at 1550ml/hr. Pedal pulses present with doppler. Denies pain. ACHS bg. Moderate 60g carb diet. Assist x1 with gait belt walker. Hand wound cares done. No BM on shift. Premofit in place, total output 900ml.

## 2025-03-16 LAB
ALBUMIN SERPL BCG-MCNC: 3.1 G/DL (ref 3.5–5.2)
ALBUMIN SERPL BCG-MCNC: 3.3 G/DL (ref 3.5–5.2)
ANION GAP SERPL CALCULATED.3IONS-SCNC: 8 MMOL/L (ref 7–15)
ANION GAP SERPL CALCULATED.3IONS-SCNC: 9 MMOL/L (ref 7–15)
APTT PPP: 76 SECONDS (ref 22–38)
BUN SERPL-MCNC: 38.4 MG/DL (ref 8–23)
BUN SERPL-MCNC: 44.4 MG/DL (ref 8–23)
CALCIUM SERPL-MCNC: 9.2 MG/DL (ref 8.8–10.4)
CALCIUM SERPL-MCNC: 9.5 MG/DL (ref 8.8–10.4)
CHLORIDE SERPL-SCNC: 104 MMOL/L (ref 98–107)
CHLORIDE SERPL-SCNC: 105 MMOL/L (ref 98–107)
CREAT SERPL-MCNC: 1.45 MG/DL (ref 0.67–1.17)
CREAT SERPL-MCNC: 1.55 MG/DL (ref 0.67–1.17)
EGFRCR SERPLBLD CKD-EPI 2021: 49 ML/MIN/1.73M2
EGFRCR SERPLBLD CKD-EPI 2021: 53 ML/MIN/1.73M2
ERYTHROCYTE [DISTWIDTH] IN BLOOD BY AUTOMATED COUNT: 14.6 % (ref 10–15)
GLUCOSE BLDC GLUCOMTR-MCNC: 183 MG/DL (ref 70–99)
GLUCOSE BLDC GLUCOMTR-MCNC: 193 MG/DL (ref 70–99)
GLUCOSE BLDC GLUCOMTR-MCNC: 197 MG/DL (ref 70–99)
GLUCOSE BLDC GLUCOMTR-MCNC: 237 MG/DL (ref 70–99)
GLUCOSE BLDC GLUCOMTR-MCNC: 309 MG/DL (ref 70–99)
GLUCOSE SERPL-MCNC: 211 MG/DL (ref 70–99)
GLUCOSE SERPL-MCNC: 253 MG/DL (ref 70–99)
HCO3 SERPL-SCNC: 22 MMOL/L (ref 22–29)
HCO3 SERPL-SCNC: 24 MMOL/L (ref 22–29)
HCT VFR BLD AUTO: 28.9 % (ref 40–53)
HGB BLD-MCNC: 9 G/DL (ref 13.3–17.7)
LACTATE SERPL-SCNC: 1.3 MMOL/L (ref 0.7–2)
MCH RBC QN AUTO: 25.3 PG (ref 26.5–33)
MCHC RBC AUTO-ENTMCNC: 31.1 G/DL (ref 31.5–36.5)
MCV RBC AUTO: 81 FL (ref 78–100)
PHOSPHATE SERPL-MCNC: 2.7 MG/DL (ref 2.5–4.5)
PHOSPHATE SERPL-MCNC: 3.4 MG/DL (ref 2.5–4.5)
PLATELET # BLD AUTO: 285 10E3/UL (ref 150–450)
POTASSIUM SERPL-SCNC: 4.8 MMOL/L (ref 3.4–5.3)
POTASSIUM SERPL-SCNC: 4.9 MMOL/L (ref 3.4–5.3)
RBC # BLD AUTO: 3.56 10E6/UL (ref 4.4–5.9)
SODIUM SERPL-SCNC: 135 MMOL/L (ref 135–145)
SODIUM SERPL-SCNC: 137 MMOL/L (ref 135–145)
TACROLIMUS BLD-MCNC: 3.4 UG/L (ref 5–15)
TME LAST DOSE: ABNORMAL H
TME LAST DOSE: ABNORMAL H
WBC # BLD AUTO: 4.9 10E3/UL (ref 4–11)

## 2025-03-16 PROCEDURE — 85018 HEMOGLOBIN: CPT | Performed by: STUDENT IN AN ORGANIZED HEALTH CARE EDUCATION/TRAINING PROGRAM

## 2025-03-16 PROCEDURE — 80069 RENAL FUNCTION PANEL: CPT | Performed by: STUDENT IN AN ORGANIZED HEALTH CARE EDUCATION/TRAINING PROGRAM

## 2025-03-16 PROCEDURE — 80197 ASSAY OF TACROLIMUS: CPT | Performed by: STUDENT IN AN ORGANIZED HEALTH CARE EDUCATION/TRAINING PROGRAM

## 2025-03-16 PROCEDURE — 250N000013 HC RX MED GY IP 250 OP 250 PS 637: Performed by: STUDENT IN AN ORGANIZED HEALTH CARE EDUCATION/TRAINING PROGRAM

## 2025-03-16 PROCEDURE — 250N000013 HC RX MED GY IP 250 OP 250 PS 637

## 2025-03-16 PROCEDURE — 83605 ASSAY OF LACTIC ACID: CPT | Performed by: STUDENT IN AN ORGANIZED HEALTH CARE EDUCATION/TRAINING PROGRAM

## 2025-03-16 PROCEDURE — 36415 COLL VENOUS BLD VENIPUNCTURE: CPT | Performed by: STUDENT IN AN ORGANIZED HEALTH CARE EDUCATION/TRAINING PROGRAM

## 2025-03-16 PROCEDURE — 999N000128 HC STATISTIC PERIPHERAL IV START W/O US GUIDANCE

## 2025-03-16 PROCEDURE — 36415 COLL VENOUS BLD VENIPUNCTURE: CPT | Performed by: INTERNAL MEDICINE

## 2025-03-16 PROCEDURE — 99232 SBSQ HOSP IP/OBS MODERATE 35: CPT | Performed by: STUDENT IN AN ORGANIZED HEALTH CARE EDUCATION/TRAINING PROGRAM

## 2025-03-16 PROCEDURE — 250N000013 HC RX MED GY IP 250 OP 250 PS 637: Performed by: INTERNAL MEDICINE

## 2025-03-16 PROCEDURE — 250N000012 HC RX MED GY IP 250 OP 636 PS 637: Performed by: NURSE PRACTITIONER

## 2025-03-16 PROCEDURE — 85730 THROMBOPLASTIN TIME PARTIAL: CPT | Performed by: INTERNAL MEDICINE

## 2025-03-16 PROCEDURE — 250N000013 HC RX MED GY IP 250 OP 250 PS 637: Performed by: PHYSICIAN ASSISTANT

## 2025-03-16 PROCEDURE — 250N000011 HC RX IP 250 OP 636: Performed by: STUDENT IN AN ORGANIZED HEALTH CARE EDUCATION/TRAINING PROGRAM

## 2025-03-16 PROCEDURE — 85048 AUTOMATED LEUKOCYTE COUNT: CPT | Performed by: STUDENT IN AN ORGANIZED HEALTH CARE EDUCATION/TRAINING PROGRAM

## 2025-03-16 PROCEDURE — 120N000002 HC R&B MED SURG/OB UMMC

## 2025-03-16 PROCEDURE — 250N000012 HC RX MED GY IP 250 OP 636 PS 637: Performed by: PHYSICIAN ASSISTANT

## 2025-03-16 PROCEDURE — 258N000003 HC RX IP 258 OP 636: Performed by: STUDENT IN AN ORGANIZED HEALTH CARE EDUCATION/TRAINING PROGRAM

## 2025-03-16 RX ORDER — CLINDAMYCIN PHOSPHATE 900 MG/50ML
900 INJECTION, SOLUTION INTRAVENOUS
Status: DISCONTINUED | OUTPATIENT
Start: 2025-03-16 | End: 2025-03-17

## 2025-03-16 RX ORDER — SODIUM CHLORIDE 9 MG/ML
INJECTION, SOLUTION INTRAVENOUS CONTINUOUS
Status: DISCONTINUED | OUTPATIENT
Start: 2025-03-17 | End: 2025-03-17

## 2025-03-16 RX ADMIN — ATORVASTATIN CALCIUM 40 MG: 40 TABLET, FILM COATED ORAL at 19:20

## 2025-03-16 RX ADMIN — INSULIN ASPART 5 UNITS: 100 INJECTION, SOLUTION INTRAVENOUS; SUBCUTANEOUS at 22:18

## 2025-03-16 RX ADMIN — MYCOPHENOLATE MOFETIL 500 MG: 500 TABLET, FILM COATED ORAL at 19:20

## 2025-03-16 RX ADMIN — LATANOPROST 1 DROP: 50 SOLUTION OPHTHALMIC at 19:20

## 2025-03-16 RX ADMIN — TAMSULOSIN HYDROCHLORIDE 0.4 MG: 0.4 CAPSULE ORAL at 08:56

## 2025-03-16 RX ADMIN — TACROLIMUS 2 MG: 1 CAPSULE ORAL at 17:36

## 2025-03-16 RX ADMIN — SODIUM BICARBONATE 650 MG TABLET 1300 MG: at 19:20

## 2025-03-16 RX ADMIN — INSULIN GLARGINE 7 UNITS: 100 INJECTION, SOLUTION SUBCUTANEOUS at 17:35

## 2025-03-16 RX ADMIN — TACROLIMUS 1.5 MG: 1 CAPSULE ORAL at 08:56

## 2025-03-16 RX ADMIN — SODIUM CHLORIDE: 9 INJECTION, SOLUTION INTRAVENOUS at 23:34

## 2025-03-16 RX ADMIN — ASPIRIN 81 MG CHEWABLE TABLET 81 MG: 81 TABLET CHEWABLE at 08:56

## 2025-03-16 RX ADMIN — MYCOPHENOLATE MOFETIL 500 MG: 500 TABLET, FILM COATED ORAL at 08:55

## 2025-03-16 RX ADMIN — HEPARIN SODIUM 1550 UNITS/HR: 10000 INJECTION, SOLUTION INTRAVENOUS at 17:37

## 2025-03-16 RX ADMIN — SODIUM BICARBONATE 650 MG TABLET 1300 MG: at 08:55

## 2025-03-16 RX ADMIN — FERROUS SULFATE TAB 325 MG (65 MG ELEMENTAL FE) 325 MG: 325 (65 FE) TAB at 08:56

## 2025-03-16 RX ADMIN — PANTOPRAZOLE SODIUM 40 MG: 40 TABLET, DELAYED RELEASE ORAL at 08:55

## 2025-03-16 ASSESSMENT — ACTIVITIES OF DAILY LIVING (ADL)
ADLS_ACUITY_SCORE: 78
ADLS_ACUITY_SCORE: 79
ADLS_ACUITY_SCORE: 75
ADLS_ACUITY_SCORE: 78
ADLS_ACUITY_SCORE: 75
ADLS_ACUITY_SCORE: 78
ADLS_ACUITY_SCORE: 75
ADLS_ACUITY_SCORE: 75
ADLS_ACUITY_SCORE: 78
ADLS_ACUITY_SCORE: 75
ADLS_ACUITY_SCORE: 75
ADLS_ACUITY_SCORE: 78
ADLS_ACUITY_SCORE: 75
ADLS_ACUITY_SCORE: 75
ADLS_ACUITY_SCORE: 78
ADLS_ACUITY_SCORE: 75
ADLS_ACUITY_SCORE: 75
ADLS_ACUITY_SCORE: 78
ADLS_ACUITY_SCORE: 78

## 2025-03-16 NOTE — PROGRESS NOTES
[facility]    Medicine Progress Note - Hospitalist Service, GOLD TEAM 7    Date of Admission:  2/25/2025    Assessment & Plan   Tad Zaman is a 64 year old male admitted on 2/25/2025 for AMS. He has a history of diabetes, HTN, CAD, HF, and renal transplant 9/2022, left AKA, CKD.  Patient was brought in by family for AMS x 1-2 months. He has been evaluated at the [facility] multiple times in ED then was evaluated during an admission 1/20-2/21/25 with very broad workup (see below) that did not amount to any type of notable etiology of his symptoms. During this stay he was also treated for HF exacerbation, MOY, and UTI which were all stable at time of discharge. He finished course of cefdinir for e coli UTI on 2/24. He was discharged with family being told that he has failure to thrive. He was discharged home, required 24 hr cares by daughter including changing him and preparation of food. He has also has had intermittent agitation and aggression. Home health nurse stated that he was not appropriate for their program.      No clear etiology for confusion but the aggression and refusal of cares have clinically improved without intervention. Course complicated by new cyanotic areas on the R toes concerning for progression of PAD. Vascular surgery consulted, details per below for work up of PAD    Today  - remains on heparin gtt, pending Monday (3/17) for angiogram w/ angioplasty vs vascular bypass w vascular surgery.  Heparin gtt timed to stop at 3AM  - daughter Jarrod updated on the phone.   - NPO at midnight and continue IVF 50ml/hr overnight  - IV clindamycin ordered per vascular mag-op      AMS  Cognitive impairment vs encephalopathy   3/11 reseen by  psychiatry to see reg decision making capacity and was fel he needed surrogate decision maker for consent for procedures. Presented with 1-2 months of progressive encephalopathy and agitation with aggressive  behavior toward family who is caring for him at home. [facility]  -Initial LP with pleocytosis, otherwise unrevealing.   -MRI brain without acute pathology.   -Neurology consulted. Some concern for hx of viral encephalitis that has since resolved leaving him with a degree of impairment. He otherwise remains hemodynamically stable without clinical evidence of infection, no gross electrolyte abnormality, stable glucose (elevated but without acidosis), BUN is improving, tacrolimus levels was  WNL.   [facility]:   - MRI 1/29: Stable mild amount of chronic small vessel ischemia. Small chronic R cerebellar infarct.   -OT SLUMS 6/30   -EEG: nonspecific encephalopathy.   -LP: protein 173, glucose 110. Cell count with 21 WBC, 41 RBC, 52 lymph. Hazy appearance  -Encephalopathy paraneoplastic evaluation (including Purkinje, ADI, antiglial nucl antibody, NMDA, BOSSMAN) 1/30/25 negative.   -CSF flow cytometry: suboptimal specimen with low cellularity. No immunophenotypic evidence of involvement by lymphoma. Clutier light chain 64.8, lambda light chain 35.1, kappa/lambda 1.85 all elevated. No monoclonals detected (1/22)  -ELP/Immunofix, serum panel: total protein low, beta 1 fraction low (1/22/25)  -Polyoma virus DNA of CSF, MAGNUS virus DNA (1/28) negative.   -Meningitis / encephalitis panel (1/28) negative  -CSF angiotensin converting enzyme (1/28) negative negative  -CMV (1/28, 1/25) negative   -BK virus (1/28) negative  -EBV (1/25) negative  -Cryptococcus (1/26) negative; Cryptococcus neoformans (1/28) negative  -Syphilis (1/25) negative  -HIV (1/25) negative  -B12 (1/18) WNL  -TSH (1/18) high side of normal 4.94  - CSF flow cytometry from 3/5/25  rare to absent B cells , no malignant cells seen   - Drug screen negative. Patient has distant history of what sounds to be mild to moderate alcohol intake.   - needs out patient  neuropsych testing as well as op EEG if cognition worsens    - repeat MRI of brain  3/1/25 limited by motion artifact  but no evidence of acute infarct or acute intracranial pathology   - Delirium precautions as able.   - repeat LP per CAPs on 3/5 completed - repeat CSF unremarkable (negative cytometry, cytology negative),  West Nile virus negative , VDRL non reactive , aerobic and anaerobic cultures so far no growth   - He will need follow up with outpatient neurology and consider neuropsychology testing  - Per neuro: can consider EEG in outpatient setting if cognition were to worsen      Peripheral vascular disease/ PAD    S/p L BKA  Seen by vascular surgery   -Vein mapping (done 3/11/2025)  -Hold Eliquis, okay to anticoagulate with heparin drip.   -Our team is working with scheduling the patient for potentially Monday for angiogram with angioplasty vs vascular bypass.  -Please start aspirin 81mg if no medical contraindications exist for antiplatelet therapy, and continue with statin  -Keep right foot warm with warming blankets   -Please ensure adequate blood glucose control  -Neurovascular checks per unit protocol (patient has brisk Doppler PT and faint monophasic DP).  -Consider aspirin 81mg (started 3/13), and continue with statin (discussing with transplant neph if atorvastatin can be increased). Per Transplant neph, OK to go up to 40 atorvastatin.        Renal transplant 9/2022    CKD Stage 3b   Had the transplant at [facility]. [facility]. 1/27 renal US with doppler showed no stenosis of vessels. Resistive indices were increased, felt could be related to ureteric obstruction but not specific. Mild dilation of intrarenal collecting system and ureter was also seen. CT a/p 1/28 W/O contrast suggested mild hydro.  - [facility]/3/25 at [facility] showed no hydro or other abnormalities. FENA 4.3 (1/28) at [facility].   -  Creat recent baseline appears to be 1.8-2.3  - transplant neph following the patient  Recommendations:   - Continue mycophenolate  500 mg twice a day.  -Continue to hold torsemide, calcitriol and lokelma  - Continue tacrolimus 1.5 mg twice a day.  -Tacro level on 3/9/2025 is 3.6  - Accurate I/O and daily weight  - Will need Urology outpatient follow up for TOV and further management.    - Hold calcitriol given borderline high calcium. Will follow up calcium levels on next renal panel (ordered)  - Ordered UA and UPCR/UACR 3/3/2025 - trace leuk esterase and glucose present. No evidence of infection.   - Ordered Kidney Transplant US 3/3/2025 (routine) - mild hydronephrosis of transplanted kidney, torturous renal artery with mildly elevated velocity at the anastomosis, likely chronic.   - Check hepatitis panel (ordered for 3/4/2025) - HCV positive, known history and cured, otherwise negative.   - Accurate I/O and daily weight given recent heart failure exacerbation.  - Plan for urinary retention per below     Urinary retention  Noted to have ongoing retention requiring straight cath. Unclear whether this is a new issue for him. No significant anticholinergics on his current medication list.   - Tamsulosin 0.4mg PO initiated on 3/4; as tolerated   - Avoid anticholinergic medications as able.       Troponinemia, stable   Lateral lead T wave inversions   Atrial flutter   HFrEF last echo 2/2025 with EF 40-45%   HTN   Hx MI  Limited cardiac history available. Per patient's daughter he may have had a stent placed at 1 point but is unsure.  Patient and daughters deny any history of cardiac arrhythmia.  However on exam and on EKG evidence of atrial flutter, and patient also on Eliquis at least since December.  [facility], he was found to have heart failure exacerbation with 25-30 lbs fluid excess. BNP during hospitalization was 2868. His Lasix was switched to Torsemide. He has been taking his medications per his daughter.   - per cardiology note I found in EPIC form 2018 he has had  medium size ischemic area  in inferior wall   ( had similar but smaller in 2013)   - BNP 85357 here.   - trop 267>>218  - EKG with T wave inversions and mild ST depressions in lateral leads  - denies recent CP or SOB, no peripheral or abdominal edema  Plan:   - TTE 2/26/25: LV function decreased w/ EF 40-45%   - No report of chest pain. Troponin trended down   - cont PTA atorvastatin 40 mg nightly  - restarted PTA Eliquis 5 mg BID on 3/6  but now on hold as of 3/11 and iv heparin started    - HOLD  PTA Jardiance 12.5 mg daily per cardiology pre-op  - continue PTA metoprolol 75 mg daily  - HOLD PTA torsemide 10 mg BID  - cardiology consulted and saw pt for pre-op; cleared  - He will need to arrange follow-up with [facility] after his discharge     # T2DM  Not on long-term insulin. Hemoglobin A1c 6.9% as of 7/2024. Hyperglycemia when he allowed check on 3/4. He has been amenable to glucose checks and insulin this admission.   - HOLD PTA Jardiance 12.5 mg daily  - Repeat Hemoglobin A1c 7.9  - Initiated sliding scale insulin on 3/4 with TID AC and 0200 glucose checks -> increased to HDSSI on 3/10              - Will need to discuss with family whether they feel they would be able to manage sliding scale insulin at home.   - Carb consistent diet   - Given sliding scale insulin needs but would like to simplify this on discharge and will start basal Lantus at 5 units (half of what would be calculated 0.2units/kg) and uptitrate based on fasting AM sugars. Increased to lantus 7 units 3/13     # Mild normocytic anemia  - Hg stable, no signs of bleed, monitor on AC      # Hx hepatitis C,  - s/p treatment with cure        # Glaucoma - cont PTA eyedrops  # Sickle cell trait          Diet: Moderate Consistent Carb (60 g CHO per Meal) Diet  Snacks/Supplements Adult: Nepro Oral Supplement; With Meals  NPO for Procedure/Surgery per Anesthesia Guidelines Except for: Meds; Clear liquids before procedure/surgery: ADULT (Age GREATER than or Equal to 18  years) - Clear liquids 2 hours before procedure/surgery  NPO for Procedure/Surgery per Anesthesia Guidelines Except for: Meds; Clear liquids before procedure/surgery: ADULT (Age GREATER than or Equal to 18 years) - Clear liquids 2 hours before procedure/surgery    DVT Prophylaxis: heparin gtt  Zaman Catheter: Not present  Lines: None     Cardiac Monitoring: None  Code Status: Full Code      Clinically Significant Risk Factors               # Hypoalbuminemia: Lowest albumin = 3 g/dL at 3/11/2025  5:55 AM, will monitor as appropriate               # DMII: A1C = 7.9 % (Ref range: <5.7 %) within past 6 months    # Severe Malnutrition: based on nutrition assessment and treatment provided per dietitian's recommendations.    # Financial/Environmental Concerns: none         Social Drivers of Health    Tobacco Use: Medium Risk (7/26/2024)    Received from [employer]    Patient History     Smoking Tobacco Use: Former     Smokeless Tobacco Use: Never          Disposition Plan     Medically Ready for Discharge: Anticipated in 2-4 Days             Sonia Pham MD  Hospitalist Service, GOLD TEAM 7  [facility]  Securely message with Vocera (more info)  Text page via [employer] Paging/Directory   See signed in provider for up to date coverage information  ______________________________________________________________________    Interval History   Reports no issues. Called daughter Jarrod for an update; she had no questions or concerns for me. Eating well and having regular BMs per pt.     Physical Exam   Vital Signs: Temp: 98.6  F (37  C) Temp src: Oral BP: 107/62 Pulse: 70   Resp: 18 SpO2: 96 % O2 Device: None (Room air)    Weight: 158 lbs 8.17 oz    Gen: no acute distress, alert answering questions   HEENT: normal conjunctivae, EOMI  CV: RRR, no murmus  Pulm: CTBA, no crackles or wheezing  Abd: soft, nl BS  Extremities: s/p L AKA. RLE DP present w / dopplers    Medical Decision Making        45 MINUTES SPENT BY ME on the date of service doing chart review, history, exam, documentation & further activities per the note.      Data     I have personally reviewed the following data over the past 24 hrs:    4.9  \   9.0 (L)   / 285     135 104 38.4 (H) /  193 (H)   4.8 22 1.45 (H) \     ALT: N/A AST: N/A AP: N/A TBILI: N/A   ALB: 3.1 (L) TOT PROTEIN: N/A LIPASE: N/A     Procal: N/A CRP: N/A Lactic Acid: 1.3       INR:  N/A PTT:  76 (H)   D-dimer:  N/A Fibrinogen:  N/A       Imaging results reviewed over the past 24 hrs:   No results found for this or any previous visit (from the past 24 hours).

## 2025-03-16 NOTE — PLAN OF CARE
Goal Outcome Evaluation:         VSS on RA. A&Ox3, disoriented to situation. Denied pain, nausea, vomiting, and diarrhea. Primofit in place. Heparin gtt infusing at 1550ml/hr- recheck ~00:00. Poor appetite. Pulses present with doppler. Hand wound cares completed.

## 2025-03-16 NOTE — CONSULTS
Care Management Follow Up    A CM consult was generated d/t ERS. An initial consult was completed on 2/26/25 per RNCC. CM continue to follow and plan safe discharge.    Nikia Ortiz RN    7C RN Coordinator  Phone: [phone]  3/16/2025  Units: 7C Med Surg 7401 thru 7418 RNCC     Social Work and Care Management Department   SEARCHABLE in [facility] - search CARE COORDINATOR   [address] [address] [place] ([phone]) Saturday & Sunday; ([phone]) [facility] Recognized Holidays   Units: 5A Onc 5201 - 5219 RNCC,  5A Onc 5220 thru 5240 RNCC, 5C OFFSERVICE [id number] RNCC & 5C OFF SERVICE [phone] RNCC   Units: 6B Vocera, 6C Card 6401 thru 6420 RNCC, 6C Card 6502 thru 6514 RNCC & 6C Card 6515 thru 6519 RNCC    Units: 7A SOT RNCC Vocera, 7B Med Surg Vocera, & 7C Med Surg 7502 thru 7521 RNCC   Units: 6A Vocera & 4A CVICU Vocera, 4C MICU Vocera, and 4E SICU Vocera     Units: 5 Ortho Vocera & 5 Med Surg Vocera    Units: 6 Med Surg Vocera & 8 Med Surg Vocera

## 2025-03-16 NOTE — PLAN OF CARE
Goal Outcome Evaluation:      Plan of Care Reviewed With: patient    Overall Patient Progress: no change    Assumed cares [id number]    Patient is alert but disoriented to time and situation. Triggered sepsis, notified MD, lactate 1.3. Patient was confused and thought procedure was today but writer reminded procedure is Monday. Refused linen change and bath wipes. Premofit in place, 700ml output. Poor intake, cereal for breakfast but refused wanting lunch. ACHS bg. No BM. Pedal pulse present with doppler. Continue POC.     NPO at midnight.   Heparin @1550 units/hr. Stop drip at 3am for angiogram.      Start IV fluids at midnight.

## 2025-03-16 NOTE — PLAN OF CARE
Goal Outcome Evaluation:      Plan of Care Reviewed With: patient    Overall Patient Progress: no change Overall Patient Progress: no change     Disoriented to place and time, writer reoriented pt each time when entering room.  VSS on RA, denies pain. Doppler on R lower extremity q4h: DP +2, PT +1. Fluids provided. Heparin recheck at midnight was therapeutic, gtt remained at 1550 units/hr. Paged team regarding if recheck for tomorrow at 0700 should be modified to sooner since it will be 24+ hours until next lab result.

## 2025-03-16 NOTE — PROGRESS NOTES
Brief vascular surgery progress note    Patient doing okay this morning.  Discussed with the patient this morning about procedure tomorrow.  Discussed benefits and risks including anesthetic complications such as death, heart attack, intubation, and procedural complication including damage to the nearby structures, kidney failure, bleeding, pseudoaneurysm, hematoma, dissection or other arterial injuries that need for further procedures.    Explained to the patient that angiogram with intervention is needed for limb salvage.  The patient already lost the left side, and right leg is crucial to save for his continued functional status.    Patient voiced understanding and agreed to proceed tomorrow morning.  N.p.o. at midnight.  Gentle fluids overnight.    Nikki Lozano MD  Vascular Surgery Fellow    Addendum:  I discussed above risks and benefits with the patient's daughter Jarrod Zaman and she agrees to proceed with the surgery.  I will call the patient's daughter tomorrow again to obtain the consent with a witness if she is not here in person.

## 2025-03-17 ENCOUNTER — PREP FOR PROCEDURE (OUTPATIENT)
Dept: SURGERY | Facility: CLINIC | Age: 65
End: 2025-03-17
Payer: MEDICARE

## 2025-03-17 ENCOUNTER — APPOINTMENT (OUTPATIENT)
Dept: INTERVENTIONAL RADIOLOGY/VASCULAR | Facility: CLINIC | Age: 65
End: 2025-03-17
Attending: SURGERY
Payer: MEDICARE

## 2025-03-17 ENCOUNTER — ANESTHESIA (OUTPATIENT)
Dept: SURGERY | Facility: CLINIC | Age: 65
End: 2025-03-17
Payer: MEDICARE

## 2025-03-17 DIAGNOSIS — I73.9 PVD (PERIPHERAL VASCULAR DISEASE): ICD-10-CM

## 2025-03-17 DIAGNOSIS — Z98.890 POSTOPERATIVE STATE: Primary | ICD-10-CM

## 2025-03-17 DIAGNOSIS — I96 GANGRENE OF TOE OF RIGHT FOOT (H): Primary | ICD-10-CM

## 2025-03-17 LAB
ABO + RH BLD: NORMAL
ALBUMIN SERPL BCG-MCNC: 3.4 G/DL (ref 3.5–5.2)
ANION GAP SERPL CALCULATED.3IONS-SCNC: 12 MMOL/L (ref 7–15)
APTT PPP: 30 SECONDS (ref 22–38)
BLD GP AB SCN SERPL QL: NEGATIVE
BUN SERPL-MCNC: 37 MG/DL (ref 8–23)
CALCIUM SERPL-MCNC: 9.8 MG/DL (ref 8.8–10.4)
CHLORIDE SERPL-SCNC: 106 MMOL/L (ref 98–107)
CREAT SERPL-MCNC: 1.6 MG/DL (ref 0.67–1.17)
EGFRCR SERPLBLD CKD-EPI 2021: 47 ML/MIN/1.73M2
ERYTHROCYTE [DISTWIDTH] IN BLOOD BY AUTOMATED COUNT: 14.9 % (ref 10–15)
ERYTHROCYTE [DISTWIDTH] IN BLOOD BY AUTOMATED COUNT: 15 % (ref 10–15)
GLUCOSE BLDC GLUCOMTR-MCNC: 151 MG/DL (ref 70–99)
GLUCOSE BLDC GLUCOMTR-MCNC: 158 MG/DL (ref 70–99)
GLUCOSE BLDC GLUCOMTR-MCNC: 182 MG/DL (ref 70–99)
GLUCOSE BLDC GLUCOMTR-MCNC: 186 MG/DL (ref 70–99)
GLUCOSE BLDC GLUCOMTR-MCNC: 191 MG/DL (ref 70–99)
GLUCOSE BLDC GLUCOMTR-MCNC: 217 MG/DL (ref 70–99)
GLUCOSE BLDC GLUCOMTR-MCNC: 250 MG/DL (ref 70–99)
GLUCOSE SERPL-MCNC: 179 MG/DL (ref 70–99)
HCO3 SERPL-SCNC: 20 MMOL/L (ref 22–29)
HCT VFR BLD AUTO: 34.4 % (ref 40–53)
HCT VFR BLD AUTO: 34.5 % (ref 40–53)
HGB BLD-MCNC: 10.4 G/DL (ref 13.3–17.7)
HGB BLD-MCNC: 10.5 G/DL (ref 13.3–17.7)
LACTATE SERPL-SCNC: 2 MMOL/L (ref 0.7–2)
MCH RBC QN AUTO: 25.1 PG (ref 26.5–33)
MCH RBC QN AUTO: 25.4 PG (ref 26.5–33)
MCHC RBC AUTO-ENTMCNC: 30.2 G/DL (ref 31.5–36.5)
MCHC RBC AUTO-ENTMCNC: 30.4 G/DL (ref 31.5–36.5)
MCV RBC AUTO: 83 FL (ref 78–100)
MCV RBC AUTO: 84 FL (ref 78–100)
PHOSPHATE SERPL-MCNC: 3.1 MG/DL (ref 2.5–4.5)
PLATELET # BLD AUTO: 255 10E3/UL (ref 150–450)
PLATELET # BLD AUTO: 285 10E3/UL (ref 150–450)
POTASSIUM SERPL-SCNC: 5.1 MMOL/L (ref 3.4–5.3)
RBC # BLD AUTO: 4.09 10E6/UL (ref 4.4–5.9)
RBC # BLD AUTO: 4.18 10E6/UL (ref 4.4–5.9)
SODIUM SERPL-SCNC: 138 MMOL/L (ref 135–145)
SPECIMEN EXP DATE BLD: NORMAL
TACROLIMUS BLD-MCNC: 4 UG/L (ref 5–15)
TME LAST DOSE: ABNORMAL H
TME LAST DOSE: ABNORMAL H
UFH PPP CHRO-ACNC: <0.1 IU/ML
WBC # BLD AUTO: 5.9 10E3/UL (ref 4–11)
WBC # BLD AUTO: 7.1 10E3/UL (ref 4–11)

## 2025-03-17 PROCEDURE — 250N000013 HC RX MED GY IP 250 OP 250 PS 637: Performed by: INTERNAL MEDICINE

## 2025-03-17 PROCEDURE — 82040 ASSAY OF SERUM ALBUMIN: CPT | Performed by: STUDENT IN AN ORGANIZED HEALTH CARE EDUCATION/TRAINING PROGRAM

## 2025-03-17 PROCEDURE — 82947 ASSAY GLUCOSE BLOOD QUANT: CPT | Performed by: STUDENT IN AN ORGANIZED HEALTH CARE EDUCATION/TRAINING PROGRAM

## 2025-03-17 PROCEDURE — 250N000013 HC RX MED GY IP 250 OP 250 PS 637

## 2025-03-17 PROCEDURE — 85014 HEMATOCRIT: CPT | Performed by: STUDENT IN AN ORGANIZED HEALTH CARE EDUCATION/TRAINING PROGRAM

## 2025-03-17 PROCEDURE — 36415 COLL VENOUS BLD VENIPUNCTURE: CPT

## 2025-03-17 PROCEDURE — 120N000002 HC R&B MED SURG/OB UMMC

## 2025-03-17 PROCEDURE — 272N000001 HC OR GENERAL SUPPLY STERILE: Performed by: SURGERY

## 2025-03-17 PROCEDURE — 85730 THROMBOPLASTIN TIME PARTIAL: CPT | Performed by: HOSPITALIST

## 2025-03-17 PROCEDURE — 83605 ASSAY OF LACTIC ACID: CPT

## 2025-03-17 PROCEDURE — 75710 ARTERY X-RAYS ARM/LEG: CPT | Mod: 26 | Performed by: SURGERY

## 2025-03-17 PROCEDURE — 250N000011 HC RX IP 250 OP 636: Performed by: STUDENT IN AN ORGANIZED HEALTH CARE EDUCATION/TRAINING PROGRAM

## 2025-03-17 PROCEDURE — 80197 ASSAY OF TACROLIMUS: CPT | Performed by: NURSE PRACTITIONER

## 2025-03-17 PROCEDURE — 250N000012 HC RX MED GY IP 250 OP 636 PS 637: Performed by: PHYSICIAN ASSISTANT

## 2025-03-17 PROCEDURE — 250N000009 HC RX 250: Performed by: ANESTHESIOLOGY

## 2025-03-17 PROCEDURE — 370N000017 HC ANESTHESIA TECHNICAL FEE, PER MIN: Performed by: SURGERY

## 2025-03-17 PROCEDURE — C1769 GUIDE WIRE: HCPCS | Performed by: SURGERY

## 2025-03-17 PROCEDURE — 250N000013 HC RX MED GY IP 250 OP 250 PS 637: Performed by: PHYSICIAN ASSISTANT

## 2025-03-17 PROCEDURE — 250N000025 HC SEVOFLURANE, PER MIN: Performed by: SURGERY

## 2025-03-17 PROCEDURE — 85520 HEPARIN ASSAY: CPT | Performed by: STUDENT IN AN ORGANIZED HEALTH CARE EDUCATION/TRAINING PROGRAM

## 2025-03-17 PROCEDURE — C1887 CATHETER, GUIDING: HCPCS | Performed by: SURGERY

## 2025-03-17 PROCEDURE — C1760 CLOSURE DEV, VASC: HCPCS | Performed by: SURGERY

## 2025-03-17 PROCEDURE — 76937 US GUIDE VASCULAR ACCESS: CPT | Mod: 26 | Performed by: SURGERY

## 2025-03-17 PROCEDURE — 999N000083 IR OR ANGIOGRAM

## 2025-03-17 PROCEDURE — 258N000001 HC RX 258: Performed by: SURGERY

## 2025-03-17 PROCEDURE — 250N000011 HC RX IP 250 OP 636: Performed by: ANESTHESIOLOGY

## 2025-03-17 PROCEDURE — 36247 INS CATH ABD/L-EXT ART 3RD: CPT | Mod: RT | Performed by: SURGERY

## 2025-03-17 PROCEDURE — 360N000082 HC SURGERY LEVEL 2 W/ FLUORO, PER MIN: Performed by: SURGERY

## 2025-03-17 PROCEDURE — 710N000010 HC RECOVERY PHASE 1, LEVEL 2, PER MIN: Performed by: SURGERY

## 2025-03-17 PROCEDURE — 250N000011 HC RX IP 250 OP 636: Performed by: SURGERY

## 2025-03-17 PROCEDURE — 255N000002 HC RX 255 OP 636: Performed by: SURGERY

## 2025-03-17 PROCEDURE — 258N000003 HC RX IP 258 OP 636: Performed by: ANESTHESIOLOGY

## 2025-03-17 PROCEDURE — 86850 RBC ANTIBODY SCREEN: CPT | Performed by: STUDENT IN AN ORGANIZED HEALTH CARE EDUCATION/TRAINING PROGRAM

## 2025-03-17 PROCEDURE — 250N000012 HC RX MED GY IP 250 OP 636 PS 637: Performed by: NURSE PRACTITIONER

## 2025-03-17 PROCEDURE — 85018 HEMOGLOBIN: CPT | Performed by: INTERNAL MEDICINE

## 2025-03-17 PROCEDURE — 99233 SBSQ HOSP IP/OBS HIGH 50: CPT | Performed by: STUDENT IN AN ORGANIZED HEALTH CARE EDUCATION/TRAINING PROGRAM

## 2025-03-17 PROCEDURE — B41F1ZZ FLUOROSCOPY OF RIGHT LOWER EXTREMITY ARTERIES USING LOW OSMOLAR CONTRAST: ICD-10-PCS | Performed by: SURGERY

## 2025-03-17 PROCEDURE — 86900 BLOOD TYPING SEROLOGIC ABO: CPT | Performed by: STUDENT IN AN ORGANIZED HEALTH CARE EDUCATION/TRAINING PROGRAM

## 2025-03-17 PROCEDURE — 84132 ASSAY OF SERUM POTASSIUM: CPT | Performed by: STUDENT IN AN ORGANIZED HEALTH CARE EDUCATION/TRAINING PROGRAM

## 2025-03-17 PROCEDURE — 36415 COLL VENOUS BLD VENIPUNCTURE: CPT | Performed by: STUDENT IN AN ORGANIZED HEALTH CARE EDUCATION/TRAINING PROGRAM

## 2025-03-17 PROCEDURE — 250N000013 HC RX MED GY IP 250 OP 250 PS 637: Performed by: STUDENT IN AN ORGANIZED HEALTH CARE EDUCATION/TRAINING PROGRAM

## 2025-03-17 PROCEDURE — 258N000003 HC RX IP 258 OP 636: Performed by: STUDENT IN AN ORGANIZED HEALTH CARE EDUCATION/TRAINING PROGRAM

## 2025-03-17 PROCEDURE — C1894 INTRO/SHEATH, NON-LASER: HCPCS | Performed by: SURGERY

## 2025-03-17 PROCEDURE — 999N000141 HC STATISTIC PRE-PROCEDURE NURSING ASSESSMENT: Performed by: SURGERY

## 2025-03-17 RX ORDER — DEXAMETHASONE SODIUM PHOSPHATE 4 MG/ML
4 INJECTION, SOLUTION INTRA-ARTICULAR; INTRALESIONAL; INTRAMUSCULAR; INTRAVENOUS; SOFT TISSUE
Status: DISCONTINUED | OUTPATIENT
Start: 2025-03-17 | End: 2025-03-17 | Stop reason: HOSPADM

## 2025-03-17 RX ORDER — PROTAMINE SULFATE 10 MG/ML
INJECTION, SOLUTION INTRAVENOUS PRN
Status: DISCONTINUED | OUTPATIENT
Start: 2025-03-17 | End: 2025-03-17

## 2025-03-17 RX ORDER — ONDANSETRON 2 MG/ML
4 INJECTION INTRAMUSCULAR; INTRAVENOUS EVERY 30 MIN PRN
Status: DISCONTINUED | OUTPATIENT
Start: 2025-03-17 | End: 2025-03-17 | Stop reason: HOSPADM

## 2025-03-17 RX ORDER — NALOXONE HYDROCHLORIDE 0.4 MG/ML
0.1 INJECTION, SOLUTION INTRAMUSCULAR; INTRAVENOUS; SUBCUTANEOUS
Status: DISCONTINUED | OUTPATIENT
Start: 2025-03-17 | End: 2025-03-17 | Stop reason: HOSPADM

## 2025-03-17 RX ORDER — HYDROMORPHONE HCL IN WATER/PF 6 MG/30 ML
0.4 PATIENT CONTROLLED ANALGESIA SYRINGE INTRAVENOUS EVERY 5 MIN PRN
Status: DISCONTINUED | OUTPATIENT
Start: 2025-03-17 | End: 2025-03-17 | Stop reason: HOSPADM

## 2025-03-17 RX ORDER — IODIXANOL 320 MG/ML
INJECTION, SOLUTION INTRAVASCULAR PRN
Status: DISCONTINUED | OUTPATIENT
Start: 2025-03-17 | End: 2025-03-17 | Stop reason: HOSPADM

## 2025-03-17 RX ORDER — HEPARIN SODIUM 1000 [USP'U]/ML
INJECTION, SOLUTION INTRAVENOUS; SUBCUTANEOUS PRN
Status: DISCONTINUED | OUTPATIENT
Start: 2025-03-17 | End: 2025-03-17

## 2025-03-17 RX ORDER — PROPOFOL 10 MG/ML
INJECTION, EMULSION INTRAVENOUS PRN
Status: DISCONTINUED | OUTPATIENT
Start: 2025-03-17 | End: 2025-03-17

## 2025-03-17 RX ORDER — CEFOXITIN 2 G/1
INJECTION, POWDER, FOR SOLUTION INTRAVENOUS PRN
Status: DISCONTINUED | OUTPATIENT
Start: 2025-03-17 | End: 2025-03-17

## 2025-03-17 RX ORDER — HYDROMORPHONE HCL IN WATER/PF 6 MG/30 ML
0.2 PATIENT CONTROLLED ANALGESIA SYRINGE INTRAVENOUS EVERY 5 MIN PRN
Status: DISCONTINUED | OUTPATIENT
Start: 2025-03-17 | End: 2025-03-17 | Stop reason: HOSPADM

## 2025-03-17 RX ORDER — ONDANSETRON 4 MG/1
4 TABLET, ORALLY DISINTEGRATING ORAL EVERY 30 MIN PRN
Status: DISCONTINUED | OUTPATIENT
Start: 2025-03-17 | End: 2025-03-17 | Stop reason: HOSPADM

## 2025-03-17 RX ORDER — LIDOCAINE HYDROCHLORIDE 20 MG/ML
INJECTION, SOLUTION INFILTRATION; PERINEURAL PRN
Status: DISCONTINUED | OUTPATIENT
Start: 2025-03-17 | End: 2025-03-17

## 2025-03-17 RX ORDER — FENTANYL CITRATE 50 UG/ML
50 INJECTION, SOLUTION INTRAMUSCULAR; INTRAVENOUS EVERY 5 MIN PRN
Status: DISCONTINUED | OUTPATIENT
Start: 2025-03-17 | End: 2025-03-17 | Stop reason: HOSPADM

## 2025-03-17 RX ORDER — ONDANSETRON 2 MG/ML
INJECTION INTRAMUSCULAR; INTRAVENOUS PRN
Status: DISCONTINUED | OUTPATIENT
Start: 2025-03-17 | End: 2025-03-17

## 2025-03-17 RX ORDER — FENTANYL CITRATE 50 UG/ML
25 INJECTION, SOLUTION INTRAMUSCULAR; INTRAVENOUS EVERY 5 MIN PRN
Status: DISCONTINUED | OUTPATIENT
Start: 2025-03-17 | End: 2025-03-17 | Stop reason: HOSPADM

## 2025-03-17 RX ORDER — FENTANYL CITRATE 50 UG/ML
INJECTION, SOLUTION INTRAMUSCULAR; INTRAVENOUS PRN
Status: DISCONTINUED | OUTPATIENT
Start: 2025-03-17 | End: 2025-03-17

## 2025-03-17 RX ORDER — HEPARIN SODIUM 10000 [USP'U]/100ML
0-5000 INJECTION, SOLUTION INTRAVENOUS CONTINUOUS
Status: DISCONTINUED | OUTPATIENT
Start: 2025-03-17 | End: 2025-03-20

## 2025-03-17 RX ORDER — SODIUM CHLORIDE, SODIUM LACTATE, POTASSIUM CHLORIDE, CALCIUM CHLORIDE 600; 310; 30; 20 MG/100ML; MG/100ML; MG/100ML; MG/100ML
INJECTION, SOLUTION INTRAVENOUS CONTINUOUS PRN
Status: DISCONTINUED | OUTPATIENT
Start: 2025-03-17 | End: 2025-03-17

## 2025-03-17 RX ORDER — SODIUM CHLORIDE 9 MG/ML
75 INJECTION, SOLUTION INTRAVENOUS CONTINUOUS
Status: DISCONTINUED | OUTPATIENT
Start: 2025-03-17 | End: 2025-03-17

## 2025-03-17 RX ORDER — PROPOFOL 10 MG/ML
INJECTION, EMULSION INTRAVENOUS CONTINUOUS PRN
Status: DISCONTINUED | OUTPATIENT
Start: 2025-03-17 | End: 2025-03-17

## 2025-03-17 RX ADMIN — PHENYLEPHRINE HYDROCHLORIDE 200 MCG: 10 INJECTION INTRAVENOUS at 09:03

## 2025-03-17 RX ADMIN — LIDOCAINE 4% 1 PATCH: 40 PATCH TOPICAL at 20:30

## 2025-03-17 RX ADMIN — PHENYLEPHRINE HYDROCHLORIDE 0.5 MCG/KG/MIN: 10 INJECTION INTRAVENOUS at 09:30

## 2025-03-17 RX ADMIN — Medication 20 MG: at 09:38

## 2025-03-17 RX ADMIN — ATORVASTATIN CALCIUM 40 MG: 40 TABLET, FILM COATED ORAL at 20:30

## 2025-03-17 RX ADMIN — PHENYLEPHRINE HYDROCHLORIDE 50 MCG: 10 INJECTION INTRAVENOUS at 09:33

## 2025-03-17 RX ADMIN — PHENYLEPHRINE HYDROCHLORIDE 50 MCG: 10 INJECTION INTRAVENOUS at 09:28

## 2025-03-17 RX ADMIN — LIDOCAINE HYDROCHLORIDE 100 MG: 20 INJECTION, SOLUTION INFILTRATION; PERINEURAL at 09:00

## 2025-03-17 RX ADMIN — INSULIN GLARGINE 7 UNITS: 100 INJECTION, SOLUTION SUBCUTANEOUS at 18:39

## 2025-03-17 RX ADMIN — SODIUM CHLORIDE, SODIUM LACTATE, POTASSIUM CHLORIDE, AND CALCIUM CHLORIDE: .6; .31; .03; .02 INJECTION, SOLUTION INTRAVENOUS at 08:53

## 2025-03-17 RX ADMIN — HEPARIN SODIUM 850 UNITS/HR: 10000 INJECTION, SOLUTION INTRAVENOUS at 18:43

## 2025-03-17 RX ADMIN — PROPOFOL 100 MCG/KG/MIN: 10 INJECTION, EMULSION INTRAVENOUS at 09:17

## 2025-03-17 RX ADMIN — Medication 50 MG: at 09:04

## 2025-03-17 RX ADMIN — PROPOFOL 80 MG: 10 INJECTION, EMULSION INTRAVENOUS at 09:03

## 2025-03-17 RX ADMIN — ATORVASTATIN CALCIUM 40 MG: 40 TABLET, FILM COATED ORAL at 13:59

## 2025-03-17 RX ADMIN — TACROLIMUS 1.5 MG: 1 CAPSULE ORAL at 13:56

## 2025-03-17 RX ADMIN — PROPOFOL 100 MCG/KG/MIN: 10 INJECTION, EMULSION INTRAVENOUS at 09:08

## 2025-03-17 RX ADMIN — FENTANYL CITRATE 50 MCG: 50 INJECTION INTRAMUSCULAR; INTRAVENOUS at 08:55

## 2025-03-17 RX ADMIN — METOPROLOL SUCCINATE 75 MG: 25 TABLET, EXTENDED RELEASE ORAL at 08:46

## 2025-03-17 RX ADMIN — SODIUM BICARBONATE 650 MG TABLET 1300 MG: at 20:30

## 2025-03-17 RX ADMIN — Medication 50 MG: at 10:29

## 2025-03-17 RX ADMIN — HEPARIN SODIUM 5000 UNITS: 1000 INJECTION INTRAVENOUS; SUBCUTANEOUS at 09:57

## 2025-03-17 RX ADMIN — Medication 50 MG: at 10:25

## 2025-03-17 RX ADMIN — SODIUM CHLORIDE: 9 INJECTION, SOLUTION INTRAVENOUS at 14:04

## 2025-03-17 RX ADMIN — PROTAMINE SULFATE 30 MG: 10 INJECTION, SOLUTION INTRAVENOUS at 10:13

## 2025-03-17 RX ADMIN — INSULIN ASPART 1 UNITS: 100 INJECTION, SOLUTION INTRAVENOUS; SUBCUTANEOUS at 21:28

## 2025-03-17 RX ADMIN — FERROUS SULFATE TAB 325 MG (65 MG ELEMENTAL FE) 325 MG: 325 (65 FE) TAB at 13:59

## 2025-03-17 RX ADMIN — MYCOPHENOLATE MOFETIL 500 MG: 500 TABLET, FILM COATED ORAL at 13:58

## 2025-03-17 RX ADMIN — MYCOPHENOLATE MOFETIL 500 MG: 500 TABLET, FILM COATED ORAL at 20:30

## 2025-03-17 RX ADMIN — Medication 100 MG: at 10:34

## 2025-03-17 RX ADMIN — FENTANYL CITRATE 50 MCG: 50 INJECTION INTRAMUSCULAR; INTRAVENOUS at 09:00

## 2025-03-17 RX ADMIN — ONDANSETRON 4 MG: 2 INJECTION INTRAMUSCULAR; INTRAVENOUS at 10:15

## 2025-03-17 RX ADMIN — TACROLIMUS 2 MG: 1 CAPSULE ORAL at 18:39

## 2025-03-17 RX ADMIN — SODIUM BICARBONATE 650 MG TABLET 1300 MG: at 13:57

## 2025-03-17 RX ADMIN — LATANOPROST 1 DROP: 50 SOLUTION OPHTHALMIC at 21:26

## 2025-03-17 RX ADMIN — ASPIRIN 81 MG CHEWABLE TABLET 81 MG: 81 TABLET CHEWABLE at 13:58

## 2025-03-17 RX ADMIN — METOPROLOL SUCCINATE 75 MG: 25 TABLET, EXTENDED RELEASE ORAL at 14:00

## 2025-03-17 RX ADMIN — CEFOXITIN SODIUM 2 G: 2 POWDER, FOR SOLUTION INTRAVENOUS at 09:20

## 2025-03-17 RX ADMIN — Medication 100 MG: at 10:44

## 2025-03-17 ASSESSMENT — ACTIVITIES OF DAILY LIVING (ADL)
ADLS_ACUITY_SCORE: 75
ADLS_ACUITY_SCORE: 79
ADLS_ACUITY_SCORE: 75
ADLS_ACUITY_SCORE: 73
ADLS_ACUITY_SCORE: 75
ADLS_ACUITY_SCORE: 73
ADLS_ACUITY_SCORE: 75
ADLS_ACUITY_SCORE: 79
ADLS_ACUITY_SCORE: 79
ADLS_ACUITY_SCORE: 73
ADLS_ACUITY_SCORE: 73
ADLS_ACUITY_SCORE: 75
ADLS_ACUITY_SCORE: 73
ADLS_ACUITY_SCORE: 79
ADLS_ACUITY_SCORE: 75

## 2025-03-17 ASSESSMENT — ENCOUNTER SYMPTOMS: DYSRHYTHMIAS: 1

## 2025-03-17 NOTE — PROGRESS NOTES
[facility]    Medicine Progress Note - Hospitalist Service, GOLD TEAM 7    Date of Admission:  2/25/2025    Assessment & Plan   Tad Zaman is a 64 year old male admitted on 2/25/2025 for AMS. He has a history of diabetes, HTN, CAD, HF, and renal transplant 9/2022, left AKA, CKD.  Patient was brought in by family for AMS x 1-2 months. He has been evaluated at the [facility] multiple times in ED then was evaluated during an admission 1/20-2/21/25 with very broad workup (see below) that did not amount to any type of notable etiology of his symptoms. During this stay he was also treated for HF exacerbation, MOY, and UTI which were all stable at time of discharge. He finished course of cefdinir for e coli UTI on 2/24. He was discharged with family being told that he has failure to thrive. He was discharged home, required 24 hr cares by daughter including changing him and preparation of food. He has also has had intermittent agitation and aggression. Home health nurse stated that he was not appropriate for their program.      No clear etiology for confusion but the aggression and refusal of cares have clinically improved without intervention. Course complicated by new cyanotic areas on the R toes concerning for progression of PAD. Vascular surgery consulted, details per below for work up of PAD    Today  - vascular surgery took to OR today, not amenable for endovascular therapy.   - Will obtain stress test (ordered by vascular) and do pre-op work-up for bypass (fem-AT or PT) Tentatively scheduled for Thursday or Friday. (Ordered)  - resume heparin gtt at 1800  - daughter Jarrod updated on the phone (she is primary contact). Pt cannot consent, his slums is 6, tho he can answer some questions he gets confused.      AMS  Cognitive impairment vs encephalopathy   3/11 reseen by  psychiatry to see reg decision making capacity and was fel he needed surrogate decision  maker for consent for procedures. Presented with 1-2 months of progressive encephalopathy and agitation with aggressive behavior toward family who is caring for him at home. [facility]  -Initial LP with pleocytosis, otherwise unrevealing.   -MRI brain without acute pathology.   -Neurology consulted. Some concern for hx of viral encephalitis that has since resolved leaving him with a degree of impairment. He otherwise remains hemodynamically stable without clinical evidence of infection, no gross electrolyte abnormality, stable glucose (elevated but without acidosis), BUN is improving, tacrolimus levels was  WNL.   [facility]:   - MRI 1/29: Stable mild amount of chronic small vessel ischemia. Small chronic R cerebellar infarct.   -OT SLUMS 6/30   -EEG: nonspecific encephalopathy.   -LP: protein 173, glucose 110. Cell count with 21 WBC, 41 RBC, 52 lymph. Hazy appearance  -Encephalopathy paraneoplastic evaluation (including Purkinje, ADI, antiglial nucl antibody, NMDA, BOSSMAN) 1/30/25 negative.   -CSF flow cytometry: suboptimal specimen with low cellularity. No immunophenotypic evidence of involvement by lymphoma. [place] light chain 64.8, lambda light chain 35.1, kappa/lambda 1.85 all elevated. No monoclonals detected (1/22)  -ELP/Immunofix, serum panel: total protein low, beta 1 fraction low (1/22/25)  -Polyoma virus DNA of CSF, MAGNUS virus DNA (1/28) negative.   -Meningitis / encephalitis panel (1/28) negative  -CSF angiotensin converting enzyme (1/28) negative negative  -CMV (1/28, 1/25) negative   -BK virus (1/28) negative  -EBV (1/25) negative  -Cryptococcus (1/26) negative; Cryptococcus neoformans (1/28) negative  -Syphilis (1/25) negative  -HIV (1/25) negative  -B12 (1/18) WNL  -TSH (1/18) high side of normal 4.94  - CSF flow cytometry from 3/5/25  rare to absent B cells , no malignant cells seen   - Drug screen negative. Patient has distant history of what sounds to be mild to moderate  alcohol intake.   - needs out patient  neuropsych testing as well as op EEG if cognition worsens    - repeat MRI of brain 3/1/25 limited by motion artifact  but no evidence of acute infarct or acute intracranial pathology   - Delirium precautions as able.   - repeat LP per CAPs on 3/5 completed - repeat CSF unremarkable (negative cytometry, cytology negative),  West Nile virus negative , VDRL non reactive , aerobic and anaerobic cultures so far no growth   - He will need follow up with outpatient neurology and consider neuropsychology testing  - Per neuro: can consider EEG in outpatient setting if cognition were to worsen      Peripheral vascular disease/ PAD    S/p L BKA  Seen by vascular surgery   -Vein mapping (done 3/11/2025)  -Hold Eliquis, okay to anticoagulate with heparin drip.   -Please start aspirin 81mg if no medical contraindications exist for antiplatelet therapy, and continue with statin  -Keep right foot warm with warming blankets   -Please ensure adequate blood glucose control  -Neurovascular checks per unit protocol (patient has brisk Doppler PT and faint monophasic DP).  -Consider aspirin 81mg (started 3/13), and continue with statin (discussing with transplant neph if atorvastatin can be increased). Per Transplant neph, OK to go up to 40 atorvastatin.  - 3/17 no endovascular therapy completed 3/17. Pending stress test and potential bypass now scheduled for tentatively Thursday (3/20) or Friday (3/21)  -heparin gtt to resume at 1800 per vascular today 3/17      Renal transplant 9/2022    CKD Stage 3b   [facility] at [facility]. [facility] 1/27 renal US with doppler showed no stenosis of vessels. Resistive indices were increased, felt could be related to ureteric obstruction but not specific. Mild dilation of intrarenal collecting system and ureter was also seen. CT a/p 1/28 W/O contrast suggested mild hydro.  - Renal ultrasound 2/3/25  at [facility] showed no hydro or other abnormalities. FENA 4.3 (1/28) at [facility].   -  Creat recent baseline appears to be 1.8-2.3  - transplant neph following the patient  Recommendations:   - Continue mycophenolate 500 mg twice a day.  - Hold torsemide, calcitriol and lokelma  - Continue tacrolimus 1.5 mg QAM and 2mg at bedtime (adjusting per transplant Neph)  -Tacro level on 3/9/2025 is 3.6  - Accurate I/O and daily weight  - Will need Urology outpatient follow up for TOV and further management.    - Hold calcitriol given borderline high calcium. Will follow up calcium levels on next renal panel (ordered)  - Ordered UA and UPCR/UACR 3/3/2025 - trace leuk esterase and glucose present. No evidence of infection.   - Ordered Kidney Transplant US 3/3/2025 (routine) - mild hydronephrosis of transplanted kidney, torturous renal artery with mildly elevated velocity at the anastomosis, likely chronic.   - Check hepatitis panel (ordered for 3/4/2025) - HCV positive, known history and cured, otherwise negative.   - Accurate I/O and daily weight given recent heart failure exacerbation.  - Plan for urinary retention per below     Urinary retention  Noted to have ongoing retention requiring straight cath. Unclear whether this is a new issue for him. No significant anticholinergics on his current medication list.   - Tamsulosin 0.4mg PO initiated on 3/4; as tolerated   - Avoid anticholinergic medications as able.       Troponinemia, stable   Lateral lead T wave inversions   Atrial flutter   HFrEF last echo 2/2025 with EF 40-45%   HTN   Hx MI  Limited cardiac history available. Per patient's daughter he may have had a stent placed at 1 point but is unsure.  Patient and daughters deny any history of cardiac arrhythmia.  However on exam and on EKG evidence of atrial flutter, and patient also on Eliquis at least since December.  [facility], he was found to have heart failure exacerbation with 25-30 lbs fluid  excess. BNP during hospitalization was 2868. His Lasix was switched to Torsemide. He has been taking his medications per his daughter.   - per cardiology note I found in EPIC form 2018 he has had  medium size ischemic area  in inferior wall  ( had similar but smaller in 2013)   - BNP 31279 here.   - trop 267>>218  - EKG with T wave inversions and mild ST depressions in lateral leads  - denies recent CP or SOB, no peripheral or abdominal edema  Plan:   - TTE 2/26/25: LV function decreased w/ EF 40-45%   - No report of chest pain. Troponin trended down   - cont PTA atorvastatin 40 mg nightly  - HOLD PTA  Eliquis 5 mg BID on 3/6  but now on hold as of 3/11 and iv heparin started    - HOLD  PTA Jardiance 12.5 mg daily per cardiology pre-op  - continue PTA metoprolol 75 mg daily  - HOLD PTA torsemide 10 mg BID  - cardiology consulted and saw pt for pre-op; cleared  - He will need to arrange follow-up with [facility] after his discharge     # T2DM  Not on long-term insulin. Hemoglobin A1c 6.9% as of 7/2024. Hyperglycemia when he allowed check on 3/4. He has been amenable to glucose checks and insulin this admission.   - HOLD PTA Jardiance 12.5 mg daily  - Repeat Hemoglobin A1c 7.9  - Initiated sliding scale insulin on 3/4 with TID AC and 0200 glucose checks -> increased to HDSSI on 3/10              - Will need to discuss with family whether they feel they would be able to manage sliding scale insulin at home.   - Carb consistent diet   - Given sliding scale insulin needs but would like to simplify this on discharge and will start basal Lantus at 5 units (half of what would be calculated 0.2units/kg) and uptitrate based on fasting AM sugars. Increased to lantus 7 units 3/13     # Mild normocytic anemia  - Hg stable, no signs of bleed, monitor on AC      # Hx hepatitis C,  - s/p treatment with cure        # Glaucoma - cont PTA eyedrops  # Sickle cell trait          Diet: Snacks/Supplements Adult:  Nepro Oral Supplement; With Meals  NPO for Medical/Clinical Reasons Except for: Meds  Renal Diet (non-dialysis)    DVT Prophylaxis: heparin gtt to resume at 3/17 1800  Zaman Catheter: Not present  Lines: None     Cardiac Monitoring: None  Code Status: Full Code      Clinically Significant Risk Factors            # Hypercalcemia: corrected calcium is >10.1, will monitor as appropriate    # Hypoalbuminemia: Lowest albumin = 3 g/dL at 3/11/2025  5:55 AM, will monitor as appropriate               # DMII: A1C = 7.9 % (Ref range: <5.7 %) within past 6 months    # Severe Malnutrition: based on nutrition assessment and treatment provided per dietitian's recommendations.    # Financial/Environmental Concerns: none         Social Drivers of Health    Tobacco Use: Medium Risk (7/26/2024)    Received from [employer]    Patient History     Smoking Tobacco Use: Former     Smokeless Tobacco Use: Never          Disposition Plan     Medically Ready for Discharge: Anticipated in 2-4 Days             Sonia Pham MD  Hospitalist Service, [facility] TEAM 7  [facility]  Securely message with [employer] (more info)  Text page via [facility] Paging/Directory   See signed in provider for up to date coverage information  ______________________________________________________________________    Interval History   Seen after OR. Pt doing well with family at bedside. Was tolerating PO with no issues. Denies any pain. Is aware of bypass plan.     Physical Exam   Vital Signs: Temp: 97.5  F (36.4  C) Temp src: Axillary BP: 117/71 Pulse: 70   Resp: 16 SpO2: 100 % O2 Device: Nasal cannula Oxygen Delivery: 2 LPM  Weight: 158 lbs 8.17 oz    Gen: no acute distress, alert answering questions   HEENT: normal conjunctivae, EOMI  CV: RRR, no murmus  Pulm: CTBA, no crackles or wheezing  Extremities: s/p L AKA. DP and popliteal present on dopplers on my exam    Medical Decision Making       45 MINUTES SPENT BY ME on the  date of service doing chart review, history, exam, documentation & further activities per the note.      Data     I have personally reviewed the following data over the past 24 hrs:    7.1  \   10.5 (L)   / 285     138 106 37.0 (H) /  191 (H)   5.1 20 (L) 1.60 (H) \     ALT: N/A AST: N/A AP: N/A TBILI: N/A   ALB: 3.4 (L) TOT PROTEIN: N/A LIPASE: N/A     INR:  N/A PTT:  30   D-dimer:  N/A Fibrinogen:  N/A       Imaging results reviewed over the past 24 hrs:   No results found for this or any previous visit (from the past 24 hours).

## 2025-03-17 NOTE — PLAN OF CARE
Goal Outcome Evaluation:      Plan of Care Reviewed With: patient          Outcome Evaluation: Pt A&Ox2, disoriented to situation and place. Denies pain. Pulses intact on R foot with doppler. Heparin gtt running at 1550, stopped at 0300. NS 50 ml/hr hung at 0000. NPO since 0000. Primofit changed in AM. No BM overnight. CHG shower complete in AM. Angiogram w/ angioplasty planned for AM, report given to periop RN.

## 2025-03-17 NOTE — PROGRESS NOTES
Post-op Check    Patient is doing okay.  Pain is controlled.    On exam  NAD  Left groin access site is hemostatic without signs of bleeding  CMS intact  Left popliteal pulses palpable    I discussed with the patient and her daughter about our surgical plan of coming this Thursday.    I discussed the high risk nature of the surgery as well as more difficult surgery for him as his vessels are calcified and there is no good vein.  I explained to them there is a high risk of limb loss even after the surgery if the bypass goes down.  I tried to bypass to bypass first to the AT and then try the PT if AT is not good, and if this fails, I will try to do retrograde recannulation of AT in the popliteal artery and do angioplasty.    Explained the risks of surgery again.  I explained there is a chance of MI, death, stroke, renal failure, eventual limb loss, infection, damage to nearby structure, etc.    This is scheduled for Thursday 830.    They voiced understanding and agreed to proceed with the surgery we will obtain Lexiscan tomorrow morning.  Also obtain cardiology consultation for risk restratification given his heart failure history.    Resume heparin at 6 pm without boluses.    Nikki Lozano MD  Vascular Surgery Fellow

## 2025-03-17 NOTE — ANESTHESIA CARE TRANSFER NOTE
04/05/22 0800   Consent   Risks, benefits, and alternatives have been discussed with the patient/patient representative, and patient/patient representative agrees to proceed Yes   Medical History (complete if no H&P available, or if H&P is greater than 30 days old)   Significant medical/surgical history No   Past Complications with Sedation/Anesthesia No   Significant Family History No   Smoking History No   Alcohol/Drug abuse No   Possible Pregnancy (LMP) No  (Negative Urine Pregnancy on admission 4/4)   Cardiac History No   Respiratory History No   PHYSICAL EXAM (complete on day of procedure, regardless of whether valid H&P is present)   History and Physical Reviewed Patient has valid H&P within 30 days. I have reviewed and there are no changes.   Airway Risk History No previous history   Airway Anatomy  Class II   Heart  Normal   Lungs  Normal   LOC/Mental Status  Normal   Other Findings   Reviewed current medications and allergies Yes  (No allergy to shellfish)   Pertinent lab/diagnostic test reviewed Yes   Sedation Risk Assessment   Risk Status ASA Class I - Normal, healthy patient   Plan for Sedation Minimal Sedation   Indications for Procedure/Pre-Procedure Diagnosis and Planned Procedure Diagnostic Cerebral Angiogram with IV Sedation   EKG Monitoring Yes        Patient: Tad Zaman    Procedure: Procedure(s):  ANGIOGRAM  ANGIOPLASTY       Diagnosis: PAD (peripheral artery disease) [I73.9]  Gangrenous toe (H) [I96]  Diagnosis Additional Information: No value filed.    Anesthesia Type:   No value filed.     Note:    Oropharynx: oropharynx clear of all foreign objects  Level of Consciousness: awake  Oxygen Supplementation: nasal cannula  Level of Supplemental Oxygen (L/min / FiO2): 3  Independent Airway: airway patency satisfactory and stable  Dentition: dentition unchanged  Vital Signs Stable: post-procedure vital signs reviewed and stable  Report to RN Given: handoff report given  Patient transferred to: PACU    Handoff Report: Identifed the Patient, Identified the Reponsible Provider, Reviewed the pertinent medical history, Discussed the surgical course, Reviewed Intra-OP anesthesia mangement and issues during anesthesia, Set expectations for post-procedure period and Allowed opportunity for questions and acknowledgement of understanding    Vitals:  Vitals Value Taken Time   /79 03/17/25 1051   Temp     Pulse 75 03/17/25 1056   Resp 20 03/17/25 1056   SpO2 100 % 03/17/25 1056   Vitals shown include unfiled device data.    Electronically Signed By: RUFINO Abdullahi CRNA  March 17, 2025  10:56 AM

## 2025-03-17 NOTE — PROGRESS NOTES
I examined the patient for the ischemic left foot.  Arteriogram and indicated intervention are medically necessary for the purpose of healing and limb salvage.  Patient understands the operation, indications, risk of bleeding, infection, open conversion, vessel occlusion, limb loss, or life threatening and other complications.  Benefits of procedure outweigh these risks.  He understands and agrees to going ahead.  He has no additional questions for me to answer.

## 2025-03-17 NOTE — BRIEF OP NOTE
[facility]    Brief Operative Note    Pre-operative diagnosis: PAD (peripheral artery disease) [I73.9]  Gangrenous toe (H) [I96]  Post-operative diagnosis Same as pre-operative diagnosis    Procedure: ANGIOGRAM, Right - Leg  ANGIOPLASTY, Right - Leg    Surgeon: Surgeons and Role:     * Messi Irving MD - Primary     * Nikki Lozano MD - Assisting  Anesthesia: General   Estimated Blood Loss: Less than 10 ml    Drains: None  Specimens: * No specimens in log *  Findings:   Diffuse calcification of SFA into AK pop. P2-P3 occluded with reconstitution of AT, peroneal, and PT after trifurcation area with no P3 segment opacifying at all. Not amenable for endovascular therapy. AT and PT go all the way to the foot with AT being a larger branch .  Complications: None.  Implants: * No implants in log *    Will obtain stress test and do pre-op work-up for bypass (fem-AT or PT).  This will be tentatively scheduled for Thursday or Friday.  R DP/PT monophasic, stable from pre-op  L Palp pop, stable from pre-op    Daughter updated

## 2025-03-17 NOTE — ANESTHESIA PREPROCEDURE EVALUATION
Anesthesia Pre-Procedure Evaluation    Patient: Tad Zaman   MRN: [id number] : 1960        Procedure : Procedure(s):  ANGIOGRAM  ANGIOPLASTY          Past Medical History:   Diagnosis Date    Chronic kidney disease     Diabetes (H)     Hypertension     Myocardial infarction (H)       History reviewed. No pertinent surgical history.   Allergies   Allergen Reactions    Penicillins Hives      Social History     Tobacco Use    Smoking status: Not on file    Smokeless tobacco: Not on file   Substance Use Topics    Alcohol use: Not on file      Wt Readings from Last 1 Encounters:   03/15/25 71.9 kg (158 lb 8.2 oz)        Anesthesia Evaluation   Pt has had prior anesthetic. Type: General.        ROS/MED HX  ENT/Pulmonary:       Neurologic: Comment: encephalopathy    (+)          CVA,                      Cardiovascular:     (+)  hypertension- -  CAD -  - stent-      CHF                  dysrhythmias, a-flutter,             METS/Exercise Tolerance:     Hematologic:     (+)      anemia,          Musculoskeletal: Comment: Amputation left BKA      GI/Hepatic:  - neg GI/hepatic ROS     Renal/Genitourinary:     (+) renal disease, type: CRI,   Pt has history of transplant (),         Endo:     (+)  type II DM,                    Psychiatric/Substance Use:       Infectious Disease: Comment: UTI      Malignancy:  - neg malignancy ROS     Other:            Physical Exam    Airway        Mallampati: II   TM distance: > 3 FB   Neck ROM: full   Mouth opening: > 3 cm    Respiratory Devices and Support         Dental       (+) Edentulous      Cardiovascular          Rhythm and rate: irregular and normal     Pulmonary   pulmonary exam normal                OUTSIDE LABS:  CBC:   Lab Results   Component Value Date    WBC 7.1 2025    WBC 5.9 2025    HGB 10.5 (L) 2025    HGB 10.4 (L) 2025    HCT 34.5 (L) 2025    HCT 34.4 (L) 2025     2025     2025     BMP:   Lab  "Results   Component Value Date     03/17/2025     03/16/2025    POTASSIUM 5.1 03/17/2025    POTASSIUM 4.8 03/16/2025    CHLORIDE 106 03/17/2025    CHLORIDE 104 03/16/2025    CO2 20 (L) 03/17/2025    CO2 22 03/16/2025    BUN 37.0 (H) 03/17/2025    BUN 38.4 (H) 03/16/2025    CR 1.60 (H) 03/17/2025    CR 1.45 (H) 03/16/2025     (H) 03/17/2025     (H) 03/17/2025     COAGS:   Lab Results   Component Value Date    PTT 30 03/17/2025    INR 1.29 (H) 03/05/2025     POC: No results found for: \"BGM\", \"HCG\", \"HCGS\"  HEPATIC:   Lab Results   Component Value Date    ALBUMIN 3.4 (L) 03/17/2025    PROTTOTAL 7.7 02/25/2025    ALT 13 02/25/2025    AST 19 02/25/2025    ALKPHOS 122 02/25/2025    BILITOTAL 0.4 02/25/2025     OTHER:   Lab Results   Component Value Date    LACT 1.3 03/16/2025    A1C 7.9 (H) 03/03/2025    QUE 9.8 03/17/2025    PHOS 3.1 03/17/2025    TSH 4.23 (H) 03/05/2025    T4 1.03 03/05/2025       Anesthesia Plan    ASA Status:  4    NPO Status:  NPO Appropriate    Anesthesia Type: General.     - Airway: ETT              Consents    Anesthesia Plan(s) and associated risks, benefits, and realistic alternatives discussed. Questions answered and patient/representative(s) expressed understanding.     - Discussed: Risks, Benefits and Alternatives for BOTH SEDATION and the PROCEDURE were discussed     - Discussed with:  Patient, Spouse      - Extended Intubation/Ventilatory Support Discussed: Yes.      Use of blood products discussed: Yes.     - Discussed with: Patient.     - Consented: consented to blood products     Postoperative Care       PONV prophylaxis: Ondansetron (or other 5HT-3), Background Propofol Infusion     Comments:               Bal Jessica MD    I have reviewed the pertinent notes and labs in the chart from the past 30 days and (re)examined the patient.  Any updates or changes from those notes are reflected in this note.    Clinically Significant Risk Factors            # " Hypercalcemia: corrected calcium is >10.1, will monitor as appropriate    # Hypoalbuminemia: Lowest albumin = 3 g/dL at 3/11/2025  5:55 AM, will monitor as appropriate               # DMII: A1C = 7.9 % (Ref range: <5.7 %) within past 6 months    # Severe Malnutrition: based on nutrition assessment and treatment provided per dietitian's recommendations.    # Financial/Environmental Concerns: none

## 2025-03-17 NOTE — ANESTHESIA POSTPROCEDURE EVALUATION
Patient: Tad Zaman    Procedure: Procedure(s):  ANGIOGRAM  ANGIOPLASTY       Anesthesia Type:  No value filed.    Note:  Disposition: Inpatient   Postop Pain Control: Uneventful            Sign Out: Well controlled pain   PONV: No   Neuro/Psych: Uneventful            Sign Out: Acceptable/Baseline neuro status   Airway/Respiratory: Uneventful            Sign Out: Acceptable/Baseline resp. status   CV/Hemodynamics: Uneventful            Sign Out: Acceptable CV status; No obvious hypovolemia; No obvious fluid overload   Other NRE: NONE   DID A NON-ROUTINE EVENT OCCUR? No           Last vitals:  Vitals Value Taken Time   /79 03/17/25 1130   Temp 36.6  C (97.9  F) 03/17/25 1130   Pulse 73 03/17/25 1140   Resp 18 03/17/25 1140   SpO2 100 % 03/17/25 1140   Vitals shown include unfiled device data.    Electronically Signed By: LYNNE BAILEY MD  March 17, 2025  11:40 AM

## 2025-03-17 NOTE — PLAN OF CARE
Goal Outcome Evaluation:       VSS on RA. A&Ox3- disoriented to situation. Denies pain, N/V/D. Good appetite on shift. X1 BM. Primofit replaced and in place. Heparin gtt infusing at 1550 units/hr. NPO at midnight for procedure on 3/17. RLE pulses present with doppler. Linen changes performed.

## 2025-03-17 NOTE — ANESTHESIA PROCEDURE NOTES
Airway       Patient location during procedure: OR       Procedure Start/Stop Times: 3/17/2025 9:07 AM  Staff -        Anesthesiologist:  Bal Jessica MD       CRNA: Ryne Vila APRN CRNA       Other Anesthesia Staff: Sonia Sol       Performed By: GEOVANY and with CRNAs       Procedure performed by resident/fellow/CRNA in presence of a teaching physician.    Consent for Airway        Urgency: elective  Indications and Patient Condition       Indications for airway management: mag-procedural         Mask difficulty assessment: 1 - vent by mask    Final Airway Details       Final airway type: endotracheal airway       Successful airway: ETT - single  Endotracheal Airway Details        ETT size (mm): 7.5       Cuffed: yes       Successful intubation technique: direct laryngoscopy       DL Blade Type: Vieyra 2       Grade View of Cords: 1       Adjucts: stylet       Position: Right       Measured from: gums/teeth       Secured at (cm): 22       Bite block used: None    Post intubation assessment        Placement verified by: capnometry, equal breath sounds and chest rise        Number of attempts at approach: 1       Secured with: tape       Ease of procedure: easy       Dentition: Intact and Unchanged    Medication(s) Administered   Medication Administration Time: 3/17/2025 9:07 AM

## 2025-03-18 ENCOUNTER — APPOINTMENT (OUTPATIENT)
Dept: NUCLEAR MEDICINE | Facility: CLINIC | Age: 65
End: 2025-03-18
Attending: STUDENT IN AN ORGANIZED HEALTH CARE EDUCATION/TRAINING PROGRAM
Payer: MEDICARE

## 2025-03-18 ENCOUNTER — APPOINTMENT (OUTPATIENT)
Dept: NUCLEAR MEDICINE | Facility: CLINIC | Age: 65
DRG: 981 | End: 2025-03-18
Attending: STUDENT IN AN ORGANIZED HEALTH CARE EDUCATION/TRAINING PROGRAM
Payer: MEDICARE

## 2025-03-18 ENCOUNTER — APPOINTMENT (OUTPATIENT)
Dept: PHYSICAL THERAPY | Facility: CLINIC | Age: 65
End: 2025-03-18
Attending: STUDENT IN AN ORGANIZED HEALTH CARE EDUCATION/TRAINING PROGRAM
Payer: MEDICARE

## 2025-03-18 ENCOUNTER — APPOINTMENT (OUTPATIENT)
Dept: CARDIOLOGY | Facility: CLINIC | Age: 65
End: 2025-03-18
Attending: STUDENT IN AN ORGANIZED HEALTH CARE EDUCATION/TRAINING PROGRAM
Payer: MEDICARE

## 2025-03-18 LAB
ALBUMIN SERPL BCG-MCNC: 3.1 G/DL (ref 3.5–5.2)
ANION GAP SERPL CALCULATED.3IONS-SCNC: 11 MMOL/L (ref 7–15)
ANION GAP SERPL CALCULATED.3IONS-SCNC: 9 MMOL/L (ref 7–15)
BUN SERPL-MCNC: 38.6 MG/DL (ref 8–23)
BUN SERPL-MCNC: 38.7 MG/DL (ref 8–23)
CALCIUM SERPL-MCNC: 9.7 MG/DL (ref 8.8–10.4)
CALCIUM SERPL-MCNC: 9.8 MG/DL (ref 8.8–10.4)
CHLORIDE SERPL-SCNC: 106 MMOL/L (ref 98–107)
CHLORIDE SERPL-SCNC: 107 MMOL/L (ref 98–107)
CREAT SERPL-MCNC: 1.54 MG/DL (ref 0.67–1.17)
CREAT SERPL-MCNC: 1.56 MG/DL (ref 0.67–1.17)
EGFRCR SERPLBLD CKD-EPI 2021: 49 ML/MIN/1.73M2
EGFRCR SERPLBLD CKD-EPI 2021: 49 ML/MIN/1.73M2
ERYTHROCYTE [DISTWIDTH] IN BLOOD BY AUTOMATED COUNT: 15.2 % (ref 10–15)
GLUCOSE BLDC GLUCOMTR-MCNC: 161 MG/DL (ref 70–99)
GLUCOSE BLDC GLUCOMTR-MCNC: 173 MG/DL (ref 70–99)
GLUCOSE BLDC GLUCOMTR-MCNC: 174 MG/DL (ref 70–99)
GLUCOSE BLDC GLUCOMTR-MCNC: 230 MG/DL (ref 70–99)
GLUCOSE BLDC GLUCOMTR-MCNC: 278 MG/DL (ref 70–99)
GLUCOSE SERPL-MCNC: 179 MG/DL (ref 70–99)
GLUCOSE SERPL-MCNC: 209 MG/DL (ref 70–99)
HCO3 SERPL-SCNC: 21 MMOL/L (ref 22–29)
HCO3 SERPL-SCNC: 22 MMOL/L (ref 22–29)
HCT VFR BLD AUTO: 31.3 % (ref 40–53)
HGB BLD-MCNC: 9.6 G/DL (ref 13.3–17.7)
MCH RBC QN AUTO: 25.1 PG (ref 26.5–33)
MCHC RBC AUTO-ENTMCNC: 30.7 G/DL (ref 31.5–36.5)
MCV RBC AUTO: 82 FL (ref 78–100)
PHOSPHATE SERPL-MCNC: 3.2 MG/DL (ref 2.5–4.5)
PLATELET # BLD AUTO: 278 10E3/UL (ref 150–450)
POTASSIUM SERPL-SCNC: 5.2 MMOL/L (ref 3.4–5.3)
POTASSIUM SERPL-SCNC: 5.5 MMOL/L (ref 3.4–5.3)
RBC # BLD AUTO: 3.82 10E6/UL (ref 4.4–5.9)
SODIUM SERPL-SCNC: 138 MMOL/L (ref 135–145)
SODIUM SERPL-SCNC: 138 MMOL/L (ref 135–145)
TACROLIMUS BLD-MCNC: 4.2 UG/L (ref 5–15)
TME LAST DOSE: ABNORMAL H
TME LAST DOSE: ABNORMAL H
UFH PPP CHRO-ACNC: 0.18 IU/ML
UFH PPP CHRO-ACNC: 0.24 IU/ML
UFH PPP CHRO-ACNC: <0.1 IU/ML
WBC # BLD AUTO: 6.8 10E3/UL (ref 4–11)

## 2025-03-18 PROCEDURE — 250N000013 HC RX MED GY IP 250 OP 250 PS 637: Performed by: STUDENT IN AN ORGANIZED HEALTH CARE EDUCATION/TRAINING PROGRAM

## 2025-03-18 PROCEDURE — 97530 THERAPEUTIC ACTIVITIES: CPT | Mod: GP

## 2025-03-18 PROCEDURE — 36415 COLL VENOUS BLD VENIPUNCTURE: CPT

## 2025-03-18 PROCEDURE — 250N000012 HC RX MED GY IP 250 OP 636 PS 637: Performed by: STUDENT IN AN ORGANIZED HEALTH CARE EDUCATION/TRAINING PROGRAM

## 2025-03-18 PROCEDURE — 80069 RENAL FUNCTION PANEL: CPT | Performed by: STUDENT IN AN ORGANIZED HEALTH CARE EDUCATION/TRAINING PROGRAM

## 2025-03-18 PROCEDURE — 120N000002 HC R&B MED SURG/OB UMMC

## 2025-03-18 PROCEDURE — 93017 CV STRESS TEST TRACING ONLY: CPT

## 2025-03-18 PROCEDURE — 250N000011 HC RX IP 250 OP 636: Performed by: STUDENT IN AN ORGANIZED HEALTH CARE EDUCATION/TRAINING PROGRAM

## 2025-03-18 PROCEDURE — 78452 HT MUSCLE IMAGE SPECT MULT: CPT

## 2025-03-18 PROCEDURE — 36415 COLL VENOUS BLD VENIPUNCTURE: CPT | Performed by: STUDENT IN AN ORGANIZED HEALTH CARE EDUCATION/TRAINING PROGRAM

## 2025-03-18 PROCEDURE — A9502 TC99M TETROFOSMIN: HCPCS | Performed by: STUDENT IN AN ORGANIZED HEALTH CARE EDUCATION/TRAINING PROGRAM

## 2025-03-18 PROCEDURE — 97116 GAIT TRAINING THERAPY: CPT | Mod: GP

## 2025-03-18 PROCEDURE — 93016 CV STRESS TEST SUPVJ ONLY: CPT | Performed by: INTERNAL MEDICINE

## 2025-03-18 PROCEDURE — 36415 COLL VENOUS BLD VENIPUNCTURE: CPT | Performed by: INTERNAL MEDICINE

## 2025-03-18 PROCEDURE — 85520 HEPARIN ASSAY: CPT

## 2025-03-18 PROCEDURE — 84520 ASSAY OF UREA NITROGEN: CPT

## 2025-03-18 PROCEDURE — 78452 HT MUSCLE IMAGE SPECT MULT: CPT | Mod: 26 | Performed by: INTERNAL MEDICINE

## 2025-03-18 PROCEDURE — 85014 HEMATOCRIT: CPT | Performed by: STUDENT IN AN ORGANIZED HEALTH CARE EDUCATION/TRAINING PROGRAM

## 2025-03-18 PROCEDURE — 80197 ASSAY OF TACROLIMUS: CPT | Performed by: STUDENT IN AN ORGANIZED HEALTH CARE EDUCATION/TRAINING PROGRAM

## 2025-03-18 PROCEDURE — 343N000001 HC RX 343 MED OP 636: Performed by: STUDENT IN AN ORGANIZED HEALTH CARE EDUCATION/TRAINING PROGRAM

## 2025-03-18 PROCEDURE — 99233 SBSQ HOSP IP/OBS HIGH 50: CPT

## 2025-03-18 PROCEDURE — 93018 CV STRESS TEST I&R ONLY: CPT | Performed by: INTERNAL MEDICINE

## 2025-03-18 PROCEDURE — 85520 HEPARIN ASSAY: CPT | Performed by: STUDENT IN AN ORGANIZED HEALTH CARE EDUCATION/TRAINING PROGRAM

## 2025-03-18 PROCEDURE — 82310 ASSAY OF CALCIUM: CPT | Performed by: STUDENT IN AN ORGANIZED HEALTH CARE EDUCATION/TRAINING PROGRAM

## 2025-03-18 PROCEDURE — 85520 HEPARIN ASSAY: CPT | Performed by: INTERNAL MEDICINE

## 2025-03-18 PROCEDURE — 82310 ASSAY OF CALCIUM: CPT

## 2025-03-18 RX ORDER — REGADENOSON 0.08 MG/ML
0.4 INJECTION, SOLUTION INTRAVENOUS ONCE
Status: DISCONTINUED | OUTPATIENT
Start: 2025-03-18 | End: 2025-03-18

## 2025-03-18 RX ADMIN — TACROLIMUS 2 MG: 1 CAPSULE ORAL at 17:33

## 2025-03-18 RX ADMIN — ATORVASTATIN CALCIUM 40 MG: 40 TABLET, FILM COATED ORAL at 20:28

## 2025-03-18 RX ADMIN — HEPARIN SODIUM 1450 UNITS/HR: 10000 INJECTION, SOLUTION INTRAVENOUS at 20:31

## 2025-03-18 RX ADMIN — TACROLIMUS 1.5 MG: 1 CAPSULE ORAL at 09:02

## 2025-03-18 RX ADMIN — HEPARIN SODIUM 1300 UNITS/HR: 10000 INJECTION, SOLUTION INTRAVENOUS at 17:31

## 2025-03-18 RX ADMIN — INSULIN GLARGINE 7 UNITS: 100 INJECTION, SOLUTION SUBCUTANEOUS at 17:34

## 2025-03-18 RX ADMIN — TETROFOSMIN 10.8 MILLICURIE: 1.38 INJECTION, POWDER, LYOPHILIZED, FOR SOLUTION INTRAVENOUS at 09:30

## 2025-03-18 RX ADMIN — MYCOPHENOLATE MOFETIL 500 MG: 500 TABLET, FILM COATED ORAL at 20:28

## 2025-03-18 RX ADMIN — ASPIRIN 81 MG CHEWABLE TABLET 81 MG: 81 TABLET CHEWABLE at 09:02

## 2025-03-18 RX ADMIN — TAMSULOSIN HYDROCHLORIDE 0.4 MG: 0.4 CAPSULE ORAL at 09:02

## 2025-03-18 RX ADMIN — PANTOPRAZOLE SODIUM 40 MG: 40 TABLET, DELAYED RELEASE ORAL at 09:03

## 2025-03-18 RX ADMIN — LIDOCAINE 4% 1 PATCH: 40 PATCH TOPICAL at 20:28

## 2025-03-18 RX ADMIN — SODIUM BICARBONATE 650 MG TABLET 1300 MG: at 20:28

## 2025-03-18 RX ADMIN — INSULIN ASPART 2 UNITS: 100 INJECTION, SOLUTION INTRAVENOUS; SUBCUTANEOUS at 22:11

## 2025-03-18 RX ADMIN — TETROFOSMIN 41 MILLICURIE: 1.38 INJECTION, POWDER, LYOPHILIZED, FOR SOLUTION INTRAVENOUS at 11:00

## 2025-03-18 RX ADMIN — SODIUM BICARBONATE 650 MG TABLET 1300 MG: at 09:02

## 2025-03-18 RX ADMIN — HEPARIN SODIUM 1300 UNITS/HR: 10000 INJECTION, SOLUTION INTRAVENOUS at 12:13

## 2025-03-18 RX ADMIN — FERROUS SULFATE TAB 325 MG (65 MG ELEMENTAL FE) 325 MG: 325 (65 FE) TAB at 09:03

## 2025-03-18 RX ADMIN — MYCOPHENOLATE MOFETIL 500 MG: 500 TABLET, FILM COATED ORAL at 09:03

## 2025-03-18 RX ADMIN — REGADENOSON 0.4 MG: 0.08 INJECTION, SOLUTION INTRAVENOUS at 10:57

## 2025-03-18 RX ADMIN — LATANOPROST 1 DROP: 50 SOLUTION OPHTHALMIC at 20:28

## 2025-03-18 ASSESSMENT — ACTIVITIES OF DAILY LIVING (ADL)
ADLS_ACUITY_SCORE: 73

## 2025-03-18 NOTE — PROGRESS NOTES
[facility]    Medicine Progress Note - Hospitalist Service, GOLD TEAM 7    Date of Admission:  2/25/2025    Assessment & Plan      Tad Zaman is a 64 year old male admitted on 2/25/2025 for AMS. He has a history of diabetes, HTN, CAD, HF, and renal transplant 9/2022, left AKA, CKD.  Patient was brought in by family for AMS x 1-2 months. He has been evaluated at the [facility] multiple times in ED then was evaluated during an admission 1/20-2/21/25 with very broad workup (see below) that did not amount to any type of notable etiology of his symptoms. During this stay he was also treated for HF exacerbation, MOY, and UTI which were all stable at time of discharge. He finished course of cefdinir for e coli UTI on 2/24. He was discharged with family being told that he has failure to thrive. He was discharged home, required 24 hr cares by daughter including changing him and preparation of food. He has also has had intermittent agitation and aggression. Home health nurse stated that he was not appropriate for their program.      No clear etiology for confusion but the aggression and refusal of cares have clinically improved without intervention. Course complicated by new cyanotic areas on the R toes concerning for progression of PAD. Vascular surgery consulted, details per below for work up of PAD     Today  - OR with vascular surgery on Thursday at 8:30 AM   - stress test this morning  - discussed with cards -- cards completed pre-op risk assessment 3/13. Given no significant changes from cardiac perspective since then, new cards consult was cancelled. Paged vascular for awareness  - continue IV heparin gtt   - K 5.5 this morning, recheck K 5.2 without specific intervention   - FYI Pt cannot consent, his slums is 6. Able to answer some questions     AMS  Cognitive impairment vs encephalopathy   3/11 reseen by  psychiatry to see reg decision making capacity and was  fel he needed surrogate decision maker for consent for procedures. Presented with 1-2 months of progressive encephalopathy and agitation with aggressive behavior toward family who is caring for him at home. [facility]  -Initial LP with pleocytosis, otherwise unrevealing.   -MRI brain without acute pathology.   -Neurology consulted. Some concern for hx of viral encephalitis that has since resolved leaving him with a degree of impairment. He otherwise remains hemodynamically stable without clinical evidence of infection, no gross electrolyte abnormality, stable glucose (elevated but without acidosis), BUN is improving, tacrolimus levels was  WNL.   [facility]:   - MRI 1/29: Stable mild amount of chronic small vessel ischemia. Small chronic R cerebellar infarct.   -OT SLUMS 6/30   -EEG: nonspecific encephalopathy.   -LP: protein 173, glucose 110. Cell count with 21 WBC, 41 RBC, 52 lymph. Hazy appearance  -Encephalopathy paraneoplastic evaluation (including Purkinje, ADI, antiglial nucl antibody, NMDA, BOSSMAN) 1/30/25 negative.   -CSF flow cytometry: suboptimal specimen with low cellularity. No immunophenotypic evidence of involvement by lymphoma. [place] light chain 64.8, lambda light chain 35.1, kappa/lambda 1.85 all elevated. No monoclonals detected (1/22)  -ELP/Immunofix, serum panel: total protein low, beta 1 fraction low (1/22/25)  -Polyoma virus DNA of CSF, MAGNUS virus DNA (1/28) negative.   -Meningitis / encephalitis panel (1/28) negative  -CSF angiotensin converting enzyme (1/28) negative negative  -CMV (1/28, 1/25) negative   -BK virus (1/28) negative  -EBV (1/25) negative  -Cryptococcus (1/26) negative; Cryptococcus neoformans (1/28) negative  -Syphilis (1/25) negative  -HIV (1/25) negative  -B12 (1/18) WNL  -TSH (1/18) high side of normal 4.94  - CSF flow cytometry from 3/5/25  rare to absent B cells , no malignant cells seen   - Drug screen negative. Patient has distant history of what  sounds to be mild to moderate alcohol intake.   - needs out patient  neuropsych testing as well as op EEG if cognition worsens    - repeat MRI of brain 3/1/25 limited by motion artifact  but no evidence of acute infarct or acute intracranial pathology   - Delirium precautions as able.   - repeat LP per CAPs on 3/5 completed - repeat CSF unremarkable (negative cytometry, cytology negative),  West Nile virus negative , VDRL non reactive , aerobic and anaerobic cultures so far no growth   - He will need follow up with outpatient neurology and consider neuropsychology testing  - Per neuro: can consider EEG in outpatient setting if cognition were to worsen      Peripheral vascular disease/ PAD    S/p L BKA  Seen by vascular surgery   -Vein mapping (done 3/11/2025)  -Hold Eliquis, okay to anticoagulate with heparin drip.   -Please start aspirin 81mg if no medical contraindications exist for antiplatelet therapy, and continue with statin  -Keep right foot warm with warming blankets   -Please ensure adequate blood glucose control  -Neurovascular checks per unit protocol (patient has brisk Doppler PT and faint monophasic DP).  -Consider aspirin 81mg (started 3/13), and continue with statin (discussing with transplant neph if atorvastatin can be increased). Per Transplant neph, OK to go up to 40 atorvastatin.  - 3/17 no endovascular therapy completed 3/17.   - follow-up stress test  - OR with vascular surgery on 3/20   -continue heparin gtt       Renal transplant 9/2022    CKD Stage 3b   [facility] the [facility]. [facility]. 1/27 renal US with doppler showed no stenosis of vessels. Resistive indices were increased, felt could be related to ureteric obstruction but not specific. Mild dilation of intrarenal collecting system and ureter was also seen. CT a/p 1/28 W/O contrast suggested mild hydro.  - [facility]/3/25 at [facility] showed no hydro or other  abnormalities. FENA 4.3 (1/28) at [facility].   -  Creat recent baseline appears to be 1.8-2.3  - transplant neph following the patient  Recommendations:   - Continue mycophenolate 500 mg twice a day.  - Hold torsemide, calcitriol and lokelma  - Continue tacrolimus 1.5 mg QAM and 2mg at bedtime (adjusting per transplant Neph)  -Tacro level on 3/9/2025 is 3.6  - Accurate I/O and daily weight  - Will need Urology outpatient follow up for TOV and further management.    - Hold calcitriol given borderline high calcium. Will follow up calcium levels on next renal panel (ordered)  - Ordered UA and UPCR/UACR 3/3/2025 - trace leuk esterase and glucose present. No evidence of infection.   - Ordered Kidney Transplant US 3/3/2025 (routine) - mild hydronephrosis of transplanted kidney, torturous renal artery with mildly elevated velocity at the anastomosis, likely chronic.   - Check hepatitis panel (ordered for 3/4/2025) - HCV positive, known history and cured, otherwise negative.   - Accurate I/O and daily weight given recent heart failure exacerbation.  - Plan for urinary retention per below     Urinary retention  Noted to have ongoing retention requiring straight cath. Unclear whether this is a new issue for him. No significant anticholinergics on his current medication list.   - Tamsulosin 0.4mg PO initiated on 3/4; as tolerated   - Avoid anticholinergic medications as able.       Troponinemia, stable   Lateral lead T wave inversions   Atrial flutter   HFrEF last echo 2/2025 with EF 40-45%   HTN   Hx MI  Limited cardiac history available. Per patient's daughter he may have had a stent placed at 1 point but is unsure.  Patient and daughters deny any history of cardiac arrhythmia.  However on exam and on EKG evidence of atrial flutter, and patient also on Eliquis at least since December.  [facility], he was found to have heart failure exacerbation with 25-30 lbs fluid excess. BNP during hospitalization  was 2868. His Lasix was switched to Torsemide. He has been taking his medications per his daughter.   - per cardiology note I found in EPIC form 2018 he has had  medium size ischemic area  in inferior wall  ( had similar but smaller in 2013)   - BNP 04075 here.   - trop 267>>218  - EKG with T wave inversions and mild ST depressions in lateral leads  - denies recent CP or SOB, no peripheral or abdominal edema  Plan:   - TTE 2/26/25: LV function decreased w/ EF 40-45%   - No report of chest pain. Troponin trended down   - cont PTA atorvastatin 40 mg nightly  - HOLD PTA  Eliquis 5 mg BID on 3/6  but now on hold as of 3/11 and iv heparin started    - HOLD  PTA Jardiance 12.5 mg daily per cardiology pre-op  - continue PTA metoprolol 75 mg daily  - HOLD PTA torsemide 10 mg BID  - cardiology consulted and saw pt for pre-op; cleared  - He will need to arrange follow-up with [facility] after his discharge     # T2DM  Not on long-term insulin. Hemoglobin A1c 6.9% as of 7/2024. Hyperglycemia when he allowed check on 3/4. He has been amenable to glucose checks and insulin this admission.   - HOLD PTA Jardiance 12.5 mg daily  - Repeat Hemoglobin A1c 7.9  - Initiated sliding scale insulin on 3/4 with TID AC and 0200 glucose checks -> increased to HDSSI on 3/10              - Will need to discuss with family whether they feel they would be able to manage sliding scale insulin at home.   - Carb consistent diet   - Given sliding scale insulin needs but would like to simplify this on discharge and will start basal Lantus at 5 units (half of what would be calculated 0.2units/kg) and uptitrate based on fasting AM sugars. Increased to lantus 7 units 3/13     # Mild normocytic anemia  - Hg stable, no signs of bleed, monitor on AC      # Hx hepatitis C,  - s/p treatment with cure        # Glaucoma - cont PTA eyedrops  # Sickle cell trait           Diet: Snacks/Supplements Adult: Nepro Oral Supplement; With  Meals  NPO for Medical/Clinical Reasons Except for: Meds  Renal Diet (non-dialysis)    DVT Prophylaxis: heparin gtt to resume at 3/17 1800  Zaman Catheter: Not present  Lines: None     Cardiac Monitoring: None  Code Status: Full Code          Diet: Snacks/Supplements Adult: Nepro Oral Supplement; With Meals  Regular Diet Adult    DVT Prophylaxis: heparin gtt   Zaman Catheter: Not present  Lines: None     Cardiac Monitoring: None  Code Status: Full Code      Clinically Significant Risk Factors        # Hyperkalemia: Highest K = 5.5 mmol/L in last 2 days, will monitor as appropriate     # Hypercalcemia: corrected calcium is >10.1, will monitor as appropriate    # Hypoalbuminemia: Lowest albumin = 3 g/dL at 3/11/2025  5:55 AM, will monitor as appropriate               # DMII: A1C = 7.9 % (Ref range: <5.7 %) within past 6 months    # Severe Malnutrition: based on nutrition assessment and treatment provided per dietitian's recommendations.    # Financial/Environmental Concerns: none         Social Drivers of Health    Tobacco Use: Medium Risk (7/26/2024)    Received from [facility]    Patient History     Smoking Tobacco Use: Former     Smokeless Tobacco Use: Never          Disposition Plan     Medically Ready for Discharge: Anticipated in 5+ Days             Roxann Lopez MD  Hospitalist Service, [facility] TEAM 7  [facility]  Securely message with [employer] (more info)  Text page via [employer] Paging/Directory   See signed in provider for up to date coverage information  ______________________________________________________________________    Interval History   NAEO  Patient seen after stress test. Overall feels ready for surgery on Thursday. No new concerns or questions at this time    Physical Exam   Vital Signs: Temp: 98  F (36.7  C) Temp src: Axillary BP: 99/61 Pulse: 69   Resp: 16 SpO2: 99 % O2 Device: None (Room air) Oxygen Delivery: 2 LPM  Weight: 158 lbs 8.17  oz    General: awake, alert, non-toxic appearing, no acute distress   HEENT: normocephalic, atraumatic with moist mucous membranes  Respiratory: comfortable conversational breathing on room air, no increased work of breathing. Clear to auscultation in all lobes bilaterally.   Cardiac: warm and well perfused extremities. Regular rate and rhythm.   Abdomen: Non-distended. Soft, non-tender to palpation   Extremities: L leg AKA. R leg 1+ KM     Medical Decision Making       40 MINUTES SPENT BY ME on the date of service doing chart review, history, exam, documentation & further activities per the note.      Data     I have personally reviewed the following data over the past 24 hrs:    6.8  \   9.6 (L)   / 278     138 106 38.6 (H) /  230 (H)   5.2 21 (L) 1.54 (H) \     ALT: N/A AST: N/A AP: N/A TBILI: N/A   ALB: 3.1 (L) TOT PROTEIN: N/A LIPASE: N/A       Imaging results reviewed over the past 24 hrs:   Recent Results (from the past 24 hours)   NM MPI w Lexiscan   Result Value    Target     Baseline Systolic     Baseline Diastolic BP 76    Last Stress Systolic BP 93    Last Stress Diastolic BP 58    Baseline HR 67    Max HR  78    Max Predicted HR  51    Rate Pressure Product 7,254.0   NM MPI Radiologist Consult For Cardiology    Narrative    EXAMINATION: NM MPI RADIOLOGIST CONSULT FOR CARDIOLOGY, 3/18/2025  11:59 AM     COMPARISON: Chest radiograph 2/25/2025. CT abdomen/pelvis 1/28/2025.    HISTORY: 64-year-old with heart failure, coronary artery disease,  hypertension, diabetes and chronic kidney disease. Significant  peripheral artery disease resulting in left above-the-knee amputation.  Concern for progression of peripheral artery disease in the right  lower extremity. Exam requested as preoperative workup for lower  extremity artery bypass.    TECHNIQUE: Limited field of view 5 mm CT images were acquired for  attenuation correction purposes for nuclear medicine cardiac study.  Radiology consulted to  review the CT images. Please refer to separate  dictation for the nuclear medicine portion of the study.    FINDINGS:     Cardiomegaly with multivessel atherosclerotic coronary artery  calcifications. No pericardial effusion. No significant coronary  artery atherosclerotic calcifications. Normal caliber of the  visualized thoracic aorta and main pulmonary artery. The visualized  mediastinal and hilar lymph nodes are not enlarged.    The visualized lungs are clear. No pleural effusion or pneumothorax at  this level.     Extensive atherosclerotic calcifications visualized in the upper  abdominal vasculature. Atrophic native kidneys. Evaluation of the  solid organs is limited by lack of IV contrast. No acute osseous  lesions of the visualized thorax.      Impression    IMPRESSION:  1. No acute abnormality on the CT images of the lower chest and upper  abdomen.  2. Cardiomegaly with multivessel atherosclerotic coronary artery  calcifications.  3. Please see separate report for the nuclear medicine portion of the  study.    I have personally reviewed the examination and initial interpretation  and I agree with the findings.    ALIYA DODD MD         SYSTEM ID:  [id number]

## 2025-03-18 NOTE — PLAN OF CARE
Goal Outcome Evaluation:      Plan of Care Reviewed With: patient    Overall Patient Progress: no changeOverall Patient Progress: no change    Outcome Evaluation: Pt dis to time ON. Int dis to place. Hx of L BKA with prosthetic in the room. Vasc surgery attempted angioplasty of RLE yesterday but pt not amendable to endovascular therapy. Pt is now NPO for planned stress test today at 09:30am for pre op w/u for bypass. Scored SLUMS of 6, pt daughter Jarrod has been making decisions for pt. Doppler q4 on RLE. L PIV infusing hep gtt at new increased rate of 1,150U/hr. Next 10A ordered for 8am. Sacral mepi in place preventatitvely. R hand wound is opne to air, gets WC BID. Pt incont intermittenlty of bowel and bladder. Last BM 3/17, primofit in use ON. Will continue to monitor the pt and follow POC.

## 2025-03-18 NOTE — PLAN OF CARE
Shift Hours: 0700 - 1900    Assessment:  Body systems assessments were at patient's baseline. Pt is more orientated per his family. He was able to answer orientation questions X4 but is forgetful,         Activity     Fall Risk Score: 95   Bed alarm on? Yes     Activity Assistance Provided: assistance, 1 person      Assistive Device Utilized: walker, gait belt    Pain: denies pain    Labs/RN Managed Protocols: on heparin drip, not therapeutic this morning, at stress test and so when returned increased from 1150 units per hour to 1300 units per hour with hep 10 A level recheck due at 1830    Lines/Drains: PIV infusing heparin    Nutrition: regular diet, poor to fair po intake    Voided 200, bladder scan for 971, straight cathed for 850 at 1800    Goal Outcome Evaluation  Pt had stress test today. Deciding if able to have surgery on Thursday. Dopplers on right foot able to be heard- marked with marker, assess q 4 hours    Barriers to Discharge:   Plan and clearance for OR procedure

## 2025-03-18 NOTE — PLAN OF CARE
Nursing Care Plan Note:    Assumed care 0700 to 2330  /63   Pulse 64   Temp 98.2  F (36.8  C) (Oral)   Resp 18   Ht 1.829 m (6')   Wt 71.9 kg (158 lb 8.2 oz)   SpO2 100%   BMI 21.50 kg/m      Active/Admitting Problems:  altered mental status and weakness  Pt rounded on hourly  [place]:  alert and oriented x4 forgetful at times   Pain:  denies lidocaine patch on upper back   GI/:  Denies nausea. Bowel sounds present. Pt has urinary retention pt able to void 250, but had 422 on post void bladder scan. Pt had BM external catheter placed and pt dia to void 200 ml more  How Pt Takes Meds:  oral  Cardiac:  WDL  Respiratory:  denies SOB lung sounds clear bilaterally  Skin:  L groin site covered with primapore, R foot poor perfusion, and L BKA  Lines:  PIV infusing heparin 850 units/hr started at 1840, Heparin XA lab ordered for 0030 3/18  Labs: BG at bed was 217  Activity/mobility:  1A GB and walker   Events:  angiogram and angioplasty completed today   Plan:  stress test tomorrow at 0930 pt needs to be caffeine free 12 hours prior and NPO 4hrs prior.     Call light in reach, Pt able to make needs known, Will continue to monitor and follow plan of care.     Goal Outcome Evaluation:      Plan of Care Reviewed With: patient    Overall Patient Progress: improvingOverall Patient Progress: improving    Outcome Evaluation: Pt had angiogram and angioplasty this shift.

## 2025-03-18 NOTE — OP NOTE
[facility]  Operative Note    Pre-operative diagnosis: PAD (peripheral artery disease) [I73.9]  Gangrenous toe (H) [I96]   Post-operative diagnosis same   Procedure: 3rd order selective catheterization via L common femoral artery to right distal superficial femoral artery, Carbon Dioxide radiologic supervision and interpretation of left lower extremity subtraction arteriogram, Perclose Proglide repair of left femoral artery   Surgeon: Messi Irving MD   Assistants(s): Nikki Lozano MD   Anesthesia: General    Estimated blood loss: None    Total IV fluids: (See anesthesia record)   Blood transfusion: No transfusion was given during surgery   Total urine output: (See anesthesia record)   Drains: None   Specimens: None   Implants: None     Findings:   Right common femoral (CFA) and profunda femoris normal.   Right most distal external iliac normal.  Right superficial femoral artery (SFA) patent but with severe closely spaced beads of calcification.  Right proximal, mid, distal popliteal occluded.  Right posterior tibial, peroneal, anterior tibial patent, with anterior tibial being the main perfusion of the foot, via a good-sized DP artery.  Satisfactory anatomy for right common femoral to anterior tibial bypass.  Inflow via SFA will not be satisfactory.  Angio of left femoral shows entry was to CFA and OK for closure device deployment.  Closure device is hemostatic.   Complications: None.   Condition: Stable               Description of procedure:  In the hybrid OR, supine position, under general anesthesia, prep and drape and timeout checklist were done.  Ultrasound guided needle entry to left CFA was followed by guidewire and sizing up to 5F shealth, then omniflush catheter over Bentsen wire, and selection of right iliac.  After heparin, and without contrast, we were able to guide wire and catheter to external iliac just above pelvis.  CO2 injections were made with imaging in  proximal, then distal thigh.  Straight tipped catheter was then advanced over wire to distal thigh, then CO2 injections made in various projections to show the entire knee, calf, and foot arteries, assisted by half strength hand contrast injections for complete assessment.  Hardware was withdrawn except wire, and Perclose deployed.  Patient left for PACU is stable condition.

## 2025-03-19 ENCOUNTER — ANESTHESIA EVENT (OUTPATIENT)
Dept: SURGERY | Facility: CLINIC | Age: 65
End: 2025-03-19
Payer: MEDICARE

## 2025-03-19 LAB
ALBUMIN SERPL BCG-MCNC: 3.1 G/DL (ref 3.5–5.2)
ANION GAP SERPL CALCULATED.3IONS-SCNC: 10 MMOL/L (ref 7–15)
BACTERIA CSF CULT: NORMAL
BUN SERPL-MCNC: 37.5 MG/DL (ref 8–23)
CALCIUM SERPL-MCNC: 9.3 MG/DL (ref 8.8–10.4)
CHLORIDE SERPL-SCNC: 107 MMOL/L (ref 98–107)
CREAT SERPL-MCNC: 1.52 MG/DL (ref 0.67–1.17)
CV STRESS MAX HR HE: 78
EGFRCR SERPLBLD CKD-EPI 2021: 50 ML/MIN/1.73M2
ERYTHROCYTE [DISTWIDTH] IN BLOOD BY AUTOMATED COUNT: 15.4 % (ref 10–15)
GLUCOSE BLDC GLUCOMTR-MCNC: 149 MG/DL (ref 70–99)
GLUCOSE BLDC GLUCOMTR-MCNC: 214 MG/DL (ref 70–99)
GLUCOSE BLDC GLUCOMTR-MCNC: 231 MG/DL (ref 70–99)
GLUCOSE BLDC GLUCOMTR-MCNC: 232 MG/DL (ref 70–99)
GLUCOSE BLDC GLUCOMTR-MCNC: 260 MG/DL (ref 70–99)
GLUCOSE SERPL-MCNC: 209 MG/DL (ref 70–99)
HCO3 SERPL-SCNC: 21 MMOL/L (ref 22–29)
HCT VFR BLD AUTO: 28.9 % (ref 40–53)
HGB BLD-MCNC: 8.8 G/DL (ref 13.3–17.7)
MAGNESIUM SERPL-MCNC: 1.8 MG/DL (ref 1.7–2.3)
MCH RBC QN AUTO: 25.1 PG (ref 26.5–33)
MCHC RBC AUTO-ENTMCNC: 30.4 G/DL (ref 31.5–36.5)
MCV RBC AUTO: 83 FL (ref 78–100)
PHOSPHATE SERPL-MCNC: 2.5 MG/DL (ref 2.5–4.5)
PLATELET # BLD AUTO: 269 10E3/UL (ref 150–450)
POTASSIUM SERPL-SCNC: 4.9 MMOL/L (ref 3.4–5.3)
RATE PRESSURE PRODUCT: 7254
RBC # BLD AUTO: 3.5 10E6/UL (ref 4.4–5.9)
SODIUM SERPL-SCNC: 138 MMOL/L (ref 135–145)
STRESS ECHO BASELINE DIASTOLIC HE: 76
STRESS ECHO BASELINE HR: 67 BPM
STRESS ECHO BASELINE SYSTOLIC BP: 118
STRESS ECHO CALCULATED PERCENT HR: 51 %
STRESS ECHO LAST STRESS DIASTOLIC BP: 58
STRESS ECHO LAST STRESS SYSTOLIC BP: 93
STRESS ECHO TARGET HR: 154
TACROLIMUS BLD-MCNC: 3 UG/L (ref 5–15)
TME LAST DOSE: ABNORMAL H
TME LAST DOSE: ABNORMAL H
UFH PPP CHRO-ACNC: 0.3 IU/ML
UFH PPP CHRO-ACNC: 0.39 IU/ML
WBC # BLD AUTO: 7.1 10E3/UL (ref 4–11)

## 2025-03-19 PROCEDURE — 82040 ASSAY OF SERUM ALBUMIN: CPT | Performed by: STUDENT IN AN ORGANIZED HEALTH CARE EDUCATION/TRAINING PROGRAM

## 2025-03-19 PROCEDURE — 250N000013 HC RX MED GY IP 250 OP 250 PS 637

## 2025-03-19 PROCEDURE — 250N000013 HC RX MED GY IP 250 OP 250 PS 637: Performed by: STUDENT IN AN ORGANIZED HEALTH CARE EDUCATION/TRAINING PROGRAM

## 2025-03-19 PROCEDURE — 85041 AUTOMATED RBC COUNT: CPT

## 2025-03-19 PROCEDURE — 250N000012 HC RX MED GY IP 250 OP 636 PS 637: Performed by: STUDENT IN AN ORGANIZED HEALTH CARE EDUCATION/TRAINING PROGRAM

## 2025-03-19 PROCEDURE — 999N000127 HC STATISTIC PERIPHERAL IV START W US GUIDANCE

## 2025-03-19 PROCEDURE — 99233 SBSQ HOSP IP/OBS HIGH 50: CPT

## 2025-03-19 PROCEDURE — 250N000011 HC RX IP 250 OP 636: Performed by: STUDENT IN AN ORGANIZED HEALTH CARE EDUCATION/TRAINING PROGRAM

## 2025-03-19 PROCEDURE — 99231 SBSQ HOSP IP/OBS SF/LOW 25: CPT | Performed by: NURSE PRACTITIONER

## 2025-03-19 PROCEDURE — 82310 ASSAY OF CALCIUM: CPT | Performed by: STUDENT IN AN ORGANIZED HEALTH CARE EDUCATION/TRAINING PROGRAM

## 2025-03-19 PROCEDURE — 85520 HEPARIN ASSAY: CPT | Performed by: STUDENT IN AN ORGANIZED HEALTH CARE EDUCATION/TRAINING PROGRAM

## 2025-03-19 PROCEDURE — 36415 COLL VENOUS BLD VENIPUNCTURE: CPT | Performed by: STUDENT IN AN ORGANIZED HEALTH CARE EDUCATION/TRAINING PROGRAM

## 2025-03-19 PROCEDURE — 83735 ASSAY OF MAGNESIUM: CPT

## 2025-03-19 PROCEDURE — 85014 HEMATOCRIT: CPT

## 2025-03-19 PROCEDURE — 120N000002 HC R&B MED SURG/OB UMMC

## 2025-03-19 PROCEDURE — 80197 ASSAY OF TACROLIMUS: CPT | Performed by: STUDENT IN AN ORGANIZED HEALTH CARE EDUCATION/TRAINING PROGRAM

## 2025-03-19 RX ORDER — TORSEMIDE 10 MG/1
10 TABLET ORAL ONCE
Status: COMPLETED | OUTPATIENT
Start: 2025-03-19 | End: 2025-03-19

## 2025-03-19 RX ADMIN — TACROLIMUS 1.5 MG: 1 CAPSULE ORAL at 10:29

## 2025-03-19 RX ADMIN — MYCOPHENOLATE MOFETIL 500 MG: 500 TABLET, FILM COATED ORAL at 20:10

## 2025-03-19 RX ADMIN — HEPARIN SODIUM 1450 UNITS/HR: 10000 INJECTION, SOLUTION INTRAVENOUS at 10:36

## 2025-03-19 RX ADMIN — TORSEMIDE 10 MG: 10 TABLET ORAL at 14:44

## 2025-03-19 RX ADMIN — TACROLIMUS 2 MG: 1 CAPSULE ORAL at 18:32

## 2025-03-19 RX ADMIN — SODIUM BICARBONATE 650 MG TABLET 1300 MG: at 10:29

## 2025-03-19 RX ADMIN — ATORVASTATIN CALCIUM 40 MG: 40 TABLET, FILM COATED ORAL at 20:10

## 2025-03-19 RX ADMIN — FERROUS SULFATE TAB 325 MG (65 MG ELEMENTAL FE) 325 MG: 325 (65 FE) TAB at 10:29

## 2025-03-19 RX ADMIN — ASPIRIN 81 MG CHEWABLE TABLET 81 MG: 81 TABLET CHEWABLE at 10:29

## 2025-03-19 RX ADMIN — LATANOPROST 1 DROP: 50 SOLUTION OPHTHALMIC at 20:10

## 2025-03-19 RX ADMIN — SODIUM BICARBONATE 650 MG TABLET 1300 MG: at 20:10

## 2025-03-19 RX ADMIN — MYCOPHENOLATE MOFETIL 500 MG: 500 TABLET, FILM COATED ORAL at 10:29

## 2025-03-19 RX ADMIN — PANTOPRAZOLE SODIUM 40 MG: 40 TABLET, DELAYED RELEASE ORAL at 10:29

## 2025-03-19 RX ADMIN — TAMSULOSIN HYDROCHLORIDE 0.4 MG: 0.4 CAPSULE ORAL at 10:29

## 2025-03-19 ASSESSMENT — ACTIVITIES OF DAILY LIVING (ADL)
ADLS_ACUITY_SCORE: 69
ADLS_ACUITY_SCORE: 67
ADLS_ACUITY_SCORE: 67
ADLS_ACUITY_SCORE: 69
ADLS_ACUITY_SCORE: 69
ADLS_ACUITY_SCORE: 73
ADLS_ACUITY_SCORE: 69
ADLS_ACUITY_SCORE: 73
ADLS_ACUITY_SCORE: 69
ADLS_ACUITY_SCORE: 67
ADLS_ACUITY_SCORE: 69
ADLS_ACUITY_SCORE: 73
ADLS_ACUITY_SCORE: 67
ADLS_ACUITY_SCORE: 67

## 2025-03-19 ASSESSMENT — ENCOUNTER SYMPTOMS: DYSRHYTHMIAS: 1

## 2025-03-19 NOTE — ANESTHESIA PREPROCEDURE EVALUATION
Anesthesia Pre-Procedure Evaluation    Patient: Tad Zaman   MRN: [id number] : 1960        Procedure : Procedure(s):  CREATION, BYPASS, VASCULAR, FEMORAL TO TIBIAL,  COMPLETION ANGIOGRAM          63 yo male admitted on 2025 for AMS with PMH significant for CAD, Hx MI, HF with EF 40-45%, HTN, DM2 (insulin started this admission), PAD s/p left BKA, ESRD s/p renal transplant 2022 now with CKD stage 3b (creat baseline 1.8-2.3). Patient was brought in by family for AMS x 1-2 months and failure to thrive [facility], course complicated by new cyanotic areas on the R toes concerning for progression of PAD.     Past Medical History:   Diagnosis Date    Chronic kidney disease     Diabetes (H)     Hypertension     Myocardial infarction (H)       Past Surgical History:   Procedure Laterality Date    ANGIOGRAM Right 3/17/2025    Procedure: ANGIOGRAM;  Surgeon: Messi Irving MD;  Location: [facility] OR    ANGIOPLASTY Right 3/17/2025    Procedure: ANGIOPLASTY;  Surgeon: Messi Irving MD;  Location: [facility] OR    IR OR ANGIOGRAM  3/17/2025      Allergies   Allergen Reactions    Penicillins Hives      Social History     Tobacco Use    Smoking status: Not on file    Smokeless tobacco: Not on file   Substance Use Topics    Alcohol use: Not on file      Wt Readings from Last 1 Encounters:   03/15/25 71.9 kg (158 lb 8.2 oz)        Anesthesia Evaluation   Pt has had prior anesthetic. Type: General.        ROS/MED HX  ENT/Pulmonary:       Neurologic: Comment:   # encephalopathy    (+)          CVA,                      Cardiovascular:     (+)  hypertension- Peripheral Vascular Disease-  CAD -  - stent-      CHF                  dysrhythmias, a-flutter,        Previous cardiac testing   Echo: Date: 2025 Results:  Interpretation Summary  Left ventricular function is decreased. The ejection fraction is 40-45% (mildly reduced).  Diastolic function not assessed due to arrhythmia.  Right ventricular  function, chamber size, wall motion, and thickness are normal.  Mild tricuspid insufficiency is present.  Mild (pulmonary artery systolic pressure<50mmHg) pulmonary hypertension is present.  IVC diameter and respiratory changes fall into an intermediate range suggesting an RA pressure of 8 mmHg.  Trivial pericardial effusion is present.  There is no prior study for direct comparison.    Stress Test:  Date: Results:    ECG Reviewed:  Date: 3/13/2025 Results:  Sinus rhythm   Nonspecific T wave abnormality   Abnormal ECG   When compared with ECG of 25-Feb-2025 21:53,   Sinus rhythm has replaced Atrial flutter   T wave inversion no longer evident in Lateral leads   Confirmed by MD JAMES, SHANE ([id number]) on 3/13/2025 10:23:20 PM     Cath:  Date: Results:      METS/Exercise Tolerance:     Hematologic: Comments:  On heparin drip    (+)      anemia,          Musculoskeletal: Comment:   # PAD s/p amputation left BKA      GI/Hepatic:  - neg GI/hepatic ROS     Renal/Genitourinary:     (+) renal disease, type: CRI,   Pt has history of transplant (2022),         Endo: Comment:   # malnutrition    (+)  type II DM (insulin during this admission), Last HgA1c: 7.9, date: 3/3/2025,                  Psychiatric/Substance Use:       Infectious Disease: Comment: UTI      Malignancy:  - neg malignancy ROS     Other:            Physical Exam    Airway        Mallampati: II   TM distance: > 3 FB   Neck ROM: full   Mouth opening: > 3 cm    Respiratory Devices and Support         Dental     Comment: Middle bottom teeth in place, otherwise edentulous    (+) Edentulous      Cardiovascular   cardiovascular exam normal       Rhythm and rate: regular and normal     Pulmonary   pulmonary exam normal        breath sounds clear to auscultation           OUTSIDE LABS:  CBC:   Lab Results   Component Value Date    WBC 7.1 03/19/2025    WBC 6.8 03/18/2025    HGB 8.8 (L) 03/19/2025    HGB 9.6 (L) 03/18/2025    HCT 28.9 (L) 03/19/2025    HCT 31.3 (L)  "03/18/2025     03/19/2025     03/18/2025     BMP:   Lab Results   Component Value Date     03/19/2025     03/18/2025    POTASSIUM 4.9 03/19/2025    POTASSIUM 5.2 03/18/2025    CHLORIDE 107 03/19/2025    CHLORIDE 106 03/18/2025    CO2 21 (L) 03/19/2025    CO2 21 (L) 03/18/2025    BUN 37.5 (H) 03/19/2025    BUN 38.6 (H) 03/18/2025    CR 1.52 (H) 03/19/2025    CR 1.54 (H) 03/18/2025     (H) 03/19/2025     (H) 03/19/2025     COAGS:   Lab Results   Component Value Date    PTT 30 03/17/2025    INR 1.29 (H) 03/05/2025     POC: No results found for: \"BGM\", \"HCG\", \"HCGS\"  HEPATIC:   Lab Results   Component Value Date    ALBUMIN 3.1 (L) 03/19/2025    PROTTOTAL 7.7 02/25/2025    ALT 13 02/25/2025    AST 19 02/25/2025    ALKPHOS 122 02/25/2025    BILITOTAL 0.4 02/25/2025     OTHER:   Lab Results   Component Value Date    LACT 2.0 03/17/2025    A1C 7.9 (H) 03/03/2025    QUE 9.3 03/19/2025    PHOS 2.5 03/19/2025    MAG 1.8 03/19/2025    TSH 4.23 (H) 03/05/2025    T4 1.03 03/05/2025       Anesthesia Plan    ASA Status:  4    NPO Status:  NPO Appropriate    Anesthesia Type: General.     - Airway: ETT   Induction: Intravenous, Propofol.   Maintenance: Balanced.   Techniques and Equipment:     - Lines/Monitors: Arterial Line, BIS     - Blood: T&S     Consents    Anesthesia Plan(s) and associated risks, benefits, and realistic alternatives discussed. Questions answered and patient/representative(s) expressed understanding.     - Discussed:     - Discussed with:  Patient, Spouse      - Extended Intubation/Ventilatory Support Discussed: No.      - Patient is DNR/DNI Status: No     Use of blood products discussed: Yes.     - Consented: consented to blood products     Postoperative Care    Pain management: IV analgesics, Oral pain medications, Multi-modal analgesia.   PONV prophylaxis: Ondansetron (or other 5HT-3), Dexamethasone or Solumedrol     Comments:               Dallas Vang, " MD    Clinically Significant Risk Factors        # Hyperkalemia: Highest K = 5.5 mmol/L in last 2 days, will monitor as appropriate     # Hypercalcemia: corrected calcium is >10.1, will monitor as appropriate    # Hypoalbuminemia: Lowest albumin = 3 g/dL at 3/11/2025  5:55 AM, will monitor as appropriate               # DMII: A1C = 7.9 % (Ref range: <5.7 %) within past 6 months    # Severe Malnutrition: based on nutrition assessment and treatment provided per dietitian's recommendations.    # Financial/Environmental Concerns: none

## 2025-03-19 NOTE — PROGRESS NOTES
[facility]    Medicine Progress Note - Hospitalist Service, GOLD TEAM 7    Date of Admission:  2/25/2025    Assessment & Plan   Tad Zaman is a 64 year old male admitted on 2/25/2025 for AMS. He has a history of diabetes, HTN, CAD, HF, and renal transplant 9/2022, left AKA, CKD.  Patient was brought in by family for AMS x 1-2 months. He has been evaluated at the [facility] multiple times in ED then was evaluated during an admission 1/20-2/21/25 with very broad workup (see below) that did not amount to any type of notable etiology of his symptoms. During this stay he was also treated for HF exacerbation, MOY, and UTI which were all stable at time of discharge. He finished course of cefdinir for e coli UTI on 2/24. He was discharged with family being told that he has failure to thrive. He was discharged home, required 24 hr cares by daughter including changing him and preparation of food. He has also has had intermittent agitation and aggression. Home health nurse stated that he was not appropriate for their program.      No clear etiology for confusion but the aggression and refusal of cares have clinically improved without intervention. Course complicated by new cyanotic areas on the R toes concerning for progression of PAD. Vascular surgery consulted, details per below for work up of PAD     Today  - OR with vascular surgery tomorrow    - NPO at midnight, apixaban held, continue heparin gtt. Continue aspirin  - increase insulin glargine to 10u QPM. Will not add AM glargine at this point given OR tomorrow morning, but can add in future. Add carb correction insulin for mealtime coverage   - mg placed for ongoing urinary retention requiring straight cath       Peripheral vascular disease/ PAD    S/p L BKA  Seen by vascular surgery. Angiogram RLE revealed diffuse calcification of SFA into above-knee popliteal.   -Vein mapping (done 3/11/2025)  -Hold Eliquis,  okay to anticoagulate with heparin drip.   - continue aspirin and statin   -Keep right foot warm with warming blankets   -Please ensure adequate blood glucose control  -Neurovascular checks per unit protocol (patient has brisk Doppler PT and faint monophasic DP).  -Consider aspirin 81mg (started 3/13), and continue with statin (discussing with transplant neph if atorvastatin can be increased). Per Transplant neph, OK to go up to 40 atorvastatin.  - 3/17 no endovascular therapy completed 3/17.   - stress test negative for ischemia   - OR with vascular surgery on 3/20 for vascular bypass   -continue heparin gtt     AMS  Cognitive impairment vs encephalopathy   3/11 reseen by  psychiatry to see reg decision making capacity and was fel he needed surrogate decision maker for consent for procedures. Presented with 1-2 months of progressive encephalopathy and agitation with aggressive behavior toward family who is caring for him at home. [facility]  -Initial LP with pleocytosis, otherwise unrevealing.   -MRI brain without acute pathology.   -Neurology consulted. Some concern for hx of viral encephalitis that has since resolved leaving him with a degree of impairment. He otherwise remains hemodynamically stable without clinical evidence of infection, no gross electrolyte abnormality, stable glucose (elevated but without acidosis), BUN is improving, tacrolimus levels was  WNL.   [facility]:   - MRI 1/29: Stable mild amount of chronic small vessel ischemia. Small chronic R cerebellar infarct.   -OT SLUMS 6/30   -EEG: nonspecific encephalopathy.   -LP: protein 173, glucose 110. Cell count with 21 WBC, 41 RBC, 52 lymph. Hazy appearance  -Encephalopathy paraneoplastic evaluation (including Purkinje, ADI, antiglial nucl antibody, NMDA, BOSSMAN) 1/30/25 negative.   -CSF flow cytometry: suboptimal specimen with low cellularity. No immunophenotypic evidence of involvement by lymphoma. [place] light chain 64.8,  lambda light chain 35.1, kappa/lambda 1.85 all elevated. No monoclonals detected (1/22)  -ELP/Immunofix, serum panel: total protein low, beta 1 fraction low (1/22/25)  -Polyoma virus DNA of CSF, MAGNUS virus DNA (1/28) negative.   -Meningitis / encephalitis panel (1/28) negative  -CSF angiotensin converting enzyme (1/28) negative negative  -CMV (1/28, 1/25) negative   -BK virus (1/28) negative  -EBV (1/25) negative  -Cryptococcus (1/26) negative; Cryptococcus neoformans (1/28) negative  -Syphilis (1/25) negative  -HIV (1/25) negative  -B12 (1/18) WNL  -TSH (1/18) high side of normal 4.94  - CSF flow cytometry from 3/5/25  rare to absent B cells , no malignant cells seen   - Drug screen negative. Patient has distant history of what sounds to be mild to moderate alcohol intake.   - needs out patient  neuropsych testing as well as op EEG if cognition worsens    - repeat MRI of brain 3/1/25 limited by motion artifact  but no evidence of acute infarct or acute intracranial pathology   - Delirium precautions as able.   - repeat LP per CAPs on 3/5 completed - repeat CSF unremarkable (negative cytometry, cytology negative),  West Nile virus negative , VDRL non reactive , aerobic and anaerobic cultures so far no growth   - He will need follow up with outpatient neurology and consider neuropsychology testing  - Per neuro: can consider EEG in outpatient setting if cognition were to worsen      Renal transplant 9/2022    CKD Stage 3b   Had the transplant at the [facility]. [facility] 1/27 renal US with doppler showed no stenosis of vessels. Resistive indices were increased, felt could be related to ureteric obstruction but not specific. Mild dilation of intrarenal collecting system and ureter was also seen. CT a/p 1/28 W/O contrast suggested mild hydro.  - [facility]/3/25 [facility] showed no hydro or other abnormalities. FENA 4.3 (1/28) at [facility].   -  Creat recent baseline  appears to be 1.8-2.3  - transplant neph following the patient  Recommendations:   - Continue mycophenolate 500 mg twice a day.  - Hold torsemide, calcitriol and lokelma  - Continue tacrolimus 1.5 mg QAM and 2mg at bedtime (adjusting per transplant Neph)  -Tacro level on 3/9/2025 is 3.6  - Accurate I/O and daily weight  - Will need Urology outpatient follow up for TOV and further management.    - Hold calcitriol given borderline high calcium. Will follow up calcium levels on next renal panel (ordered)  - Ordered UA and UPCR/UACR 3/3/2025 - trace leuk esterase and glucose present. No evidence of infection.   - Ordered Kidney Transplant US 3/3/2025 (routine) - mild hydronephrosis of transplanted kidney, torturous renal artery with mildly elevated velocity at the anastomosis, likely chronic.   - Check hepatitis panel (ordered for 3/4/2025) - HCV positive, known history and cured, otherwise negative.   - Accurate I/O and daily weight given recent heart failure exacerbation.  - Plan for urinary retention per below     Urinary retention  Noted to have ongoing retention requiring straight cath. Unclear whether this is a new issue for him. No significant anticholinergics on his current medication list.   - Tamsulosin 0.4mg PO initiated on 3/4; as tolerated   - Avoid anticholinergic medications as able.       Troponinemia, stable   Lateral lead T wave inversions   Atrial flutter   HFrEF last echo 2/2025 with EF 40-45%   HTN   Hx MI  Limited cardiac history available. Per patient's daughter he may have had a stent placed at 1 point but is unsure.  Patient and daughters deny any history of cardiac arrhythmia.  However on exam and on EKG evidence of atrial flutter, and patient also on Eliquis at least since December.  [facility], he was found to have heart failure exacerbation with 25-30 lbs fluid excess. BNP during hospitalization was 2868. His Lasix was switched to Torsemide. He has been taking  his medications per his daughter.   - per cardiology note I found in EPIC form 2018 he has had  medium size ischemic area  in inferior wall  ( had similar but smaller in 2013)   - BNP 61311 here.   - trop 267>>218  - EKG with T wave inversions and mild ST depressions in lateral leads  - denies recent CP or SOB, no peripheral or abdominal edema  Plan:   - TTE 2/26/25: LV function decreased w/ EF 40-45%   - No report of chest pain. Troponin trended down   - cont PTA atorvastatin 40 mg nightly  - HOLD PTA  Eliquis 5 mg BID on 3/6  but now on hold as of 3/11 and iv heparin started    - HOLD  PTA Jardiance 12.5 mg daily per cardiology pre-op  - continue PTA metoprolol 75 mg daily  - HOLD PTA torsemide 10 mg BID  - cardiology consulted and saw pt for pre-op; cleared  - He will need to arrange follow-up with [facility] after [facility]     # T2DM  Not on long-term insulin. Hemoglobin A1c 6.9% as of 7/2024. Hyperglycemia when he allowed check on 3/4. He has been amenable to glucose checks and insulin this admission.   - HOLD PTA Jardiance 12.5 mg daily  - Repeat Hemoglobin A1c 7.9  - Initiated sliding scale insulin on 3/4 with TID AC and 0200 glucose checks -> increased to HDSSI on 3/10              - Will need to discuss with family whether they feel they would be able to manage sliding scale insulin at home.   - Carb consistent diet   - Given sliding scale insulin needs but would like to simplify this on discharge and will start basal Lantus at 5 units (half of what would be calculated 0.2units/kg) and uptitrate based on fasting AM sugars. Increased to lantus 7 units 3/13 and 10u 3/19.      # Mild normocytic anemia  - Hg stable, no signs of bleed, monitor on AC      # Hx hepatitis C,  - s/p treatment with cure        # Glaucoma - cont PTA eyedrops  # Sickle cell trait           Diet: Snacks/Supplements Adult: Nepro Oral Supplement; With Meals  NPO for Medical/Clinical Reasons Except for:  Meds  Renal Diet (non-dialysis)    DVT Prophylaxis: heparin gtt to resume at 3/17 1800  Zaman Catheter: Not present  Lines: None     Cardiac Monitoring: None  Code Status: Full Code            Diet: Snacks/Supplements Adult: Nepro Oral Supplement; With Meals  Regular Diet Adult  NPO for Procedure/Surgery per Anesthesia Guidelines Except for: Meds; Clear liquids before procedure/surgery: ADULT (Age GREATER than or Equal to 18 years) - Clear liquids 2 hours before procedure/surgery    DVT Prophylaxis: heparin gtt   Zaman Catheter: Not present  Lines: None     Cardiac Monitoring: None  Code Status: Full Code      Clinically Significant Risk Factors        # Hyperkalemia: Highest K = 5.5 mmol/L in last 2 days, will monitor as appropriate     # Hypercalcemia: corrected calcium is >10.1, will monitor as appropriate    # Hypoalbuminemia: Lowest albumin = 3 g/dL at 3/11/2025  5:55 AM, will monitor as appropriate               # DMII: A1C = 7.9 % (Ref range: <5.7 %) within past 6 months    # Severe Malnutrition: based on nutrition assessment and treatment provided per dietitian's recommendations.    # Financial/Environmental Concerns: none         Social Drivers of Health    Tobacco Use: Medium Risk (7/26/2024)    Received from Recommend    Patient History     Smoking Tobacco Use: Former     Smokeless Tobacco Use: Never          Disposition Plan     Medically Ready for Discharge: Anticipated in 2-4 Days             Roxann Lopez MD  Hospitalist Service, [facility] TEAM 7  [facility]  Securely message with [employer] (more info)  Text page via [employer]ator Paging/Directory   See signed in provider for up to date coverage information  ______________________________________________________________________    Interval History   NAEO. Patient feels ready for surgery tomorrow and has no new questions or concerns at this point     Physical Exam   Vital Signs: Temp: 98.6  F (37  C) Temp src: Oral  BP: 112/59 Pulse: 70   Resp: 20 SpO2: 98 % O2 Device: None (Room air)    Weight: 158 lbs 8.17 oz    General: awake, alert, non-toxic appearing, no acute distress   HEENT: normocephalic, atraumatic with moist mucous membranes  Respiratory: comfortable conversational breathing on room air, no increased work of breathing. Clear to auscultation in all lobes bilaterally.   Cardiac: warm and well perfused extremities. Regular rate and rhythm.   Abdomen: Non-distended. Soft, non-tender to palpation   Extremities: L leg AKA. R leg 1+ KM     Medical Decision Making       40 MINUTES SPENT BY ME on the date of service doing chart review, history, exam, documentation & further activities per the note.      Data     I have personally reviewed the following data over the past 24 hrs:    7.1  \   8.8 (L)   / 269     138 107 37.5 (H) /  232 (H)   4.9 21 (L) 1.52 (H) \     ALT: N/A AST: N/A AP: N/A TBILI: N/A   ALB: 3.1 (L) TOT PROTEIN: N/A LIPASE: N/A       Imaging results reviewed over the past 24 hrs:   No results found for this or any previous visit (from the past 24 hours).

## 2025-03-19 NOTE — PROGRESS NOTES
Vascular Surgery Progress Note  03/19/2025       Subjective/Interval: Underwent angiogram of RLE, found to have diffuse calcification of SFA into above-knee popliteal.  Underwent stress test which was negative.  Pending vascular bypass tomorrow.     Objective:  Temp:  [98.1  F (36.7  C)-98.6  F (37  C)] 98.6  F (37  C)  Pulse:  [70] 70  Resp:  [16-20] 20  BP: ()/(56-59) 112/59  SpO2:  [98 %] 98 %    I/O last 3 completed shifts:  In: 240 [P.O.:240]  Out: 1500 [Urine:1500]      Gen: Awake, alert, NAD  CV: RRR per peripheral pulses  Resp: NLB on room air  Abd:  soft, nondistended, nontender  Ext: Warm, no edema  Vascular: Faint monophasic Doppler DP and PT on RLE, palpable popliteal. Right foot toes with dry gangrene. Motor and sensory function intact. Dorsiflexion/plantarflexion 5/5 on RLE. With L BKA.      Labs: Reviewed  Imaging: Reviewed     Assessment/Plan:   Tad Zaman is a pleasant 64-year-old gentleman with PMH significant for T2DM (A1C 6.9% in 2024), HFrEF, HTN, MI, HLD, ESRD s/p renal transplant (9/2022), CKD stage 3b (creat baseline 1.8-2.3), left BKA, and PAD recently admitted with altered mental status (resolved) found to have blue toes on RLE, concern for worsening PAD.      Arterial duplex demonstrates monophasic poststenotic waveforms in the PT, AT and DP, velocities are 17-22, however likely falsely elevated due to monophasic waveform. AYAN's are noncompressible. RLE angiogram demonstrated diffuse calcification of SFA into above-knee popliteal. P2-P3 occluded with reconstitution of AT, peroneal, and PT after trifurcation area with no P3 segment opacifying at all. Not amenable for endovascular therapy. AT and PT go all the way to the foot with AT being a larger branch.     -Pending OR tomorrow with vascular  -Stress test was negative, preop risk assessment was done.  -Vein mapping (done 3/11/2025)  -Hold Eliquis, okay to anticoagulate with heparin drip.   -Continue aspirin 81mg and  statin  -Keep right foot warm with warming blankets   -Please ensure adequate blood glucose control  -Neurovascular checks per unit protocol (patient has brisk Doppler PT and faint monophasic DP).  -N.p.o. after midnight for surgery tomorrow    Piedad Bacu, CNP  Vascular Surgery  Pager: [phone]  [email or web address]  Send message or 10 digit call back number Securely via Minbox with the Vocera Web Console (learn more here)    Please page me directly with any questions/concerns during regular weekday hours before 3PM. If there is no response, if it is a weekend, or if it is during evening hours, then please use the [employer] system to page the first-call resident on call for vascular surgery.

## 2025-03-20 ENCOUNTER — ANESTHESIA (OUTPATIENT)
Dept: SURGERY | Facility: CLINIC | Age: 65
End: 2025-03-20
Payer: MEDICARE

## 2025-03-20 ENCOUNTER — APPOINTMENT (OUTPATIENT)
Dept: INTERVENTIONAL RADIOLOGY/VASCULAR | Facility: CLINIC | Age: 65
End: 2025-03-20
Attending: SURGERY
Payer: MEDICARE

## 2025-03-20 VITALS
TEMPERATURE: 99 F | RESPIRATION RATE: 16 BRPM | HEART RATE: 79 BPM | OXYGEN SATURATION: 98 % | WEIGHT: 160.72 LBS | SYSTOLIC BLOOD PRESSURE: 120 MMHG | HEIGHT: 72 IN | BODY MASS INDEX: 21.77 KG/M2 | DIASTOLIC BLOOD PRESSURE: 79 MMHG

## 2025-03-20 LAB
ABO + RH BLD: NORMAL
ACT BLD: 162 SECONDS (ref 74–150)
ACT BLD: 235 SECONDS (ref 74–150)
ACT BLD: 263 SECONDS (ref 74–150)
ACT BLD: 275 SECONDS (ref 74–150)
ALBUMIN SERPL BCG-MCNC: 3 G/DL (ref 3.5–5.2)
ANION GAP SERPL CALCULATED.3IONS-SCNC: 10 MMOL/L (ref 7–15)
BASE EXCESS BLDA CALC-SCNC: 0.3 MMOL/L (ref -3–3)
BASE EXCESS BLDA CALC-SCNC: 0.5 MMOL/L (ref -3–3)
BASE EXCESS BLDA CALC-SCNC: 0.9 MMOL/L (ref -3–3)
BASE EXCESS BLDA CALC-SCNC: 1.4 MMOL/L (ref -3–3)
BASE EXCESS BLDA CALC-SCNC: 1.5 MMOL/L (ref -3–3)
BASOPHILS # BLD AUTO: 0 10E3/UL (ref 0–0.2)
BASOPHILS NFR BLD AUTO: 0 %
BLD GP AB SCN SERPL QL: NEGATIVE
BLD PROD TYP BPU: NORMAL
BLD PROD TYP BPU: NORMAL
BLOOD COMPONENT TYPE: NORMAL
BLOOD COMPONENT TYPE: NORMAL
BUN SERPL-MCNC: 34.8 MG/DL (ref 8–23)
CA-I BLD-MCNC: 5.1 MG/DL (ref 4.4–5.2)
CA-I BLD-MCNC: 5.1 MG/DL (ref 4.4–5.2)
CA-I BLD-MCNC: 5.2 MG/DL (ref 4.4–5.2)
CALCIUM SERPL-MCNC: 9.4 MG/DL (ref 8.8–10.4)
CHLORIDE SERPL-SCNC: 104 MMOL/L (ref 98–107)
CODING SYSTEM: NORMAL
CODING SYSTEM: NORMAL
CREAT SERPL-MCNC: 1.59 MG/DL (ref 0.67–1.17)
CROSSMATCH: NORMAL
CROSSMATCH: NORMAL
EGFRCR SERPLBLD CKD-EPI 2021: 48 ML/MIN/1.73M2
EOSINOPHIL # BLD AUTO: 0.2 10E3/UL (ref 0–0.7)
EOSINOPHIL NFR BLD AUTO: 3 %
ERYTHROCYTE [DISTWIDTH] IN BLOOD BY AUTOMATED COUNT: 15.2 % (ref 10–15)
ERYTHROCYTE [DISTWIDTH] IN BLOOD BY AUTOMATED COUNT: 15.7 % (ref 10–15)
GLUCOSE BLD-MCNC: 131 MG/DL (ref 70–99)
GLUCOSE BLD-MCNC: 136 MG/DL (ref 70–99)
GLUCOSE BLD-MCNC: 158 MG/DL (ref 70–99)
GLUCOSE BLD-MCNC: 177 MG/DL (ref 70–99)
GLUCOSE BLD-MCNC: 178 MG/DL (ref 70–99)
GLUCOSE BLDC GLUCOMTR-MCNC: 119 MG/DL (ref 70–99)
GLUCOSE BLDC GLUCOMTR-MCNC: 172 MG/DL (ref 70–99)
GLUCOSE BLDC GLUCOMTR-MCNC: 196 MG/DL (ref 70–99)
GLUCOSE BLDC GLUCOMTR-MCNC: 223 MG/DL (ref 70–99)
GLUCOSE BLDC GLUCOMTR-MCNC: 230 MG/DL (ref 70–99)
GLUCOSE SERPL-MCNC: 202 MG/DL (ref 70–99)
HCO3 BLDA-SCNC: 25 MMOL/L (ref 21–28)
HCO3 BLDA-SCNC: 26 MMOL/L (ref 21–28)
HCO3 SERPL-SCNC: 23 MMOL/L (ref 22–29)
HCT VFR BLD AUTO: 29.2 % (ref 40–53)
HCT VFR BLD AUTO: 29.5 % (ref 40–53)
HGB BLD-MCNC: 8.9 G/DL (ref 13.3–17.7)
HGB BLD-MCNC: 9 G/DL (ref 13.3–17.7)
HGB BLD-MCNC: 9.2 G/DL (ref 13.3–17.7)
HGB BLD-MCNC: 9.3 G/DL (ref 13.3–17.7)
HGB BLD-MCNC: 9.3 G/DL (ref 13.3–17.7)
HGB BLD-MCNC: 9.4 G/DL (ref 13.3–17.7)
HGB BLD-MCNC: 9.4 G/DL (ref 13.3–17.7)
IMM GRANULOCYTES # BLD: 0 10E3/UL
IMM GRANULOCYTES NFR BLD: 1 %
ISSUE DATE AND TIME: NORMAL
ISSUE DATE AND TIME: NORMAL
LACTATE BLD-SCNC: 1.2 MMOL/L (ref 0.7–2)
LACTATE BLD-SCNC: 1.4 MMOL/L (ref 0.7–2)
LACTATE BLD-SCNC: 1.5 MMOL/L (ref 0.7–2)
LACTATE BLD-SCNC: 1.5 MMOL/L (ref 0.7–2)
LACTATE BLD-SCNC: 1.6 MMOL/L (ref 0.7–2)
LYMPHOCYTES # BLD AUTO: 0.4 10E3/UL (ref 0.8–5.3)
LYMPHOCYTES NFR BLD AUTO: 7 %
MAGNESIUM SERPL-MCNC: 1.7 MG/DL (ref 1.7–2.3)
MCH RBC QN AUTO: 25.2 PG (ref 26.5–33)
MCH RBC QN AUTO: 25.7 PG (ref 26.5–33)
MCHC RBC AUTO-ENTMCNC: 30.5 G/DL (ref 31.5–36.5)
MCHC RBC AUTO-ENTMCNC: 30.5 G/DL (ref 31.5–36.5)
MCV RBC AUTO: 83 FL (ref 78–100)
MCV RBC AUTO: 84 FL (ref 78–100)
MONOCYTES # BLD AUTO: 0.7 10E3/UL (ref 0–1.3)
MONOCYTES NFR BLD AUTO: 11 %
NEUTROPHILS # BLD AUTO: 5 10E3/UL (ref 1.6–8.3)
NEUTROPHILS NFR BLD AUTO: 79 %
NRBC # BLD AUTO: 0 10E3/UL
NRBC BLD AUTO-RTO: 0 /100
O2/TOTAL GAS SETTING VFR VENT: 35 %
O2/TOTAL GAS SETTING VFR VENT: 40 %
O2/TOTAL GAS SETTING VFR VENT: 40 %
OXYHGB MFR BLDA: 96 % (ref 92–100)
OXYHGB MFR BLDA: 97 % (ref 92–100)
OXYHGB MFR BLDA: 98 % (ref 92–100)
PCO2 BLDA: 34 MM HG (ref 35–45)
PCO2 BLDA: 37 MM HG (ref 35–45)
PCO2 BLDA: 38 MM HG (ref 35–45)
PCO2 BLDA: 41 MM HG (ref 35–45)
PCO2 BLDA: 41 MM HG (ref 35–45)
PH BLDA: 7.4 [PH] (ref 7.35–7.45)
PH BLDA: 7.4 [PH] (ref 7.35–7.45)
PH BLDA: 7.44 [PH] (ref 7.35–7.45)
PH BLDA: 7.44 [PH] (ref 7.35–7.45)
PH BLDA: 7.48 [PH] (ref 7.35–7.45)
PHOSPHATE SERPL-MCNC: 2.4 MG/DL (ref 2.5–4.5)
PLATELET # BLD AUTO: 266 10E3/UL (ref 150–450)
PLATELET # BLD AUTO: ABNORMAL 10*3/UL
PO2 BLDA: 129 MM HG (ref 80–105)
PO2 BLDA: 139 MM HG (ref 80–105)
PO2 BLDA: 143 MM HG (ref 80–105)
PO2 BLDA: 146 MM HG (ref 80–105)
PO2 BLDA: 207 MM HG (ref 80–105)
POTASSIUM BLD-SCNC: 4.7 MMOL/L (ref 3.4–5.3)
POTASSIUM BLD-SCNC: 4.8 MMOL/L (ref 3.4–5.3)
POTASSIUM BLD-SCNC: 4.9 MMOL/L (ref 3.4–5.3)
POTASSIUM BLD-SCNC: 4.9 MMOL/L (ref 3.4–5.3)
POTASSIUM BLD-SCNC: 5 MMOL/L (ref 3.4–5.3)
POTASSIUM SERPL-SCNC: 4.9 MMOL/L (ref 3.4–5.3)
RBC # BLD AUTO: 3.5 10E6/UL (ref 4.4–5.9)
RBC # BLD AUTO: 3.53 10E6/UL (ref 4.4–5.9)
SAO2 % BLDA: 100 % (ref 95–96)
SAO2 % BLDA: 100 % (ref 95–96)
SAO2 % BLDA: 99 % (ref 95–96)
SODIUM BLD-SCNC: 138 MMOL/L (ref 135–145)
SODIUM BLD-SCNC: 139 MMOL/L (ref 135–145)
SODIUM SERPL-SCNC: 137 MMOL/L (ref 135–145)
SPECIMEN EXP DATE BLD: NORMAL
TACROLIMUS BLD-MCNC: 3.6 UG/L (ref 5–15)
TME LAST DOSE: ABNORMAL H
TME LAST DOSE: ABNORMAL H
UFH PPP CHRO-ACNC: 0.23 IU/ML
UFH PPP CHRO-ACNC: 0.23 IU/ML
UNIT ABO/RH: NORMAL
UNIT ABO/RH: NORMAL
UNIT NUMBER: NORMAL
UNIT NUMBER: NORMAL
UNIT STATUS: NORMAL
UNIT STATUS: NORMAL
UNIT TYPE ISBT: 6200
UNIT TYPE ISBT: 6200
WBC # BLD AUTO: 6.4 10E3/UL (ref 4–11)
WBC # BLD AUTO: 7 10E3/UL (ref 4–11)

## 2025-03-20 PROCEDURE — 258N000003 HC RX IP 258 OP 636: Performed by: STUDENT IN AN ORGANIZED HEALTH CARE EDUCATION/TRAINING PROGRAM

## 2025-03-20 PROCEDURE — 250N000011 HC RX IP 250 OP 636: Performed by: STUDENT IN AN ORGANIZED HEALTH CARE EDUCATION/TRAINING PROGRAM

## 2025-03-20 PROCEDURE — 84132 ASSAY OF SERUM POTASSIUM: CPT

## 2025-03-20 PROCEDURE — 86850 RBC ANTIBODY SCREEN: CPT | Performed by: STUDENT IN AN ORGANIZED HEALTH CARE EDUCATION/TRAINING PROGRAM

## 2025-03-20 PROCEDURE — 041K0KQ BYPASS RIGHT FEMORAL ARTERY TO LOWER EXTREMITY ARTERY WITH NONAUTOLOGOUS TISSUE SUBSTITUTE, OPEN APPROACH: ICD-10-PCS | Performed by: SURGERY

## 2025-03-20 PROCEDURE — 80197 ASSAY OF TACROLIMUS: CPT | Performed by: STUDENT IN AN ORGANIZED HEALTH CARE EDUCATION/TRAINING PROGRAM

## 2025-03-20 PROCEDURE — 85347 COAGULATION TIME ACTIVATED: CPT

## 2025-03-20 PROCEDURE — 85520 HEPARIN ASSAY: CPT | Performed by: INTERNAL MEDICINE

## 2025-03-20 PROCEDURE — 84132 ASSAY OF SERUM POTASSIUM: CPT | Performed by: STUDENT IN AN ORGANIZED HEALTH CARE EDUCATION/TRAINING PROGRAM

## 2025-03-20 PROCEDURE — 250N000024 HC ISOFLURANE, PER MIN: Performed by: SURGERY

## 2025-03-20 PROCEDURE — 710N000010 HC RECOVERY PHASE 1, LEVEL 2, PER MIN: Performed by: SURGERY

## 2025-03-20 PROCEDURE — 999N000083 IR OR ANGIOGRAM

## 2025-03-20 PROCEDURE — 86900 BLOOD TYPING SEROLOGIC ABO: CPT | Performed by: STUDENT IN AN ORGANIZED HEALTH CARE EDUCATION/TRAINING PROGRAM

## 2025-03-20 PROCEDURE — 83735 ASSAY OF MAGNESIUM: CPT

## 2025-03-20 PROCEDURE — 250N000013 HC RX MED GY IP 250 OP 250 PS 637: Performed by: STUDENT IN AN ORGANIZED HEALTH CARE EDUCATION/TRAINING PROGRAM

## 2025-03-20 PROCEDURE — 85520 HEPARIN ASSAY: CPT

## 2025-03-20 PROCEDURE — 99232 SBSQ HOSP IP/OBS MODERATE 35: CPT

## 2025-03-20 PROCEDURE — 255N000002 HC RX 255 OP 636: Performed by: SURGERY

## 2025-03-20 PROCEDURE — 84295 ASSAY OF SERUM SODIUM: CPT | Performed by: STUDENT IN AN ORGANIZED HEALTH CARE EDUCATION/TRAINING PROGRAM

## 2025-03-20 PROCEDURE — C1762 CONN TISS, HUMAN(INC FASCIA): HCPCS | Performed by: SURGERY

## 2025-03-20 PROCEDURE — 250N000012 HC RX MED GY IP 250 OP 636 PS 637: Performed by: STUDENT IN AN ORGANIZED HEALTH CARE EDUCATION/TRAINING PROGRAM

## 2025-03-20 PROCEDURE — 36415 COLL VENOUS BLD VENIPUNCTURE: CPT | Performed by: INTERNAL MEDICINE

## 2025-03-20 PROCEDURE — 120N000003 HC R&B IMCU UMMC

## 2025-03-20 PROCEDURE — 250N000009 HC RX 250: Performed by: STUDENT IN AN ORGANIZED HEALTH CARE EDUCATION/TRAINING PROGRAM

## 2025-03-20 PROCEDURE — 278N000051 HC OR IMPLANT GENERAL: Performed by: SURGERY

## 2025-03-20 PROCEDURE — 85018 HEMOGLOBIN: CPT | Performed by: STUDENT IN AN ORGANIZED HEALTH CARE EDUCATION/TRAINING PROGRAM

## 2025-03-20 PROCEDURE — 82330 ASSAY OF CALCIUM: CPT

## 2025-03-20 PROCEDURE — 250N000009 HC RX 250: Performed by: SURGERY

## 2025-03-20 PROCEDURE — 250N000011 HC RX IP 250 OP 636: Performed by: SURGERY

## 2025-03-20 PROCEDURE — 999N000141 HC STATISTIC PRE-PROCEDURE NURSING ASSESSMENT: Performed by: SURGERY

## 2025-03-20 PROCEDURE — 86923 COMPATIBILITY TEST ELECTRIC: CPT

## 2025-03-20 PROCEDURE — 85025 COMPLETE CBC W/AUTO DIFF WBC: CPT | Performed by: STUDENT IN AN ORGANIZED HEALTH CARE EDUCATION/TRAINING PROGRAM

## 2025-03-20 PROCEDURE — 258N000003 HC RX IP 258 OP 636: Performed by: SURGERY

## 2025-03-20 PROCEDURE — P9016 RBC LEUKOCYTES REDUCED: HCPCS

## 2025-03-20 PROCEDURE — 250N000009 HC RX 250: Performed by: ANESTHESIOLOGY

## 2025-03-20 PROCEDURE — 272N000002 HC OR SUPPLY OTHER OPNP: Performed by: SURGERY

## 2025-03-20 PROCEDURE — 360N000082 HC SURGERY LEVEL 2 W/ FLUORO, PER MIN: Performed by: SURGERY

## 2025-03-20 PROCEDURE — 370N000017 HC ANESTHESIA TECHNICAL FEE, PER MIN: Performed by: SURGERY

## 2025-03-20 PROCEDURE — 36415 COLL VENOUS BLD VENIPUNCTURE: CPT | Performed by: STUDENT IN AN ORGANIZED HEALTH CARE EDUCATION/TRAINING PROGRAM

## 2025-03-20 PROCEDURE — 272N000001 HC OR GENERAL SUPPLY STERILE: Performed by: SURGERY

## 2025-03-20 PROCEDURE — 35666 BPG FEM-ANT TIB PST TIB/PRNL: CPT | Mod: RT | Performed by: SURGERY

## 2025-03-20 DEVICE — IMPLANTABLE DEVICE: Type: IMPLANTABLE DEVICE | Site: LEG | Status: FUNCTIONAL

## 2025-03-20 RX ORDER — ONDANSETRON 2 MG/ML
INJECTION INTRAMUSCULAR; INTRAVENOUS PRN
Status: DISCONTINUED | OUTPATIENT
Start: 2025-03-20 | End: 2025-03-20

## 2025-03-20 RX ORDER — FENTANYL CITRATE 50 UG/ML
INJECTION, SOLUTION INTRAMUSCULAR; INTRAVENOUS PRN
Status: DISCONTINUED | OUTPATIENT
Start: 2025-03-20 | End: 2025-03-20

## 2025-03-20 RX ORDER — ALBUTEROL SULFATE 0.83 MG/ML
2.5 SOLUTION RESPIRATORY (INHALATION) EVERY 4 HOURS PRN
Status: DISCONTINUED | OUTPATIENT
Start: 2025-03-20 | End: 2025-03-20 | Stop reason: HOSPADM

## 2025-03-20 RX ORDER — ONDANSETRON 4 MG/1
4 TABLET, ORALLY DISINTEGRATING ORAL EVERY 30 MIN PRN
Status: DISCONTINUED | OUTPATIENT
Start: 2025-03-20 | End: 2025-03-20 | Stop reason: HOSPADM

## 2025-03-20 RX ORDER — LIDOCAINE HYDROCHLORIDE 20 MG/ML
INJECTION, SOLUTION INFILTRATION; PERINEURAL PRN
Status: DISCONTINUED | OUTPATIENT
Start: 2025-03-20 | End: 2025-03-20

## 2025-03-20 RX ORDER — LIDOCAINE 40 MG/G
CREAM TOPICAL
Status: DISCONTINUED | OUTPATIENT
Start: 2025-03-20 | End: 2025-03-20

## 2025-03-20 RX ORDER — CEFAZOLIN SODIUM/WATER 2 G/20 ML
2 SYRINGE (ML) INTRAVENOUS
Status: COMPLETED | OUTPATIENT
Start: 2025-03-20 | End: 2025-03-20

## 2025-03-20 RX ORDER — SODIUM CHLORIDE, SODIUM LACTATE, POTASSIUM CHLORIDE, CALCIUM CHLORIDE 600; 310; 30; 20 MG/100ML; MG/100ML; MG/100ML; MG/100ML
INJECTION, SOLUTION INTRAVENOUS CONTINUOUS PRN
Status: DISCONTINUED | OUTPATIENT
Start: 2025-03-20 | End: 2025-03-20

## 2025-03-20 RX ORDER — CEFAZOLIN SODIUM 2 G/50ML
2 SOLUTION INTRAVENOUS EVERY 8 HOURS
Status: COMPLETED | OUTPATIENT
Start: 2025-03-20 | End: 2025-03-21

## 2025-03-20 RX ORDER — LIDOCAINE HYDROCHLORIDE 10 MG/ML
INJECTION, SOLUTION INFILTRATION; PERINEURAL
Status: COMPLETED | OUTPATIENT
Start: 2025-03-20 | End: 2025-03-20

## 2025-03-20 RX ORDER — PROTAMINE SULFATE 10 MG/ML
INJECTION, SOLUTION INTRAVENOUS PRN
Status: DISCONTINUED | OUTPATIENT
Start: 2025-03-20 | End: 2025-03-20

## 2025-03-20 RX ORDER — DEXAMETHASONE SODIUM PHOSPHATE 4 MG/ML
4 INJECTION, SOLUTION INTRA-ARTICULAR; INTRALESIONAL; INTRAMUSCULAR; INTRAVENOUS; SOFT TISSUE
Status: DISCONTINUED | OUTPATIENT
Start: 2025-03-20 | End: 2025-03-20 | Stop reason: HOSPADM

## 2025-03-20 RX ORDER — PROPOFOL 10 MG/ML
INJECTION, EMULSION INTRAVENOUS PRN
Status: DISCONTINUED | OUTPATIENT
Start: 2025-03-20 | End: 2025-03-20

## 2025-03-20 RX ORDER — IODIXANOL 320 MG/ML
INJECTION, SOLUTION INTRAVASCULAR PRN
Status: DISCONTINUED | OUTPATIENT
Start: 2025-03-20 | End: 2025-03-20 | Stop reason: HOSPADM

## 2025-03-20 RX ORDER — DEXAMETHASONE SODIUM PHOSPHATE 4 MG/ML
INJECTION, SOLUTION INTRA-ARTICULAR; INTRALESIONAL; INTRAMUSCULAR; INTRAVENOUS; SOFT TISSUE PRN
Status: DISCONTINUED | OUTPATIENT
Start: 2025-03-20 | End: 2025-03-20

## 2025-03-20 RX ORDER — OXYCODONE HYDROCHLORIDE 5 MG/1
5 TABLET ORAL EVERY 4 HOURS PRN
Status: DISCONTINUED | OUTPATIENT
Start: 2025-03-20 | End: 2025-03-26 | Stop reason: HOSPADM

## 2025-03-20 RX ORDER — ACETAMINOPHEN 325 MG/1
975 TABLET ORAL ONCE
Status: COMPLETED | OUTPATIENT
Start: 2025-03-20 | End: 2025-03-20

## 2025-03-20 RX ORDER — HYDROMORPHONE HCL IN WATER/PF 6 MG/30 ML
0.2 PATIENT CONTROLLED ANALGESIA SYRINGE INTRAVENOUS EVERY 5 MIN PRN
Status: DISCONTINUED | OUTPATIENT
Start: 2025-03-20 | End: 2025-03-20

## 2025-03-20 RX ORDER — SODIUM CHLORIDE, SODIUM GLUCONATE, SODIUM ACETATE, POTASSIUM CHLORIDE AND MAGNESIUM CHLORIDE 526; 502; 368; 37; 30 MG/100ML; MG/100ML; MG/100ML; MG/100ML; MG/100ML
INJECTION, SOLUTION INTRAVENOUS CONTINUOUS PRN
Status: DISCONTINUED | OUTPATIENT
Start: 2025-03-20 | End: 2025-03-20

## 2025-03-20 RX ORDER — FENTANYL CITRATE 50 UG/ML
50 INJECTION, SOLUTION INTRAMUSCULAR; INTRAVENOUS EVERY 5 MIN PRN
Status: DISCONTINUED | OUTPATIENT
Start: 2025-03-20 | End: 2025-03-20

## 2025-03-20 RX ORDER — PHENYLEPHRINE HCL IN 0.9% NACL 50MG/250ML
PLASTIC BAG, INJECTION (ML) INTRAVENOUS CONTINUOUS PRN
Status: DISCONTINUED | OUTPATIENT
Start: 2025-03-20 | End: 2025-03-20

## 2025-03-20 RX ORDER — HEPARIN SODIUM 10000 [USP'U]/100ML
500 INJECTION, SOLUTION INTRAVENOUS CONTINUOUS
Status: DISCONTINUED | OUTPATIENT
Start: 2025-03-20 | End: 2025-03-21

## 2025-03-20 RX ORDER — HYDROMORPHONE HCL IN WATER/PF 6 MG/30 ML
0.4 PATIENT CONTROLLED ANALGESIA SYRINGE INTRAVENOUS EVERY 5 MIN PRN
Status: DISCONTINUED | OUTPATIENT
Start: 2025-03-20 | End: 2025-03-20

## 2025-03-20 RX ORDER — CEFAZOLIN SODIUM/WATER 2 G/20 ML
2 SYRINGE (ML) INTRAVENOUS SEE ADMIN INSTRUCTIONS
Status: DISCONTINUED | OUTPATIENT
Start: 2025-03-20 | End: 2025-03-20

## 2025-03-20 RX ORDER — SODIUM CHLORIDE, SODIUM LACTATE, POTASSIUM CHLORIDE, CALCIUM CHLORIDE 600; 310; 30; 20 MG/100ML; MG/100ML; MG/100ML; MG/100ML
INJECTION, SOLUTION INTRAVENOUS CONTINUOUS
Status: DISCONTINUED | OUTPATIENT
Start: 2025-03-20 | End: 2025-03-20 | Stop reason: HOSPADM

## 2025-03-20 RX ORDER — LIDOCAINE 40 MG/G
CREAM TOPICAL
Status: DISCONTINUED | OUTPATIENT
Start: 2025-03-20 | End: 2025-03-26 | Stop reason: HOSPADM

## 2025-03-20 RX ORDER — FENTANYL CITRATE 50 UG/ML
25 INJECTION, SOLUTION INTRAMUSCULAR; INTRAVENOUS EVERY 5 MIN PRN
Status: DISCONTINUED | OUTPATIENT
Start: 2025-03-20 | End: 2025-03-20

## 2025-03-20 RX ORDER — NALOXONE HYDROCHLORIDE 0.4 MG/ML
0.1 INJECTION, SOLUTION INTRAMUSCULAR; INTRAVENOUS; SUBCUTANEOUS
Status: DISCONTINUED | OUTPATIENT
Start: 2025-03-20 | End: 2025-03-20 | Stop reason: HOSPADM

## 2025-03-20 RX ORDER — SODIUM CHLORIDE, SODIUM LACTATE, POTASSIUM CHLORIDE, CALCIUM CHLORIDE 600; 310; 30; 20 MG/100ML; MG/100ML; MG/100ML; MG/100ML
INJECTION, SOLUTION INTRAVENOUS CONTINUOUS
Status: DISCONTINUED | OUTPATIENT
Start: 2025-03-20 | End: 2025-03-23

## 2025-03-20 RX ORDER — HEPARIN SODIUM 1000 [USP'U]/ML
INJECTION, SOLUTION INTRAVENOUS; SUBCUTANEOUS PRN
Status: DISCONTINUED | OUTPATIENT
Start: 2025-03-20 | End: 2025-03-20

## 2025-03-20 RX ORDER — HYDROMORPHONE HCL IN WATER/PF 6 MG/30 ML
0.3 PATIENT CONTROLLED ANALGESIA SYRINGE INTRAVENOUS EVERY 4 HOURS PRN
Status: DISCONTINUED | OUTPATIENT
Start: 2025-03-20 | End: 2025-03-26 | Stop reason: HOSPADM

## 2025-03-20 RX ORDER — ONDANSETRON 2 MG/ML
4 INJECTION INTRAMUSCULAR; INTRAVENOUS EVERY 30 MIN PRN
Status: DISCONTINUED | OUTPATIENT
Start: 2025-03-20 | End: 2025-03-20 | Stop reason: HOSPADM

## 2025-03-20 RX ADMIN — SODIUM CHLORIDE, SODIUM GLUCONATE, SODIUM ACETATE, POTASSIUM CHLORIDE AND MAGNESIUM CHLORIDE: 526; 502; 368; 37; 30 INJECTION, SOLUTION INTRAVENOUS at 09:03

## 2025-03-20 RX ADMIN — INSULIN ASPART 2 UNITS: 100 INJECTION, SOLUTION INTRAVENOUS; SUBCUTANEOUS at 22:12

## 2025-03-20 RX ADMIN — HEPARIN SODIUM 3000 UNITS: 1000 INJECTION INTRAVENOUS; SUBCUTANEOUS at 12:17

## 2025-03-20 RX ADMIN — TACROLIMUS 1.5 MG: 1 CAPSULE ORAL at 08:23

## 2025-03-20 RX ADMIN — HEPARIN SODIUM 7000 UNITS: 1000 INJECTION INTRAVENOUS; SUBCUTANEOUS at 10:49

## 2025-03-20 RX ADMIN — LATANOPROST 1 DROP: 50 SOLUTION OPHTHALMIC at 20:54

## 2025-03-20 RX ADMIN — Medication 2 G: at 09:06

## 2025-03-20 RX ADMIN — SODIUM CHLORIDE, SODIUM LACTATE, POTASSIUM CHLORIDE, AND CALCIUM CHLORIDE: .6; .31; .03; .02 INJECTION, SOLUTION INTRAVENOUS at 08:51

## 2025-03-20 RX ADMIN — HEPARIN SODIUM 500 UNITS/HR: 10000 INJECTION, SOLUTION INTRAVENOUS at 20:16

## 2025-03-20 RX ADMIN — MYCOPHENOLATE MOFETIL 500 MG: 500 TABLET, FILM COATED ORAL at 08:12

## 2025-03-20 RX ADMIN — Medication 10 MG: at 11:21

## 2025-03-20 RX ADMIN — PROPOFOL 150 MG: 10 INJECTION, EMULSION INTRAVENOUS at 08:57

## 2025-03-20 RX ADMIN — MYCOPHENOLATE MOFETIL 500 MG: 500 TABLET, FILM COATED ORAL at 20:48

## 2025-03-20 RX ADMIN — Medication 10 MG: at 10:37

## 2025-03-20 RX ADMIN — Medication 100 MG: at 13:44

## 2025-03-20 RX ADMIN — DEXAMETHASONE SODIUM PHOSPHATE 4 MG: 4 INJECTION, SOLUTION INTRA-ARTICULAR; INTRALESIONAL; INTRAMUSCULAR; INTRAVENOUS; SOFT TISSUE at 09:42

## 2025-03-20 RX ADMIN — Medication 2 G: at 13:06

## 2025-03-20 RX ADMIN — ONDANSETRON 4 MG: 2 INJECTION INTRAMUSCULAR; INTRAVENOUS at 13:36

## 2025-03-20 RX ADMIN — TACROLIMUS 2 MG: 1 CAPSULE ORAL at 18:23

## 2025-03-20 RX ADMIN — Medication 10 MG: at 12:06

## 2025-03-20 RX ADMIN — INSULIN HUMAN 2 UNITS/HR: 1 INJECTION, SOLUTION INTRAVENOUS at 10:42

## 2025-03-20 RX ADMIN — HEPARIN SODIUM 1450 UNITS/HR: 10000 INJECTION, SOLUTION INTRAVENOUS at 04:16

## 2025-03-20 RX ADMIN — PHENYLEPHRINE HYDROCHLORIDE 100 MCG: 10 INJECTION INTRAVENOUS at 09:03

## 2025-03-20 RX ADMIN — ACETAMINOPHEN 975 MG: 325 TABLET, FILM COATED ORAL at 08:06

## 2025-03-20 RX ADMIN — FENTANYL CITRATE 50 MCG: 50 INJECTION INTRAMUSCULAR; INTRAVENOUS at 10:01

## 2025-03-20 RX ADMIN — PROTAMINE SULFATE 75 MG: 10 INJECTION, SOLUTION INTRAVENOUS at 13:09

## 2025-03-20 RX ADMIN — HYDROMORPHONE HYDROCHLORIDE 0.5 MG: 1 INJECTION, SOLUTION INTRAMUSCULAR; INTRAVENOUS; SUBCUTANEOUS at 13:34

## 2025-03-20 RX ADMIN — HEPARIN SODIUM 2000 UNITS: 1000 INJECTION INTRAVENOUS; SUBCUTANEOUS at 11:09

## 2025-03-20 RX ADMIN — ATORVASTATIN CALCIUM 40 MG: 40 TABLET, FILM COATED ORAL at 20:47

## 2025-03-20 RX ADMIN — Medication 0.5 MCG/KG/MIN: at 09:00

## 2025-03-20 RX ADMIN — PHENYLEPHRINE HYDROCHLORIDE 100 MCG: 10 INJECTION INTRAVENOUS at 09:11

## 2025-03-20 RX ADMIN — FENTANYL CITRATE 50 MCG: 50 INJECTION INTRAMUSCULAR; INTRAVENOUS at 12:30

## 2025-03-20 RX ADMIN — LIDOCAINE HYDROCHLORIDE 100 MG: 20 INJECTION, SOLUTION INFILTRATION; PERINEURAL at 08:57

## 2025-03-20 RX ADMIN — Medication 10 MG: at 09:57

## 2025-03-20 RX ADMIN — Medication 50 MG: at 08:58

## 2025-03-20 RX ADMIN — SODIUM BICARBONATE 650 MG TABLET 1300 MG: at 20:48

## 2025-03-20 RX ADMIN — LIDOCAINE HYDROCHLORIDE 1 ML: 10 INJECTION, SOLUTION INFILTRATION; PERINEURAL at 08:20

## 2025-03-20 RX ADMIN — LIDOCAINE 4% 1 PATCH: 40 PATCH TOPICAL at 20:48

## 2025-03-20 RX ADMIN — FENTANYL CITRATE 150 MCG: 50 INJECTION INTRAMUSCULAR; INTRAVENOUS at 08:56

## 2025-03-20 RX ADMIN — CEFAZOLIN SODIUM 2 G: 2 SOLUTION INTRAVENOUS at 22:00

## 2025-03-20 ASSESSMENT — ACTIVITIES OF DAILY LIVING (ADL)
ADLS_ACUITY_SCORE: 68
ADLS_ACUITY_SCORE: 67
ADLS_ACUITY_SCORE: 68
ADLS_ACUITY_SCORE: 67
ADLS_ACUITY_SCORE: 68
ADLS_ACUITY_SCORE: 67
ADLS_ACUITY_SCORE: 68
ADLS_ACUITY_SCORE: 67
ADLS_ACUITY_SCORE: 67
ADLS_ACUITY_SCORE: 69
ADLS_ACUITY_SCORE: 67
ADLS_ACUITY_SCORE: 68
ADLS_ACUITY_SCORE: 67

## 2025-03-20 NOTE — ANESTHESIA PROCEDURE NOTES
Airway       Patient location during procedure: OR       Procedure Start/Stop Times: 3/20/2025 9:01 AM  Staff -        Anesthesiologist:  Behrens, Christopher J, MD       Resident/Fellow: Dallas Vang MD       Performed By: resident  Consent for Airway        Urgency: elective  Indications and Patient Condition       Indications for airway management: mag-procedural       Induction type:intravenous       Mask difficulty assessment: 1 - vent by mask    Final Airway Details       Final airway type: endotracheal airway       Successful airway: ETT - single  Endotracheal Airway Details        ETT size (mm): 7.5       Cuffed: yes       Successful intubation technique: direct laryngoscopy       DL Blade Type: Vieyra 2       Grade View of Cords: 1       Adjucts: stylet       Position: Right       Measured from: gums/teeth       Secured at (cm): 24       Bite block used: None    Post intubation assessment        Placement verified by: capnometry, equal breath sounds and chest rise        Number of attempts at approach: 1       Secured with: tape       Ease of procedure: easy       Dentition: Intact and Unchanged    Medication(s) Administered   Medication Administration Time: 3/20/2025 9:01 AM

## 2025-03-20 NOTE — OR NURSING
Blood sugar 196 mg/dl, informed Behrens, Christopher -anesthesiologist, no further order was made, Dr. Behrens stated they will correct blood sugar in OR.

## 2025-03-20 NOTE — ANESTHESIA POSTPROCEDURE EVALUATION
Patient: Tad Zaman    Procedure: Procedure(s):  Right Femoral Anterior Tibial Bypass using Cryo preserve artery  COMPLETION ANGIOGRAM       Anesthesia Type:  General    Note:  Disposition: Inpatient   Postop Pain Control: Uneventful            Sign Out: Well controlled pain   PONV: No   Neuro/Psych: Uneventful            Sign Out: Acceptable/Baseline neuro status   Airway/Respiratory: Uneventful            Sign Out: Acceptable/Baseline resp. status   CV/Hemodynamics: Uneventful            Sign Out: Acceptable CV status; No obvious hypovolemia; No obvious fluid overload   Other NRE: NONE   DID A NON-ROUTINE EVENT OCCUR? No           Last vitals:  Vitals Value Taken Time   /80 03/20/25 1415   Temp 36.6  C (97.8  F) 03/20/25 1405   Pulse 67 03/20/25 1427   Resp 15 03/20/25 1427   SpO2 98 % 03/20/25 1427   Vitals shown include unfiled device data.    Electronically Signed By: Dallas Vang MD  March 20, 2025  2:28 PM

## 2025-03-20 NOTE — PLAN OF CARE
Transfer  Transferred from: PACU  Via:bed  Reason for transfer: Pt appropriate for 6B  Family: Aware of transfer - PACU nurse to call family  Belongings: Received with pt  Chart: Received with pt  Medications: Meds received from old unit with pt  Code Status verified on armband: yes  2 RN Skin Assessment Completed By: Mingo HUTTON RN, Shelbi KRISHNA  Med rec completed: yes  Suction/Ambu bag/Flowmeter at bedside: yes    Report received from:   Pt status:     BP (!) 130/95 (BP Location: Left arm)   Pulse 71   Temp 97.7  F (36.5  C) (Oral)   Resp 16   Ht 1.829 m (6')   Wt 72.9 kg (160 lb 11.5 oz)   SpO2 98%   BMI 21.80 kg/m

## 2025-03-20 NOTE — OR NURSING
Dr. Lozano present at bedside to assess incisions and perform pulse check. Per Dr. Lozano, bedrest today and patient should go to [facility].

## 2025-03-20 NOTE — OR NURSING
Dr. Lozano present at bedside, MD to put in order for 500 U/hr of Heparin to start at 1800. No labs needed until tomorrow morning, per MD.

## 2025-03-20 NOTE — ANESTHESIA PROCEDURE NOTES
Arterial Line Procedure Note    Pre-Procedure   Staff -        Anesthesiologist:  Behrens, Christopher J, MD       Resident/Fellow: Dallas Vang MD       Performed By: resident       Location: OR       Pre-Anesthestic Checklist: patient identified, IV checked, risks and benefits discussed, informed consent, monitors and equipment checked, pre-op evaluation and at physician/surgeon's request  Line Placement:   This line was placed Pre Induction starting at 3/20/2025 8:15 AM and ending at 3/20/2025 8:25 AM  Procedure   Procedure: arterial line       Laterality: left       Insertion Site: radial.  Sterile Prep        Standard elements of sterile barrier followed       Skin prep: Chloraprep  Insertion/Injection        Technique: ultrasound guided and Seldinger Technique        1. Ultrasound was used to evaluate the access site.       2. Artery evaluated via ultrasound for patency/adequacy.       3. Using real-time ultrasound the needle/catheter was observed entering the artery/vein.       5. The visualized structures were anatomically normal.       6. There were no apparent abnormal pathologic findings.       Catheter Type/Size: 20 G, 12 cm  Narrative         Secured by: suture       Tegaderm dressing used.       Complications: None apparent,        Arterial waveform: Yes        IBP within 10% of NIBP: Yes

## 2025-03-20 NOTE — ANESTHESIA CARE TRANSFER NOTE
Patient: Tad Zaman    Procedure: Procedure(s):  Right Femoral Anterior Tibial Bypass using Cryo preserve artery  COMPLETION ANGIOGRAM       Diagnosis: Gangrene of toe of right foot (H) [I96]  Diagnosis Additional Information: No value filed.    Anesthesia Type:   General     Note:    Oropharynx: oropharynx clear of all foreign objects and spontaneously breathing  Level of Consciousness: awake  Oxygen Supplementation: nasal cannula  Level of Supplemental Oxygen (L/min / FiO2): 3  Independent Airway: airway patency satisfactory and stable  Dentition: dentition unchanged  Vital Signs Stable: post-procedure vital signs reviewed and stable  Report to RN Given: handoff report given  Patient transferred to: PACU    Handoff Report: Identifed the Patient, Identified the Reponsible Provider, Reviewed the pertinent medical history, Discussed the surgical course, Reviewed Intra-OP anesthesia mangement and issues during anesthesia, Set expectations for post-procedure period and Allowed opportunity for questions and acknowledgement of understanding      Vitals:  Vitals Value Taken Time   /80 03/20/25 1415   Temp 36.6  C (97.8  F) 03/20/25 1405   Pulse 70 03/20/25 1417   Resp 0 03/20/25 1417   SpO2 100 % 03/20/25 1417   Vitals shown include unfiled device data.    Electronically Signed By: Dallas Vang MD  March 20, 2025  2:18 PM

## 2025-03-20 NOTE — PROGRESS NOTES
Post op check    Doing good. Pain controlled.    NAD  Right groin under prevena with good seal and no signs of bleeding  Right graft palp, AT/DP biphasic  Lateral incision in dressing without signs of bleeding  Neuro at baseline    POD 0 right CFA-AT bypass using spliced cryoartery    [facility] for close monitoring and q2 CMS/neurovascular checks  Aspirin 81 mg  Heparin to resume at straight rate of 500 units/hr at 6 pm  Labs tomorrow AM  Bedrest and mg in tonight    Nikki Lozano MD  Vascular Surgery Fellow

## 2025-03-20 NOTE — INTERVAL H&P NOTE
I, Messi Irving MD, examined the patient now.  I discussed the procedure with the patient and family, and explained risks, benefits, alternatives.  Benefits outweigh the potential risk, such as bleeding, infection, allergic reaction, limb loss, or need for revision, and also other life threatening risks of this major procedure.  They have no further questions.    I have reviewed the surgical (or preoperative) H&P that is linked to this encounter, and examined the patient. There are no significant changes    Clinical Conditions Present on Arrival:  Clinically Significant Risk Factors Present on Admission

## 2025-03-20 NOTE — PLAN OF CARE
Shift: [id number]     D: See flowsheets for full assessment & vitals    PRNs: None  Lines: New PIV infusing heparin drip @ 1450 units/hr  RN managed Labs:  and 223. Next hep xa check @ 0700.       A/R: NPO since midnight. Alert, disoriented to time and situation but able to make needs known. Pulses intact on R leg. Zaman in place with good output-cares completed. Denied pain. VSS on RA.     P: Vascular bypass today     Goal Outcome Evaluation:    Plan of Care Reviewed With: patient    Overall Patient Progress: no changeOverall Patient Progress: no change    AM Vitals: /75 (BP Location: Left arm, Patient Position: Semi-Osorio's)   Pulse 72   Temp 98.9  F (37.2  C) (Oral)   Resp 16   Ht 1.829 m (6')   Wt 72.9 kg (160 lb 11.5 oz)   SpO2 100%   BMI 21.80 kg/m

## 2025-03-20 NOTE — PROVIDER NOTIFICATION
"Provider notified (name): Roberto Carlos Mcleod  Reason for notification: \"Do you still want pt on heparin drip? Has vascular bypass at 0830 in the AM\"  Response from provider: \"Plan is to continue. Will ask morning team what time it will need to be discontinued\"   " no

## 2025-03-20 NOTE — OP NOTE
[facility]  Operative Note    Pre-operative diagnosis: Gangrene of toe of right foot (H) [I96]   Post-operative diagnosis Same, due to PVD   Procedure: Procedure(s):  Right Femoral Anterior Tibial Bypass using Cryopreserved artery Prosthesis,   Completion Angiogram   Surgeon: Messi Irving MD   Assistants(s): Nikki Lozano MD   Anesthesia: General    Estimated blood loss: Less than 100 ml    Total IV fluids: (See anesthesia record)   Blood transfusion: No transfusion was given during surgery   Total urine output: (See anesthesia record)   Drains: None   Specimens:    Implants: Cryopreserved allograft artery grafts        Findings:   Good sized proximal anterior tibial artery with slight calcification, but mostly soft, and allowing uncomplicated anastomosis.  Strong pulse in right common femoral artery (CFA), graft, anterior tibial artery (MARYELLEN) beyond anastomosis .  Strong postop doppler signal at R DP.  Angiogram shows patent CFA, occluded popliteal, normal femoral AT bypass and normal AT artery filling to DP.  Hemostasis is good at the end of the procedure.  Final ACT is 165, which was allowed to self correct.   Complications: None.   Condition: Stable               Description of procedure:  In the hybrid OR, in supine position, general anesthesia was done, then sterile prep and drape, then timeout checklist.  Vertical incision was made at proximal right anterior compartment of leg, and carried down the the MARYELLEN, which was exposed, placing traction to retract some branches, but not dividing them.  R CFA was exposed with transverse incision.  Subcutaneous lateral tunnel was made connecting the two fields.  Cryoartery was meanwhile prepared in usual way, including 7-6 mm for proximal graft, and 6-5 for distal, to allow total graft length of up to 62 cm.  Under systemic heparin, monitored with ACT, and supplemented as needed, clamps were applied to CFA, and spatulated graft  anastomosed using continuous 5-0 Prolene.  Arterial pressure was opened, and a few areas of bleeding in the graft controlled with 6-0 Prolene figure-of-8 sutures.  Graft was placed through its tunnel, and allowed to gradually dilate, then was heparin locked.  It was spatulated distally and anastomosed end to side to MARYELLEN using continuous 6-0 Prolene, and Yasargil clips for arterial control.  Flow was established, heparin reversed, and incision closed with layers of 3-0 interrupted and continuous Vicryl, the staples for the skin.  Final arteriogram through angiocath in proximal CFA shows a good result.  Sterile dressing is applied and patient going to PACU in good condition.  Counts are correct.

## 2025-03-20 NOTE — PLAN OF CARE
Shift Hours: 0700 - 1900    Assessment:  Body systems assessments were at patient's baseline.        Activity     Fall Risk Score: 95   Bed alarm on? Yes     Activity Assistance Provided: assistance, 1 person      Assistive Device Utilized: gait belt, walker    Pain: denies    Labs/RN Managed Protocols: BG ACHS, Xa protocol    Lines/Drains: L PIV infusing, mg patent    Nutrition: tolerating regular diet    Goal Outcome Evaluation  Plan of Care Reviewed With: patient, family  Overall Patient Progress: no change  Outcome Evaluation: A&Ox2-3 (ex place & intermittently situation). VSS on RA. Continued urinary retention, mg placed. No changes in R DP & PT pulses.    Barriers to Discharge:   Plan for vascular bypass tomorrow (3/20).

## 2025-03-20 NOTE — PROGRESS NOTES
[facility]    Medicine Progress Note - Hospitalist Service, GOLD TEAM 7    Date of Admission:  2/25/2025    Assessment & Plan   Tad Zaman is a 64 year old male admitted on 2/25/2025 for AMS. He has a history of diabetes, HTN, CAD, HF, and renal transplant 9/2022, left AKA, CKD.  Patient was brought in by family for AMS x 1-2 months. He has been evaluated at the [facility] multiple times in ED then was evaluated during an admission 1/20-2/21/25 with very broad workup (see below) that did not amount to any type of notable etiology of his symptoms. During this stay he was also treated for HF exacerbation, MOY, and UTI which were all stable at time of discharge. He finished course of cefdinir for e coli UTI on 2/24. He was discharged with family being told that he has failure to thrive. He was discharged home, required 24 hr cares by daughter including changing him and preparation of food. He has also has had intermittent agitation and aggression. Home health nurse stated that he was not appropriate for their program.      No clear etiology for confusion but the aggression and refusal of cares have clinically improved without intervention. Course complicated by new cyanotic areas on the R toes concerning for progression of PAD. Vascular surgery consulted, details per below for work up of PAD     Today  - OR with vascular surgery today, in OR most of the day   - transferred to [facility] post-op for close monitoring and Q2H CMS/neurovascular checs  - continue mg for urinary retention       Peripheral vascular disease/ PAD s/p R femoral anterior tibial bypass 3/20/25   S/p L BKA  Seen by vascular surgery. Angiogram RLE revealed diffuse calcification of SFA into above-knee popliteal.   -Vein mapping (done 3/11/2025)  -Hold Eliquis, okay to anticoagulate with heparin drip.   - continue aspirin and statin   -Keep right foot warm with warming blankets   -Please ensure  adequate blood glucose control  -Neurovascular checks per unit protocol (patient has brisk Doppler PT and faint monophasic DP).  -Consider aspirin 81mg (started 3/13), and continue with statin (discussing with transplant neph if atorvastatin can be increased). Per Transplant neph, OK to go up to 40 atorvastatin.  - 3/17 no endovascular therapy completed 3/17.   - stress test negative for ischemia   - OR with vascular surgery on 3/20 for vascular bypass   -continue heparin gtt     AMS  Cognitive impairment vs encephalopathy   3/11 reseen by  psychiatry to see reg decision making capacity and was fel he needed surrogate decision maker for consent for procedures. Presented with 1-2 months of progressive encephalopathy and agitation with aggressive behavior toward family who is caring for him at home. [facility]  -Initial LP with pleocytosis, otherwise unrevealing.   -MRI brain without acute pathology.   -Neurology consulted. Some concern for hx of viral encephalitis that has since resolved leaving him with a degree of impairment. He otherwise remains hemodynamically stable without clinical evidence of infection, no gross electrolyte abnormality, stable glucose (elevated but without acidosis), BUN is improving, tacrolimus levels was  WNL.   [facility]:   - MRI 1/29: Stable mild amount of chronic small vessel ischemia. Small chronic R cerebellar infarct.   -OT SLUMS 6/30   -EEG: nonspecific encephalopathy.   -LP: protein 173, glucose 110. Cell count with 21 WBC, 41 RBC, 52 lymph. Hazy appearance  -Encephalopathy paraneoplastic evaluation (including Purkinje, ADI, antiglial nucl antibody, NMDA, BOSSMAN) 1/30/25 negative.   -CSF flow cytometry: suboptimal specimen with low cellularity. No immunophenotypic evidence of involvement by lymphoma. Pemberton light chain 64.8, lambda light chain 35.1, kappa/lambda 1.85 all elevated. No monoclonals detected (1/22)  -ELP/Immunofix, serum panel: total protein  low, beta 1 fraction low (1/22/25)  -Polyoma virus DNA of CSF, MAGNUS virus DNA (1/28) negative.   -Meningitis / encephalitis panel (1/28) negative  -CSF angiotensin converting enzyme (1/28) negative negative  -CMV (1/28, 1/25) negative   -BK virus (1/28) negative  -EBV (1/25) negative  -Cryptococcus (1/26) negative; Cryptococcus neoformans (1/28) negative  -Syphilis (1/25) negative  -HIV (1/25) negative  -B12 (1/18) WNL  -TSH (1/18) high side of normal 4.94  - CSF flow cytometry from 3/5/25  rare to absent B cells , no malignant cells seen   - Drug screen negative. Patient has distant history of what sounds to be mild to moderate alcohol intake.   - needs out patient  neuropsych testing as well as op EEG if cognition worsens    - repeat MRI of brain 3/1/25 limited by motion artifact  but no evidence of acute infarct or acute intracranial pathology   - Delirium precautions as able.   - repeat LP per CAPs on 3/5 completed - repeat CSF unremarkable (negative cytometry, cytology negative),  West Nile virus negative , VDRL non reactive , aerobic and anaerobic cultures so far no growth   - He will need follow up with outpatient neurology and consider neuropsychology testing  - Per neuro: can consider EEG in outpatient setting if cognition were to worsen      Renal transplant 9/2022    CKD Stage 3b   [facility]. [facility]. 1/27 renal US with doppler showed no stenosis of vessels. Resistive indices were increased, felt could be related to ureteric obstruction but not specific. Mild dilation of intrarenal collecting system and ureter was also seen. CT a/p 1/28 W/O contrast suggested mild hydro.  - Renal [facility]/3/25 at [facility] showed no hydro or other abnormalities. FENA 4.3 (1/28) at [facility].   -  Creat recent baseline appears to be 1.8-2.3  - transplant neph following the patient  Recommendations:   - Continue mycophenolate 500 mg twice a day.  - Hold  torsemide, calcitriol and lokelma  - Continue tacrolimus 1.5 mg QAM and 2mg at bedtime (adjusting per transplant Neph)  -Tacro level on 3/9/2025 is 3.6  - Accurate I/O and daily weight  - Will need Urology outpatient follow up for TOV and further management.    - Hold calcitriol given borderline high calcium. Will follow up calcium levels on next renal panel (ordered)  - Ordered UA and UPCR/UACR 3/3/2025 - trace leuk esterase and glucose present. No evidence of infection.   - Ordered Kidney Transplant US 3/3/2025 (routine) - mild hydronephrosis of transplanted kidney, torturous renal artery with mildly elevated velocity at the anastomosis, likely chronic.   - Check hepatitis panel (ordered for 3/4/2025) - HCV positive, known history and cured, otherwise negative.   - Accurate I/O and daily weight given recent heart failure exacerbation.  - Plan for urinary retention per below     Urinary retention  Noted to have ongoing retention requiring straight cath. Unclear whether this is a new issue for him. No significant anticholinergics on his current medication list.   - Tamsulosin 0.4mg PO initiated on 3/4; as tolerated   - Avoid anticholinergic medications as able.       Troponinemia, stable   Lateral lead T wave inversions   Atrial flutter   HFrEF last echo 2/2025 with EF 40-45%   HTN   Hx MI  Limited cardiac history available. Per patient's daughter he may have had a stent placed at 1 point but is unsure.  Patient and daughters deny any history of cardiac arrhythmia.  However on exam and on EKG evidence of atrial flutter, and patient also on Eliquis at least since December.  [facility], he was found to have heart failure exacerbation with 25-30 lbs fluid excess. BNP during hospitalization was 2868. His Lasix was switched to Torsemide. He has been taking his medications per his daughter.   - per cardiology note I found in EPIC form 2018 he has had  medium size ischemic area  in inferior  wall  ( had similar but smaller in 2013)   - BNP 24698 here.   - trop 267>>218  - EKG with T wave inversions and mild ST depressions in lateral leads  - denies recent CP or SOB, no peripheral or abdominal edema  Plan:   - TTE 2/26/25: LV function decreased w/ EF 40-45%   - No report of chest pain. Troponin trended down   - cont PTA atorvastatin 40 mg nightly  - HOLD PTA  Eliquis 5 mg BID on 3/6  but now on hold as of 3/11 and iv heparin started    - HOLD  PTA Jardiance 12.5 mg daily per cardiology pre-op  - continue PTA metoprolol 75 mg daily  - HOLD PTA torsemide 10 mg BID  - cardiology consulted and saw pt for pre-op; cleared  - He will need to arrange follow-up with his [facility]ologist [facility] after his discharge     # T2DM  Not on long-term insulin. Hemoglobin A1c 6.9% as of 7/2024. Hyperglycemia when he allowed check on 3/4. He has been amenable to glucose checks and insulin this admission.   - HOLD PTA Jardiance 12.5 mg daily  - Repeat Hemoglobin A1c 7.9  - Initiated sliding scale insulin on 3/4 with TID AC and 0200 glucose checks -> increased to HDSSI on 3/10              - Will need to discuss with family whether they feel they would be able to manage sliding scale insulin at home.   - Carb consistent diet   - Given sliding scale insulin needs but would like to simplify this on discharge and will start basal Lantus at 5 units (half of what would be calculated 0.2units/kg) and uptitrate based on fasting AM sugars. Increased to lantus 7 units 3/13 and 10u 3/19.      # Mild normocytic anemia  - Hg stable, no signs of bleed, monitor on AC      # Hx hepatitis C,  - s/p treatment with cure        # Glaucoma - cont PTA eyedrops  # Sickle cell trait           Diet: Snacks/Supplements Adult: Nepro Oral Supplement; With Meals  NPO for Medical/Clinical Reasons Except for: Meds  Renal Diet (non-dialysis)    DVT Prophylaxis: heparin gtt to resume at 3/17 1800  Zaman Catheter: Not present  Lines: None      Cardiac Monitoring: None  Code Status: Full Code            Diet: Snacks/Supplements Adult: Nepro Oral Supplement; With Meals  NPO for Procedure/Surgery per Anesthesia Guidelines    DVT Prophylaxis: heparin gtt   Zaman Catheter: PRESENT, indication: Acute retention or obstruction, Acute retention or obstruction  Lines: None     Cardiac Monitoring: ACTIVE order. Indication: Procedural area  Code Status: Full Code      Clinically Significant Risk Factors            # Hypercalcemia: corrected calcium is >10.1, will monitor as appropriate    # Hypoalbuminemia: Lowest albumin = 3 g/dL at 3/20/2025  3:32 AM, will monitor as appropriate               # DMII: A1C = 7.9 % (Ref range: <5.7 %) within past 6 months    # Severe Malnutrition: based on nutrition assessment and treatment provided per dietitian's recommendations.    # Financial/Environmental Concerns: none         Social Drivers of Health    Tobacco Use: Medium Risk (7/26/2024)    Received from [facility]    Patient History     Smoking Tobacco Use: Former     Smokeless Tobacco Use: Never          Disposition Plan     Medically Ready for Discharge: Anticipated in 2-4 Days             Roxann Lopez MD  Hospitalist Service, [facility] TEAM 7  [facility]  Securely message with [employer] (more info)  Text page via [facility] Paging/Directory   See signed in provider for up to date coverage information  ______________________________________________________________________    Interval History   NAEO     Physical Exam   Vital Signs: Temp: 98.4  F (36.9  C) Temp src: Oral BP: (!) 142/76 Pulse: 79   Resp: 16 SpO2: 99 % O2 Device: None (Room air)    Weight: 160 lbs 11.45 oz    General: awake, alert, non-toxic appearing, no acute distress   HEENT: normocephalic, atraumatic with moist mucous membranes  Respiratory: comfortable conversational breathing on room air  Cardiac: RRR  Abdomen: Non-distended. Soft, non-tender to palpation    Extremities: L leg AKA. R leg warm, necrotic toe tips     Medical Decision Making       40 MINUTES SPENT BY ME on the date of service doing chart review, history, exam, documentation & further activities per the note.      Data     I have personally reviewed the following data over the past 24 hrs:    7.0  \   8.9 (L)   / 266     138 104 34.8 (H) /  172 (H)   5.0 23 1.59 (H) \     ALT: N/A AST: N/A AP: N/A TBILI: N/A   ALB: 3.0 (L) TOT PROTEIN: N/A LIPASE: N/A     Procal: N/A CRP: N/A Lactic Acid: 1.4         Imaging results reviewed over the past 24 hrs:   No results found for this or any previous visit (from the past 24 hours).

## 2025-03-20 NOTE — BRIEF OP NOTE
[facility]    Brief Operative Note    Pre-operative diagnosis: Gangrene of toe of right foot (H) [I96]  Post-operative diagnosis Same as pre-operative diagnosis    Procedure: Right Femoral Anterior Tibial Bypass using Cryo preserve artery, Right - Leg  COMPLETION ANGIOGRAM, Right - Groin    Surgeon: Surgeons and Role:     * Messi Irving MD - Primary     * Nikki Lozano MD - Fellow - Assisting  Anesthesia: General   Estimated Blood Loss: Less than 100 ml    Drains: None  Specimens: * No specimens in log *  Findings:   Soft inflow, calcified AT vessel, but there was a room to sew. Used spliced cryo artery. Good angiographic result at the end. .  Complications: None.  Implants:   Implant Name Type Inv. Item Serial No.  Lot No. LRB No. Used Action   GRAFT TISS 4MM-5MM ALESIA 21-29 CM FEM POPLITEAL ART STRL R020 - [id number] Bone/Tissue/Biologic GRAFT TISS 4MM-5MM ALESIA 21-29 CM FEM POPLITEAL ART STRL R020 [id number] CR[employer]IFE  Right 1 Implanted   GRAFT TISS 4MM-5MM ALESIA 21-29 CM FEM POPLITEAL ART STRL R020 - [id number] Bone/Tissue/Biologic GRAFT TISS 4MM-5MM ALESIA 21-29 CM FEM POPLITEAL ART STRL R020 [id number] CRYOLIFE  Right 1 Implanted   CLIP L-CLIP PERM STR 18MM 45.790 - [id number] Clip CLIP L-CLIP PERM STR 18MM 45.790  MARINA ESTEBAN  Right 2 Used as a Supply     Biphasic AT/DP  Podiatry consult  Heparin drip at 6 pm if doing good at 500 units/hr

## 2025-03-20 NOTE — OR NURSING
Rn spoke with Dr. Lozano regarding patient placement. Per MD, patient should go to [facility] and order will change for pulse and CMS check Q2.

## 2025-03-21 ENCOUNTER — APPOINTMENT (OUTPATIENT)
Dept: PHYSICAL THERAPY | Facility: CLINIC | Age: 65
End: 2025-03-21
Payer: MEDICARE

## 2025-03-21 LAB
ANION GAP SERPL CALCULATED.3IONS-SCNC: 11 MMOL/L (ref 7–15)
BASOPHILS # BLD AUTO: 0 10E3/UL (ref 0–0.2)
BASOPHILS NFR BLD AUTO: 0 %
BUN SERPL-MCNC: 32.3 MG/DL (ref 8–23)
CALCIUM SERPL-MCNC: 9.6 MG/DL (ref 8.8–10.4)
CHLORIDE SERPL-SCNC: 102 MMOL/L (ref 98–107)
CREAT SERPL-MCNC: 1.47 MG/DL (ref 0.67–1.17)
EGFRCR SERPLBLD CKD-EPI 2021: 52 ML/MIN/1.73M2
EOSINOPHIL # BLD AUTO: 0 10E3/UL (ref 0–0.7)
EOSINOPHIL NFR BLD AUTO: 0 %
ERYTHROCYTE [DISTWIDTH] IN BLOOD BY AUTOMATED COUNT: 15.5 % (ref 10–15)
ERYTHROCYTE [DISTWIDTH] IN BLOOD BY AUTOMATED COUNT: 15.6 % (ref 10–15)
GLUCOSE BLDC GLUCOMTR-MCNC: 112 MG/DL (ref 70–99)
GLUCOSE BLDC GLUCOMTR-MCNC: 121 MG/DL (ref 70–99)
GLUCOSE BLDC GLUCOMTR-MCNC: 122 MG/DL (ref 70–99)
GLUCOSE BLDC GLUCOMTR-MCNC: 123 MG/DL (ref 70–99)
GLUCOSE BLDC GLUCOMTR-MCNC: 126 MG/DL (ref 70–99)
GLUCOSE BLDC GLUCOMTR-MCNC: 163 MG/DL (ref 70–99)
GLUCOSE BLDC GLUCOMTR-MCNC: 174 MG/DL (ref 70–99)
GLUCOSE SERPL-MCNC: 121 MG/DL (ref 70–99)
HCO3 SERPL-SCNC: 20 MMOL/L (ref 22–29)
HCT VFR BLD AUTO: 28.7 % (ref 40–53)
HCT VFR BLD AUTO: 29.2 % (ref 40–53)
HGB BLD-MCNC: 8.9 G/DL (ref 13.3–17.7)
HGB BLD-MCNC: 8.9 G/DL (ref 13.3–17.7)
IMM GRANULOCYTES # BLD: 0.1 10E3/UL
IMM GRANULOCYTES NFR BLD: 1 %
LACTATE SERPL-SCNC: 1.5 MMOL/L (ref 0.7–2)
LYMPHOCYTES # BLD AUTO: 0.3 10E3/UL (ref 0.8–5.3)
LYMPHOCYTES NFR BLD AUTO: 3 %
MAGNESIUM SERPL-MCNC: 1.7 MG/DL (ref 1.7–2.3)
MCH RBC QN AUTO: 25.3 PG (ref 26.5–33)
MCH RBC QN AUTO: 25.9 PG (ref 26.5–33)
MCHC RBC AUTO-ENTMCNC: 30.5 G/DL (ref 31.5–36.5)
MCHC RBC AUTO-ENTMCNC: 31 G/DL (ref 31.5–36.5)
MCV RBC AUTO: 82 FL (ref 78–100)
MCV RBC AUTO: 85 FL (ref 78–100)
MONOCYTES # BLD AUTO: 0.9 10E3/UL (ref 0–1.3)
MONOCYTES NFR BLD AUTO: 9 %
NEUTROPHILS # BLD AUTO: 8.6 10E3/UL (ref 1.6–8.3)
NEUTROPHILS NFR BLD AUTO: 87 %
NRBC # BLD AUTO: 0 10E3/UL
NRBC BLD AUTO-RTO: 0 /100
PHOSPHATE SERPL-MCNC: 2.8 MG/DL (ref 2.5–4.5)
PLATELET # BLD AUTO: 184 10E3/UL (ref 150–450)
PLATELET # BLD AUTO: 250 10E3/UL (ref 150–450)
POTASSIUM SERPL-SCNC: 5.7 MMOL/L (ref 3.4–5.3)
RBC # BLD AUTO: 3.43 10E6/UL (ref 4.4–5.9)
RBC # BLD AUTO: 3.52 10E6/UL (ref 4.4–5.9)
SODIUM SERPL-SCNC: 133 MMOL/L (ref 135–145)
UFH PPP CHRO-ACNC: <0.1 IU/ML
UFH PPP CHRO-ACNC: <0.1 IU/ML
WBC # BLD AUTO: 10 10E3/UL (ref 4–11)
WBC # BLD AUTO: 10.2 10E3/UL (ref 4–11)

## 2025-03-21 PROCEDURE — 97164 PT RE-EVAL EST PLAN CARE: CPT | Mod: GP

## 2025-03-21 PROCEDURE — 250N000013 HC RX MED GY IP 250 OP 250 PS 637: Performed by: STUDENT IN AN ORGANIZED HEALTH CARE EDUCATION/TRAINING PROGRAM

## 2025-03-21 PROCEDURE — 99232 SBSQ HOSP IP/OBS MODERATE 35: CPT

## 2025-03-21 PROCEDURE — 80048 BASIC METABOLIC PNL TOTAL CA: CPT

## 2025-03-21 PROCEDURE — 99222 1ST HOSP IP/OBS MODERATE 55: CPT | Mod: 24 | Performed by: ASSISTANT, PODIATRIC

## 2025-03-21 PROCEDURE — 250N000013 HC RX MED GY IP 250 OP 250 PS 637

## 2025-03-21 PROCEDURE — 97116 GAIT TRAINING THERAPY: CPT | Mod: GP

## 2025-03-21 PROCEDURE — 84100 ASSAY OF PHOSPHORUS: CPT

## 2025-03-21 PROCEDURE — 250N000012 HC RX MED GY IP 250 OP 636 PS 637: Performed by: STUDENT IN AN ORGANIZED HEALTH CARE EDUCATION/TRAINING PROGRAM

## 2025-03-21 PROCEDURE — 85025 COMPLETE CBC W/AUTO DIFF WBC: CPT

## 2025-03-21 PROCEDURE — 36415 COLL VENOUS BLD VENIPUNCTURE: CPT | Performed by: STUDENT IN AN ORGANIZED HEALTH CARE EDUCATION/TRAINING PROGRAM

## 2025-03-21 PROCEDURE — 99233 SBSQ HOSP IP/OBS HIGH 50: CPT | Mod: 24 | Performed by: PHYSICIAN ASSISTANT

## 2025-03-21 PROCEDURE — 85520 HEPARIN ASSAY: CPT | Performed by: STUDENT IN AN ORGANIZED HEALTH CARE EDUCATION/TRAINING PROGRAM

## 2025-03-21 PROCEDURE — 85027 COMPLETE CBC AUTOMATED: CPT | Performed by: STUDENT IN AN ORGANIZED HEALTH CARE EDUCATION/TRAINING PROGRAM

## 2025-03-21 PROCEDURE — 250N000011 HC RX IP 250 OP 636: Performed by: STUDENT IN AN ORGANIZED HEALTH CARE EDUCATION/TRAINING PROGRAM

## 2025-03-21 PROCEDURE — 120N000003 HC R&B IMCU UMMC

## 2025-03-21 PROCEDURE — 97530 THERAPEUTIC ACTIVITIES: CPT | Mod: GP

## 2025-03-21 PROCEDURE — 83735 ASSAY OF MAGNESIUM: CPT

## 2025-03-21 PROCEDURE — 36415 COLL VENOUS BLD VENIPUNCTURE: CPT

## 2025-03-21 PROCEDURE — 83605 ASSAY OF LACTIC ACID: CPT | Performed by: INTERNAL MEDICINE

## 2025-03-21 PROCEDURE — 36415 COLL VENOUS BLD VENIPUNCTURE: CPT | Performed by: INTERNAL MEDICINE

## 2025-03-21 RX ORDER — NALOXONE HYDROCHLORIDE 0.4 MG/ML
0.4 INJECTION, SOLUTION INTRAMUSCULAR; INTRAVENOUS; SUBCUTANEOUS
Status: DISCONTINUED | OUTPATIENT
Start: 2025-03-21 | End: 2025-03-26 | Stop reason: HOSPADM

## 2025-03-21 RX ORDER — TACROLIMUS 1 MG/1
2 CAPSULE ORAL
Status: DISCONTINUED | OUTPATIENT
Start: 2025-03-22 | End: 2025-03-26 | Stop reason: HOSPADM

## 2025-03-21 RX ORDER — SODIUM BICARBONATE 650 MG/1
1300 TABLET ORAL 3 TIMES DAILY
Status: DISCONTINUED | OUTPATIENT
Start: 2025-03-21 | End: 2025-03-26 | Stop reason: HOSPADM

## 2025-03-21 RX ORDER — NALOXONE HYDROCHLORIDE 0.4 MG/ML
0.2 INJECTION, SOLUTION INTRAMUSCULAR; INTRAVENOUS; SUBCUTANEOUS
Status: DISCONTINUED | OUTPATIENT
Start: 2025-03-21 | End: 2025-03-26 | Stop reason: HOSPADM

## 2025-03-21 RX ORDER — HEPARIN SODIUM 10000 [USP'U]/100ML
0-5000 INJECTION, SOLUTION INTRAVENOUS CONTINUOUS
Status: DISPENSED | OUTPATIENT
Start: 2025-03-21 | End: 2025-03-23

## 2025-03-21 RX ADMIN — SODIUM BICARBONATE 1300 MG: 650 TABLET ORAL at 20:58

## 2025-03-21 RX ADMIN — LIDOCAINE 4% 1 PATCH: 40 PATCH TOPICAL at 20:57

## 2025-03-21 RX ADMIN — FERROUS SULFATE TAB 325 MG (65 MG ELEMENTAL FE) 325 MG: 325 (65 FE) TAB at 09:38

## 2025-03-21 RX ADMIN — PANTOPRAZOLE SODIUM 40 MG: 40 TABLET, DELAYED RELEASE ORAL at 09:38

## 2025-03-21 RX ADMIN — LATANOPROST 1 DROP: 50 SOLUTION OPHTHALMIC at 20:59

## 2025-03-21 RX ADMIN — ASPIRIN 81 MG CHEWABLE TABLET 81 MG: 81 TABLET CHEWABLE at 09:38

## 2025-03-21 RX ADMIN — ATORVASTATIN CALCIUM 40 MG: 40 TABLET, FILM COATED ORAL at 20:58

## 2025-03-21 RX ADMIN — TACROLIMUS 2 MG: 1 CAPSULE ORAL at 20:58

## 2025-03-21 RX ADMIN — MYCOPHENOLATE MOFETIL 500 MG: 500 TABLET, FILM COATED ORAL at 09:38

## 2025-03-21 RX ADMIN — SODIUM ZIRCONIUM CYCLOSILICATE 10 G: 10 POWDER, FOR SUSPENSION ORAL at 18:13

## 2025-03-21 RX ADMIN — TACROLIMUS 1.5 MG: 1 CAPSULE ORAL at 09:38

## 2025-03-21 RX ADMIN — MYCOPHENOLATE MOFETIL 500 MG: 500 TABLET, FILM COATED ORAL at 20:58

## 2025-03-21 RX ADMIN — CEFAZOLIN SODIUM 2 G: 2 SOLUTION INTRAVENOUS at 05:03

## 2025-03-21 RX ADMIN — SODIUM BICARBONATE 1300 MG: 650 TABLET ORAL at 16:50

## 2025-03-21 RX ADMIN — HEPARIN SODIUM 800 UNITS/HR: 10000 INJECTION, SOLUTION INTRAVENOUS at 09:30

## 2025-03-21 RX ADMIN — SODIUM BICARBONATE 650 MG TABLET 1300 MG: at 09:38

## 2025-03-21 RX ADMIN — TAMSULOSIN HYDROCHLORIDE 0.4 MG: 0.4 CAPSULE ORAL at 09:38

## 2025-03-21 ASSESSMENT — ACTIVITIES OF DAILY LIVING (ADL)
ADLS_ACUITY_SCORE: 68

## 2025-03-21 NOTE — PROGRESS NOTES
[facility]    Medicine Progress Note - Hospitalist Service, GOLD TEAM 7    Date of Admission:  2/25/2025    Assessment & Plan   Tad Zaman is a 64 year old male admitted on 2/25/2025 for AMS. He has a history of diabetes, HTN, CAD, HF, and renal transplant 9/2022, left AKA, CKD.  Patient was brought in by family for AMS x 1-2 months. He has been evaluated at the [facility] multiple times in ED then was evaluated during an admission 1/20-2/21/25 with very broad workup (see below) that did not amount to any type of notable etiology of his symptoms. During this stay he was also treated for HF exacerbation, MOY, and UTI which were all stable at time of discharge. He finished course of cefdinir for e coli UTI on 2/24. He was discharged with family being told that he has failure to thrive. He was discharged home, required 24 hr cares by daughter including changing him and preparation of food. He has also has had intermittent agitation and aggression. Home health nurse stated that he was not appropriate for their program.      No clear etiology for confusion but the aggression and refusal of cares have clinically improved without intervention. Course complicated by new cyanotic areas on the R toes concerning for progression of PAD. Vascular surgery consulted, details per below for work up of PAD     Today  - seen by transplant renal, lokelma x 1 given, bicarb increased to TID, low potassium and carb consistent diet ordered. Tacrolimus increased to 2mg BID   - continue Q2H neurovascular check. Continue heparin   - mg to remain in place for urinary retention which pre-dates surgery. Urinary retention noted 3/4, mg placed. Restarted tamsulosin with repeat trial of void 3/8, ultimately required multiple episodes of straight cath w/ ongoing retention and mg was replaced 3/19.         Peripheral vascular disease/ PAD s/p R femoral anterior tibial bypass 3/20/25    S/p L BKA  Seen by vascular surgery. Angiogram RLE revealed diffuse calcification of SFA into above-knee popliteal.   -Vein mapping (done 3/11/2025)  -Hold Eliquis, okay to anticoagulate with heparin drip.   - continue aspirin and statin   -Keep right foot warm with warming blankets   -Please ensure adequate blood glucose control  -Neurovascular checks per unit protocol (patient has brisk Doppler PT and faint monophasic DP).  -Consider aspirin 81mg (started 3/13), and continue with statin (discussing with transplant neph if atorvastatin can be increased). Per Transplant neph, OK to go up to 40 atorvastatin.  - 3/17 no endovascular therapy completed 3/17.   - stress test negative for ischemia   - OR with vascular surgery on 3/20 for vascular bypass   -continue heparin gtt     AMS  Cognitive impairment vs encephalopathy   3/11 reseen by  psychiatry to see reg decision making capacity and was fel he needed surrogate decision maker for consent for procedures. Presented with 1-2 months of progressive encephalopathy and agitation with aggressive behavior toward family who is caring for him at home. [facility]  -Initial LP with pleocytosis, otherwise unrevealing.   -MRI brain without acute pathology.   -Neurology consulted. Some concern for hx of viral encephalitis that has since resolved leaving him with a degree of impairment. He otherwise remains hemodynamically stable without clinical evidence of infection, no gross electrolyte abnormality, stable glucose (elevated but without acidosis), BUN is improving, tacrolimus levels was  WNL.   [facility]:   - MRI 1/29: Stable mild amount of chronic small vessel ischemia. Small chronic R cerebellar infarct.   -OT SLUMS 6/30   -EEG: nonspecific encephalopathy.   -LP: protein 173, glucose 110. Cell count with 21 WBC, 41 RBC, 52 lymph. Hazy appearance  -Encephalopathy paraneoplastic evaluation (including Purkinje, ADI, antiglial nucl antibody, NMDA,  BOSSMAN) 1/30/25 negative.   -CSF flow cytometry: suboptimal specimen with low cellularity. No immunophenotypic evidence of involvement by lymphoma. [place] light chain 64.8, lambda light chain 35.1, kappa/lambda 1.85 all elevated. No monoclonals detected (1/22)  -ELP/Immunofix, serum panel: total protein low, beta 1 fraction low (1/22/25)  -Polyoma virus DNA of CSF, MAGNUS virus DNA (1/28) negative.   -Meningitis / encephalitis panel (1/28) negative  -CSF angiotensin converting enzyme (1/28) negative negative  -CMV (1/28, 1/25) negative   -BK virus (1/28) negative  -EBV (1/25) negative  -Cryptococcus (1/26) negative; Cryptococcus neoformans (1/28) negative  -Syphilis (1/25) negative  -HIV (1/25) negative  -B12 (1/18) WNL  -TSH (1/18) high side of normal 4.94  - CSF flow cytometry from 3/5/25  rare to absent B cells , no malignant cells seen   - Drug screen negative. Patient has distant history of what sounds to be mild to moderate alcohol intake.   - needs out patient  neuropsych testing as well as op EEG if cognition worsens    - repeat MRI of brain 3/1/25 limited by motion artifact  but no evidence of acute infarct or acute intracranial pathology   - Delirium precautions as able.   - repeat LP per CAPs on 3/5 completed - repeat CSF unremarkable (negative cytometry, cytology negative),  West Nile virus negative , VDRL non reactive , aerobic and anaerobic cultures so far no growth   - He will need follow up with outpatient neurology and consider neuropsychology testing  - Per neuro: can consider EEG in outpatient setting if cognition were to worsen      Renal transplant 9/2022    CKD Stage 3b   Had the transplant at [facility]. [facility]. 1/27 renal US with doppler showed no stenosis of vessels. Resistive indices were increased, felt could be related to ureteric obstruction but not specific. Mild dilation of intrarenal collecting system and ureter was also seen. CT  a/p 1/28 W/O contrast suggested mild hydro.  - [facility]al [facility] 2/3/25 at [facility] showed no hydro or other abnormalities. FENA 4.3 (1/28) at [facility].   -  Creat recent baseline appears to be 1.8-2.3  - transplant neph following the patient  Recommendations:   - Continue mycophenolate 500 mg twice a day.  - Hold torsemide, calcitriol and lokelma  - Continue tacrolimus 1.5 mg QAM and 2mg at bedtime (adjusting per transplant Neph)  -Tacro level on 3/9/2025 is 3.6  - Accurate I/O and daily weight  - Will need Urology outpatient follow up for TOV and further management.    - Hold calcitriol given borderline high calcium. Will follow up calcium levels on next renal panel (ordered)  - Ordered UA and UPCR/UACR 3/3/2025 - trace leuk esterase and glucose present. No evidence of infection.   - Ordered Kidney Transplant US 3/3/2025 (routine) - mild hydronephrosis of transplanted kidney, torturous renal artery with mildly elevated velocity at the anastomosis, likely chronic.   - Check hepatitis panel (ordered for 3/4/2025) - HCV positive, known history and cured, otherwise negative.   - Accurate I/O and daily weight given recent heart failure exacerbation.  - Plan for urinary retention per below     Urinary retention  Noted to have ongoing retention requiring straight cath. Unclear whether this is a new issue for him. No significant anticholinergics on his current medication list.   - Tamsulosin 0.4mg PO initiated on 3/4; as tolerated   - Avoid anticholinergic medications as able.       Troponinemia, stable   Lateral lead T wave inversions   Atrial flutter   HFrEF last echo 2/2025 with EF 40-45%   HTN   Hx MI  Limited cardiac history available. Per patient's daughter he may have had a stent placed at 1 point but is unsure.  Patient and daughters deny any history of cardiac arrhythmia.  However on exam and on EKG evidence of atrial flutter, and patient also on Eliquis at least since December.  [facility], he  was found to have heart failure exacerbation with 25-30 lbs fluid excess. BNP during hospitalization was 2868. His Lasix was switched to Torsemide. He has been taking his medications per his daughter.   - per cardiology note I found in EPIC form 2018 he has had  medium size ischemic area  in inferior wall  ( had similar but smaller in 2013)   - BNP 24667 here.   - trop 267>>218  - EKG with T wave inversions and mild ST depressions in lateral leads  - denies recent CP or SOB, no peripheral or abdominal edema  Plan:   - TTE 2/26/25: LV function decreased w/ EF 40-45%   - No report of chest pain. Troponin trended down   - cont PTA atorvastatin 40 mg nightly  - HOLD PTA  Eliquis 5 mg BID on 3/6  but now on hold as of 3/11 and iv heparin started    - HOLD  PTA Jardiance 12.5 mg daily per cardiology pre-op  - continue PTA metoprolol 75 mg daily  - HOLD PTA torsemide 10 mg BID  - cardiology consulted and saw pt for pre-op; cleared  - He will need to arrange follow-up with his [facility]ologist [facility] after his discharge     # T2DM  Not on long-term insulin. Hemoglobin A1c 6.9% as of 7/2024. Hyperglycemia when he allowed check on 3/4. He has been amenable to glucose checks and insulin this admission.   - HOLD PTA Jardiance 12.5 mg daily  - Repeat Hemoglobin A1c 7.9  - Initiated sliding scale insulin on 3/4 with TID AC and 0200 glucose checks -> increased to HDSSI on 3/10              - Will need to discuss with family whether they feel they would be able to manage sliding scale insulin at home.   - Carb consistent diet   - Given sliding scale insulin needs but would like to simplify this on discharge and will start basal Lantus at 5 units (half of what would be calculated 0.2units/kg) and uptitrate based on fasting AM sugars. Increased to lantus 7 units 3/13 and 10u 3/19.      # Mild normocytic anemia  - Hg stable, no signs of bleed, monitor on AC      # Hx hepatitis C,  - s/p treatment with cure        # Glaucoma  - cont PTA eyedrops  # Sickle cell trait           Diet: Snacks/Supplements Adult: Nepro Oral Supplement; With Meals  NPO for Medical/Clinical Reasons Except for: Meds  Renal Diet (non-dialysis)    DVT Prophylaxis: heparin gtt to resume at 3/17 1800  Zaman Catheter: Not present  Lines: None     Cardiac Monitoring: None  Code Status: Full Code            Diet: Snacks/Supplements Adult: Nepro Oral Supplement; With Meals  Advance Diet as Tolerated: Clear Liquid Diet    DVT Prophylaxis: heparin gtt  Zaman Catheter: PRESENT, indication: Acute retention or obstruction, Acute retention or obstruction  Lines: None     Cardiac Monitoring: None  Code Status: Full Code      Clinically Significant Risk Factors        # Hyperkalemia: Highest K = 5.7 mmol/L in last 2 days, will monitor as appropriate  # Hyponatremia: Lowest Na = 133 mmol/L in last 2 days, will monitor as appropriate       # Hypoalbuminemia: Lowest albumin = 3 g/dL at 3/20/2025  3:32 AM, will monitor as appropriate               # DMII: A1C = 7.9 % (Ref range: <5.7 %) within past 6 months    # Severe Malnutrition: based on nutrition assessment and treatment provided per dietitian's recommendations.    # Financial/Environmental Concerns: none         Social Drivers of Health    Tobacco Use: Medium Risk (7/26/2024)    Received from [facility]    Patient History     Smoking Tobacco Use: Former     Smokeless Tobacco Use: Never          Disposition Plan     Medically Ready for Discharge: Anticipated in 2-4 Days             Roxann Lopez MD  Hospitalist Service, GOLD TEAM 7  [facility]  Securely message with [employer] (more info)  Text page via [employer] Paging/Directory   See signed in provider for up to date coverage information  ______________________________________________________________________    Interval History   NAEO  Patient feels well this morning. Pain is well controlled     Physical Exam   Vital Signs: Temp:  99.9  F (37.7  C) Temp src: Oral BP: 108/65 Pulse: 76   Resp: 22 SpO2: 98 % O2 Device: None (Room air) Oxygen Delivery: 2 LPM  Weight: 161 lbs 13.08 oz    General: awake, alert, non-toxic appearing, no acute distress   HEENT: normocephalic, atraumatic with moist mucous membranes  Respiratory: comfortable conversational breathing on room air  Cardiac: RRR  Abdomen: Non-distended. Soft, non-tender to palpation   Extremities: L leg AKA. R leg warm, necrotic toe tips     Medical Decision Making       40 MINUTES SPENT BY ME on the date of service doing chart review, history, exam, documentation & further activities per the note.      Data     I have personally reviewed the following data over the past 24 hrs:    10.2  \   8.9 (L)   / 250     133 (L) 102 32.3 (H) /  174 (H)   5.7 (H) 20 (L) 1.47 (H) \     Procal: N/A CRP: N/A Lactic Acid: 1.5         Imaging results reviewed over the past 24 hrs:   No results found for this or any previous visit (from the past 24 hours).

## 2025-03-21 NOTE — PROGRESS NOTES
"CLINICAL NUTRITION SERVICES - REASSESSMENT NOTE     RECOMMENDATIONS FOR MDs/PROVIDERS TO ORDER:  Consider scheduling bowel meds, last documented BM per I/O on 3/17    Consider diet modification to 2 gram potassium, with current K+ elevation per labs (5.7 mmol/L today)    Registered Dietitian Interventions:  Encourage PO intake  Continue Nepro shakes BID to optimize PO intake (renal/low potassium shakes)    Future/Additional Recommendations:  Monitor labs, weight trends vs fluid status, intake trends/supplement needs     INFORMATION OBTAINED  Assessed patient in room.    CURRENT NUTRITION ORDERS  Diet: Clear Liquid \"advance as tolerated\"   Snacks/Supplements: Nepro \"with and between meals\"       CURRENT INTAKE/TOLERANCE  - PO Intake: % intake documented for most meals, per I/O since last assessment. Patient reports appetite is fair; being encouraged to eat by providers. Is enjoying the protein shakes being sent and would like to continue receiving these, BID. Obtained flavor preferences and will make adjustments to orders based on preferences. Continue to encourage PO intake.      NEW FINDINGS  Weight:   Weight on upward trend since admission. Last standing scale measurement 66.4 kg (146 lb 9.7 oz). Likely confounded by fluid status.   Skin/wounds: Reviewed    Most recent WOCN note 310/25 - see note for detail (R 3rd digit, frost bite, initial assessment)  GI symptoms: Reviewed    Last documented BM on 3/17; not currently on a scheduled bowel regimen  Nutrition-relevant labs: Reviewed; elevated potassium levels 3/21  Nutrition-relevant medications: Reviewed    ASSESSED NUTRITION NEEDS - no change since last assessment, see note 3/14    MALNUTRITION  % Intake: No decreased intake noted  % Weight Loss: Unable to assess  confounded by fluid status  Subcutaneous Fat Loss: Orbital: Mild and Triceps: Moderate  Muscle Loss: Temples (temporalis muscle): Mild and Clavicles (pectoralis and deltoids): Moderate  Fluid " Accumulation/Edema: Mild, 1+  Malnutrition Diagnosis: Severe malnutrition in the context of acute illness or injury  Malnutrition Present on Admission: Yes    EVALUATION OF THE PROGRESS TOWARD GOALS   Previous Goals  Patient to consume % of nutritionally adequate meal trays TID, or the equivalent with supplements/snacks.   Evaluation: Progressing    Previous Nutrition Diagnosis  Predicted inadequate oral intake related prolonged LOS and AMS   Evaluation: No change    NUTRITION DIAGNOSIS  Predicted inadequate nutrient intake (energy/protein)  related to prolonged LOS, AMS as evidenced by reliance on PO diet to meet 100% needs    INTERVENTIONS  Medical food supplement therapy    GOALS  Patient to consume % of nutritionally adequate meal trays TID, or the equivalent with supplements/snacks.    Monitoring/Evaluation      Progress toward goals will be monitored and evaluated per policy.    Tashi Paz, MS, RDN, LD, CNSC  Available by [employer] or phone   [place]: M-F (7:00-3:30)  6B Clinical Dietitian   Weekend/Holiday (7:00-3:30) - Weekend Clinical Dietitian   6B RD desk: [phone]       **Clinical Dietitians are no longer available by pager.

## 2025-03-21 NOTE — PROGRESS NOTES
Vascular Surgery Progress Note    Doing good. Pain controlled.    NAD  Right groin under prevena with good seal and no signs of bleeding  Right graft palp, AT/DP biphasic  Lateral incision in dressing without signs of bleeding  Neuro at baseline    POD 1 right CFA-AT bypass using spliced cryoartery    [facility] for close monitoring and q2   Podiatry consulted -- Dr. Arreola  CMS/neurovascular checks  Aspirin 81 mg  Heparin to low intensity without boluses starting at 500 units/hr and ramp up slowly  Daily labs  Up with assist/PT/OT    Nikki Lozano MD  Vascular Surgery Fellow

## 2025-03-21 NOTE — PROGRESS NOTES
Vascular Surgery Progress Note  Surgery Cross-Cover  Post Op Check    03/20/2025    Tad Zaman is a 64 year old male POD#0 s/p Procedure(s):  Right Femoral Anterior Tibial Bypass using Cryo preserve artery  COMPLETION ANGIOGRAM for Pre-Op Diagnosis Codes:      * PAD (peripheral artery disease) [I73.9]     * Gangrenous toe (H) [I96]    Pt reports their pain is controlled with current regimen. Denies nausea, SOB, chest pain, or dizziness. Patient is not passing flatus or having bowel movements and Is voiding spontaneously. No complaints at this time. Family at bedside.     /76 (BP Location: Right arm)   Pulse 75   Temp 98.1  F (36.7  C) (Oral)   Resp 16   Ht 1.829 m (6')   Wt 72.9 kg (160 lb 11.5 oz)   SpO2 98%   BMI 21.80 kg/m      Gen: A&O x4, NAD   Chest: breathing non-labored on RA  Abdomen: soft, non-tender, non-distended  Incision: distal incision covered with bandage, no strikethrough. Proximal covered with pravena that is holding suction and is functioning without issue.   Extremities: warm and well perfused; dopplered signals to RLE - Right graft palp, AT/DP biphasic   Devices: R groin pravena functioning properly    A/P:   No acute post-op issues.   Continue plan of care per primary team.    [facility] for close monitoring and q2 CMS/neurovascular checks  Aspirin 81 mg  Heparin to resume at straight rate of 500 units/hr at 6 pm  Labs tomorrow AM  Bedrest and mg in tonight  Please call with any questions.    Tristan Sevilla DO

## 2025-03-21 NOTE — PROGRESS NOTES
[facility]  Transplant Nephrology Progress Note  Date of Admission:  2/25/2025  Today's Date: 03/21/2025  Requesting physician: Basilio Martines MD    Recommendations:   - Please increase tacrolimus dose to 2 mg bid given low level  - Can give lokelma 10 g x 1 for hyperkalemia  - Low potassium diet  - Increase sodium bicarbonate to 1300 mg tid  - Can resume torsemide tomorrow if needed    Assessment & Plan   # DDKT: Stable from presumed baseline of 1.8 to 2 mg/dL.    - Baseline Creatinine: ~ 1.8-2 as per inpatient notes.    - Proteinuria:  Unknown   - DSA Hx: Unknown due to being transplanted at another Transplant Center   - Last cPRA: Unknown/   - BK Viremia: Not checked recently due to time from transplant   - Kidney Tx Biopsy Hx:  Unknown .    # Immunosuppression: Tacrolimus immediate release (goal 4-6) and Mycophenolate mofetil (dose 500 mg every 12 hours)   - Induction with Recent Transplant:  Not known due to time from transplant   - Continue with intensive monitoring of immunosuppression for efficacy and toxicity.   - Historical Changes in Immunosuppression: None   - Changes: Yes - increase dose to 2 mg bid given low level    # Infection Prevention:   Last CD4 Level: Unknown  - PJP: None      - CMV IgG Ab High Risk Discordance (D+/R-) at time of transplant: Unknown  Present CMV Serostatus: Unknown  - EBV IgG Ab High Risk Discordance (D+/R-) at time of transplant: Unknown  Present EBV Serostatus: Unknown    # Blood Pressure / volume: Controlled, but low at times;  Goal BP: < 140/90 (Hospitalization goal)   - Changes: Not at this time    # Diabetes: Borderline control (HbA1c 7-9%)  Last HbA1c: 7.9 (3/3/2025).    # Anemia in Chronic Renal Disease: Hgb: Stable, near normal      SIOBHAN: No   - Iron studies: Not checked recently    # Mineral Bone Disorder:    - Secondary renal hyperparathyroidism; PTH level: Not checked recently        On treatment:  Calcitriol  (currently on hold for borderline hypercalcemia)  - Vitamin D; level: Not checked recently        On supplement: No  - Calcium; level: Normal        On supplement: No  - Phosphorus; level: Normal        On supplement: No    # Electrolytes:   - Potassium; level: High        On binder: No. Can give lokelma 10 g x 1 for hyperkalemia. Would also recommend low potassium diet.  - Magnesium; level: Normal        On supplement: No  - Bicarbonate; level: Low        On supplement: Yes, would increase sodium bicarbonate to 1300 mg tid    # AMS: [facility].     # Other Significant PMH:   - Atrial flutter: On Eliquis.   - HFrEF: presumed ischemic given past maricel pre-transplant with area of ischemia and possible history of stent. On Toprol, jardiance and torsemide. With new drop in LVEF to ~ 45%, he may need further work-up.    - CAD: Possible history of stent.    - PVD s/p left AKA   - Sickle cell trait   - History of hepatitis C.    - Urinary Retention: - Tamsulosin 0.4mg PO initiated on 3/4    # Transplant History:  Etiology of Kidney Failure: Hypertension and diabetes.  Tx: DDKT  Transplant: N/A  Significant transplant-related complications: None    Seen and discussed with ASHLEY Rod-C          Physician Attestation     I saw and evaluated Tad Zaman as part of a shared APRN/PA visit.     I personally reviewed the vital signs, medications, labs, and imaging.    I personally provided a substantive portion of care for this patient and I approve the care plan as written by the REYNA.  I was involved with Medical Decision Making including: Please see A&P for additional details of medical decision making.  MANAGEMENT DISCUSSED with the following over the past 24 hours:  stable renal function. increase tacrolimus to 2 mg po bid, repeat trough 3/23, lokelma 10 g po x 1.      Mitzy Villarreal MD  Date of Service (when I saw the patient):  03/21/25    Interval History:  Mr. Callahan creatinine is 1.47 (03/21 0451); Stable.  Good urine output.  Other significant labs/tests/vitals: bicarb 20, K+ 5.7. Tacrolimus level 3.6 with 9 hr trough.  No events overnight. S/p right CFA-AT bypass yesterday.  No chest pain or shortness of breath.  No leg swelling.  No nausea and vomiting.  No fever, sweats or chills.    MEDICATIONS:  Current Facility-Administered Medications   Medication Dose Route Frequency Provider Last Rate Last Admin    [Held by provider] apixaban ANTICOAGULANT (ELIQUIS) tablet 5 mg  5 mg Oral BID Nikki Lozano MD   5 mg at 03/11/25 0832    aspirin (ASA) chewable tablet 81 mg  81 mg Oral Daily Nikki Lozano MD   81 mg at 03/21/25 0938    atorvastatin (LIPITOR) tablet 40 mg  40 mg Oral QPM Nikki Lozano MD   40 mg at 03/20/25 2047    [Held by provider] calcitRIOL (ROCALTROL) capsule 0.25 mcg  0.25 mcg Oral Daily Nikki Lozano MD   0.25 mcg at 02/26/25 0822    [Held by provider] empagliflozin (JARDIANCE) tablet 10 mg  10 mg Oral Daily Nikki Lozano MD   10 mg at 03/13/25 0838    ferrous sulfate (FEROSUL) tablet 325 mg  325 mg Oral Daily Nikki Lozano MD   325 mg at 03/21/25 0938    insulin aspart (NovoLOG) injection (RAPID ACTING)   Subcutaneous Daily with breakfast Nikki Lozano MD        insulin aspart (NovoLOG) injection (RAPID ACTING)   Subcutaneous Daily with lunch Nikki Lozano MD        insulin aspart (NovoLOG) injection (RAPID ACTING)   Subcutaneous Daily with supper Nikki Lozano MD        insulin aspart (NovoLOG) injection (RAPID ACTING)  1-10 Units Subcutaneous TID AC Nikki Lozano MD   4 Units at 03/19/25 1833    insulin aspart (NovoLOG) injection (RAPID ACTING)  1-7 Units Subcutaneous At Bedtime Nikki Lozano MD   2 Units at 03/20/25 2212    insulin glargine (LANTUS PEN) injection 10 Units  10 Units Subcutaneous Daily Nikki Lozano MD   10 Units at 03/20/25 1828    latanoprost (XALATAN) 0.005 % ophthalmic solution 1 drop  1  drop Both Eyes At Bedtime Nikki Lozano MD   1 drop at 25    Lidocaine (LIDOCARE) 4 % Patch 1 patch  1 patch Transdermal Q24h Nikki Lozano MD   1 patch at 25    mycophenolate (GENERIC EQUIVALENT) tablet 500 mg  500 mg Oral BID IS Nikki Lozano MD   500 mg at 25 09    pantoprazole (PROTONIX) EC tablet 40 mg  40 mg Oral QAM AC Nikki Lozano MD   40 mg at 25 0938    sodium bicarbonate tablet 1,300 mg  1,300 mg Oral BID Nikki Lozano MD   1,300 mg at 25 0938    sodium chloride (PF) 0.9% PF flush 3 mL  3 mL Intracatheter Q8H Nikki Lozano MD   3 mL at 25 0033    sodium chloride (PF) 0.9% PF flush 3 mL  3 mL Intracatheter Q8H ELIU Nikki Lozano MD   3 mL at 25 0503    sodium chloride (PF) 0.9% PF flush 3 mL  3 mL Intracatheter Q8H Nikki Lozano MD   3 mL at 25 2211    tacrolimus (GENERIC EQUIVALENT) capsule 1.5 mg  1.5 mg Oral QAM Nikki Lozano MD   1.5 mg at 25 0938    tacrolimus (GENERIC EQUIVALENT) capsule 2 mg  2 mg Oral QPM Nikki Lozano MD   2 mg at 25 1823    tamsulosin (FLOMAX) capsule 0.4 mg  0.4 mg Oral Daily Nikki Lozano MD   0.4 mg at 25 0938    [Held by provider] torsemide (DEMADEX) tablet 10 mg  10 mg Oral BID Nikki Lozano MD   10 mg at 25 0928     Current Facility-Administered Medications   Medication Dose Route Frequency Provider Last Rate Last Admin    Continuing statin from home medication list OR statin order already placed during this visit   Does not apply DOES NOT GO TO Nikki Coppola MD        heparin 25,000 units in 0.45% NaCl 250 mL ANTICOAGULANT infusion  0-5,000 Units/hr Intravenous Continuous Nikki Lozano MD 8 mL/hr at 25 0930 800 Units/hr at 25 0930    lactated ringers infusion   Intravenous Continuous Nikki Lozano MD           Physical Exam   Temp  Av.2  F (36.8  C)  Min: 97.8  F (36.6  C)  Max: 98.5  F (36.9  C)      Pulse  Av.2  Min: 81  Max: 95 Resp  Av.7   Min: 16  Max: 18  SpO2  Av.3 %  Min: 96 %  Max: 99 %     /65 (BP Location: Left arm)   Pulse 78   Temp 100.4  F (38  C) (Oral)   Resp 20   Ht 1.829 m (6')   Wt 73.4 kg (161 lb 13.1 oz)   SpO2 94%   BMI 21.95 kg/m     Date 25 0700 - 25 0659   Shift 6075-8872 7644-9038 0378-4108 24 Hour Total   INTAKE   P.O. 480   480   Shift Total(mL/kg) 480(7.79)   480(7.79)   OUTPUT   Shift Total(mL/kg)       Weight (kg) 61.6 61.6 61.6 61.6      Admit Weight: 61.6 kg (135 lb 12.9 oz)     GENERAL APPEARANCE: alert and interactive. Sitting in bed.   EDEMA: no LE edema  ABDOMEN: soft, nondistended, nontender, bowel sounds normal  MS: LEFT AKA.   SKIN: right toes with wrap in place, not removed for this exam  TX KIDNEY: non-tender  DIALYSIS ACCESS:  RUE AV fistula with bruit and thrill.    Data   All labs reviewed by me.  CMP  Recent Labs   Lab 25  0945 25  0812 25  0612 25  0451 25  1413 25  1319 25  1224 25  1118 25  0709 25  0332 25  1033 25  0623 25  1724 25  1336 25  0905 25  0721 25  0814 25  0712   NA  --   --   --  133*  --  138 139 138   < > 137  --  138  --  138  --  138  --  138   POTASSIUM  --   --   --  5.7*  --  5.0 4.9 4.7   < > 4.9  --  4.9  --  5.2  --  5.5*  --  5.1   CHLORIDE  --   --   --  102  --   --   --   --   --  104  --  107  --  106  --  107  --  106   CO2  --   --   --  20*  --   --   --   --   --  23  --  21*  --  21*  --  22  --  20*   ANIONGAP  --   --   --  11  --   --   --   --   --  10  --  10  --  11  --  9  --  12   * 126* 123* 121*   < > 131* 136* 158*   < > 202*   < > 209*   < > 209*   < > 179*   < > 179*   BUN  --   --   --  32.3*  --   --   --   --   --  34.8*  --  37.5*  --  38.6*  --  38.7*  --  37.0*   CR  --   --   --  1.47*  --   --   --   --   --  1.59*  --  1.52*  --  1.54*  --  1.56*  --  1.60*   GFRESTIMATED  --   --   --  52*  --   --   --   --    --  48*  --  50*  --  49*  --  49*  --  47*   QUE  --   --   --  9.6  --   --   --   --   --  9.4  --  9.3  --  9.8  --  9.7  --  9.8   MAG  --   --   --  1.7  --   --   --   --   --  1.7  --  1.8  --   --   --   --   --   --    PHOS  --   --   --  2.8  --   --   --   --   --  2.4*  --  2.5  --   --   --  3.2  --  3.1   ALBUMIN  --   --   --   --   --   --   --   --   --  3.0*  --  3.1*  --   --   --  3.1*  --  3.4*    < > = values in this interval not displayed.     CBC  Recent Labs   Lab 03/21/25  0813 03/21/25  0452 03/20/25  1433 03/20/25  1319 03/20/25  0922 03/20/25  0332 03/19/25  0623   HGB 8.9* 8.9* 8.9* 9.4*   < > 9.0* 8.8*   WBC 10.2 10.0 7.0  --   --  6.4 7.1   RBC 3.43* 3.52* 3.53*  --   --  3.50* 3.50*   HCT 29.2* 28.7* 29.2*  --   --  29.5* 28.9*   MCV 85 82 83  --   --  84 83   MCH 25.9* 25.3* 25.2*  --   --  25.7* 25.1*   MCHC 30.5* 31.0* 30.5*  --   --  30.5* 30.4*   RDW 15.5* 15.6* 15.7*  --   --  15.2* 15.4*    184  --   --   --  266 269    < > = values in this interval not displayed.     INR  Recent Labs   Lab 03/17/25  0712 03/16/25  0043 03/15/25  1431 03/15/25  0650   PTT 30 76* 73* 45*     ABG  Recent Labs   Lab 03/20/25  1319 03/20/25  1224 03/20/25  1118 03/20/25  1035   PH 7.40 7.40 7.44 7.48*   PCO2 41 41 37 34*   PO2 139* 129* 146* 143*   HCO3 25 25 25 25   O2PER 35.0 35.0 35.0 40.0      Urine Studies  Recent Labs   Lab Test 03/03/25  1508   COLOR Light Yellow   APPEARANCE Clear   URINEGLC >=1000*   URINEBILI Negative   URINEKETONE Negative   SG 1.016   UBLD Negative   URINEPH 6.5   PROTEIN Negative   NITRITE Negative   LEUKEST Trace*   RBCU 3*   WBCU 8*     No lab results found.  PTH  No lab results found.  Iron Studies  Recent Labs   Lab Test 03/10/25  0744   IRON 23*   *   IRONSAT 17   HAMMAD 1,062*

## 2025-03-21 NOTE — PLAN OF CARE
Vitals: /79 (BP Location: Left arm)   Pulse 79   Temp 99  F (37.2  C) (Oral)   Resp 16   Ht 1.829 m (6')   Wt 73.4 kg (161 lb 13.1 oz)   SpO2 96%   BMI 21.95 kg/m    BMI= Body mass index is 21.95 kg/m .     Neuro: A&Ox1. Disoriented to place time and situation  Cardiac: Afebrile, SR. VSS.   Respiratory: Sating > 95% on RA.  GI/: Zaman Cath, Adequate urine output. No BM this shift  Diet/appetite: Tolerating Clear liquid diet. Eating well.  Activity:  Assist of 2, Bedrest, Not Out of bed  Pain: Denies pain or discomfort   Skin: No new deficits noted.  LDA's: L PIV X 2 Heparin Drip & SL    Plan: Continue with POC. Notify primary team with changes. Q 2 RLE CMS's  Goal Outcome Evaluation:      Plan of Care Reviewed With: patient, spouse    Overall Patient Progress: no changeOverall Patient Progress: no change    Outcome Evaluation: R LE Q 2 CMS checks in progress doppler pulses present.

## 2025-03-21 NOTE — CONSULTS
Podiatry Consult Note    Date: March 21, 2025      ASSESSMENT/PLAN:  Patient was seen and evaluated for right toe gangrene    The gangrene itself is stable at this time.  He has been recently revascularized and the wound on the dorsal aspect of the right second toe with good bleeding.  At this time it does appear that he has improved flow into the distal foot on the side with some healing potential.    Would recommend simple local wound care monitoring the dry gangrene areas.  Can follow-up outpatient in clinic, I can help with this once he discharges.  The dry gangrene areas will likely auto amputate especially if we restored flow at the margin between dry gangrene and healthy tissue.    Educated patient on signs and symptoms of infection, if these areas do become soupy/wet, malodorous or there is extreme redness and swelling this would be concern for wet gangrene at this time that would require surgical debridement and amputation for source control.    Podiatry can follow peripherally if there are any needs please let me know via pager Rodriguez, I will not round on him regularly      Surgery: None indicated at this time continue simple local wound cares to the right dry gangrene areas  Activity/WB: As tolerated  DME: None  Antibiotics: per team/ID  Labs: No specific labs from a podiatry standpoint if concern for infection could complete infectious workup, on exam I did not have any concerns for infection at this time  Imaging: None needed  Diet: He can have a diet  Dressing: Apply Betadine to the right dorsal toe wound, can paint the dry gangrene areas with Betadine as well and cover with a light layer of gauze, can also secure with a very light layer of 2 inch Kerlix and tape, please avoid any sort of major compression to these areas  Dispo: Per team, podiatry has no specific dispo needs    OBJECTIVE:    IMAGING:  No specific imaging for this consult    /62 (Cuff Size: Adult Regular)   Pulse 86   Temp 99.2  " F (37.3  C) (Oral)   Resp 21   Ht 1.829 m (6')   Wt 73.4 kg (161 lb 13.1 oz)   SpO2 99%   BMI 21.95 kg/m      Lab Results   Component Value Date    WBC 10.2 03/21/2025     Lab Results   Component Value Date    RBC 3.43 03/21/2025     Lab Results   Component Value Date    HGB 8.9 03/21/2025     Lab Results   Component Value Date    HCT 29.2 03/21/2025     Lab Results   Component Value Date    MCV 85 03/21/2025     Lab Results   Component Value Date    MCH 25.9 03/21/2025     Lab Results   Component Value Date    MCHC 30.5 03/21/2025     Lab Results   Component Value Date    RDW 15.5 03/21/2025     Lab Results   Component Value Date     03/21/2025         No results found for: \"SED\"  No results found for: \"CRPI\"      PHYSICAL EXAM:    General: Patient is in NAD and is AAOx4, communicates appropriately, he does have a below the knee amputation on the left side    Derm: Skin to the right lower extremity is slightly shiny, slightly dry on the plantar foot and cool to touch distally.  He has dry gangrene necrosis to the distal aspect of the 1st through 3rd digit on the right side, I do not appreciate any cellulitis, edema or expressible purulence.  On the dorsal side of the right third toe at the level of the PIPJ he does have a wound, after debridement of the eschar There is a mostly granular and bleeding base now, there was no expressible purulence                Vascular:  DP pulse is he has a palpable pulse on the right side  PT pulse is difficult to palpate  Cap refill is sluggish to toes 1-3 on the right side but less than 3 seconds to 4-5 on the right  Pedal hair is absent on the right side      MSK:  MMT is 5 out of 5 to the right, he has a below the knee amputation on the left side    Neuro: Gross sensation is diminished via light touch to pedal nerve branches right        HPI:  Tad Zaman is a pleasant 64-year-old gentleman with PMH significant for T2DM (A1C 6.9% in 2024), HFrEF, HTN, MI, HLD, " ESRD s/p renal transplant (9/2022), CKD stage 3b (creat baseline 1.8-2.3), left BKA, and PAD recently admitted with altered mental status (resolved) found to have blue toes on RLE, concern for worsening PAD.   Patient is now postop day 1 from right CFA-AT bypass by vascular surgery team on the right side.  Podiatry consulted for dry gangrene to the tips of the toes on his right foot, consulted for help with management recommendations for care of this.    Past Medical History:   Diagnosis Date    Chronic kidney disease     Diabetes (H)     Hypertension     Myocardial infarction (H)      Social Connections: Not on file        Allergies   Allergen Reactions    Penicillins Hives     Past Surgical History:   Procedure Laterality Date    ANGIOGRAM Right 3/17/2025    Procedure: ANGIOGRAM;  Surgeon: Messi Irving MD;  Location: [facility] OR    ANGIOPLASTY Right 3/17/2025    Procedure: ANGIOPLASTY;  Surgeon: Messi Irving MD;  Location: [facility] OR    IR OR ANGIOGRAM  3/17/2025     History reviewed. No pertinent family history.

## 2025-03-22 LAB
ALBUMIN UR-MCNC: NEGATIVE MG/DL
ANION GAP SERPL CALCULATED.3IONS-SCNC: 9 MMOL/L (ref 7–15)
APPEARANCE UR: CLEAR
BACTERIA #/AREA URNS HPF: ABNORMAL /HPF
BILIRUB UR QL STRIP: NEGATIVE
BUN SERPL-MCNC: 29.2 MG/DL (ref 8–23)
CALCIUM SERPL-MCNC: 9.7 MG/DL (ref 8.8–10.4)
CHLORIDE SERPL-SCNC: 105 MMOL/L (ref 98–107)
COLOR UR AUTO: ABNORMAL
CREAT SERPL-MCNC: 1.49 MG/DL (ref 0.67–1.17)
EGFRCR SERPLBLD CKD-EPI 2021: 51 ML/MIN/1.73M2
GLUCOSE SERPL-MCNC: 178 MG/DL (ref 70–99)
GLUCOSE UR STRIP-MCNC: 300 MG/DL
HCO3 SERPL-SCNC: 23 MMOL/L (ref 22–29)
HGB UR QL STRIP: NEGATIVE
KETONES UR STRIP-MCNC: NEGATIVE MG/DL
LEUKOCYTE ESTERASE UR QL STRIP: ABNORMAL
NITRATE UR QL: NEGATIVE
PH UR STRIP: 7 [PH] (ref 5–7)
POTASSIUM SERPL-SCNC: 4.8 MMOL/L (ref 3.4–5.3)
RBC URINE: <1 /HPF
SODIUM SERPL-SCNC: 137 MMOL/L (ref 135–145)
SP GR UR STRIP: 1.01 (ref 1–1.03)
TRANSITIONAL EPI: <1 /HPF
UFH PPP CHRO-ACNC: 0.12 IU/ML
UFH PPP CHRO-ACNC: <0.1 IU/ML
UROBILINOGEN UR STRIP-MCNC: NORMAL MG/DL
WBC URINE: 36 /HPF

## 2025-03-22 PROCEDURE — 120N000003 HC R&B IMCU UMMC

## 2025-03-22 PROCEDURE — 99233 SBSQ HOSP IP/OBS HIGH 50: CPT | Mod: 24 | Performed by: INTERNAL MEDICINE

## 2025-03-22 PROCEDURE — 85520 HEPARIN ASSAY: CPT

## 2025-03-22 PROCEDURE — 250N000012 HC RX MED GY IP 250 OP 636 PS 637

## 2025-03-22 PROCEDURE — 99418 PROLNG IP/OBS E/M EA 15 MIN: CPT

## 2025-03-22 PROCEDURE — 250N000013 HC RX MED GY IP 250 OP 250 PS 637: Performed by: STUDENT IN AN ORGANIZED HEALTH CARE EDUCATION/TRAINING PROGRAM

## 2025-03-22 PROCEDURE — 81001 URINALYSIS AUTO W/SCOPE: CPT

## 2025-03-22 PROCEDURE — 250N000011 HC RX IP 250 OP 636: Performed by: STUDENT IN AN ORGANIZED HEALTH CARE EDUCATION/TRAINING PROGRAM

## 2025-03-22 PROCEDURE — 87186 SC STD MICRODIL/AGAR DIL: CPT

## 2025-03-22 PROCEDURE — 250N000012 HC RX MED GY IP 250 OP 636 PS 637: Performed by: STUDENT IN AN ORGANIZED HEALTH CARE EDUCATION/TRAINING PROGRAM

## 2025-03-22 PROCEDURE — 99233 SBSQ HOSP IP/OBS HIGH 50: CPT

## 2025-03-22 PROCEDURE — 250N000013 HC RX MED GY IP 250 OP 250 PS 637

## 2025-03-22 PROCEDURE — 80048 BASIC METABOLIC PNL TOTAL CA: CPT | Performed by: INTERNAL MEDICINE

## 2025-03-22 RX ORDER — DEXTROSE MONOHYDRATE 25 G/50ML
25-50 INJECTION, SOLUTION INTRAVENOUS
Status: DISCONTINUED | OUTPATIENT
Start: 2025-03-22 | End: 2025-03-26 | Stop reason: HOSPADM

## 2025-03-22 RX ORDER — METFORMIN HYDROCHLORIDE 500 MG/1
500 TABLET, EXTENDED RELEASE ORAL
Status: DISCONTINUED | OUTPATIENT
Start: 2025-03-22 | End: 2025-03-26 | Stop reason: HOSPADM

## 2025-03-22 RX ORDER — NICOTINE POLACRILEX 4 MG
15-30 LOZENGE BUCCAL
Status: DISCONTINUED | OUTPATIENT
Start: 2025-03-22 | End: 2025-03-26 | Stop reason: HOSPADM

## 2025-03-22 RX ORDER — TAMSULOSIN HYDROCHLORIDE 0.4 MG/1
0.8 CAPSULE ORAL DAILY
Status: DISCONTINUED | OUTPATIENT
Start: 2025-03-23 | End: 2025-03-26 | Stop reason: HOSPADM

## 2025-03-22 RX ADMIN — MYCOPHENOLATE MOFETIL 500 MG: 500 TABLET, FILM COATED ORAL at 08:01

## 2025-03-22 RX ADMIN — MYCOPHENOLATE MOFETIL 500 MG: 500 TABLET, FILM COATED ORAL at 20:07

## 2025-03-22 RX ADMIN — EMPAGLIFLOZIN 10 MG: 10 TABLET, FILM COATED ORAL at 15:33

## 2025-03-22 RX ADMIN — METFORMIN ER 500 MG 500 MG: 500 TABLET ORAL at 17:40

## 2025-03-22 RX ADMIN — TAMSULOSIN HYDROCHLORIDE 0.4 MG: 0.4 CAPSULE ORAL at 08:01

## 2025-03-22 RX ADMIN — PANTOPRAZOLE SODIUM 40 MG: 40 TABLET, DELAYED RELEASE ORAL at 08:01

## 2025-03-22 RX ADMIN — SODIUM BICARBONATE 1300 MG: 650 TABLET ORAL at 08:01

## 2025-03-22 RX ADMIN — FERROUS SULFATE TAB 325 MG (65 MG ELEMENTAL FE) 325 MG: 325 (65 FE) TAB at 08:00

## 2025-03-22 RX ADMIN — ASPIRIN 81 MG CHEWABLE TABLET 81 MG: 81 TABLET CHEWABLE at 08:00

## 2025-03-22 RX ADMIN — TACROLIMUS 2 MG: 1 CAPSULE ORAL at 08:01

## 2025-03-22 RX ADMIN — SENNOSIDES AND DOCUSATE SODIUM 1 TABLET: 50; 8.6 TABLET ORAL at 08:02

## 2025-03-22 RX ADMIN — TACROLIMUS 2 MG: 1 CAPSULE ORAL at 17:39

## 2025-03-22 RX ADMIN — ATORVASTATIN CALCIUM 40 MG: 40 TABLET, FILM COATED ORAL at 20:07

## 2025-03-22 RX ADMIN — LIDOCAINE 4% 1 PATCH: 40 PATCH TOPICAL at 20:07

## 2025-03-22 RX ADMIN — SODIUM BICARBONATE 1300 MG: 650 TABLET ORAL at 20:07

## 2025-03-22 RX ADMIN — HEPARIN SODIUM 1550 UNITS/HR: 10000 INJECTION, SOLUTION INTRAVENOUS at 22:26

## 2025-03-22 RX ADMIN — LATANOPROST 1 DROP: 50 SOLUTION OPHTHALMIC at 20:08

## 2025-03-22 RX ADMIN — HEPARIN SODIUM 1100 UNITS/HR: 10000 INJECTION, SOLUTION INTRAVENOUS at 02:59

## 2025-03-22 RX ADMIN — SODIUM BICARBONATE 1300 MG: 650 TABLET ORAL at 15:33

## 2025-03-22 ASSESSMENT — ACTIVITIES OF DAILY LIVING (ADL)
ADLS_ACUITY_SCORE: 67
ADLS_ACUITY_SCORE: 68
ADLS_ACUITY_SCORE: 67
ADLS_ACUITY_SCORE: 68
ADLS_ACUITY_SCORE: 67
ADLS_ACUITY_SCORE: 68
ADLS_ACUITY_SCORE: 67
ADLS_ACUITY_SCORE: 67
ADLS_ACUITY_SCORE: 68
ADLS_ACUITY_SCORE: 67

## 2025-03-22 NOTE — PROVIDER NOTIFICATION
Time of notification: 9:33 PM  Provider notified: Nicholas Wheeler  Patient status: Patient is refusing blood sugars/ insulin coverage. Circulating nurse: CA was able to educate patient to take oral medications, however unsuccessful as well as writer to educate on insulin/blood sugars. The circulating nurse is taking over assignment at 11 and will reasses/try to provide coverage.   Temp:  [99  F (37.2  C)-100.4  F (38  C)] 99  F (37.2  C)  Pulse:  [76-91] 91  Resp:  [16-24] 20  BP: ()/(62-79) 127/76  SpO2:  [94 %-100 %] 100 %  Orders received: Document and educate patient on importance of blood sugar/insulin control with kidney transplant.

## 2025-03-22 NOTE — PLAN OF CARE
[id number]    Goal Outcome Evaluation:      Plan of Care Reviewed With: patient    Overall Patient Progress: no changeOverall Patient Progress: no change    Outcome Evaluation: R LE q2h CMS checks continue. Mg to remain in due to retention per primary team. Pt off bedrest and diet advanced today.    Neuro: A&Ox1, to self-- able to say he is in the [facility]    Cardiac: SR. HR 90s, denies any chest pain. Afebrile.  Respiratory: Sating above 94% on RA. Denies SOB  GI/: Adequate urine output via mg for retention. No BM this shift.   Diet/appetite: Tolerating consistent carb, low potassium diet. Eating well. Will get confused and say he has eaten more times than he has. ACHS Bgs.   Activity:  Assist of 1-w/ GB and walker, up to chair and in halls. Assist of 1 w/ gb when pivoting. Able to put on and take off prosthesis on his own.   Pain: At acceptable level on current regimen.   Skin: No new deficits noted.  LDA's: L PIV- heparin gtt @1100 units/hr, L PIV SL    Plan: Continue with POC. Notify primary team with changes. Pt off bedrest today, up with PT. Advanced diet today, see above. Q2 CMS checks to continue per vascular surgery.

## 2025-03-22 NOTE — PLAN OF CARE
[id number]    Goal Outcome Evaluation:      Plan of Care Reviewed With: patient    Overall Patient Progress: improvingOverall Patient Progress: improving    Outcome Evaluation: Doppler checks on RLE, Alert and orientated 2-3. Refusing all needle pokes.    Neuro: A&Ox2-3, to self, place, and time(sometimes). Calls appropriately. Refusing all pokes with needles. Frustrated at times.   Cardiac: SR. HR 70-90s, denies any chest pain. Afebrile.  Respiratory: Sating above 94% on RA. Denies SOB  GI/: Adequate urine output via mg for retention. No BM this shift.   Diet/appetite: Tolerating consistent carb, low potassium diet. Eating well.   Activity:  Assist of 1-w/ GB and walker, up to chair and in halls. Assist of 1 w/ gb when pivoting. Able to put on and take off prosthesis on his own.   Pain: At acceptable level on current regimen.   Skin: No new deficits noted.  LDA's: L PIV- heparin gtt @1550 units/hr, L PIV SL, Prevena vac on R groin site, R shin surgical site- dressing changed, right toes cyanotic- covered in kerlix, R arm AV fistula, L groin site- WNL, L BKA,      Plan: Continue with POC. Notify primary team with changes.    Shift updates:  -Order for OK to draw off of PIV for needed labs because of refusal of needle pokes.  -Insulin and BG orders d/c'd  - Home Jardiance restarted and metformin initiated for DM management  -UA

## 2025-03-22 NOTE — PLAN OF CARE
Goal Outcome Evaluation:    Neuro: A&Ox2 -3. Disoriented with situation and time. Oriented. Forgetful.   Cardiac:VSS.   Respiratory: Sating above 95%  on RA.  GI/: Adequate urine output with FC.   Diet/appetite: Tolerating regular consistent carb diet. Eating well. Refusing poke for BS and insulin admin. MD aware.  Activity:  Assist of 1, turned to sides. Prostesis off.   Pain: At acceptable level on current regimen.   Skin: No new deficits noted.  LDA's: Prevena on right groin site - intact. Right surgical site - shin, primapore no drainage, right toe cyanotic - covered with kerlix. Right arm AV fistula, Left groin site - WNL. Left BKA - wnl, PIV L Sl, heparin gtts - 1100units/hr- refused poking for labs.    Plan:  PICC line.  Continue with POC. Notify primary team with changes.     Plan of Care Reviewed With: patient    Overall Patient Progress: no changeOverall Patient Progress: no change    Outcome Evaluation: Doppler checks on right lower ext. A/o x 2-3. Vitally stable. No complaints of pain. FC with good output. Refused poking and blood draws.

## 2025-03-22 NOTE — PROVIDER NOTIFICATION
Provider notifications  Provider notified: Roxann Lopez    @1026  RN communication: Hey are you able to update the blood sugar orders to achs? Matching the time insulin is done in the mar. He has q2hr BG right now. Thanks!  Orders received:  BG order changed.     @1111  RN communication: I advanced his diet to regular because that is the only thing within my scope to but it too. Per note in nutrition he should probably be moderate consistent CHO since he is diabetic. But I can't change it to that. Thanks  Orders received:  Changed to correct diet order    @1412  RN communication: Hey just confirming you got want his mg removed today as it is POD 1?Per report it's for retention not the surgical procedure. He came with it before the procedure yesterday. Orders say for surgical procedure but they were having to straight cath per a nursing note  -- @1440 When you have a chance could you update it so it isn't accidentally pulled  Orders received: Orders changed to keep mg in due to past retention after voiding.    @1450  RN communication: Also general vital sign orders as he has none when you have time. Thank you so much  Orders received: Order received for vital sign parameters and frequency.   Yes, ordered thanks!    @1635  RN communication: Hey so sorry one more question. Do you want us to continue with the q2h CMS? Or could we transition to q4h?  Orders received: continue CMS per vascular surgery recommendations      @1750  RN communication: A provider just came by and said to remove the mg? Shouldn't we leak it in for now?  Orders received: Leave in mg per primary team and order.   -See above for confirmation by primary team to leave mg in for acute retention.         Temp:  [99  F (37.2  C)-100.4  F (38  C)] 99  F (37.2  C)  Pulse:  [76-91] 91  Resp:  [16-24] 20  BP: ()/(62-79) 127/76  SpO2:  [94 %-100 %] 100 %

## 2025-03-22 NOTE — PROGRESS NOTES
[facility]  Transplant Nephrology Progress Note  Date of Admission:  2/25/2025  Today's Date: 03/22/2025  Requesting physician: Basilio Martines MD    Recommendations:   - send BMP  - repeat 12h tacrolimus trough tomorrow, increased tacrolimus dose to 2 mg bid 3/21  - Low potassium diet  - continue sodium bicarbonate to 1300 mg tid  - Can resume torsemide tomorrow if stable Cr    Assessment & Plan   # DDKT: Stable from presumed baseline of 1.8 to 2 mg/dL.    - Baseline Creatinine: ~ 1.8-2 as per inpatient notes.    - Proteinuria:  Unknown   - DSA Hx: Unknown due to being transplanted at another Transplant Center   - Last cPRA: Unknown/   - BK Viremia: Not checked recently due to time from transplant   - Kidney Tx Biopsy Hx:  Unknown .    # Immunosuppression: Tacrolimus immediate release (goal 4-6) and Mycophenolate mofetil (dose 500 mg every 12 hours)   - Induction with Recent Transplant:  Not known due to time from transplant   - Continue with intensive monitoring of immunosuppression for efficacy and toxicity.   - Historical Changes in Immunosuppression: None   - Changes: Yes - increase dose to 2 mg bid given low level    # Infection Prevention:   Last CD4 Level: Unknown  - PJP: None      - CMV IgG Ab High Risk Discordance (D+/R-) at time of transplant: Unknown  Present CMV Serostatus: Unknown  - EBV IgG Ab High Risk Discordance (D+/R-) at time of transplant: Unknown  Present EBV Serostatus: Unknown    # Blood Pressure / volume: Controlled, but low at times;  Goal BP: < 140/90 (Hospitalization goal)   - Changes: Not at this time    # Diabetes: Borderline control (HbA1c 7-9%)  Last HbA1c: 7.9 (3/3/2025).    # Anemia in Chronic Renal Disease: Hgb: Stable, near normal      SIOBHAN: No   - Iron studies: Not checked recently    # Mineral Bone Disorder:    - Secondary renal hyperparathyroidism; PTH level: Not checked recently        On treatment:  Calcitriol (currently on  hold for borderline hypercalcemia)  - Vitamin D; level: Not checked recently        On supplement: No  - Calcium; level: Normal        On supplement: No  - Phosphorus; level: Normal        On supplement: No    # Electrolytes:   - Potassium; level: High        On binder: No. Can give lokelma 10 g x 1 for hyperkalemia. Would also recommend low potassium diet.  - Magnesium; level: Normal        On supplement: No  - Bicarbonate; level: Low        On supplement: Yes, would increase sodium bicarbonate to 1300 mg tid    # AMS: [facility].     # Other Significant PMH:   - Atrial flutter: On Eliquis.   - HFrEF: presumed ischemic given past maricel pre-transplant with area of ischemia and possible history of stent. On Toprol, jardiance and torsemide. With new drop in LVEF to ~ 45%, he may need further work-up.    - CAD: Possible history of stent.    - PVD s/p left AKA   - Sickle cell trait   - History of hepatitis C.    - Urinary Retention: - Tamsulosin 0.4mg PO initiated on 3/4    # Transplant History:  Etiology of Kidney Failure: Hypertension and diabetes.  Tx: DDKT  Transplant: N/A  Significant transplant-related complications: None    Mitzy Villarreal MD     Interval History:  No events overnight.  No chest pain or shortness of breath.  No leg swelling.  No nausea and vomiting.  No fever, sweats or chills.    MEDICATIONS:  Current Facility-Administered Medications   Medication Dose Route Frequency Provider Last Rate Last Admin    [Held by provider] apixaban ANTICOAGULANT (ELIQUIS) tablet 5 mg  5 mg Oral BID Nikki Lozano MD   5 mg at 03/11/25 0832    aspirin (ASA) chewable tablet 81 mg  81 mg Oral Daily Nikki Lozano MD   81 mg at 03/22/25 0800    atorvastatin (LIPITOR) tablet 40 mg  40 mg Oral QPM Nikki Lozano MD   40 mg at 03/21/25 2058    [Held by provider] calcitRIOL (ROCALTROL) capsule 0.25 mcg  0.25 mcg Oral Daily Nikki Lozano MD   0.25 mcg at 02/26/25 0822     [Held by provider] empagliflozin (JARDIANCE) tablet 10 mg  10 mg Oral Daily Nikki Lozano MD   10 mg at 03/13/25 0838    ferrous sulfate (FEROSUL) tablet 325 mg  325 mg Oral Daily Nikki Lozano MD   325 mg at 03/22/25 0800    insulin aspart (NovoLOG) injection (RAPID ACTING)   Subcutaneous Daily with breakfast Nikki Lozano MD        insulin aspart (NovoLOG) injection (RAPID ACTING)   Subcutaneous Daily with lunch Nikki Lozano MD   4 Units at 03/21/25 1533    insulin aspart (NovoLOG) injection (RAPID ACTING)   Subcutaneous Daily with supper Nikki Lozano MD        insulin aspart (NovoLOG) injection (RAPID ACTING)  1-10 Units Subcutaneous TID AC Nikki Lozano MD   2 Units at 03/21/25 1820    insulin aspart (NovoLOG) injection (RAPID ACTING)  1-7 Units Subcutaneous At Bedtime Nikki Lozano MD   2 Units at 03/20/25 2212    insulin glargine (LANTUS PEN) injection 10 Units  10 Units Subcutaneous Daily Nikki Lozano MD   10 Units at 03/21/25 1820    latanoprost (XALATAN) 0.005 % ophthalmic solution 1 drop  1 drop Both Eyes At Bedtime Nikki Lozano MD   1 drop at 03/21/25 2059    Lidocaine (LIDOCARE) 4 % Patch 1 patch  1 patch Transdermal Q24h Nikki Lozano MD   1 patch at 03/21/25 2057    mycophenolate (GENERIC EQUIVALENT) tablet 500 mg  500 mg Oral BID IS Nikki Lozano MD   500 mg at 03/22/25 0801    pantoprazole (PROTONIX) EC tablet 40 mg  40 mg Oral QAM AC Nikki Lozano MD   40 mg at 03/22/25 0801    sodium bicarbonate tablet 1,300 mg  1,300 mg Oral TID Roxann Lopez MD   1,300 mg at 03/22/25 0801    sodium chloride (PF) 0.9% PF flush 3 mL  3 mL Intracatheter Q8H Nikki Lozano MD   3 mL at 03/21/25 0033    sodium chloride (PF) 0.9% PF flush 3 mL  3 mL Intracatheter Q8H [facility] Nikki Lozano MD   3 mL at 03/22/25 0554    sodium chloride (PF) 0.9% PF flush 3 mL  3 mL Intracatheter Q8H Nikki Lozano MD   3 mL at 03/22/25 0258    tacrolimus (GENERIC EQUIVALENT) capsule 2 mg  2 mg Oral Roxann Katz MD    2 mg at 25 0801    tacrolimus (GENERIC EQUIVALENT) capsule 2 mg  2 mg Oral QPM Nikki Lozano MD   2 mg at 25    tamsulosin (FLOMAX) capsule 0.4 mg  0.4 mg Oral Daily Nikki Lozano MD   0.4 mg at 25 08    [Held by provider] torsemide (DEMADEX) tablet 10 mg  10 mg Oral BID Nikki Lozano MD   10 mg at 25 0928     Current Facility-Administered Medications   Medication Dose Route Frequency Provider Last Rate Last Admin    Continuing statin from home medication list OR statin order already placed during this visit   Does not apply DOES NOT GO TO Nikki Coppola MD        heparin 25,000 units in 0.45% NaCl 250 mL ANTICOAGULANT infusion  0-5,000 Units/hr Intravenous Continuous Nikki Lozano MD 11 mL/hr at 25 0400 1,100 Units/hr at 25 0400    lactated ringers infusion   Intravenous Continuous Nikki Lozano MD           Physical Exam   Temp  Av.2  F (36.8  C)  Min: 97.8  F (36.6  C)  Max: 98.5  F (36.9  C)      Pulse  Av.2  Min: 81  Max: 95 Resp  Av.7  Min: 16  Max: 18  SpO2  Av.3 %  Min: 96 %  Max: 99 %     /43 (BP Location: Right arm)   Pulse 81   Temp 98.7  F (37.1  C) (Oral)   Resp 20   Ht 1.829 m (6')   Wt 71.4 kg (157 lb 6.5 oz)   SpO2 98%   BMI 21.35 kg/m     Date 25 0700 - 25 0659   Shift 5788-3378 9818-5413 1523-5145 24 Hour Total   INTAKE   P.O. 480   480   Shift Total(mL/kg) 480(7.79)   480(7.79)   OUTPUT   Shift Total(mL/kg)       Weight (kg) 61.6 61.6 61.6 61.6      Admit Weight: 61.6 kg (135 lb 12.9 oz)     GENERAL: alert and no distress  EYES: Eyes grossly normal to inspection.  No discharge or erythema, or obvious scleral/conjunctival abnormalities.  RESP: No audible wheeze, cough, or visible cyanosis.    SKIN: Visible skin clear. No significant rash, abnormal pigmentation or lesions.  NEURO: Cranial nerves grossly intact.  Mentation and speech appropriate for age.  DIALYSIS ACCESS:  RUE AV fistula with bruit and  owen.    Data   All labs reviewed by me.  CMP  Recent Labs   Lab 03/21/25  1725 03/21/25  1215 03/21/25  0945 03/21/25  0812 03/21/25  0612 03/21/25  0451 03/20/25  1413 03/20/25  1319 03/20/25  1224 03/20/25  1118 03/20/25  0709 03/20/25  0332 03/19/25  1033 03/19/25  0623 03/18/25  1724 03/18/25  1336 03/18/25  0905 03/18/25  0721 03/17/25  0814 03/17/25  0712   NA  --   --   --   --   --  133*  --  138 139 138   < > 137  --  138  --  138  --  138  --  138   POTASSIUM  --   --   --   --   --  5.7*  --  5.0 4.9 4.7   < > 4.9  --  4.9  --  5.2  --  5.5*  --  5.1   CHLORIDE  --   --   --   --   --  102  --   --   --   --   --  104  --  107  --  106  --  107  --  106   CO2  --   --   --   --   --  20*  --   --   --   --   --  23  --  21*  --  21*  --  22  --  20*   ANIONGAP  --   --   --   --   --  11  --   --   --   --   --  10  --  10  --  11  --  9  --  12   * 112* 121* 126*   < > 121*   < > 131* 136* 158*   < > 202*   < > 209*   < > 209*   < > 179*   < > 179*   BUN  --   --   --   --   --  32.3*  --   --   --   --   --  34.8*  --  37.5*  --  38.6*  --  38.7*  --  37.0*   CR  --   --   --   --   --  1.47*  --   --   --   --   --  1.59*  --  1.52*  --  1.54*  --  1.56*  --  1.60*   GFRESTIMATED  --   --   --   --   --  52*  --   --   --   --   --  48*  --  50*  --  49*  --  49*  --  47*   QUE  --   --   --   --   --  9.6  --   --   --   --   --  9.4  --  9.3  --  9.8  --  9.7  --  9.8   MAG  --   --   --   --   --  1.7  --   --   --   --   --  1.7  --  1.8  --   --   --   --   --   --    PHOS  --   --   --   --   --  2.8  --   --   --   --   --  2.4*  --  2.5  --   --   --  3.2  --  3.1   ALBUMIN  --   --   --   --   --   --   --   --   --   --   --  3.0*  --  3.1*  --   --   --  3.1*  --  3.4*    < > = values in this interval not displayed.     CBC  Recent Labs   Lab 03/21/25  0813 03/21/25  0452 03/20/25  1433 03/20/25  1319 03/20/25  0922 03/20/25  0332 03/19/25  0623   HGB 8.9* 8.9* 8.9* 9.4*   < > 9.0*  8.8*   WBC 10.2 10.0 7.0  --   --  6.4 7.1   RBC 3.43* 3.52* 3.53*  --   --  3.50* 3.50*   HCT 29.2* 28.7* 29.2*  --   --  29.5* 28.9*   MCV 85 82 83  --   --  84 83   MCH 25.9* 25.3* 25.2*  --   --  25.7* 25.1*   MCHC 30.5* 31.0* 30.5*  --   --  30.5* 30.4*   RDW 15.5* 15.6* 15.7*  --   --  15.2* 15.4*    184  --   --   --  266 269    < > = values in this interval not displayed.     INR  Recent Labs   Lab 03/17/25  0712 03/16/25  0043 03/15/25  1431   PTT 30 76* 73*     ABG  Recent Labs   Lab 03/20/25  1319 03/20/25  1224 03/20/25  1118 03/20/25  1035   PH 7.40 7.40 7.44 7.48*   PCO2 41 41 37 34*   PO2 139* 129* 146* 143*   HCO3 25 25 25 25   O2PER 35.0 35.0 35.0 40.0      Urine Studies  Recent Labs   Lab Test 03/03/25  1508   COLOR Light Yellow   APPEARANCE Clear   URINEGLC >=1000*   URINEBILI Negative   URINEKETONE Negative   SG 1.016   UBLD Negative   URINEPH 6.5   PROTEIN Negative   NITRITE Negative   LEUKEST Trace*   RBCU 3*   WBCU 8*     No lab results found.  PTH  No lab results found.  Iron Studies  Recent Labs   Lab Test 03/10/25  0744   IRON 23*   *   IRONSAT 17   HAMMAD 1,062*

## 2025-03-22 NOTE — PROGRESS NOTES
[facility]    Medicine Progress Note - Hospitalist Service, GOLD TEAM 7    Date of Admission:  2/25/2025    Assessment & Plan   Tad Zaman is a 64 year old male admitted on 2/25/2025 for AMS. He has a history of diabetes, HTN, CAD, HF, and renal transplant 9/2022, left AKA, CKD.  Patient was brought in by family for AMS x 1-2 months. He has been evaluated at the [facility] multiple times in ED then was evaluated during an admission 1/20-2/21/25 with very broad workup (see below) that did not amount to any type of notable etiology of his symptoms. During this stay he was also treated for HF exacerbation, MOY, and UTI which were all stable at time of discharge. He finished course of cefdinir for e coli UTI on 2/24. He was discharged with family being told that he has failure to thrive. He was discharged home, required 24 hr cares by daughter including changing him and preparation of food. He has also has had intermittent agitation and aggression. Home health nurse stated that he was not appropriate for their program.      No clear etiology for confusion but the aggression and refusal of cares have clinically improved without intervention. Course complicated by new cyanotic areas on the R toes concerning for progression of PAD. Vascular surgery consulted, details per below for work up of PAD     Today  - continue heparin gtt, Q2H CMS checks  - renal function stable, continue to hold torsemide  - patient refusing all blood draws, glucose finger checks despite multiple attempts by nursing and provider conversations with patient. Discussed picc placement for lab draws which patient also insisted against. Patient does not have decision-making capacity, but would like to avoid forcing patient against his will which would likely require sedating meds at this point   - restarted PTA jardiance at lower dose 10mg daily. Started metformin 500mg QPM,. Stopped all insulin.  Continue hypoglycemic protocol. Switching to oral agents instead of insulin as cannot safely monitor insulin with refusal of POC glucose and for patient comfort. Continue hypoglycemia protocol  - extended discussion with daughter today who notes patient last acted this way when he last had a UTI (eg refusing cares, rude to care team)   - UA ordered   - increase tamsulosin dose to 0.8mg daily tomorrow morning       Peripheral vascular disease/ PAD s/p R femoral anterior tibial bypass 3/20/25   S/p L BKA  Seen by vascular surgery. Angiogram RLE revealed diffuse calcification of SFA into above-knee popliteal. Underwent R femoral anterior tibial bypass 3/20/25.   -Consider aspirin 81mg (started 3/13), and continue with statin (discussing with transplant neph if atorvastatin can be increased). Per Transplant neph, OK to go up to 40 atorvastatin.  - stress test negative for ischemia   -continue heparin gtt   - hold PTA doac   - podiatry consult, follow-up with patient in outpatient podiatry clinic   - woc consulted     AMS  Cognitive impairment vs encephalopathy   3/11 reseen by  psychiatry to see reg decision making capacity and was fel he needed surrogate decision maker for consent for procedures. Presented with 1-2 months of progressive encephalopathy and agitation with aggressive behavior toward family who is caring for him at home. [facility]  -Initial LP with pleocytosis, otherwise unrevealing.   -MRI brain without acute pathology.   -Neurology consulted. Some concern for hx of viral encephalitis that has since resolved leaving him with a degree of impairment. He otherwise remains hemodynamically stable without clinical evidence of infection, no gross electrolyte abnormality, stable glucose (elevated but without acidosis), BUN is improving, tacrolimus levels was  WNL.   [facility]:   - MRI 1/29: Stable mild amount of chronic small vessel ischemia. Small chronic R cerebellar infarct.   -OT  SLUMS 6/30   -EEG: nonspecific encephalopathy.   -LP: protein 173, glucose 110. Cell count with 21 WBC, 41 RBC, 52 lymph. Hazy appearance  -Encephalopathy paraneoplastic evaluation (including Purkinje, ADI, antiglial nucl antibody, NMDA, BOSSMAN) 1/30/25 negative.   -CSF flow cytometry: suboptimal specimen with low cellularity. No immunophenotypic evidence of involvement by lymphoma. [place] light chain 64.8, lambda light chain 35.1, kappa/lambda 1.85 all elevated. No monoclonals detected (1/22)  -ELP/Immunofix, serum panel: total protein low, beta 1 fraction low (1/22/25)  -Polyoma virus DNA of CSF, MAGNUS virus DNA (1/28) negative.   -Meningitis / encephalitis panel (1/28) negative  -CSF angiotensin converting enzyme (1/28) negative negative  -CMV (1/28, 1/25) negative   -BK virus (1/28) negative  -EBV (1/25) negative  -Cryptococcus (1/26) negative; Cryptococcus neoformans (1/28) negative  -Syphilis (1/25) negative  -HIV (1/25) negative  -B12 (1/18) WNL  -TSH (1/18) high side of normal 4.94  - CSF flow cytometry from 3/5/25  rare to absent B cells , no malignant cells seen   - Drug screen negative. Patient has distant history of what sounds to be mild to moderate alcohol intake.   - needs out patient  neuropsych testing as well as op EEG if cognition worsens    - repeat MRI of brain 3/1/25 limited by motion artifact  but no evidence of acute infarct or acute intracranial pathology   - Delirium precautions as able.   - repeat LP per CAPs on 3/5 completed - repeat CSF unremarkable (negative cytometry, cytology negative),  West Nile virus negative , VDRL non reactive , aerobic and anaerobic cultures so far no growth   - He will need follow up with outpatient neurology and consider neuropsychology testing  - Per neuro: can consider EEG in outpatient setting if cognition were to worsen      Renal transplant 9/2022    CKD Stage 3b   [facility] 1/27 renal US with doppler showed no stenosis of vessels. Resistive indices were increased, felt could be related to ureteric obstruction but not specific. Mild dilation of intrarenal collecting system and ureter was also seen. CT a/p 1/28 W/O contrast suggested mild hydro.  - [facility]/3/25 [facility] showed no hydro or other abnormalities. FENA 4.3 (1/28) at [facility].   -  Creat recent baseline appears to be 1.8-2.3  - transplant neph following the patient  Recommendations:   - Continue mycophenolate 500 mg twice a day.  - Hold torsemide, calcitriol and lokelma  - Continue tacrolimus 1.5 mg QAM and 2mg at bedtime (adjusting per transplant Neph)  -Tacro level on 3/9/2025 is 3.6  - Accurate I/O and daily weight  - Will need Urology outpatient follow up for TOV and further management.    - Hold calcitriol given borderline high calcium. Will follow up calcium levels on next renal panel (ordered)  - Ordered UA and UPCR/UACR 3/3/2025 - trace leuk esterase and glucose present. No evidence of infection.   - Ordered Kidney Transplant US 3/3/2025 (routine) - mild hydronephrosis of transplanted kidney, torturous renal artery with mildly elevated velocity at the anastomosis, likely chronic.   - Check hepatitis panel (ordered for 3/4/2025) - HCV positive, known history and cured, otherwise negative.   - Accurate I/O and daily weight given recent heart failure exacerbation.  - Plan for urinary retention per below     Urinary retention  Noted to have ongoing retention requiring straight cath. Unclear whether this is a new issue for him. No significant anticholinergics on his current medication list.   - Tamsulosin 0.4mg PO initiated on 3/4; as tolerated   - Avoid anticholinergic medications as able.       Troponinemia, stable   Lateral lead T wave inversions   Atrial flutter   HFrEF last echo 2/2025 with EF 40-45%   HTN   Hx MI  Limited cardiac history available. Per patient's daughter he may have had a stent placed at 1 point  but is unsure.  Patient and daughters deny any history of cardiac arrhythmia.  However on exam and on EKG evidence of atrial flutter, and patient also on Eliquis at least since December.  [facility], he was found to have heart failure exacerbation with 25-30 lbs fluid excess. BNP during hospitalization was 2868. His Lasix was switched to Torsemide. He has been taking his medications per his daughter.   - per cardiology note I found in EPIC form 2018 he has had  medium size ischemic area  in inferior wall  ( had similar but smaller in 2013)   - BNP 43058 here.   - trop 267>>218  - EKG with T wave inversions and mild ST depressions in lateral leads  - denies recent CP or SOB, no peripheral or abdominal edema  Plan:   - TTE 2/26/25: LV function decreased w/ EF 40-45%   - No report of chest pain. Troponin trended down   - cont PTA atorvastatin 40 mg nightly  - HOLD PTA  Eliquis 5 mg BID on 3/6  but now on hold as of 3/11 and iv heparin started    - HOLD  PTA Jardiance 12.5 mg daily per cardiology pre-op  - continue PTA metoprolol 75 mg daily  - HOLD PTA torsemide 10 mg BID  - cardiology consulted and saw pt for pre-op; cleared  - He will need to arrange follow-up with [facility] after his [facility]     # T2DM  Not on long-term insulin. Hemoglobin A1c 6.9% as of 7/2024. Hyperglycemia when he allowed check on 3/4. He has been amenable to glucose checks and insulin this admission.   - HOLD PTA Jardiance 12.5 mg daily  - Repeat Hemoglobin A1c 7.9  - Initiated sliding scale insulin on 3/4 with TID AC and 0200 glucose checks -> increased to HDSSI on 3/10              - Will need to discuss with family whether they feel they would be able to manage sliding scale insulin at home.   - Carb consistent diet   - Given sliding scale insulin needs but would like to simplify this on discharge and will start basal Lantus at 5 units (half of what would be calculated 0.2units/kg)  and uptitrate based on fasting AM sugars. Increased to lantus 7 units 3/13 and 10u 3/19.      # Mild normocytic anemia  - Hg stable, no signs of bleed, monitor on AC      # Hx hepatitis C,  - s/p treatment with cure        # Glaucoma - cont PTA eyedrops  # Sickle cell trait          Diet: Snacks/Supplements Adult: Nepro Oral Supplement; With Meals  Combination Diet 3 gm K Diet (low potassium); Moderate Consistent Carb (60 g CHO per Meal) Diet    DVT Prophylaxis: heparin gtt   Zaman Catheter: PRESENT, indication: Acute retention or obstruction, Acute retention or obstruction  Lines: None     Cardiac Monitoring: None  Code Status: Full Code      Clinically Significant Risk Factors        # Hyperkalemia: Highest K = 5.7 mmol/L in last 2 days, will monitor as appropriate  # Hyponatremia: Lowest Na = 133 mmol/L in last 2 days, will monitor as appropriate       # Hypoalbuminemia: Lowest albumin = 3 g/dL at 3/20/2025  3:32 AM, will monitor as appropriate               # DMII: A1C = 7.9 % (Ref range: <5.7 %) within past 6 months    # Severe Malnutrition: based on nutrition assessment and treatment provided per dietitian's recommendations.    # Financial/Environmental Concerns: none         Social Drivers of Health    Tobacco Use: Medium Risk (7/26/2024)    Received from [employer]    Patient History     Smoking Tobacco Use: Former     Smokeless Tobacco Use: Never          Disposition Plan     Medically Ready for Discharge: Anticipated in 2-4 Days             Roxann Lopez MD  Hospitalist Service, GOLD TEAM 7  [facility]  Securely message with [employer] (more info)  Text page via [employer] Paging/Directory   See signed in provider for up to date coverage information  ______________________________________________________________________    Interval History   See above  30 minute phone conversation with patient's daughter and medical decision maker Jarrod today    Physical Exam    Vital Signs: Temp: 98.7  F (37.1  C) Temp src: Oral BP: 102/43 Pulse: 81   Resp: 20 SpO2: 98 % O2 Device: None (Room air)    Weight: 157 lbs 6.54 oz    General: awake, alert, non-toxic appearing, no acute distress   HEENT: normocephalic, atraumatic with moist mucous membranes  Respiratory: comfortable conversational breathing on room air, CTAB  Cardiac: RRR  Abdomen: Non-distended. Soft, non-tender to palpation   Extremities: L leg AKA. R leg warm, necrotic toe tips     Medical Decision Making       75 MINUTES SPENT BY ME on the date of service doing chart review, history, exam, documentation & further activities per the note.      Data     I have personally reviewed the following data over the past 24 hrs:    N/A  \   N/A   / N/A     137 105 29.2 (H) /  178 (H)   4.8 23 1.49 (H) \       Imaging results reviewed over the past 24 hrs:   No results found for this or any previous visit (from the past 24 hours).

## 2025-03-22 NOTE — PROGRESS NOTES
Vascular Surgery Progress Note    NAEON. AFVSS. Tolerating diet. Pain w/c. Is planning to work with PT/OT today.     Exam:  /43 (BP Location: Right arm)   Pulse 81   Temp 98.7  F (37.1  C) (Oral)   Resp 20   Ht 1.829 m (6')   Wt 71.4 kg (157 lb 6.5 oz)   SpO2 98%   BMI 21.35 kg/m     NAD, AAO, comfortable  Right groin with Prevena in place with good seal, no surrounding SOI. Below-knee incision with staples in place, no drainage, erythema, or other SOI.   Graft palpable in subcutaneous tunnel in lateral thigh. Strong biphasic AT/DP.   Neuro exam at baseline.     A/P: CLTI with tissue loss of RLE, 2 Days Post-Op s/p right CFA-AT bypass using spliced cryoartery with subcutaneous tunneling.     - Medicine primary. IMC for close monitoring and q2 CMS/neurovascular checks.  - Podiatry consulted, recommend local wound care and monitoring of dry gangrene and will follow up with patient in outpatient podiatry clinic. WOC consulted.   - Aspirin 81 mg  - Low-intensity heparin gtt continued today. If doing well, will switch to home Eliquis tomorrow.   Daily labs  Zaman in place for urinary retention, primary team managing.   Up with assist/PT/OT      Marleen Rai MD  PGY-6  Vascular Surgery  Pager: [phone]    Please page me directly with any questions/concerns during regular weekday hours before 5PM. If there is no response, if it is a weekend, or if it is during evening hours, then please use the [employer] system to page the first-call resident on call for vascular surgery.

## 2025-03-23 ENCOUNTER — APPOINTMENT (OUTPATIENT)
Dept: OCCUPATIONAL THERAPY | Facility: CLINIC | Age: 65
End: 2025-03-23
Payer: MEDICARE

## 2025-03-23 LAB
AMMONIA PLAS-SCNC: 40 UMOL/L (ref 16–60)
ANION GAP SERPL CALCULATED.3IONS-SCNC: 11 MMOL/L (ref 7–15)
BASE EXCESS BLDV CALC-SCNC: 2.3 MMOL/L (ref -3–3)
BUN SERPL-MCNC: 31.6 MG/DL (ref 8–23)
CALCIUM SERPL-MCNC: 9.8 MG/DL (ref 8.8–10.4)
CHLORIDE SERPL-SCNC: 104 MMOL/L (ref 98–107)
CREAT SERPL-MCNC: 1.44 MG/DL (ref 0.67–1.17)
EGFRCR SERPLBLD CKD-EPI 2021: 54 ML/MIN/1.73M2
ERYTHROCYTE [DISTWIDTH] IN BLOOD BY AUTOMATED COUNT: 15.8 % (ref 10–15)
GLUCOSE SERPL-MCNC: 169 MG/DL (ref 70–99)
HCO3 BLDV-SCNC: 25 MMOL/L (ref 21–28)
HCO3 SERPL-SCNC: 21 MMOL/L (ref 22–29)
HCT VFR BLD AUTO: 27.7 % (ref 40–53)
HGB BLD-MCNC: 8.6 G/DL (ref 13.3–17.7)
MAGNESIUM SERPL-MCNC: 1.7 MG/DL (ref 1.7–2.3)
MCH RBC QN AUTO: 25.4 PG (ref 26.5–33)
MCHC RBC AUTO-ENTMCNC: 31 G/DL (ref 31.5–36.5)
MCV RBC AUTO: 82 FL (ref 78–100)
O2/TOTAL GAS SETTING VFR VENT: 21 %
OXYHGB MFR BLDV: 93 % (ref 70–75)
PCO2 BLDV: 33 MM HG (ref 40–50)
PH BLDV: 7.5 [PH] (ref 7.32–7.43)
PHOSPHATE SERPL-MCNC: 3.1 MG/DL (ref 2.5–4.5)
PLATELET # BLD AUTO: 249 10E3/UL (ref 150–450)
PO2 BLDV: 72 MM HG (ref 25–47)
POTASSIUM SERPL-SCNC: 4.8 MMOL/L (ref 3.4–5.3)
RBC # BLD AUTO: 3.39 10E6/UL (ref 4.4–5.9)
SAO2 % BLDV: 96.3 % (ref 70–75)
SODIUM SERPL-SCNC: 136 MMOL/L (ref 135–145)
TACROLIMUS BLD-MCNC: 3 UG/L (ref 5–15)
TME LAST DOSE: ABNORMAL H
TME LAST DOSE: ABNORMAL H
UFH PPP CHRO-ACNC: 0.19 IU/ML
WBC # BLD AUTO: 8.2 10E3/UL (ref 4–11)

## 2025-03-23 PROCEDURE — 250N000012 HC RX MED GY IP 250 OP 636 PS 637: Performed by: STUDENT IN AN ORGANIZED HEALTH CARE EDUCATION/TRAINING PROGRAM

## 2025-03-23 PROCEDURE — 82805 BLOOD GASES W/O2 SATURATION: CPT

## 2025-03-23 PROCEDURE — 250N000012 HC RX MED GY IP 250 OP 636 PS 637

## 2025-03-23 PROCEDURE — 80048 BASIC METABOLIC PNL TOTAL CA: CPT

## 2025-03-23 PROCEDURE — 84100 ASSAY OF PHOSPHORUS: CPT

## 2025-03-23 PROCEDURE — 85520 HEPARIN ASSAY: CPT

## 2025-03-23 PROCEDURE — 250N000013 HC RX MED GY IP 250 OP 250 PS 637: Performed by: STUDENT IN AN ORGANIZED HEALTH CARE EDUCATION/TRAINING PROGRAM

## 2025-03-23 PROCEDURE — 97165 OT EVAL LOW COMPLEX 30 MIN: CPT | Mod: GO

## 2025-03-23 PROCEDURE — 85027 COMPLETE CBC AUTOMATED: CPT

## 2025-03-23 PROCEDURE — 97130 THER IVNTJ EA ADDL 15 MIN: CPT | Mod: GO

## 2025-03-23 PROCEDURE — 99233 SBSQ HOSP IP/OBS HIGH 50: CPT

## 2025-03-23 PROCEDURE — 82140 ASSAY OF AMMONIA: CPT

## 2025-03-23 PROCEDURE — 120N000005 HC R&B MS OVERFLOW UMMC

## 2025-03-23 PROCEDURE — 80197 ASSAY OF TACROLIMUS: CPT

## 2025-03-23 PROCEDURE — 97535 SELF CARE MNGMENT TRAINING: CPT | Mod: GO

## 2025-03-23 PROCEDURE — 97110 THERAPEUTIC EXERCISES: CPT | Mod: GO

## 2025-03-23 PROCEDURE — 83735 ASSAY OF MAGNESIUM: CPT

## 2025-03-23 PROCEDURE — 97129 THER IVNTJ 1ST 15 MIN: CPT | Mod: GO

## 2025-03-23 PROCEDURE — 250N000013 HC RX MED GY IP 250 OP 250 PS 637

## 2025-03-23 RX ORDER — LIDOCAINE 40 MG/G
CREAM TOPICAL
Status: DISCONTINUED | OUTPATIENT
Start: 2025-03-23 | End: 2025-03-26 | Stop reason: HOSPADM

## 2025-03-23 RX ORDER — TORSEMIDE 10 MG/1
10 TABLET ORAL
Status: DISCONTINUED | OUTPATIENT
Start: 2025-03-23 | End: 2025-03-26 | Stop reason: HOSPADM

## 2025-03-23 RX ADMIN — EMPAGLIFLOZIN 10 MG: 10 TABLET, FILM COATED ORAL at 08:13

## 2025-03-23 RX ADMIN — SODIUM BICARBONATE 1300 MG: 650 TABLET ORAL at 08:13

## 2025-03-23 RX ADMIN — ASPIRIN 81 MG CHEWABLE TABLET 81 MG: 81 TABLET CHEWABLE at 08:14

## 2025-03-23 RX ADMIN — MYCOPHENOLATE MOFETIL 500 MG: 500 TABLET, FILM COATED ORAL at 08:13

## 2025-03-23 RX ADMIN — ATORVASTATIN CALCIUM 40 MG: 40 TABLET, FILM COATED ORAL at 20:13

## 2025-03-23 RX ADMIN — SODIUM BICARBONATE 1300 MG: 650 TABLET ORAL at 20:13

## 2025-03-23 RX ADMIN — LIDOCAINE 4% 1 PATCH: 40 PATCH TOPICAL at 20:13

## 2025-03-23 RX ADMIN — TACROLIMUS 2 MG: 1 CAPSULE ORAL at 08:13

## 2025-03-23 RX ADMIN — APIXABAN 5 MG: 5 TABLET, FILM COATED ORAL at 20:13

## 2025-03-23 RX ADMIN — MYCOPHENOLATE MOFETIL 500 MG: 500 TABLET, FILM COATED ORAL at 20:13

## 2025-03-23 RX ADMIN — TORSEMIDE 10 MG: 10 TABLET ORAL at 11:58

## 2025-03-23 RX ADMIN — METFORMIN ER 500 MG 500 MG: 500 TABLET ORAL at 18:31

## 2025-03-23 RX ADMIN — SODIUM BICARBONATE 1300 MG: 650 TABLET ORAL at 15:52

## 2025-03-23 RX ADMIN — TAMSULOSIN HYDROCHLORIDE 0.8 MG: 0.4 CAPSULE ORAL at 08:13

## 2025-03-23 RX ADMIN — PANTOPRAZOLE SODIUM 40 MG: 40 TABLET, DELAYED RELEASE ORAL at 08:13

## 2025-03-23 RX ADMIN — FERROUS SULFATE TAB 325 MG (65 MG ELEMENTAL FE) 325 MG: 325 (65 FE) TAB at 08:13

## 2025-03-23 RX ADMIN — TACROLIMUS 2 MG: 1 CAPSULE ORAL at 18:31

## 2025-03-23 RX ADMIN — LATANOPROST 1 DROP: 50 SOLUTION OPHTHALMIC at 20:13

## 2025-03-23 RX ADMIN — APIXABAN 5 MG: 5 TABLET, FILM COATED ORAL at 08:14

## 2025-03-23 RX ADMIN — TORSEMIDE 10 MG: 10 TABLET ORAL at 20:13

## 2025-03-23 ASSESSMENT — ACTIVITIES OF DAILY LIVING (ADL)
ADLS_ACUITY_SCORE: 64
PREVIOUS_RESPONSIBILITIES: MEAL PREP;HOUSEKEEPING;LAUNDRY;MEDICATION MANAGEMENT;FINANCES;DRIVING
ADLS_ACUITY_SCORE: 64
ADLS_ACUITY_SCORE: 67
ADLS_ACUITY_SCORE: 66
ADLS_ACUITY_SCORE: 64
ADLS_ACUITY_SCORE: 66
ADLS_ACUITY_SCORE: 66
ADLS_ACUITY_SCORE: 64
ADLS_ACUITY_SCORE: 64

## 2025-03-23 NOTE — PROGRESS NOTES
"   03/23/25 1125   Appointment Info   Signing Clinician's Name / Credentials (OT) Xin Dunham, OTR/L   Living Environment   People in Home child(gina), adult   Home Accessibility stairs within home   Number of Stairs, Within Home, Primary greater than 10 stairs   Stair Railings, Within Home, Primary railing on right side (ascending)   Transportation Anticipated family or friend will provide   Living Environment Comments Some conflicting information provided compared to PT eval. Reports 2 adult daughters live with him. Ramp access to front door, 3 MICHAEL back door. Reports laundry is in the basement and he likes to do it himself. Railings on L side for descending into basement. Walk in shower, reports GB by shower and toilet. Built in shower chair present.   Self-Care   Usual Activity Tolerance moderate   Current Activity Tolerance moderate   Regular Exercise No   Equipment Currently Used at Home cane, straight;grab bar, tub/shower;prosthesis;walker, rolling   Fall history within last six months yes   Number of times patient has fallen within last six months 1  (was mowing the yard)   Activity/Exercise/Self-Care Comment Per PT: \"Pt baseline prior to Jan hospitalization, pt was completely IND with ADLs, lived alone, used SEC for mobility, no cog concerns per daughter. On recent return home from hospital, pt able to sit EOB and stand with walker, but unable to walk, dependent for cares by daughters. Daughters would provide 24/7 assist, able to continue 24/7 assist at d/c. Notes does not have w/c, but [facility].\" Pt reporting to OT that he is doing all ADLs/IADLs independently, including driving. Unclear if this is accurate. Reports he enjoys \"tinkering\" around the house. Formerly in the [employer].   Instrumental Activities of Daily Living (IADL)   Previous Responsibilities meal prep;housekeeping;laundry;medication management;finances;driving   IADL Comments Reports he uses air fryer, cooks simple things " "though daughter gets him a lot of take out. Reports \"I like to do everything by myself, I will never be dependent.\"   General Information   Onset of Illness/Injury or Date of Surgery 02/25/25   Referring Physician Roxann Lopez MD   Patient/Family Therapy Goal Statement (OT) Nothing stated   Additional Occupational Profile Info/Pertinent History of Current Problem Tad Zaman is a 64 year old male admitted on 2/25/2025 for AMS. He has a history of diabetes, HTN, CAD, HF, and renal transplant 9/2022, left AKA, CKD.  Patient was brought in by family for AMS x 1-2 months. He has been evaluated at the [facility] multiple times in ED then was evaluated during an admission 1/20-2/21/25 with very broad workup (see below) that did not amount to any type of notable etiology of his symptoms. During this stay he was also treated for HF exacerbation, MOY, and UTI which were all stable at time of discharge. He finished course of cefdinir for e coli UTI on 2/24. He was discharged with family being told that he has failure to thrive. He was discharged home, required 24 hr cares by daughter including changing him and preparation of food. He has also has had intermittent agitation and aggression. Home health nurse stated that he was not appropriate for their program.   General Observations and Info L AKA, edema in R foot (unable to fit into shoe)   Cognitive Status Examination   Orientation Status place   Cognitive Status Comments Believes it is October 2011. [facility] 6/30 per chart. See sepate note for cog screen details.   Cognitive Screens/Assessments   Cognitive Assessments Completed Other Cognitive Screen/Assessment   Cognitive Assessment Test MoCA 8.1   Cognitive Assessment Test Score 14/30  (MIS 9/15)   Cognitive Assessment Test Interpretation See separate progress note for details   Sensory   Sensory Comments denies n/t   Range of Motion Comprehensive "   Comment, General Range of Motion B UE WNL   Strength Comprehensive (MMT)   Comment, General Manual Muscle Testing (MMT) Assessment UE strength appears functional though pt has generalized deconditioning from prolonged hospitalizations.   Coordination   Fine Motor Coordination Opposition appears WNL bilaterally   Coordination Comments missing tip of 4th digit on R UE   Bed Mobility   Comment (Bed Mobility) SBA due to impulsivity with lines   Transfers   Transfer Comments Close SBA with FWW   Bathing Assessment/Intervention   Comment, (Bathing) A x 1 per clinical judgment. Recommend use of shower chair and at least supervision for safety purposes.   Lower Body Dressing Assessment/Training   Comment, (Lower Body Dressing) Able to don L LE prosthesis with set up while dangling EOB, unable to don R shoe due to pedal edema.   Toileting   Comment, (Toileting) A x 1 for safety per clinical judgment, continues to have mg catheter   Clinical Impression   Criteria for Skilled Therapeutic Interventions Met (OT) Yes, treatment indicated   OT Diagnosis Decreased I with ADL/IADL   OT Problem List-Impairments impacting ADL problems related to;activity tolerance impaired;cognition;strength   Assessment of Occupational Performance 1-3 Performance Deficits   Identified Performance Deficits bathing, LBD, all IADLs, toileting   Planned Therapy Interventions (OT) ADL retraining;cognition;strengthening;home program guidelines;progressive activity/exercise;risk factor education   Clinical Decision Making Complexity (OT) problem focused assessment/low complexity   Risk & Benefits of therapy have been explained evaluation/treatment results reviewed;care plan/treatment goals reviewed   Clinical Impression Comments Pt to benefit from skilled OT to address above deficits. See daily flowsheet for details regarding tx provided.   OT Total Evaluation Time   OT Eval, Low Complexity Minutes (97519) 8   OT Goals   Therapy Frequency (OT) 3  times/week   OT Predicted Duration/Target Date for Goal Attainment 04/13/25   OT: Lower Body Dressing Supervision/stand-by assist   OT: Toilet Transfer/Toileting Supervision/stand-by assist   OT: Cognitive Patient/caregiver will verbalize understanding of cognitive assessment results/recommendations as needed for safe discharge planning   OT: Goal 1 Pt will demo shower transfer with SBA   OT Discharge Planning   OT Plan OT: functional cog, dressing, toileting, shower t/f   OT Discharge Recommendation (DC Rec) home with assist;home with home care occupational therapy   OT Rationale for DC Rec Pt continues to demo significant cognitive deficits, impacting safety with tasks due to lack of insight. Up moving with FWW and SBA. Recommend continued supervision with all IADLs including med management. Pt states he drives which is concerning if true. Would benefit from OP neuropsych testing for formal cognitive work up and to determine decisional capacity. Follow up with  OT to maximize safety at home.   OT Brief overview of current status Ambulate with FWW and close SBA, MoCA 14/30   OT Total Distance Amb During Session (feet) 400   Total Session Time   Total Session Time (sum of timed and untimed services) 8

## 2025-03-23 NOTE — PLAN OF CARE
[id number]    Goal Outcome Evaluation:      Plan of Care Reviewed With: patient    Overall Patient Progress: no changeOverall Patient Progress: no change    Outcome Evaluation: Doppler and CMS q4h. A&O x2. Heparin gtt d/c'd, now on home dose of eliquis.    Neuro: A&Ox2, to self, place. Calls appropriately. Refusing all pokes with needles. Frustrated at times.   Cardiac: SR. HR 70-90s, denies any chest pain. Afebrile.  Respiratory: Sating above 94% on RA. Denies SOB  GI/: Due to void as mg pulled @1515. BM x2, large.   Diet/appetite: Tolerating consistent carb, low potassium diet. Eating well.   Activity:  SBA-w/ GB and walker, up to chair and in halls. Able to put on and take off prosthesis on his own.   Pain: At acceptable level on current regimen.   Skin: No new deficits noted.  LDA's: 2 L PIV SL, Prevena vac on R groin site, R shin surgical site, right toes cyanotic- covered in kerlix, R arm AV fistula, L groin site- WNL, L BKA,      Plan: Continue with POC. Notify primary team with changes.     Shift updates:  - Heparin gtt d/c'd, as now on home dose eliquis  -Austyn pulled

## 2025-03-23 NOTE — PROGRESS NOTES
Patient has an order for midline for lab draws and access per RN.Sent a message to Dr. Mitzy De Leon for nephrology clearance. Added Roxann Lopez, ordering provider in a conversation because Dr. Forrester wanted to know why patient need a midline. It was agreed to hold off on midline for now since patient doesn't have IV meds and no frequent lab draws. RN informed.

## 2025-03-23 NOTE — PROGRESS NOTES
Vascular Surgery Progress Note    NAEON. AFVSS. Tolerating diet, states he is eating well. Pain w/c. Reportedly did not work with PT/OT yesterday but has been sitting up on the edge of the bed with assistance from nursing.     Exam:  /62 (BP Location: Left arm)   Pulse 76   Temp 98.3  F (36.8  C) (Oral)   Resp 18   Ht 1.829 m (6')   Wt 69.9 kg (154 lb 1.6 oz)   SpO2 96%   BMI 20.90 kg/m     NAD, AAO, comfortable  Right groin with Prevena in place with good seal, no surrounding SOI. Below-knee incision with staples and fresh dressing in place, no drainage, erythema, or other SOI.   Graft palpable in subcutaneous tunnel in anterolateral thigh. Strong biphasic AT/DP.   Neuro exam at baseline.     A/P: CLTI with tissue loss of RLE, 2 Days Post-Op s/p right CFA-AT bypass using spliced cryoartery with subcutaneous tunneling.     - Medicine primary. Q4 neuro/doppler checks.  - Switched from heparin gtt to PTA Eliquis.  - Podiatry consulted, recommend local wound care and monitoring of dry gangrene and will follow up with patient in outpatient podiatry clinic. WOC consulted.   - Aspirin 81 mg  - Zaman in place, possible TOV per primary team today.   - Up with assist/PT/OT, pulmonary toilet    Staff: Dr. Kidd.      Marleen Rai MD  PGY-6  Vascular Surgery  Pager: [phone]    Please page me directly with any questions/concerns during regular weekday hours before 5PM. If there is no response, if it is a weekend, or if it is during evening hours, then please use the [employer] system to page the first-call resident on call for vascular surgery.

## 2025-03-23 NOTE — PROGRESS NOTES
[facility]    Medicine Progress Note - Hospitalist Service, GOLD TEAM 7    Date of Admission:  2/25/2025    Assessment & Plan   Tda Zaman is a 64 year old male admitted on 2/25/2025 for AMS. He has a history of diabetes, HTN, CAD, HF, and renal transplant 9/2022, left AKA, CKD.  Patient was brought in by family for AMS x 1-2 months. He has been evaluated at the [facility] multiple times in ED then was evaluated during an admission 1/20-2/21/25 with very broad workup (see below) that did not amount to any type of notable etiology of his symptoms. During this stay he was also treated for HF exacerbation, MOY, and UTI which were all stable at time of discharge. He finished course of cefdinir for e coli UTI on 2/24. He was discharged with family being told that he has failure to thrive. He was discharged home, required 24 hr cares by daughter including changing him and preparation of food. He has also has had intermittent agitation and aggression. Home health nurse stated that he was not appropriate for their program.      No clear etiology for confusion but the aggression and refusal of cares have clinically improved without intervention. Course complicated by new cyanotic areas on the R toes concerning for progression of PAD. Vascular surgery consulted, details per below for work up of PAD     Today  - heparin gtt stopped, apixaban restarted  - repeat trial of void today. Patient has increased tamsulosin 0.8mg and jardiance restarted which may help  - repeat cognitive evaluation   - torsemide resumed   - discussed midline placement with renal for lab draws as patient is refusing all lab checks and currently checking labs off of PIV. Per nephrology, try to avoid midline placement, continue using piv, space labs   - urine culture is pending      Peripheral vascular disease/ PAD s/p R femoral anterior tibial bypass 3/20/25   S/p L BKA  Seen by vascular surgery.  Angiogram RLE revealed diffuse calcification of SFA into above-knee popliteal. Underwent R femoral anterior tibial bypass 3/20/25. Treated with heparin gtt following procedure with subsequent transition to DOAC   - vascular surgery, appreciate recommendations   - Q4H neuro/doppler checks   -continue aspirin 81mg and atorvastatin 40mg daily   - stress test negative for ischemia  - stop heparin gtt, restart PTA apixaban 5mg BID   - podiatry consult, follow-up with patient in outpatient podiatry clinic   - [facility] consulted     AMS  Cognitive impairment vs encephalopathy   3/11 reseen by  psychiatry to see reg decision making capacity and was fel he needed surrogate decision maker for consent for procedures. Presented with 1-2 months of progressive encephalopathy and agitation with aggressive behavior toward family who is caring for him at home. [facility]  -Initial LP with pleocytosis, otherwise unrevealing.   -MRI brain without acute pathology.   -Neurology consulted. Some concern for hx of viral encephalitis that has since resolved leaving him with a degree of impairment. He otherwise remains hemodynamically stable without clinical evidence of infection, no gross electrolyte abnormality, stable glucose (elevated but without acidosis), BUN is improving, tacrolimus levels was  WNL.   [facility]:   - MRI 1/29: Stable mild amount of chronic small vessel ischemia. Small chronic R cerebellar infarct.   -OT SLUMS 6/30   -EEG: nonspecific encephalopathy.   -LP: protein 173, glucose 110. Cell count with 21 WBC, 41 RBC, 52 lymph. Hazy appearance  -Encephalopathy paraneoplastic evaluation (including Purkinje, ADI, antiglial nucl antibody, NMDA, BOSSMAN) 1/30/25 negative.   -CSF flow cytometry: suboptimal specimen with low cellularity. No immunophenotypic evidence of involvement by lymphoma. Rawson light chain 64.8, lambda light chain 35.1, kappa/lambda 1.85 all elevated. No monoclonals detected  (1/22)  -ELP/Immunofix, serum panel: total protein low, beta 1 fraction low (1/22/25)  -Polyoma virus DNA of CSF, MAGNUS virus DNA (1/28) negative.   -Meningitis / encephalitis panel (1/28) negative  -CSF angiotensin converting enzyme (1/28) negative negative  -CMV (1/28, 1/25) negative   -BK virus (1/28) negative  -EBV (1/25) negative  -Cryptococcus (1/26) negative; Cryptococcus neoformans (1/28) negative  -Syphilis (1/25) negative  -HIV (1/25) negative  -B12 (1/18) WNL  -TSH (1/18) high side of normal 4.94  - CSF flow cytometry from 3/5/25  rare to absent B cells , no malignant cells seen   - Drug screen negative. Patient has distant history of what sounds to be mild to moderate alcohol intake.   - needs out patient  neuropsych testing as well as op EEG if cognition worsens    - repeat MRI of brain 3/1/25 limited by motion artifact  but no evidence of acute infarct or acute intracranial pathology   - Delirium precautions as able.   - repeat LP per CAPs on 3/5 completed - repeat CSF unremarkable (negative cytometry, cytology negative),  West Nile virus negative , VDRL non reactive , aerobic and anaerobic cultures so far no growth   - He will need follow up with outpatient neurology and consider neuropsychology testing  - Per neuro: can consider EEG in outpatient setting if cognition were to worsen  - 3/23/25 MoCA 8.1 score 14/30, where 26+ is normal, and 10-17 is moderate cognitive impairment. Likely represents some improvement from prior SLUMS score of 6/30 [facility] in [facility]25     Renal transplant 9/2022    CKD Stage 3b   Had the transplant at [facility]. [facility]. 1/27 renal US with doppler showed no stenosis of vessels. Resistive indices were increased, felt could be related to ureteric obstruction but not specific. Mild dilation of intrarenal collecting system and ureter was also seen. CT a/p 1/28 W/O contrast suggested mild hydro.  - Renal  ultrasound 2/3/25 at [facility] showed no hydro or other abnormalities. FENA 4.3 (1/28) at [facility].   -  Creat recent baseline appears to be 1.8-2.3  - transplant neph following the patient  Recommendations:   - Continue mycophenolate 500 mg twice a day.  - Hold torsemide, calcitriol and lokelma  - Continue tacrolimus 1.5 mg QAM and 2mg at bedtime (adjusting per transplant Neph)  -Tacro level on 3/9/2025 is 3.6  - Accurate I/O and daily weight  - Will need Urology outpatient follow up for TOV and further management.    - Hold calcitriol given borderline high calcium. Will follow up calcium levels on next renal panel (ordered)  - Ordered UA and UPCR/UACR 3/3/2025 - trace leuk esterase and glucose present. No evidence of infection.   - Ordered Kidney Transplant US 3/3/2025 (routine) - mild hydronephrosis of transplanted kidney, torturous renal artery with mildly elevated velocity at the anastomosis, likely chronic.   - Check hepatitis panel (ordered for 3/4/2025) - HCV positive, known history and cured, otherwise negative.   - Accurate I/O and daily weight given recent heart failure exacerbation.  - Plan for urinary retention per below     Urinary retention  Noted to have ongoing retention requiring straight cath. Unclear whether this is a new issue for him. No significant anticholinergics on his current medication list.   - Tamsulosin 0.4mg PO initiated on 3/4; as tolerated   - Avoid anticholinergic medications as able.       Troponinemia, stable   Lateral lead T wave inversions   Atrial flutter   HFrEF last echo 2/2025 with EF 40-45%   HTN   Hx MI  Limited cardiac history available. Per patient's daughter he may have had a stent placed at 1 point but is unsure.  Patient and daughters deny any history of cardiac arrhythmia.  However on exam and on EKG evidence of atrial flutter, and patient also on Eliquis at least since December.  [facility], he was found to have heart failure exacerbation with  25-30 lbs fluid excess. BNP during hospitalization was 2868. His Lasix was switched to Torsemide. He has been taking his medications per his daughter.   - per cardiology note I found in EPIC form 2018 he has had  medium size ischemic area  in inferior wall  ( had similar but smaller in 2013)   - BNP 17212 here.   - trop 267>>218  - EKG with T wave inversions and mild ST depressions in lateral leads  - denies recent CP or SOB, no peripheral or abdominal edema  Plan:   - TTE 2/26/25: LV function decreased w/ EF 40-45%   - No report of chest pain. Troponin trended down   - cont PTA atorvastatin 40 mg nightly  - HOLD PTA  Eliquis 5 mg BID on 3/6  but now on hold as of 3/11 and iv heparin started    - HOLD  PTA Jardiance 12.5 mg daily per cardiology pre-op  - continue PTA metoprolol 75 mg daily  - HOLD PTA torsemide 10 mg BID  - cardiology consulted and saw pt for pre-op; cleared  - He will need to arrange follow-up with his [facility]ologist [facility] after his discharge     # T2DM  Not on long-term insulin. Hemoglobin A1c 6.9% as of 7/2024. Hyperglycemia when he allowed check on 3/4. He has been amenable to glucose checks and insulin this admission.   - HOLD PTA Jardiance 12.5 mg daily  - Repeat Hemoglobin A1c 7.9  - Initiated sliding scale insulin on 3/4 with TID AC and 0200 glucose checks -> increased to HDSSI on 3/10              - Will need to discuss with family whether they feel they would be able to manage sliding scale insulin at home.   - Carb consistent diet   - Given sliding scale insulin needs but would like to simplify this on discharge and will start basal Lantus at 5 units (half of what would be calculated 0.2units/kg) and uptitrate based on fasting AM sugars. Increased to lantus 7 units 3/13 and 10u 3/19.      # Mild normocytic anemia  - Hg stable, no signs of bleed, monitor on AC      # Hx hepatitis C,  - s/p treatment with cure        # Glaucoma - cont PTA eyedrops  # Sickle cell trait           Diet: Snacks/Supplements Adult: Nepro Oral Supplement; With Meals  Combination Diet 3 gm K Diet (low potassium); Moderate Consistent Carb (60 g CHO per Meal) Diet    DVT Prophylaxis: DOAC  Zaman Catheter: PRESENT, indication: Acute retention or obstruction, Acute retention or obstruction  Lines: None     Cardiac Monitoring: None  Code Status: Full Code      Clinically Significant Risk Factors               # Hypoalbuminemia: Lowest albumin = 3 g/dL at 3/20/2025  3:32 AM, will monitor as appropriate               # DMII: A1C = 7.9 % (Ref range: <5.7 %) within past 6 months    # Severe Malnutrition: based on nutrition assessment and treatment provided per dietitian's recommendations.    # Financial/Environmental Concerns: none         Social Drivers of Health    Tobacco Use: Medium Risk (7/26/2024)    Received from [facility]    Patient History     Smoking Tobacco Use: Former     Smokeless Tobacco Use: Never          Disposition Plan     Medically Ready for Discharge: Anticipated in 2-4 Days             Roxann Lopez MD  Hospitalist Service, GOLD TEAM 7  [facility]  Securely message with [employer] (more info)  Text page via [facility] Paging/Directory   See signed in provider for up to date coverage information  ______________________________________________________________________    Interval History   NAEO  Patient in better spirits today. He has no new concerns or questions today. He is open to retaking a cognitive eval     Physical Exam   Vital Signs: Temp: 98.3  F (36.8  C) Temp src: Oral BP: 114/62 Pulse: 76   Resp: 18 SpO2: 96 % O2 Device: None (Room air)    Weight: 154 lbs 1.62 oz    General: awake, alert, non-toxic appearing, no acute distress   HEENT: normocephalic, atraumatic with moist mucous membranes  Respiratory: comfortable conversational breathing on room air, CTAB  Cardiac: RRR  Abdomen: Non-distended. Soft, non-tender to palpation   Extremities: L leg  AKA. R leg warm, necrotic toe tips     Medical Decision Making       50 MINUTES SPENT BY ME on the date of service doing chart review, history, exam, documentation & further activities per the note.      Data     I have personally reviewed the following data over the past 24 hrs:    8.2  \   8.6 (L)   / 249     136 104 31.6 (H) /  169 (H)   4.8 21 (L) 1.44 (H) \       Imaging results reviewed over the past 24 hrs:   No results found for this or any previous visit (from the past 24 hours).

## 2025-03-23 NOTE — PLAN OF CARE
Goal Outcome Evaluation:  Neuro: A&Ox 2- 3. Forgetful. Disoriented to situation and time. Reoriented.   Cardiac: SR. VSS.   Respiratory: Sating above 95%  on RA.  GI/: Adequate urine output via FC. No BM.  Diet/appetite: Tolerating consistent carb diet. Eating well.  Activity:  Assist of 1 turns to sides.   Pain: At acceptable level on current regimen.   Skin: No new deficits noted.  LDA's: 2 PIVs L, heparin 1700 units, AV fistula at right arm, prevena -125mmhg continous at right groin, FC.     Plan: Continue with POC. Notify primary team with changes.       Plan of Care Reviewed With: patient    Overall Patient Progress: no changeOverall Patient Progress: no change    Outcome Evaluation: Doppler every 2 hrs. Alert and oriented x 2-3, Heparin 1700 units/hr.

## 2025-03-23 NOTE — PLAN OF CARE
Occupational Therapy: Patient scored 14/30 on MoCA 8.1 where 26+ is considered normal. Scores were as follows:     Visuospatial/executive function: 0/5  Naming: 3/3  Attention: 6/6   Language: 1/3  Abstraction: 1/2  Delayed recall: 1/5  Orientation: 2/6    It should be noted that pt demonstrated the reversed clock phenomenon and would benefit from further neurological work up with OP neuropsych.     Pt's memory index score was 5/15, indicating issues primarily with memory encoding (AND/OR) memory retrieval.     The [place] Cognitive Assessment (MoCA) is a 30 point cognitive screening assessment. Normal score is considered = or > 26/30.  The following ranges may be used to grade severity: 18-25 = mild cognitive impairment, 10-17= moderate cognitive impairment, less than 10= severe cognitive impairment. Some studies have found the average score for mild cognitive impairment to be 22 and mild Alzheimer's disease to be 16. However, most studies examining the MoCA and its subtests have been limited to  samples; MoCA use with minority groups has not been widely studied. Therefore, the above ranges may vary and should be used as reference ranges only.     Please note that this assessment is used to screen individuals to look for the presence of cognitive deficits and to identify changes in cognition over time, and it can be followed by further cognitive assessments if appropriate/necessary. It does not determine decisional capacity.     Note: Pt previously completed SLUMS [facility]Feb [facility] with a score of 6/30. Do not recommend repeating SLUMS within 1 year period due to validity concerns.     Educated pt on results and implications for ADLs/IADLs. Pt demonstrates very minimal insight into deficits. Per his report, he continues to do his own IADLs including driving (unclear if this is accurate) which is concerning if true.

## 2025-03-23 NOTE — CONSULTS
Order received for PICC/Midline Placement.  Patient has a history of CKD.  In accordance to Kidney Disease Outcomes Quality Initiative (KDOQI) guidelines request made to nephrology to obtain clearance for PICC line placement.  Once clearance is obtained PICC will be scheduled by the [place] Vascular Access Team.  For questions or plan of care change please contact  Vascular Access Charge via Loopback.

## 2025-03-24 ENCOUNTER — APPOINTMENT (OUTPATIENT)
Dept: PHYSICAL THERAPY | Facility: CLINIC | Age: 65
DRG: 981 | End: 2025-03-24
Payer: MEDICARE

## 2025-03-24 LAB
ALBUMIN UR-MCNC: NEGATIVE MG/DL
APPEARANCE UR: CLEAR
BACTERIA #/AREA URNS HPF: ABNORMAL /HPF
BILIRUB UR QL STRIP: NEGATIVE
COLOR UR AUTO: ABNORMAL
GLUCOSE UR STRIP-MCNC: >=1000 MG/DL
HGB UR QL STRIP: NEGATIVE
KETONES UR STRIP-MCNC: NEGATIVE MG/DL
LEUKOCYTE ESTERASE UR QL STRIP: ABNORMAL
NITRATE UR QL: NEGATIVE
PH UR STRIP: 7 [PH] (ref 5–7)
RBC URINE: <1 /HPF
SP GR UR STRIP: 1.01 (ref 1–1.03)
SQUAMOUS EPITHELIAL: <1 /HPF
TRANSITIONAL EPI: <1 /HPF
UROBILINOGEN UR STRIP-MCNC: NORMAL MG/DL
WBC URINE: 4 /HPF

## 2025-03-24 PROCEDURE — 99233 SBSQ HOSP IP/OBS HIGH 50: CPT

## 2025-03-24 PROCEDURE — 99418 PROLNG IP/OBS E/M EA 15 MIN: CPT

## 2025-03-24 PROCEDURE — 250N000013 HC RX MED GY IP 250 OP 250 PS 637

## 2025-03-24 PROCEDURE — 250N000013 HC RX MED GY IP 250 OP 250 PS 637: Performed by: STUDENT IN AN ORGANIZED HEALTH CARE EDUCATION/TRAINING PROGRAM

## 2025-03-24 PROCEDURE — 97116 GAIT TRAINING THERAPY: CPT | Mod: GP

## 2025-03-24 PROCEDURE — 81001 URINALYSIS AUTO W/SCOPE: CPT

## 2025-03-24 PROCEDURE — 250N000012 HC RX MED GY IP 250 OP 636 PS 637: Performed by: STUDENT IN AN ORGANIZED HEALTH CARE EDUCATION/TRAINING PROGRAM

## 2025-03-24 PROCEDURE — 97530 THERAPEUTIC ACTIVITIES: CPT | Mod: GP

## 2025-03-24 PROCEDURE — 87088 URINE BACTERIA CULTURE: CPT

## 2025-03-24 PROCEDURE — 120N000002 HC R&B MED SURG/OB UMMC

## 2025-03-24 PROCEDURE — 250N000012 HC RX MED GY IP 250 OP 636 PS 637

## 2025-03-24 RX ORDER — LINEZOLID 600 MG/1
600 TABLET, FILM COATED ORAL EVERY 12 HOURS SCHEDULED
Status: DISCONTINUED | OUTPATIENT
Start: 2025-03-24 | End: 2025-03-24

## 2025-03-24 RX ADMIN — LINEZOLID 600 MG: 600 TABLET, FILM COATED ORAL at 10:48

## 2025-03-24 RX ADMIN — TAMSULOSIN HYDROCHLORIDE 0.8 MG: 0.4 CAPSULE ORAL at 08:36

## 2025-03-24 RX ADMIN — TORSEMIDE 10 MG: 10 TABLET ORAL at 19:34

## 2025-03-24 RX ADMIN — LINEZOLID 600 MG: 600 TABLET, FILM COATED ORAL at 19:35

## 2025-03-24 RX ADMIN — ATORVASTATIN CALCIUM 40 MG: 40 TABLET, FILM COATED ORAL at 19:34

## 2025-03-24 RX ADMIN — TACROLIMUS 2 MG: 1 CAPSULE ORAL at 08:35

## 2025-03-24 RX ADMIN — PANTOPRAZOLE SODIUM 40 MG: 40 TABLET, DELAYED RELEASE ORAL at 08:36

## 2025-03-24 RX ADMIN — SODIUM BICARBONATE 1300 MG: 650 TABLET ORAL at 14:08

## 2025-03-24 RX ADMIN — SODIUM BICARBONATE 1300 MG: 650 TABLET ORAL at 08:37

## 2025-03-24 RX ADMIN — APIXABAN 5 MG: 5 TABLET, FILM COATED ORAL at 19:34

## 2025-03-24 RX ADMIN — APIXABAN 5 MG: 5 TABLET, FILM COATED ORAL at 08:36

## 2025-03-24 RX ADMIN — SODIUM BICARBONATE 1300 MG: 650 TABLET ORAL at 19:35

## 2025-03-24 RX ADMIN — EMPAGLIFLOZIN 10 MG: 10 TABLET, FILM COATED ORAL at 08:36

## 2025-03-24 RX ADMIN — FERROUS SULFATE TAB 325 MG (65 MG ELEMENTAL FE) 325 MG: 325 (65 FE) TAB at 08:36

## 2025-03-24 RX ADMIN — MYCOPHENOLATE MOFETIL 500 MG: 500 TABLET, FILM COATED ORAL at 08:36

## 2025-03-24 RX ADMIN — METFORMIN ER 500 MG 500 MG: 500 TABLET ORAL at 17:51

## 2025-03-24 RX ADMIN — MYCOPHENOLATE MOFETIL 500 MG: 500 TABLET, FILM COATED ORAL at 19:34

## 2025-03-24 RX ADMIN — TORSEMIDE 10 MG: 10 TABLET ORAL at 08:35

## 2025-03-24 RX ADMIN — TACROLIMUS 2 MG: 1 CAPSULE ORAL at 17:51

## 2025-03-24 RX ADMIN — LIDOCAINE 4% 1 PATCH: 40 PATCH TOPICAL at 19:34

## 2025-03-24 RX ADMIN — ASPIRIN 81 MG CHEWABLE TABLET 81 MG: 81 TABLET CHEWABLE at 08:35

## 2025-03-24 RX ADMIN — LATANOPROST 1 DROP: 50 SOLUTION OPHTHALMIC at 19:35

## 2025-03-24 ASSESSMENT — ACTIVITIES OF DAILY LIVING (ADL)
ADLS_ACUITY_SCORE: 67

## 2025-03-24 NOTE — PROGRESS NOTES
Pt is a 64 yr old male transferred from ICU @ 0640, alert and oriented x 2-3.  Skin assessment not yet done.

## 2025-03-24 NOTE — PROGRESS NOTES
Appointment time changed, pt contacted and aware new appointment time is 11:15 AM   Vascular Surgery Progress Note    NAEON. AFVSS. Denies RLE pain, dorsiflexion and plantarflexion intact.    Exam:  /79 (BP Location: Left arm)   Pulse 72   Temp 99.1  F (37.3  C) (Oral)   Resp 16   Ht 1.829 m (6')   Wt 68.5 kg (151 lb)   SpO2 99%   BMI 20.48 kg/m     NAD, AAO, comfortable  Right groin with Prevena in place with good seal, no surrounding SOI (placed on 3/20/25, to stay on for 7 days). Below-knee incision with staples and fresh dressing in place, no drainage, erythema, or other SOI.   Graft palpable in subcutaneous tunnel in anterolateral thigh. Strong biphasic AT/DP.   Neuro exam at baseline.     A/P: CLTI with tissue loss of RLE, 4 Days Post-Op s/p right CFA-AT bypass using spliced cryoartery with subcutaneous tunneling.     - Medicine primary. Q4 neuro/doppler checks while inpatient.  - Continue Eliquis  - Podiatry following, recommend local wound care and monitoring of dry gangrene and will follow up with patient in outpatient podiatry clinic. WOC consulted.   - Aspirin 81 mg  - Zaman in place, possible TOV per primary team today.   - Up with assist/PT/OT, pulmonary toilet  - Okay to discharge from a vascular surgery perspective, please let us know so that we can schedule follow-up on an outpatient basis.    Staff: Dr. Kidd.    Piedad Lloyd, [facility]  Vascular Surgery  Pager: [phone]  [email or web address]  Send message or 10 digit call back number Securely via [employer] with the [employer] Web Console (learn more here)    Please page me directly with any questions/concerns during regular weekday hours before 3PM. If there is no response, if it is a weekend, or if it is during evening hours, then please use the [facility] system to page the first-call resident on call for vascular surgery.

## 2025-03-24 NOTE — PLAN OF CARE
Goal Outcome Evaluation:      Plan of Care Reviewed With: patient    Overall Patient Progress: no change     Outcome Evaluation: Pt alert, disoriented to time and place. VSS on RA. Up w/ SBA+W to BR. Denies pain.Wound vac to R groin. Dressing changed on R shin and R foot. WOC consult ordered for R toes. Voids spont, intermittent incontinent of urine. UA sent. Had 1 BM per pt report. Good appetite. Will cont to monitor.     Admission/Transfer from: ICU  2 RN skin assessment completed. Yes w/ Brittney SIMONS   Significant findings include: R groin wound vac, R shin incision, black toes on R foot, bleeding from R big toe.   WOC Nurse Consult Ordered? Yes ordered by MD

## 2025-03-24 NOTE — CONSULTS
[facility]  WO Nurse Inpatient Assessment     Consulted for: right foot     Summary: Podiatry consulted and has made plan of care. Woc will follow their recommendation    WOC nurse follow-up plan: signing off    Treatment Plan:     Right toe wound(s):    Apply Betadine to the right dorsal toe wound, can paint the dry gangrene areas with Betadine as well and cover with a light layer of gauze, can also secure with a very light layer of 2 inch Kerlix and tape,     Orders: Reviewed    RECOMMEND PRIMARY TEAM ORDER: None, at this time  Notify WOC if wound(s) deteriorate.  Nursing to notify the Provider(s) and re-consult the WOC Nurse if new skin concern.    DATA:     Current support surface: Standard  Standard gel mattress (Isoflex)  Containment of urine/stool: Incontinence Protocol  BMI: Body mass index is 20.48 kg/m .   Active diet order: Orders Placed This Encounter      Combination Diet 3 gm K Diet (low potassium); Moderate Consistent Carb (60 g CHO per Meal) Diet     Output: I/O last 3 completed shifts:  In: 2220 [P.O.:2220]  Out: 775 [Urine:775]     Labs:   Recent Labs   Lab 03/23/25  0557 03/20/25  0922 03/20/25  0332   ALBUMIN  --   --  3.0*   HGB 8.6*   < > 9.0*   WBC 8.2   < > 6.4    < > = values in this interval not displayed.     Pressure injury risk assessment:   Sensory Perception: 3-->slightly limited  Moisture: 4-->rarely moist  Activity: 3-->walks occasionally  Mobility: 3-->slightly limited  Nutrition: 3-->adequate  Friction and Shear: 2-->potential problem  Blair Score: 18    Jenna Barahona RN CWOCN  Please contact through [employer] group: [facility] Nurse [place]  Dept. Office Number: [phone]

## 2025-03-24 NOTE — PROGRESS NOTES
Care Management Follow Up    Length of Stay (days): 26  Expected Discharge Date: 03/26/2025  Concerns to be Addressed: discharge planning     Patient plan of care discussed at interdisciplinary rounds: Yes  Anticipated Discharge Disposition: Home  Anticipated Discharge Services: Home Care  Anticipated Discharge DME: FWW  Patient/family educated on Medicare website which has current facility and service quality ratings:  n/a  Education Provided on the Discharge Plan:    Patient/Family in Agreement with the Plan: yes    Referrals Placed by CM/SW:      PENDING   [facility]  [address]; [address]  [place] [address]  Phone: [phone]  Fax: [phone]    [facility] [place]  [address] [place] [address]  [facility] Phone: [phone]  Fax: [phone]  Donna, VA liaison - [phone]   PCP - Dr. Ashley Garcia RN - Stacie -     [facility]  [address]  [facility] [address]  P: [phone] or [phone]  F: [phone]   SNV/PT/OT/HHA/HM  REQUIRES NEW REFERRAL [facility]IA [facility]    Private pay costs discussed: Not applicable  Discussed  Partnership in Safe Discharge Planning  document with patient/family: Yes:    Handoff Completed: No, handoff not indicated or clinically appropriate    Additional Information:  Ongoing POC; pt near med ready for discharge, per provider; Pt daughter requesting call (Nadinee - [phone]) this afternoon; Attempt #1 @ 4504 - no answer/no VM. RNCC submitted notice to perceived established home care agency, [facility], however they responded with the comment that they never completed an intake and would require a new order from the [facility]. VM left with Donna @ [facility] (~1400). Alternately, VM left with [facility] (per telephone prompts), RNCC received callback, which clarified the [facility] provider and nurse; subsequent contact with different department, pt's PCP will be alerted of need for new order. RNCC submitted home care  search to [facility] due to difficulty navigating [facility] system and potential gaps in home support from time of discharge.    Next Steps: RNCC to continue to follow and support safe discharge planning.       Hung Krause RN BSN  7C RNCC - Phone: [phone]  Care Management Department    Secure message via [employer] (Search Name or 7C Med Surg [phone] RNCC)  For Weekend & Holiday on call RN Care Coordinator:  * [place] (0800 - 1630) Saturday and Sunday    Units: 5A, 5B, & 5C     Units: 6B, 6C, 6D     Units: 7A, 7B, 7C, 7D     Units: 6A/ICU      * [facility] ([phone]) Saturday and Sunday    Units: 6 Med/Surg, 8A, 10 ICU, & Pediatric Units    Units: 5 Ortho, 5Med/Surg & WB ED

## 2025-03-24 NOTE — PLAN OF CARE
Goal Outcome Evaluation:  Neuro: A&Ox2-3, disoriented to time and place. Reorientation provided.   Cardiac: SR. VSS.   Respiratory: Sating above 95% on RA.  GI/: Adequate urine output. No BM,  Diet/appetite: Tolerating combination diet. Eating well.  Activity:  Assist of 1 walker and left leg prosthesis ambulated to bathroom.   Pain: At acceptable level on current regimen.   Skin: No new deficits noted.  LDA's: PIV 2, SL, AV fistula at left arm. BKA left leg. Right toe cyanosis and right middel finger redness.  Transferred to  for further care and mangement,  Plan: Continue with POC. Notify primary team with changes.       Plan of Care Reviewed With: patient    Overall Patient Progress: improvingOverall Patient Progress: improving    Outcome Evaluation: Vitally stable. Doppler at right LE. Prevena right groin no output. Good urine output.

## 2025-03-24 NOTE — PROGRESS NOTES
[facility]    Medicine Progress Note - Hospitalist Service, GOLD TEAM 7    Date of Admission:  2/25/2025    Assessment & Plan   Tad Zaman is a 64 year old male admitted on 2/25/2025 for AMS. He has a history of diabetes, HTN, CAD, HF, and renal transplant 9/2022, left AKA, CKD.  Patient was brought in by family for AMS x 1-2 months. He has been evaluated at the [facility] multiple times in ED then was evaluated during an admission 1/20-2/21/25 with very broad workup (see below) that did not amount to any type of notable etiology of his symptoms. During this stay he was also treated for HF exacerbation, MOY, and UTI which were all stable at time of discharge. He finished course of cefdinir for e coli UTI on 2/24. He was discharged with family being told that he has failure to thrive. He was discharged home, required 24 hr cares by daughter including changing him and preparation of food. He has also has had intermittent agitation and aggression. Home health nurse stated that he was not appropriate for their program.      No clear etiology for confusion but the aggression and refusal of cares have clinically improved without intervention. Course complicated by new cyanotic areas on the R toes concerning for progression of PAD. Vascular surgery consulted, details per below for work up of PAD     Today  - No AM labs today, labs spaced to Q48H  - urine culture growing >100k e. Faecium. UA taken on day 4 of patient having mg so there is some possibility bacteria is colonizer. Started linezolid (subsequently stopped)   - repeat UA obtained today from spontaneous void -- negative, not consistent with infection --> linezolid stopped   - anticipate discharge tomorrow vs Wednesday      Peripheral vascular disease/ PAD s/p R femoral anterior tibial bypass 3/20/25   S/p L BKA  Seen by vascular surgery. Angiogram RLE revealed diffuse calcification of SFA into above-knee  popliteal. Underwent R femoral anterior tibial bypass 3/20/25. Treated with heparin gtt following procedure with subsequent transition to DOAC   - vascular surgery, appreciate recommendations   - Q4H neuro/doppler checks   -continue aspirin 81mg and atorvastatin 40mg daily   - stress test negative for ischemia  - stop heparin gtt, restart PTA apixaban 5mg BID   - podiatry consult, follow-up with patient in outpatient podiatry clinic   - [facility] consulted     AMS  Cognitive impairment vs encephalopathy   3/11 reseen by  psychiatry to see reg decision making capacity and was fel he needed surrogate decision maker for consent for procedures. Presented with 1-2 months of progressive encephalopathy and agitation with aggressive behavior toward family who is caring for him at home. [facility]  -Initial LP with pleocytosis, otherwise unrevealing.   -MRI brain without acute pathology.   -Neurology consulted. Some concern for hx of viral encephalitis that has since resolved leaving him with a degree of impairment. He otherwise remains hemodynamically stable without clinical evidence of infection, no gross electrolyte abnormality, stable glucose (elevated but without acidosis), BUN is improving, tacrolimus levels was  WNL.   [facility]:   - MRI 1/29: Stable mild amount of chronic small vessel ischemia. Small chronic R cerebellar infarct.   -OT SLUMS 6/30   -EEG: nonspecific encephalopathy.   -LP: protein 173, glucose 110. Cell count with 21 WBC, 41 RBC, 52 lymph. Hazy appearance  -Encephalopathy paraneoplastic evaluation (including Purkinje, ADI, antiglial nucl antibody, NMDA, BOSSMAN) 1/30/25 negative.   -CSF flow cytometry: suboptimal specimen with low cellularity. No immunophenotypic evidence of involvement by lymphoma. [place] light chain 64.8, lambda light chain 35.1, kappa/lambda 1.85 all elevated. No monoclonals detected (1/22)  -ELP/Immunofix, serum panel: total protein low, beta 1 fraction low  (1/22/25)  -Polyoma virus DNA of CSF, MAGNUS virus DNA (1/28) negative.   -Meningitis / encephalitis panel (1/28) negative  -CSF angiotensin converting enzyme (1/28) negative negative  -CMV (1/28, 1/25) negative   -BK virus (1/28) negative  -EBV (1/25) negative  -Cryptococcus (1/26) negative; Cryptococcus neoformans (1/28) negative  -Syphilis (1/25) negative  -HIV (1/25) negative  -B12 (1/18) WNL  -TSH (1/18) high side of normal 4.94  - CSF flow cytometry from 3/5/25  rare to absent B cells , no malignant cells seen   - Drug screen negative. Patient has distant history of what sounds to be mild to moderate alcohol intake.   - needs out patient  neuropsych testing as well as op EEG if cognition worsens    - repeat MRI of brain 3/1/25 limited by motion artifact  but no evidence of acute infarct or acute intracranial pathology   - Delirium precautions as able.   - repeat LP per CAPs on 3/5 completed - repeat CSF unremarkable (negative cytometry, cytology negative),  West Nile virus negative , VDRL non reactive , aerobic and anaerobic cultures so far no growth   - He will need follow up with outpatient neurology and consider neuropsychology testing  - Per neuro: can consider EEG in outpatient setting if cognition were to worsen  - 3/23/25 MoCA 8.1 score 14/30, where 26+ is normal, and 10-17 is moderate cognitive impairment. Likely represents some improvement from prior SLUMS score of 6/30 [facility] in [facility]25     Renal transplant 9/2022    CKD Stage 3b   Had the transplant [facility]. [facility]. 1/27 renal US with doppler showed no stenosis of vessels. Resistive indices were increased, felt could be related to ureteric obstruction but not specific. Mild dilation of intrarenal collecting system and ureter was also seen. CT a/p 1/28 W/O contrast suggested mild hydro.  - [facility]/3/25 at [facility] showed no hydro or other abnormalities. FENA 4.3 (1/28)  at [facility].   -  Creat recent baseline appears to be 1.8-2.3  - transplant neph following the patient  Recommendations:   - Continue mycophenolate 500 mg twice a day.  - Hold torsemide, calcitriol and lokelma  - Continue tacrolimus 1.5 mg QAM and 2mg at bedtime (adjusting per transplant Neph)  -Tacro level on 3/9/2025 is 3.6  - Accurate I/O and daily weight  - Will need Urology outpatient follow up for TOV and further management.    - Hold calcitriol given borderline high calcium. Will follow up calcium levels on next renal panel (ordered)  - Ordered UA and UPCR/UACR 3/3/2025 - trace leuk esterase and glucose present. No evidence of infection.   - Ordered Kidney Transplant US 3/3/2025 (routine) - mild hydronephrosis of transplanted kidney, torturous renal artery with mildly elevated velocity at the anastomosis, likely chronic.   - Check hepatitis panel (ordered for 3/4/2025) - HCV positive, known history and cured, otherwise negative.   - Accurate I/O and daily weight given recent heart failure exacerbation.  - Plan for urinary retention per below     Urinary retention  Noted to have ongoing retention requiring straight cath. Unclear whether this is a new issue for him. No significant anticholinergics on his current medication list.   - Tamsulosin 0.4mg PO initiated on 3/4; as tolerated   - Avoid anticholinergic medications as able.       Troponinemia, stable   Lateral lead T wave inversions   Atrial flutter   HFrEF last echo 2/2025 with EF 40-45%   HTN   Hx MI  Limited cardiac history available. Per patient's daughter he may have had a stent placed at 1 point but is unsure.  Patient and daughters deny any history of cardiac arrhythmia.  However on exam and on EKG evidence of atrial flutter, and patient also on Eliquis at least since December.  [facility], he was found to have heart failure exacerbation with 25-30 lbs fluid excess. BNP during hospitalization was 2868. His Lasix was switched  to Torsemide. He has been taking his medications per his daughter.   - per cardiology note I found in EPIC form 2018 he has had  medium size ischemic area  in inferior wall  ( had similar but smaller in 2013)   - BNP 64239 here.   - trop 267>>218  - EKG with T wave inversions and mild ST depressions in lateral leads  - denies recent CP or SOB, no peripheral or abdominal edema  Plan:   - TTE 2/26/25: LV function decreased w/ EF 40-45%   - No report of chest pain. Troponin trended down   - cont PTA atorvastatin 40 mg nightly  - HOLD PTA  Eliquis 5 mg BID on 3/6  but now on hold as of 3/11 and iv heparin started    - HOLD  PTA Jardiance 12.5 mg daily per cardiology pre-op  - continue PTA metoprolol 75 mg daily  - HOLD PTA torsemide 10 mg BID  - cardiology consulted and saw pt for pre-op; cleared  - He will need to arrange follow-up with his [facility] after his discharge     # T2DM  Not on long-term insulin. Hemoglobin A1c 6.9% as of 7/2024. Hyperglycemia when he allowed check on 3/4. He has been amenable to glucose checks and insulin this admission.   - HOLD PTA Jardiance 12.5 mg daily  - Repeat Hemoglobin A1c 7.9  - Initiated sliding scale insulin on 3/4 with TID AC and 0200 glucose checks -> increased to HDSSI on 3/10              - Will need to discuss with family whether they feel they would be able to manage sliding scale insulin at home.   - Carb consistent diet   - Given sliding scale insulin needs but would like to simplify this on discharge and will start basal Lantus at 5 units (half of what would be calculated 0.2units/kg) and uptitrate based on fasting AM sugars. Increased to lantus 7 units 3/13 and 10u 3/19.      # Mild normocytic anemia  - Hg stable, no signs of bleed, monitor on AC      # Hx hepatitis C,  - s/p treatment with cure        # Glaucoma - cont PTA eyedrops  # Sickle cell trait          Diet: Snacks/Supplements Adult: Nepro Oral Supplement; With Meals  Combination Diet  3 gm K Diet (low potassium); Moderate Consistent Carb (60 g CHO per Meal) Diet    DVT Prophylaxis: DOAC  Mg Catheter: Not present  Lines: None     Cardiac Monitoring: None  Code Status: Full Code      Clinically Significant Risk Factors               # Hypoalbuminemia: Lowest albumin = 3 g/dL at 3/20/2025  3:32 AM, will monitor as appropriate               # DMII: A1C = 7.9 % (Ref range: <5.7 %) within past 6 months    # Severe Malnutrition: based on nutrition assessment and treatment provided per dietitian's recommendations.    # Financial/Environmental Concerns: none         Social Drivers of Health    Tobacco Use: Medium Risk (7/26/2024)    Received from [employer]    Patient History     Smoking Tobacco Use: Former     Smokeless Tobacco Use: Never          Disposition Plan     Medically Ready for Discharge: Anticipated Tomorrow             Roxann Lopez MD  Hospitalist Service, GOLD TEAM 7  [facility]  Securely message with [employer] (more info)  Text page via [employer] Paging/Directory   See signed in provider for up to date coverage information  ______________________________________________________________________    Interval History   NAEO. Patient has had 3 episodes of spontaneous voiding since mg removal   Discussed discharge planning with family-- they would like patient to have all follow-up through [facility] and not through the [facility] following this admission     Physical Exam   Vital Signs: Temp: 97.4  F (36.3  C) Temp src: Oral BP: 111/70     Resp: 19 SpO2: 99 % O2 Device: None (Room air)    Weight: 150 lbs 9.19 oz    General: awake, alert, non-toxic appearing, no acute distress   HEENT: normocephalic, atraumatic with moist mucous membranes  Respiratory: comfortable conversational breathing on room air, CTAB  Cardiac: RRR  Abdomen: Non-distended. Soft, non-tender to palpation   Extremities: L leg AKA. R leg warm, necrotic toe tips        Medical Decision  Making       80 MINUTES SPENT BY ME on the date of service doing chart review, history, exam, documentation & further activities per the note.      Data         Imaging results reviewed over the past 24 hrs:   No results found for this or any previous visit (from the past 24 hours).

## 2025-03-25 PROCEDURE — 250N000011 HC RX IP 250 OP 636: Performed by: HOSPITALIST

## 2025-03-25 PROCEDURE — 250N000012 HC RX MED GY IP 250 OP 636 PS 637

## 2025-03-25 PROCEDURE — 250N000013 HC RX MED GY IP 250 OP 250 PS 637: Performed by: STUDENT IN AN ORGANIZED HEALTH CARE EDUCATION/TRAINING PROGRAM

## 2025-03-25 PROCEDURE — 250N000013 HC RX MED GY IP 250 OP 250 PS 637

## 2025-03-25 PROCEDURE — 250N000012 HC RX MED GY IP 250 OP 636 PS 637: Performed by: STUDENT IN AN ORGANIZED HEALTH CARE EDUCATION/TRAINING PROGRAM

## 2025-03-25 PROCEDURE — 99233 SBSQ HOSP IP/OBS HIGH 50: CPT | Performed by: STUDENT IN AN ORGANIZED HEALTH CARE EDUCATION/TRAINING PROGRAM

## 2025-03-25 PROCEDURE — 120N000002 HC R&B MED SURG/OB UMMC

## 2025-03-25 RX ORDER — LINEZOLID 2 MG/ML
600 INJECTION, SOLUTION INTRAVENOUS EVERY 12 HOURS
Status: DISCONTINUED | OUTPATIENT
Start: 2025-03-25 | End: 2025-03-25

## 2025-03-25 RX ORDER — TACROLIMUS 1 MG/1
2 CAPSULE ORAL 2 TIMES DAILY
Qty: 120 CAPSULE | Refills: 0 | Status: SHIPPED | OUTPATIENT
Start: 2025-03-25 | End: 2025-03-25

## 2025-03-25 RX ORDER — FERROUS SULFATE 325(65) MG
325 TABLET ORAL EVERY OTHER DAY
Qty: 30 TABLET | Refills: 0 | Status: SHIPPED | OUTPATIENT
Start: 2025-03-25

## 2025-03-25 RX ORDER — TAMSULOSIN HYDROCHLORIDE 0.4 MG/1
0.8 CAPSULE ORAL DAILY
Qty: 60 CAPSULE | Refills: 0 | Status: SHIPPED | OUTPATIENT
Start: 2025-03-25

## 2025-03-25 RX ORDER — SODIUM BICARBONATE 650 MG/1
1300 TABLET ORAL 3 TIMES DAILY
Qty: 120 TABLET | Refills: 1 | Status: SHIPPED | OUTPATIENT
Start: 2025-03-25

## 2025-03-25 RX ORDER — TACROLIMUS 1 MG/1
2 CAPSULE ORAL 2 TIMES DAILY
Qty: 120 CAPSULE | Refills: 0 | Status: SHIPPED | OUTPATIENT
Start: 2025-03-25

## 2025-03-25 RX ORDER — ATORVASTATIN CALCIUM 40 MG/1
40 TABLET, FILM COATED ORAL AT BEDTIME
Qty: 90 TABLET | Refills: 0 | Status: SHIPPED | OUTPATIENT
Start: 2025-03-25

## 2025-03-25 RX ORDER — ASPIRIN 81 MG/1
81 TABLET, CHEWABLE ORAL DAILY
Qty: 60 TABLET | Refills: 0 | Status: SHIPPED | OUTPATIENT
Start: 2025-03-25

## 2025-03-25 RX ORDER — METFORMIN HYDROCHLORIDE 500 MG/1
500 TABLET, EXTENDED RELEASE ORAL
Qty: 60 TABLET | Refills: 0 | Status: SHIPPED | OUTPATIENT
Start: 2025-03-25

## 2025-03-25 RX ADMIN — ATORVASTATIN CALCIUM 40 MG: 40 TABLET, FILM COATED ORAL at 20:13

## 2025-03-25 RX ADMIN — TACROLIMUS 2 MG: 1 CAPSULE ORAL at 17:56

## 2025-03-25 RX ADMIN — APIXABAN 5 MG: 5 TABLET, FILM COATED ORAL at 08:39

## 2025-03-25 RX ADMIN — SODIUM BICARBONATE 1300 MG: 650 TABLET ORAL at 08:39

## 2025-03-25 RX ADMIN — TAMSULOSIN HYDROCHLORIDE 0.8 MG: 0.4 CAPSULE ORAL at 08:38

## 2025-03-25 RX ADMIN — ASPIRIN 81 MG CHEWABLE TABLET 81 MG: 81 TABLET CHEWABLE at 08:38

## 2025-03-25 RX ADMIN — MYCOPHENOLATE MOFETIL 500 MG: 500 TABLET, FILM COATED ORAL at 20:13

## 2025-03-25 RX ADMIN — TORSEMIDE 10 MG: 10 TABLET ORAL at 20:13

## 2025-03-25 RX ADMIN — MYCOPHENOLATE MOFETIL 500 MG: 500 TABLET, FILM COATED ORAL at 08:39

## 2025-03-25 RX ADMIN — PANTOPRAZOLE SODIUM 40 MG: 40 TABLET, DELAYED RELEASE ORAL at 08:38

## 2025-03-25 RX ADMIN — LATANOPROST 1 DROP: 50 SOLUTION OPHTHALMIC at 20:16

## 2025-03-25 RX ADMIN — LINEZOLID 600 MG: 600 INJECTION, SOLUTION INTRAVENOUS at 05:21

## 2025-03-25 RX ADMIN — EMPAGLIFLOZIN 10 MG: 10 TABLET, FILM COATED ORAL at 08:38

## 2025-03-25 RX ADMIN — FERROUS SULFATE TAB 325 MG (65 MG ELEMENTAL FE) 325 MG: 325 (65 FE) TAB at 08:38

## 2025-03-25 RX ADMIN — TACROLIMUS 2 MG: 1 CAPSULE ORAL at 08:40

## 2025-03-25 RX ADMIN — APIXABAN 5 MG: 5 TABLET, FILM COATED ORAL at 20:13

## 2025-03-25 RX ADMIN — SODIUM BICARBONATE 1300 MG: 650 TABLET ORAL at 20:13

## 2025-03-25 RX ADMIN — SODIUM BICARBONATE 1300 MG: 650 TABLET ORAL at 14:10

## 2025-03-25 RX ADMIN — LIDOCAINE 4% 1 PATCH: 40 PATCH TOPICAL at 20:18

## 2025-03-25 RX ADMIN — TORSEMIDE 10 MG: 10 TABLET ORAL at 08:39

## 2025-03-25 RX ADMIN — METFORMIN ER 500 MG 500 MG: 500 TABLET ORAL at 17:56

## 2025-03-25 ASSESSMENT — ACTIVITIES OF DAILY LIVING (ADL)
ADLS_ACUITY_SCORE: 67

## 2025-03-25 NOTE — PROGRESS NOTES
[facility]    Medicine Progress Note - Hospitalist Service, GOLD TEAM 7    Date of Admission:  2/25/2025    Assessment & Plan   Tad Zaman is a 64 year old man admitted on 2/25/2025 for AMS. He has a history of diabetes, HTN, CAD, HF, and renal transplant 9/2022, left AKA, CKD.  Patient was brought in by family for AMS x 1-2 months. He has been evaluated at the [facility] multiple times in ED then was evaluated during an admission 1/20-2/21/25 with very broad workup (see below) that did not amount to any type of notable etiology of his symptoms. During this stay he was also treated for HF exacerbation, MOY, and UTI which were all stable at time of discharge. He finished course of cefdinir for e coli UTI on 2/24. He was discharged with family being told that he has failure to thrive. He was discharged home, required 24 hr cares by daughter including changing him and preparation of food. He had also had intermittent agitation and aggression.      No clear etiology for confusion but the aggression and refusal of cares have clinically improved without intervention. Course complicated by new cyanotic areas on the R toes concerning for progression of PAD. Vascular surgery consulted, details per below for workup and treatment of PAD.     Today  - No AM labs, labs spaced to Q48H (refused labs today)  - urine culture growing >100k e. Faecium. UA taken on day 4 of patient having mg so there is some possibility bacteria is colonizer, so linezolid was not continued. Reviewed with ID 03/25/25 who were in agreement.  - anticipate discharge tomorrow to home with home care nursing          Peripheral vascular disease/ PAD s/p R femoral anterior tibial bypass 3/20/25   S/p L BKA  Seen by vascular surgery. Angiogram RLE revealed diffuse calcification of SFA into above-knee popliteal. Underwent R femoral anterior tibial bypass 3/20/25. Treated with heparin gtt following  procedure with subsequent transition to DOAC   - vascular surgery, appreciate recommendations   - Q4H neuro/doppler checks   -continue aspirin 81mg and atorvastatin 40mg daily   - stress test negative for ischemia  - stop heparin gtt, restarted PTA apixaban 5mg BID   - podiatry consult, follow-up with patient in outpatient podiatry clinic   - [facility] consulted     AMS  Cognitive impairment vs encephalopathy   3/11 reseen by  psychiatry to see reg decision making capacity and was fel he needed surrogate decision maker for consent for procedures. Presented with 1-2 months of progressive encephalopathy and agitation with aggressive behavior toward family who is caring for him at home. [facility]  -Initial LP with pleocytosis, otherwise unrevealing.   -MRI brain without acute pathology.   -Neurology consulted. Some concern for hx of viral encephalitis that has since resolved leaving him with a degree of impairment. He otherwise remains hemodynamically stable without clinical evidence of infection, no gross electrolyte abnormality, stable glucose (elevated but without acidosis), BUN is improving, tacrolimus levels was  WNL.   [facility]:   - MRI 1/29: Stable mild amount of chronic small vessel ischemia. Small chronic R cerebellar infarct.   -OT SLUMS 6/30   -EEG: nonspecific encephalopathy.   -LP: protein 173, glucose 110. Cell count with 21 WBC, 41 RBC, 52 lymph. Hazy appearance  -Encephalopathy paraneoplastic evaluation (including Purkinje, ADI, antiglial nucl antibody, NMDA, BOSSMAN) 1/30/25 negative.   -CSF flow cytometry: suboptimal specimen with low cellularity. No immunophenotypic evidence of involvement by lymphoma. [place] light chain 64.8, lambda light chain 35.1, kappa/lambda 1.85 all elevated. No monoclonals detected (1/22)  -ELP/Immunofix, serum panel: total protein low, beta 1 fraction low (1/22/25)  -Polyoma virus DNA of CSF, MAGNUS virus DNA (1/28) negative.   -Meningitis / encephalitis  panel (1/28) negative  -CSF angiotensin converting enzyme (1/28) negative negative  -CMV (1/28, 1/25) negative   -BK virus (1/28) negative  -EBV (1/25) negative  -Cryptococcus (1/26) negative; Cryptococcus neoformans (1/28) negative  -Syphilis (1/25) negative  -HIV (1/25) negative  -B12 (1/18) WNL  -TSH (1/18) high side of normal 4.94  - CSF flow cytometry from 3/5/25  rare to absent B cells , no malignant cells seen   - Drug screen negative. Patient has distant history of what sounds to be mild to moderate alcohol intake.   - needs out patient  neuropsych testing as well as op EEG if cognition worsens    - repeat MRI of brain 3/1/25 limited by motion artifact  but no evidence of acute infarct or acute intracranial pathology   - Delirium precautions as able.   - repeat LP per CAPs on 3/5 completed - repeat CSF unremarkable (negative cytometry, cytology negative),  West Nile virus negative , VDRL non reactive , aerobic and anaerobic cultures so far no growth   - He will need follow up with outpatient neurology and consider neuropsychology testing  - Per neuro: can consider EEG in outpatient setting if cognition were to worsen  - 3/23/25 MoCA 8.1 score 14/30, where 26+ is normal, and 10-17 is moderate cognitive impairment. Likely represents some improvement from prior SLUMS score of 6/30 [facility] in [facility]25     Renal transplant 9/2022    CKD Stage 3b   Had the transplant at the [facility]. [facility]. 1/27 renal US with doppler showed no stenosis of vessels. Resistive indices were increased, felt could be related to ureteric obstruction but not specific. Mild dilation of intrarenal collecting system and ureter was also seen. CT a/p 1/28 W/O contrast suggested mild hydro.  - Renal ultrasound 2/3/25 at [facility] showed no hydro or other abnormalities. FENA 4.3 (1/28) at [facility].   -  Creat recent baseline appears to be 1.8-2.3  - transplant neph following the  patient  Recommendations:   - Continue mycophenolate 500 mg twice a day.  - Hold torsemide, calcitriol and lokelma  - Continue tacrolimus 1.5 mg QAM and 2mg at bedtime (adjusting per transplant Neph)  -Tacro level on 3/9/2025 is 3.6  - Accurate I/O and daily weight  - Will need Urology outpatient follow up for TOV and further management.    - Hold calcitriol given borderline high calcium. Will follow up calcium levels on next renal panel (ordered)  - Ordered UA and UPCR/UACR 3/3/2025 - trace leuk esterase and glucose present. No evidence of infection.   - Ordered Kidney Transplant US 3/3/2025 (routine) - mild hydronephrosis of transplanted kidney, torturous renal artery with mildly elevated velocity at the anastomosis, likely chronic.   - Check hepatitis panel (ordered for 3/4/2025) - HCV positive, known history and cured, otherwise negative.   - Accurate I/O and daily weight given recent heart failure exacerbation.     Urinary retention, resolved  Noted to have ongoing retention requiring straight cath. Unclear whether this is a new issue for him. No significant anticholinergics on his current medication list.   - Tamsulosin 0.4mg PO initiated on 3/4; as tolerated   - Avoid anticholinergic medications as able.       Troponinemia, stable   Lateral lead T wave inversions   Atrial flutter   HFrEF last echo 2/2025 with EF 40-45%   HTN   Hx MI  Limited cardiac history available. Per patient's daughter he may have had a stent placed at 1 point but is unsure.  Patient and daughters deny any history of cardiac arrhythmia.  However on exam and on EKG evidence of atrial flutter, and patient also on Eliquis at least since December.  [facility], he was found to have heart failure exacerbation with 25-30 lbs fluid excess. BNP during hospitalization was 2868. His Lasix was switched to Torsemide. He has been taking his medications per his daughter.   - per cardiology note I found in EPIC form 2018 he  has had  medium size ischemic area  in inferior wall  ( had similar but smaller in 2013)   - BNP 97018 here  - EKG with T wave inversions and mild ST depressions in lateral leads  - denies recent CP or SOB, no peripheral or abdominal edema  - No report of chest pain. Troponin trended down   Plan:   - TTE 2/26/25: LV function decreased w/ EF 40-45%   - resume PTA atorvastatin 40 mg nightly  - resume PTA empagliflozin 12.5 mg daily  - holding PTA metoprolol 75 mg daily  - resume PTA torsemide 10 mg BID  - cardiology consulted and saw pt for pre-op; cleared  - He will need to arrange follow-up with his [facility]ologist [facility] after his discharge     # T2DM  Not on long-term insulin. Hemoglobin A1c 6.9% as of 7/2024. Hyperglycemia when he allowed check on 3/4. He has been amenable to glucose checks and insulin this admission.   - resume PTA empagliflozin 12.5 mg daily  - PTA metformin  - Repeat Hemoglobin A1c 7.9  - Carb consistent diet   - glucose checks were stopped when orals were restarted     # Mild normocytic anemia  - Hg stable, no signs of bleed, monitor on AC      # Hx hepatitis C,  - s/p treatment with cure        # Glaucoma - cont PTA eyedrops  # Sickle cell trait          Diet: Snacks/Supplements Adult: Nepro Oral Supplement; With Meals  Combination Diet 3 gm K Diet (low potassium); Moderate Consistent Carb (60 g CHO per Meal) Diet  Diet    DVT Prophylaxis: DOAC  Zaman Catheter: Not present  Lines: None     Cardiac Monitoring: None  Code Status: Full Code      Clinically Significant Risk Factors               # Hypoalbuminemia: Lowest albumin = 3 g/dL at 3/20/2025  3:32 AM, will monitor as appropriate               # DMII: A1C = 7.9 % (Ref range: <5.7 %) within past 6 months    # Severe Malnutrition: based on nutrition assessment and treatment provided per dietitian's recommendations.      # Financial/Environmental Concerns: none         Social Drivers of Health    Tobacco Use: Medium Risk  "(7/26/2024)    Received from [facility]    Patient History     Smoking Tobacco Use: Former     Smokeless Tobacco Use: Never          Disposition Plan     Medically Ready for Discharge: Anticipated Tomorrow             Eugenio Eddy DO  Hospitalist Service, GOLD TEAM 7  [facility]  Securely message with [employer] (more info)  Text page via [email or web address] Paging/Directory   See signed in provider for up to date coverage information  ______________________________________________________________________    Interval History   NAEO. No new complaints today, physically. Expresses a belief that one of the nurses is trying to delay his discharge, and delayed redressing his foot wound this morning. He thinks someone is trying to prevent drawing blood from his \"port\" (indicating to the abdomen), but he has no port or abdominal catheter. When asked, he reaches for and shows the pulse ox, \"this catheter!\"    He is disappointed not to be leaving the hospital today. I spoke with SW and RNCC, as well as his daughter. There is concern about his safety being alone at home, but Jarrod (daughter) describes a plan for the family to \"step up\" and be with him 24 hours. He is doing better now, mentally, than he was last time he was home between hospitalizations.    Physical Exam   Vital Signs: Temp: 97.7  F (36.5  C) Temp src: Oral BP: 115/72 Pulse: 81   Resp: 16 SpO2: 97 % O2 Device: None (Room air)    Weight: 149 lbs 9.6 oz    General: awake, alert, non-toxic appearing, no acute distress   HEENT: normocephalic, atraumatic with moist mucous membranes  Respiratory: comfortable conversational breathing on room air, CTAB  Cardiac: Regular, normal rate  Abdomen: Non-distended. Soft, non-tender to palpation   Extremities: L leg AKA. R leg warm, necrotic toe tips   Neuro: oriented to self, but not date (year) or place ([place])  Psych: Possible paranoid thought content       Medical Decision " Making       60 MINUTES SPENT BY ME on the date of service doing chart review, history, exam, documentation & further activities per the note.      Data         Imaging results reviewed over the past 24 hrs:   No results found for this or any previous visit (from the past 24 hours).

## 2025-03-25 NOTE — PLAN OF CARE
Assumed cares     Pain: Denies pain and nausea.  Neuro: Disoriented to time and place  Respiratory: RA  Cardiac/Neurovascular: Pedal pulses with doppler  GI/: Voiding spontaneously. No BM.   Nutrition: 3 gram K, moderate consistent carb  Activity: Standby walker  Skin: Right foot-necrotic toes covered with gauze. Wound vac to right groin  Lines: Left PIV-SL    Events this shift: Dressing change on right foot  Plan:  Discharging home tomorrow with home care      Goal Outcome Evaluation:      Plan of Care Reviewed With: patient    Overall Patient Progress: no change

## 2025-03-25 NOTE — PROGRESS NOTES
Care Management Follow Up    Length of Stay (days): 27  Expected Discharge Date: 03/26/2025  Concerns to be Addressed: discharge planning     Patient plan of care discussed at interdisciplinary rounds: Yes  Anticipated Discharge Disposition: Home  Anticipated Discharge Services: Home Care  Anticipated Discharge DME: Charli wound manager   Patient/family educated on Medicare website which has current facility and service quality ratings:  n/a  Education Provided on the Discharge Plan:  yes  Patient/Family in Agreement with the Plan: yes    Referrals Placed by CM/SW:      ACCEPTED  [facility]  [address]; [address]  [place] [address]   Phone: [phone]  Fax: [phone]  SNV; PT/OT; [facility]  [address] [place] [address]  [facility] liaison - [phone]   PCP - Dr. Ashley Garcia  RN - Stacie - [phone]     Private pay costs discussed: transportation costs- deferring [employer] - family to drive  Discussed  Partnership in Safe Discharge Planning  document with patient/family: Yes:    Handoff Completed: Yes, [facility] PCP: Internal handoff referral completed    Additional Information:  Ongoing POC; Pt near med ready for discharge. RNCC clarified home care service;  Family plans to be available for transportation, either noon, via pt spouse; or after 1600, via daughter, Jarrod (who declined [employer] when pricing was quoted (~$171.00). Pt to discharge with [facility] being billed via Medcare plan; subsequent episode [facility] to [facility], per [facility] team. Pt will discharge with wound manager, Charli, which will be removed on 3/27, and can be disposed of by home care nursing.     Next Steps: RNCC to continue to follow and support safe discharge planning.       Hung Krause RN BSN  7C RNCC - Phone: [phone]  Care Management Department    Secure message via [employer] (Search Name or  Med Surg [phone] RNCC)  For Weekend & Holiday on call RN Care  Coordinator:  * [facility] (0800 - 1630) Saturday and Sunday    Units: 5A, 5B, & 5C     Units: 6B, 6C, 6D     Units: 7A, 7B, 7C, 7D     Units: 6A/ICU      * [facility] ([phone]) Saturday and Sunday    Units: 6 Med/Surg, 8A, 10 ICU, & Pediatric Units    Units: 5 Ortho, 5Med/Surg & WB ED

## 2025-03-25 NOTE — DISCHARGE INSTRUCTIONS
Vascular Surgery Discharge Instructions:    You have a clean dressing on your surgical wound.   Dressing change instructions as follows:     Right groin -- Shower daily. OK to do daily dressing with dry gauze and tape to avoid staples catching your clothes. Okay to leave the right groin incision open to air as well. Please call vascular surgery with any concerns such as increased redness, swelling, drainage at incision sites.  These are signs of infections.  Please make sure the right groin incision is always kept dry, and clean at all times.    Right leg incision is closed with staples.  Please keep the incision clean and dry at all times.  Avoid staples being caught in close, therefore okay to apply dressing for comfort.    Continue aspirin and apixaban (Eliquis).    Please feel for your bypass graft daily to make sure it is open. Also, watch out for bleeding, right foot/leg pain, coolness, numbness, weakness, signs of infection such as redness, worsening tenderness, purulence, etc or any other concerning symptoms. Please come to the ED and let us know immediately if there is any concerning signs/symptoms.    You will see us in 3-4 weeks for staple removal and repeat AYAN/duplex.    Contact your Surgeon Team if you have increased redness, warmth around the surgical wound, and/or drainage from the surgical wound.    If you have any questions, please contact our clinic directly at [phone] and ask for the nurse. We also encourage the use of DJZ to communicate with your HealthCare Provider.        If you have an urgent need after business hours (8:00 am to 4:30 pm) please call [phone], option 4, and ask for the vascular attending on call. For non-urgent after hours needs, please call the vascular nurse at [phone] and leave a detailed voicemail. For scheduling needs, please call our clinic directly at [phone].

## 2025-03-25 NOTE — PLAN OF CARE
Goal Outcome Evaluation: [facility] [id number]. Pt disoriented to time and place, VSS on RA. Denies pain and nausea. Up SBA w/ walker. L PIV- SL. R groin wound vac intact. Voiding spontaneously, no BM. R shin and R foot dressings CDI. Continue with plan of care.       Plan of Care Reviewed With: patient    Overall Patient Progress: no changeOverall Patient Progress: no change

## 2025-03-25 NOTE — CONSULTS
Care Management consult acknowledged.  Care Management team continues to follow for safe discharge plan. Please see most recent RNCC note from 3/24/25.    __________________________     REILLY Dickens, DARRELLSW  , [facility]  7C Medical Surgical Beds [id number]   Desk Phone: [phone]   Securely message with [employer] (Search Name or  Med Surg [phone] )  Text page via [facility] Paging/Directory   See signed in provider for up to date coverage information  Social Work & Care Management Department

## 2025-03-26 ENCOUNTER — APPOINTMENT (OUTPATIENT)
Dept: OCCUPATIONAL THERAPY | Facility: CLINIC | Age: 65
DRG: 981 | End: 2025-03-26
Payer: MEDICARE

## 2025-03-26 VITALS
TEMPERATURE: 98.1 F | DIASTOLIC BLOOD PRESSURE: 76 MMHG | BODY MASS INDEX: 19.84 KG/M2 | OXYGEN SATURATION: 97 % | HEIGHT: 72 IN | HEART RATE: 78 BPM | WEIGHT: 146.5 LBS | SYSTOLIC BLOOD PRESSURE: 111 MMHG | RESPIRATION RATE: 16 BRPM

## 2025-03-26 LAB
BACTERIA UR CULT: ABNORMAL

## 2025-03-26 PROCEDURE — 250N000013 HC RX MED GY IP 250 OP 250 PS 637

## 2025-03-26 PROCEDURE — 97535 SELF CARE MNGMENT TRAINING: CPT | Mod: GO

## 2025-03-26 PROCEDURE — 250N000013 HC RX MED GY IP 250 OP 250 PS 637: Performed by: STUDENT IN AN ORGANIZED HEALTH CARE EDUCATION/TRAINING PROGRAM

## 2025-03-26 PROCEDURE — 250N000012 HC RX MED GY IP 250 OP 636 PS 637

## 2025-03-26 PROCEDURE — 250N000012 HC RX MED GY IP 250 OP 636 PS 637: Performed by: STUDENT IN AN ORGANIZED HEALTH CARE EDUCATION/TRAINING PROGRAM

## 2025-03-26 PROCEDURE — 99239 HOSP IP/OBS DSCHRG MGMT >30: CPT | Performed by: STUDENT IN AN ORGANIZED HEALTH CARE EDUCATION/TRAINING PROGRAM

## 2025-03-26 PROCEDURE — 97530 THERAPEUTIC ACTIVITIES: CPT | Mod: GO

## 2025-03-26 RX ADMIN — SODIUM BICARBONATE 1300 MG: 650 TABLET ORAL at 13:40

## 2025-03-26 RX ADMIN — TAMSULOSIN HYDROCHLORIDE 0.8 MG: 0.4 CAPSULE ORAL at 08:16

## 2025-03-26 RX ADMIN — PANTOPRAZOLE SODIUM 40 MG: 40 TABLET, DELAYED RELEASE ORAL at 08:16

## 2025-03-26 RX ADMIN — FERROUS SULFATE TAB 325 MG (65 MG ELEMENTAL FE) 325 MG: 325 (65 FE) TAB at 08:14

## 2025-03-26 RX ADMIN — SODIUM BICARBONATE 1300 MG: 650 TABLET ORAL at 08:16

## 2025-03-26 RX ADMIN — ASPIRIN 81 MG CHEWABLE TABLET 81 MG: 81 TABLET CHEWABLE at 08:16

## 2025-03-26 RX ADMIN — APIXABAN 5 MG: 5 TABLET, FILM COATED ORAL at 08:16

## 2025-03-26 RX ADMIN — TORSEMIDE 10 MG: 10 TABLET ORAL at 08:15

## 2025-03-26 RX ADMIN — MYCOPHENOLATE MOFETIL 500 MG: 500 TABLET, FILM COATED ORAL at 08:15

## 2025-03-26 RX ADMIN — EMPAGLIFLOZIN 10 MG: 10 TABLET, FILM COATED ORAL at 08:16

## 2025-03-26 RX ADMIN — TACROLIMUS 2 MG: 1 CAPSULE ORAL at 08:15

## 2025-03-26 ASSESSMENT — ACTIVITIES OF DAILY LIVING (ADL)
ADLS_ACUITY_SCORE: 67

## 2025-03-26 NOTE — PROGRESS NOTES
Care Management Discharge Note    Discharge Date: 03/26/2025  Discharge Disposition: Home  Discharge Services: Home Care  Discharge DME: No new needs  Discharge Transportation: family or friend will provide  Private pay costs discussed: Not applicable  Does the patient's insurance plan have a 3 day qualifying hospital stay waiver?  No  PAS Confirmation Code:  n/a  Patient/family educated on Medicare website which has current facility and service quality ratings:  n/a  Education Provided on the Discharge Plan:  yes  Persons Notified of Discharge Plans: pt, family, bedside RN; charge RN; Attnd MD  Patient/Family in Agreement with the Plan: yes    Handoff Referral Completed: Yes, non-[facility] PCP: External handoff communication completed    ACCEPTED  [facility]  [address]; [address]  [place] [address]   Phone: [phone]  Fax: [phone]  SNV; PT/OT; [facility]  [address] [place] [address]  [facility] liaison - [phone]   PCP - Dr. Ashley Garcia  RN - [place] - [phone]    Additional Information:  Pt med ready for discharge to home with assist; Pt's family present at the bedside for discharge instructions and transportation. RNCC provided verbal and EPIC hand-off to accepting home care agency, including [facility] information for extending payer source for home care needs. RNCC to conclude following upon safe departure from floor and facility.       Hung Krause RN BSN  7C RNCC - Phone: [phone]  Care Management Department    Secure message via [employer] (Search Name or 7C Med Surg [phone] RNCC)  For Weekend & Holiday on call RN Care Coordinator:  * [facility] (0800 - 1630) Saturday and Sunday    Units: 5A, 5B, & 5C     Units: 6B, 6C, 6D     Units: 7A, 7B, 7C, 7D     Units: 6A/ICU      * [facility] ([phone]) Saturday and Sunday    Units: 6 Med/Surg, 8A, 10 ICU, & Pediatric Units    Units: 5 Ortho, 5Med/Surg & WB ED

## 2025-03-26 NOTE — PROGRESS NOTES
Vascular Progress Note    ROMEOEON. Doing well. Potential discharge home today    On exam,  NAD  Right groin incision in staples, well healing without signs of bleeding/infection  Right lateral calf incision, well healing without signs of bleeding/infection  Palp graft  R DP biphasic    Impression: POD 6 from right CFA-AT with cryopreserved artery    - Prevena removed today -- either daily dressing changes or leave open to air for both incisions  - Continue ASA/apixaban  - Follow-up with us in 3-4 weeks with repeat duplex/AYAN  - Discharge instructions in    Staff: Dr. Marti Lozano MD  Vascular Surgery Fellow

## 2025-03-26 NOTE — PLAN OF CARE
Assumed cares     Pain: Denies pain   Neuro: A&O x2. Disoriented to time and place  Respiratory: RA  Cardiac/Neurovascular: Pedal pulses w/doppler  GI/: Voiding spontaneously. No BM.   Nutrition: 3 gram K, moderate consistent carb  Activity:Standby walker  Skin: R foot necrotic toes-iodine and gauze applied. Wound vac removed.  Lines: Left PIV removed    Events this shift: Dressing change on R foot. Discharged w/home care. Transportation provided by family. Personal belongings sent home with family.    Goal Outcome Evaluation:      Plan of Care Reviewed With: patient    Overall Patient Progress: no change

## 2025-03-26 NOTE — PLAN OF CARE
Physical Therapy Discharge Summary    Reason for therapy discharge:    Discharged to home with home therapy.  All goals and outcomes met, no further needs identified.    Progress towards therapy goal(s). See goals on Care Plan in [facility] electronic health record for goal details.  Goals met    Therapy recommendation(s):    Continued therapy is recommended.  Rationale/Recommendations:  Patient met inpatient goals to return to home safely but requires further skilled physical therapy to return to baseline function.

## 2025-03-26 NOTE — PLAN OF CARE
Goal Outcome Evaluation:      Plan of Care Reviewed With: patient    Overall Patient Progress: no changeOverall Patient Progress: no change    Outcome Evaluation: [id number]. No events. Alert, dis to time/situation. VSS on RA. Denies pain. R groin wound vac intact. Adequate voids, no BM. Bed alarm on. Slept well overnight. Plan for discharge home today w/home care.

## 2025-03-26 NOTE — DISCHARGE SUMMARY
"[facility]  Hospitalist Discharge Summary      Date of Admission:  2/25/2025  Date of Discharge:  3/26/2025  Discharging Provider: Eugenio Eddy DO  Discharge Service: Hospitalist Service, GOLD TEAM 7    Discharge Diagnoses   ***    Clinically Significant Risk Factors     # DMII: A1C = 7.9 % (Ref range: <5.7 %) within past 6 months  # Severe Malnutrition: based on nutrition assessment and treatment provided per dietitian's recommendations.      Follow-ups Needed After Discharge   Follow-up Appointments       Hospital Follow-up with Existing Primary Care Provider (PCP)      Please see details below         Schedule Primary Care visit within: 7 Days           {Additional follow-up instructions/to-do's for PCP    :***}    Unresulted Labs Ordered in the Past 30 Days of this Admission       Date and Time Order Name Status Description    3/20/2025  9:11 AM Prepare red blood cells (unit) Preliminary     3/20/2025  9:11 AM Prepare red blood cells (unit) Preliminary         These results will be followed up by ***    Discharge Disposition   {Discharge to:6542028::\"Discharged to home\"}  Condition at discharge: {CONDITION:400220::\"Stable\"}    Hospital Course   {OPTIONAL -- use to select A&P section   :809214}    Consultations This Hospital Stay   MEDICATION HISTORY IP PHARMACY CONSULT  NEUROLOGY GENERAL ADULT IP CONSULT  PHYSICAL THERAPY ADULT IP CONSULT  NEPHROLOGY KIDNEY/PANCREAS TRANSPLANT ADULT IP CONSULT  CARE MANAGEMENT / SOCIAL WORK IP CONSULT  NEUROLOGY GENERAL ADULT IP CONSULT  INTERNAL MEDICINE PROCEDURE TEAM ADULT IP CONSULT - LUMBAR PUNCTURE  PSYCHIATRY IP CONSULT  WOUND OSTOMY CONTINENCE NURSE  IP CONSULT  VASCULAR SURGERY ADULT IP CONSULT  PSYCHIATRY IP CONSULT  PHARMACY IP CONSULT  PHARMACY IP CONSULT  CARDIOLOGY GENERAL ADULT IP CONSULT  VASCULAR SURGERY ADULT IP CONSULT  PHYSICAL THERAPY ADULT IP CONSULT  SOCIAL WORK IP CONSULT  CARE MANAGEMENT / SOCIAL WORK IP " CONSULT  NURSING TO CONSULT FOR VASCULAR ACCESS CARE IP CONSULT  PHARMACY IP CONSULT  CARDIOLOGY GENERAL ADULT IP CONSULT  NURSING TO CONSULT FOR VASCULAR ACCESS CARE IP CONSULT  PODIATRY IP CONSULT  PHARMACY IP CONSULT  WOUND OSTOMY CONTINENCE NURSE  IP CONSULT  CARE MANAGEMENT / SOCIAL WORK IP CONSULT  OCCUPATIONAL THERAPY ADULT IP CONSULT  VASCULAR ACCESS ADULT IP CONSULT  WOUND OSTOMY CONTINENCE NURSE  IP CONSULT  PHARMACY IP CONSULT  INFECTIOUS DISEASE TRANSPLANT SOT ADULT IP CONSULT    Code Status   Full Code    Time Spent on this Encounter   {How much time did you spend on discharge?:[id number]}       Eugenio Eddy DO  [facility] 7C MED SURG  [address]  [place] [address]  Phone: [phone]  ______________________________________________________________________    Physical Exam   Vital Signs: Temp: 98.1  F (36.7  C) Temp src: Oral BP: 111/76 Pulse: 78   Resp: 16 SpO2: 97 % O2 Device: None (Room air)    Weight: 146 lbs 8 oz  {Recommend personal SmartPhrase or Notewriter for exam (OPTIONAL)   :767473}       Primary Care Physician   Julius Cesar    Discharge Orders      Adult Mental Health  Referral      Adult Neurology  Referral      Orthopedic  Referral      Home Care Referral      Primary Care Referral      Resume Home Care Services    [facility]  [address]  [place] [address]  P: [phone]  F: [phone]    - Services: RN/PT/OT/HHA/HM     Dressing / Wound Care - Wound    You have a clean dressing on your surgical wound. Dressing change instructions as follows:   Right groin --you have a negative pressure dressing there.  Please make sure that is on negative pressure/suction at all times. Do not allow for the pump to turn off.  If the pump turns off/loses suction for more than 2 hours, please call vascular surgery immediately as your risk for infection in the groin will increase. This dressing was placed on 3/28/2025,  please remove it on 3/28/2025.  Okay to leave the right groin incision open to air, no need to apply further dressings after this.  Please call vascular surgery with any concerns such as increased redness, swelling, drainage at incision sites.  These are signs of infections.  Please make sure the right groin incision is always kept dry, and clean at all times.    Right leg incision is closed with staples.  Please keep the incision clean and dry at all times.  Avoid staples being caught in close, therefore okay to apply dressing for comfort.    Contact your Surgeon Team if you have increased redness, warmth around the surgical wound, and/or drainage from the surgical wound.    If you have any questions, please contact our clinic directly at [phone] and ask for the nurse. We also encourage the use of [employer] to communicate with your HealthCare Provider.        If you have an urgent need after business hours (8:00 am to 4:30 pm) please call [phone], option 4, and ask for the vascular attending on call. For non-urgent after hours needs, please call the vascular nurse at [phone] and leave a detailed voicemail. For scheduling needs, please call our clinic directly at [phone].     Reason for your hospital stay    Dear Tad Zaman,    You were hospitalized at [facility] with altered mental status (confusion) and peripheral artery disease (low blood flow to the foot) You were evaluated by vascular surgery, neurology, and nephrology, and you were treated with surgery to improve the blood flow in your foot.  Over your hospitalization your conditions improved and today you are ready to be discharged home.      Please note the following medication changes:  1) Start taking aspirin once per day  2) Start taking metformin once per day for improved diabetes control  3) Start taking ferrous sulfate (iron) once every other day for low iron   4) Start taking tamsulosin once every  day (preferably at night) to help with urination and retention   5) Increase your dose of atorvastatin to 40mg once per day  6) Increase your dose of sodium bicarbonate to 1300mg three times per day  7) Increase your dose of tacrolimus to 2mg two times per day    Please follow up with:   1) Vascular surgery -- you have an appointment scheduled on 4/8/25  2) Podiatry -- you will be called to schedule this appointment  3) Neurology -- you will be called to schedule this appointment  4) Neuropsychiatric testing -- you will be called to schedule this appointment. This testing is to better qualify memory and cognitive function  5) Nephrology -- for your kidney transplant. Please make sure [facility] or [facility] an appointment with nephrology through [facility]. A referral has been placed for nephrology through [facility] and you will be contacted to be offered an appointment, should you decide to follow-up at [facility].   6) Primary care doctor -- follow-up is recommended within the next two weeks, either through the [facility] or through [facility].     Through the [facility], please continue to follow-up with:   1) Nephrology/Kidney Provider -- [facility] and make an appointment with nephrology as soon as possible (or follow-up with [facility] nephrology if desired).  Your tacrolimus dose was increased during this hospitalization and will need close monitoring with a nephrologist after you leave the hospital.     You may remove the wound vac at home on March 27, 2025. (7 days after surgery). See separate discharge instructions on how to remove this at home.       Thank you for allowing us the privilege of caring for you. We wish you the best in your ongoing recovery.     Activity    Your activity upon discharge: activity as tolerated     Tubes and Drains    Current Tubes and Drains:     Other  Duration           Brace/Orthotic/Orthosis -- days        Drain  Duration            Negative Pressure Wound Therapy Thigh Anterior;Right 4 days -- Remove on 3/27/25     Wound care and dressings    Instructions to care for your wound at home:  Right toe wounds:   Apply Betadine solution to the right toe wound. Paint the areas of dark/black wounds with Betadine solution as well and cover with a light layer of gauze. Secure gauze with a very light layer of 2 inch Kerlix and tape. Avoid major compression to the right foot.     Diet    Follow this diet upon discharge: Current Diet:Orders Placed This Encounter      Snacks/Supplements Adult: Nepro Oral Supplement; With Meals      Combination Diet 3 gm K Diet (low potassium); Moderate Consistent Carb (60 g CHO per Meal) Diet     Hospital Follow-up with Existing Primary Care Provider (PCP)    Please see details below            Significant Results and Procedures   {Data for Discharge Summary:931186}    Discharge Medications   Current Discharge Medication List        START taking these medications    Details   aspirin (ASA) 81 MG chewable tablet Take 1 tablet (81 mg) by mouth daily.  Qty: 60 tablet, Refills: 0    Associated Diagnoses: PAD (peripheral artery disease)      ferrous sulfate (FEROSUL) 325 (65 Fe) MG tablet Take 1 tablet (325 mg) by mouth every other day.  Qty: 30 tablet, Refills: 0    Associated Diagnoses: Iron deficiency anemia, unspecified iron deficiency anemia type      metFORMIN (GLUCOPHAGE XR) 500 MG 24 hr tablet Take 1 tablet (500 mg) by mouth daily (with dinner).  Qty: 60 tablet, Refills: 0    Associated Diagnoses: Type 2 diabetes mellitus with other diabetic kidney complication, without long-term current use of insulin (H)      tamsulosin (FLOMAX) 0.4 MG capsule Take 2 capsules (0.8 mg) by mouth daily.  Qty: 60 capsule, Refills: 0    Associated Diagnoses: Benign localized prostatic hyperplasia with lower urinary tract symptoms (LUTS)           CONTINUE these medications which have CHANGED    Details   atorvastatin (LIPITOR) 40 MG  tablet Take 1 tablet (40 mg) by mouth at bedtime.  Qty: 90 tablet, Refills: 0    Associated Diagnoses: PAD (peripheral artery disease)      sodium bicarbonate 650 MG tablet Take 2 tablets (1,300 mg) by mouth 3 times daily.  Qty: 120 tablet, Refills: 1    Associated Diagnoses: Renal transplant, status post      tacrolimus (GENERIC EQUIVALENT) 1 MG capsule Take 2 capsules (2 mg) by mouth 2 times daily.  Qty: 120 capsule, Refills: 0    Associated Diagnoses: Renal transplant, status post           CONTINUE these medications which have NOT CHANGED    Details   apixaban ANTICOAGULANT (ELIQUIS) 5 MG tablet Take 5 mg by mouth 2 times daily.      brinzolamide-brimonidine (SIMBRINZA) 1-0.2 % ophthalmic suspension Apply 1 drop to eye 2 times daily.      calcitRIOL (ROCALTROL) 0.25 MCG capsule Take 0.25 mcg by mouth. TAKE ONE CAPSULE BY MOUTH MONDAY THROUGH FRIDAY FOR BONE HEALTH ** DOSE INCREASE      empagliflozin (JARDIANCE) 25 MG TABS tablet Take 12.5 mg by mouth every morning. TAKE ONE-HALF TABLET BY MOUTH EVERY MORNING FOR DIABETES AND KIDNEY PROTECTION      Latanoprost PF 0.005 % SOLN Apply to eye at bedtime. INSTILL 1 DROP IN BOTH EYES AT BEDTIME FOR GLAUCOMA      metoprolol succinate ER (TOPROL XL) 25 MG 24 hr tablet Take 75 mg by mouth daily. TAKE THREE TABLETS BY MOUTH EVERY DAY FOR HEART RATE      mycophenolate (GENERIC EQUIVALENT) 500 MG tablet Take 500 mg by mouth 2 times daily.      pantoprazole (PROTONIX) 40 MG EC tablet Take 40 mg by mouth daily.  TAKE ONE TABLET BY MOUTH EVERY DAY PREVENT GI BLEED      patiromer (VELTASSA) 8.4 g packet Take 8.4 g by mouth once a week. Take 1 packet (8.4 grams) by mouth once weekly for high potassium      torsemide (DEMADEX) 10 MG tablet Take 10 mg by mouth 2 times daily.           STOP taking these medications       cefdinir (OMNICEF) 300 MG capsule Comments:   Reason for Stopping:             Allergies   Allergies   Allergen Reactions    Penicillins Hives      is always kept dry, and clean at all times.    Right leg incision is closed with staples.  Please keep the incision clean and dry at all times.  Avoid staples being caught in close, therefore okay to apply dressing for comfort.    Contact your Surgeon Team if you have increased redness, warmth around the surgical wound, and/or drainage from the surgical wound.    If you have any questions, please contact our clinic directly at [phone] and ask for the nurse. We also encourage the use of [employer] to communicate with your HealthCare Provider.        If you have an urgent need after business hours (8:00 am to 4:30 pm) please call [phone], option 4, and ask for the vascular attending on call. For non-urgent after hours needs, please call the vascular nurse at [phone] and leave a detailed voicemail. For scheduling needs, please call our clinic directly at [phone].     Reason for your hospital stay    Dear Tad Zaman,    You were hospitalized at [facility] with altered mental status (confusion) and peripheral artery disease (low blood flow to the foot) You were evaluated by vascular surgery, neurology, and nephrology, and you were treated with surgery to improve the blood flow in your foot.  Over your hospitalization your conditions improved and today you are ready to be discharged home.      Please note the following medication changes:  1) Start taking aspirin once per day  2) Start taking metformin once per day for improved diabetes control  3) Start taking ferrous sulfate (iron) once every other day for low iron   4) Start taking tamsulosin once every day (preferably at night) to help with urination and retention   5) Increase your dose of atorvastatin to 40mg once per day  6) Increase your dose of sodium bicarbonate to 1300mg three times per day  7) Increase your dose of tacrolimus to 2mg two times per day    Please follow up with:   1) Vascular surgery -- you have  an appointment scheduled on 4/8/25  2) Podiatry -- you will be called to schedule this appointment  3) Neurology -- you will be called to schedule this appointment  4) Neuropsychiatric testing -- you will be called to schedule this appointment. This testing is to better qualify memory and cognitive function  5) Nephrology -- for your kidney transplant. Please make sure to either [facility], or make an appointment with nephrology through [facility]. A referral has been placed for nephrology through [facility] and you will be contacted to be offered an appointment, should you decide to follow-up at [facility].   6) Primary care doctor -- follow-up is recommended within the next two weeks, either through the [facility] or through [facility].     Through the [facility], please continue to follow-up with:   1) Nephrology/Kidney Provider -- [facility] and make an appointment with nephrology as soon as possible (or follow-up with [facility] nephrology if desired).  Your tacrolimus dose was increased during this hospitalization and will need close monitoring with a nephrologist after you leave the hospital.     You may remove the wound vac at home on March 27, 2025. (7 days after surgery). See separate discharge instructions on how to remove this at home.       Thank you for allowing us the privilege of caring for you. We wish you the best in your ongoing recovery.     Activity    Your activity upon discharge: activity as tolerated     Tubes and Drains    Current Tubes and Drains:     Other  Duration           Brace/Orthotic/Orthosis -- days        Drain  Duration           Negative Pressure Wound Therapy Thigh Anterior;Right 4 days -- Remove on 3/27/25     Wound care and dressings    Instructions to care for your wound at home:  Right toe wounds:   Apply Betadine solution to the right toe wound. Paint the areas of dark/black wounds with Betadine solution as well and cover with a  light layer of gauze. Secure gauze with a very light layer of 2 inch Kerlix and tape. Avoid major compression to the right foot.     Diet    Follow this diet upon discharge: Current Diet:Orders Placed This Encounter      Snacks/Supplements Adult: Nepro Oral Supplement; With Meals      Combination Diet 3 gm K Diet (low potassium); Moderate Consistent Carb (60 g CHO per Meal) Diet     Hospital Follow-up with Existing Primary Care Provider (PCP)    Please see details below            Significant Results and Procedures   Results for orders placed or performed during the hospital encounter of 02/25/25   XR Chest 2 Views    Narrative    EXAM: XR CHEST 2 VIEWS  LOCATION: [facility]  DATE: 2/25/2025    INDICATION: sob  COMPARISON: None.      Impression    IMPRESSION: Prominent right nipple shadow. Shallow inspiration. Negative chest.   MR Brain w/o Contrast    Narrative    EXAM: MR BRAIN W/O CONTRAST  3/1/2025 8:56 AM     HISTORY:  Persistent confusion       COMPARISON:  Head CT 1/18/2025, MRI brain 1/24/2025 and 1/29/2025    TECHNIQUE: Sagittal T1-weighted, coronal T2-weighted, axial T2 FLAIR,  axial susceptibility weighted, and axial diffusion-weighted with ADC  map images of the brain were obtained without intravenous contrast    FINDINGS:  Exam is significantly limited by motion artifact. There is no mass  effect, midline shift, or intracranial hemorrhage. The ventricles are  proportionate to the cerebral sulci. Diffusion and susceptibility  weighted images are negative for acute/focal abnormality. Mild  generalized parenchymal volume loss. Punctate T2 hyperintensity within  the right cerebellar hemisphere appears unchanged compared to prior  MRI 1/29/2025.    No suspicious abnormality of the skull marrow signal. Clear paranasal  sinuses. Mastoid air cells are clear. Bilateral pseudophakia.      Impression    IMPRESSION:  1. Exam significantly limited by motion artifact.  2.  No evidence of acute infarction or other acute intracranial  pathology on available images.  3. Stable chronic right cerebellar infarct.    I have personally reviewed the examination and initial interpretation  and I agree with the findings.    SHIRA MENESES MD         SYSTEM ID:  [id number]   US Renal Transplant with Doppler    Narrative    EXAMINATION: US RENAL TRANSPLANT, 3/3/2025 2:49 PM     COMPARISON: None.    HISTORY: Hx of kidney transplant. Bruit on transplant eval inpatient.  US to eval for stenosis or other issue.    TECHNIQUE:  Grey-scale, color Doppler and spectral flow analysis.    FINDINGS:  The transplant kidney is located right lower quadrant, and measures  11.5 cm in length. Parenchyma is of normal thickness and echogenicity.  No focal lesions. Mild hydronephrosis. No perinephric fluid  collection.    Renal artery flow:   82 cm/sec peak systolic at hilum.  230 cm/s peak systolic at anastomosis. Renal artery is torturous at  that anastomosis.  Arcuate artery resistive indices (upper to lower): 1, 1, 1    Renal Vein Flow:  Patent at hilum.   Patent at anastomosis.    Iliac artery flow:  70 cm/s peak systolic above anastomosis.  84 cm/s peak systolic below anastomosis.    Iliac vein flow:  Patent above and below the anastomosis.    Prevoid bladder volume was 765 mL. Postvoid bladder volume was 746 mL.      Impression    IMPRESSION:  1. Mild hydronephrosis of the transplanted kidney.  2. No significant change between prevoid and postvoid bladder volumes  suggesting obstruction distal to the bladder.  3. Torturous renal artery with mildly elevated velocity at the  anastomosis, likely chronic.    I have personally reviewed the examination and initial interpretation  and I agree with the findings.    ROSAS HOLLIDAY DO         SYSTEM ID:  [id number]    AYAN Doppler No Exercise    Narrative    Right AYAN and 1st digit PPG 3/11/2025 3:13 PM    CLINICAL HISTORY: Know PAD, R foot with skin changes concerning  "for  worsening ischemia.. Left below knee amputation.    COMPARISONS: None available.    REFERRING PROVIDER: JENNIFER GONZALEZ    TECHNIQUE: Right AYAN and 1st digit PPG obtained.    FINDINGS: Technologist note: \"left upper arm fistula-per patient,  right arm fistula-visualized. arm pressure recorded at left radial\".    RIGHT:       Brachial: > 254 mmHg - ? left radial pressure       Ankle (PT): > 254 mmHg - non compressible       Ankle (DP): > 254 mmHg - non compressible          AYAN: non compressible         1st Digit PPG: flat occlusive      Impression    IMPRESSION:  1. RIGHT:       A. Resting AYAN is non compressible.       B. Flat occlusive 1st digit PPG.    2. LEFT: Below knee amputation.  -------------    AYAN Interpretation:     Normal: 1.00 - 1.40     Borderline: 0.91 - 0.99     Abnormal: ? 0.90     Noncompressible: > 1.40    TBI Interpretation:     Normal: > 0.70     Abnormal: ? 0.70    Brian HL, Alyx HD, Isaac PP, Nikko S, et al.; Peer Review  Committee Members. 2024  ACC/AHA/AACVPR/APMA/ABC/SCAI/SVM/SVN/SVS/SIR/VESS Guideline for the  Management of Lower Extremity Peripheral Artery Disease: A Report of  the American College of Cardiology/American Heart Association Joint  Committee on Clinical Practice Guidelines. Circulation. 2024 Jun 11;149(24):d6756-y8647. doi: 10.1161/CIR.1725914231476657. Epub 2024  May 14. PMID: [id number].    MANUELITO DE LEÓN MD         SYSTEM ID:  [id number]   US Lower Extremity Arterial Duplex Right    Narrative    Exam: Duplex ultrasound of right lower extremity arteries dated  3/10/2025 6:19 PM     Clinical information: Know PAD, R foot with skin changes concerning  for worsening ischemia.     Comparison: None    Technique: Grayscale (B-mode), color Doppler, and duplex spectral  Doppler ultrasound of the lower extremity arteries. Velocity  measurements obtained with angle correction of 60 degrees or less.    Ordering provider: Lucía Chapman    Findings:     Right lower extremity: "   Common femoral artery: Velocity: 44 cm/sec.   Profunda femoral artery: Velocity: 43 cm/sec.   Proximal SFA: Velocity: 43 cm/sec.   Mid SFA:Velocity:  50 cm/sec.   Distal SFA: Velocity: 25 cm/sec.     Popliteal artery, proximal: Velocity: 20 cm/sec.     PTA ankle: Velocity: 22 cm/sec.   Peroneal artery and ankle: 17.1 cm/s.   MARYELLEN ankle: Velocity: 17 cm/sec.   DPA ankle: Velocity: 22 cm/s.     Triphasic waveforms the sharp upstroke from the common femoral artery  to the popliteal artery. Monophasic waveform of the of the lower leg  arteries.    Left lower extremity:    Common femoral artery: Velocity: 36 cm/sec. Waveforms: Triphasic with  sharp upstroke.      Impression    Impression:     1. Right leg: Triphasic sharp upstroke waveforms of the upper right  leg from the common femoral artery through the popliteal artery with  low peak systolic velocities. The lower leg arteries demonstrate  patent monophasic post stenotic waveforms.     I have personally reviewed the examination and initial interpretation  and I agree with the findings.    ROSAS HOLLIDAY DO         SYSTEM ID:  [id number]    Lower Extremity Venous Mapping Right    Narrative    ULTRASOUND LOWER EXTREMITY VENOUS MAPPING BILATERAL 3/11/2025 3:32 PM    CLINICAL HISTORY: Potential need for vascular bypass on RLE, please  complete vein mapping for potential vein harvest.     COMPARISONS: None available.    REFERRING PROVIDER: JENNIFER GONZALEZ    TECHNIQUE: Right common femoral and great saphenous veins evaluated  with grayscale imaging and compression.    FINDINGS: Right common femoral vein is fully compressible.    Saphenofemoral junction is partially compressible.    Great saphenous vein is partially compressible in the proximal thigh.  Great saphenous vein otherwise fully compressible to the ankle.    RIGHT great saphenous vein:   Saphenofemoral junction: 6.3 mm - partially compressible  Proximal thigh: 5.9 mm - partially compressible  Mid thigh: 3.0 mm  Distal  thigh: 3.7 mm  Knee: 1.6 mm  Proximal calf: 2.9 mm  Mid calf: 2.6 mm  Distal calf: 2.4 mm  Ankle: 1.3 mm    Branch veins from the great saphenous vein in the proximal thigh, and  proximal and mid calf.      Impression    IMPRESSION:  1. No deep venous thrombosis in the right common femoral vein.    2. Non occlusive superficial venous thrombosis or chronic venous  changes in the right great saphenous vein in the proximal thigh to  saphenofemoral junction.    3. Right great saphenous vein less than 2 mm in diameter in the knee  and ankle.    MANUELITO DE LEÓN MD         SYSTEM ID:  [id number]    Lower Extremity Venous Mapping Left    Narrative    ULTRASOUND LOWER EXTREMITY VENOUS MAPPING LEFT 3/14/2025 2:07 PM    CLINICAL HISTORY: Bypass candidate. Left below knee amputation.    COMPARISONS: None available.    REFERRING PROVIDER: SOFIYA ARREDONDO KYU    TECHNIQUE: Left great saphenous vein evaluated with grayscale imaging  and compression.    FINDINGS: Visualized left great saphenous vein is fully compressible.    LEFT great saphenous vein:  Groin: 4.0 mm  Mid thigh: 1.3 mm  Lower thigh: 1.0 mm  Knee: 0.7 mm      Impression    IMPRESSION: Left below knee amputation. Remaining left great saphenous  vein is patent. Less than 2 mm in diameter below the groin.    MANUELITO DE LEÓN MD         SYSTEM ID:  [id number]   IR OR Angiogram    Narrative    This exam was marked as non-reportable because it will not be read by a   radiologist or a [place] non-radiologist provider.         NM MPI Radiologist Consult For Cardiology    Narrative    EXAMINATION: NM MPI RADIOLOGIST CONSULT FOR CARDIOLOGY, 3/18/2025  11:59 AM     COMPARISON: Chest radiograph 2/25/2025. CT abdomen/pelvis 1/28/2025.    HISTORY: 64-year-old with heart failure, coronary artery disease,  hypertension, diabetes and chronic kidney disease. Significant  peripheral artery disease resulting in left above-the-knee amputation.  Concern for progression of peripheral artery disease in  the right  lower extremity. Exam requested as preoperative workup for lower  extremity artery bypass.    TECHNIQUE: Limited field of view 5 mm CT images were acquired for  attenuation correction purposes for nuclear medicine cardiac study.  Radiology consulted to review the CT images. Please refer to separate  dictation for the nuclear medicine portion of the study.    FINDINGS:     Cardiomegaly with multivessel atherosclerotic coronary artery  calcifications. No pericardial effusion. No significant coronary  artery atherosclerotic calcifications. Normal caliber of the  visualized thoracic aorta and main pulmonary artery. The visualized  mediastinal and hilar lymph nodes are not enlarged.    The visualized lungs are clear. No pleural effusion or pneumothorax at  this level.     Extensive atherosclerotic calcifications visualized in the upper  abdominal vasculature. Atrophic native kidneys. Evaluation of the  solid organs is limited by lack of IV contrast. No acute osseous  lesions of the visualized thorax.      Impression    IMPRESSION:  1. No acute abnormality on the CT images of the lower chest and upper  abdomen.  2. Cardiomegaly with multivessel atherosclerotic coronary artery  calcifications.  3. Please see separate report for the nuclear medicine portion of the  study.    I have personally reviewed the examination and initial interpretation  and I agree with the findings.    ALIYA DODD MD         SYSTEM ID:  [id number]   Echo Complete     Value    LVEF  40-45% (mildly reduced)    Narrative    [id number]  [id number]  [id number]  [id number]^AMBER^ELIZABETH^WIN     [facility]  Echocardiography Laboratory  [address] [place] [address]     Name: TERESITA GRANDA  MRN: [id number]  : 1960  Study Date: 2025 02:02 PM  Age: 64 yrs  Gender: Male  Patient Location: [facility]  Reason For Study: Abn EKG  Ordering Physician: ELIZABETH CLARK  Performed By: Ashlee Bullard     BSA: 1.8  m2  Height: 72 in  Weight: 135 lb  HR: 99  BP: 122/80 mmHg  ______________________________________________________________________________  Procedure  Echocardiogram with two-dimensional, color and spectral Doppler. Contrast  Optison. Optison (NDC #1475-9590-34) given intravenously. Patient was given 6  ml mixture of 3 ml Optison and 6 ml saline. 3 ml wasted.  ______________________________________________________________________________  Interpretation Summary  Left ventricular function is decreased. The ejection fraction is 40-45%  (mildly reduced).  Diastolic function not assessed due to arrhythmia.  Right ventricular function, chamber size, wall motion, and thickness are  normal.  Mild tricuspid insufficiency is present.  Mild (pulmonary artery systolic pressure<50mmHg) pulmonary hypertension is  present.  IVC diameter and respiratory changes fall into an intermediate range  suggesting an RA pressure of 8 mmHg.  Trivial pericardial effusion is present.  There is no prior study for direct comparison.  ______________________________________________________________________________  Left Ventricle  Left ventricular size is normal. Left ventricular wall thickness is normal.  Left ventricular function is decreased. The ejection fraction is 40-45%  (mildly reduced). Diastolic function not assessed due to arrhythmia. No  regional wall motion abnormalities are seen.     Right Ventricle  Right ventricular function, chamber size, wall motion, and thickness are  normal.     Atria  Mild to moderate biatrial enlargement is present. The atrial septum is intact  as assessed by color Doppler .     Mitral Valve  Mild mitral annular calcification is present.     Aortic Valve  Aortic valve sclerosis is present. The mean AoV pressure gradient is 1.7 mmHg.  The calculated aortic valve are is 2.8 cm^2.     Tricuspid Valve  Mild tricuspid insufficiency is present. The right ventricular systolic  pressure is approximated at 33.9 mmHg  plus the right atrial pressure. Mild  (pulmonary artery systolic pressure<50mmHg) pulmonary hypertension is present.     Pulmonic Valve  The pulmonic valve is normal. Trace pulmonic insufficiency is present.     Vessels  The thoracic aorta is normal. The pulmonary artery is normal. IVC diameter and  respiratory changes fall into an intermediate range suggesting an RA pressure  of 8 mmHg.     Pericardium  Trivial pericardial effusion is present.     Compared to Previous Study  There is no prior study for direct comparison.  ______________________________________________________________________________  MMode/2D Measurements & Calculations     IVSd: 1.2 cm  LVIDd: 4.9 cm  LVIDs: 3.7 cm  LVPWd: 1.1 cm  FS: 23.4 %  LV mass(C)d: 204.2 grams  LV mass(C)dI: 113.3 grams/m2  Ao root diam: 3.4 cm  asc Aorta Diam: 3.4 cm  LVOT diam: 2.2 cm  LVOT area: 3.8 cm2     EF(MOD-bp): 38.3 %  Ao root diam index Ht(cm/m): 1.9  Ao root diam index BSA (cm/m2): 1.9  Asc Ao diam index BSA (cm/m2): 1.9  Asc Ao diam index Ht(cm/m): 1.9  RWT: 0.45  TAPSE: 1.7 cm     Doppler Measurements & Calculations  Ao V2 max: 102.0 cm/sec  Ao max P.2 mmHg  Ao V2 mean: 66.4 cm/sec  Ao mean P.7 mmHg  Ao V2 VTI: 16.9 cm  AZALIA(I,D): 2.8 cm2  AZALIA(V,D): 2.7 cm2  LV V1 max P.2 mmHg  LV V1 max: 73.7 cm/sec  LV V1 VTI: 12.6 cm  SV(LVOT): 47.9 ml  SI(LVOT): 26.6 ml/m2  TR max vitaly: 291.0 cm/sec  TR max P.9 mmHg  AV Vitaly Ratio (DI): 0.72  AZALIA Index (cm2/m2): 1.6     ______________________________________________________________________________  Report approved by: YUMIKO Darby MD on 2025 03:07 PM         NM MPI w Lexiscan     Value    Target     Baseline Systolic     Baseline Diastolic BP 76    Last Stress Systolic BP 93    Last Stress Diastolic BP 58    Baseline HR 67    Max HR  78    Max Predicted HR  51    Rate Pressure Product 7,254.0    Narrative      The nuclear stress test is negative for inducible myocardial ischemia   or  infarction.    LVEDv 157ml. LVESv 77ml. LV EF 51%.    There is no prior study for comparison.         Discharge Medications   Current Discharge Medication List        START taking these medications    Details   aspirin (ASA) 81 MG chewable tablet Take 1 tablet (81 mg) by mouth daily.  Qty: 60 tablet, Refills: 0    Associated Diagnoses: PAD (peripheral artery disease)      ferrous sulfate (FEROSUL) 325 (65 Fe) MG tablet Take 1 tablet (325 mg) by mouth every other day.  Qty: 30 tablet, Refills: 0    Associated Diagnoses: Iron deficiency anemia, unspecified iron deficiency anemia type      metFORMIN (GLUCOPHAGE XR) 500 MG 24 hr tablet Take 1 tablet (500 mg) by mouth daily (with dinner).  Qty: 60 tablet, Refills: 0    Associated Diagnoses: Type 2 diabetes mellitus with other diabetic kidney complication, without long-term current use of insulin (H)      tamsulosin (FLOMAX) 0.4 MG capsule Take 2 capsules (0.8 mg) by mouth daily.  Qty: 60 capsule, Refills: 0    Associated Diagnoses: Benign localized prostatic hyperplasia with lower urinary tract symptoms (LUTS)           CONTINUE these medications which have CHANGED    Details   atorvastatin (LIPITOR) 40 MG tablet Take 1 tablet (40 mg) by mouth at bedtime.  Qty: 90 tablet, Refills: 0    Associated Diagnoses: PAD (peripheral artery disease)      sodium bicarbonate 650 MG tablet Take 2 tablets (1,300 mg) by mouth 3 times daily.  Qty: 120 tablet, Refills: 1    Associated Diagnoses: Renal transplant, status post      tacrolimus (GENERIC EQUIVALENT) 1 MG capsule Take 2 capsules (2 mg) by mouth 2 times daily.  Qty: 120 capsule, Refills: 0    Associated Diagnoses: Renal transplant, status post           CONTINUE these medications which have NOT CHANGED    Details   apixaban ANTICOAGULANT (ELIQUIS) 5 MG tablet Take 5 mg by mouth 2 times daily.      brinzolamide-brimonidine (SIMBRINZA) 1-0.2 % ophthalmic suspension Apply 1 drop to eye 2 times daily.      calcitRIOL (ROCALTROL)  0.25 MCG capsule Take 0.25 mcg by mouth. TAKE ONE CAPSULE BY MOUTH MONDAY THROUGH FRIDAY FOR BONE HEALTH ** DOSE INCREASE      empagliflozin (JARDIANCE) 25 MG TABS tablet Take 12.5 mg by mouth every morning. TAKE ONE-HALF TABLET BY MOUTH EVERY MORNING FOR DIABETES AND KIDNEY PROTECTION      Latanoprost PF 0.005 % SOLN Apply to eye at bedtime. INSTILL 1 DROP IN BOTH EYES AT BEDTIME FOR GLAUCOMA      metoprolol succinate ER (TOPROL XL) 25 MG 24 hr tablet Take 75 mg by mouth daily. TAKE THREE TABLETS BY MOUTH EVERY DAY FOR HEART RATE      mycophenolate (GENERIC EQUIVALENT) 500 MG tablet Take 500 mg by mouth 2 times daily.      pantoprazole (PROTONIX) 40 MG EC tablet Take 40 mg by mouth daily.  TAKE ONE TABLET BY MOUTH EVERY DAY PREVENT GI BLEED      patiromer (VELTASSA) 8.4 g packet Take 8.4 g by mouth once a week. Take 1 packet (8.4 grams) by mouth once weekly for high potassium      torsemide (DEMADEX) 10 MG tablet Take 10 mg by mouth 2 times daily.           STOP taking these medications       cefdinir (OMNICEF) 300 MG capsule Comments:   Reason for Stopping:             Allergies   Allergies   Allergen Reactions    Penicillins Hives

## 2025-03-27 ENCOUNTER — MEDICAL CORRESPONDENCE (OUTPATIENT)
Dept: HEALTH INFORMATION MANAGEMENT | Facility: CLINIC | Age: 65
End: 2025-03-27

## 2025-03-28 ENCOUNTER — TELEPHONE (OUTPATIENT)
Dept: VASCULAR SURGERY | Facility: CLINIC | Age: 65
End: 2025-03-28
Payer: MEDICARE

## 2025-03-28 NOTE — TELEPHONE ENCOUNTER
"Called and spoke with Tad's daughter Jarrod to follow-up s/p discharge. Pt had femoral-tibial bypass with Dr Irving on 3/20.    She reports pt is \"doing well\". He is ambulating independently and w/o difficulty. Home OT/PT has been started and he is participating in therapies. He has not had issues with pain management. Incisions are WNL ([facility] RN has also assessed and is providing wound care). He took a shower today and reports this went well. Also reports a good appetite. Is unfortunately still having some urinary incontinence overnight.    Confirmed pt is taking Eliquis and ASA as prescribed. Follow-up scheduled with vascular REYNA on 4/8.    Note no follow-up with surgeon. Will set this up.      Cora Ortiz RN  [facility]  P: [phone]  F: [phone]    "

## 2025-03-31 NOTE — TELEPHONE ENCOUNTER
Vascular Clinic Appointment Request    Visit type: In-person   Imaging needed: Yes. Orders placed. (Orders are under Dr Irving)   Provider: Piedad Lloyd   Timeframe: 4-6 weeks from now   Appointment notes: Post-op bypass w/ Dr Irving     Appointment request routed to clinic coordinator pool for scheduling.      Cora Ortiz RN  [facility]  P: [phone]  F: [phone]

## 2025-03-31 NOTE — TELEPHONE ENCOUNTER
Cora I called to scheduled the pt for the requested appointments and the pts daughter Nella became upset with me because I mentioned to her that there is no consent to communicate on file and without a verbal consent I cannot speak with her concerning the pt.   We have been told that a consent to communicate is needed if we are not speaking with the pt.  I ask if there is a time that she would be near the pt so that I can call or she could call back with the pt to get the verbal consent and we can schedule the pt. She stated that she would firgure out what the pt needs to schedule and she will call back to schedule.  I was kind and professional I thanked her but she was very unhappy that I requested the consent.  Luis Joshua on 3/31/2025 at 9:56 AM

## 2025-04-01 ENCOUNTER — TELEPHONE (OUTPATIENT)
Dept: VASCULAR SURGERY | Facility: CLINIC | Age: 65
End: 2025-04-01
Payer: MEDICARE

## 2025-04-01 DIAGNOSIS — L97.512 ULCER OF RIGHT FOOT WITH FAT LAYER EXPOSED (H): Primary | ICD-10-CM

## 2025-04-01 DIAGNOSIS — I96 DRY GANGRENE (H): ICD-10-CM

## 2025-04-01 NOTE — TELEPHONE ENCOUNTER
Vascular Referral Intake    Appointment note (to be pasted into appt note. Also add where additional info is located ie: outside images pushed to PACS, in Epic, sent to HIM, etc):   Referred by Dr. Arreola for Right foot wound. Had RLE angio with Dr. Irving on 3/17, to follow up with Piedad on 4/8    Specialty: Wound Clinic    Specific Provider if Necessary:  Dr. Ryan Miller    Visit Type: New    Time Frame: Next Available    Testing/Imaging Needed Before Consult: ROMEO Ronquillo or bed needed: Unknown- please verify     *Schedulers: Please send welcome letter to patient after appointment(s) scheduled*

## 2025-04-01 NOTE — TELEPHONE ENCOUNTER
Attempt #1, no answer and mailbox is full.  Attempting to schedule consult with Dr. Miller. [phone]

## 2025-04-08 ENCOUNTER — OFFICE VISIT (OUTPATIENT)
Dept: VASCULAR SURGERY | Facility: CLINIC | Age: 65
End: 2025-04-08
Payer: MEDICARE

## 2025-04-08 ENCOUNTER — TELEPHONE (OUTPATIENT)
Dept: VASCULAR SURGERY | Facility: CLINIC | Age: 65
End: 2025-04-08

## 2025-04-08 VITALS
BODY MASS INDEX: 19.8 KG/M2 | OXYGEN SATURATION: 98 % | SYSTOLIC BLOOD PRESSURE: 121 MMHG | DIASTOLIC BLOOD PRESSURE: 74 MMHG | HEART RATE: 61 BPM | WEIGHT: 146 LBS

## 2025-04-08 DIAGNOSIS — Z95.828 STATUS POST VASCULAR BYPASS: ICD-10-CM

## 2025-04-08 DIAGNOSIS — I73.9 PAD (PERIPHERAL ARTERY DISEASE): Primary | ICD-10-CM

## 2025-04-08 PROCEDURE — 99024 POSTOP FOLLOW-UP VISIT: CPT | Performed by: NURSE PRACTITIONER

## 2025-04-08 PROCEDURE — 1126F AMNT PAIN NOTED NONE PRSNT: CPT | Performed by: NURSE PRACTITIONER

## 2025-04-08 PROCEDURE — 3074F SYST BP LT 130 MM HG: CPT | Performed by: NURSE PRACTITIONER

## 2025-04-08 PROCEDURE — 3078F DIAST BP <80 MM HG: CPT | Performed by: NURSE PRACTITIONER

## 2025-04-08 ASSESSMENT — PAIN SCALES - GENERAL: PAINLEVEL_OUTOF10: NO PAIN (0)

## 2025-04-08 NOTE — NURSING NOTE
Chief Complaint   Patient presents with    Surgical Followup     4/8/2025 visit with Piedad Lloyd APRN CNP for POST-OP - **please have daughter fill out consent to communicate** 103/20/25 IP Bypass, angio completion       Vitals were taken and medications reconciled.    Ghanshyam Aponte, Visit Facilitator  1:13 PM

## 2025-04-08 NOTE — TELEPHONE ENCOUNTER
Patient Contacted for the patient to call back and schedule the following:Bypass, angio completion.     I spoke with the pts daughter Jarrod she states that the pt needs to be seen on a Thursday or Friday or late in the afternoon on any other day.  The first available visit for afternoons are not until 5/20/25.  Please advise.        Location: [facility]  Provider: Piedad Lloyd CNP  Appointment type: Return Vascular Patient  Appointment mode: In Person  Return date: 4 Week follow up     Specialty phone number: [phone] or [phone]    Referred to [facility]: No    Is Imaging Needed: Yes, US    Additional Notes: Please schedule imaging prior to Provider visit.    Pt states Consent to communicat was sign today in clinic 4/8/25   Luis Joshua on 4/8/2025 at 5:06 PM

## 2025-04-08 NOTE — LETTER
4/8/2025       RE: Tad Zaman  [address]  [facility] [address]     Dear Colleague,    Thank you for referring your patient, Tad Zaman, to the [facility] [place] [facility]. Please see a copy of my visit note below.        VASCULAR SURGERY PROGRESS NOTE    LOCATION: [facility]     Tad Zaman  Medical Record #:  [id number]  YOB: 1960  Age:  64 year old     Date of Service: 4/8/2025    PRIMARY CARE PROVIDER: Julius Cesar    Reason for visit: Postop wound check    Impression/Recommendations:   Tad Zaman is a pleasant 64-year-old gentleman with CLTI with tissue loss of RLE now s/p right CFA-AT bypass using spliced cryoartery with subcutaneous tunneling. Presents to clinic for postop wound check  Right groin and RLE incisions are well-approximated, no erythema, no swelling, no drainage. Staples removed from both incision sites.    -Patient's family had questions regarding wound care on right foot, deferred to podiatry.  They have follow-up scheduled on 4/17/2025, recommend follow-up sooner if possible to address the concerns/questions patient's family has.  - Continue with aspirin, Eliquis and statin  - We will schedule follow-up in 4 weeks with arterial duplex and AYAN to reassess the vascular bypass    Discussed warning signs including, but not limited to, acute limb ischemia, new leg pain, motor or sensory deficits, chronic limb threatening ischemia, ischemic rest pain, and nonhealing wounds. If he experiences any of these, he has been advised to seek treatment and contact our team, patient verbalized clear understanding of the above. Information/Education: patient able to teach back. Patient agreeable to plan of care: yes. All questions were answered and support provided. He has our contact information and knows to reach out with any additional questions or concerns.     It was a pleasure  seeing Mr. Zaman in clinic today.    Piedad Lloyd CNP  Vascular Surgery  Pager: [phone]  [email or web address]  Send message or 10 digit call back number Securely via [employer] with the [employer] Web Console (learn more here)        HPI:  Tad Zaman is a 64 year old male with past medical history significant for type 2 diabetes, HTN, CAD, CHF, renal transplant 9/20/2022, left AKA, CKD, PAD who was found to have nonhealing wounds on right foot with subsequent vascular evaluation. Now s/p right CFA-AT bypass using spliced cryoartery with subcutaneous tunneling, incisions are healing appropriately. Denies fevers chills nausea vomiting. Reports good appetite, patient's family had questions about the right foot wound for which received care as recommended by podiatry, deferred to podiatry for further care. Denies rest pain, right foot is warm, well-perfused, multiphasic DP and PT is present. No other concerns.    REVIEW OF SYSTEMS:    A 12 point ROS was reviewed and is negative except for what is listed above in HPI.    PHH:    Past Medical History:   Diagnosis Date     Chronic kidney disease      Diabetes (H)      Hypertension      Myocardial infarction (H)           Past Surgical History:   Procedure Laterality Date     ANGIOGRAM Right 3/17/2025    Procedure: ANGIOGRAM;  Surgeon: Messi Irving MD;  Location: [facility] OR     ANGIOPLASTY Right 3/17/2025    Procedure: ANGIOPLASTY;  Surgeon: Messi Irving MD;  Location: [facility] OR     BYPASS GRAFT FEMOROTIBIAL Right 3/20/2025    Procedure: Right Femoral Anterior Tibial Bypass using Cryo preserve artery;  Surgeon: Messi Irving MD;  Location: [facility] OR     IR OR ANGIOGRAM  3/17/2025     IR OR ANGIOGRAM  3/20/2025     OR ANGIOGRAM, LOWER EXTREMITY Right 3/20/2025    Procedure: COMPLETION ANGIOGRAM;  Surgeon: Messi Irving MD;  Location: [facility] OR       ALLERGIES:  Penicillins    MEDS:    Current Outpatient Medications:      apixaban ANTICOAGULANT (ELIQUIS) 5 MG tablet, Take 5 mg by  mouth 2 times daily., Disp: , Rfl:      aspirin (ASA) 81 MG chewable tablet, Take 1 tablet (81 mg) by mouth daily., Disp: 60 tablet, Rfl: 0     atorvastatin (LIPITOR) 40 MG tablet, Take 1 tablet (40 mg) by mouth at bedtime., Disp: 90 tablet, Rfl: 0     brinzolamide-brimonidine (SIMBRINZA) 1-0.2 % ophthalmic suspension, Apply 1 drop to eye 2 times daily., Disp: , Rfl:      calcitRIOL (ROCALTROL) 0.25 MCG capsule, Take 0.25 mcg by mouth. TAKE ONE CAPSULE BY MOUTH MONDAY THROUGH FRIDAY FOR BONE HEALTH ** DOSE INCREASE, Disp: , Rfl:      empagliflozin (JARDIANCE) 25 MG TABS tablet, Take 12.5 mg by mouth every morning. TAKE ONE-HALF TABLET BY MOUTH EVERY MORNING FOR DIABETES AND KIDNEY PROTECTION, Disp: , Rfl:      ferrous sulfate (FEROSUL) 325 (65 Fe) MG tablet, Take 1 tablet (325 mg) by mouth every other day., Disp: 30 tablet, Rfl: 0     Latanoprost PF 0.005 % SOLN, Apply to eye at bedtime. INSTILL 1 DROP IN BOTH EYES AT BEDTIME FOR GLAUCOMA, Disp: , Rfl:      metFORMIN (GLUCOPHAGE XR) 500 MG 24 hr tablet, Take 1 tablet (500 mg) by mouth daily (with dinner)., Disp: 60 tablet, Rfl: 0     metoprolol succinate ER (TOPROL XL) 25 MG 24 hr tablet, Take 75 mg by mouth daily. TAKE THREE TABLETS BY MOUTH EVERY DAY FOR HEART RATE, Disp: , Rfl:      mycophenolate (GENERIC EQUIVALENT) 500 MG tablet, Take 500 mg by mouth 2 times daily., Disp: , Rfl:      pantoprazole (PROTONIX) 40 MG EC tablet, Take 40 mg by mouth daily.  TAKE ONE TABLET BY MOUTH EVERY DAY PREVENT GI BLEED, Disp: , Rfl:      patiromer (VELTASSA) 8.4 g packet, Take 8.4 g by mouth once a week. Take 1 packet (8.4 grams) by mouth once weekly for high potassium, Disp: , Rfl:      sodium bicarbonate 650 MG tablet, Take 2 tablets (1,300 mg) by mouth 3 times daily., Disp: 120 tablet, Rfl: 1     tacrolimus (GENERIC EQUIVALENT) 1 MG capsule, Take 2 capsules (2 mg) by mouth 2 times daily., Disp: 120 capsule, Rfl: 0     tamsulosin (FLOMAX) 0.4 MG capsule, Take 2 capsules (0.8  mg) by mouth daily., Disp: 60 capsule, Rfl: 0     torsemide (DEMADEX) 10 MG tablet, Take 10 mg by mouth 2 times daily., Disp: , Rfl:     SOCIAL HABITS:    History   Smoking Status     Former     Types: Cigarettes   Smokeless Tobacco     Never     Social History    Substance and Sexual Activity      Alcohol use: Not on file      History   Drug Use Not on file       FAMILY HISTORY:  No family history on file.    PE:  /74 (BP Location: Left arm, Patient Position: Sitting, Cuff Size: Adult Regular)   Pulse 61   Wt 146 lb   SpO2 98%   BMI 19.80 kg/m    Wt Readings from Last 1 Encounters:   04/08/25 146 lb     Body mass index is 19.8 kg/m .    EXAM:  General: No apparent distress. Answers questions appropriately.   Neurologic: Focally intact, Alert and oriented x 3.   Eyes: Grossly normal.   Cardiac:  RRR  Pulmonary: Non labored breathing with normal respiratory effort  Abdominal: Soft, non distended, non tender to palpation. No pulsatile mass.   Musculoskeletal/Extremity: Multiphasic Doppler DP and PT on RLE, 5/5 gross bilateral upper and lower extremity strength. Mild +1 edema on right ankle. Right foot with open wounds, dressing intact, minimal to no drainage seen on dressing.     LABS:      Sodium   Date Value Ref Range Status   03/23/2025 136 135 - 145 mmol/L Final   03/22/2025 137 135 - 145 mmol/L Final   03/21/2025 133 (L) 135 - 145 mmol/L Final     Urea Nitrogen   Date Value Ref Range Status   03/23/2025 31.6 (H) 8.0 - 23.0 mg/dL Final   03/22/2025 29.2 (H) 8.0 - 23.0 mg/dL Final   03/21/2025 32.3 (H) 8.0 - 23.0 mg/dL Final     Hemoglobin   Date Value Ref Range Status   03/23/2025 8.6 (L) 13.3 - 17.7 g/dL Final   03/21/2025 8.9 (L) 13.3 - 17.7 g/dL Final   03/21/2025 8.9 (L) 13.3 - 17.7 g/dL Final     Platelet Count   Date Value Ref Range Status   03/23/2025 249 150 - 450 10e3/uL Final   03/21/2025 250 150 - 450 10e3/uL Final   03/21/2025 184 150 - 450 10e3/uL Final     INR   Date Value Ref Range Status    03/05/2025 1.29 (H) 0.85 - 1.15 Final            Again, thank you for allowing me to participate in the care of your patient.      Sincerely,    RUFINO Tony CNP

## 2025-04-08 NOTE — TELEPHONE ENCOUNTER
----- Message from Piedad Lloyd sent at 4/8/2025  4:37 PM CDT -----  Hi team, can you please schedule this patient for follow-up with me in 4 weeks with repeat ABIs and ultrasound prior to visit.  Thank you so much,  Piedad

## 2025-04-08 NOTE — PATIENT INSTRUCTIONS
Thank you so much for choosing us for your care. It was a pleasure to see you at the vascular clinic today.     Follow-up recommendations: We will see you back in 4-6 weeks. Please do not hesitate to reach out to us in the meantime with any concerns. Our schedulers will get in touch with you to set up your follow-up visit.     Additional testing/imaging ordered today: US and ABIs in 4-6 weeks        Our scheduling team will get in touch with you to set up any follow-up testing/imaging and/or appointments. Please be aware that any testing/imaging recommended today will need to completed prior to your next visit with the provider. If testing/imaging is not completed prior to your next visit, your visit may be rescheduled.        If you have any questions, please contact our clinic directly at [phone] and ask for the nurse. We also encourage the use of [employer] to communicate with your HealthCare Provider.        If you have an urgent need after business hours (8:00 am to 4:30 pm) please call [phone], option 4, and ask for the vascular attending on call. For non-urgent after hours needs, please call the vascular nurse at [phone] and leave a detailed voicemail. For scheduling needs, please call our clinic directly at [phone].

## 2025-04-08 NOTE — PROGRESS NOTES
VASCULAR SURGERY PROGRESS NOTE    LOCATION: [facility]     Tad Zaman  Medical Record #:  [id number]  YOB: 1960  Age:  64 year old     Date of Service: 4/8/2025    PRIMARY CARE PROVIDER: Julius Cesar    Reason for visit: Postop wound check    Impression/Recommendations:   Tad Zaman is a pleasant 64-year-old gentleman with CLTI with tissue loss of RLE now s/p right CFA-AT bypass using spliced cryoartery with subcutaneous tunneling. Presents to clinic for postop wound check  Right groin and RLE incisions are well-approximated, no erythema, no swelling, no drainage. Staples removed from both incision sites.    -Patient's family had questions regarding wound care on right foot, deferred to podiatry.  They have follow-up scheduled on 4/17/2025, recommend follow-up sooner if possible to address the concerns/questions patient's family has.  - Continue with aspirin, Eliquis and statin  - We will schedule follow-up in 4 weeks with arterial duplex and AYAN to reassess the vascular bypass    Discussed warning signs including, but not limited to, acute limb ischemia, new leg pain, motor or sensory deficits, chronic limb threatening ischemia, ischemic rest pain, and nonhealing wounds. If he experiences any of these, he has been advised to seek treatment and contact our team, patient verbalized clear understanding of the above. Information/Education: patient able to teach back. Patient agreeable to plan of care: yes. All questions were answered and support provided. He has our contact information and knows to reach out with any additional questions or concerns.     It was a pleasure seeing Mr. Zaman in clinic today.    Piedad Lloyd, [facility]  Vascular Surgery  Pager: [phone]  [email or web address]  Send message or 10 digit call back number Securely via [employer] with the Vocera Web Console (learn more here)        HPI:  Tad Zaman is a 64 year old male with past medical history  significant for type 2 diabetes, HTN, CAD, CHF, renal transplant 9/20/2022, left AKA, CKD, PAD who was found to have nonhealing wounds on right foot with subsequent vascular evaluation. Now s/p right CFA-AT bypass using spliced cryoartery with subcutaneous tunneling, incisions are healing appropriately. Denies fevers chills nausea vomiting. Reports good appetite, patient's family had questions about the right foot wound for which received care as recommended by podiatry, deferred to podiatry for further care. Denies rest pain, right foot is warm, well-perfused, multiphasic DP and PT is present. No other concerns.    REVIEW OF SYSTEMS:    A 12 point ROS was reviewed and is negative except for what is listed above in HPI.    PHH:    Past Medical History:   Diagnosis Date    Chronic kidney disease     Diabetes (H)     Hypertension     Myocardial infarction (H)           Past Surgical History:   Procedure Laterality Date    ANGIOGRAM Right 3/17/2025    Procedure: ANGIOGRAM;  Surgeon: Messi Irving MD;  Location: [facility] OR    ANGIOPLASTY Right 3/17/2025    Procedure: ANGIOPLASTY;  Surgeon: Messi Irving MD;  Location: [facility] OR    BYPASS GRAFT FEMOROTIBIAL Right 3/20/2025    Procedure: Right Femoral Anterior Tibial Bypass using Cryo preserve artery;  Surgeon: Messi Irving MD;  Location: [facility] OR    IR OR ANGIOGRAM  3/17/2025    IR OR ANGIOGRAM  3/20/2025    OR ANGIOGRAM, LOWER EXTREMITY Right 3/20/2025    Procedure: COMPLETION ANGIOGRAM;  Surgeon: Messi Irving MD;  Location: [facility] OR       ALLERGIES:  Penicillins    MEDS:    Current Outpatient Medications:     apixaban ANTICOAGULANT (ELIQUIS) 5 MG tablet, Take 5 mg by mouth 2 times daily., Disp: , Rfl:     aspirin (ASA) 81 MG chewable tablet, Take 1 tablet (81 mg) by mouth daily., Disp: 60 tablet, Rfl: 0    atorvastatin (LIPITOR) 40 MG tablet, Take 1 tablet (40 mg) by mouth at bedtime., Disp: 90 tablet, Rfl: 0    brinzolamide-brimonidine (SIMBRINZA) 1-0.2 % ophthalmic suspension, Apply  1 drop to eye 2 times daily., Disp: , Rfl:     calcitRIOL (ROCALTROL) 0.25 MCG capsule, Take 0.25 mcg by mouth. TAKE ONE CAPSULE BY MOUTH MONDAY THROUGH FRIDAY FOR BONE HEALTH ** DOSE INCREASE, Disp: , Rfl:     empagliflozin (JARDIANCE) 25 MG TABS tablet, Take 12.5 mg by mouth every morning. TAKE ONE-HALF TABLET BY MOUTH EVERY MORNING FOR DIABETES AND KIDNEY PROTECTION, Disp: , Rfl:     ferrous sulfate (FEROSUL) 325 (65 Fe) MG tablet, Take 1 tablet (325 mg) by mouth every other day., Disp: 30 tablet, Rfl: 0    Latanoprost PF 0.005 % SOLN, Apply to eye at bedtime. INSTILL 1 DROP IN BOTH EYES AT BEDTIME FOR GLAUCOMA, Disp: , Rfl:     metFORMIN (GLUCOPHAGE XR) 500 MG 24 hr tablet, Take 1 tablet (500 mg) by mouth daily (with dinner)., Disp: 60 tablet, Rfl: 0    metoprolol succinate ER (TOPROL XL) 25 MG 24 hr tablet, Take 75 mg by mouth daily. TAKE THREE TABLETS BY MOUTH EVERY DAY FOR HEART RATE, Disp: , Rfl:     mycophenolate (GENERIC EQUIVALENT) 500 MG tablet, Take 500 mg by mouth 2 times daily., Disp: , Rfl:     pantoprazole (PROTONIX) 40 MG EC tablet, Take 40 mg by mouth daily.  TAKE ONE TABLET BY MOUTH EVERY DAY PREVENT GI BLEED, Disp: , Rfl:     patiromer (VELTASSA) 8.4 g packet, Take 8.4 g by mouth once a week. Take 1 packet (8.4 grams) by mouth once weekly for high potassium, Disp: , Rfl:     sodium bicarbonate 650 MG tablet, Take 2 tablets (1,300 mg) by mouth 3 times daily., Disp: 120 tablet, Rfl: 1    tacrolimus (GENERIC EQUIVALENT) 1 MG capsule, Take 2 capsules (2 mg) by mouth 2 times daily., Disp: 120 capsule, Rfl: 0    tamsulosin (FLOMAX) 0.4 MG capsule, Take 2 capsules (0.8 mg) by mouth daily., Disp: 60 capsule, Rfl: 0    torsemide (DEMADEX) 10 MG tablet, Take 10 mg by mouth 2 times daily., Disp: , Rfl:     SOCIAL HABITS:    History   Smoking Status    Former    Types: Cigarettes   Smokeless Tobacco    Never     Social History    Substance and Sexual Activity      Alcohol use: Not on file      History    Drug Use Not on file       FAMILY HISTORY:  No family history on file.    PE:  /74 (BP Location: Left arm, Patient Position: Sitting, Cuff Size: Adult Regular)   Pulse 61   Wt 146 lb   SpO2 98%   BMI 19.80 kg/m    Wt Readings from Last 1 Encounters:   04/08/25 146 lb     Body mass index is 19.8 kg/m .    EXAM:  General: No apparent distress. Answers questions appropriately.   Neurologic: Focally intact, Alert and oriented x 3.   Eyes: Grossly normal.   Cardiac:  RRR  Pulmonary: Non labored breathing with normal respiratory effort  Abdominal: Soft, non distended, non tender to palpation. No pulsatile mass.   Musculoskeletal/Extremity: Multiphasic Doppler DP and PT on RLE, 5/5 gross bilateral upper and lower extremity strength. Mild +1 edema on right ankle. Right foot with open wounds, dressing intact, minimal to no drainage seen on dressing.     LABS:      Sodium   Date Value Ref Range Status   03/23/2025 136 135 - 145 mmol/L Final   03/22/2025 137 135 - 145 mmol/L Final   03/21/2025 133 (L) 135 - 145 mmol/L Final     Urea Nitrogen   Date Value Ref Range Status   03/23/2025 31.6 (H) 8.0 - 23.0 mg/dL Final   03/22/2025 29.2 (H) 8.0 - 23.0 mg/dL Final   03/21/2025 32.3 (H) 8.0 - 23.0 mg/dL Final     Hemoglobin   Date Value Ref Range Status   03/23/2025 8.6 (L) 13.3 - 17.7 g/dL Final   03/21/2025 8.9 (L) 13.3 - 17.7 g/dL Final   03/21/2025 8.9 (L) 13.3 - 17.7 g/dL Final     Platelet Count   Date Value Ref Range Status   03/23/2025 249 150 - 450 10e3/uL Final   03/21/2025 250 150 - 450 10e3/uL Final   03/21/2025 184 150 - 450 10e3/uL Final     INR   Date Value Ref Range Status   03/05/2025 1.29 (H) 0.85 - 1.15 Final

## 2025-04-10 ENCOUNTER — LAB REQUISITION (OUTPATIENT)
Dept: LAB | Facility: CLINIC | Age: 65
End: 2025-04-10

## 2025-04-10 ENCOUNTER — OFFICE VISIT (OUTPATIENT)
Dept: FAMILY MEDICINE | Facility: CLINIC | Age: 65
End: 2025-04-10
Payer: MEDICARE

## 2025-04-10 VITALS
TEMPERATURE: 97.6 F | RESPIRATION RATE: 18 BRPM | HEIGHT: 72 IN | HEART RATE: 59 BPM | DIASTOLIC BLOOD PRESSURE: 74 MMHG | BODY MASS INDEX: 21.67 KG/M2 | WEIGHT: 160 LBS | SYSTOLIC BLOOD PRESSURE: 129 MMHG | OXYGEN SATURATION: 96 %

## 2025-04-10 DIAGNOSIS — N18.31 STAGE 3A CHRONIC KIDNEY DISEASE (H): ICD-10-CM

## 2025-04-10 DIAGNOSIS — Z94.0 RENAL TRANSPLANT, STATUS POST: ICD-10-CM

## 2025-04-10 DIAGNOSIS — R41.82 ALTERED MENTAL STATUS, UNSPECIFIED ALTERED MENTAL STATUS TYPE: Primary | ICD-10-CM

## 2025-04-10 DIAGNOSIS — I25.2 HISTORY OF MI (MYOCARDIAL INFARCTION): ICD-10-CM

## 2025-04-10 DIAGNOSIS — Z86.19 HISTORY OF HEPATITIS C: ICD-10-CM

## 2025-04-10 DIAGNOSIS — N40.1 BENIGN LOCALIZED PROSTATIC HYPERPLASIA WITH LOWER URINARY TRACT SYMPTOMS (LUTS): ICD-10-CM

## 2025-04-10 DIAGNOSIS — E78.5 HYPERLIPIDEMIA LDL GOAL <70: ICD-10-CM

## 2025-04-10 DIAGNOSIS — I73.9 PAD (PERIPHERAL ARTERY DISEASE): ICD-10-CM

## 2025-04-10 DIAGNOSIS — D50.9 IRON DEFICIENCY ANEMIA, UNSPECIFIED IRON DEFICIENCY ANEMIA TYPE: ICD-10-CM

## 2025-04-10 DIAGNOSIS — E11.29 TYPE 2 DIABETES MELLITUS WITH OTHER DIABETIC KIDNEY COMPLICATION, WITHOUT LONG-TERM CURRENT USE OF INSULIN (H): ICD-10-CM

## 2025-04-10 DIAGNOSIS — Z12.11 SCREEN FOR COLON CANCER: ICD-10-CM

## 2025-04-10 DIAGNOSIS — Z89.512 HISTORY OF LEFT BELOW KNEE AMPUTATION (H): ICD-10-CM

## 2025-04-10 LAB
TACROLIMUS BLD-MCNC: 5.2 UG/L (ref 5–15)
TME LAST DOSE: NORMAL H
TME LAST DOSE: NORMAL H

## 2025-04-10 PROCEDURE — 80197 ASSAY OF TACROLIMUS: CPT

## 2025-04-10 ASSESSMENT — PAIN SCALES - GENERAL: PAINLEVEL_OUTOF10: NO PAIN (0)

## 2025-04-10 NOTE — TELEPHONE ENCOUNTER
I left a message for Jarrod with the scheduling plan to schedule the imaging for 4 weeks out and the schedule the pt for a video visit with Piedad Lloyd for the follow up.  Luis Joshua on 4/10/2025 at 4:53 PM

## 2025-04-10 NOTE — TELEPHONE ENCOUNTER
I spoke with the pts daughter Jarrod and she now states that she is not available on Mondays or Tuesdays only Wednesdays, Thursdays and Friday in the afternoons only.  Luis Joshua on 4/10/2025 at 9:18 AM

## 2025-04-10 NOTE — PROGRESS NOTES
Assessment & Plan     (R41.82) Altered mental status, unspecified altered mental status type  (primary encounter diagnosis)  Comment:   Plan: resolved per patient's daughter. No specific findings during hospitalization.    (I73.9) PAD (peripheral artery disease)  Comment:   Plan: s/p bypass right leg. Patient will follow up with Vascular Surgery and Podiatry.    (I25.2) History of MI (myocardial infarction)  Comment:   Plan: asymptomatic.     (E11.29) Type 2 diabetes mellitus with other diabetic kidney complication, without long-term current use of insulin (H)  Comment:   Plan: controlled. [facility]    (E78.5) Hyperlipidemia LDL goal <70  Comment:   Plan: [facility]ing at [facility].    (Z94.0) Renal transplant, status post  Comment:   Plan: [facility]    (N18.31) Stage 3a chronic kidney disease (H)  Comment:   Plan: reviewed and stable.    (D50.9) Iron deficiency anemia, unspecified iron deficiency anemia type  Comment:   Plan: continue with iron supplements.    (N40.1) Benign localized prostatic hyperplasia with lower urinary tract symptoms (LUTS)  Comment:   Plan: continue with Flomax.    (Z12.11) Screen for colon cancer  Comment:   Plan: [facility] with [facility].    (Z89.512) History of left below knee amputation (H)  Comment:   Plan:     (Z86.19) History of hepatitis C  Comment:   Plan: treated.      MED REC REQUIRED  Post Medication Reconciliation Status:       See Patient Instructions    Alin Mishra is a 64 year old, presenting for the following health issues:  Hospital F/U ([facility])      4/10/2025    10:40 AM   Additional Questions   Roomed by Lyric   Accompanied by Daughter     HPI        Hospital Follow-up Visit:    Hospital/Nursing Home/ Rehab Facility: [facility]  Most Recent Admission Date: 2/25/2025   Most Recent Admission Diagnosis: Weakness generalized - R53.1  Altered mental status, unspecified altered mental status type - R41.82      Most Recent Discharge Date: 3/26/2025   Most Recent Discharge Diagnosis: Weakness generalized - R53.1  Altered mental status, unspecified altered mental status type - R41.82  Frostbite, initial encounter - T33.90XA  PAD (peripheral artery disease) - I73.9  Gangrenous toe (H) - I96  Pre-op evaluation - Z01.818  Postoperative state - Z98.890  Renal transplant, status post - Z94.0  Iron deficiency anemia, unspecified iron deficiency anemia type - D50.9  Type 2 diabetes mellitus with other diabetic kidney complication, without long-term current use of insulin (H) - E11.29  Benign localized prostatic hyperplasia with lower urinary tract symptoms (LUTS) - N40.1   Was the patient in the ICU or did the patient experience delirium during hospitalization?  No  Do you have any other stressors you would like to discuss with your provider? No    Problems taking medications regularly:  None  Medication changes since discharge: None  Problems adhering to non-medication therapy:  None    Summary of hospitalization:  [facility] discharge summary reviewed  Diagnostic Tests/Treatments reviewed.  Follow up needed: none  Other Healthcare Providers Involved in Patient s Care:         None  Update since discharge: improved.     Plan of care communicated with patient           Review of Systems  Constitutional, HEENT, cardiovascular, pulmonary, GI, , musculoskeletal, neuro, skin, endocrine and psych systems are negative, except as otherwise noted.      Objective    /74 (BP Location: Left arm, Patient Position: Chair, Cuff Size: Adult Regular)   Pulse 59   Temp 97.6  F (36.4  C) (Temporal)   Resp 18   Ht 1.829 m (6')   Wt 72.6 kg (160 lb)   SpO2 96%   BMI 21.70 kg/m    Body mass index is 21.7 kg/m .  Physical Exam   GENERAL: alert and no distress  NECK: no adenopathy, no asymmetry, masses, or scars  RESP: lungs clear to auscultation - no rales, rhonchi or wheezes  CV: regular rate and rhythm, normal S1 S2,  no S3 or S4, no murmur, click or rub, no peripheral edema  ABDOMEN: soft, nontender, no hepatosplenomegaly, no masses and bowel sounds normal  MS: no gross musculoskeletal defects noted, no edema          Signed Electronically by: Rodney Anthony MD, MD

## 2025-04-14 NOTE — PATIENT INSTRUCTIONS
"*Wheelchairs are never guaranteed at the front door, if you have your own wheel chair or knee walker, please bring that with you to your appointment. Thank you for your understanding!*    Wound care supplies were not ordered or needed today.    Important lnstructions      WEIGHT BEARING STATUS: You are to remain NON WEIGHT BEARING on your right foot. NON WEIGHT BEARING MEANS NO PRESSURE ON YOUR FOOT OR HEEL AT ANY TIME FOR ANY REASON!      2. OFFLOADING DEVICE: Must use either a Wheelchair at all times! (do not use affected foot to push wheelchair)    3. STABILIZATION DEVICE: Use a Prafo boot or a prevalon boot. You will need to WEAR THIS AT ALL TIMES EVEN WHILE IN BED.     4. ELEVATE: Elevating your leg means laying with your head on a pillow and your foot ABOVE YOUR HEART.     5. DO NOT MOVE YOUR FOOT.  There is a risk of worsening the wound or incision. To give yourself a higher chance of healing, please DO NOT swing foot back and forth and wiggle foot/toes especially when inside a stabilization device. Limited movement is allowable with therapy as recommended by the doctor.     6. TAKE A PROTEIN SHAKE TWICE A DAY.  (For ex: Boost, Ensure, Glucerna)    7. KEEP YOUR WOUND DRY AT ALL TIMES    Use a shower bag or a cast cover to keep your foot/leg dry during showers. These can be purchased on [employer] or any pharmacy.         Dressing Change lnstructions    Change DAILY to your right foot/heel. Paint eschar on toes with betadine, allow to air dry.     Primary Dressing: Apply dry gauze into/onto the wounds    Secure with non-sterile roll gauze (4\" x 75\" roll) and tape (1\" roll tape) as needed; avoid adhesive directly on the skin     SEEK MEDICAL CARE IF:  You have an increase in swelling, pain, or redness around the wound.  You have an increase in the amount of pus coming from the wound.  There is a bad smell coming from the wound.  The wound appears to be worsening/enlarging  You have a fever greater than 101.5 " F      It is ok to continue current wound care treatment/products for the next 2-3 days until new wound care supplies are ordered and arrive. If longer than this please contact our office at [phone].      We want to hear from you!   In the next few weeks, you should receive a call or email to complete a survey about your visit at [facility]. Please help us improve your appointment experience by letting us know how we did today. We strive to make your experience good and value any ways in which we could do better.      We value your input and suggestions.    Thank you for choosing the [facility]!

## 2025-04-17 ENCOUNTER — OFFICE VISIT (OUTPATIENT)
Dept: VASCULAR SURGERY | Facility: CLINIC | Age: 65
End: 2025-04-17
Attending: ASSISTANT, PODIATRIC
Payer: MEDICARE

## 2025-04-17 ENCOUNTER — TELEPHONE (OUTPATIENT)
Dept: FAMILY MEDICINE | Facility: CLINIC | Age: 65
End: 2025-04-17
Payer: MEDICARE

## 2025-04-17 VITALS — HEART RATE: 79 BPM | SYSTOLIC BLOOD PRESSURE: 104 MMHG | TEMPERATURE: 98.1 F | DIASTOLIC BLOOD PRESSURE: 56 MMHG

## 2025-04-17 DIAGNOSIS — L97.511 DIABETIC ULCER OF OTHER PART OF RIGHT FOOT ASSOCIATED WITH TYPE 2 DIABETES MELLITUS, LIMITED TO BREAKDOWN OF SKIN (H): Primary | ICD-10-CM

## 2025-04-17 DIAGNOSIS — L89.613 PRESSURE ULCER OF RIGHT HEEL, STAGE 3 (H): ICD-10-CM

## 2025-04-17 DIAGNOSIS — Z53.9 DIAGNOSIS NOT YET DEFINED: Primary | ICD-10-CM

## 2025-04-17 DIAGNOSIS — E11.621 DIABETIC ULCER OF OTHER PART OF RIGHT FOOT ASSOCIATED WITH TYPE 2 DIABETES MELLITUS, LIMITED TO BREAKDOWN OF SKIN (H): Primary | ICD-10-CM

## 2025-04-17 DIAGNOSIS — I96 GANGRENE OF TOE OF RIGHT FOOT (H): ICD-10-CM

## 2025-04-17 PROCEDURE — G0180 MD CERTIFICATION HHA PATIENT: HCPCS | Performed by: FAMILY MEDICINE

## 2025-04-17 PROCEDURE — 97597 DBRDMT OPN WND 1ST 20 CM/<: CPT | Performed by: PODIATRIST

## 2025-04-17 PROCEDURE — G0463 HOSPITAL OUTPT CLINIC VISIT: HCPCS | Performed by: PODIATRIST

## 2025-04-17 ASSESSMENT — PAIN SCALES - GENERAL: PAINLEVEL_OUTOF10: MILD PAIN (2)

## 2025-04-17 NOTE — TELEPHONE ENCOUNTER
Forms/Letter Request    Type of form/letter: [facility] Home Health Certification  Cert:03/27/2025 to 05/25/2025  Order NO:[id number]      Do we have the form/letter: Yes: placed in provider bin    Who is the form from? Home care    Where did/will the form come from? form was faxed in    When is form/letter needed by: asap    How would you like the form/letter returned: Fax : [phone]    Patient Notified form requests are processed in 5-7 business days:N/A    Okay to leave a detailed message?: N/A at Cell number on file:    Telephone Information:   Mobile [phone]

## 2025-04-17 NOTE — PROGRESS NOTES
FOOT AND ANKLE SURGERY/PODIATRY CONSULT NOTE        ASSESSMENT:   Dry gangrene right foot  Diabetic ulceration right foot into subcutaneous tissue  Stage III pressure ulceration right heel  CKD        TREATMENT:  -I discussed with the patient that he does have necrotic/nonviable tissue along distal digits 1, 2, 3 on the right foot with developing nonviable tissue along the distal fourth digit right foot.  No signs of infection on exam today.    -Based on the above, I discussed with the patient and his daughter today that he likely has dry gangrene along the distal digits 1-3 on the right foot.  Possible eschar formation.    -Pressure ulceration along the right heel is stable without signs of infection.    -Based on current presentation we discussed attempted salvage of the digits.  However, given the amount of nonviable tissue present we also discussed that more proximal foot amputation in the form of a transmetatarsal amputation with Achilles tendon lengthening may be necessary.    -Prior to surgical intervention I recommended and referred him for an MRI of the right foot to investigate for underlying osteomyelitis.    -I will also plan to reach out to Dr. Irving to get an idea of blood flow to the right lower extremity.    -After discussion of risk factors, nursing staff removed dressing, cleansed wound and consent obtained 2% Lidocaine HCL jelly was applied, under clean conditions, the right foot and heel ulceration(s) were debrided using currette.  Devitalized and nonviable tissue, along with any fibrin and slough, was removed to improve granulation tissue formation, stimulate wound healing, decrease overall bacteria load, disrupt biofilm formation and decrease edge senescence. Wound drainage was small Serosanguinous. Total excisional debridement was 3 sq cm from the epidermis/dermis area with a depth of 0.1 cm.   Ulcers were improved afterwards and .  Measures were as noted on the flow sheet.  Gauze  dressing applied today which we will reapply daily.    -I will contact the patient with the MRI report when available and we will be guided by the results.     Ryan Miller DPM  [facility] [place]      HPI: Tad Zaman was seen today at the request of Dr. Arreola for right foot and heel ulcers.  Patient is somewhat of a poor historian and his daughter is present today providing the majority of the history.  Patient's daughter reports that they first noticed discoloration along the distal second injured on the right foot in March of this year.  The discoloration has progressed to now include digits 1, 2, 3 right foot.  Patient has undergone bypass to the right lower extremity recently with Dr. Irving.  Left BKA.  Past medical history significant for type 2 diabetes, CKD.    Past Medical History:   Diagnosis Date    Chronic kidney disease     Diabetes (H)     Hypertension     Myocardial infarction (H)        Past Surgical History:   Procedure Laterality Date    ANGIOGRAM Right 3/17/2025    Procedure: ANGIOGRAM;  Surgeon: Messi Irving MD;  Location: [facility]    ANGIOPLASTY Right 3/17/2025    Procedure: ANGIOPLASTY;  Surgeon: Messi Irving MD;  Location: [facility]    BYPASS GRAFT FEMOROTIBIAL Right 3/20/2025    Procedure: Right Femoral Anterior Tibial Bypass using Cryo preserve artery;  Surgeon: Messi Irving MD;  Location: [facility] OR    IR OR ANGIOGRAM  3/17/2025    IR OR ANGIOGRAM  3/20/2025    OR ANGIOGRAM, LOWER EXTREMITY Right 3/20/2025    Procedure: COMPLETION ANGIOGRAM;  Surgeon: Messi Irving MD;  Location: [facility] OR        Allergies   Allergen Reactions    Penicillins Hives         Current Outpatient Medications:     apixaban ANTICOAGULANT (ELIQUIS) 5 MG tablet, Take 5 mg by mouth 2 times daily., Disp: , Rfl:     aspirin (ASA) 81 MG chewable tablet, Take 1 tablet (81 mg) by mouth daily., Disp: 60 tablet, Rfl: 0    atorvastatin (LIPITOR) 40 MG tablet, Take 1 tablet (40 mg) by mouth at bedtime., Disp: 90 tablet,  Rfl: 0    empagliflozin (JARDIANCE) 25 MG TABS tablet, Take 12.5 mg by mouth every morning. TAKE ONE-HALF TABLET BY MOUTH EVERY MORNING FOR DIABETES AND KIDNEY PROTECTION, Disp: , Rfl:     ferrous sulfate (FEROSUL) 325 (65 Fe) MG tablet, Take 1 tablet (325 mg) by mouth every other day., Disp: 30 tablet, Rfl: 0    metoprolol succinate ER (TOPROL XL) 25 MG 24 hr tablet, Take 75 mg by mouth daily. TAKE THREE TABLETS BY MOUTH EVERY DAY FOR HEART RATE, Disp: , Rfl:     mycophenolate (GENERIC EQUIVALENT) 500 MG tablet, Take 500 mg by mouth 2 times daily., Disp: , Rfl:     brinzolamide-brimonidine (SIMBRINZA) 1-0.2 % ophthalmic suspension, Apply 1 drop to eye 2 times daily. (Patient not taking: Reported on 4/17/2025), Disp: , Rfl:     calcitRIOL (ROCALTROL) 0.25 MCG capsule, Take 0.25 mcg by mouth. TAKE ONE CAPSULE BY MOUTH MONDAY THROUGH FRIDAY FOR BONE HEALTH ** DOSE INCREASE, Disp: , Rfl:     Latanoprost PF 0.005 % SOLN, Apply to eye at bedtime. INSTILL 1 DROP IN BOTH EYES AT BEDTIME FOR GLAUCOMA (Patient not taking: Reported on 4/17/2025), Disp: , Rfl:     metFORMIN (GLUCOPHAGE XR) 500 MG 24 hr tablet, Take 1 tablet (500 mg) by mouth daily (with dinner). (Patient not taking: Reported on 4/17/2025), Disp: 60 tablet, Rfl: 0    pantoprazole (PROTONIX) 40 MG EC tablet, Take 40 mg by mouth daily.  TAKE ONE TABLET BY MOUTH EVERY DAY PREVENT GI BLEED (Patient not taking: Reported on 4/17/2025), Disp: , Rfl:     patiromer (VELTASSA) 8.4 g packet, Take 8.4 g by mouth once a week. Take 1 packet (8.4 grams) by mouth once weekly for high potassium (Patient not taking: Reported on 4/17/2025), Disp: , Rfl:     sodium bicarbonate 650 MG tablet, Take 2 tablets (1,300 mg) by mouth 3 times daily. (Patient not taking: Reported on 4/17/2025), Disp: 120 tablet, Rfl: 1    tacrolimus (GENERIC EQUIVALENT) 1 MG capsule, Take 2 capsules (2 mg) by mouth 2 times daily. (Patient not taking: Reported on 4/17/2025), Disp: 120 capsule, Rfl: 0     tamsulosin (FLOMAX) 0.4 MG capsule, Take 2 capsules (0.8 mg) by mouth daily. (Patient not taking: Reported on 4/17/2025), Disp: 60 capsule, Rfl: 0    torsemide (DEMADEX) 10 MG tablet, Take 10 mg by mouth 2 times daily. (Patient not taking: Reported on 4/17/2025), Disp: , Rfl:     Social History     Social History Narrative    Not on file       No family history on file.    Review of Systems - 10 point Review of Systems is negative except for right foot ulcers which is noted in HPI.      OBJECTIVE:  Appearance: alert, well appearing, and in no distress.    /73   Pulse 79   Temp 98.1  F (36.7  C) (Oral)   SpO2 95%     BMI= There is no height or weight on file to calculate BMI.    General appearance: Patient is alert and fully cooperative with history & exam.  No sign of distress is noted during the visit.     Psychiatric: Affect is pleasant & appropriate.  Patient appears motivated to improve health.     Respiratory: Breathing is regular & unlabored while sitting.     HEENT: Hearing is intact to spoken word.  Speech is clear.  No gross evidence of visual impairment that would impact ambulation.    Vascular: Dorsalis pedis non-palpableRight.  Dermatologic:   Negative Pressure Wound Therapy Thigh Anterior;Right (Active)   Wound Type Surgical 03/25/25 2149   Unit Type prevena 03/25/25 2149   Dressing Pieces Applied (# of Each Type) Prevena foam 03/23/25 1200   Cycle Continuous;On 03/25/25 2149   Target Pressure (mmHg) 125 03/25/25 2149   Cannister changed? No 03/25/25 2149       VASC Wound R plantar foot (Active)   Post Size Length 0.3 04/17/25 1400   Post Size Width 0.2 04/17/25 1400   Post Size Depth 0.1 04/17/25 1400   Post Total Sq cm 0.06 04/17/25 1400       Incision/Surgical Site 03/17/25 Left Groin (Active)   Incision Assessment WDL 03/26/25 1100   Dressing Dry gauze 03/19/25 0945   Closure Approximated 03/25/25 2014   Incision Drainage Amount None 03/26/25 1100   Drainage Description UTV 03/18/25 0058    Dressing Intervention Clean, dry, intact 03/23/25 0430       Incision/Surgical Site 03/20/25 Anterior;Right Thigh (Active)   Incision Assessment UTV 03/26/25 1100   Dressing Vacuum based 03/25/25 2014   Munira-Incision Assessment UTV 03/25/25 2014   Closure Other (Comment) 03/23/25 0810   Incision Drainage Amount None 03/21/25 1630   Drainage Description UTV 03/25/25 2014   Incision Care Therapy - negative pressure 03/25/25 2014   Dressing Intervention Clean, dry, intact 03/26/25 1100       Incision/Surgical Site 03/20/25 Right;Outer Tibial (Active)   Incision Assessment UTV 03/26/25 1100   Dressing Other (Comment) 03/23/25 0810   Munira-Incision Assessment Warm 03/22/25 1545   Closure [place] 03/25/25 0300   Incision Drainage Amount None 03/26/25 1100   Drainage Description Serosanguinous 03/21/25 1630   Dressing Intervention Clean, dry, intact 03/26/25 1100   Necrotic/nonviable tissue along distal digits 1, 2, 3 right foot with developing nonviable tissue along the plantar distal aspect of the fourth digit right foot.  Ulceration along medial first MPJ right foot has nonviable tissue.  Granular/fibrotic tissue ulceration posterior aspect of the right heel.  No erythema right lower extremity.    Neurologic: Diminished to light touch Right.  Musculoskeletal: Left BKA.

## 2025-04-17 NOTE — TELEPHONE ENCOUNTER
Received provider signed forms from [facility] and faxed to [facility] at [phone] on 04/17/2025. Right fax confirmed at 3:57PM.    Jan Shore

## 2025-04-17 NOTE — TELEPHONE ENCOUNTER
"JOHANN Mcneil with [facility] called to clinic. She was out to patient home today for wound check and cleaning. Patient has wound on Right foot from vascular surgery on 3/31.    Calling to report change in his wound on Right foot.    Tarun said she called to another doctor about this but was told that pt is with [place] and recently seen and should be contacting our office about this (looks like pt was previously with Dr. Cesar at [facility]).    Patient was seen by Dr. Anthony on 4/10/25.    Per Tarun, wound on Right foot is worse today. Had a strong odor when she unwrapped it and during cleaning, was noticing pus. The odor and the pus was not present at time of her visit on Monday so this is a change. \"His wound didn't look or smell good\". Concerns voiced for possible infection. \"Something is brewing under there\".  Vital signs were stable, no fever. Patient reported no pain.   Tarun asking about getting antibiotic prescribed? Or would patient need to be seen first?    Wound Infection [id number] (Triage Protocol Reviewed)  Looks infected (e.g., spreading redness, pus) and no fever  R/O: cellulitis. Note: It may be difficult to determine the rash color in people with darker-colored skin.    Yes See in Office Today     ACTION:   Writer attempted phone call to patient to gather more information, discuss concerns from home care nurse about possible infection. Patient should be seen in person to have wound assessed.   Should be seen today in office.  PCP does not have any openings remaining today, nor do any other providers with Primary Care.  No answer at mobile # in chart.  Called to secondary # in chart, listed as home #.  Patient did answer. Is currently at Vascular Surgeon office right now for a post-op follow up. Currently in patient room with staff. Advised for patient to please have vascular take look at the wound his home care nurse was concerned about and advise on " plan/treatment. Patient agreed. Noted he is also seeing a podiatrist tomorrow and if vascular won't address the wound, he will ask podiatrist to look at it and advise.    No further action needed by primary care at this time.  Called back to home care nurse, left detailed message that pt is seeing vascular today and podiatry tomorrow and one of these specialties should be addressing possible infection in wound of Right foot. Call back to [facility] clinic with any questions.            Meg Long RN  Clinical Triage/Primary Care  [facility]                Meg Long RN  Clinical Triage/Primary Care  [facility]

## 2025-04-18 ENCOUNTER — ANCILLARY PROCEDURE (OUTPATIENT)
Dept: MRI IMAGING | Facility: CLINIC | Age: 65
End: 2025-04-18
Attending: PODIATRIST
Payer: MEDICARE

## 2025-04-18 DIAGNOSIS — E11.621 DIABETIC ULCER OF OTHER PART OF RIGHT FOOT ASSOCIATED WITH TYPE 2 DIABETES MELLITUS, LIMITED TO BREAKDOWN OF SKIN (H): ICD-10-CM

## 2025-04-18 DIAGNOSIS — L97.511 DIABETIC ULCER OF OTHER PART OF RIGHT FOOT ASSOCIATED WITH TYPE 2 DIABETES MELLITUS, LIMITED TO BREAKDOWN OF SKIN (H): ICD-10-CM

## 2025-04-18 PROCEDURE — 73718 MRI LOWER EXTREMITY W/O DYE: CPT | Mod: RT | Performed by: RADIOLOGY

## 2025-04-22 ENCOUNTER — VIRTUAL VISIT (OUTPATIENT)
Dept: VASCULAR SURGERY | Facility: CLINIC | Age: 65
End: 2025-04-22
Attending: PODIATRIST
Payer: MEDICARE

## 2025-04-22 DIAGNOSIS — L97.511 DIABETIC ULCER OF OTHER PART OF RIGHT FOOT ASSOCIATED WITH TYPE 2 DIABETES MELLITUS, LIMITED TO BREAKDOWN OF SKIN (H): ICD-10-CM

## 2025-04-22 DIAGNOSIS — E11.621 DIABETIC ULCER OF OTHER PART OF RIGHT FOOT ASSOCIATED WITH TYPE 2 DIABETES MELLITUS, LIMITED TO BREAKDOWN OF SKIN (H): ICD-10-CM

## 2025-04-22 DIAGNOSIS — I96 GANGRENE OF TOE OF RIGHT FOOT (H): ICD-10-CM

## 2025-04-22 DIAGNOSIS — M86.171 ACUTE OSTEOMYELITIS OF TOE, RIGHT (H): Primary | ICD-10-CM

## 2025-04-22 NOTE — NURSING NOTE
Current patient location: [address] [facility] [address]    Is the patient currently in the state of [place]? YES    Visit mode: VIDEO    If the visit is dropped, the patient can be reconnected by:VIDEO VISIT: Text to cell phone:   Telephone Information:   Mobile [phone]       Will anyone else be joining the visit? YES: How would they like to receive their invitation? Text to cell phone: Daughter   (If patient encounters technical issues they should call [phone])    Are changes needed to the allergy or medication list? No    Are refills needed on medications prescribed by this physician? NO    Rooming Documentation:  Questionnaire(s) completed    Reason for visit: RECHECK    Kalpana HUTTONF

## 2025-04-22 NOTE — PROGRESS NOTES
Virtual Visit Details    Type of service:  Video Visit   Video Start Time: 3:41 PM  Video End Time:3:55 PM    Originating Location (pt. Location): Home    Distant Location (provider location):  On-site  Platform used for Video Visit: [facility]      MRI right foot:1. Finding most consistent with great toe distal phalangeal  osteomyelitis.  2. Assuming no prior amputation surgery in this area, finding  consistent with second distal phalangeal and to the level of mid  middle phalangeal shaft osteomyelitis. Radiographical correlation  might be helpful to identify residual bone.  3. Apparent exposed third distal phalanx with marrow abnormality  suspicious for osteomyelitis down to the base of middle phalanx.  4. Query forth distal phalangeal tuft acroosteolysis vs. sequale of  osteomyelitis.  5. First proximal phalangeal osteonecrosis.   6. Charcot arthropathy with multiple erosions, possibly related to  superimposed inflammatory arthritis.    - I reviewed the above MRI report with the patient and his daughter today which is positive for osteomyelitis along digits 1, 2 with likely osteomyelitis present on digits 3, 4.    - Based on the extent of osteomyelitis and extent of dry gangrene we discussed that at minimum I would recommend a transmetatarsal amputation with Achilles tendon lengthening.  Discussed surgical procedure including postop course to remain nonweightbearing for minimum 4 to 6 weeks.  Risks of excessive weightbearing include but not limited to need for additional surgical intervention including partial foot amputation or loss of limb.  Associated risk would also extend to lack of blood flow to the right foot which would require possible more proximal foot or limb amputation.  All questions invited and answered.  Patient consents to surgery.    - We discussed additional option for hospitalization with transfer to TCU to assist with nonweightbearing during the postop period.  Patient likely to be agreeable to  this option will discuss further with his daughter.    - I will plan to follow-up with the patient with an additional video visit after ABIs have been completed on 5/2 and we will be guided by the results.  I will plan also to contact Dr. Irving after AAYN report is available to obtain input from vascular surgery.

## 2025-04-24 ENCOUNTER — VIRTUAL VISIT (OUTPATIENT)
Dept: EDUCATION SERVICES | Facility: CLINIC | Age: 65
End: 2025-04-24
Attending: PODIATRIST
Payer: MEDICARE

## 2025-04-24 DIAGNOSIS — L97.511 DIABETIC ULCER OF OTHER PART OF RIGHT FOOT ASSOCIATED WITH TYPE 2 DIABETES MELLITUS, LIMITED TO BREAKDOWN OF SKIN (H): ICD-10-CM

## 2025-04-24 DIAGNOSIS — E11.621 DIABETIC ULCER OF OTHER PART OF RIGHT FOOT ASSOCIATED WITH TYPE 2 DIABETES MELLITUS, LIMITED TO BREAKDOWN OF SKIN (H): ICD-10-CM

## 2025-04-24 NOTE — PATIENT INSTRUCTIONS
"Harjit Mishra,    It was nice meeting with you. Here is a summary of our visit and plan:    PLAN:  Can start monitoring blood glucose or wear continuous glucose monitor  [facility]      FOLLOW-UP:    As needed      Glucose Targets:    Blood Glucose Targets:   Fasting and before meal: 80 - 130   2 hours after the start of a meal: less than 180    Continuous Glucose Monitor Goals:    Glucose Target Range  Recommended Time in Target Range   70 to 180 70% or more of the time   Less than 70 Less than 4% of the time   Above 180 Less than 25% of time       Activity helps to improve blood sugars. Find something you enjoy that fits your schedule. Try to incorporate 150 minutes per week (at least 10 minute at a time, and at least every other day).     Eat three balanced meals each day, consider using plate planning method, aiming for a variety of food groups including 1/4 plate starch/grains, 1/4 plate lean protein, and 1/2 plate non-starchy vegetables. Focus on \"high quality\" carbohydrates (whole grains, starchy vegetables, fresh fruits, beans/legumes). Limit added sugar and refined carbohydrates as much as possible.    Take diabetes medications as prescribed.    If you are overweight aim for a 5% weight loss. This will improve blood sugars, cholesterol, and blood pressure!         Krystal HUFF [facility]  Diabetes Educator  [facility] [address] [address]  Phone: [phone]  Email: [email or web address]                Please call or MyChart if you have any questions.     "

## 2025-04-24 NOTE — PROGRESS NOTES
Virtual Visit Details    Type of service:  Video Visit   Video Start Time: 9:00 am  Video End Time:9:25 am    Originating Location (pt. Location): Home    Distant Location (provider location):  On-site  Platform used for Video Visit: Well

## 2025-04-24 NOTE — NURSING NOTE
Current patient location: [address] [place] [address] [address] [facility] [address]    Is the patient currently in the state of [place]? YES    Visit mode: VIDEO    If the visit is dropped, the patient can be reconnected by:VIDEO VISIT: Text to cell phone:   Telephone Information:   Mobile [phone]       Will anyone else be joining the visit? NO  (If patient encounters technical issues they should call [phone]    Are changes needed to the allergy or medication list? No    Are refills needed on medications prescribed by this physician? NO    Rooming Documentation:  Questionnaire(s) not done per department protocol    Reason for visit: Diabetes Education    Shelby Kocher VVF

## 2025-04-24 NOTE — PROGRESS NOTES
"Diabetes Self-Management Education & Support    Tad Zaman is a 64 year old who presents today for education related to Type 2 diabetes  Patient is being treated with:  diet, oral agents, and exercise  He is accompanied by self and daughter    Year of diagnosis: 30 years,  Referring provider:  Ryan Miller DPM      PATIENT CONCERNS RELATED TO DIABETES SELF MANAGEMENT: non-healing wound, referred by podiatrist      SUBJECTIVE / OBJECTIVE:     Kidney transplant in 2021  [facility]        Monitoring:     BG results: not available   CGM: none    Labs:  Lab Results   Component Value Date    A1C 7.9 03/03/2025     Lab Results   Component Value Date     03/23/2025     03/21/2025     No results found for: \"LDL\"  No results found for: \"HDL\"  GFR Estimate   Date Value Ref Range Status   03/23/2025 54 (L) >60 mL/min/1.73m2 Final     Comment:     eGFR calculated using 2021 CKD-EPI equation.     Lab Results   Component Value Date    CR 1.44 03/23/2025     No results found for: \"MICROALBUMIN\"  No results found for: \"GADAB\"  No results found for: \"CPEPT\"      Diabetes Symptoms & Complications:            Taking Medications:  Diabetes Medication(s)       Biguanides       metFORMIN (GLUCOPHAGE XR) 500 MG 24 hr tablet Take 1 tablet (500 mg) by mouth daily (with dinner).       Sodium-Glucose Co-Transporter 2 (SGLT2) Inhibitors       empagliflozin (JARDIANCE) 25 MG TABS tablet Take 12.5 mg by mouth every morning. TAKE ONE-HALF TABLET BY MOUTH EVERY MORNING FOR DIABETES AND KIDNEY PROTECTION              Problem Solving:    Reducing Risks:  Discussed goals of diabetes care including A1C, blood pressure, cholesterol, kidney test (urine albumin), foot care, eye exam, exercise, aspirin, and diabetes self-management education      Patient's most recent   Lab Results   Component Value Date    A1C 7.9 03/03/2025      Patient's A1C goal: <7.0    Being Active:  Leaving the house  Walks to " car  Limited by weight bearing status  Gets physical therapy two times / week      Nutrition:     Daughter has been trying to cook more, stay away from high sodium foods  Cooks shrimp, chicken,  Rotisserie chicken, coleslaw, grilled chicken, flavored rice not a big rice eater  Tries to stick with brown rice  Bbq -   Drinks water and coffee          Healthy Coping:  Has support from family  Sources of stress identified by patient: My health      EDUCATION and INSTRUCTION PROVIDED AT THIS VISIT:    T2D seen for Comprehensive Knowledge Assessment and Instruction at the request of Ryan Miller DPM. Antonio has a non-healing foot ulcer. Hx of kidney transplant. Has had diabetes for over 30 years. He and daughter have been working on following low sodium diet and watch portion sizes of carbs. Physical activity is limited right now due to weight-bearing status. Drinks water. Taking metformin and Jardiance. [facility] Discussed monitoring blood sugars or seeing if [facility] could provide continuous glucose monitor. Can get A1C re-checked to 3 months. Daughter thinks A1C increased due to hospitalization and illness.    Patient-stated goal written and given to Tad Zaman.  Verbalized and demonstrated understanding of instructions.     PLAN:  Can start monitoring blood glucose or wear continuous glucose monitor  [facility]      FOLLOW-UP:    As needed  Upcoming Diabetes Ed Appointments     Visit Type Date Time Department    DIABETES ED 4/24/2025  9:00 AM [facility]            See patient instructions.  AVS provided to patient.    Time spent with patient at today's visit was 25 minutes.      Any diabetes medication initiation or dose changes were made via the CDCES Standing Orders per the patient's referring provider. A copy of this encounter was shared with the provider.

## 2025-05-03 ENCOUNTER — HOSPITAL ENCOUNTER (INPATIENT)
Facility: CLINIC | Age: 65
End: 2025-05-03
Attending: STUDENT IN AN ORGANIZED HEALTH CARE EDUCATION/TRAINING PROGRAM | Admitting: HOSPITALIST
Payer: MEDICARE

## 2025-05-03 ENCOUNTER — NURSE TRIAGE (OUTPATIENT)
Dept: NURSING | Facility: CLINIC | Age: 65
End: 2025-05-03
Payer: MEDICARE

## 2025-05-03 ENCOUNTER — APPOINTMENT (OUTPATIENT)
Dept: ULTRASOUND IMAGING | Facility: CLINIC | Age: 65
End: 2025-05-03
Attending: HOSPITALIST
Payer: MEDICARE

## 2025-05-03 ENCOUNTER — APPOINTMENT (OUTPATIENT)
Dept: GENERAL RADIOLOGY | Facility: CLINIC | Age: 65
End: 2025-05-03
Attending: STUDENT IN AN ORGANIZED HEALTH CARE EDUCATION/TRAINING PROGRAM
Payer: MEDICARE

## 2025-05-03 ENCOUNTER — APPOINTMENT (OUTPATIENT)
Dept: CT IMAGING | Facility: CLINIC | Age: 65
End: 2025-05-03
Attending: STUDENT IN AN ORGANIZED HEALTH CARE EDUCATION/TRAINING PROGRAM
Payer: MEDICARE

## 2025-05-03 DIAGNOSIS — I25.2 HISTORY OF MI (MYOCARDIAL INFARCTION): ICD-10-CM

## 2025-05-03 DIAGNOSIS — N17.9 AKI (ACUTE KIDNEY INJURY): ICD-10-CM

## 2025-05-03 DIAGNOSIS — Z94.0 RENAL TRANSPLANT, STATUS POST: ICD-10-CM

## 2025-05-03 DIAGNOSIS — I96 GANGRENE OF RIGHT FOOT (H): ICD-10-CM

## 2025-05-03 DIAGNOSIS — M79.89 SWELLING OF LIMB: ICD-10-CM

## 2025-05-03 DIAGNOSIS — N17.0 ACUTE KIDNEY FAILURE WITH TUBULAR NECROSIS: ICD-10-CM

## 2025-05-03 DIAGNOSIS — H40.003 GLAUCOMA SUSPECT, BILATERAL: ICD-10-CM

## 2025-05-03 DIAGNOSIS — Z89.439 HISTORY OF TRANSMETATARSAL AMPUTATION OF FOOT (H): ICD-10-CM

## 2025-05-03 DIAGNOSIS — D50.9 IRON DEFICIENCY ANEMIA, UNSPECIFIED IRON DEFICIENCY ANEMIA TYPE: ICD-10-CM

## 2025-05-03 DIAGNOSIS — E11.29 TYPE 2 DIABETES MELLITUS WITH OTHER DIABETIC KIDNEY COMPLICATION, WITHOUT LONG-TERM CURRENT USE OF INSULIN (H): ICD-10-CM

## 2025-05-03 DIAGNOSIS — Z79.01 ANTICOAGULATED: ICD-10-CM

## 2025-05-03 DIAGNOSIS — K21.00 GASTROESOPHAGEAL REFLUX DISEASE WITH ESOPHAGITIS WITHOUT HEMORRHAGE: ICD-10-CM

## 2025-05-03 DIAGNOSIS — N40.1 BENIGN LOCALIZED PROSTATIC HYPERPLASIA WITH LOWER URINARY TRACT SYMPTOMS (LUTS): ICD-10-CM

## 2025-05-03 DIAGNOSIS — Z89.512 HISTORY OF LEFT BELOW KNEE AMPUTATION (H): ICD-10-CM

## 2025-05-03 DIAGNOSIS — I96 GANGRENE OF TOE OF RIGHT FOOT (H): Primary | ICD-10-CM

## 2025-05-03 DIAGNOSIS — I73.9 PAD (PERIPHERAL ARTERY DISEASE): ICD-10-CM

## 2025-05-03 DIAGNOSIS — M86.9 OSTEOMYELITIS OF RIGHT FOOT, UNSPECIFIED TYPE (H): ICD-10-CM

## 2025-05-03 DIAGNOSIS — N19 UREMIA: ICD-10-CM

## 2025-05-03 PROBLEM — R41.82 ALTERED MENTAL STATUS, UNSPECIFIED ALTERED MENTAL STATUS TYPE: Status: RESOLVED | Noted: 2025-02-25 | Resolved: 2025-05-03

## 2025-05-03 LAB
ALBUMIN SERPL BCG-MCNC: 3.8 G/DL (ref 3.5–5.2)
ALP SERPL-CCNC: 89 U/L (ref 40–150)
ALT SERPL W P-5'-P-CCNC: 9 U/L (ref 0–70)
ANION GAP SERPL CALCULATED.3IONS-SCNC: 12 MMOL/L (ref 7–15)
APTT PPP: 34 SECONDS (ref 22–38)
AST SERPL W P-5'-P-CCNC: 20 U/L (ref 0–45)
BASOPHILS # BLD AUTO: 0 10E3/UL (ref 0–0.2)
BASOPHILS NFR BLD AUTO: 1 %
BILIRUB SERPL-MCNC: 0.3 MG/DL
BUN SERPL-MCNC: 42.4 MG/DL (ref 8–23)
CALCIUM SERPL-MCNC: 9.5 MG/DL (ref 8.8–10.4)
CHLORIDE SERPL-SCNC: 103 MMOL/L (ref 98–107)
CREAT SERPL-MCNC: 2.15 MG/DL (ref 0.67–1.17)
CRP SERPL-MCNC: <3 MG/L
EGFRCR SERPLBLD CKD-EPI 2021: 33 ML/MIN/1.73M2
EOSINOPHIL # BLD AUTO: 0.2 10E3/UL (ref 0–0.7)
EOSINOPHIL NFR BLD AUTO: 5 %
ERYTHROCYTE [DISTWIDTH] IN BLOOD BY AUTOMATED COUNT: 14.9 % (ref 10–15)
ERYTHROCYTE [SEDIMENTATION RATE] IN BLOOD BY WESTERGREN METHOD: 18 MM/HR (ref 0–20)
GLUCOSE SERPL-MCNC: 225 MG/DL (ref 70–99)
HCO3 SERPL-SCNC: 23 MMOL/L (ref 22–29)
HCT VFR BLD AUTO: 37.7 % (ref 40–53)
HGB BLD-MCNC: 11.6 G/DL (ref 13.3–17.7)
IMM GRANULOCYTES # BLD: 0 10E3/UL
IMM GRANULOCYTES NFR BLD: 1 %
INR PPP: 1.61 (ref 0.85–1.15)
LACTATE SERPL-SCNC: 1.9 MMOL/L (ref 0.7–2)
LYMPHOCYTES # BLD AUTO: 0.5 10E3/UL (ref 0.8–5.3)
LYMPHOCYTES NFR BLD AUTO: 9 %
MAGNESIUM SERPL-MCNC: 2 MG/DL (ref 1.7–2.3)
MCH RBC QN AUTO: 25.7 PG (ref 26.5–33)
MCHC RBC AUTO-ENTMCNC: 30.8 G/DL (ref 31.5–36.5)
MCV RBC AUTO: 83 FL (ref 78–100)
MONOCYTES # BLD AUTO: 0.6 10E3/UL (ref 0–1.3)
MONOCYTES NFR BLD AUTO: 11 %
NEUTROPHILS # BLD AUTO: 3.7 10E3/UL (ref 1.6–8.3)
NEUTROPHILS NFR BLD AUTO: 74 %
NRBC # BLD AUTO: 0 10E3/UL
NRBC BLD AUTO-RTO: 0 /100
PLATELET # BLD AUTO: 229 10E3/UL (ref 150–450)
POTASSIUM SERPL-SCNC: 4.6 MMOL/L (ref 3.4–5.3)
PROCALCITONIN SERPL IA-MCNC: 0.15 NG/ML
PROT SERPL-MCNC: 6.9 G/DL (ref 6.4–8.3)
PROTHROMBIN TIME: 18.9 SECONDS (ref 11.8–14.8)
RBC # BLD AUTO: 4.52 10E6/UL (ref 4.4–5.9)
SODIUM SERPL-SCNC: 138 MMOL/L (ref 135–145)
WBC # BLD AUTO: 5 10E3/UL (ref 4–11)

## 2025-05-03 PROCEDURE — 85025 COMPLETE CBC W/AUTO DIFF WBC: CPT | Performed by: STUDENT IN AN ORGANIZED HEALTH CARE EDUCATION/TRAINING PROGRAM

## 2025-05-03 PROCEDURE — 258N000003 HC RX IP 258 OP 636: Performed by: STUDENT IN AN ORGANIZED HEALTH CARE EDUCATION/TRAINING PROGRAM

## 2025-05-03 PROCEDURE — 80197 ASSAY OF TACROLIMUS: CPT | Performed by: STUDENT IN AN ORGANIZED HEALTH CARE EDUCATION/TRAINING PROGRAM

## 2025-05-03 PROCEDURE — 82040 ASSAY OF SERUM ALBUMIN: CPT | Performed by: STUDENT IN AN ORGANIZED HEALTH CARE EDUCATION/TRAINING PROGRAM

## 2025-05-03 PROCEDURE — 73701 CT LOWER EXTREMITY W/DYE: CPT | Mod: RT

## 2025-05-03 PROCEDURE — 250N000013 HC RX MED GY IP 250 OP 250 PS 637: Performed by: HOSPITALIST

## 2025-05-03 PROCEDURE — 86140 C-REACTIVE PROTEIN: CPT | Performed by: STUDENT IN AN ORGANIZED HEALTH CARE EDUCATION/TRAINING PROGRAM

## 2025-05-03 PROCEDURE — 99285 EMERGENCY DEPT VISIT HI MDM: CPT | Performed by: STUDENT IN AN ORGANIZED HEALTH CARE EDUCATION/TRAINING PROGRAM

## 2025-05-03 PROCEDURE — 83735 ASSAY OF MAGNESIUM: CPT | Performed by: STUDENT IN AN ORGANIZED HEALTH CARE EDUCATION/TRAINING PROGRAM

## 2025-05-03 PROCEDURE — 99285 EMERGENCY DEPT VISIT HI MDM: CPT | Mod: 25 | Performed by: STUDENT IN AN ORGANIZED HEALTH CARE EDUCATION/TRAINING PROGRAM

## 2025-05-03 PROCEDURE — 93971 EXTREMITY STUDY: CPT | Mod: RT

## 2025-05-03 PROCEDURE — 99221 1ST HOSP IP/OBS SF/LOW 40: CPT | Mod: 24 | Performed by: SURGERY

## 2025-05-03 PROCEDURE — 76776 US EXAM K TRANSPL W/DOPPLER: CPT | Mod: 26 | Performed by: RADIOLOGY

## 2025-05-03 PROCEDURE — 36415 COLL VENOUS BLD VENIPUNCTURE: CPT | Performed by: STUDENT IN AN ORGANIZED HEALTH CARE EDUCATION/TRAINING PROGRAM

## 2025-05-03 PROCEDURE — 87040 BLOOD CULTURE FOR BACTERIA: CPT | Performed by: STUDENT IN AN ORGANIZED HEALTH CARE EDUCATION/TRAINING PROGRAM

## 2025-05-03 PROCEDURE — 84145 PROCALCITONIN (PCT): CPT | Performed by: STUDENT IN AN ORGANIZED HEALTH CARE EDUCATION/TRAINING PROGRAM

## 2025-05-03 PROCEDURE — 93971 EXTREMITY STUDY: CPT | Mod: 26 | Performed by: RADIOLOGY

## 2025-05-03 PROCEDURE — 85652 RBC SED RATE AUTOMATED: CPT | Performed by: STUDENT IN AN ORGANIZED HEALTH CARE EDUCATION/TRAINING PROGRAM

## 2025-05-03 PROCEDURE — 96374 THER/PROPH/DIAG INJ IV PUSH: CPT | Performed by: STUDENT IN AN ORGANIZED HEALTH CARE EDUCATION/TRAINING PROGRAM

## 2025-05-03 PROCEDURE — 83605 ASSAY OF LACTIC ACID: CPT | Performed by: STUDENT IN AN ORGANIZED HEALTH CARE EDUCATION/TRAINING PROGRAM

## 2025-05-03 PROCEDURE — 120N000002 HC R&B MED SURG/OB UMMC

## 2025-05-03 PROCEDURE — 85610 PROTHROMBIN TIME: CPT | Performed by: STUDENT IN AN ORGANIZED HEALTH CARE EDUCATION/TRAINING PROGRAM

## 2025-05-03 PROCEDURE — 82310 ASSAY OF CALCIUM: CPT | Performed by: STUDENT IN AN ORGANIZED HEALTH CARE EDUCATION/TRAINING PROGRAM

## 2025-05-03 PROCEDURE — 85730 THROMBOPLASTIN TIME PARTIAL: CPT | Performed by: STUDENT IN AN ORGANIZED HEALTH CARE EDUCATION/TRAINING PROGRAM

## 2025-05-03 PROCEDURE — 99223 1ST HOSP IP/OBS HIGH 75: CPT | Mod: AI | Performed by: HOSPITALIST

## 2025-05-03 PROCEDURE — 258N000003 HC RX IP 258 OP 636: Performed by: HOSPITALIST

## 2025-05-03 PROCEDURE — 76776 US EXAM K TRANSPL W/DOPPLER: CPT

## 2025-05-03 PROCEDURE — 250N000011 HC RX IP 250 OP 636: Mod: JZ | Performed by: STUDENT IN AN ORGANIZED HEALTH CARE EDUCATION/TRAINING PROGRAM

## 2025-05-03 PROCEDURE — 73620 X-RAY EXAM OF FOOT: CPT | Mod: 26 | Performed by: RADIOLOGY

## 2025-05-03 PROCEDURE — 73620 X-RAY EXAM OF FOOT: CPT | Mod: RT

## 2025-05-03 PROCEDURE — 999N000248 HC STATISTIC IV INSERT WITH US BY RN

## 2025-05-03 PROCEDURE — 250N000012 HC RX MED GY IP 250 OP 636 PS 637: Performed by: HOSPITALIST

## 2025-05-03 PROCEDURE — 73701 CT LOWER EXTREMITY W/DYE: CPT | Mod: 26 | Performed by: RADIOLOGY

## 2025-05-03 PROCEDURE — 250N000011 HC RX IP 250 OP 636: Performed by: STUDENT IN AN ORGANIZED HEALTH CARE EDUCATION/TRAINING PROGRAM

## 2025-05-03 RX ORDER — DEXTROSE MONOHYDRATE 25 G/50ML
25-50 INJECTION, SOLUTION INTRAVENOUS
Status: ACTIVE | OUTPATIENT
Start: 2025-05-03

## 2025-05-03 RX ORDER — CALCITRIOL 0.25 UG/1
0.25 CAPSULE, LIQUID FILLED ORAL
Status: DISPENSED | OUTPATIENT
Start: 2025-05-05

## 2025-05-03 RX ORDER — CALCIUM CARBONATE 500 MG/1
1000 TABLET, CHEWABLE ORAL 4 TIMES DAILY PRN
Status: ACTIVE | OUTPATIENT
Start: 2025-05-03

## 2025-05-03 RX ORDER — LIDOCAINE 40 MG/G
CREAM TOPICAL
Status: ACTIVE | OUTPATIENT
Start: 2025-05-03

## 2025-05-03 RX ORDER — ASPIRIN 81 MG/1
81 TABLET, CHEWABLE ORAL DAILY
Status: DISPENSED | OUTPATIENT
Start: 2025-05-04

## 2025-05-03 RX ORDER — ATORVASTATIN CALCIUM 40 MG/1
40 TABLET, FILM COATED ORAL AT BEDTIME
Status: DISPENSED | OUTPATIENT
Start: 2025-05-03

## 2025-05-03 RX ORDER — NICOTINE POLACRILEX 4 MG
15-30 LOZENGE BUCCAL
Status: ACTIVE | OUTPATIENT
Start: 2025-05-03

## 2025-05-03 RX ORDER — AMOXICILLIN 250 MG
2 CAPSULE ORAL 2 TIMES DAILY PRN
Status: ACTIVE | OUTPATIENT
Start: 2025-05-03

## 2025-05-03 RX ORDER — METRONIDAZOLE 500 MG/100ML
500 INJECTION, SOLUTION INTRAVENOUS EVERY 8 HOURS
Status: DISPENSED | OUTPATIENT
Start: 2025-05-04

## 2025-05-03 RX ORDER — LATANOPROST 50 UG/ML
1 SOLUTION/ DROPS OPHTHALMIC AT BEDTIME
Status: DISPENSED | OUTPATIENT
Start: 2025-05-03

## 2025-05-03 RX ORDER — METRONIDAZOLE 500 MG/100ML
500 INJECTION, SOLUTION INTRAVENOUS ONCE
Status: COMPLETED | OUTPATIENT
Start: 2025-05-03 | End: 2025-05-03

## 2025-05-03 RX ORDER — CEFEPIME HYDROCHLORIDE 2 G/1
2 INJECTION, POWDER, FOR SOLUTION INTRAVENOUS EVERY 12 HOURS
Status: DISCONTINUED | OUTPATIENT
Start: 2025-05-04 | End: 2025-05-08

## 2025-05-03 RX ORDER — AMOXICILLIN 250 MG
1 CAPSULE ORAL 2 TIMES DAILY PRN
Status: ACTIVE | OUTPATIENT
Start: 2025-05-03

## 2025-05-03 RX ORDER — TORSEMIDE 10 MG/1
10 TABLET ORAL 2 TIMES DAILY
Status: DISPENSED | OUTPATIENT
Start: 2025-05-03

## 2025-05-03 RX ORDER — TAMSULOSIN HYDROCHLORIDE 0.4 MG/1
0.8 CAPSULE ORAL DAILY
Status: DISPENSED | OUTPATIENT
Start: 2025-05-04

## 2025-05-03 RX ORDER — SODIUM BICARBONATE 650 MG/1
1300 TABLET ORAL 3 TIMES DAILY
Status: DISPENSED | OUTPATIENT
Start: 2025-05-03

## 2025-05-03 RX ORDER — IOPAMIDOL 755 MG/ML
99 INJECTION, SOLUTION INTRAVASCULAR ONCE
Status: COMPLETED | OUTPATIENT
Start: 2025-05-03 | End: 2025-05-03

## 2025-05-03 RX ORDER — MYCOPHENOLATE MOFETIL 500 MG/1
500 TABLET ORAL 2 TIMES DAILY
Status: DISPENSED | OUTPATIENT
Start: 2025-05-03

## 2025-05-03 RX ORDER — SODIUM CHLORIDE 9 MG/ML
INJECTION, SOLUTION INTRAVENOUS CONTINUOUS
Status: ACTIVE | OUTPATIENT
Start: 2025-05-03 | End: 2025-05-04

## 2025-05-03 RX ORDER — TACROLIMUS 1 MG/1
2 CAPSULE ORAL
Status: DISPENSED | OUTPATIENT
Start: 2025-05-03

## 2025-05-03 RX ORDER — OXYCODONE HYDROCHLORIDE 5 MG/1
5 TABLET ORAL EVERY 4 HOURS PRN
Status: ACTIVE | OUTPATIENT
Start: 2025-05-03

## 2025-05-03 RX ORDER — PANTOPRAZOLE SODIUM 40 MG/1
40 TABLET, DELAYED RELEASE ORAL DAILY
Status: DISPENSED | OUTPATIENT
Start: 2025-05-04

## 2025-05-03 RX ORDER — FERROUS SULFATE 325(65) MG
325 TABLET ORAL EVERY OTHER DAY
Status: DISPENSED | OUTPATIENT
Start: 2025-05-04

## 2025-05-03 RX ORDER — CEFEPIME HYDROCHLORIDE 2 G/1
2 INJECTION, POWDER, FOR SOLUTION INTRAVENOUS ONCE
Status: COMPLETED | OUTPATIENT
Start: 2025-05-03 | End: 2025-05-03

## 2025-05-03 RX ADMIN — SODIUM BICARBONATE 1300 MG: 650 TABLET ORAL at 20:33

## 2025-05-03 RX ADMIN — CEFEPIME 2 G: 2 INJECTION, POWDER, FOR SOLUTION INTRAVENOUS at 17:52

## 2025-05-03 RX ADMIN — SODIUM CHLORIDE 1000 ML: 0.9 INJECTION, SOLUTION INTRAVENOUS at 17:52

## 2025-05-03 RX ADMIN — SODIUM CHLORIDE 1000 ML: 0.9 INJECTION, SOLUTION INTRAVENOUS at 18:17

## 2025-05-03 RX ADMIN — IOPAMIDOL 99 ML: 755 INJECTION, SOLUTION INTRAVENOUS at 17:56

## 2025-05-03 RX ADMIN — LATANOPROST 1 DROP: 50 SOLUTION/ DROPS OPHTHALMIC at 22:21

## 2025-05-03 RX ADMIN — ATORVASTATIN CALCIUM 40 MG: 40 TABLET, FILM COATED ORAL at 22:21

## 2025-05-03 RX ADMIN — TACROLIMUS 2 MG: 1 CAPSULE ORAL at 20:33

## 2025-05-03 RX ADMIN — METRONIDAZOLE 500 MG: 500 INJECTION, SOLUTION INTRAVENOUS at 18:43

## 2025-05-03 RX ADMIN — SODIUM CHLORIDE: 0.9 INJECTION, SOLUTION INTRAVENOUS at 20:32

## 2025-05-03 RX ADMIN — MYCOPHENOLATE MOFETIL 500 MG: 500 TABLET, FILM COATED ORAL at 20:32

## 2025-05-03 ASSESSMENT — ACTIVITIES OF DAILY LIVING (ADL)
ADLS_ACUITY_SCORE: 58

## 2025-05-03 ASSESSMENT — COLUMBIA-SUICIDE SEVERITY RATING SCALE - C-SSRS
2. HAVE YOU ACTUALLY HAD ANY THOUGHTS OF KILLING YOURSELF IN THE PAST MONTH?: NO
6. HAVE YOU EVER DONE ANYTHING, STARTED TO DO ANYTHING, OR PREPARED TO DO ANYTHING TO END YOUR LIFE?: NO
1. IN THE PAST MONTH, HAVE YOU WISHED YOU WERE DEAD OR WISHED YOU COULD GO TO SLEEP AND NOT WAKE UP?: NO

## 2025-05-03 NOTE — CONSULTS
[facility]    Consult Note - Vascular Surgery Service  Date of Admission:  5/3/2025  Consult Requested by: Pantera Nielson  Reason for Consult: Eval for Vascular Involvement    Assessment & Plan: Surgery   Tad Zaman is a 64 year old male s/p R femoral-anterior tibial bypass (3/20/25) that presented 05/03/2025 with progressive worsening of known right foot necrosis (toes and calcaneus) in the setting of known osteomyelitis and ongoing plans for R foot amputation (date had not been set yet). PMH notable for T2DM, HTN, CAD c/b MI, HFrEF, CKD s/p Renal Transplant, PAD s/p L BKA. Recent Arterial Duplex show patents bypass. AYAN's unable to be obtained d/t not compressible vessels. PMH notable for prior kidney transplant.     - No further revascularizations planned       Drains: PRESENT          - May have additional drains, review Avatar    Clinically Significant Risk Factors Present on Admission                # Drug Induced Coagulation Defect: home medication list includes an anticoagulant medication  # Drug Induced Platelet Defect: home medication list includes an antiplatelet medication            # DMII: A1C = 7.9 % (Ref range: <5.7 %) within past 6 months        # Financial/Environmental Concerns:             The patient's care was discussed with the staff surgeon, Dr. Nobles.  Michael Lebron MD  General Surgery Resident  [facility]  Text page via [facility] Paging/Directory    ______________________________________________________________________    Chief Complaint   foot infection       History is obtained from the patient, his family, and the patient's chart.     History of Present Illness   Tad Zaman is a 64 year old male w/ a complex PMH that presented 05/03/2025 with progressive worsening of known right foot necrosis (toes and calcaneus) in the setting of known osteomyelitis and ongoing plans for R foot  amputation (date had not been set yet). He is s/p R femoral-anterior tibial bypass on 3/20/25. Recent Arterial Duplex show patent bypass. AYAN's unable to be obtained d/t not compressible vessels. PMH notable for T2DM, HTN, CAD c/b MI, HFrEF, CKD s/p Renal Transplant, PAD s/p L BKA.       Past Medical History    Past Medical History:   Diagnosis Date    Chronic kidney disease     Diabetes (H)     Hypertension     Myocardial infarction (H)        Past Surgical History   Past Surgical History:   Procedure Laterality Date    ANGIOGRAM Right 3/17/2025    Procedure: ANGIOGRAM;  Surgeon: Messi Irving MD;  Location: [facility] OR    ANGIOPLASTY Right 3/17/2025    Procedure: ANGIOPLASTY;  Surgeon: Messi Irving MD;  Location: [facility]    BYPASS GRAFT FEMOROTIBIAL Right 3/20/2025    Procedure: Right Femoral Anterior Tibial Bypass using Cryo preserve artery;  Surgeon: Messi Irving MD;  Location: [facility]    IR OR ANGIOGRAM  3/17/2025    IR OR ANGIOGRAM  3/20/2025    OR ANGIOGRAM, LOWER EXTREMITY Right 3/20/2025    Procedure: COMPLETION ANGIOGRAM;  Surgeon: Messi Irving MD;  Location: [facility] OR       Prior to Admission Medications   I have reviewed this patient's current medications  Current Outpatient Medications on File Prior to Encounter   Medication Sig Dispense Refill    apixaban ANTICOAGULANT (ELIQUIS) 5 MG tablet Take 5 mg by mouth 2 times daily.      aspirin (ASA) 81 MG chewable tablet Take 1 tablet (81 mg) by mouth daily. 60 tablet 0    atorvastatin (LIPITOR) 40 MG tablet Take 1 tablet (40 mg) by mouth at bedtime. 90 tablet 0    brinzolamide-brimonidine (SIMBRINZA) 1-0.2 % ophthalmic suspension Apply 1 drop to eye 2 times daily.      calcitRIOL (ROCALTROL) 0.25 MCG capsule Take 0.25 mcg by mouth. TAKE ONE CAPSULE BY MOUTH MONDAY THROUGH FRIDAY FOR BONE HEALTH ** DOSE INCREASE      empagliflozin (JARDIANCE) 25 MG TABS tablet Take 12.5 mg by mouth every morning. TAKE ONE-HALF TABLET BY MOUTH EVERY MORNING FOR DIABETES AND KIDNEY PROTECTION       ferrous sulfate (FEROSUL) 325 (65 Fe) MG tablet Take 1 tablet (325 mg) by mouth every other day. 30 tablet 0    Latanoprost PF 0.005 % SOLN Apply to eye at bedtime. INSTILL 1 DROP IN BOTH EYES AT BEDTIME FOR GLAUCOMA      metFORMIN (GLUCOPHAGE XR) 500 MG 24 hr tablet Take 1 tablet (500 mg) by mouth daily (with dinner). 60 tablet 0    metoprolol succinate ER (TOPROL XL) 25 MG 24 hr tablet Take 75 mg by mouth daily. TAKE THREE TABLETS BY MOUTH EVERY DAY FOR HEART RATE      mycophenolate (GENERIC EQUIVALENT) 500 MG tablet Take 500 mg by mouth 2 times daily.      pantoprazole (PROTONIX) 40 MG EC tablet Take 40 mg by mouth daily.  TAKE ONE TABLET BY MOUTH EVERY DAY PREVENT GI BLEED      patiromer (VELTASSA) 8.4 g packet Take 8.4 g by mouth once a week. Take 1 packet (8.4 grams) by mouth once weekly for high potassium      sodium bicarbonate 650 MG tablet Take 2 tablets (1,300 mg) by mouth 3 times daily. 120 tablet 1    tacrolimus (GENERIC EQUIVALENT) 1 MG capsule Take 2 capsules (2 mg) by mouth 2 times daily. 120 capsule 0    tamsulosin (FLOMAX) 0.4 MG capsule Take 2 capsules (0.8 mg) by mouth daily. 60 capsule 0    torsemide (DEMADEX) 10 MG tablet Take 10 mg by mouth 2 times daily.            Review of Systems    The 10 point Review of Systems is negative other than noted in the HPI or here.    Social History   I have reviewed this patient's social history and updated it with pertinent information if needed.  Social History     Tobacco Use    Smoking status: Former     Current packs/day: 0.00     Average packs/day: 0.5 packs/day for 19.0 years (9.5 ttl pk-yrs)     Types: Cigarettes     Start date: 1972     Quit date: 1991     Years since quittin.3     Passive exposure: Never    Smokeless tobacco: Never         Family History           Allergies   Allergies   Allergen Reactions    Penicillins Hives        Physical Exam   Vitals: Most Recent  Ranges (Last 24 hours)   Temp: 97.6  F (36.4  C)  Pulse:  66  BP: (!) 142/74  Resp: 20  SpO2: 97 %  O2 Device: None (Room air) Temp  Min: 97.6  F (36.4  C)  Max: 97.6  F (36.4  C)  Pulse  Min: 66  Max: 66  BP  Min: 142/74  Max: 142/74  Resp  Min: 20  Max: 20  SpO2  Min: 97 %  Max: 97 %     Physical Exam:  Gen: Appears stated age, NAD.  HEENT: NC/AT, PERRL, EOMI, Sclera Anicteric, Hearing intact  Neuro: Answers questions appropriately, but with increased response latency and prompting from family.  Psych: Affect appropriate, Behavior appropriate, Cooperative  CV: Midl HTN, Normocardic  Pulm: NWOB on RA  ABD: Soft, NT, ND  MSK: Warm. R foot swollen with necrotic changes to toes and eschar on heel. R DP palpable. Foot warm but not overly so. Nontender. R LE quad strength intact.                Data         Imaging results reviewed over the past 24 hrs:   US art low ext uni right (vascular lab)    Result Date: 5/2/2025  BILATERAL LOWER EXTREMITY DUPLEX ARTERIAL ULTRASOUND 5/2/2025 3:24 PM CLINICAL HISTORY: Previous right common femoral artery to anterior tibial artery bypass. COMPARISONS: Largest remedy ultrasound 3/14/2025, 3/11/2023, 3/10/2025 REFERRING PROVIDER: JENNIFER GONZALEZ TECHNIQUE: Grayscale, color Doppler, Doppler waveform ultrasound evaluation of right external iliac arteries through anterior and posterior tibial arteries. FINDINGS: RIGHT:      EXTERNAL ILIAC ARTERY: 73/0 cm/s, biphasic      COMMON FEMORAL ARTERY: 76/0 cm/s, biphasic      PROFUNDUS FEMORAL ARTERY: 40/0 cm/s, biphasic      SUPERFICIAL FEMORAL ARTERY, proximal: 31/0 cm/s, biphasic GRAFT:      CFA ANASTOMOSIS: 58/0 cm/s, measures approximately 7 mm in diameter.      PROXIMAL: 65/0 cm/s      MID-DISTAL THIGH: 47/0 cm/s      MID-KNEE: 32/0 cm/s      DISTAL: 29/0 cm/s      MARYELLEN ANASTOMOSIS: 32/3 cm/s, measures approximately 4 mm in diameter.      ANTERIOR TIBIAL ARTERY, distal to anastomosis: 73/6 cm/s monophasic        ANTERIOR TIBIAL ARTERY, ankle: 96/10 cm/s, monophasic         POSTERIOR TIBIAL ARTERY,  ankle: 96/10 cm/s, monophasic     IMPRESSION: RIGHT: Patent right common femoral artery to anterior tibial artery bypass graft with no focal areas of stenosis. Guidelines: [facility] duplex criteria for lower limb arterial occlusive disease -Percent stenosis- Normal (1-19%): Peak systolic velocity (cm/s): <150, End-diastolic velocity (cm/s): <40, Velocity ratio (Vr): <1.5, Distal arterial waveform: Triphasic -Percent stenosis- 20-49%: Peak systolic velocity (cm/s): 150-200, End-diastolic velocity (cm/s): <40, Velocity ratio (Vr): 1.5-2.0, Distal arterial waveform: Triphasic -Percent stenosis- 50-75%: Peak systolic velocity (cm/s): 200-300, End-diastolic velocity (cm/s): <90, Velocity ratio (Vr): 2.0-3.9, Distal arterial waveform: Poststenotic turbulence distal to stenosis, monophasic distal waveform -Percent stenosis- >75%: Peak systolic velocity (cm/s): >300, End-diastolic velocity (cm/s): <90, Velocity ratio (Vr): >4.0, Distal arterial waveform: Dampened distal waveform and low PSV/EDV* in the stenosis -Percent stenosis- Occlusion: Absent flow by color Doppler/pulsed Doppler spectral analysis; length of occlusion estimated from distance between exit and reentry collateral arteries *PSV = peak systolic velocity, EDV = end-diastolic velocity http://link.loaiza.com/chapter/10.1007/[email or web address]_23/fulltext html I have personally reviewed the examination and initial interpretation and I agree with the findings. CARMEL ARGUELLO MD   SYSTEM ID:  [id number]    US AYAN with PPG wo Exercise Right    Result Date: 5/2/2025  RESTING AYAN, TBI, PVR 5/2/2025 3:24 PM CLINICAL HISTORY: s/p vascular bypass; PAD (peripheral artery disease); Status post vascular bypass. R arm AVF. COMPARISONS: Lower extremity arterial ultrasound same day. REFERRING PROVIDER: JENNIFER GONZALEZ TECHNIQUE: Right AYAN, TBI, PVR obtained. FINDINGS: RIGHT:      Brachial (LEFT): 144 mmHg      Ankle (PT): >240 mmHg      Ankle (DP): >240 mmHg       1st Digit: >240 mmHg      AYAN: N/A      TBI: N/A Toe PPGs:           1st digit: Normal           2nd digit: Normal           3rd digit: flat           4th digit: severely diminished           5th digit: Normal     IMPRESSION: 1. RIGHT:      A. Resting AYAN, Non-compressible vessels.      B. Flat PPG of the 3rd digit, severely diminished PPG of 4th digit. Normal first, second and fifth digit PPGs. ------------- AYAN Interpretation:    Normal: 1.00 - 1.40    Borderline: 0.91 - 0.99    Abnormal: ? 0.90    Noncompressible: > 1.40 TBI Interpretation:    Normal: > 0.70    Abnormal: ? 0.70 Brian HL, Alyx HD, Isaac PP, Nikko S, et al.; Peer Review Committee Members. 2024 ACC/AHA/AACVPR/APMA/ABC/SCAI/SVM/SVN/SVS/SIR/VESS Guideline for the Management of Lower Extremity Peripheral Artery Disease: A Report of the American College of Cardiology/American Heart Association Joint Committee on Clinical Practice Guidelines. Circulation. 2024 Jun 11;149(24):j0696-k7462. doi: 10.1161/CIR.1324247510507040. Epub 2024 May 14. PMID: [id number]. I have personally reviewed the examination and initial interpretation and I agree with the findings. CARMEL ARGUELLO MD   SYSTEM ID:  [id number]     Vascular surgery attending staff note: I have gone over the chart and imaging and the documentation.  I agree with the documentation by our resident, .  Patient is a chronically ill 64-year-old gentleman with a previous kidney transplant and now progressive necrosis of his toes.  He has recently undergone right common femoral artery to anterior tibial artery bypass with cryopreserved vein.  Looking at his preoperative imaging he has poor flow in the posterior tibial artery and therefore anterior tibial artery target was chosen.  Even with this revascularization, unfortunately, he will be at high risk for major amputation.  Unfortunately no additional revascularization can be done.    EDGAR MENENDEZ M.D.

## 2025-05-03 NOTE — H&P
[facility]    History and Physical - Hospitalist Service, GOLD TEAM        Date of Admission:  5/3/2025    Assessment & Plan      Tad Zaman is a 64 year old male with hypertension, chronic systolic heart failure, type 2 diabetes, peripheral artery disease status post left BKA, right femoral-tibial bypass (3/20/2025), right diabetic foot ulcer, osteomyelitis, history of MI, status post renal transplant, CKD stage III, with with complaints of progressive right foot pain and swelling and being admitted for progressive R foot severe diabetic foot ulcer and possible R TMA     Updates:  Vascular surgery recommending continuing ASA till day before surgery considering LE stent. Will need to clarify with Podiatry    R Diabetic foot ulcer  Acute osteomyelitis  PAD s/p L BKA, R F-T stent  Chronic immunosuppression  - Presents with history of progressive R diabetic foot ulcer with outpatient plans for R TMA, but presents today with worsening symptoms  - Continue cefepime (allergy profile), vancomycin and flagyl (5/3/25 - )  - may need holding Mycophenolate, but will follow up renal transplant service recommendations  - recent arterial doppler (5/2/25) with good stent flow  - Vascular surgery and Podiatry on consult  - consult Infectious Disease service post procedure    S/P Renal transplant  Acute on CKD3  - check renal transplant US, Tacro levels  - IV fluids overnight and trend BMP  - PTA Tacro 2 mg twice daily to continue  - PTA Mycophenolate 500 mg twice daily to continue for now  - hold PTA Torsemide  - continue sodium bicarb and Patiromer  - consult transplant Nephrology    History of CVA/TIA  Chronic AC, ASA  - on dual coverage for history of CVA/TIA per family and also PAD  - PTA regimen - Apixaban 5 mg twice daily and ASA 81 mg daily  - Considering no recent CVA/TIA. Will hold DOAC (5/3/25)  - No cardiac stent, but does have LE stent and will clarify with vascular  surgery about perioperative ASA use    Hypertension  Chronic systolic heart failure/HFmrEF  -History of chronic systolic heart failure, MI but no clear history of CAD or cardiac revascularization.   - transthoracic echocardiogram on 2/25/25 shows EF of 40-45% and mild pulmonary hypertension likely Group 2   Continue metoprolol perioperatively   - no signs of acute heart failure  - Holding aspirin as above.  Continuing statins perioperatively    DM2  - choose medium correctional insulin for now  Lab Results   Component Value Date    A1C 7.9 03/03/2025    - last A1C suggestive of good control  - hold metformin and continue jardiance      BPH - continue Tamsulosin    Glaucoma - continue topical         Diet: Combination Diet Renal Diet (non-dialysis); Moderate Consistent Carb (60 g CHO per Meal) Diet    DVT Prophylaxis: DOAC- ON HOLD AND WILL NEED venous thromboembolism PROPHYLAXIS IF CONTINUES TO BE ON HOLD BEYOND 5/6/25  Zaman Catheter: Not present  Lines: None     Cardiac Monitoring: None  Code Status: Full Code    Clinically Significant Risk Factors Present on Admission                # Drug Induced Coagulation Defect: home medication list includes an anticoagulant medication  # Drug Induced Platelet Defect: home medication list includes an antiplatelet medication  # Acute Kidney Injury, unspecified: based on a >150% or 0.3 mg/dL increase in last creatinine compared to past 90 day average, will monitor renal function           # DMII: A1C = 7.9 % (Ref range: <5.7 %) within past 6 months          # Financial/Environmental Concerns:           Disposition Plan      Expected Discharge Date: 05/05/2025                  Vinh Crews MD  Hospitalist Service, [facility]  Securely message with [employer] (more info)  Text page via [employer] Paging/Directory   See signed in provider for up to date coverage  information    ______________________________________________________________________    Chief Complaint   Gangrene of right foot (H)     History is obtained from the patient    History of Present Illness   Tad Zaman is a 64 year old male with hypertension, chronic systolic heart failure, type 2 diabetes, peripheral artery disease status post left BKA, right femoral-tibial bypass (3/20/2025), right diabetic foot ulcer, osteomyelitis, history of MI, status post renal transplant, CKD stage III, with with complaints of progressive right foot pain and swelling.  Patient has a history of PAD and developed right foot ulcer and has been evaluated in the outpatient setting for an upcoming right foot TMA.  He recently underwent arterial Doppler by vascular surgery in the outpatient setting with reassuring results.  Patient presents today with progressive right foot pain, swelling.      Past Medical History    Past Medical History:   Diagnosis Date    Chronic kidney disease     Diabetes (H)     Hypertension     Myocardial infarction (H)        Past Surgical History   Past Surgical History:   Procedure Laterality Date    ANGIOGRAM Right 3/17/2025    Procedure: ANGIOGRAM;  Surgeon: Messi Irving MD;  [facility]    ANGIOPLASTY Right 3/17/2025    Procedure: ANGIOPLASTY;  Surgeon: Messi Irving MD;  [facility]    BYPASS GRAFT FEMOROTIBIAL Right 3/20/2025    Procedure: Right Femoral Anterior Tibial Bypass using Cryo preserve artery;  Surgeon: Messi Irving MD;  Location[facility]    IR OR ANGIOGRAM  3/17/2025    IR OR ANGIOGRAM  3/20/2025    OR ANGIOGRAM, LOWER EXTREMITY Right 3/20/2025    Procedure: COMPLETION ANGIOGRAM;  Surgeon: Messi Irving MD;  Location: [facility]       Prior to Admission Medications   Prior to Admission Medications   Prescriptions Last Dose Informant Patient Reported? Taking?   Latanoprost PF 0.005 % SOLN   Yes No   Sig: Apply to eye at bedtime. INSTILL 1 DROP IN BOTH EYES AT BEDTIME FOR GLAUCOMA    apixaban ANTICOAGULANT (ELIQUIS) 5 MG tablet   Yes No   Sig: Take 5 mg by mouth 2 times daily.   aspirin (ASA) 81 MG chewable tablet   No No   Sig: Take 1 tablet (81 mg) by mouth daily.   atorvastatin (LIPITOR) 40 MG tablet   No No   Sig: Take 1 tablet (40 mg) by mouth at bedtime.   brinzolamide-brimonidine (SIMBRINZA) 1-0.2 % ophthalmic suspension   Yes No   Sig: Apply 1 drop to eye 2 times daily.   calcitRIOL (ROCALTROL) 0.25 MCG capsule   Yes No   Sig: Take 0.25 mcg by mouth. TAKE ONE CAPSULE BY MOUTH MONDAY THROUGH FRIDAY FOR BONE HEALTH ** DOSE INCREASE   empagliflozin (JARDIANCE) 25 MG TABS tablet   Yes No   Sig: Take 12.5 mg by mouth every morning. TAKE ONE-HALF TABLET BY MOUTH EVERY MORNING FOR DIABETES AND KIDNEY PROTECTION   ferrous sulfate (FEROSUL) 325 (65 Fe) MG tablet   No No   Sig: Take 1 tablet (325 mg) by mouth every other day.   metFORMIN (GLUCOPHAGE XR) 500 MG 24 hr tablet   No No   Sig: Take 1 tablet (500 mg) by mouth daily (with dinner).   metoprolol succinate ER (TOPROL XL) 25 MG 24 hr tablet   Yes No   Sig: Take 75 mg by mouth daily. TAKE THREE TABLETS BY MOUTH EVERY DAY FOR HEART RATE   mycophenolate (GENERIC EQUIVALENT) 500 MG tablet   Yes No   Sig: Take 500 mg by mouth 2 times daily.   pantoprazole (PROTONIX) 40 MG EC tablet   Yes No   Sig: Take 40 mg by mouth daily.  TAKE ONE TABLET BY MOUTH EVERY DAY PREVENT GI BLEED   patiromer (VELTASSA) 8.4 g packet   Yes No   Sig: Take 8.4 g by mouth once a week. Take 1 packet (8.4 grams) by mouth once weekly for high potassium   sodium bicarbonate 650 MG tablet   No No   Sig: Take 2 tablets (1,300 mg) by mouth 3 times daily.   tacrolimus (GENERIC EQUIVALENT) 1 MG capsule   No No   Sig: Take 2 capsules (2 mg) by mouth 2 times daily.   tamsulosin (FLOMAX) 0.4 MG capsule   No No   Sig: Take 2 capsules (0.8 mg) by mouth daily.   torsemide (DEMADEX) 10 MG tablet   Yes No   Sig: Take 10 mg by mouth 2 times daily.      Facility-Administered Medications:  None        Review of Systems    The 10 point Review of Systems is negative other than noted in the HPI or here.      Physical Exam   Vital Signs: Temp: 97.6  F (36.4  C)   BP: (!) 142/74 Pulse: 66   Resp: 20 SpO2: 97 % O2 Device: None (Room air)    Weight: 0 lbs 0 oz    Physical Exam  HENT:      Mouth/Throat:      Mouth: Mucous membranes are moist.   Eyes:      Extraocular Movements: Extraocular movements intact.      Pupils: Pupils are equal, round, and reactive to light.   Cardiovascular:      Rate and Rhythm: Normal rate and regular rhythm.      Pulses: Normal pulses.      Heart sounds: Normal heart sounds. No murmur heard.  Pulmonary:      Effort: Pulmonary effort is normal. No respiratory distress.      Breath sounds: Normal breath sounds. No wheezing.   Abdominal:      General: Abdomen is flat. Bowel sounds are normal. There is no distension.      Tenderness: There is no abdominal tenderness.   Musculoskeletal:      Comments: R foot with open ulcer of the toes, surrounded by swelling, tenderness   Neurological:      General: No focal deficit present.      Mental Status: He is alert and oriented to person, place, and time.           Medical Decision Making       45 MINUTES SPENT BY ME on the date of service doing chart review, history, exam, documentation & further activities per the note.      Data     I have personally reviewed the following data over the past 24 hrs:    5.0  \   11.6 (L)   / 229     138 103 42.4 (H) /  225 (H)   4.6 23 2.15 (H) \     ALT: 9 AST: 20 AP: 89 TBILI: 0.3   ALB: 3.8 TOT PROTEIN: 6.9 LIPASE: N/A     Procal: 0.15 CRP: <3.00 Lactic Acid: 1.9       INR:  1.61 (H) PTT:  34   D-dimer:  N/A Fibrinogen:  N/A

## 2025-05-03 NOTE — ED PROVIDER NOTES
[place] EMERGENCY DEPARTMENT ([facility])    5/03/25       ED PROVIDER NOTE      History     Chief Complaint   Patient presents with    foot infection      HPI  Tad Zaman is a 64 year old male with a history of type II diabetes, left AKA, s/p renal transplant (2022), hyperlipidemia, MI, on Eliquis, who presents to the ED for evaluation of foot infection.  Patient has been followed as an outpatient by podiatry for osteomyelitis and dry gangrene.  He has been having increasing swelling of the foot with associated malodorous scent and some drainage/discharge.  Daughter is a medic and was concerned for worsening infection so reached out to the nurse line and patient was directed to come to the emergency department.  He is not having significant pain.  He denies fevers and chills.    Past Medical History  Past Medical History:   Diagnosis Date    Chronic kidney disease     Diabetes (H)     Hypertension     Myocardial infarction (H)      Past Surgical History:   Procedure Laterality Date    ANGIOGRAM Right 3/17/2025    Procedure: ANGIOGRAM;  Surgeon: Messi Irving MD;  Location: [facility] OR    ANGIOPLASTY Right 3/17/2025    Procedure: ANGIOPLASTY;  Surgeon: Messi Irving MD;  Location: [facility] OR    BYPASS GRAFT FEMOROTIBIAL Right 3/20/2025    Procedure: Right Femoral Anterior Tibial Bypass using Cryo preserve artery;  Surgeon: Messi Irving MD;  Location: [facility] OR    IR OR ANGIOGRAM  3/17/2025    IR OR ANGIOGRAM  3/20/2025    OR ANGIOGRAM, LOWER EXTREMITY Right 3/20/2025    Procedure: COMPLETION ANGIOGRAM;  Surgeon: Messi Irving MD;  Location: [facility] OR     apixaban ANTICOAGULANT (ELIQUIS) 5 MG tablet  aspirin (ASA) 81 MG chewable tablet  atorvastatin (LIPITOR) 40 MG tablet  brinzolamide-brimonidine (SIMBRINZA) 1-0.2 % ophthalmic suspension  calcitRIOL (ROCALTROL) 0.25 MCG capsule  empagliflozin (JARDIANCE) 25 MG TABS tablet  ferrous sulfate (FEROSUL) 325 (65 Fe) MG tablet  Latanoprost PF 0.005 % SOLN  metFORMIN  (GLUCOPHAGE XR) 500 MG 24 hr tablet  metoprolol succinate ER (TOPROL XL) 25 MG 24 hr tablet  mycophenolate (GENERIC EQUIVALENT) 500 MG tablet  pantoprazole (PROTONIX) 40 MG EC tablet  patiromer (VELTASSA) 8.4 g packet  sodium bicarbonate 650 MG tablet  tacrolimus (GENERIC EQUIVALENT) 1 MG capsule  tamsulosin (FLOMAX) 0.4 MG capsule  torsemide (DEMADEX) 10 MG tablet      Allergies   Allergen Reactions    Penicillins Hives     Family History  No family history on file.  Social History   Social History     Tobacco Use    Smoking status: Former     Current packs/day: 0.00     Average packs/day: 0.5 packs/day for 19.0 years (9.5 ttl pk-yrs)     Types: Cigarettes     Start date: 1972     Quit date: 1991     Years since quittin.3     Passive exposure: Never    Smokeless tobacco: Never      A medically appropriate review of systems was performed with pertinent positives and negatives noted in the HPI, and all other systems negative.    Physical Exam   BP: (!) 142/74  Pulse: 66  Temp: 97.6  F (36.4  C)  Resp: 20  SpO2: 97 %  Physical Exam  Vital Signs Reviewed  Gen: Well nourished, well developed, resting comfortably, no acute distress  HEENT: NC/AT, PERRL, EOMI, MMM  Neck: Supple, FROM  CV: Regular Rate  Lungs/Chest: Normal Effort  Abd: Non-distended  MSK/Back: FROM  Neuro: A&Ox3, GCS 15, CN II-XII unremarkable.  Skin: Warm, Dry            ED Course, Procedures, & Data      Procedures                Results for orders placed or performed during the hospital encounter of 25   CT Foot Right w Contrast     Status: None    Narrative    EXAM: CT FOOT RIGHT W CONTRAST  LOCATION: [facility]  DATE: 2025    INDICATION: History of gangrene, right foot; progressive wounds, erythema, edema.  COMPARISON: Same-day radiographs.  TECHNIQUE: IV contrast. Axial, sagittal, and coronal thin-section reconstruction. Dose reduction techniques were used.   CONTRAST: 99 mL Iso  370.    FINDINGS: Again seen are changes of advanced Charcot arthropathy with fragmentation, fracture, and malalignment within the midtarsal and midfoot region as well as the 1st MTP joint. Dorsal dislocation of the 2nd MTP joint again seen.    Remote healed fracture deformity of the distal fibular shaft. Bones are demineralized.    There is some soft tissue swelling and soft tissue gas along the superficial aspect of the plantar great toe, presumably associated with an area of ulceration. Along the distal phalanx/tuft of the great toe there is soft tissue gas at an area of skin   breakdown/ulceration which abuts the bony cortex of the distal phalanx where there is underlying erosive change, concerning for distal phalangeal osteomyelitis. Small foci of soft tissue gas extend proximally to the level of the proximal phalanx of the   great toe.    There is soft tissue stranding and irregularity along the distal aspect of the 2nd toe without soft tissue gas or well-defined abscess.    There is no well-defined rim-enhancing fluid collection on CT with attention to the great toe, although poorly-defined abscess is not excluded.    Fatty atrophy of intrinsic musculature of the foot.    More global diffuse soft tissue swelling in the subcutaneous tissues with skin thickening.    Chronic bony proliferative change along the distal syndesmosis of the ankle.      Impression    IMPRESSION:  1.  Soft tissue deficiency, ulceration, and gas along the plantar aspect of the great toe with underlying erosive change of the distal phalanx, concerning for osteomyelitis. Soft tissue gas extends proximally to the level of the proximal phalanx of the   great toe where there is soft tissue irregularity. Findings are favored to be related to gas from direct extension from the area of ulceration as opposed to a gas-forming organism, although this is difficult to completely rule-out on CT alone.  2.  Soft tissue stranding and irregularity  distal aspect of the 2nd toe without discrete abscess or evidence for osteomyelitis at this time. Note is made that there is dorsal dislocation of the 2nd MTP joint.  3.  No well-defined rim-enhancing fluid collection on CT with attention to the great toe, although poorly-defined abscess is not excluded as there is poorly-defined circumferential soft tissue swelling.  4.  More global soft tissue swelling about the foot, which can be seen with cellulitis as well as noninfectious causes of edema.  5.  Advanced Charcot arthropathy as described.     NOTE: ABNORMAL REPORT    THE DICTATION ABOVE DESCRIBES AN ABNORMALITY FOR WHICH FOLLOWUP IS NEEDED.    XR Foot Right 2 Views     Status: None    Narrative    EXAM: XR FOOT RIGHT 2 VIEWS  LOCATION: [facility]  DATE: 5/3/2025    INDICATION: Question of subcutaneous gas on the foot.  COMPARISON: 06/21/2016.      Impression    IMPRESSION:  Severe Charcot arthropathy involving the midfoot and midtarsal region, with fracture, fragmentation and malalignment as well as soft tissue swelling, progressed from 06/21/2016. There is midfoot collapse and soft tissue swelling. Dorsal   dislocation 2nd MTP joint. Osteonecrosis distal tibial shaft and healed fracture deformity distal fibular shaft.    Extensive atherosclerotic vascular calcifications. No definitive evidence for soft tissue gas on these 2 views, noting there is soft tissue swelling.   US Lower Extremity Venous Duplex Right     Status: None    Narrative    EXAM: US LOWER EXTREMITY VENOUS DUPLEX RIGHT  LOCATION: [facility]  DATE: 5/3/2025    INDICATION: Right lower leg edema.  COMPARISON: None.  TECHNIQUE: Venous Duplex ultrasound of the right lower extremity with and without compression, augmentation and duplex. Color flow and spectral Doppler with waveform analysis performed.    FINDINGS: Exam includes the common femoral, femoral,  popliteal, and contralateral common femoral veins as well as segmentally visualized deep calf veins and greater saphenous vein.     RIGHT: No deep vein thrombosis. No superficial thrombophlebitis. No popliteal cyst.      Impression    IMPRESSION:  No deep venous thrombosis in the right lower extremity.   US Renal Transplant with Doppler     Status: None    Narrative    EXAM: US RENAL TRANSPLANT WITH DOPPLER  LOCATION: [facility]  DATE: 5/3/2025    INDICATION: Acute kidney injury, hx renal transplant  COMPARISON: 3/3/2025  TECHNIQUE: Ultrasound of the renal transplant with Doppler waveform spectral analysis.    FINDINGS: The transplant kidney is located in the right lower quadrant. The transplant kidney is normal in echogenicity. There is no cortical thinning. Moderate hydronephrosis. This slightly improved post void. There is no calculus, cyst or mass. There   is no perinephric fluid.    The urinary bladder is normal. There is no post-void residual.    TRANSPLANT DOPPLER:    The main renal artery peak systolic velocity is normal (less than 200 cm/sec). The intra-renal transplant resistive index (RI) are abnormal (>0.8).       Impression    IMPRESSION:   1.  Moderate hydronephrosis of the transplant kidney. This slightly improved post void.  2.  Elevated resistive indices suggestive of renal transplant dysfunction.     INR     Status: Abnormal   Result Value Ref Range    INR 1.61 (H) 0.85 - 1.15    PT 18.9 (H) 11.8 - 14.8 Seconds   Partial thromboplastin time     Status: Normal   Result Value Ref Range    aPTT 34 22 - 38 Seconds   Comprehensive metabolic panel     Status: Abnormal   Result Value Ref Range    Sodium 138 135 - 145 mmol/L    Potassium 4.6 3.4 - 5.3 mmol/L    Carbon Dioxide (CO2) 23 22 - 29 mmol/L    Anion Gap 12 7 - 15 mmol/L    Urea Nitrogen 42.4 (H) 8.0 - 23.0 mg/dL    Creatinine 2.15 (H) 0.67 - 1.17 mg/dL    GFR Estimate 33 (L) >60 mL/min/1.73m2     Calcium 9.5 8.8 - 10.4 mg/dL    Chloride 103 98 - 107 mmol/L    Glucose 225 (H) 70 - 99 mg/dL    Alkaline Phosphatase 89 40 - 150 U/L    AST 20 0 - 45 U/L    ALT 9 0 - 70 U/L    Protein Total 6.9 6.4 - 8.3 g/dL    Albumin 3.8 3.5 - 5.2 g/dL    Bilirubin Total 0.3 <=1.2 mg/dL   Lactic acid whole blood with 1x repeat in 2 hr when >2     Status: Normal   Result Value Ref Range    Lactic Acid, Initial 1.9 0.7 - 2.0 mmol/L   Magnesium     Status: Normal   Result Value Ref Range    Magnesium 2.0 1.7 - 2.3 mg/dL   Procalcitonin     Status: Normal   Result Value Ref Range    Procalcitonin 0.15 <0.50 ng/mL   CRP inflammation     Status: Normal   Result Value Ref Range    CRP Inflammation <3.00 <5.00 mg/L   Erythrocyte sedimentation rate auto     Status: Normal   Result Value Ref Range    Erythrocyte Sedimentation Rate 18 0 - 20 mm/hr   [place] Draw     Status: None (In process)    Narrative    The following orders were created for panel order [place] Draw.  Procedure                               Abnormality         Status                     ---------                               -----------         ------                     Extra Red Top Tube[[id number]]                              In process                   Please view results for these tests on the individual orders.   CBC with platelets and differential     Status: Abnormal   Result Value Ref Range    WBC Count 5.0 4.0 - 11.0 10e3/uL    RBC Count 4.52 4.40 - 5.90 10e6/uL    Hemoglobin 11.6 (L) 13.3 - 17.7 g/dL    Hematocrit 37.7 (L) 40.0 - 53.0 %    MCV 83 78 - 100 fL    MCH 25.7 (L) 26.5 - 33.0 pg    MCHC 30.8 (L) 31.5 - 36.5 g/dL    RDW 14.9 10.0 - 15.0 %    Platelet Count 229 150 - 450 10e3/uL    % Neutrophils 74 %    % Lymphocytes 9 %    % Monocytes 11 %    % Eosinophils 5 %    % Basophils 1 %    % Immature Granulocytes 1 %    NRBCs per 100 WBC 0 <1 /100    Absolute Neutrophils 3.7 1.6 - 8.3 10e3/uL    Absolute Lymphocytes 0.5 (L) 0.8 - 5.3 10e3/uL    Absolute  Monocytes 0.6 0.0 - 1.3 10e3/uL    Absolute Eosinophils 0.2 0.0 - 0.7 10e3/uL    Absolute Basophils 0.0 0.0 - 0.2 10e3/uL    Absolute Immature Granulocytes 0.0 <=0.4 10e3/uL    Absolute NRBCs 0.0 10e3/uL   CBC with platelets differential     Status: Abnormal    Narrative    The following orders were created for panel order CBC with platelets differential.  Procedure                               Abnormality         Status                     ---------                               -----------         ------                     CBC with platelets and ...[[id number]]  Abnormal            Final result                 Please view results for these tests on the individual orders.     Medications   vancomycin (VANCOCIN) 1,500 mg in 0.9% NaCl 250 mL intermittent infusion (0 mg Intravenous Stopped 5/3/25 2035)   vancomycin place [place] - receiving intermittent dosing (has no administration in time range)   sodium bicarbonate tablet 1,300 mg (1,300 mg Oral $Given 5/3/25 2033)   empagliflozin (JARDIANCE) tablet 10 mg (has no administration in time range)   atorvastatin (LIPITOR) tablet 40 mg (has no administration in time range)   mycophenolate (GENERIC EQUIVALENT) tablet 500 mg (500 mg Oral $Given 5/3/25 2032)   patiromer (VELTASSA) packet 8.4 g (has no administration in time range)   tacrolimus (GENERIC EQUIVALENT) capsule 2 mg (2 mg Oral $Given 5/3/25 2033)   metoprolol succinate ER (TOPROL XL) 24 hr tablet 75 mg (has no administration in time range)   torsemide (DEMADEX) tablet 10 mg ( Oral Automatically Held 5/6/25 2000)   ferrous sulfate (FEROSUL) tablet 325 mg (has no administration in time range)   tamsulosin (FLOMAX) capsule 0.8 mg (has no administration in time range)   brinzolamide-brimonidine (SIMBRINZA) 1-0.2 % ophthalmic suspension 1 drop (has no administration in time range)   latanoprost (XALATAN) 0.005 % ophthalmic solution 1 drop (has no administration in time range)   pantoprazole (PROTONIX) EC tablet 40 mg  (has no administration in time range)   calcitRIOL (ROCALTROL) capsule 0.25 mcg (has no administration in time range)   ceFEPIme (MAXIPIME) 2 g vial to attach to  mL bag for ADULTS or NS 50 mL bag for PEDS (has no administration in time range)   metroNIDAZOLE (FLAGYL) infusion 500 mg (has no administration in time range)   insulin aspart (NovoLOG) injection (RAPID ACTING) ( Subcutaneous Not Given 5/3/25 2030)   insulin aspart (NovoLOG) injection (RAPID ACTING) (has no administration in time range)   glucose gel 15-30 g (has no administration in time range)     Or   dextrose 50 % injection 25-50 mL (has no administration in time range)     Or   glucagon injection 1 mg (has no administration in time range)   sodium chloride 0.9 % infusion ( Intravenous $New Bag 5/3/25 2032)   oxyCODONE IR (ROXICODONE) half-tab 2.5 mg (has no administration in time range)     Or   oxyCODONE (ROXICODONE) tablet 5 mg (has no administration in time range)   lidocaine 1 % 0.1-1 mL (has no administration in time range)   lidocaine (LMX4) cream (has no administration in time range)   sodium chloride (PF) 0.9% PF flush 3 mL (3 mLs Intracatheter Not Given 5/3/25 2032)   sodium chloride (PF) 0.9% PF flush 3 mL (has no administration in time range)   senna-docusate (SENOKOT-S/PERICOLACE) 8.6-50 MG per tablet 1 tablet (has no administration in time range)     Or   senna-docusate (SENOKOT-S/PERICOLACE) 8.6-50 MG per tablet 2 tablet (has no administration in time range)   calcium carbonate (TUMS) chewable tablet 1,000 mg (has no administration in time range)   Patient is already receiving anticoagulation with heparin, enoxaparin (LOVENOX), warfarin (COUMADIN)  or other anticoagulant medication (has no administration in time range)   aspirin (ASA) chewable tablet 81 mg (has no administration in time range)   ceFEPIme (MAXIPIME) 2 g vial to attach to  mL bag for ADULTS or NS 50 mL bag for PEDS (0 g Intravenous Stopped 5/3/25 1838)    metroNIDAZOLE (FLAGYL) infusion 500 mg (0 mg Intravenous Stopped 5/3/25 1943)   sodium chloride 0.9% BOLUS 1,000 mL (0 mLs Intravenous Stopped 5/3/25 1852)   iopamidol (ISOVUE-370) solution 99 mL (99 mLs Intravenous $Given 5/3/25 1756)   sodium chloride (PF) 0.9% PF flush 75 mL (75 mLs Intravenous $Given 5/3/25 1757)   sodium chloride 0.9% BOLUS 1,000 mL (0 mLs Intravenous Stopped 5/3/25 2035)     Labs Ordered and Resulted from Time of ED Arrival to Time of ED Departure   INR - Abnormal       Result Value    INR 1.61 (*)     PT 18.9 (*)    COMPREHENSIVE METABOLIC PANEL - Abnormal    Sodium 138      Potassium 4.6      Carbon Dioxide (CO2) 23      Anion Gap 12      Urea Nitrogen 42.4 (*)     Creatinine 2.15 (*)     GFR Estimate 33 (*)     Calcium 9.5      Chloride 103      Glucose 225 (*)     Alkaline Phosphatase 89      AST 20      ALT 9      Protein Total 6.9      Albumin 3.8      Bilirubin Total 0.3     CBC WITH PLATELETS AND DIFFERENTIAL - Abnormal    WBC Count 5.0      RBC Count 4.52      Hemoglobin 11.6 (*)     Hematocrit 37.7 (*)     MCV 83      MCH 25.7 (*)     MCHC 30.8 (*)     RDW 14.9      Platelet Count 229      % Neutrophils 74      % Lymphocytes 9      % Monocytes 11      % Eosinophils 5      % Basophils 1      % Immature Granulocytes 1      NRBCs per 100 WBC 0      Absolute Neutrophils 3.7      Absolute Lymphocytes 0.5 (*)     Absolute Monocytes 0.6      Absolute Eosinophils 0.2      Absolute Basophils 0.0      Absolute Immature Granulocytes 0.0      Absolute NRBCs 0.0     PARTIAL THROMBOPLASTIN TIME - Normal    aPTT 34     LACTIC ACID WHOLE BLOOD WITH 1X REPEAT IN 2 HR WHEN >2 - Normal    Lactic Acid, Initial 1.9     MAGNESIUM - Normal    Magnesium 2.0     PROCALCITONIN - Normal    Procalcitonin 0.15     CRP INFLAMMATION - Normal    CRP Inflammation <3.00     ERYTHROCYTE SEDIMENTATION RATE AUTO - Normal    Erythrocyte Sedimentation Rate 18     TACROLIMUS BY TANDEM MASS SPECTROMETRY   ROUTINE [facility] WITH  MICROSCOPIC REFLEX TO CULTURE   GLUCOSE MONITOR NURSING POCT   GLUCOSE MONITOR NURSING POCT   BLOOD CULTURE   BLOOD CULTURE     US Renal Transplant with Doppler   Final Result   IMPRESSION:    1.  Moderate hydronephrosis of the transplant kidney. This slightly improved post void.   2.  Elevated resistive indices suggestive of renal transplant dysfunction.         US Lower Extremity Venous Duplex Right   Final Result   IMPRESSION:   No deep venous thrombosis in the right lower extremity.      CT Foot Right w Contrast   Final Result   IMPRESSION:   1.  Soft tissue deficiency, ulceration, and gas along the plantar aspect of the great toe with underlying erosive change of the distal phalanx, concerning for osteomyelitis. Soft tissue gas extends proximally to the level of the proximal phalanx of the    great toe where there is soft tissue irregularity. Findings are favored to be related to gas from direct extension from the area of ulceration as opposed to a gas-forming organism, although this is difficult to completely rule-out on CT alone.   2.  Soft tissue stranding and irregularity distal aspect of the 2nd toe without discrete abscess or evidence for osteomyelitis at this time. Note is made that there is dorsal dislocation of the 2nd MTP joint.   3.  No well-defined rim-enhancing fluid collection on CT with attention to the great toe, although poorly-defined abscess is not excluded as there is poorly-defined circumferential soft tissue swelling.   4.  More global soft tissue swelling about the foot, which can be seen with cellulitis as well as noninfectious causes of edema.   5.  Advanced Charcot arthropathy as described.       NOTE: ABNORMAL REPORT      THE DICTATION ABOVE DESCRIBES AN ABNORMALITY FOR WHICH FOLLOWUP IS NEEDED.       XR Foot Right 2 Views   Final Result   IMPRESSION:  Severe Charcot arthropathy involving the midfoot and midtarsal region, with fracture, fragmentation and malalignment as well as soft  tissue swelling, progressed from 06/21/2016. There is midfoot collapse and soft tissue swelling. Dorsal    dislocation 2nd MTP joint. Osteonecrosis distal tibial shaft and healed fracture deformity distal fibular shaft.      Extensive atherosclerotic vascular calcifications. No definitive evidence for soft tissue gas on these 2 views, noting there is soft tissue swelling.             Critical care was not performed.     Medical Decision Making  The patient's presentation was of high complexity (a chronic illness severe exacerbation, progression, or side effect of treatment).    The patient's evaluation involved:  ordering and/or review of 3+ test(s) in this encounter (see separate area of note for details)  discussion of management or test interpretation with another health professional (Medicine, Vascular, Ortho)    The patient's management necessitated high risk (a decision regarding hospitalization).    Assessment & Plan    Tad Zaman is a 64 year old male with a history of type II diabetes, left AKA, s/p renal transplant (2022), hyperlipidemia, MI, on Eliquis, who presents to the ED for evaluation of foot infection.  On arrival the patient is moderately hypertensive, he is otherwise afebrile with reassuring vital signs.  Foot examination is concerning for progressive infection.  DVT also considered, venous insufficiency.  With the increased malodorous drainage concern for progressive infection requiring more immediate treatment.  Not currently on outpatient antibiotics.    Sepsis evaluation was initiated.  Patient is normotensive with a lactic acid within normal limits.  Does not have significant elevations in his inflammatory markers.  Has no leukocytosis.    Foot imaging is notable for severe Charcot arthropathy with soft tissue edema and no obvious gas. CT imaging showing no obvious abscess or signs of necrotizing infection.  Based on National kidney foundation/American College of radiology imaging  guidelines, recent literature suggesting the safety of low molecular weight contrast agents even with GFR under 30 (Patient is >30 today), and indication for clinical imaging proceeded under current guidelines. Will monitor kidney function closely due to decline in creatinine over the last month possibly related to acute infectious process, patient is receiving volume expansion although he is also receiving multiple nephrotoxic agents in the form of necessary antibiotics. Will also consult transplant nephrology.    US with no evidence for DVT.  Renal transplant ultrasound shows moderate hydronephrosis slightly improved post void with elevated resistive indices. [place] level ordered and pending.    Orthopedic surgery was consulted to evaluate the patient.  Likely no immediate surgical intervention based on preliminary recommendations.  Final recommendation pending.  Vascular surgery was consulted as well based on review of outpatient podiatry notes and plan-reviewed outpatient vascular labs no acute vascular intervention required.    Patient will be admitted to the internal medicine service in the interim and was accepted by Dr. Crews.     Patient and family updated on plan at the bedside.    New Prescriptions    No medications on file       Final diagnoses:   Gangrene of right foot (H)   Osteomyelitis of right foot, unspecified type (H)   MOY (acute kidney injury)   Uremia     Pantera Carbajal Jr., MD   [facility] EMERGENCY DEPARTMENT  5/3/2025     Pantera Carbajal MD  05/03/25 2046

## 2025-05-03 NOTE — TELEPHONE ENCOUNTER
"Nurse Triage SBAR    Is this a 2nd Level Triage? NO    Situation:  Foot infection     Background: No consent on file for daughter calling in. Nadinewes reports \"bad smell on foot, supposed to be getting an amputation, has been having altered mental status, aware of where he is, who he is, gets combative\". Saray reports pt \"got really irritated with me because I handed my uncle money, thinks my sister is my daughter\". Jarrod gave pt phone and he was able to verify his information and gave verbal consent to speak with Saray and states \"she is my trustee\". Jarrod reports \"toes have been black\".     Assessment: Acute worsening of foot infection     Protocol Recommended Disposition:   Go to ED Now (Or PCP Triage)    Recommendation:  ED visit now.      Nadinewes verbalizes understanding and agrees to plan.     Does the patient meet one of the following criteria for ADS visit consideration? 16+ years old, with an [facility] PCP     TIP  Providers, please consider if this condition is appropriate for management at one of our Acute and Diagnostic Services sites.     If patient is a good candidate, please use dotphrase <dot>triageresponse and select Refer to ADS to document.    Reason for Disposition   Black (necrotic), dark purple, or blisters develop in area of wound    Additional Information   Negative: [1] Widespread rash AND [2] bright red, sunburn-like AND [3] too weak to stand   Negative: Sounds like a life-threatening emergency to the triager   Negative: SEVERE pain in the wound   Negative: [1] SEVERE pain with bending of finger (or toe) AND [2] wound on hand (or foot)   Negative: [1] Widespread rash AND [2] bright red, sunburn-like   Negative: Fever > 103 F (39.4 C)    Protocols used: Wound Infection [id number]    "

## 2025-05-03 NOTE — LETTER
Transition Communication Hand-off for Care Transitions to Next Level of Care Provider    Name: Tad Zaman  : 1960  MRN #: [id number]  Primary Care Provider: Julisu Cesar     [facility] [address] [facility] [place] [address]     Reason for Hospitalization:  Uremia [N19]  MOY (acute kidney injury) [N17.9]  Gangrene of right foot (H) [I96]  Gangrene of toe of right foot (H) [I96]  Osteomyelitis of right foot, unspecified type (H) [M86.9]  Admit Date/Time: 5/3/2025  2:55 PM  Discharge Date: 25  Payor Source: Payor: MEDICARE / Plan: MEDICARE / Product Type: Medicare /     Reason for Communication Hand-off Referral: Other inpatient to outpatient    Discharge Plan:  Home with Home Care RN/PT/OT, wound care, labs.     Concern for non-adherence with plan of care: Potentially YES    Discharge Needs Assessment:  Needs      Flowsheet Row Most Recent Value   Equipment Currently Used at Home walker, rolling, crutches, prosthesis, shower chair, walker, standard, wheelchair, manual, grab bar, tub/shower  [built in shower chair, has knee scooter]   # of Referrals Placed by CM External Care Coordination, Homecare          Follow-up specialty is recommended: Yes    Follow-up plan:    Future Appointments   Date Time Provider Department Center   2025  8:00 AM Vish Tellez, ROSALIND [facility]   2025  1:30 PM Gil Mojica, PT [facility]   2025  7:10 AM [facility]   2025  8:10 AM [facility]   2025 10:00 AM Bahman Diego MD [facility]       Any outstanding tests or procedures:        Radiology & Cardiology Orders       Future Labs/Procedures Complete By Expires    US AYAN Doppler No Exercise  2025 (Approximate) 2026    US Lower Extremity Arterial Duplex Bilateral  2025 (Approximate) 2026          Referrals       Future Labs/Procedures    Orthopedic  Referral     Process Instructions:    USE THE SPINE REFERRAL  (9019.010) FOR ALL SPINE RELATED SYMPTOMS.    Consider E-Consult to Sports Medicine/Non-Surgical Orthopedics for the following conditions: ankle pain, back pain, elbow pain, epicondylitis, foot pain, hand/wrist pain, hip osteoarthritis, knee osteoarthritis, knee pain, shoulder pain, trochanteric bursitis.  E-Consult Cost: </=$10.     Comments:    Please be aware that coverage of these services is subject to the terms and limitations of your health insurance plan.  Call member services at your health plan with any benefit or coverage questions.  Our [facility] Orthopedic  team will contact you via phone, text, email or MyChart within 2 business days to help you schedule your appointment, or you may contact the  Team at [phone].    Home Care Referral     Comments:    Your provider has ordered home health services. If you have not been contacted within 2 days of your discharge please call the selected Home Care agency listed on your Discharge document.  If a Home Care agency is NOT listed, please call [phone].    Home Care Referral     Comments:    Your provider has ordered home health services. If you have not been contacted within 2 days of your discharge please call the selected Home Care agency listed on your Discharge document.  If a Home Care agency is NOT listed, please call [phone].              [place] Recommendations:    PLEASE SCHEDULE PCP POST HOSPITAL FOLLOW-UP. Please discuss with patient and family need for planning long term due to cognitive deficits. Currently patient is deemed his own decision maker but is forgetful. Patient will need increased caregiver support. Thank you!    Mary Grace Cervantes CC    AVS/Discharge Summary is the source of truth; this is a helpful guide for improved communication of patient story

## 2025-05-03 NOTE — ED TRIAGE NOTES
Arrives ambulatory with concerns of infection in his right foot. States there is a odor to it. Called the nurse line and was referred to the ED. Reports he has an upcoming surgery with podiatry CINDY Miller.      HX Diabetes      Triage Assessment (Adult)       Row Name 05/03/25 1430          Triage Assessment    Airway WDL WDL        Respiratory WDL    Respiratory WDL WDL        Skin Circulation/Temperature WDL    Skin Circulation/Temperature WDL WDL        Cardiac WDL    Cardiac WDL WDL        Peripheral/Neurovascular WDL    Peripheral Neurovascular WDL WDL        Cognitive/Neuro/Behavioral WDL    Cognitive/Neuro/Behavioral WDL WDL

## 2025-05-03 NOTE — PHARMACY-VANCOMYCIN DOSING SERVICE
Pharmacy Vancomycin Initial Note  Date of Service May 3, 2025  Patient's  1960  64 year old, male    Indication: Skin and Soft Tissue Infection    Current estimated CrCl = Estimated Creatinine Clearance: 34.7 mL/min (A) (based on SCr of 2.15 mg/dL (H)).    Creatinine for last 3 days  5/3/2025:  4:41 PM Creatinine 2.15 mg/dL    Recent Vancomycin Level(s) for last 3 days  No results found for requested labs within last 3 days.      Vancomycin IV Administrations (past 72 hours)        No vancomycin orders with administrations in past 72 hours.                    Nephrotoxins and other renal medications (From now, onward)      Start     Dose/Rate Route Frequency Ordered Stop    25 1515  vancomycin (VANCOCIN) 1,500 mg in 0.9% NaCl 250 mL intermittent infusion         1,500 mg  over 90 Minutes Intravenous ONCE 25 1513      25 1513  vancomycin place rockwell - receiving intermittent dosing         1 each Intravenous SEE ADMIN INSTRUCTIONS 25 1513              Contrast Orders - past 72 hours (72h ago, onward)      Start     Dose/Rate Route Frequency Stop    25 1745  iopamidol (ISOVUE-370) solution 99 mL         99 mL Intravenous ONCE                    Plan:  Start vancomycin  1500 mg IV once, followed by intermittent dosing based on levels.   Vancomycin monitoring method: Trough (Method 2 = manual dose calculation)  Vancomycin therapeutic monitoring goal: 15-20 mg/L  Pharmacy will check vancomycin levels as appropriate in 1-3 Days.    Serum creatinine levels will be ordered daily for the first week of therapy and at least twice weekly for subsequent weeks.      Katt Ritchie, PharmD, BCPS

## 2025-05-04 LAB
ALBUMIN SERPL BCG-MCNC: 3.7 G/DL (ref 3.5–5.2)
ALBUMIN UR-MCNC: NEGATIVE MG/DL
ANION GAP SERPL CALCULATED.3IONS-SCNC: 10 MMOL/L (ref 7–15)
APPEARANCE UR: CLEAR
BILIRUB UR QL STRIP: NEGATIVE
BUN SERPL-MCNC: 37.2 MG/DL (ref 8–23)
CALCIUM SERPL-MCNC: 9.2 MG/DL (ref 8.8–10.4)
CHLORIDE SERPL-SCNC: 106 MMOL/L (ref 98–107)
COLOR UR AUTO: ABNORMAL
CREAT SERPL-MCNC: 1.69 MG/DL (ref 0.67–1.17)
EGFRCR SERPLBLD CKD-EPI 2021: 44 ML/MIN/1.73M2
ERYTHROCYTE [DISTWIDTH] IN BLOOD BY AUTOMATED COUNT: 15 % (ref 10–15)
GLUCOSE BLDC GLUCOMTR-MCNC: 145 MG/DL (ref 70–99)
GLUCOSE BLDC GLUCOMTR-MCNC: 149 MG/DL (ref 70–99)
GLUCOSE BLDC GLUCOMTR-MCNC: 182 MG/DL (ref 70–99)
GLUCOSE BLDC GLUCOMTR-MCNC: 189 MG/DL (ref 70–99)
GLUCOSE SERPL-MCNC: 166 MG/DL (ref 70–99)
GLUCOSE UR STRIP-MCNC: >=1000 MG/DL
HCO3 SERPL-SCNC: 21 MMOL/L (ref 22–29)
HCT VFR BLD AUTO: 35.2 % (ref 40–53)
HGB BLD-MCNC: 10.9 G/DL (ref 13.3–17.7)
HGB UR QL STRIP: NEGATIVE
KETONES UR STRIP-MCNC: NEGATIVE MG/DL
LEUKOCYTE ESTERASE UR QL STRIP: NEGATIVE
MAGNESIUM SERPL-MCNC: 1.8 MG/DL (ref 1.7–2.3)
MCH RBC QN AUTO: 25.2 PG (ref 26.5–33)
MCHC RBC AUTO-ENTMCNC: 31 G/DL (ref 31.5–36.5)
MCV RBC AUTO: 82 FL (ref 78–100)
MRSA DNA SPEC QL NAA+PROBE: NEGATIVE
NITRATE UR QL: NEGATIVE
PH UR STRIP: 7 [PH] (ref 5–7)
PHOSPHATE SERPL-MCNC: 3.4 MG/DL (ref 2.5–4.5)
PLATELET # BLD AUTO: 225 10E3/UL (ref 150–450)
POTASSIUM SERPL-SCNC: 4.1 MMOL/L (ref 3.4–5.3)
RBC # BLD AUTO: 4.32 10E6/UL (ref 4.4–5.9)
RBC URINE: <1 /HPF
SA TARGET DNA: NEGATIVE
SODIUM SERPL-SCNC: 137 MMOL/L (ref 135–145)
SP GR UR STRIP: 1.02 (ref 1–1.03)
TACROLIMUS BLD-MCNC: 5.5 UG/L (ref 5–15)
TACROLIMUS BLD-MCNC: 7.6 UG/L (ref 5–15)
TME LAST DOSE: NORMAL H
UROBILINOGEN UR STRIP-MCNC: NORMAL MG/DL
WBC # BLD AUTO: 4.4 10E3/UL (ref 4–11)
WBC URINE: 2 /HPF

## 2025-05-04 PROCEDURE — 120N000002 HC R&B MED SURG/OB UMMC

## 2025-05-04 PROCEDURE — 82962 GLUCOSE BLOOD TEST: CPT

## 2025-05-04 PROCEDURE — 87640 STAPH A DNA AMP PROBE: CPT

## 2025-05-04 PROCEDURE — 99207 PR APP CREDIT; MD BILLING SHARED VISIT: CPT

## 2025-05-04 PROCEDURE — 83735 ASSAY OF MAGNESIUM: CPT | Performed by: HOSPITALIST

## 2025-05-04 PROCEDURE — 82040 ASSAY OF SERUM ALBUMIN: CPT | Performed by: HOSPITALIST

## 2025-05-04 PROCEDURE — 250N000013 HC RX MED GY IP 250 OP 250 PS 637: Performed by: HOSPITALIST

## 2025-05-04 PROCEDURE — 36415 COLL VENOUS BLD VENIPUNCTURE: CPT | Performed by: HOSPITALIST

## 2025-05-04 PROCEDURE — 87641 MR-STAPH DNA AMP PROBE: CPT

## 2025-05-04 PROCEDURE — 250N000012 HC RX MED GY IP 250 OP 636 PS 637: Performed by: HOSPITALIST

## 2025-05-04 PROCEDURE — 85048 AUTOMATED LEUKOCYTE COUNT: CPT | Performed by: HOSPITALIST

## 2025-05-04 PROCEDURE — 99223 1ST HOSP IP/OBS HIGH 75: CPT | Mod: FS | Performed by: NURSE PRACTITIONER

## 2025-05-04 PROCEDURE — 250N000011 HC RX IP 250 OP 636: Mod: JZ | Performed by: HOSPITALIST

## 2025-05-04 PROCEDURE — 99233 SBSQ HOSP IP/OBS HIGH 50: CPT | Mod: FS | Performed by: PEDIATRICS

## 2025-05-04 PROCEDURE — 81001 URINALYSIS AUTO W/SCOPE: CPT | Performed by: HOSPITALIST

## 2025-05-04 PROCEDURE — 999N000248 HC STATISTIC IV INSERT WITH US BY RN

## 2025-05-04 PROCEDURE — 80197 ASSAY OF TACROLIMUS: CPT | Performed by: HOSPITALIST

## 2025-05-04 RX ADMIN — EMPAGLIFLOZIN 10 MG: 10 TABLET, FILM COATED ORAL at 08:58

## 2025-05-04 RX ADMIN — TAMSULOSIN HYDROCHLORIDE 0.8 MG: 0.4 CAPSULE ORAL at 08:57

## 2025-05-04 RX ADMIN — CEFEPIME 2 G: 2 INJECTION, POWDER, FOR SOLUTION INTRAVENOUS at 17:50

## 2025-05-04 RX ADMIN — BRINZOLAMIDE/BRIMONIDINE TARTRATE 1 DROP: 10; 2 SUSPENSION/ DROPS OPHTHALMIC at 21:29

## 2025-05-04 RX ADMIN — METOPROLOL SUCCINATE 75 MG: 25 TABLET, EXTENDED RELEASE ORAL at 08:57

## 2025-05-04 RX ADMIN — FERROUS SULFATE TAB 325 MG (65 MG ELEMENTAL FE) 325 MG: 325 (65 FE) TAB at 08:58

## 2025-05-04 RX ADMIN — METRONIDAZOLE 500 MG: 500 INJECTION, SOLUTION INTRAVENOUS at 18:30

## 2025-05-04 RX ADMIN — SODIUM BICARBONATE 1300 MG: 650 TABLET ORAL at 17:49

## 2025-05-04 RX ADMIN — SODIUM BICARBONATE 1300 MG: 650 TABLET ORAL at 08:57

## 2025-05-04 RX ADMIN — SODIUM BICARBONATE 1300 MG: 650 TABLET ORAL at 21:28

## 2025-05-04 RX ADMIN — LATANOPROST 1 DROP: 50 SOLUTION/ DROPS OPHTHALMIC at 21:29

## 2025-05-04 RX ADMIN — MYCOPHENOLATE MOFETIL 500 MG: 500 TABLET, FILM COATED ORAL at 21:35

## 2025-05-04 RX ADMIN — CEFEPIME 2 G: 2 INJECTION, POWDER, FOR SOLUTION INTRAVENOUS at 05:00

## 2025-05-04 RX ADMIN — ASPIRIN 81 MG CHEWABLE TABLET 81 MG: 81 TABLET CHEWABLE at 08:57

## 2025-05-04 RX ADMIN — BRINZOLAMIDE/BRIMONIDINE TARTRATE 1 DROP: 10; 2 SUSPENSION/ DROPS OPHTHALMIC at 08:58

## 2025-05-04 RX ADMIN — ATORVASTATIN CALCIUM 40 MG: 40 TABLET, FILM COATED ORAL at 21:28

## 2025-05-04 RX ADMIN — METRONIDAZOLE 500 MG: 500 INJECTION, SOLUTION INTRAVENOUS at 11:34

## 2025-05-04 RX ADMIN — TACROLIMUS 2 MG: 1 CAPSULE ORAL at 17:49

## 2025-05-04 RX ADMIN — PANTOPRAZOLE SODIUM 40 MG: 40 TABLET, DELAYED RELEASE ORAL at 08:57

## 2025-05-04 RX ADMIN — TACROLIMUS 2 MG: 1 CAPSULE ORAL at 08:57

## 2025-05-04 RX ADMIN — METRONIDAZOLE 500 MG: 500 INJECTION, SOLUTION INTRAVENOUS at 02:29

## 2025-05-04 RX ADMIN — MYCOPHENOLATE MOFETIL 500 MG: 500 TABLET, FILM COATED ORAL at 08:57

## 2025-05-04 ASSESSMENT — ACTIVITIES OF DAILY LIVING (ADL)
ADLS_ACUITY_SCORE: 61
ADLS_ACUITY_SCORE: 58
ADLS_ACUITY_SCORE: 61
ADLS_ACUITY_SCORE: 58
ADLS_ACUITY_SCORE: 58
ADLS_ACUITY_SCORE: 61
ADLS_ACUITY_SCORE: 58
ADLS_ACUITY_SCORE: 61
ADLS_ACUITY_SCORE: 58
ADLS_ACUITY_SCORE: 58
ADLS_ACUITY_SCORE: 61
ADLS_ACUITY_SCORE: 58
ADLS_ACUITY_SCORE: 61
ADLS_ACUITY_SCORE: 58
ADLS_ACUITY_SCORE: 58
ADLS_ACUITY_SCORE: 61
ADLS_ACUITY_SCORE: 58

## 2025-05-04 NOTE — CONSULTS
[facility]  Transplant Nephrology Consult Note  Date of Admission:  5/3/2025  Today's Date: 05/04/2025  Requesting physician: Vinh Crews MD    Reason for Consult:  Kidney transplant    Recommendations:   - Resume PTA IS medications: Tac 2mg BID, MMF 500mg BID, level today within goal. No adjustment    Assessment & Plan   # DDKT and MOY: Trend down   - Baseline Creatinine: had been ~ 1.8-2 in Feb 2025, appears to be 1.4-1.6 since Mar 2025. MOY to 2.2 on arrival, improved to baseline this am after IVF    - Proteinuria: Not checked recently   - DSA Hx: Unknown due to being transplanted at another Transplant Center   - Last cPRA: unknown   - BK Viremia: Not checked recently due to time from transplant   - Kidney Tx Biopsy Hx:  Unknown .     # Immunosuppression: Tacrolimus immediate release (goal 4-6) and Mycophenolate mofetil (dose 500 mg every 12 hours)   - Induction with Recent Transplant:   unknown, done at outside facility   - Continue with intensive monitoring of immunosuppression for efficacy and toxicity.   - Historical Changes in Immunosuppression:  unknown   - Changes: No    # Infection Prevention:   Last CD4 Level: Unknown  - PJP: None      - CMV IgG Ab High Risk Discordance (D+/R-) at time of transplant: Unknown  Present CMV Serostatus: Unknown  - EBV IgG Ab High Risk Discordance (D+/R-) at time of transplant: Unknown  Present EBV Serostatus: Unknown    # Blood Pressure: Controlled;  Goal BP: < 130/80   - Changes: Not at this time    # Diabetes: Borderline control (HbA1c 7-9%) Last HbA1c: 7.9% 3/2025    # Anemia in Chronic Renal Disease: Hgb: Trend down      SIOBHAN: No   - Iron studies: Low iron saturation on Iron supplement    # Mineral Bone Disorder:    - Secondary renal hyperparathyroidism; PTH level: Not checked recently        On treatment: Calcitriol  - Vitamin D; level: Not checked recently        On supplement: No  - Calcium; level: Normal        On  supplement: No  - Phosphorus; level: Normal        On supplement: No    # Electrolytes:   - Potassium; level: Normal        On supplement:no,  on veltassa  - Magnesium; level: Low normal        On supplement: No  - Bicarbonate; level: Low        On supplement: Yes  - Sodium; level: Normal     # Other Significant PMH:   - Atrial flutter: On Eliquis PTA-on hold for Possible intervention              - HFrEF: presumed ischemic given past maricel pre-transplant with area of ischemia and possible history of stent. On Toprol, jardiance and torsemide. With new drop in LVEF to ~ 45%, he may need further work-up.               - CAD: Possible history of stent.               - PAD s/p left AKA  - History of CVA/TIA               - Sickle cell trait              - History of hepatitis C.               - Urinary Retention: - Tamsulosin 0.8mg PO daily    # Transplant History:  Etiology of Kidney Failure: Diabetes mellitus type 2, HTN   Tx: DDKT  Transplant: N/A  Significant transplant-related complications: None    Recommendations were communicated to the primary team via this note.    Seen and discussed with Dr. Garry Conner NP  Transplant Nephrology  Contact information via Vocera Web Console or via [facility] Paging/Directory      History of Present Illness  Tad Zaman is a 64 year old male with PMH significant for ESRD due to HTN, Type II DM who is s/p DDKTx 9/2022 at outside institution([facility]), peripheral artery disease status post left BKA, right femoral-tibial bypass (3/20/2025), right diabetic foot ulcer, osteomyelitis, history of MI, who presents to the ED 5/3/2025 with complaints of progressive right foot pain and swelling and being admitted for progressive R foot severe diabetic foot ulcer and possible R TMA which had previously been planned to occur OP. He has been placed on Cefepime, Flagyl, and vancomycin.    At present he denies fever, chills, night sweats. He denies nausea, vomiting or  diarrhea. He denies any voiding complaints, gross hematuria, pain over graft. He reports he is maintaing hydration and reports drinking several liters/day. He denies CP, SOB, cough or other URI. He reports his right foot pain is controlled.  He endorses he currently ambulates with a walker and relies on the support of his two daughters with medication administration.      Review of Systems   The 10 point Review of Systems is negative other than noted in the HPI or here.      MEDICATIONS:  Current Facility-Administered Medications   Medication Dose Route Frequency Provider Last Rate Last Admin    aspirin (ASA) chewable tablet 81 mg  81 mg Oral Daily Vinh Crews MD   81 mg at 05/04/25 0857    atorvastatin (LIPITOR) tablet 40 mg  40 mg Oral At Bedtime Vinh Crews MD   40 mg at 05/03/25 2221    brinzolamide-brimonidine (SIMBRINZA) 1-0.2 % ophthalmic suspension 1 drop  1 drop Ophthalmic BID Vinh Crews MD   1 drop at 05/04/25 0858    [START ON 5/5/2025] calcitRIOL (ROCALTROL) capsule 0.25 mcg  0.25 mcg Oral Once per day on Monday Tuesday Wednesday Thursday Friday Vinh Crews MD        ceFEPIme (MAXIPIME) 2 g vial to attach to  mL bag for ADULTS or NS 50 mL bag for PEDS  2 g Intravenous Q12H Vinh Crews MD   2 g at 05/04/25 0500    empagliflozin (JARDIANCE) tablet 10 mg  10 mg Oral QAM Vinh Crews MD   10 mg at 05/04/25 0858    ferrous sulfate (FEROSUL) tablet 325 mg  325 mg Oral Every Other Day Vinh Crews MD   325 mg at 05/04/25 0858    latanoprost (XALATAN) 0.005 % ophthalmic solution 1 drop  1 drop Both Eyes At Bedtime Vinh Crews MD   1 drop at 05/03/25 2221    metoprolol succinate ER (TOPROL XL) 24 hr tablet 75 mg  75 mg Oral Daily Vinh Crews MD   75 mg at 05/04/25 0857    metroNIDAZOLE (FLAGYL) infusion 500 mg  500 mg Intravenous Q8H Vinh Crews MD 0 mL/hr at 05/04/25 0500 500 mg at 05/04/25 1134    mycophenolate (GENERIC EQUIVALENT)  tablet 500 mg  500 mg Oral BID Vinh Crews MD   500 mg at 25    pantoprazole (PROTONIX) EC tablet 40 mg  40 mg Oral Daily Vinh Crews MD   40 mg at 25 08    [START ON 5/10/2025] patiromer (VELTASSA) packet 8.4 g  8.4 g Oral Weekly Vinh Crews MD        sodium bicarbonate tablet 1,300 mg  1,300 mg Oral TID Vinh Crews MD   1,300 mg at 25    sodium chloride (PF) 0.9% PF flush 3 mL  3 mL Intracatheter Q8H Vinh Crews MD   3 mL at 25 1118    tacrolimus (GENERIC EQUIVALENT) capsule 2 mg  2 mg Oral BID IS Vinh Crews MD   2 mg at 25    tamsulosin (FLOMAX) capsule 0.8 mg  0.8 mg Oral Daily Vihn Crews MD   0.8 mg at 25    [Held by provider] torsemide (DEMADEX) tablet 10 mg  10 mg Oral BID Vinh Crews MD        vancomycin (VANCOCIN) 1,500 mg in 0.9% NaCl 250 mL intermittent infusion  1,500 mg Intravenous Once Vinh Crews MD   Stopped at 25    vancomycin place rockwell - receiving intermittent dosing  1 each Intravenous See Admin Instructions Vinh Crews MD         Current Facility-Administered Medications   Medication Dose Route Frequency Provider Last Rate Last Admin    Patient is already receiving anticoagulation with heparin, enoxaparin (LOVENOX), warfarin (COUMADIN)  or other anticoagulant medication   Does not apply Continuous PRN Vinh Crews MD           Physical Exam   Temp  Av.8  F (36.6  C)  Min: 97.6  F (36.4  C)  Max: 98.2  F (36.8  C)      Pulse  Av.3  Min: 66  Max: 75 Resp  Av.5  Min: 18  Max: 20  SpO2  Av.2 %  Min: 97 %  Max: 100 %     /69   Pulse 75   Temp 97.8  F (36.6  C) (Oral)   Resp 18   SpO2 98%    Date 25 0700 - 25 0659   Shift 0380-3176 5143-3142 3251-1537 24 Hour Total   INTAKE   P.O. 337   337   Shift Total 337   337   OUTPUT   Shift Total       Weight (kg)          Admit       GENERAL APPEARANCE: alert and no  distress  HENT: mouth without ulcers or lesions  RESP: lungs clear to auscultation - no rales, rhonchi or wheezes  CV: regular rhythm, normal rate, no rub, no murmur  EDEMA: no LE edema bilaterally  ABDOMEN: soft, nondistended, nontender, bowel sounds normal  MS: extremities normal - no gross deformities noted, no evidence of inflammation in joints, no muscle tenderness  SKIN: Right foot with mild swelling, dry gangrenous toes to 1st -3rd digit.   PSYCH: confused at times, poor historian  TX KIDNEY: normal, surgical scar well approximated  Dialysis access: Right UE fistula    Data   All labs reviewed by me.  CMP  Recent Labs   Lab 05/04/25  1117 05/04/25  0819 05/04/25  0232 05/03/25  1641   NA  --  137  --  138   POTASSIUM  --  4.1  --  4.6   CHLORIDE  --  106  --  103   CO2  --  21*  --  23   ANIONGAP  --  10  --  12   * 166* 182* 225*   BUN  --  37.2*  --  42.4*   CR  --  1.69*  --  2.15*   GFRESTIMATED  --  44*  --  33*   QUE  --  9.2  --  9.5   MAG  --  1.8  --  2.0   PHOS  --  3.4  --   --    PROTTOTAL  --   --   --  6.9   ALBUMIN  --  3.7  --  3.8   BILITOTAL  --   --   --  0.3   ALKPHOS  --   --   --  89   AST  --   --   --  20   ALT  --   --   --  9     CBC  Recent Labs   Lab 05/04/25  0819 05/03/25  1641   HGB 10.9* 11.6*   WBC 4.4 5.0   RBC 4.32* 4.52   HCT 35.2* 37.7*   MCV 82 83   MCH 25.2* 25.7*   MCHC 31.0* 30.8*   RDW 15.0 14.9    229     INR  Recent Labs   Lab 05/03/25  1641   INR 1.61*   PTT 34     ABGNo lab results found in last 7 days.   Urine Studies  Recent Labs   Lab Test 03/24/25  1036 03/22/25  1837 03/03/25  1508   COLOR Light Yellow Light Yellow Light Yellow   APPEARANCE Clear Clear Clear   URINEGLC >=1000* 300* >=1000*   URINEBILI Negative Negative Negative   URINEKETONE Negative Negative Negative   SG 1.010 1.008 1.016   UBLD Negative Negative Negative   URINEPH 7.0 7.0 6.5   PROTEIN Negative Negative Negative   NITRITE Negative Negative Negative   LEUKEST Trace* Large*  Trace*   RBCU <1 <1 3*   WBCU 4 36* 8*     No lab results found.  PTH  No lab results found.  Iron Studies  Recent Labs   Lab Test 03/10/25  0744   IRON 23*   *   IRONSAT 17   HAMMAD 1,062*       IMAGING:  All imaging studies reviewed by me.    Past Medical History    I have reviewed this patient's medical history and updated it with pertinent information if needed.   Past Medical History:   Diagnosis Date    Chronic kidney disease     Diabetes (H)     Hypertension     Myocardial infarction (H)        Past Surgical History   I have reviewed this patient's surgical history and updated it with pertinent information if needed.  Past Surgical History:   Procedure Laterality Date    ANGIOGRAM Right 3/17/2025    Procedure: ANGIOGRAM;  Surgeon: Messi Irving MD;  Location: [facility] OR    ANGIOPLASTY Right 3/17/2025    Procedure: ANGIOPLASTY;  Surgeon: Messi Irving MD;  Location: [facility] OR    BYPASS GRAFT FEMOROTIBIAL Right 3/20/2025    Procedure: Right Femoral Anterior Tibial Bypass using Cryo preserve artery;  Surgeon: Messi Irving MD;  Location: [facility] OR    IR OR ANGIOGRAM  3/17/2025    IR OR ANGIOGRAM  3/20/2025    OR ANGIOGRAM, LOWER EXTREMITY Right 3/20/2025    Procedure: COMPLETION ANGIOGRAM;  Surgeon: Messi Irving MD;  Location: [facility] OR       Family History   No family history on file.    Social History   Former smoker, quit 1991    Prior to Admission Medications   (Not in a hospital admission)

## 2025-05-04 NOTE — PROGRESS NOTES
Vascular Surgery Progress Note    Subjective:  No changes since yesterday.  Eager to get his foot operated on and looking forward to speaking with podiatry tomorrow.    Objective:    *Vitals, labs, intake and output, pertinent imaging, and other pertinent studies were reviewed*    Gen: NAD  Vascular: Right palpable DP.  No PT signal.  Palpable femorals bilaterally and palpable popliteal on right.  left BKA  With prosthetic in place  Wound: Consistent with dry gangrene, without pus expression  Neuro: Moving all extremities  Psych: Cooperative    Assessment and Plan:  64 year old male with past medical history of hypertension heart failure, type 2 diabetes, peripheral artery disease with history of left BKA as well as renal transplant. He has h/o cryo fem-AT bypass on 3/20/25. He presented to ED d/t long delays in care and loss of home nursing associated with some foul odor.     - podiatry evaluation for TMA  - vascular will follow   - no further revascularization at this time    Nataliia Almeida MD  Vascular Surgery Fellow        /69   Pulse 75   Temp 97.8  F (36.6  C) (Oral)   Resp 18   SpO2 98%      Intake/Output Summary (Last 24 hours) at 5/4/2025 1349  Last data filed at 5/4/2025 0900  Gross per 24 hour   Intake 337 ml   Output --   Net 337 ml

## 2025-05-04 NOTE — CONSULTS
[facility] Orthopedic Surgery Consultation    Tad Zaman MRN# [id number]   Age: 64 year old YOB: 1960   Date of Admission: 5/3/2025    Reason for consult: Concern for foot infection   Requesting physician: Vinh Crews MD   Level of consult: Consult, follow and place orders          Assessment and Plan:   Assessment:  Tad Zaman is a 64 year old male with past medical history of hypertension heart failure, type 2 diabetes, peripheral artery disease with history of left BKA as well as renal transplant who is been followed by the podiatry service for dry gangrene of the right foot as well as the vascular surgery service who is presenting with increased swelling in the right lower extremity as well as with more foul odor from the foot.  Overall, today his exam is reassuring.  He is well-appearing.  His vital signs demonstrate no tachycardia or hypotension.  His inflammatory markers are not elevated including his CRP, ESR as well as no leukocytosis.  No obvious fluid collections on the CT scan or abscesses that are seen.  At this time, does not appear that there is any indication for urgent surgical intervention from the orthopedic standpoint.  If the patient remains in-house over the weekend on Monday they could consider reaching out to podiatry as an inpatient to see if they would be able to move up the timing of his transmetatarsal amputation.    Plan:  - Plan for OR: No plan for surgical intervention from the orthopedic surgery team.  - Anticoagulation/DVT prophylaxis: Per primary  - Antibiotics: Per primary.  - Imaging: Complete..  - Weight bearing: None weight bearing on the right foot.  - Pain control: per primary.  - Diet: Ok for diet.  - Follow-up: Recommend follow up with his podiatrist.  - Disposition: Per primary.     Orthopedic surgery staff is Dr. Beck, discussed .    --  Jeffrey Gaines MD  Orthopedic Surgery PGY-4         History of Present Illness:   This is a 64-year-old male  with a history of hypertension, heart failure, type 2 diabetes, peripheral artery disease status post left BKA, right femoral-tibial bypass in 2025, right foot diabetic foot ulcer, and status post renal transplant who has been followed by the podiatry service for dry gangrene of the right foot.  Has been working with vascular as well as podiatry as an outpatient with plans for a left transmetatarsal amputation.  The patient presented to the emergency department for increased swelling of the right foot and leg as well as more of a foul odor.  He notes maybe he has felt more chilled and some hot spells recently but no measured fevers.  He has been tolerating p.o. no nausea or vomiting.  He has been feeling generally like himself.  No significant increase in pain.  On examination there is what appears to be dry gangrene involving the      A 10 point review of systems was otherwise negative other than as noted in history above.         Past Medical History:     Past Medical History:   Diagnosis Date    Chronic kidney disease     Diabetes (H)     Hypertension     Myocardial infarction (H)        Patient denies any personal history of bleeding disorders, clotting disorders, or adverse reactions to anesthesia.         Past Surgical History:     Past Surgical History:   Procedure Laterality Date    ANGIOGRAM Right 3/17/2025    Procedure: ANGIOGRAM;  Surgeon: Messi Irving MD;  Location: [facility] OR    ANGIOPLASTY Right 3/17/2025    Procedure: ANGIOPLASTY;  Surgeon: Messi Irving MD;  Location: [facility] OR    BYPASS GRAFT FEMOROTIBIAL Right 3/20/2025    Procedure: Right Femoral Anterior Tibial Bypass using Cryo preserve artery;  Surgeon: Messi Irving MD;  Location: [facility] OR    IR OR ANGIOGRAM  3/17/2025    IR OR ANGIOGRAM  3/20/2025    OR ANGIOGRAM, LOWER EXTREMITY Right 3/20/2025    Procedure: COMPLETION ANGIOGRAM;  Surgeon: Messi Irving MD;  Location: [facility] OR            Social History:     Social History     Socioeconomic History    Marital  status:      Spouse name: Not on file    Number of children: Not on file    Years of education: Not on file    Highest education level: Not on file   Occupational History    Not on file   Tobacco Use    Smoking status: Former     Current packs/day: 0.00     Average packs/day: 0.5 packs/day for 19.0 years (9.5 ttl pk-yrs)     Types: Cigarettes     Start date: 1972     Quit date: 1991     Years since quittin.3     Passive exposure: Never    Smokeless tobacco: Never   Substance and Sexual Activity    Alcohol use: Not on file    Drug use: Not on file    Sexual activity: Not Currently   Other Topics Concern    Not on file   Social History Narrative    Not on file     Social Drivers of Health     Financial Resource Strain: Low Risk  (4/10/2025)    Financial Resource Strain     Within the past 12 months, have you or your family members you live with been unable to get utilities (heat, electricity) when it was really needed?: No   Food Insecurity: Low Risk  (4/10/2025)    Food Insecurity     Within the past 12 months, did you worry that your food would run out before you got money to buy more?: No     Within the past 12 months, did the food you bought just not last and you didn t have money to get more?: No   Transportation Needs: Low Risk  (4/10/2025)    Transportation Needs     Within the past 12 months, has lack of transportation kept you from medical appointments, getting your medicines, non-medical meetings or appointments, work, or from getting things that you need?: No   Physical Activity: Not on file   Stress: Not on file   Social Connections: Not on file   Interpersonal Safety: Low Risk  (3/20/2025)    Interpersonal Safety     Do you feel physically and emotionally safe where you currently live?: Yes     Within the past 12 months, have you been hit, slapped, kicked or otherwise physically hurt by someone?: No     Within the past 12 months, have you been humiliated or emotionally abused in other  ways by your partner or ex-partner?: No   Housing Stability: Low Risk  (4/10/2025)    Housing Stability     Do you have housing? : Yes     Are you worried about losing your housing?: No             Family History:   No family history on file.    Patient denies known family history of bleeding, clotting, or anesthesia-related complications.            Allergies:     Allergies   Allergen Reactions    Penicillins Hives             Medications:                Physical Exam:     Vitals:    05/03/25 1428   BP: (!) 142/74   Pulse: 66   Resp: 20   Temp: 97.6  F (36.4  C)   SpO2: 97%       General: alert and oriented, answers questions appropriately,   Neuro: EOM grossly intact  HEENT: atraumatic  Lungs: breathing comfortably on RA  Heart/Cardiovascular: well perfused      Right Lower Extremity:   - Forefoot.  There is soft tissue edema.  No obvious areas of erythema or active purulent drainage.  No apparent tenderness to palpation about the foot.  No fluctuance palpated.  -Tolerates ankle motion without significant pain  -No significant tenderness to palpation about the calf, knee or with range of motion of the hip   He can tell me when I am touching his foot either dorsal and plantar                         Imaging:   All imaging independently reviewed.     Patient CT was reviewed and there are areas of soft tissue gas but these are in the area of his wound.  Agree with radiologist who feels that this is related from direct extension from the ulceration rather than from bacteria.  No fluid collections or abscesses are seen.         Labs:   CBC:  Lab Results   Component Value Date    WBC 5.0 05/03/2025    HGB 11.6 (L) 05/03/2025     05/03/2025    INR 1.61 (H) 05/03/2025       BMP:  Lab Results   Component Value Date     05/03/2025    POTASSIUM 4.6 05/03/2025    CHLORIDE 103 05/03/2025    CO2 23 05/03/2025    BUN 42.4 (H) 05/03/2025    CR 2.15 (H) 05/03/2025    ANIONGAP 12 05/03/2025    QUE 9.5 05/03/2025    GLC  225 (H) 05/03/2025       Inflammatory Markers:  Lab Results   Component Value Date    WBC 5.0 05/03/2025    SED 18 05/03/2025

## 2025-05-04 NOTE — PLAN OF CARE
Goal Outcome Evaluation:  Vitals:    05/03/25 2326 05/04/25 0234 05/04/25 0859 05/04/25 0900   BP: 126/78 126/77 125/69 125/69   BP Location:   Left arm    Patient Position:   Supine    Cuff Size:   Adult Regular    Pulse: 74 74 75    Resp: 18 18 18    Temp: 98.2  F (36.8  C) 97.7  F (36.5  C) 97.8  F (36.6  C)    TempSrc: Oral  Oral    SpO2: 98% 100% 98% 98%    Assessment & Plan  Tad Zaman is a 64 year old male, alert and oriented  with hypertension, chronic systolic heart failure, type 2 diabetes, peripheral artery disease status post left BKA, right femoral-tibial bypass (3/20/2025), right diabetic foot ulcer, osteomyelitis, history of MI, status post renal transplant, CKD stage III, with with complaints of progressive right foot pain and swelling and being admitted for progressive R foot severe diabetic foot ulcer and possible R TMA,  currently afebrile vitally stable, sating 98% on room air, non labor breathing,   Right arm fistula thrill bruit, right foot ulcer, right heel dry scab,   Denies any pain, denies nausea, tolerating intake,   Ortho and ID consult placed, waiting on UA to be collected,   Resting. Continue with plan  of care.    Added at 1900  Pt  149, MD paged notified, no sliding scale order placed, pt decline insulin as well,

## 2025-05-04 NOTE — MEDICATION SCRIBE - ADMISSION MEDICATION HISTORY
Medication Scribe Admission Medication History    Admission medication history is complete. The information provided in this note is only as accurate as the sources available at the time of the update.    Information Source(s): Patient via in-person    Pertinent Information:   None  Changes made to PTA medication list:  Added: None  Deleted: None  Changed: None    Allergies reviewed with patient and updates made in EHR: yes    Medication History Completed By: Lidya Golden 5/3/2025 8:35 PM    PTA Med List   Medication Sig Last Dose/Taking    apixaban ANTICOAGULANT (ELIQUIS) 5 MG tablet Take 5 mg by mouth 2 times daily. 5/3/2025 Morning    aspirin (ASA) 81 MG chewable tablet Take 1 tablet (81 mg) by mouth daily. 5/3/2025 Morning    atorvastatin (LIPITOR) 40 MG tablet Take 1 tablet (40 mg) by mouth at bedtime. 5/2/2025 Bedtime    brinzolamide-brimonidine (SIMBRINZA) 1-0.2 % ophthalmic suspension Apply 1 drop to eye 2 times daily. 5/3/2025 Morning    calcitRIOL (ROCALTROL) 0.25 MCG capsule Take 0.25 mcg by mouth. TAKE ONE CAPSULE BY MOUTH MONDAY THROUGH FRIDAY FOR BONE HEALTH ** DOSE INCREASE 5/2/2025 Morning    empagliflozin (JARDIANCE) 25 MG TABS tablet Take 12.5 mg by mouth every morning. TAKE ONE-HALF TABLET BY MOUTH EVERY MORNING FOR DIABETES AND KIDNEY PROTECTION 5/3/2025 Morning    ferrous sulfate (FEROSUL) 325 (65 Fe) MG tablet Take 1 tablet (325 mg) by mouth every other day. 5/3/2025 Morning    Latanoprost PF 0.005 % SOLN Apply to eye at bedtime. INSTILL 1 DROP IN BOTH EYES AT BEDTIME FOR GLAUCOMA 5/2/2025 Bedtime    metFORMIN (GLUCOPHAGE XR) 500 MG 24 hr tablet Take 1 tablet (500 mg) by mouth daily (with dinner). 5/2/2025    metoprolol succinate ER (TOPROL XL) 25 MG 24 hr tablet Take 75 mg by mouth daily. TAKE THREE TABLETS BY MOUTH EVERY DAY FOR HEART RATE 5/3/2025 Morning    mycophenolate (GENERIC EQUIVALENT) 500 MG tablet Take 500 mg by mouth 2 times daily. 5/3/2025 Morning    pantoprazole (PROTONIX) 40  MG EC tablet Take 40 mg by mouth daily.  TAKE ONE TABLET BY MOUTH EVERY DAY PREVENT GI BLEED 5/3/2025 Morning    patiromer (VELTASSA) 8.4 g packet Take 8.4 g by mouth once a week. Take 1 packet (8.4 grams) by mouth once weekly for high potassium 5/3/2025 Morning    sodium bicarbonate 650 MG tablet Take 2 tablets (1,300 mg) by mouth 3 times daily. 5/2/2025 Morning    tacrolimus (GENERIC EQUIVALENT) 1 MG capsule Take 2 capsules (2 mg) by mouth 2 times daily. 5/3/2025 Morning    tamsulosin (FLOMAX) 0.4 MG capsule Take 2 capsules (0.8 mg) by mouth daily. 5/3/2025 Morning    torsemide (DEMADEX) 10 MG tablet Take 10 mg by mouth 2 times daily. 5/3/2025 Morning

## 2025-05-04 NOTE — PROGRESS NOTES
Medicine Cross Cover Note    Contacted by nursing regarding patient's refusal of insulin and request that it be removed from the MAR. Sliding scale was ordered on admission for diabetes. Will discontinue as patient is refusing.      Alka Montes PA-C  Hospitalist Service  Contact information available via [facility] Paging/Directory

## 2025-05-04 NOTE — PROGRESS NOTES
[facility]    Medicine Progress Note - Hospitalist Service, GOLD TEAM 4    Date of Admission:  5/3/2025    Assessment & Plan   Tad Zaman is a 64 year old male with hypertension, chronic systolic heart failure, type 2 diabetes, peripheral artery disease status post left BKA, right femoral-tibial bypass (3/20/2025), right diabetic foot ulcer, osteomyelitis, history of MI, status post renal transplant, CKD stage III, with with complaints of progressive right foot pain and swelling and being admitted for progressive R foot severe diabetic foot ulcer and possible R TMA.    R Diabetic foot ulcer  C/f osteomyelitis  PAD s/p Left BKA and Right F-T stent  Chronic immunosuppression  Presents with history of progressive R diabetic foot ulcer with outpatient plans for R TMA, but having concern for worsening swelling and foul odor. He had recent Arterial doppler on 5/2 with good stent flow. Workup in ED reassuring with WBC WNL, Lactate 1.9, CRP and ESR WNL. LLE Duplex negative for DVT. CT with contrast concerning for soft tissue gas which is favored to be from direct extension from the area of ulceration as opposed to gas-formin organism, though cannot rule out. Exam with edema of the foot, no surrounding erythema. Wounds without producible purulence or drainage.   - Continue Cefepime (allergy profile), vancomycin and flagyl (5/3/25 - ### )  - MRSA Nares pending  - Follow 5/3 Bcx, NGTD; will consider narrowing abx tomorrow pending growth  - Vascular surgery consulted  > No acute surgical plan  > Recommending to continue ASA until day before surgery considering LE stent.   - Orthopedics consulted   > No plan for inpatient surgical intervention  - Will discuss with podiatry tomorrow  - Consult Transplant ID prior to DC to assist with abx course  - Continue PTA ASA for now   - HOLD Apixaban given possible procedure; will need to have chemical VTE ppx added if no AC beyond 5/6  -  Daily CBC, BMP    S/P Renal transplant  MOY, resolved  CKD3  Cr baseline around ~1.6. Cr on admit 2.15. Renal transplant US 5/3 with moderate hydronephrosis of the transplant kidney, that improved post void. Elevated resistive indices suggestive of renal transplant dysfunction. Was given 2L NS on admit and was started on 125ml/hr overnight. Cr this morning 1.69  - Renal transplant consulted  - Continue PTA Tacro 2 mg BID  - Continue PTA Mycophenolate 500 mg BID for now  - HOLD PTA Torsemide  - Continue PTA sodium bicarb and Patiromer    History of CVA/TIA  Chronic AC, ASA  He is on dual coverage with Apixaban and ASA for history of CVA/TIA and PAD per family. Apixaban held on admission given need for procedure.   - HOLD Apixaban given possible procedure; will need to have chemical VTE ppx added if no AC beyond 5/6  - ASA plan above     Hypertension  Chronic systolic heart failure/HFmrEF  History of chronic systolic heart failure, MI but no clear history of CAD or cardiac revascularization. He had TTE on 2/25/25 with EF of 40-45% and mild pulmonary hypertension likely Group 2. No signs of acute HF exacerbation.  - Continue PTA Metoprolol   - Continue PTA Statin     DM2  Last A1C 7.9 on 3/3/25. Outpatient regimen includes Metformin and Jardiance. Was started on MSSI on admit but patient refusing so this was discontinued. Last 24h trend:   Recent Labs   Lab 05/04/25  0232 05/03/25  1641   * 225*   - HOLD PTA Metformin  - Continue PTA Jardiance  - Hypoglycemia protocol  - Follow BG trend, will continue to discuss with patient the importance for BG control in perioperative setting     BPH   - Continue PTA Tamsulosin     Glaucoma   - Continue PTA gtts            Diet: Combination Diet Renal Diet (non-dialysis); Moderate Consistent Carb (60 g CHO per Meal) Diet    DVT Prophylaxis: DOAC- ON HOLD AND WILL NEED venous thromboembolism PROPHYLAXIS IF CONTINUES TO BE ON HOLD BEYOND 5/6/25   Zaman Catheter: Not  present  Lines: None     Cardiac Monitoring: None  Code Status: Full Code      Clinically Significant Risk Factors Present on Admission                # Drug Induced Coagulation Defect: home medication list includes an anticoagulant medication  # Drug Induced Platelet Defect: home medication list includes an antiplatelet medication  # Acute Kidney Injury, unspecified: based on a >150% or 0.3 mg/dL increase in last creatinine compared to past 90 day average, will monitor renal function           # DMII: A1C = 7.9 % (Ref range: <5.7 %) within past 6 months        # Financial/Environmental Concerns:           Social Drivers of Health    Tobacco Use: Medium Risk (4/22/2025)    Patient History     Smoking Tobacco Use: Former     Smokeless Tobacco Use: Never     Passive Exposure: Never          Disposition Plan     Medically Ready for Discharge: Anticipated in 2-4 Days           The patient's care was discussed with the Attending Physician, Dr. Cobian, Bedside Nurse, and Patient.    Rody Wheeler PA-C  Hospitalist Service, GOLD TEAM [address] [place] [address]  Securely message with [employer] (more info)  Text page via [employer] Paging/Directory   See signed in provider for up to date coverage information  ______________________________________________________________________    Interval History   Patient reports no acute concerns, no increased pain of the RLE. No fevers/chills, body aches. Discussed main concern was increased swelling/smell that brought him in and concern for infection.     Physical Exam   Vital Signs: Temp: 97.7  F (36.5  C) Temp src: Oral BP: 126/77 Pulse: 74   Resp: 18 SpO2: 100 % O2 Device: None (Room air)    Weight: 0 lbs 0 oz    Physical Exam  Vitals reviewed.   Constitutional:       General: He is not in acute distress.     Appearance: He is not diaphoretic.   Cardiovascular:      Rate and Rhythm: Normal rate and regular rhythm.      Heart sounds: No murmur  heard.  Pulmonary:      Effort: Pulmonary effort is normal.      Breath sounds: No wheezing, rhonchi or rales.   Musculoskeletal:      Comments: RLE charcot foot, mild/moderate edema, no erythema. Open wounds noted (please see media tab for images), no drainage or expressible purulence   Skin:     General: Skin is warm and dry.   Neurological:      Mental Status: He is alert and oriented to person, place, and time.           Medical Decision Making       60 MINUTES SPENT BY ME on the date of service doing chart review, history, exam, documentation & further activities per the note.      Data     I have personally reviewed the following data over the past 24 hrs:    5.0  \   11.6 (L)   / 229     138 103 42.4 (H) /  182 (H)   4.6 23 2.15 (H) \     ALT: 9 AST: 20 AP: 89 TBILI: 0.3   ALB: 3.8 TOT PROTEIN: 6.9 LIPASE: N/A     Procal: 0.15 CRP: <3.00 Lactic Acid: 1.9       INR:  1.61 (H) PTT:  34   D-dimer:  N/A Fibrinogen:  N/A       Imaging results reviewed over the past 24 hrs:   Recent Results (from the past 24 hours)   XR Foot Right 2 Views    Narrative    EXAM: XR FOOT RIGHT 2 VIEWS  LOCATION: [facility]  DATE: 5/3/2025    INDICATION: Question of subcutaneous gas on the foot.  COMPARISON: 06/21/2016.      Impression    IMPRESSION:  Severe Charcot arthropathy involving the midfoot and midtarsal region, with fracture, fragmentation and malalignment as well as soft tissue swelling, progressed from 06/21/2016. There is midfoot collapse and soft tissue swelling. Dorsal   dislocation 2nd MTP joint. Osteonecrosis distal tibial shaft and healed fracture deformity distal fibular shaft.    Extensive atherosclerotic vascular calcifications. No definitive evidence for soft tissue gas on these 2 views, noting there is soft tissue swelling.   CT Foot Right w Contrast    Narrative    EXAM: CT FOOT RIGHT W CONTRAST  LOCATION: [facility]  CENTER  DATE: 05/03/2025    INDICATION: History of gangrene, right foot; progressive wounds, erythema, edema.  COMPARISON: Same-day radiographs.  TECHNIQUE: IV contrast. Axial, sagittal, and coronal thin-section reconstruction. Dose reduction techniques were used.   CONTRAST: 99 mL Iso 370.    FINDINGS: Again seen are changes of advanced Charcot arthropathy with fragmentation, fracture, and malalignment within the midtarsal and midfoot region as well as the 1st MTP joint. Dorsal dislocation of the 2nd MTP joint again seen.    Remote healed fracture deformity of the distal fibular shaft. Bones are demineralized.    There is some soft tissue swelling and soft tissue gas along the superficial aspect of the plantar great toe, presumably associated with an area of ulceration. Along the distal phalanx/tuft of the great toe there is soft tissue gas at an area of skin   breakdown/ulceration which abuts the bony cortex of the distal phalanx where there is underlying erosive change, concerning for distal phalangeal osteomyelitis. Small foci of soft tissue gas extend proximally to the level of the proximal phalanx of the   great toe.    There is soft tissue stranding and irregularity along the distal aspect of the 2nd toe without soft tissue gas or well-defined abscess.    There is no well-defined rim-enhancing fluid collection on CT with attention to the great toe, although poorly-defined abscess is not excluded.    Fatty atrophy of intrinsic musculature of the foot.    More global diffuse soft tissue swelling in the subcutaneous tissues with skin thickening.    Chronic bony proliferative change along the distal syndesmosis of the ankle.      Impression    IMPRESSION:  1.  Soft tissue deficiency, ulceration, and gas along the plantar aspect of the great toe with underlying erosive change of the distal phalanx, concerning for osteomyelitis. Soft tissue gas extends proximally to the level of the proximal phalanx of the   great  toe where there is soft tissue irregularity. Findings are favored to be related to gas from direct extension from the area of ulceration as opposed to a gas-forming organism, although this is difficult to completely rule-out on CT alone.  2.  Soft tissue stranding and irregularity distal aspect of the 2nd toe without discrete abscess or evidence for osteomyelitis at this time. Note is made that there is dorsal dislocation of the 2nd MTP joint.  3.  No well-defined rim-enhancing fluid collection on CT with attention to the great toe, although poorly-defined abscess is not excluded as there is poorly-defined circumferential soft tissue swelling.  4.  More global soft tissue swelling about the foot, which can be seen with cellulitis as well as noninfectious causes of edema.  5.  Advanced Charcot arthropathy as described.     NOTE: ABNORMAL REPORT    THE DICTATION ABOVE DESCRIBES AN ABNORMALITY FOR WHICH FOLLOWUP IS NEEDED.    US Lower Extremity Venous Duplex Right    Narrative    EXAM: US LOWER EXTREMITY VENOUS DUPLEX RIGHT  LOCATION: [facility]  DATE: 5/3/2025    INDICATION: Right lower leg edema.  COMPARISON: None.  TECHNIQUE: Venous Duplex ultrasound of the right lower extremity with and without compression, augmentation and duplex. Color flow and spectral Doppler with waveform analysis performed.    FINDINGS: Exam includes the common femoral, femoral, popliteal, and contralateral common femoral veins as well as segmentally visualized deep calf veins and greater saphenous vein.     RIGHT: No deep vein thrombosis. No superficial thrombophlebitis. No popliteal cyst.      Impression    IMPRESSION:  No deep venous thrombosis in the right lower extremity.   US Renal Transplant with Doppler    Narrative    EXAM: US RENAL TRANSPLANT WITH DOPPLER  LOCATION: [facility]  DATE: 5/3/2025    INDICATION: Acute kidney injury, hx renal  transplant  COMPARISON: 3/3/2025  TECHNIQUE: Ultrasound of the renal transplant with Doppler waveform spectral analysis.    FINDINGS: The transplant kidney is located in the right lower quadrant. The transplant kidney is normal in echogenicity. There is no cortical thinning. Moderate hydronephrosis. This slightly improved post void. There is no calculus, cyst or mass. There   is no perinephric fluid.    The urinary bladder is normal. There is no post-void residual.    TRANSPLANT DOPPLER:    The main renal artery peak systolic velocity is normal (less than 200 cm/sec). The intra-renal transplant resistive index (RI) are abnormal (>0.8).       Impression    IMPRESSION:   1.  Moderate hydronephrosis of the transplant kidney. This slightly improved post void.  2.  Elevated resistive indices suggestive of renal transplant dysfunction.

## 2025-05-05 ENCOUNTER — MEDICAL CORRESPONDENCE (OUTPATIENT)
Dept: HEALTH INFORMATION MANAGEMENT | Facility: CLINIC | Age: 65
End: 2025-05-05
Payer: MEDICARE

## 2025-05-05 ENCOUNTER — TELEPHONE (OUTPATIENT)
Dept: FAMILY MEDICINE | Facility: CLINIC | Age: 65
End: 2025-05-05
Payer: MEDICARE

## 2025-05-05 LAB
ANION GAP SERPL CALCULATED.3IONS-SCNC: 11 MMOL/L (ref 7–15)
BUN SERPL-MCNC: 31.2 MG/DL (ref 8–23)
CALCIUM SERPL-MCNC: 9.4 MG/DL (ref 8.8–10.4)
CHLORIDE SERPL-SCNC: 107 MMOL/L (ref 98–107)
CREAT SERPL-MCNC: 1.43 MG/DL (ref 0.67–1.17)
EGFRCR SERPLBLD CKD-EPI 2021: 54 ML/MIN/1.73M2
ERYTHROCYTE [DISTWIDTH] IN BLOOD BY AUTOMATED COUNT: 14.8 % (ref 10–15)
GLUCOSE BLDC GLUCOMTR-MCNC: 183 MG/DL (ref 70–99)
GLUCOSE SERPL-MCNC: 169 MG/DL (ref 70–99)
HCO3 SERPL-SCNC: 20 MMOL/L (ref 22–29)
HCT VFR BLD AUTO: 40 % (ref 40–53)
HGB BLD-MCNC: 12.2 G/DL (ref 13.3–17.7)
MCH RBC QN AUTO: 25.3 PG (ref 26.5–33)
MCHC RBC AUTO-ENTMCNC: 30.5 G/DL (ref 31.5–36.5)
MCV RBC AUTO: 83 FL (ref 78–100)
PLATELET # BLD AUTO: 214 10E3/UL (ref 150–450)
POTASSIUM SERPL-SCNC: 4.4 MMOL/L (ref 3.4–5.3)
RBC # BLD AUTO: 4.82 10E6/UL (ref 4.4–5.9)
SODIUM SERPL-SCNC: 138 MMOL/L (ref 135–145)
VANCOMYCIN SERPL-MCNC: 6.2 UG/ML
WBC # BLD AUTO: 4.6 10E3/UL (ref 4–11)

## 2025-05-05 PROCEDURE — 99207 PR APP CREDIT; MD BILLING SHARED VISIT: CPT | Mod: FS | Performed by: STUDENT IN AN ORGANIZED HEALTH CARE EDUCATION/TRAINING PROGRAM

## 2025-05-05 PROCEDURE — 120N000002 HC R&B MED SURG/OB UMMC

## 2025-05-05 PROCEDURE — 99233 SBSQ HOSP IP/OBS HIGH 50: CPT | Mod: 24 | Performed by: NURSE PRACTITIONER

## 2025-05-05 PROCEDURE — 250N000011 HC RX IP 250 OP 636: Mod: JZ | Performed by: HOSPITALIST

## 2025-05-05 PROCEDURE — 82962 GLUCOSE BLOOD TEST: CPT

## 2025-05-05 PROCEDURE — 250N000012 HC RX MED GY IP 250 OP 636 PS 637: Performed by: HOSPITALIST

## 2025-05-05 PROCEDURE — 99223 1ST HOSP IP/OBS HIGH 75: CPT | Performed by: ASSISTANT, PODIATRIC

## 2025-05-05 PROCEDURE — 99233 SBSQ HOSP IP/OBS HIGH 50: CPT | Mod: FS | Performed by: PEDIATRICS

## 2025-05-05 PROCEDURE — 250N000011 HC RX IP 250 OP 636: Performed by: PEDIATRICS

## 2025-05-05 PROCEDURE — 36415 COLL VENOUS BLD VENIPUNCTURE: CPT

## 2025-05-05 PROCEDURE — 85041 AUTOMATED RBC COUNT: CPT

## 2025-05-05 PROCEDURE — 80202 ASSAY OF VANCOMYCIN: CPT | Performed by: HOSPITALIST

## 2025-05-05 PROCEDURE — 250N000013 HC RX MED GY IP 250 OP 250 PS 637: Performed by: HOSPITALIST

## 2025-05-05 PROCEDURE — 85048 AUTOMATED LEUKOCYTE COUNT: CPT

## 2025-05-05 PROCEDURE — 80048 BASIC METABOLIC PNL TOTAL CA: CPT

## 2025-05-05 RX ORDER — VANCOMYCIN HYDROCHLORIDE 1 G/200ML
1000 INJECTION, SOLUTION INTRAVENOUS
Status: DISCONTINUED | OUTPATIENT
Start: 2025-05-05 | End: 2025-05-08

## 2025-05-05 RX ADMIN — MYCOPHENOLATE MOFETIL 500 MG: 500 TABLET, FILM COATED ORAL at 09:47

## 2025-05-05 RX ADMIN — MYCOPHENOLATE MOFETIL 500 MG: 500 TABLET, FILM COATED ORAL at 20:19

## 2025-05-05 RX ADMIN — TAMSULOSIN HYDROCHLORIDE 0.8 MG: 0.4 CAPSULE ORAL at 08:06

## 2025-05-05 RX ADMIN — PANTOPRAZOLE SODIUM 40 MG: 40 TABLET, DELAYED RELEASE ORAL at 08:06

## 2025-05-05 RX ADMIN — LATANOPROST 1 DROP: 50 SOLUTION/ DROPS OPHTHALMIC at 21:48

## 2025-05-05 RX ADMIN — BRINZOLAMIDE/BRIMONIDINE TARTRATE 1 DROP: 10; 2 SUSPENSION/ DROPS OPHTHALMIC at 20:20

## 2025-05-05 RX ADMIN — SODIUM BICARBONATE 1300 MG: 650 TABLET ORAL at 20:20

## 2025-05-05 RX ADMIN — ASPIRIN 81 MG CHEWABLE TABLET 81 MG: 81 TABLET CHEWABLE at 08:06

## 2025-05-05 RX ADMIN — EMPAGLIFLOZIN 10 MG: 10 TABLET, FILM COATED ORAL at 10:41

## 2025-05-05 RX ADMIN — SODIUM BICARBONATE 1300 MG: 650 TABLET ORAL at 14:43

## 2025-05-05 RX ADMIN — METOPROLOL SUCCINATE 75 MG: 25 TABLET, EXTENDED RELEASE ORAL at 08:06

## 2025-05-05 RX ADMIN — TACROLIMUS 2 MG: 1 CAPSULE ORAL at 08:05

## 2025-05-05 RX ADMIN — VANCOMYCIN HYDROCHLORIDE 1000 MG: 1 INJECTION, SOLUTION INTRAVENOUS at 14:39

## 2025-05-05 RX ADMIN — CEFEPIME 2 G: 2 INJECTION, POWDER, FOR SOLUTION INTRAVENOUS at 05:59

## 2025-05-05 RX ADMIN — CEFEPIME 2 G: 2 INJECTION, POWDER, FOR SOLUTION INTRAVENOUS at 17:54

## 2025-05-05 RX ADMIN — CALCITRIOL CAPSULES 0.25 MCG 0.25 MCG: 0.25 CAPSULE ORAL at 10:41

## 2025-05-05 RX ADMIN — SODIUM BICARBONATE 1300 MG: 650 TABLET ORAL at 08:05

## 2025-05-05 RX ADMIN — ATORVASTATIN CALCIUM 40 MG: 40 TABLET, FILM COATED ORAL at 21:48

## 2025-05-05 RX ADMIN — METRONIDAZOLE 500 MG: 500 INJECTION, SOLUTION INTRAVENOUS at 19:15

## 2025-05-05 RX ADMIN — METRONIDAZOLE 500 MG: 500 INJECTION, SOLUTION INTRAVENOUS at 09:49

## 2025-05-05 RX ADMIN — TACROLIMUS 2 MG: 1 CAPSULE ORAL at 17:54

## 2025-05-05 RX ADMIN — METRONIDAZOLE 500 MG: 500 INJECTION, SOLUTION INTRAVENOUS at 04:24

## 2025-05-05 ASSESSMENT — ACTIVITIES OF DAILY LIVING (ADL)
ADLS_ACUITY_SCORE: 61

## 2025-05-05 NOTE — TELEPHONE ENCOUNTER
Forms/Letter Request    Type of form/letter: Missed Visit Order-Order # [id number]    Do we have the form/letter: Yes: Dr Anthony's box    Who is the form from? [facility]    Where did/will the form come from? form was faxed in    How would you like the form/letter returned: Fax : [phone]    Susy Morocho MA/  [facility]   Primary Care

## 2025-05-05 NOTE — PLAN OF CARE
A:   Neuro: A&Ox4. Used call light appropriately. Patient calm and cooperative with cares. Denied headache, dizziness, and lightheadedness.  Cardiac/Tele: VSS. MAPs > 65. Denied chest pain and palpitations. Afebrile.   Respiratory: Sats > 94% on room air. Denied shortness of breath.  GI/: Continent. Uses bedside commode. Adequate urine output. No BM during shift. Denied nausea and vomiting throughout shift.  Diet/Appetite: Consistent carb and renal diet. Patient declined breakfast, and lunch. Around 14:00 patient ordered lunch without letting writer know, so unable to obtain blood glucose before lunch. Additionally, receipt not saved so unable to count carbs. Adequate oral intake of fluids.  Skin: Black toes on right foot. Provider placed initial dressing, dressing changed 1x due to iodine leaking through; left iodine soaked dressing in place and only changed outer dressing. BKA on left leg.  LDAs: 1 PIV, infusing TKO.   Activity: Assist of 1 to standby assist. Patient declined assistance with transferring despite education.  Pain: Patient denied pain throughout shift.   Electrolytes: No electrolytes replaced during shift.       P: Continue to monitor during shift and notify team with changes. Reviewed plan of care with patient    Shift: 07:00 to 19:30    Goal Outcome Evaluation:      Plan of Care Reviewed With: patient    Overall Patient Progress: no change    Outcome Evaluation: No redness or purulent discharge from right toes. Patient denied pain and nausea

## 2025-05-05 NOTE — PROGRESS NOTES
[facility]  Transplant Nephrology Progress Note  Date of Admission:  5/3/2025  Today's Date: 05/05/2025  Requesting physician: Vinh Crews MD    Recommendations:   - No acute indication for dialysis.  - Continue current immunosuppression.   - Continue to hold PTA Torsemide.   - Encourage nursing to obtain PVR.     Assessment & Plan   # DDKT and MOY: Trend down after IVF (2.2 on arrival). Now back at baseline.    - Baseline Creatinine: had been ~ 1.8-2 in Feb 2025, appears to be 1.4-1.6 since Mar 2025.    - Proteinuria: Not checked recently   - DSA Hx: Unknown due to being transplanted at another Transplant Center   - Last cPRA: unknown   - BK Viremia: Not checked recently due to time from transplant   - Kidney Tx Biopsy Hx:  Unknown .     # Immunosuppression: Tacrolimus immediate release (goal 4-6) and Mycophenolate mofetil (dose 500 mg every 12 hours)   - Induction with Recent Transplant:   unknown, done at outside facility   - Continue with intensive monitoring of immunosuppression for efficacy and toxicity.   - Historical Changes in Immunosuppression:  unknown   - Changes: No    # Infection Prevention:   Last CD4 Level: Unknown  - PJP: None      - CMV IgG Ab High Risk Discordance (D+/R-) at time of transplant: Unknown  Present CMV Serostatus: Unknown  - EBV IgG Ab High Risk Discordance (D+/R-) at time of transplant: Unknown  Present EBV Serostatus: Unknown    # Blood Pressure: Controlled;  Goal BP: < 130/80   - Changes: Not at this time    # Diabetes: Borderline control (HbA1c 7-9%) Last HbA1c: 7.9% 3/2025    # Anemia in Chronic Renal Disease: Hgb: Trend down      SIOBHAN: No   - Iron studies: Low iron saturation on Iron supplement    # Mineral Bone Disorder:    - Secondary renal hyperparathyroidism; PTH level: Not checked recently        On treatment: Calcitriol  - Vitamin D; level: Not checked recently        On supplement: No  - Calcium; level: Normal        On  supplement: No  - Phosphorus; level: Normal        On supplement: No    # Electrolytes:   - Potassium; level: Normal        On supplement:no,  on veltassa  - Magnesium; level: Low normal        On supplement: No  - Bicarbonate; level: Low        On supplement: Yes  - Sodium; level: Normal     # Other Significant PMH:   - Atrial flutter: On Eliquis PTA-on hold for Possible intervention              - HFrEF: presumed ischemic given past maricel pre-transplant with area of ischemia and possible history of stent. On Toprol, jardiance and torsemide. With new drop in LVEF to ~ 45%, he may need further work-up.               - CAD: Possible history of stent.               - PAD s/p left AKA  - History of CVA/TIA               - Sickle cell trait              - History of hepatitis C.               - Urinary Retention: - Tamsulosin 0.8mg PO daily    # Transplant History:  Etiology of Kidney Failure: Diabetes mellitus type 2, HTN   Tx: DDKT  Transplant: N/A  Significant transplant-related complications: None    Recommendations were communicated to the primary team via [employer].    Seen and discussed with Dr. Mik Vieyra APRN CNP  Transplant Nephrology  Contact information via Vocera Web Console         Physician Attestation     I saw and evaluated Tad Zaman as part of a shared APRN/PA visit.     I personally reviewed the vital signs, medications, and labs.    I personally provided a substantive portion of care for this patient and I approve the care plan as written by the REYNA.  I was involved with Medical Decision Making including: Please see A&P for additional details of medical decision making.  MANAGEMENT DISCUSSED with the following over the past 24 hours: No acute indications for dialysis.  Would make no changes in immunosuppression, at this time.  Would continue to hold diuretics for now.     Edwardo Houser MD  Date of Service (when I saw the patient): 5/5/25      Interval History  Mr. Zaman's  creatinine is 1.43 (05/05 1200); Trend down.  Good urine output per patient.Plan for TMA by podiatry on Wednesday.   Other significant labs/tests/vitals: VSS  No events overnight.  No chest pain or shortness of breath.  No leg swelling. LE wrapped with foot wound. Mile RUE swelling from IV infiltration.   No nausea and vomiting.  Bowel movements are regular.  No fever, sweats or chills.     Review of Systems   4 point ROS was obtained and negative except as noted in the Interval History.    MEDICATIONS:  Current Facility-Administered Medications   Medication Dose Route Frequency Provider Last Rate Last Admin    aspirin (ASA) chewable tablet 81 mg  81 mg Oral Daily Vinh Crews MD   81 mg at 05/05/25 0806    atorvastatin (LIPITOR) tablet 40 mg  40 mg Oral At Bedtime Vinh Crews MD   40 mg at 05/04/25 2128    brinzolamide-brimonidine (SIMBRINZA) 1-0.2 % ophthalmic suspension 1 drop  1 drop Ophthalmic BID Vinh Crews MD   1 drop at 05/04/25 2129    calcitRIOL (ROCALTROL) capsule 0.25 mcg  0.25 mcg Oral Once per day on Monday Tuesday Wednesday Thursday Friday Vinh Crews MD        ceFEPIme (MAXIPIME) 2 g vial to attach to  mL bag for ADULTS or NS 50 mL bag for PEDS  2 g Intravenous Q12H Vinh Crews MD   Stopped at 05/05/25 0631    empagliflozin (JARDIANCE) tablet 10 mg  10 mg Oral QAM Vinh Crews MD   10 mg at 05/04/25 0858    ferrous sulfate (FEROSUL) tablet 325 mg  325 mg Oral Every Other Day Vinh Crews MD   325 mg at 05/04/25 0858    latanoprost (XALATAN) 0.005 % ophthalmic solution 1 drop  1 drop Both Eyes At Bedtime Vinh Crews MD   1 drop at 05/04/25 2129    metoprolol succinate ER (TOPROL XL) 24 hr tablet 75 mg  75 mg Oral Daily Vinh Crews MD   75 mg at 05/05/25 0806    metroNIDAZOLE (FLAGYL) infusion 500 mg  500 mg Intravenous Q8H Vinh Crews MD   Stopped at 05/05/25 0629    mycophenolate (GENERIC EQUIVALENT) tablet 500 mg  500 mg Oral  BID Vinh Crews MD   500 mg at 25    pantoprazole (PROTONIX) EC tablet 40 mg  40 mg Oral Daily Vinh Crews MD   40 mg at 25    [START ON 5/10/2025] patiromer (VELTASSA) packet 8.4 g  8.4 g Oral Weekly Vinh Crews MD        sodium bicarbonate tablet 1,300 mg  1,300 mg Oral TID Vinh Crews MD   1,300 mg at 25    sodium chloride (PF) 0.9% PF flush 3 mL  3 mL Intracatheter Q8H Vinh Crews MD   3 mL at 25 1118    tacrolimus (GENERIC EQUIVALENT) capsule 2 mg  2 mg Oral BID IS Vinh Crews MD   2 mg at 25    tamsulosin (FLOMAX) capsule 0.8 mg  0.8 mg Oral Daily Vinh Crews MD   0.8 mg at 25    [Held by provider] torsemide (DEMADEX) tablet 10 mg  10 mg Oral BID Vinh Crews MD        vancomycin (VANCOCIN) 1,500 mg in 0.9% NaCl 250 mL intermittent infusion  1,500 mg Intravenous Once Vinh Crews MD   Stopped at 25    vancomycin place [facility]well - receiving intermittent dosing  1 each Intravenous See Admin Instructions Vinh Crews MD         Current Facility-Administered Medications   Medication Dose Route Frequency Provider Last Rate Last Admin    Patient is already receiving anticoagulation with heparin, enoxaparin (LOVENOX), warfarin (COUMADIN)  or other anticoagulant medication   Does not apply Continuous PRN Vinh Crews MD           Physical Exam   Temp  Av.8  F (36.6  C)  Min: 97.6  F (36.4  C)  Max: 98.2  F (36.8  C)      Pulse  Av.3  Min: 66  Max: 75 Resp  Av.5  Min: 18  Max: 20  SpO2  Av.2 %  Min: 97 %  Max: 100 %     BP (!) 153/85 (Cuff Size: Adult Regular)   Pulse 73   Temp 98  F (36.7  C) (Oral)   Resp 18   SpO2 98%    Date 25 0700 - 25 0659   Shift 4084-4519 3983-0500 7869-7745 24 Hour Total   INTAKE   P.O. 337   337   Shift Total 337   337   OUTPUT   Shift Total       Weight (kg)          Admit       GENERAL APPEARANCE: alert and no  distress  HENT: mouth without ulcers or lesions  RESP: lungs clear to auscultation - no rales, rhonchi or wheezes  CV: regular rhythm, normal rate, no rub, no murmur  EDEMA: no LE edema bilaterally  ABDOMEN: soft, nondistended, nontender, bowel sounds normal  MS: extremities normal - no gross deformities noted, no evidence of inflammation in joints, no muscle tenderness  SKIN: Right foot with mild swelling, dry gangrenous toes to 1st -3rd digit.   PSYCH: confused at times, poor historian  TX KIDNEY: normal, surgical scar well approximated  Dialysis access: Right UE fistula    Data   All labs reviewed by me.  CMP  Recent Labs   Lab 05/05/25  0618 05/04/25  1757 05/04/25  1537 05/04/25  1117 05/04/25  0819 05/04/25  0232 05/03/25  1641   NA  --   --   --   --  137  --  138   POTASSIUM  --   --   --   --  4.1  --  4.6   CHLORIDE  --   --   --   --  106  --  103   CO2  --   --   --   --  21*  --  23   ANIONGAP  --   --   --   --  10  --  12   * 149* 145* 189* 166*   < > 225*   BUN  --   --   --   --  37.2*  --  42.4*   CR  --   --   --   --  1.69*  --  2.15*   GFRESTIMATED  --   --   --   --  44*  --  33*   QUE  --   --   --   --  9.2  --  9.5   MAG  --   --   --   --  1.8  --  2.0   PHOS  --   --   --   --  3.4  --   --    PROTTOTAL  --   --   --   --   --   --  6.9   ALBUMIN  --   --   --   --  3.7  --  3.8   BILITOTAL  --   --   --   --   --   --  0.3   ALKPHOS  --   --   --   --   --   --  89   AST  --   --   --   --   --   --  20   ALT  --   --   --   --   --   --  9    < > = values in this interval not displayed.     CBC  Recent Labs   Lab 05/04/25  0819 05/03/25  1641   HGB 10.9* 11.6*   WBC 4.4 5.0   RBC 4.32* 4.52   HCT 35.2* 37.7*   MCV 82 83   MCH 25.2* 25.7*   MCHC 31.0* 30.8*   RDW 15.0 14.9    229     INR  Recent Labs   Lab 05/03/25  1641   INR 1.61*   PTT 34     ABGNo lab results found in last 7 days.   Urine Studies  Recent Labs   Lab Test 05/04/25  1538 03/24/25  1036 03/22/25  1837  03/03/25  1508   COLOR Light Yellow Light Yellow Light Yellow Light Yellow   APPEARANCE Clear Clear Clear Clear   URINEGLC >=1000* >=1000* 300* >=1000*   URINEBILI Negative Negative Negative Negative   URINEKETONE Negative Negative Negative Negative   SG 1.023 1.010 1.008 1.016   UBLD Negative Negative Negative Negative   URINEPH 7.0 7.0 7.0 6.5   PROTEIN Negative Negative Negative Negative   NITRITE Negative Negative Negative Negative   LEUKEST Negative Trace* Large* Trace*   RBCU <1 <1 <1 3*   WBCU 2 4 36* 8*     No lab results found.  PTH  No lab results found.  Iron Studies  Recent Labs   Lab Test 03/10/25  0744   IRON 23*   *   IRONSAT 17   HAMMAD 1,062*       IMAGING:  All imaging studies reviewed by me.

## 2025-05-05 NOTE — CONSULTS
Podiatry Consult Note    Date: May 5, 2025      ASSESSMENT/PLAN:  Patient was seen and evaluated today for consult regarding right foot diabetic/peripheral vascular wounds.    He has a history of worsening wounds to the right foot, he underwent MRI in April which showed multiple sites concerning for osteomyelitis of the toes.  He has previously underwent vascular optimization on the side as well.  He was being worked up in the outpatient setting for transmetatarsal amputation of the side as a limb salvage attempt.  Also discussed with the patient would be need for nonweightbearing for minimum of 4 to 6 weeks for likely need for TCU stay to help with this.    Although is not acutely ill and it does appear that the toe sites are stable at this time if he was already planning to have transmetatarsal amputation for limb salvage attempt, we could complete this here while he is admitted, optimized and could get help from social work for TCU stay for nonweightbearing status.  I discussed this with patient as well as his daughter who state that initially they did present for concerns of worsening as well as more prompt care, as they are concerned about this area worsening and him becoming ill due to his comorbidities.  They would prefer if an amputation can get done sooner rather than later, patient is eager for this procedure.    I did discuss with him the risks and benefits of this.  I did discuss today that with known areas concerning for bone infection that I do recommend some sort of amputation as he is at high risk of worsening of this infection including sepsis.  He is in agreement and would like amputation, as for level discussed, given his vascular history, the vascular surgery team did state that he is as optimized as he will be for a TMA but he is at high risk for failure of distal foot amputations.  I discussed this with patient and his daughter as well today.  We cannot make any guarantees that even a  transmetatarsal amputation would heal, he has multiple risk factors that would put him at high risk of failure.  I discussed that failure of transmetatarsal amputation could result in further debridement, further amputation, or proximal amputation/loss of the limb.  Patient and his daughter are aware of this and understanding but if there is an attempt to try to save as much of the limb as possible, rather than jumping to a proximal amputation, they would like to try this.  Given his history of amputation proximally on the left side I do think that it is reasonable to make a limb salvage attempt with goal to maintain ambulation ability and independence.    I will again touch base with the vascular surgery team to see if there is any final recommendations or interventions they may want to do as well.  Will also touch base with his primary team to update them on plan.    Medical clearance for procedure to be done by his primary medical team please.  I will recommend at midnight prior to procedure to discontinue any DVT prophylaxis, if needed can continue aspirin.    Will plan to add-on for middle of the week, Wednesday, for procedure, will touch base again with the patient and family tomorrow on plans.    _________________________________________    Barriers to Healing (Discussed):    -Longstanding history of diabetes with elevated A1c, previous history of ulcerations and amputations due to diabetes and infection    -History of renal disease, status post transplant on immunosuppressive therapy    -Known peripheral vascular disease status post multiple interventions by vascular surgery team    Postoperative course, it would be crucial for patient to work on controlling blood glucose, It will also be crucial that patient is strict nonweightbearing to the right lower extremity for a minimum of 4 to 6 weeks to prevent surgery site dehiscence.  __________________________________________    Surgery: Planned for  transmetatarsal amputation hopefully for Wednesday pending or availability  Activity/WB: Would like him to be off the right foot as much as possible can use postoperative shoe if needed, postprocedure will order DME for him  DME: Cam boot postprocedure  Antibiotics: per team/ID  Labs: Recommend trending inflammation markers  Imaging: No further imaging needed  Diet: For today can have diet  Dressing: Betadine wet-to-dry over dry gangrene areas change daily or sooner if soiled  Dispo: pending    OBJECTIVE:    IMAGING:  Narrative & Impression   EXAM: CT FOOT RIGHT W CONTRAST  LOCATION: [facility]  DATE: 05/03/2025     INDICATION: History of gangrene, right foot; progressive wounds, erythema, edema.  COMPARISON: Same-day radiographs.  TECHNIQUE: IV contrast. Axial, sagittal, and coronal thin-section reconstruction. Dose reduction techniques were used.   CONTRAST: 99 mL Iso 370.     FINDINGS: Again seen are changes of advanced Charcot arthropathy with fragmentation, fracture, and malalignment within the midtarsal and midfoot region as well as the 1st MTP joint. Dorsal dislocation of the 2nd MTP joint again seen.     Remote healed fracture deformity of the distal fibular shaft. Bones are demineralized.     There is some soft tissue swelling and soft tissue gas along the superficial aspect of the plantar great toe, presumably associated with an area of ulceration. Along the distal phalanx/tuft of the great toe there is soft tissue gas at an area of skin   breakdown/ulceration which abuts the bony cortex of the distal phalanx where there is underlying erosive change, concerning for distal phalangeal osteomyelitis. Small foci of soft tissue gas extend proximally to the level of the proximal phalanx of the   great toe.     There is soft tissue stranding and irregularity along the distal aspect of the 2nd toe without soft tissue gas or well-defined abscess.     There is no  well-defined rim-enhancing fluid collection on CT with attention to the great toe, although poorly-defined abscess is not excluded.     Fatty atrophy of intrinsic musculature of the foot.     More global diffuse soft tissue swelling in the subcutaneous tissues with skin thickening.     Chronic bony proliferative change along the distal syndesmosis of the ankle.                                                                      IMPRESSION:  1.  Soft tissue deficiency, ulceration, and gas along the plantar aspect of the great toe with underlying erosive change of the distal phalanx, concerning for osteomyelitis. Soft tissue gas extends proximally to the level of the proximal phalanx of the   great toe where there is soft tissue irregularity. Findings are favored to be related to gas from direct extension from the area of ulceration as opposed to a gas-forming organism, although this is difficult to completely rule-out on CT alone.  2.  Soft tissue stranding and irregularity distal aspect of the 2nd toe without discrete abscess or evidence for osteomyelitis at this time. Note is made that there is dorsal dislocation of the 2nd MTP joint.  3.  No well-defined rim-enhancing fluid collection on CT with attention to the great toe, although poorly-defined abscess is not excluded as there is poorly-defined circumferential soft tissue swelling.  4.  More global soft tissue swelling about the foot, which can be seen with cellulitis as well as noninfectious causes of edema.  5.  Advanced Charcot arthropathy as described.      NOTE: ABNORMAL REPORT     THE DICTATION ABOVE DESCRIBES AN ABNORMALITY FOR WHICH FOLLOWUP IS NEEDED.        /66 (Cuff Size: Adult Regular)   Pulse 73   Temp 97.9  F (36.6  C) (Oral)   Resp 16   SpO2 100%     Lab Results   Component Value Date    WBC 4.6 05/05/2025     Lab Results   Component Value Date    RBC 4.82 05/05/2025     Lab Results   Component Value Date    HGB 12.2 05/05/2025     Lab  Results   Component Value Date    HCT 40.0 05/05/2025     Lab Results   Component Value Date    MCV 83 05/05/2025     Lab Results   Component Value Date    MCH 25.3 05/05/2025     Lab Results   Component Value Date    MCHC 30.5 05/05/2025     Lab Results   Component Value Date    RDW 14.8 05/05/2025     Lab Results   Component Value Date     05/05/2025         Erythrocyte Sedimentation Rate   Date Value Ref Range Status   05/03/2025 18 0 - 20 mm/hr Final     CRP Inflammation   Date Value Ref Range Status   05/03/2025 <3.00 <5.00 mg/L Final         PHYSICAL EXAM:    General: Patient is in NAD and is AAOx4, communicates appropriately    Derm: Skin to the right lower extremity is shiny, dry, slightly atrophic and cool to the touch especially distally, he has some areas of hyperpigmentation as well as dry gangrene to toes 1 through 3 with a slight malodor initially, there is area of necrosis to the plantar sulcus of the great toe.  There was no expressible purulence, no significant erythema or streaking erythema no significant edema at this time              Vascular:  DP pulse is difficult to palpate on right  PT pulse is difficult to palpate on the right  Cap refill is sluggish to the toes on right foot, areas of dry gangrene to toes as seen in photo  Pedal hair is absent to right foot      MSK:  Below the knee amputation on the left side, 5 out of 5 muscle strength to foot and ankle on right side, he does have planus foot type with prominent plantar midfoot consistent with Charcot deformity, he does have good range of motion of the ankle with adequate dorsiflexion    Neuro: Gross sensation is absent via light touch to pedal nerve branches right side, BKA on left side        HPI:  Patient is a pleasant 64-year-old male with past medical history of type 2 diabetes with neuropathy, peripheral arterial disease status post left BKA and has a right F-T stent.  He is followed by vascular surgery in the outpatient  setting as well as podiatry.  Recently evaluated by podiatry with plans to workup for transmetatarsal amputation at the bare minimum.  Patient presented to the emergency department for evaluation of worsening swelling and malodor to the right foot wound areas/dry gangrene areas.  He was told that MRI showed areas of infection within the bone, prompting patient as well as his daughter to present for evaluation in case of worsening infection.  He also has a history of previous kidney transplant and is on immunosuppressive therapy, high risk for worsening infection.  He denies any fevers, chills, nausea or otherwise has been well.  He does note that he has home health care nurse that comes out to help with dressing changes but a few changes were missed prompting wound area to worsen, I discussed with daughter today who is worried that the home is not the best situation for him especially for nonweightbearing and wound care.  Denies any trauma or other issues with health      Past Medical History:   Diagnosis Date    Chronic kidney disease     Diabetes (H)     Hypertension     Myocardial infarction (H)      Social Connections: Not on file        Allergies   Allergen Reactions    Penicillins Hives     Past Surgical History:   Procedure Laterality Date    ANGIOGRAM Right 3/17/2025    Procedure: ANGIOGRAM;  Surgeon: Messi Irving MD;  Location: [facility] OR    ANGIOPLASTY Right 3/17/2025    Procedure: ANGIOPLASTY;  Surgeon: Messi Irving MD;  Location: [facility] OR    BYPASS GRAFT FEMOROTIBIAL Right 3/20/2025    Procedure: Right Femoral Anterior Tibial Bypass using Cryo preserve artery;  Surgeon: Messi Irving MD;  Location: [facility] OR    IR OR ANGIOGRAM  3/17/2025    IR OR ANGIOGRAM  3/20/2025    OR ANGIOGRAM, LOWER EXTREMITY Right 3/20/2025    Procedure: COMPLETION ANGIOGRAM;  Surgeon: Messi Irving MD;  Location: [facility] OR     No family history on file.

## 2025-05-05 NOTE — PROGRESS NOTES
[facility]    Medicine Progress Note - Hospitalist Service, GOLD TEAM 4    Date of Admission:  5/3/2025    Assessment & Plan    Tad Zaman is a 64 year old male with hypertension, chronic systolic heart failure, type 2 diabetes, peripheral artery disease status post left BKA, right femoral-tibial bypass (3/20/2025), right diabetic foot ulcer, osteomyelitis, history of MI, status post renal transplant, CKD stage III, with with complaints of progressive right foot pain and swelling and being admitted for progressive R foot severe diabetic foot ulcer and possible R TMA.     Today:  - Spoke with both Vascular and Podiatry today, input appreciate   - Possible R transmetatarsal amputation on Wednesday with podiatry.   - Will perform preop assessment tomorrow  - Pt otherwise hemodynamically stable       R Diabetic foot ulcer  C/f osteomyelitis  PAD s/p Left BKA and Right F-T stent  Chronic immunosuppression  Presents with history of progressive R diabetic foot ulcer with outpatient plans for R TMA, but having concern for worsening swelling and foul odor. He had recent Arterial doppler on 5/2 with good stent flow. Workup in ED reassuring with WBC WNL, Lactate 1.9, CRP and ESR WNL. LLE Duplex negative for DVT. CT with contrast concerning for soft tissue gas which is favored to be from direct extension from the area of ulceration as opposed to gas-formin organism, though cannot rule out. Exam with edema of the foot, no surrounding erythema. Wounds without producible purulence or drainage.   - Appreciate input from podiatry - tentative TMA on 5/7  - Continue Cefepime (allergy profile), vancomycin and flagyl (5/3/25 - ### )  - Follow 5/3 Bcx, NGTD; will consider narrowing abx tomorrow pending growth  - Vascular surgery consulted  - Blood flow is tenuous to R foot. Per our conversation, it is reasonable to pursue TMA but note pt is high risk of needing additional amputation in  the future   - Recommending to continue ASA until day before surgery considering LE stent.   - Consult Transplant ID prior to DC to assist with abx course  - Continue PTA ASA for now   - HOLD Apixaban given possible procedure; will need to have chemical VTE ppx added if no AC beyond 5/6  - Daily CBC, BMP     S/P Renal transplant  MOY, resolved  CKD3  Cr baseline around ~1.6. Cr on admit 2.15. Renal transplant US 5/3 with moderate hydronephrosis of the transplant kidney, that improved post void. Elevated resistive indices suggestive of renal transplant dysfunction. Was given 2L NS on admit and was started on 125ml/hr overnight. Cr this morning 1.69  - Renal transplant consulted  - Continue PTA Tacro 2 mg BID  - Continue PTA Mycophenolate 500 mg BID for now  - HOLD PTA Torsemide  - Continue PTA sodium bicarb and Patiromer     History of CVA/TIA  Chronic AC, ASA  He is on dual coverage with Apixaban and ASA for history of CVA/TIA and PAD per family. Apixaban held on admission given need for procedure.   - HOLD Apixaban given possible procedure; will need to have chemical VTE ppx added if no AC beyond 5/6  - ASA plan above     Hypertension  Chronic systolic heart failure/HFmrEF  History of chronic systolic heart failure, MI but no clear history of CAD or cardiac revascularization. He had TTE on 2/25/25 with EF of 40-45% and mild pulmonary hypertension likely Group 2. No signs of acute HF exacerbation.  - Continue PTA Metoprolol   - Continue PTA Statin     DM2  Last A1C 7.9 on 3/3/25. Outpatient regimen includes Metformin and Jardiance. Was started on MSSI on admit but patient refusing so this was discontinued. Last 24h trend:        Recent Labs   Lab 05/04/25  0232 05/03/25  1641   * 225*   - HOLD PTA Metformin  - Continue PTA Jardiance  - Hypoglycemia protocol  - Follow BG trend, will continue to discuss with patient the importance for BG control in perioperative setting     BPH   - Continue PTA Tamsulosin      Glaucoma   - Continue PTA gtts                Diet: Combination Diet Renal Diet (non-dialysis); Moderate Consistent Carb (60 g CHO per Meal) Diet    DVT Prophylaxis: held in anticipation of upcoming TMA on 5/7  Zaman Catheter: Not present  Lines: None     Cardiac Monitoring: None  Code Status: Full Code        Disposition Plan:  Medically Ready for Discharge: Anticipated in 5+ Days     Expected Discharge Date: 05/06/2025                  The patient's care was discussed with staff physician, Bedside Nurse, Patient, and podiatry and vascular Consultant(s).    Eugenio Trivedi PA-C  Hospitalist Service, GOLD TEAM [address] [place] [address]  Securely message with [employer] (more info)  Text page via [facility] Paging/Directory     Clinically Significant Risk Factors                # Coagulation Defect: INR = 1.61 (Ref range: 0.85 - 1.15) and/or PTT = 34 Seconds (Ref range: 22 - 38 Seconds), will monitor for bleeding              # DMII: A1C = 7.9 % (Ref range: <5.7 %) within past 6 months, PRESENT ON ADMISSION      # Financial/Environmental Concerns:           ______________________________________________________________________    Interval History   Pt resting comfortably in bed. No acute complaints. Has some residual feeling in his right foot but denies any pain.  Denies fevers.  Denies nausea, vomiting or abdominal pain.  Patient anticipating plan from podiatry.    Data reviewed today: I reviewed all medications, new labs and imaging results over the last 24 hours. Please see discussion of these results in the A/P.    Physical Exam    Vital Signs: Temp: 97.6  F (36.4  C) Temp src: Oral BP: (!) 148/83 Pulse: 73     Resp: 18 SpO2: 100 % O2 Device: None (Room air)    Weight: 0 lbs 0 oz    Constitutional: awake, alert, cooperative, in no acute distress. A&Ox3    Eyes: EOM, PERRLA. Sclera clear, conjunctiva normal. Anicteric   HENT: Normocephalic, without obvious abnormality, atraumatic.    Respiratory: No increased work of breathing.   GI: Soft, non-tender without rebound or guarding.   Musculoskeletal:  Full range of motion noted.  See R foot below  Neurologic: Cranial nerves II-XII are grossly intact.   Neuropsychiatric: General: normal, calm and normal eye contact. Normal affect                          Data   Recent Labs   Lab 05/05/25  1200 05/05/25  0618 05/04/25  1757 05/04/25  1117 05/04/25  0819 05/04/25  0232 05/03/25  1641   WBC 4.6  --   --   --  4.4  --  5.0   HGB 12.2*  --   --   --  10.9*  --  11.6*   MCV 83  --   --   --  82  --  83     --   --   --  225  --  229   INR  --   --   --   --   --   --  1.61*     --   --   --  137  --  138   POTASSIUM 4.4  --   --   --  4.1  --  4.6   CHLORIDE 107  --   --   --  106  --  103   CO2 20*  --   --   --  21*  --  23   BUN 31.2*  --   --   --  37.2*  --  42.4*   CR 1.43*  --   --   --  1.69*  --  2.15*   ANIONGAP 11  --   --   --  10  --  12   QUE 9.4  --   --   --  9.2  --  9.5   * 183* 149*   < > 166*   < > 225*   ALBUMIN  --   --   --   --  3.7  --  3.8   PROTTOTAL  --   --   --   --   --   --  6.9   BILITOTAL  --   --   --   --   --   --  0.3   ALKPHOS  --   --   --   --   --   --  89   ALT  --   --   --   --   --   --  9   AST  --   --   --   --   --   --  20    < > = values in this interval not displayed.       No results found for this or any previous visit (from the past 24 hours).

## 2025-05-05 NOTE — PHARMACY-VANCOMYCIN DOSING SERVICE
Pharmacy Vancomycin Note  Date of Service May 5, 2025  Patient's  1960   64 year old, male    Indication: Osteomyelitis and gangrene  Day of Therapy: 3  Current vancomycin regimen:  intermittent dosing  Current vancomycin monitoring method: Trough (Method 2 = manual dose calculation)  Current vancomycin therapeutic monitoring goal: 15-20 mg/L    Current estimated CrCl = Estimated Creatinine Clearance: 52.2 mL/min (A) (based on SCr of 1.43 mg/dL (H)).    Creatinine for last 3 days  5/3/2025:  4:41 PM Creatinine 2.15 mg/dL  2025:  8:19 AM Creatinine 1.69 mg/dL  2025: 12:00 PM Creatinine 1.43 mg/dL    Recent Vancomycin Levels (past 3 days)  2025: 12:00 PM Vancomycin 6.2 ug/mL    Vancomycin IV Administrations (past 72 hours)                     vancomycin (VANCOCIN) 1,500 mg in 0.9% NaCl 250 mL intermittent infusion ()  Restarted 25 185                    Nephrotoxins and other renal medications (From now, onward)      Start     Dose/Rate Route Frequency Ordered Stop    25 1400  vancomycin (VANCOCIN) 1,500 mg in 0.9% NaCl 250 mL intermittent infusion         1,500 mg  over 90 Minutes Intravenous EVERY 24 HOURS 25 1320      25 0800  empagliflozin (JARDIANCE) tablet 10 mg        Note to Pharmacy: PTA Sig:Take 12.5 mg by mouth every morning. TAKE ONE-HALF TABLET BY MOUTH EVERY MORNING FOR DIABETES AND KIDNEY PROTECTION      10 mg Oral EVERY MORNING 25  [Held by provider]  torsemide (DEMADEX) tablet 10 mg        (On hold since Sat 5/3/2025 at 1947 until manually unheld; held by Vinh Crews MDHold Reason: Acute Kidney Injury)   Note to Pharmacy: PTA Sig:Take 10 mg by mouth 2 times daily.      10 mg Oral 2 TIMES DAILY 25 192  tacrolimus (GENERIC EQUIVALENT) capsule 2 mg        Note to Pharmacy: PTA Sig:Take 2 capsules (2 mg) by mouth 2 times daily.      2 mg Oral 2 TIMES DAILY. 25                 Contrast  Orders - past 72 hours (72h ago, onward)      Start     Dose/Rate Route Frequency Stop    05/03/25 1745  iopamidol (ISOVUE-370) solution 99 mL         99 mL Intravenous ONCE 05/03/25 1756            Interpretation of levels and current regimen:  Vancomycin level is reflective of  approaching steady state    Has serum creatinine changed greater than 50% in last 72 hours: Yes    Urine output:  unable to determine    Renal Function: Improving    Insight[employer] Prediction of Planned New Vancomycin Regimen  Loading dose: N/A  Regimen: 1000 mg IV every 18 hours.  Start time: 13:10 on 05/05/2025  Exposure target: AUC24 (range)400-600 mg/L.hr   AUC24,ss: 548 mg/L.hr  Probability of AUC24 > 400: 83 %  Ctrough,ss: 17.7 mg/L  Probability of Ctrough,ss > 20: 38 %  Probability of nephrotoxicity (Lodise KENTRELL 2009): 14 %      Plan:  Increase Dose to vancomycin 1000 mg every 18 hours  Vancomycin monitoring method: AUC  Vancomycin therapeutic monitoring goal: 400-600 mg*h/L  Pharmacy will check vancomycin levels as appropriate in 1-3 Days.  Serum creatinine levels will be ordered daily for the first week of therapy and at least twice weekly for subsequent weeks.    Davidson Batista RPH

## 2025-05-05 NOTE — PROGRESS NOTES
WOC Service Consult Note     S:WOC consulted 5/3/25 for right foot.    B: Podiatry consulted 5/3/25, Vascular and orthopedics consulted 5/3/25.    Per Medicine PA notes 5/4/25:  64 year old male with hypertension, chronic systolic heart failure, type 2 diabetes, peripheral artery disease status post left BKA, right femoral-tibial bypass (3/20/2025), right diabetic foot ulcer, osteomyelitis, history of MI, status post renal transplant, CKD stage III, with with complaints of progressive right foot pain and swelling and being admitted for progressive R foot severe diabetic foot ulcer and possible R TMA.   R Diabetic foot ulcer  C/f osteomyelitis  PAD s/p Left BKA and Right F-T stent  Vascular surgery consulted  > No acute surgical plan  > Recommending to continue ASA until day before surgery considering LE stent.   - Orthopedics consulted              > No plan for inpatient surgical intervention  - Will discuss with podiatry tomorrow    A:Chart reviewed, see above. No current dressing care orders in [facility].    R:Clarify with either Orthpedic, Vascular or Podiatry for dressing change orders. Per WO service agreement: Providers (Hospitalists, ED physicians) are to avoid consulting a specialty service and WOC simultaneously for the same issue. If WOC is consulted at the same time as a specialty service (i.e. surgery, podiatry, dermatology) the WOC RN will not assess the patient until after the provider on the specialty service has assessed the patient and only if requested by the specialty provider.       WOC service will sign off, please re-consult with further concerns.     Maryse Villeda, RN, BSN, CWOCN   Pager no longer is use, please contact through [email or web address]: WOC Nurse [place]  Dept. Office Number: [phone]

## 2025-05-06 ENCOUNTER — MEDICAL CORRESPONDENCE (OUTPATIENT)
Dept: HEALTH INFORMATION MANAGEMENT | Facility: CLINIC | Age: 65
End: 2025-05-06
Payer: MEDICARE

## 2025-05-06 ENCOUNTER — TELEPHONE (OUTPATIENT)
Dept: VASCULAR SURGERY | Facility: CLINIC | Age: 65
End: 2025-05-06

## 2025-05-06 ENCOUNTER — TELEPHONE (OUTPATIENT)
Dept: FAMILY MEDICINE | Facility: CLINIC | Age: 65
End: 2025-05-06
Payer: MEDICARE

## 2025-05-06 DIAGNOSIS — Z95.828 STATUS POST VASCULAR BYPASS: ICD-10-CM

## 2025-05-06 DIAGNOSIS — I73.9 PAD (PERIPHERAL ARTERY DISEASE): Primary | ICD-10-CM

## 2025-05-06 LAB
ANION GAP SERPL CALCULATED.3IONS-SCNC: 10 MMOL/L (ref 7–15)
BUN SERPL-MCNC: 29.3 MG/DL (ref 8–23)
CALCIUM SERPL-MCNC: 9.6 MG/DL (ref 8.8–10.4)
CHLORIDE SERPL-SCNC: 106 MMOL/L (ref 98–107)
CREAT SERPL-MCNC: 1.44 MG/DL (ref 0.67–1.17)
EGFRCR SERPLBLD CKD-EPI 2021: 54 ML/MIN/1.73M2
ERYTHROCYTE [DISTWIDTH] IN BLOOD BY AUTOMATED COUNT: 15 % (ref 10–15)
GLUCOSE BLDC GLUCOMTR-MCNC: 164 MG/DL (ref 70–99)
GLUCOSE BLDC GLUCOMTR-MCNC: 179 MG/DL (ref 70–99)
GLUCOSE BLDC GLUCOMTR-MCNC: 231 MG/DL (ref 70–99)
GLUCOSE BLDC GLUCOMTR-MCNC: 262 MG/DL (ref 70–99)
GLUCOSE SERPL-MCNC: 186 MG/DL (ref 70–99)
HCO3 SERPL-SCNC: 20 MMOL/L (ref 22–29)
HCT VFR BLD AUTO: 37.9 % (ref 40–53)
HGB BLD-MCNC: 11.8 G/DL (ref 13.3–17.7)
MCH RBC QN AUTO: 25.3 PG (ref 26.5–33)
MCHC RBC AUTO-ENTMCNC: 31.1 G/DL (ref 31.5–36.5)
MCV RBC AUTO: 81 FL (ref 78–100)
PLATELET # BLD AUTO: 220 10E3/UL (ref 150–450)
POTASSIUM SERPL-SCNC: 4.9 MMOL/L (ref 3.4–5.3)
RBC # BLD AUTO: 4.66 10E6/UL (ref 4.4–5.9)
SODIUM SERPL-SCNC: 136 MMOL/L (ref 135–145)
WBC # BLD AUTO: 5.9 10E3/UL (ref 4–11)

## 2025-05-06 PROCEDURE — 250N000011 HC RX IP 250 OP 636: Mod: JZ | Performed by: HOSPITALIST

## 2025-05-06 PROCEDURE — 36415 COLL VENOUS BLD VENIPUNCTURE: CPT

## 2025-05-06 PROCEDURE — 99233 SBSQ HOSP IP/OBS HIGH 50: CPT | Mod: 57 | Performed by: ASSISTANT, PODIATRIC

## 2025-05-06 PROCEDURE — 250N000013 HC RX MED GY IP 250 OP 250 PS 637: Performed by: HOSPITALIST

## 2025-05-06 PROCEDURE — 82962 GLUCOSE BLOOD TEST: CPT

## 2025-05-06 PROCEDURE — 85014 HEMATOCRIT: CPT

## 2025-05-06 PROCEDURE — 82310 ASSAY OF CALCIUM: CPT

## 2025-05-06 PROCEDURE — 250N000012 HC RX MED GY IP 250 OP 636 PS 637: Performed by: HOSPITALIST

## 2025-05-06 PROCEDURE — 120N000002 HC R&B MED SURG/OB UMMC

## 2025-05-06 PROCEDURE — 99207 PR NO BILLABLE SERVICE THIS VISIT: CPT | Performed by: STUDENT IN AN ORGANIZED HEALTH CARE EDUCATION/TRAINING PROGRAM

## 2025-05-06 PROCEDURE — 250N000011 HC RX IP 250 OP 636: Performed by: PEDIATRICS

## 2025-05-06 PROCEDURE — 82374 ASSAY BLOOD CARBON DIOXIDE: CPT

## 2025-05-06 PROCEDURE — 99233 SBSQ HOSP IP/OBS HIGH 50: CPT | Performed by: STUDENT IN AN ORGANIZED HEALTH CARE EDUCATION/TRAINING PROGRAM

## 2025-05-06 RX ADMIN — ATORVASTATIN CALCIUM 40 MG: 40 TABLET, FILM COATED ORAL at 21:37

## 2025-05-06 RX ADMIN — MYCOPHENOLATE MOFETIL 500 MG: 500 TABLET, FILM COATED ORAL at 08:20

## 2025-05-06 RX ADMIN — METRONIDAZOLE 500 MG: 500 INJECTION, SOLUTION INTRAVENOUS at 18:37

## 2025-05-06 RX ADMIN — METRONIDAZOLE 500 MG: 500 INJECTION, SOLUTION INTRAVENOUS at 11:32

## 2025-05-06 RX ADMIN — METOPROLOL SUCCINATE 75 MG: 25 TABLET, EXTENDED RELEASE ORAL at 08:22

## 2025-05-06 RX ADMIN — ASPIRIN 81 MG CHEWABLE TABLET 81 MG: 81 TABLET CHEWABLE at 08:23

## 2025-05-06 RX ADMIN — SODIUM BICARBONATE 1300 MG: 650 TABLET ORAL at 08:22

## 2025-05-06 RX ADMIN — METRONIDAZOLE 500 MG: 500 INJECTION, SOLUTION INTRAVENOUS at 02:42

## 2025-05-06 RX ADMIN — CEFEPIME 2 G: 2 INJECTION, POWDER, FOR SOLUTION INTRAVENOUS at 06:04

## 2025-05-06 RX ADMIN — CALCITRIOL CAPSULES 0.25 MCG 0.25 MCG: 0.25 CAPSULE ORAL at 08:23

## 2025-05-06 RX ADMIN — BRINZOLAMIDE/BRIMONIDINE TARTRATE 1 DROP: 10; 2 SUSPENSION/ DROPS OPHTHALMIC at 08:20

## 2025-05-06 RX ADMIN — PANTOPRAZOLE SODIUM 40 MG: 40 TABLET, DELAYED RELEASE ORAL at 08:23

## 2025-05-06 RX ADMIN — CEFEPIME 2 G: 2 INJECTION, POWDER, FOR SOLUTION INTRAVENOUS at 17:27

## 2025-05-06 RX ADMIN — VANCOMYCIN HYDROCHLORIDE 1000 MG: 1 INJECTION, SOLUTION INTRAVENOUS at 08:24

## 2025-05-06 RX ADMIN — SODIUM BICARBONATE 1300 MG: 650 TABLET ORAL at 21:36

## 2025-05-06 RX ADMIN — EMPAGLIFLOZIN 10 MG: 10 TABLET, FILM COATED ORAL at 08:24

## 2025-05-06 RX ADMIN — MYCOPHENOLATE MOFETIL 500 MG: 500 TABLET, FILM COATED ORAL at 21:35

## 2025-05-06 RX ADMIN — BRINZOLAMIDE/BRIMONIDINE TARTRATE 1 DROP: 10; 2 SUSPENSION/ DROPS OPHTHALMIC at 21:34

## 2025-05-06 RX ADMIN — FERROUS SULFATE TAB 325 MG (65 MG ELEMENTAL FE) 325 MG: 325 (65 FE) TAB at 08:23

## 2025-05-06 RX ADMIN — TAMSULOSIN HYDROCHLORIDE 0.8 MG: 0.4 CAPSULE ORAL at 08:20

## 2025-05-06 RX ADMIN — TACROLIMUS 2 MG: 1 CAPSULE ORAL at 08:23

## 2025-05-06 RX ADMIN — TACROLIMUS 2 MG: 1 CAPSULE ORAL at 17:27

## 2025-05-06 RX ADMIN — SODIUM BICARBONATE 1300 MG: 650 TABLET ORAL at 14:51

## 2025-05-06 ASSESSMENT — ACTIVITIES OF DAILY LIVING (ADL)
ADLS_ACUITY_SCORE: 62
ADLS_ACUITY_SCORE: 62
ADLS_ACUITY_SCORE: 61
ADLS_ACUITY_SCORE: 62
DEPENDENT_IADLS:: CLEANING;COOKING;LAUNDRY;SHOPPING;MEAL PREPARATION;MEDICATION MANAGEMENT;TRANSPORTATION;INCONTINENCE
ADLS_ACUITY_SCORE: 61
ADLS_ACUITY_SCORE: 61
ADLS_ACUITY_SCORE: 42
ADLS_ACUITY_SCORE: 61
ADLS_ACUITY_SCORE: 62
ADLS_ACUITY_SCORE: 42
ADLS_ACUITY_SCORE: 61
ADLS_ACUITY_SCORE: 62
ADLS_ACUITY_SCORE: 62
ADLS_ACUITY_SCORE: 61
ADLS_ACUITY_SCORE: 62
ADLS_ACUITY_SCORE: 62
ADLS_ACUITY_SCORE: 61
ADLS_ACUITY_SCORE: 62
ADLS_ACUITY_SCORE: 62
ADLS_ACUITY_SCORE: 42
ADLS_ACUITY_SCORE: 61

## 2025-05-06 NOTE — TELEPHONE ENCOUNTER
Forms/Letter Request    Type of form/letter: Transfer Summary form Episode 1 3/27/2025-5/25/2025    Do we have the form/letter: Yes: Dr Anthony's box    Who is the form from? [facility]    Where did/will the form come from? form was faxed in    How would you like the form/letter returned: Fax : [phone]    Susy Morocho MA/  [facility]   Primary Care

## 2025-05-06 NOTE — TELEPHONE ENCOUNTER
Received provider signed forms from TC bin and faxed to [facility] at [phone] on 05/06/2025. Right fax confirmed at 8:35AM.    Jan Shore

## 2025-05-06 NOTE — TELEPHONE ENCOUNTER
Forms/Letter Request     Type of form/letter: [facility] - Transfer Order   Order NO - [id number]  Order Date: 05/04/2025     Do we have the form/letter: Yes: placed in provider bin     Who is the form from? Home care     Where did/will the form come from? form was faxed in     When is form/letter needed by: asap     How would you like the form/letter returned: Fax : [phone]     Patient Notified form requests are processed in 5-7 business days:N/A     Okay to leave a detailed message?: N/A at Cell number on file:        Telephone Information:   Mobile [phone]

## 2025-05-06 NOTE — PLAN OF CARE
Goal Outcome Evaluation:      Plan of Care Reviewed With: patient and daughter Jarrod Zaman    Overall Patient Progress: improvingOverall Patient Progress: improving    Outcome Evaluation: TCU/ARU placement when medically stable    REILLY Vail, INES  ED/OBS   [facility]  Phone: [phone]  Pager: [phone]  Fax: [phone]     After hours Point and After Hours Vocera [place]     On-call pager, [phone], 4:00 pm to 8:30 pm

## 2025-05-06 NOTE — TELEPHONE ENCOUNTER
Form faxed to [facility] [phone] on 5/6/2025 at 10:45am. Copy placed in abstract and original in TC copy bin.   Alvina Diana

## 2025-05-06 NOTE — PLAN OF CARE
/82 (BP Location: Left arm)   Pulse 81   Temp 98.4  F (36.9  C) (Oral)   Resp 20   SpO2 100%    Time:  8683-9732  Patient is alert and oriented, denied pain or discomfort, on renal diet, R foot dressing intact, patient uses bed side commode independently, slept well most of the night, VS stable.

## 2025-05-06 NOTE — PLAN OF CARE
Nursing Plan of Care Note  Shift: 9435-4728    General: A&Ox4. Able to follow commands and make needs known. Afebrile. Denies pain.  Cardiac/Resp: VSS on RA.  /GI: Tolerating renal diet. Voids spontaneously.   Skin: Left BKA, R necrotic toes  Access/infusions: L PIV   R limb restriction - fistula not in use  Activity: SBA with walker. Up to commode.     Plan: To be NPO at midnight for surgical procedure tomorrow. Continue with plan of care. Notify primary team with changes.     Goal Outcome Evaluation:  Plan of Care Reviewed With: patient  Overall Patient Progress: improving

## 2025-05-06 NOTE — PROGRESS NOTES
Podiatry Progress Note    Date: May 6, 2025      ASSESSMENT/PLAN:  Patient was seen and evaluated today for consult regarding right foot diabetic/peripheral vascular wounds.    He is on OR board at  for 5/7/25 at 1:25pm for transmetatarsal amp.   -Thank you Vascular for assistance and weighing in on blow flow/revasc. No further plans from their end, flow for TMA tenuous but reasonable to try in limb salvage attempt.   -Thank you Primary for help in pre-op workup and optimization.    -DVT ppx stopped at midnight prior, 2-3 hr before if UFH, NPO at midnight, OK to continue ASA throughout.    Reviewed plan for tomorrow with patient and his daughter Jarrod. They are both in agreement to complete TMA. We again discussed risks together, I discussed specifically with them the concern of overall blood flow to the right, although s/p bypass this area is still high risk of thrombosis and current flow is tenuous. If TMA were to fail he may require further debridement, amputation in the foot or even loss of the limb. Patient and daughter understanding of this but feel if there is a chance to save even a portion of the foot so he has a chance to maintain independence, they would like to try. I feel this is reasonable.  Again, no guarantees were made to the patient or family on the final outcome of the right foot and extremity.     I also reinforced today that a crucial portion of his post-op care will be to be strict NWB to right foot for at least 4 weeks to allow sutures to heal and if FRANCESCO is completed this area will need time to heal as well. Patient understand of this and states he is motivated to do so.     Plan for Right foot TMA with FRANCESCO tomorrow afternoon.     _________________________________________    Barriers to Healing (Discussed):    -Longstanding history of diabetes with elevated A1c, previous history of ulcerations and amputations due to diabetes and infection    -History of renal disease, status post  transplant on immunosuppressive therapy    -Known peripheral vascular disease status post multiple interventions by vascular surgery team    Postoperative course, it would be crucial for patient to work on controlling blood glucose, It will also be crucial that patient is strict nonweightbearing to the right lower extremity for a minimum of 4 to 6 weeks to prevent surgery site dehiscence.  __________________________________________    Surgery: OR 5/7 for procedure  Activity/WB: Would like him to be off the right foot as much as possible can use postoperative shoe if needed, postprocedure will order DME for him  DME: Cam boot postprocedure  Antibiotics: per team/ID  Labs: Recommend trending inflammation markers  Imaging: No further imaging needed  Diet: NPO midnight  Dressing: Betadine wet-to-dry over dry gangrene areas change daily or sooner if soiled  Dispo: pending    OBJECTIVE:    IMAGING:  Narrative & Impression   EXAM: CT FOOT RIGHT W CONTRAST  LOCATION: [facility]  DATE: 05/03/2025     INDICATION: History of gangrene, right foot; progressive wounds, erythema, edema.  COMPARISON: Same-day radiographs.  TECHNIQUE: IV contrast. Axial, sagittal, and coronal thin-section reconstruction. Dose reduction techniques were used.   CONTRAST: 99 mL Iso 370.     FINDINGS: Again seen are changes of advanced Charcot arthropathy with fragmentation, fracture, and malalignment within the midtarsal and midfoot region as well as the 1st MTP joint. Dorsal dislocation of the 2nd MTP joint again seen.     Remote healed fracture deformity of the distal fibular shaft. Bones are demineralized.     There is some soft tissue swelling and soft tissue gas along the superficial aspect of the plantar great toe, presumably associated with an area of ulceration. Along the distal phalanx/tuft of the great toe there is soft tissue gas at an area of skin   breakdown/ulceration which abuts the bony  cortex of the distal phalanx where there is underlying erosive change, concerning for distal phalangeal osteomyelitis. Small foci of soft tissue gas extend proximally to the level of the proximal phalanx of the   great toe.     There is soft tissue stranding and irregularity along the distal aspect of the 2nd toe without soft tissue gas or well-defined abscess.     There is no well-defined rim-enhancing fluid collection on CT with attention to the great toe, although poorly-defined abscess is not excluded.     Fatty atrophy of intrinsic musculature of the foot.     More global diffuse soft tissue swelling in the subcutaneous tissues with skin thickening.     Chronic bony proliferative change along the distal syndesmosis of the ankle.                                                                      IMPRESSION:  1.  Soft tissue deficiency, ulceration, and gas along the plantar aspect of the great toe with underlying erosive change of the distal phalanx, concerning for osteomyelitis. Soft tissue gas extends proximally to the level of the proximal phalanx of the   great toe where there is soft tissue irregularity. Findings are favored to be related to gas from direct extension from the area of ulceration as opposed to a gas-forming organism, although this is difficult to completely rule-out on CT alone.  2.  Soft tissue stranding and irregularity distal aspect of the 2nd toe without discrete abscess or evidence for osteomyelitis at this time. Note is made that there is dorsal dislocation of the 2nd MTP joint.  3.  No well-defined rim-enhancing fluid collection on CT with attention to the great toe, although poorly-defined abscess is not excluded as there is poorly-defined circumferential soft tissue swelling.  4.  More global soft tissue swelling about the foot, which can be seen with cellulitis as well as noninfectious causes of edema.  5.  Advanced Charcot arthropathy as described.      NOTE: ABNORMAL REPORT      THE DICTATION ABOVE DESCRIBES AN ABNORMALITY FOR WHICH FOLLOWUP IS NEEDED.        /63 (BP Location: Left arm, Cuff Size: Adult Regular)   Pulse 71   Temp 98.4  F (36.9  C) (Oral)   Resp 20   SpO2 100%     Lab Results   Component Value Date    WBC 5.9 05/06/2025     Lab Results   Component Value Date    RBC 4.66 05/06/2025     Lab Results   Component Value Date    HGB 11.8 05/06/2025     Lab Results   Component Value Date    HCT 37.9 05/06/2025     Lab Results   Component Value Date    MCV 81 05/06/2025     Lab Results   Component Value Date    MCH 25.3 05/06/2025     Lab Results   Component Value Date    MCHC 31.1 05/06/2025     Lab Results   Component Value Date    RDW 15.0 05/06/2025     Lab Results   Component Value Date     05/06/2025         Erythrocyte Sedimentation Rate   Date Value Ref Range Status   05/03/2025 18 0 - 20 mm/hr Final     CRP Inflammation   Date Value Ref Range Status   05/03/2025 <3.00 <5.00 mg/L Final         PHYSICAL EXAM:    General: Patient is in NAD and is AAOx4, communicates appropriately    Derm: Skin to the right lower extremity is shiny, dry, slightly atrophic and cool to the touch especially distally, he has some areas of hyperpigmentation as well as dry gangrene to toes 1 through 3 with a slight malodor initially, there is area of necrosis to the plantar sulcus of the great toe.  There was no expressible purulence, no significant erythema or streaking erythema no significant edema at this time              Vascular:  DP pulse is difficult to palpate on right  PT pulse is difficult to palpate on the right  Cap refill is sluggish to the toes on right foot, areas of dry gangrene to toes as seen in photo  Pedal hair is absent to right foot      MSK:  Below the knee amputation on the left side, 5 out of 5 muscle strength to foot and ankle on right side, he does have planus foot type with prominent plantar midfoot consistent with Charcot deformity, he does have good  range of motion of the ankle with adequate dorsiflexion    Neuro: Gross sensation is absent via light touch to pedal nerve branches right side, BKA on left side        HPI:  Patient is a pleasant 64-year-old male with past medical history of type 2 diabetes with neuropathy, peripheral arterial disease status post left BKA and has a right F-T stent.  He is followed by vascular surgery in the outpatient setting as well as podiatry.  Recently evaluated by podiatry with plans to workup for transmetatarsal amputation at the bare minimum.  Patient presented to the emergency department for evaluation of worsening swelling and malodor to the right foot wound areas/dry gangrene areas.  He was told that MRI showed areas of infection within the bone, prompting patient as well as his daughter to present for evaluation in case of worsening infection.  He also has a history of previous kidney transplant and is on immunosuppressive therapy, high risk for worsening infection.  He denies any fevers, chills, nausea or otherwise has been well.  He does note that he has home health care nurse that comes out to help with dressing changes but a few changes were missed prompting wound area to worsen, I discussed with daughter today who is worried that the home is not the best situation for him especially for nonweightbearing and wound care.  Denies any trauma or other issues with health    Interval: NAEO, no new issue with foot, otherwise is stable at this time  Past Medical History:   Diagnosis Date    Chronic kidney disease     Diabetes (H)     Hypertension     Myocardial infarction (H)      Social Connections: Not on file        Allergies   Allergen Reactions    Penicillins Hives     Past Surgical History:   Procedure Laterality Date    ANGIOGRAM Right 3/17/2025    Procedure: ANGIOGRAM;  Surgeon: Messi Irving MD;  Location: [facility] OR    ANGIOPLASTY Right 3/17/2025    Procedure: ANGIOPLASTY;  Surgeon: Messi Irving MD;  Location: [facility] OR     BYPASS GRAFT FEMOROTIBIAL Right 3/20/2025    Procedure: Right Femoral Anterior Tibial Bypass using Cryo preserve artery;  Surgeon: Messi Irving MD;  Location: [facility] OR    IR OR ANGIOGRAM  3/17/2025    IR OR ANGIOGRAM  3/20/2025    OR ANGIOGRAM, LOWER EXTREMITY Right 3/20/2025    Procedure: COMPLETION ANGIOGRAM;  Surgeon: Messi Irving MD;  Location: [facility] OR     No family history on file.

## 2025-05-06 NOTE — TELEPHONE ENCOUNTER
Cora the message in the staff message stated  or .  I scheduled the pt on 8/11 at the [facility] for imaging and with  would you like me reschedule the appt to be seen by ?  Luis Joshua on 5/6/2025 at 2:27 PM      
I can change it without a problem just say the words.  Luis Joshua on 5/6/2025 at 2:48 PM    
The pt is scheduled on 8/13 at the [facility] for [place] and with Ihnat.  Luis Joshua on 5/6/2025 at 5:05 PM   
Vascular Clinic Appointment Request    Visit type: In-person   Imaging needed: Yes. Orders placed.    Provider: Dr. Diego   Timeframe: 3 months from now (August)   Appointment notes: Follow-up R CFA-AT bypass     Appointment request routed to clinic coordinator pool for scheduling.    Cora Ortiz, RN  RN Care Coordinator - [facility] [place]  Supporting Dr Kidd, Dr Akhtar, & Piedad Lloyd, JANNETH  Phone: [phone]  Fax: [phone]                
CHEMOTHERAPY TUBING DISCONNECTED/see chief complaint quote

## 2025-05-06 NOTE — PROGRESS NOTES
Vascular Surgery Progress Note    Arterial duplex reviewed, bypass in RLE is widely patent and although w/ velocities in 30s-40s cm/sec has no evidence of stenosis to suggest this is post obstructive waveform.     Notably patient with multiphasic dp doppler signal and dp is palapble on my exam. This is indicative of wound healing potential.     Note podiatry plan for TMA of RLE tomorrow - OK to proceed from vascular perspective as he is as optimized as possible from our perspective.     For his femoral-AT artery bypass w/ cryoartery - we recommend therapeutic anticoagulation.     He should be on a heparin drip while in hospital for bridging as he is high risk for bypass occlusion - OK to hold prior to podiatry to procedure and after as necessry, but with plan to restart anticoagulation.     Will need to go  home on oral anticoagulation which will be lifelong. Note he is already on a/c for managementof his afib however now it will also be for bypass patency and given it is for bypass patency we DO recommend bridging.     Patient with appt with  our NP, Piedad, tomorrow. We will cancel this, will rearrange follow-up for 3 month post hopsitalization to eval for improvemetn in RLE wounds and healing of RLE after TMA w/ isha and duplex at that time.     Patient should see us sooner if RLE TMA fails to heal or wounds progress again as may require further angioplasty or treatement of his lower extremity basculature     Discussed w/ vascular surgery staff.     Jose R Long MD  Vascular Surgery PGY4

## 2025-05-06 NOTE — TELEPHONE ENCOUNTER
Received provider signed forms from [facility] and faxed to [facility] at [phone] on 05/06/2025. Right fax confirmed at 12:26PM.    Jan Shore

## 2025-05-06 NOTE — PROGRESS NOTES
[facility]    Medicine Progress Note - Hospitalist Service, GOLD TEAM 4    Date of Admission:  5/3/2025    Assessment & Plan    Tad Zaman is a 64 year old male with hypertension, chronic systolic heart failure, type 2 diabetes, peripheral artery disease status post left BKA, right femoral-tibial bypass (3/20/2025), right diabetic foot ulcer, osteomyelitis, history of MI, status post renal transplant, CKD stage III, with with complaints of progressive right foot pain and swelling and being admitted for progressive R foot severe diabetic foot ulcer and possible R TMA.     Today:  - Discussed cares with podiatry today. Planning for TMA in the OR tomorrow at 1325  - Appreciate input from vascular surgery. Post operatively will plan to start heparin gtt with transition to PTA DOAC. Timing of AC resumption pending podiatry recs   - Preop risk stratification as below:      # Preoperative Risk Assessment   Cardiac Risk:   - Revised Cardiac Index Score is 3 based upon hx of MI, CHF, TIA. This indicates an approximate  15% 30 day risk of post-operative cardiac death including MI or cardiac arrest.  - He is able to perform > 4 METs   - Pt recently had preoperative workup as an outpatient including Lexiscan stress test on 3/18/2025 that was negative for inducible ischemia, low normal EF 51%.   - He is currently euvolemic on exam    Pulmonary Risk:   ARISCAT score is 3 based upon age alone but otherwise no history of COPD, recent respiratory infection and no hypoxia. This indicates an approximate 1.6% risk of in-hospital pulmonary complications (including respiratory failure, infection, pleural effusions, atelectasis, pneumothorax, bronchospasm, aspiration or pneumonitis).     Pre-op Recommendations:  - Cardiac and pulmonary risks as above.   - Medications reviewed:   - will hold Jardiance and continue to hold Torsemide   - Ok to continue remainder of medications   - Final  decision on whether or not to proceed with surgery is deferred to anesthesia and surgeon.          R Diabetic foot ulcer  C/f osteomyelitis  PAD s/p Left BKA and Right F-T stent  Chronic immunosuppression  Presents with history of progressive R diabetic foot ulcer with outpatient plans for R TMA, but having concern for worsening swelling and foul odor. He had recent Arterial doppler on 5/2 with good stent flow. Workup in ED reassuring with WBC WNL, Lactate 1.9, CRP and ESR WNL. LLE Duplex negative for DVT. CT with contrast concerning for soft tissue gas which is favored to be from direct extension from the area of ulceration as opposed to gas-formin organism, though cannot rule out. Exam with edema of the foot, no surrounding erythema. Wounds without producible purulence or drainage.   - Appreciate input from podiatry - tentative TMA on 5/7  - Continue Cefepime (allergy profile), vancomycin and flagyl (5/3/25 - ### )  - Follow 5/3 Bcx, NGTD; will consider narrowing abx tomorrow pending growth  - Vascular surgery consulted  - Blood flow is tenuous to R foot. Per our conversation, it is reasonable to pursue TMA but note pt is high risk of needing additional amputation in the future   - Will need therapeutic AC following TMA, restart timing per podiatry    - currently holding PTA Apixaban   - Podiatry recommends bridging with IV heparin post operatively prior to resumption of Apixaban   - Follow up with Vascular in 3 months, sooner if TMA fails to heal as he may require further angioplasty    - Consult Transplant ID prior to DC to assist with abx course  - Continue PTA ASA for now   - Daily CBC, BMP     S/P Renal transplant  MOY, resolved  CKD3  Cr baseline around ~1.6. Cr on admit 2.15. Renal transplant US 5/3 with moderate hydronephrosis of the transplant kidney, that improved post void. Elevated resistive indices suggestive of renal transplant dysfunction. Was given 2L NS on admit and was started on 125ml/hr  overnight. Cr this morning 1.69  - Renal transplant consulted  - Continue PTA Tacro 2 mg BID  - Continue PTA Mycophenolate 500 mg BID for now  - HOLD PTA Torsemide  - Continue PTA sodium bicarb and Patiromer     History of CVA/TIA  Chronic AC, ASA  He is on dual coverage with Apixaban and ASA for history of CVA/TIA and PAD per family. Apixaban held on admission given need for procedure.   - HOLD Apixaban given possible procedure; will need to have chemical VTE ppx added if no AC beyond 5/6  - ASA plan above     Hypertension  Chronic systolic heart failure/HFmrEF  History of chronic systolic heart failure, MI but no clear history of CAD or cardiac revascularization. He had TTE on 2/25/25 with EF of 40-45% and mild pulmonary hypertension likely Group 2. No signs of acute HF exacerbation.  - Continue PTA Metoprolol   - Continue PTA Statin     DM2  Last A1C 7.9 on 3/3/25. Outpatient regimen includes Metformin and Jardiance. Was started on MSSI on admit but patient refusing so this was discontinued. Last 24h trend:   - HOLD PTA Metformin  - Continue PTA Jardiance  - Hypoglycemia protocol  - Follow BG trend, will continue to discuss with patient the importance for BG control in perioperative setting    Recent Labs   Lab 05/06/25  1454 05/06/25  0752 05/05/25  2354 05/05/25  1200 05/05/25  0618 05/04/25  1757   * 186* 164* 169* 183* 149*         BPH   - Continue PTA Tamsulosin     Glaucoma   - Continue PTA gtts                Diet: Combination Diet Renal Diet (non-dialysis); Moderate Consistent Carb (60 g CHO per Meal) Diet    DVT Prophylaxis: held in anticipation of upcoming TMA on 5/7  Zaman Catheter: Not present  Lines: None     Cardiac Monitoring: None  Code Status: Full Code        Disposition Plan:  Medically Ready for Discharge: Anticipated in 5+ Days    Expected Discharge Date: 05/10/2025                  The patient's care was discussed with staff physician, Bedside Nurse, Patient, and podiatry and vascular  Consultant(s).    Eugenio Trivedi PA-C  Hospitalist Service, GOLD TEAM 4  [facility]  Securely message with combionic (more info)  Text page via [employer] Paging/Directory     Clinically Significant Risk Factors                             # DMII: A1C = 7.9 % (Ref range: <5.7 %) within past 6 months, PRESENT ON ADMISSION        # Financial/Environmental Concerns:           ______________________________________________________________________    Interval History   Patient resting comfortably in bed.  No acute complaints today.  Denies pain in his foot.  He feels ready for amputation tomorrow.  Denies fevers, chills, night sweats.  No abdominal pain, nausea, vomiting    Data reviewed today: I reviewed all medications, new labs and imaging results over the last 24 hours. Please see discussion of these results in the A/P.    Physical Exam    Vital Signs: Temp: 98.6  F (37  C) Temp src: Oral BP: (!) 145/82 Pulse: 68     Resp: 20 SpO2: 100 % O2 Device: None (Room air)    Weight: 0 lbs 0 oz    Constitutional: awake, alert, cooperative, in no acute distress. A&Ox3    Eyes: EOM, PERRLA. Sclera clear, conjunctiva normal. Anicteric   HENT: Normocephalic, without obvious abnormality, atraumatic.   Respiratory: No increased work of breathing.   GI: Soft, non-tender without rebound or guarding.   Musculoskeletal:  Full range of motion noted.  See R foot below  Neurologic: Cranial nerves II-XII are grossly intact.   Neuropsychiatric: General: normal, calm and normal eye contact. Normal affect                          Data   Recent Labs   Lab 05/06/25  1454 05/06/25  0752 05/05/25  2354 05/05/25  1200 05/04/25  1117 05/04/25  0819 05/04/25  0232 05/03/25  1641   WBC  --  5.9  --  4.6  --  4.4  --  5.0   HGB  --  11.8*  --  12.2*  --  10.9*  --  11.6*   MCV  --  81  --  83  --  82  --  83   PLT  --  220  --  214  --  225  --  229   INR  --   --   --   --   --   --   --  1.61*   NA  --  136   --  138  --  137  --  138   POTASSIUM  --  4.9  --  4.4  --  4.1  --  4.6   CHLORIDE  --  106  --  107  --  106  --  103   CO2  --  20*  --  20*  --  21*  --  23   BUN  --  29.3*  --  31.2*  --  37.2*  --  42.4*   CR  --  1.44*  --  1.43*  --  1.69*  --  2.15*   ANIONGAP  --  10  --  11  --  10  --  12   QUE  --  9.6  --  9.4  --  9.2  --  9.5   * 186* 164* 169*   < > 166*   < > 225*   ALBUMIN  --   --   --   --   --  3.7  --  3.8   PROTTOTAL  --   --   --   --   --   --   --  6.9   BILITOTAL  --   --   --   --   --   --   --  0.3   ALKPHOS  --   --   --   --   --   --   --  89   ALT  --   --   --   --   --   --   --  9   AST  --   --   --   --   --   --   --  20    < > = values in this interval not displayed.       No results found for this or any previous visit (from the past 24 hours).

## 2025-05-06 NOTE — CONSULTS
Care Management Initial Consult    General Information  Assessment completed with: Patient, VM-chart review,    Type of CM/SW Visit: Initial Assessment    Primary Care Provider verified and updated as needed: Yes (Julius Cesar [phone] [facility] [employer] [address] [facility] [id number])   Readmission within the last 30 days: no previous admission in last 30 days      Reason for Consult: discharge planning, utilization management concerns (elevated readmission risk score)  Advance Care Planning:          Communication Assessment  Patient's communication style: spoken language (English or Bilingual)        Cognitive  Cognitive/Neuro/Behavioral: WDL                      Living Environment:   People in home: alone, other (see comments), sibling(s) (daughter has been staying with him since last admission)     Current living Arrangements: house      Able to return to prior arrangements: yes       Family/Social Support:  Care provided by: child(gina)  Provides care for: no one, unable/limited ability to care for self  Marital Status:   Support system: Other (specify), Children (ex-spouse)          Description of Support System: Supportive, Involved    Support Assessment: Adequate family and caregiver support, Adequate social supports    Current Resources:   Patient receiving home care services: Yes  Skilled Home Care Services: Skilled Nursing, Home Health Aid, Physical Therapy, Occupational Therapy (Provided by [facility] (Ph: [phone]; fax: [phone]))     Community Resources: DME, Home Care  Equipment currently used at home: cane, straight, grab bar, tub/shower, prosthesis, walker, rolling, other (see comments) (walk-in bathtub,)  Supplies currently used at home: Incontinence Supplies    Employment/Financial:  Employment Status: retired        Financial Concerns: none   Referral to Financial Worker: No     Does the patient's insurance plan have a 3 day qualifying hospital stay waiver?   No    Lifestyle & Psychosocial Needs:  Social Drivers of Health     Food Insecurity: Low Risk  (4/10/2025)    Food Insecurity     Within the past 12 months, did you worry that your food would run out before you got money to buy more?: No     Within the past 12 months, did the food you bought just not last and you didn t have money to get more?: No   Depression: Not at risk (4/10/2025)    PHQ-2     PHQ-2 Score: 0   Housing Stability: Low Risk  (4/10/2025)    Housing Stability     Do you have housing? : Yes     Are you worried about losing your housing?: No   Tobacco Use: Medium Risk (4/22/2025)    Patient History     Smoking Tobacco Use: Former     Smokeless Tobacco Use: Never     Passive Exposure: Never   Financial Resource Strain: Low Risk  (4/10/2025)    Financial Resource Strain     Within the past 12 months, have you or your family members you live with been unable to get utilities (heat, electricity) when it was really needed?: No   Alcohol Use: Not on file   Transportation Needs: Low Risk  (4/10/2025)    Transportation Needs     Within the past 12 months, has lack of transportation kept you from medical appointments, getting your medicines, non-medical meetings or appointments, work, or from getting things that you need?: No   Physical Activity: Not on file   Interpersonal Safety: Low Risk  (3/20/2025)    Interpersonal Safety     Do you feel physically and emotionally safe where you currently live?: Yes     Within the past 12 months, have you been hit, slapped, kicked or otherwise physically hurt by someone?: No     Within the past 12 months, have you been humiliated or emotionally abused in other ways by your partner or ex-partner?: No   Stress: Not on file   Social Connections: Not on file   Health Literacy: Not on file     Functional Status:  Prior to admission patient needed assistance:   Dependent ADLs:: Ambulation-cane, Ambulation-walker, Bathing, Dressing, Grooming, Incontinence, Transfers,  "Wheelchair-with assist, Toileting  Dependent IADLs:: Cleaning, Cooking, Laundry, Shopping, Meal Preparation, Medication Management, Transportation, Incontinence     Mental Health Status:  Mental Health Status: No Current Concerns       Chemical Dependency Status:  Chemical Dependency Status: No Current Concerns           Values/Beliefs:  Spiritual, Cultural Beliefs, Congregational Practices, Values that affect care:           Discussed  Partnership in Safe Discharge Planning  document with patient/family: No    Additional Information:    Per chart review: \"Tad Zaman is a 64 year old male with hypertension, chronic systolic heart failure, type 2 diabetes, peripheral artery disease status post left BKA, right femoral-tibial bypass (3/20/2025), right diabetic foot ulcer, osteomyelitis, history of MI, status post renal transplant, CKD stage III, with with complaints of progressive right foot pain and swelling and being admitted for progressive R foot severe diabetic foot ulcer and possible R TMA\" (Vinh Crews MD; 5/3/2025)    Care Management/Social Work Consult placed due to patient's complex medical history and elevated unplanned readmission risk score and for discharge planning. HARLAN performed chart review to begin assessment.     2385 SW met with Tad at bedside to complete assessment and confirm/update information in previous assessments (2/26/2025; 3/14/2025).  SW introduced self and role, and explained the reason for the visit. Tad agreed to speak with SW.    Tad reported that he lives in a house with his disabled brother. His three daughters all live locally and one of them has been staying with him since his last admission. He also reports that his ex-wife had been providing care, but recently returned to her home. He's had a walk-in bathtub installed in his home. He confirmed that he now receives PT/OT/RN HHA home care services from [facility] (Ph: [phone]; fax: [phone]). "     Tad reported that the plan going forward is to have part of his foot (transmetatarsal) amputated on 5/7 followed by 4-6 weeks of TCU/ARU rehab. His home care will resume after is rehab discharge. He gave SW verbal permission to speak with his daughter Jarrod for any further questions.    SW provided support via active and reflective listening and responding to feelings; normalized and validated feelings and experiences; and provided a supportive non-anxious presence. No additional questions or concerns were reported. HARLAN provided availability and contact information and excused self from Tad's bedside.    1828 HARLAN spoke with Jarrod (Ph: [phone]) who confirmed his living situation, and agreement with the plan as it had discussed with her and her father. She also confirmed that [facility]. Jarrod was unable to talk for more than a couple minutes and agreed to call HARLAN back as soon as she was able. HARLAN provided contact information for communication and the call was ended.    Next Steps:    Follow for placement and placement tasks  Request HCD from Jarrod  Discharge IMM  .  HARLAN will continue to follow as needed.    REILLY Vail, [facility]  ED/OBS   [facility]  Phone: [phone]  Pager: [phone]  Fax: [phone]     After hours [employer] and After Hours [employer] [place]     On-call pager, [phone], 4:00 pm to 8:30 pm

## 2025-05-07 LAB
ANION GAP SERPL CALCULATED.3IONS-SCNC: 12 MMOL/L (ref 7–15)
ATRIAL RATE - MUSE: 70 BPM
BACTERIA SPEC CULT: NORMAL
BUN SERPL-MCNC: 29.7 MG/DL (ref 8–23)
CALCIUM SERPL-MCNC: 9.6 MG/DL (ref 8.8–10.4)
CHLORIDE SERPL-SCNC: 108 MMOL/L (ref 98–107)
CREAT SERPL-MCNC: 1.37 MG/DL (ref 0.67–1.17)
DIASTOLIC BLOOD PRESSURE - MUSE: NORMAL MMHG
EGFRCR SERPLBLD CKD-EPI 2021: 57 ML/MIN/1.73M2
ERYTHROCYTE [DISTWIDTH] IN BLOOD BY AUTOMATED COUNT: 14.9 % (ref 10–15)
GLUCOSE BLDC GLUCOMTR-MCNC: 160 MG/DL (ref 70–99)
GLUCOSE BLDC GLUCOMTR-MCNC: 172 MG/DL (ref 70–99)
GLUCOSE BLDC GLUCOMTR-MCNC: 235 MG/DL (ref 70–99)
GLUCOSE SERPL-MCNC: 200 MG/DL (ref 70–99)
GRAM STAIN RESULT: NORMAL
GRAM STAIN RESULT: NORMAL
HCO3 SERPL-SCNC: 17 MMOL/L (ref 22–29)
HCT VFR BLD AUTO: 38.1 % (ref 40–53)
HGB BLD-MCNC: 11.8 G/DL (ref 13.3–17.7)
INTERPRETATION ECG - MUSE: NORMAL
MCH RBC QN AUTO: 25.7 PG (ref 26.5–33)
MCHC RBC AUTO-ENTMCNC: 31 G/DL (ref 31.5–36.5)
MCV RBC AUTO: 83 FL (ref 78–100)
P AXIS - MUSE: 68 DEGREES
PLATELET # BLD AUTO: 200 10E3/UL (ref 150–450)
POTASSIUM SERPL-SCNC: 5.3 MMOL/L (ref 3.4–5.3)
PR INTERVAL - MUSE: 208 MS
QRS DURATION - MUSE: 78 MS
QT - MUSE: 450 MS
QTC - MUSE: 486 MS
R AXIS - MUSE: 7 DEGREES
RBC # BLD AUTO: 4.6 10E6/UL (ref 4.4–5.9)
SODIUM SERPL-SCNC: 137 MMOL/L (ref 135–145)
SYSTOLIC BLOOD PRESSURE - MUSE: NORMAL MMHG
T AXIS - MUSE: 183 DEGREES
VENTRICULAR RATE- MUSE: 70 BPM
WBC # BLD AUTO: 5.2 10E3/UL (ref 4–11)

## 2025-05-07 PROCEDURE — 250N000011 HC RX IP 250 OP 636: Performed by: PEDIATRICS

## 2025-05-07 PROCEDURE — 370N000017 HC ANESTHESIA TECHNICAL FEE, PER MIN: Performed by: ASSISTANT, PODIATRIC

## 2025-05-07 PROCEDURE — 82435 ASSAY OF BLOOD CHLORIDE: CPT

## 2025-05-07 PROCEDURE — 0Y6M0ZC DETACHMENT AT RIGHT FOOT, PARTIAL 3RD RAY, OPEN APPROACH: ICD-10-PCS | Performed by: ASSISTANT, PODIATRIC

## 2025-05-07 PROCEDURE — 87205 SMEAR GRAM STAIN: CPT | Performed by: ASSISTANT, PODIATRIC

## 2025-05-07 PROCEDURE — 0Y6M0ZD DETACHMENT AT RIGHT FOOT, PARTIAL 4TH RAY, OPEN APPROACH: ICD-10-PCS | Performed by: ASSISTANT, PODIATRIC

## 2025-05-07 PROCEDURE — 0Y6M0ZB DETACHMENT AT RIGHT FOOT, PARTIAL 2ND RAY, OPEN APPROACH: ICD-10-PCS | Performed by: ASSISTANT, PODIATRIC

## 2025-05-07 PROCEDURE — 0Y6M0ZF DETACHMENT AT RIGHT FOOT, PARTIAL 5TH RAY, OPEN APPROACH: ICD-10-PCS | Performed by: ASSISTANT, PODIATRIC

## 2025-05-07 PROCEDURE — 99232 SBSQ HOSP IP/OBS MODERATE 35: CPT | Performed by: STUDENT IN AN ORGANIZED HEALTH CARE EDUCATION/TRAINING PROGRAM

## 2025-05-07 PROCEDURE — 87102 FUNGUS ISOLATION CULTURE: CPT | Performed by: ASSISTANT, PODIATRIC

## 2025-05-07 PROCEDURE — 27685 REVISION OF LOWER LEG TENDON: CPT | Mod: 51 | Performed by: ASSISTANT, PODIATRIC

## 2025-05-07 PROCEDURE — 0Y6M0Z9 DETACHMENT AT RIGHT FOOT, PARTIAL 1ST RAY, OPEN APPROACH: ICD-10-PCS | Performed by: ASSISTANT, PODIATRIC

## 2025-05-07 PROCEDURE — 36415 COLL VENOUS BLD VENIPUNCTURE: CPT

## 2025-05-07 PROCEDURE — 88311 DECALCIFY TISSUE: CPT | Mod: 26 | Performed by: PATHOLOGY

## 2025-05-07 PROCEDURE — 0LNN3ZZ RELEASE RIGHT LOWER LEG TENDON, PERCUTANEOUS APPROACH: ICD-10-PCS | Performed by: ASSISTANT, PODIATRIC

## 2025-05-07 PROCEDURE — 28805 AMPUTATION THRU METATARSAL: CPT | Mod: RT | Performed by: ASSISTANT, PODIATRIC

## 2025-05-07 PROCEDURE — 250N000012 HC RX MED GY IP 250 OP 636 PS 637: Performed by: HOSPITALIST

## 2025-05-07 PROCEDURE — 120N000002 HC R&B MED SURG/OB UMMC

## 2025-05-07 PROCEDURE — 88300 SURGICAL PATH GROSS: CPT | Mod: 26 | Performed by: PATHOLOGY

## 2025-05-07 PROCEDURE — 87077 CULTURE AEROBIC IDENTIFY: CPT | Performed by: ASSISTANT, PODIATRIC

## 2025-05-07 PROCEDURE — 88305 TISSUE EXAM BY PATHOLOGIST: CPT | Mod: TC | Performed by: ASSISTANT, PODIATRIC

## 2025-05-07 PROCEDURE — 82565 ASSAY OF CREATININE: CPT

## 2025-05-07 PROCEDURE — 87116 MYCOBACTERIA CULTURE: CPT | Performed by: STUDENT IN AN ORGANIZED HEALTH CARE EDUCATION/TRAINING PROGRAM

## 2025-05-07 PROCEDURE — 88311 DECALCIFY TISSUE: CPT | Mod: TC | Performed by: ASSISTANT, PODIATRIC

## 2025-05-07 PROCEDURE — 360N000076 HC SURGERY LEVEL 3, PER MIN: Performed by: ASSISTANT, PODIATRIC

## 2025-05-07 PROCEDURE — 85014 HEMATOCRIT: CPT

## 2025-05-07 PROCEDURE — 87070 CULTURE OTHR SPECIMN AEROBIC: CPT | Performed by: ASSISTANT, PODIATRIC

## 2025-05-07 PROCEDURE — 250N000013 HC RX MED GY IP 250 OP 250 PS 637: Performed by: HOSPITALIST

## 2025-05-07 PROCEDURE — 250N000011 HC RX IP 250 OP 636: Mod: JZ | Performed by: HOSPITALIST

## 2025-05-07 PROCEDURE — 999N000141 HC STATISTIC PRE-PROCEDURE NURSING ASSESSMENT: Performed by: ASSISTANT, PODIATRIC

## 2025-05-07 PROCEDURE — 88305 TISSUE EXAM BY PATHOLOGIST: CPT | Mod: 26 | Performed by: PATHOLOGY

## 2025-05-07 PROCEDURE — 710N000011 HC RECOVERY PHASE 1, LEVEL 3, PER MIN: Performed by: ASSISTANT, PODIATRIC

## 2025-05-07 PROCEDURE — 272N000001 HC OR GENERAL SUPPLY STERILE: Performed by: ASSISTANT, PODIATRIC

## 2025-05-07 PROCEDURE — 250N000011 HC RX IP 250 OP 636: Mod: JZ

## 2025-05-07 RX ORDER — FLUMAZENIL 0.1 MG/ML
0.2 INJECTION, SOLUTION INTRAVENOUS
Status: DISCONTINUED | OUTPATIENT
Start: 2025-05-07 | End: 2025-05-07 | Stop reason: HOSPADM

## 2025-05-07 RX ORDER — FENTANYL CITRATE 50 UG/ML
25-50 INJECTION, SOLUTION INTRAMUSCULAR; INTRAVENOUS
Status: DISCONTINUED | OUTPATIENT
Start: 2025-05-07 | End: 2025-05-07 | Stop reason: HOSPADM

## 2025-05-07 RX ORDER — NALOXONE HYDROCHLORIDE 0.4 MG/ML
0.4 INJECTION, SOLUTION INTRAMUSCULAR; INTRAVENOUS; SUBCUTANEOUS
Status: DISCONTINUED | OUTPATIENT
Start: 2025-05-07 | End: 2025-05-07 | Stop reason: HOSPADM

## 2025-05-07 RX ORDER — NALOXONE HYDROCHLORIDE 0.4 MG/ML
0.2 INJECTION, SOLUTION INTRAMUSCULAR; INTRAVENOUS; SUBCUTANEOUS
Status: DISCONTINUED | OUTPATIENT
Start: 2025-05-07 | End: 2025-05-07 | Stop reason: HOSPADM

## 2025-05-07 RX ADMIN — CEFEPIME 2 G: 2 INJECTION, POWDER, FOR SOLUTION INTRAVENOUS at 05:45

## 2025-05-07 RX ADMIN — METRONIDAZOLE 500 MG: 500 INJECTION, SOLUTION INTRAVENOUS at 18:40

## 2025-05-07 RX ADMIN — FENTANYL CITRATE 50 MCG: 50 INJECTION, SOLUTION INTRAMUSCULAR; INTRAVENOUS at 12:47

## 2025-05-07 RX ADMIN — SODIUM BICARBONATE 1300 MG: 650 TABLET ORAL at 07:55

## 2025-05-07 RX ADMIN — MYCOPHENOLATE MOFETIL 500 MG: 500 TABLET, FILM COATED ORAL at 20:09

## 2025-05-07 RX ADMIN — CALCITRIOL CAPSULES 0.25 MCG 0.25 MCG: 0.25 CAPSULE ORAL at 08:03

## 2025-05-07 RX ADMIN — BRINZOLAMIDE/BRIMONIDINE TARTRATE 1 DROP: 10; 2 SUSPENSION/ DROPS OPHTHALMIC at 20:10

## 2025-05-07 RX ADMIN — MYCOPHENOLATE MOFETIL 500 MG: 500 TABLET, FILM COATED ORAL at 07:55

## 2025-05-07 RX ADMIN — VANCOMYCIN HYDROCHLORIDE 1000 MG: 1 INJECTION, SOLUTION INTRAVENOUS at 02:19

## 2025-05-07 RX ADMIN — TAMSULOSIN HYDROCHLORIDE 0.8 MG: 0.4 CAPSULE ORAL at 07:55

## 2025-05-07 RX ADMIN — ASPIRIN 81 MG CHEWABLE TABLET 81 MG: 81 TABLET CHEWABLE at 07:55

## 2025-05-07 RX ADMIN — VANCOMYCIN HYDROCHLORIDE 1000 MG: 1 INJECTION, SOLUTION INTRAVENOUS at 20:09

## 2025-05-07 RX ADMIN — MIDAZOLAM 1 MG: 1 INJECTION INTRAMUSCULAR; INTRAVENOUS at 12:49

## 2025-05-07 RX ADMIN — TACROLIMUS 2 MG: 1 CAPSULE ORAL at 07:55

## 2025-05-07 RX ADMIN — SODIUM BICARBONATE 1300 MG: 650 TABLET ORAL at 20:09

## 2025-05-07 RX ADMIN — TACROLIMUS 2 MG: 1 CAPSULE ORAL at 18:39

## 2025-05-07 RX ADMIN — BRINZOLAMIDE/BRIMONIDINE TARTRATE 1 DROP: 10; 2 SUSPENSION/ DROPS OPHTHALMIC at 07:55

## 2025-05-07 RX ADMIN — METRONIDAZOLE 500 MG: 500 INJECTION, SOLUTION INTRAVENOUS at 11:24

## 2025-05-07 RX ADMIN — PANTOPRAZOLE SODIUM 40 MG: 40 TABLET, DELAYED RELEASE ORAL at 07:55

## 2025-05-07 RX ADMIN — METOPROLOL SUCCINATE 75 MG: 25 TABLET, EXTENDED RELEASE ORAL at 07:55

## 2025-05-07 RX ADMIN — METRONIDAZOLE 500 MG: 500 INJECTION, SOLUTION INTRAVENOUS at 03:31

## 2025-05-07 ASSESSMENT — ACTIVITIES OF DAILY LIVING (ADL)
ADLS_ACUITY_SCORE: 43
ADLS_ACUITY_SCORE: 42
ADLS_ACUITY_SCORE: 43
ADLS_ACUITY_SCORE: 44
ADLS_ACUITY_SCORE: 43
ADLS_ACUITY_SCORE: 42
ADLS_ACUITY_SCORE: 42
ADLS_ACUITY_SCORE: 43
ADLS_ACUITY_SCORE: 42
ADLS_ACUITY_SCORE: 43
ADLS_ACUITY_SCORE: 44
ADLS_ACUITY_SCORE: 42
ADLS_ACUITY_SCORE: 43
ADLS_ACUITY_SCORE: 43
ADLS_ACUITY_SCORE: 42
ADLS_ACUITY_SCORE: 43

## 2025-05-07 NOTE — PLAN OF CARE
Report given to 7C nurse, put in for transport.     Brief report given to oncoming ED nurse with assigned room due to patient transport during shift change.

## 2025-05-07 NOTE — PLAN OF CARE
Goal Outcome Evaluation:  Vitals:    25 1715 25 1730 25 1803 25 1810   BP: 117/83 112/78 128/82 (P) 133/84   BP Location:   Left arm (P) Left arm   Patient Position:   Supine (P) Supine   Cuff Size:       Pulse: 59 62 63 63   Resp: 15 18 19 18   Temp:   97.6  F (36.4  C)    TempSrc:   Oral    SpO2: 100% 99% 100% 100%       Neuro: alert and oriented, Just arrived form PACU with out capnograph connected     VS: afebrile vitally stable sating 100% on room air     B    Cardiac: 63    Pain/Nausea:denies pian or nausea     Lines/Drains: left PIV flagyl antibiotic infusing Right arm fistula thrill and bruit, Left BKA    Incision/Wound: right foot Ace wrapped dry and clean     GI/: voided on arrival,     Diet/Appetite: just order regular diet     Plan: report passed to night RN, pt calm and resting, family at bed side, continue with plan of care.

## 2025-05-07 NOTE — PLAN OF CARE
Goal Outcome Evaluation:      Plan of Care Reviewed With: patient    Overall Patient Progress: no changeOverall Patient Progress: no change    Neuro: AOx4 with some forgetfulness, able to make need known  Cardiac: WDL denies chest pain   Respiratory: on RA, denies SOB  GI/: voiding adequately, passing flatus, no BM, +BS  Diet/Appetite: NPO   Skin: R foot in dressing AMY,  LDA: (L) PIV infusing TKO with intermittent abx  Activity: (L)BKA with prosthetic at bedside with personal walker  Pain: denies  Plan: R TMA at 1325, continue POC

## 2025-05-07 NOTE — PROGRESS NOTES
Podiatry Pre-Op Brief H&P/Note    Surgery Planned: Transmetatarsal amputation right foot, percutaneous tendo Achilles lengthening right leg    Clinical Update:  Patient has been well during inpatient stay.  Per previous notes and discussion we discussed given findings on MRI and risk factors that amputation at the level of transmetatarsal would be the bare minimum given his tenuous blood flow.  As discussed previously his blood flow will still be one of the major concerns for healing, and there is no guarantee that in the long-term this will heal, it may need further vascular interventions, debridement, further amputation or loss of limb.  No guarantees on long-term outcome of surgical site and right lower extremity were made to the patient when discussing consent for this procedure and plan.    Patient otherwise has been well during his inpatient stay without complications, elevated white count or other inflammation markers but he is immunocompromise/immunosuppressed due to kidney transplant making him high risk especially with known osteomyelitis on MRI of the right foot.  He did undergo a preoperative workup in the second half of March when he had the bypass surgery done by vascular surgery, at that time cardiology had no further workup, he had a stress test done which did not show any inducible ischemia, his risk factors for postsurgical events reviewed, thank you primary team for completing this preoperative assessment.  Given he has been stable, euvolemic, no inducible ischemia on stress test and otherwise was cleared for mild-moderate surgery a month ago we will plan to continue with surgical plan for today for transmetatarsal amputation and tendo Achilles lengthening on the right side.    Anticoagulation held prior to procedure, OK to continue aspirin.  After procedure would like to allow 4 to 6 hours for surgical area to clot then can transition to DOAC or other preferred method as he is high risk for  thrombosis especially at his bypass site in the right lower extremity.    I discussed with patient today that a key portion to his postoperative care not only be managing his diabetes, blood sugars but also to adhere strictly to the nonweightbearing instructions to the right lower extremity.  I will likely place him in a cam boot today in order to keep the ankle near 90 degrees and to limit motion given amputation as well as tendon lengthening procedure, the cam boot will allow easy donning and doffing for dressing changes given he will have high risk amputation site as well as known ulceration near the heel (versus something like a cast).  I discussed with him today that if he does not adhere to weightbearing restrictions this could put his surgical site at risk as well as his Achilles tendon, this could result in dehiscence, infection, rupture of the Achilles further need for debridement or amputation.  Patient understands this and plans to adhere to nonweightbearing protocol.    Pre-op Diagnosis: Osteomyelitis of the right first, second, third toes, dry gangrene toes 1, 2, 3 on right side, peripheral vascular disease, type 2 diabetes with peripheral neuropathy.    Planned Procedure&Time: As noted above transmetatarsal amputation with percutaneous tendo Achilles lengthening    Indication: Patient with dry gangrene and underlying osteomyelitis confirmed on MRI and multiple toes, admitted for concerns of loss of home health care and inability of family to take care of the areas effectively, noticed worsening swelling and redness as well as malodor from these areas.  Given his risk factors, immunosuppression and known osteomyelitis plan to obtain source control/amputation of these areas with a transmetatarsal amputation given tenuous blood flow (versus isolated toe amputations)    Labs/Studies: Reviewed today, hemoglobin above 7, no major electrolyte abnormalities, he is euvolemic, no chest pain, no other changes on  exam    Official CRX: chart    EKG: chart    NPO: after midnight, IVF fluids after midnight    Pre-op Abx: He is on scheduled antibiotic, broad-spectrum can continue this preoperatively as well as postoperatively, can narrow in the coming days    Anesthesia: MAC with block to the right lower extremity    Consent: in chart    Acute Concerns: None at this time      Kwesi Arreola DPM  Podiatry Service - [facility]  Pager: [phone]

## 2025-05-07 NOTE — PLAN OF CARE
Admitted/transferred from: ED  2 RN full   skin assessment completed by Bartolo Amin RN and Yen PORTER  Skin assessment finding: left BKA, right great toe, 2nd and 3rd toe necrotic,  Intervention: dressing placed for possible procedure tomorrow     Bedside Emergency Equipment Present:  Suction Regulator: Yes  Suction Canister: Yes  Tubing between Regulator and Canister: Yes  O2 Regulator with Tree: Yes  Ambu Bag: Yes   Vitals:    25 0603 25 0826 25 1500 25 1947   BP: 123/82 115/63 (!) 145/82 (!) 155/91   BP Location: Left arm Left arm  Left arm   Cuff Size:  Adult Regular Adult Regular    Pulse: 81 71 68 81   Resp: 20 20 20 12   Temp: 98.4  F (36.9  C)  98.6  F (37  C) 97.6  F (36.4  C)   TempSrc: Oral  Oral Oral   SpO2: 100%  100% 100%       Neuro:alert and oriented     VS: afebrile slight hypertensive , sating 100% on room air non labor breathing     B    Cardiac: 81    Pain/Nausea: denies any pain denies nausea     Lines/Drains: PIV SL    Incision/Wound: right great toe dressing placed     GI/: voided adequate, passing gas had BM    Diet/Appetite: NPO after midnight     Activity: up ambulate with walker     Plan: continue with plan of care.

## 2025-05-07 NOTE — OR NURSING
Temp:  [97.6  F (36.4  C)-98.6  F (37  C)] 98.3  F (36.8  C)  Pulse:  [68-81] 70  Resp:  [12-20] 19  BP: (118-156)/(77-98) 124/80  SpO2:  [98 %-100 %] 99 %    Right sciatic nerve and right adductor canal nerve block performed without complications, 1mg versed, 50mcg fentanyl given.  NSR, VSS, 2L O2 via NC.  Pt tolerated well.  Will continue to monitor.

## 2025-05-07 NOTE — BRIEF OP NOTE
[facility]    Brief Operative Note    Pre-operative diagnosis: Gangrene of right foot (H) [I96]  Osteomyelitis of right foot, unspecified type (H) [M86.9]  Gangrene of toe of right foot (H) [I96]  Post-operative diagnosis same as above    Procedure: TRANSMETATARSAL AMPUTATION, Right - Toe  LENGTHENING, TENDON, ACHILLES, Right - Ankle    Surgeon: Surgeons and Role:     * Kwesi Arreola DPM - Primary  Anesthesia: MAC with Block   Estimated Blood Loss: 150 mL from 5/7/2025  2:48 PM to 5/7/2025  4:55 PM      Drains: None  Specimens:   ID Type Source Tests Collected by Time Destination   1 : Right metatarsal Tissue Foot, Right SURGICAL PATHOLOGY EXAM Kwesi Arreola DPM 5/7/2025  4:01 PM    2 : First metatarsal Tissue Foot, Right SURGICAL PATHOLOGY EXAM Kwesi Arreola DPM 5/7/2025  3:44 PM    3 : Second  metatarsal Tissue Foot, Right SURGICAL PATHOLOGY EXAM Kwesi Arreola DPM 5/7/2025  3:58 PM    A : Right Metatarsal Tissue Foot, Right ANAEROBIC BACTERIAL CULTURE ROUTINE, GRAM STAIN, FUNGAL OR YEAST CULTURE ROUTINE, AEROBIC BACTERIAL CULTURE ROUTINE Kwesi Arreola DPM 5/7/2025  3:14 PM      Findings:     Necrosis of toes 1 through 3 on the right side, also some slight tightness in his Achilles tendon region although he does have relatively decent dorsiflexion.  During procedure he did have adequate bleeding with multiple pulsating vessels that were tied off.  He had good skin edge bleeding as well as cap refill at the end of the procedure today.  There were no obvious bleeders on closure there was some noted oozing as expected with a major foot amputation.  The entire incision site is closed, I did not appreciate any signs or symptoms of infection but we did take a tissue sample as well as 1st and 2nd metatarsal margins for pathology as these were the toe that did show osteomyelitis.    Complications: No complications.  Implants: * No implants in log *

## 2025-05-07 NOTE — OR NURSING
Patient brought to PACU for inpatient MAC eval; patient did not met admission criteria to PACU. Anesthesia team provided report to inpatient RN; patient placed on pulse ox. / capno monitor and transported to inpatient bed assignment.

## 2025-05-07 NOTE — PROGRESS NOTES
Podiatry Brief Postoperative Progress Note:    Patient underwent a transmetatarsal amputation this afternoon with percutaneous tendo Achilles lengthening.    *Patient did receive his afternoon cefepime dose intraoperatively*    There were no complications with the procedure today, the amputation site was closed in entirety and a bulky dry sterile dressing is placed over.    Due to him undergoing the percutaneous Achilles release he is in a cam boot so that his foot can stay at 90 degrees.  I want the cam boot on him at all times.  He is to be strict nonweightbearing to the right lower extremity.    Nurses during shift can open up the flaps on the cam boot to check the dressing specifically looking for any bleeding or strikethrough in the distal portion of the foot dressing where the amputation occurred.  If you note any strikethrough or major bleeding, can take down the cam boot and reinforce with ABD and a light Ace.  If this occurs and you cannot get the cam boot on, cam boot can be left off overnight and I will take down the dressing replaced tomorrow.    If you have to do this reinforcement more than 2-3 times please page the on-call orthopedic team overnight.    Patient can have his diet advanced as tolerated, no planned further surgical intervention from my end    I would recommend a brief period postoperatively before restarting DVT prophylaxis, if this is possible, this will help his amputation site clot off and avoid any bleeding into the dressing, if possible keep him off of AC until midnight or the following morning.    The dressing is to not get wet, if it anytime the dressing gets soiled or wet, please take down the dressing in entirety, wash the surgical site, cover with Betadine and Adaptic, then replace with a bulky dry sterile dressing      I will plan to round on him tomorrow postop day 1, if dressing is clean dry and intact will likely keep it intact, may take it down to debulk the dressing so he  fits appropriately in his cam boot long-term.

## 2025-05-07 NOTE — PROGRESS NOTES
[facility]    Medicine Progress Note - Hospitalist Service, GOLD TEAM 4    Date of Admission:  5/3/2025    Assessment & Plan    Tad Zaman is a 64 year old male with hypertension, chronic systolic heart failure, type 2 diabetes, peripheral artery disease status post left BKA, right femoral-tibial bypass (3/20/2025), right diabetic foot ulcer, osteomyelitis, history of MI, status post renal transplant, CKD stage III, with with complaints of progressive right foot pain and swelling and being admitted for progressive R foot severe diabetic foot ulcer and possible R TMA.     Today:  - Transmetatarsal amputation with podiatry with percutaneous tendo Achilles lengthening.  No reported complications.  - He received afternoon dose of cefepime intraoperatively  - Will plan to resume AC tomorrow morning with heparin bridge and Eliquis per podiatry recommendations       # Gangrene and osteomyelitis of right foot s/p Transmetatarsal Amputation 5/7  # PAD s/p Left BKA and Right F-T stent  Presents with history of progressive R diabetic foot ulcer with outpatient plans for R TMA, but having concern for worsening swelling and foul odor. He had recent Arterial doppler on 5/2 with good stent flow. Workup in ED reassuring with WBC WNL, Lactate 1.9, CRP and ESR WNL. LLE Duplex negative for DVT. CT with contrast concerning for soft tissue gas which is favored to be from direct extension from the area of ulceration as opposed to gas-formin organism.  Appreciate assistance from podiatry and vascular surgery.  Vascular surgery did not recommend acute vascular procedure at this time, advised that transmetatarsal amputation is a viable option at this time, but patient does remain at high risk of needing further amputation in the future.  Podiatry performed transmetatarsal amputation on 5/7.  - Continue Cefepime (allergy profile), vancomycin and flagyl (5/3/25 - ### )  - Follow 5/3 Bcx,  Well Visit, Ages 25 to 48: Care Instructions Your Care Instructions Physical exams can help you stay healthy. Your doctor has checked your overall health and may have suggested ways to take good care of yourself. He or she also may have recommended tests. At home, you can help prevent illness with healthy eating, regular exercise, and other steps. Follow-up care is a key part of your treatment and safety. Be sure to make and go to all appointments, and call your doctor if you are having problems. It's also a good idea to know your test results and keep a list of the medicines you take. How can you care for yourself at home? · Reach and stay at a healthy weight. This will lower your risk for many problems, such as obesity, diabetes, heart disease, and high blood pressure. · Get at least 30 minutes of physical activity on most days of the week. Walking is a good choice. You also may want to do other activities, such as running, swimming, cycling, or playing tennis or team sports. Discuss any changes in your exercise program with your doctor. · Do not smoke or allow others to smoke around you. If you need help quitting, talk to your doctor about stop-smoking programs and medicines. These can increase your chances of quitting for good. · Talk to your doctor about whether you have any risk factors for sexually transmitted infections (STIs). Having one sex partner (who does not have STIs and does not have sex with anyone else) is a good way to avoid these infections. · Use birth control if you do not want to have children at this time. Talk with your doctor about the choices available and what might be best for you. · Protect your skin from too much sun. When you're outdoors from 10 a.m. to 4 p.m., stay in the shade or cover up with clothing and a hat with a wide brim. Wear sunglasses that block UV rays. Even when it's cloudy, put broad-spectrum sunscreen (SPF 30 or higher) on any exposed skin. NGTD  -Follow bone aerobic and anaerobic cultures, fungal culture, and pathology  - Consult Transplant ID prior to DC to assist with abx course  -Resume anticoagulation with heparin bridge and Eliquis on 5/8, tentatively  - Vascular surgery input appreciated  - Blood flow is tenuous to R foot. Per our conversation, it is reasonable to pursue TMA but note pt is high risk of needing additional amputation in the future   - Will need therapeutic AC following TMA, restart timing per podiatry    - currently holding PTA Apixaban   - Podiatry recommends bridging with IV heparin post operatively prior to resumption of Apixaban   - Follow up with Vascular in 3 months, sooner if TMA fails to heal as he may require further angioplasty    - Continue PTA ASA for now   - Daily CBC, BMP     # S/P Renal transplant  MOY, resolved  CKD3  Cr baseline around ~1.6. Cr on admit 2.15. Renal transplant US 5/3 with moderate hydronephrosis of the transplant kidney, that improved post void. Elevated resistive indices suggestive of renal transplant dysfunction. Was given 2L NS on admit and was started on 125ml/hr overnight. Cr this morning 1.69  - Renal transplant consulted  - Continue PTA Tacro 2 mg BID  - Continue PTA Mycophenolate 500 mg BID for now  - HOLD PTA Torsemide  - Continue PTA sodium bicarb and Patiromer     History of CVA/TIA  Chronic AC, ASA  He is on dual coverage with Apixaban and ASA for history of CVA/TIA and PAD per family. Apixaban held on admission given need for procedure.   - AC as above   - ASA plan above     Hypertension  Chronic systolic heart failure/HFmrEF  History of chronic systolic heart failure, MI but no clear history of CAD or cardiac revascularization. He had TTE on 2/25/25 with EF of 40-45% and mild pulmonary hypertension likely Group 2. No signs of acute HF exacerbation.  - Continue PTA Metoprolol   - Continue PTA Statin     DM2  Last A1C 7.9 on 3/3/25. Outpatient regimen includes Metformin and Jardiance.  · Was started on MSSI on admit but patient refusing so this was discontinued. Last 24h trend:   - HOLD PTA Metformin  - Continue PTA Jardiance  - Hypoglycemia protocol  - Follow BG trend, will continue to discuss with patient the importance for BG control in perioperative setting    Recent Labs   Lab 05/07/25  1129 05/07/25  0633 05/07/25  0231 05/06/25  2207 05/06/25  1734 05/06/25  1454   * 200* 235* 179* 231* 262*         BPH   - Continue PTA Tamsulosin     Glaucoma   - Continue PTA gtts                Diet: NPO for Procedure/Surgery per Anesthesia Guidelines Except for: Meds; Clear liquids before procedure/surgery: ADULT (Age GREATER than or Equal to 18 years) - Clear liquids 2 hours before procedure/surgery    DVT Prophylaxis: held in anticipation of upcoming TMA on 5/7  Zaman Catheter: Not present  Lines: None     Cardiac Monitoring: None  Code Status: Full Code        Disposition Plan:  Medically Ready for Discharge: Anticipated in 5+ Days     Expected Discharge Date: 05/12/2025      Destination: inpatient rehabilitation facility;other (comment) (Transitional Care)            The patient's care was discussed with staff physician, Bedside Nurse, Patient, and podiatry and vascular Consultant(s).    Eugenio Trivedi PA-C  Hospitalist Service, GOLD TEAM [address] [place] [address]  Securely message with [employer] (more info)  Text page via [employer] Paging/Directory     Clinically Significant Risk Factors          # Hyperchloremia: Highest Cl = 108 mmol/L in last 2 days, will monitor as appropriate                        # DMII: A1C = 7.9 % (Ref range: <5.7 %) within past 6 months, PRESENT ON ADMISSION        # Financial/Environmental Concerns: none         ______________________________________________________________________    Interval History   Patient seen this morning and doing well.  He feels ready for amputation today.  Denies fevers, chills, night sweats.        Data  reviewed today: I reviewed all medications, new labs and imaging results over the last 24 hours. Please see discussion of these results in the A/P.    Physical Exam    Vital Signs: Temp: 97  F (36.1  C) Temp src: Axillary BP: 117/83 Pulse: 59     Resp: 15 SpO2: 100 % O2 Device: Nasal cannula Oxygen Delivery: 2 LPM  Weight: 0 lbs 0 oz    Constitutional: awake, alert, cooperative, in no acute distress. A&Ox3    Eyes: EOM, PERRLA. Sclera clear, conjunctiva normal. Anicteric   HENT: Normocephalic, without obvious abnormality, atraumatic.   Respiratory: No increased work of breathing.   GI: Soft, non-tender without rebound or guarding.   Musculoskeletal:  Full range of motion noted.  See R foot below  Neurologic: Cranial nerves II-XII are grossly intact.   Neuropsychiatric: General: normal, calm and normal eye contact. Normal affect                          Data   Recent Labs   Lab 05/07/25  1129 05/07/25  0633 05/07/25  0231 05/06/25  1454 05/06/25  0752 05/05/25  2354 05/05/25  1200 05/04/25  1117 05/04/25  0819 05/04/25  0232 05/03/25  1641   WBC  --  5.2  --   --  5.9  --  4.6  --  4.4  --  5.0   HGB  --  11.8*  --   --  11.8*  --  12.2*  --  10.9*  --  11.6*   MCV  --  83  --   --  81  --  83  --  82  --  83   PLT  --  200  --   --  220  --  214  --  225  --  229   INR  --   --   --   --   --   --   --   --   --   --  1.61*   NA  --  137  --   --  136  --  138  --  137  --  138   POTASSIUM  --  5.3  --   --  4.9  --  4.4  --  4.1  --  4.6   CHLORIDE  --  108*  --   --  106  --  107  --  106  --  103   CO2  --  17*  --   --  20*  --  20*  --  21*  --  23   BUN  --  29.7*  --   --  29.3*  --  31.2*  --  37.2*  --  42.4*   CR  --  1.37*  --   --  1.44*  --  1.43*  --  1.69*  --  2.15*   ANIONGAP  --  12  --   --  10  --  11  --  10  --  12   QUE  --  9.6  --   --  9.6  --  9.4  --  9.2  --  9.5   * 200* 235*   < > 186*   < > 169*   < > 166*   < > 225*   ALBUMIN  --   --   --   --   --   --   --   --  3.7  --   3.8   PROTTOTAL  --   --   --   --   --   --   --   --   --   --  6.9   BILITOTAL  --   --   --   --   --   --   --   --   --   --  0.3   ALKPHOS  --   --   --   --   --   --   --   --   --   --  89   ALT  --   --   --   --   --   --   --   --   --   --  9   AST  --   --   --   --   --   --   --   --   --   --  20    < > = values in this interval not displayed.       Recent Results (from the past 24 hours)   POC US Guidance Needle Placement    Impression    Right popliteal sciatic nerve block and right adductor canal nerve block

## 2025-05-07 NOTE — PROGRESS NOTES
Antimicrobial Stewardship Team Note    Antimicrobial Stewardship Program - A joint venture between [facility] and  Physicians to optimize antibiotic management.  NOT a formal consult - Restricted Antimicrobial Review     Patient: Tad Zaman  MRN: [id number]  Allergies: Penicillins    Brief Summary: Tad Zaman is a 64 year old male with a history of hypertension, chronic systolic heart failure, type 2 diabetes, peripheral artery disease status post left BKA, right femoral-tibial bypass (3/20/2025), right diabetic foot ulcer, osteomyelitis, history of MI, status post renal transplant, CKD stage III who was admitted 5/3 with a progressive right foot DFI and plans for possible R TMA.    History of Present Illness: On presentation, the patient was afebrile and hemodynamically stable with a WBC of 5.0, procalcitonin of 0.15 , negative CRP and lactic acid of 1.9.  A CT of the foot revealed soft tissue deficiency, ulceration, and gas along the plantar aspect of the great toe with underlying erosive changes of the distal phalanx concerning for osteomyelitis.  No abscess was well described.  Gas was present but thought to be less likely due to a gas producing organism.  Blood cultures from 5/3 are so far no growth to date and a MRSA nares swab is negative.  The patient was started on cefepime and metronidazole initially and vancomycin was added on 5/5.  The current plan is to undergo a TMA on 5/7.           Active Anti-infective Medications   (From admission, onward)                 Start     Stop    05/05/25 1400  vancomycin  1,000 mg,   Intravenous,   200 mL/hr,   EVERY 18 HOURS        Osteomyelitis   gangrene       --    05/04/25 0600  ceFEPIme  2 g,   Intravenous,   EVERY 12 HOURS        Osteomyelitis, Skin and Soft Tissue Infection       --    05/04/25 0245  metroNIDAZOLE  500 mg,   Intravenous,   EVERY 8 HOURS        Osteomyelitis, Skin and Soft Tissue Infection       --                   Assessment: Osteomyelitis 2/2 DFI  Imaging findings are consistent with osteomyelitis and the current plan is to perform a TMA on 5/7.  There is currently no culture data to help guide definitive antimicrobial therapy at this point.  If the TMA proceeds, please order aerobic and anaerobic stain and tissue culture, fungal stain and tissue culture, and AFB stain and tissue culture as well as sending samples to pathology to determine if clean margins were achieved to help guide antimicrobial selection going forward.  For now, we agree with continuing current antimicrobials as well as noted plan to consult ID to help guide antibiotic course and duration going forward.    Recommendations:  Please order the following labs for TMA planned for 5/7: aerobic and anaerobic stain and tissue culture, fungal stain and tissue culture, and AFB stain and tissue culture as well as sending samples to pathology to determine if clean margins were achieved  Continue current antibiotics for now pending additional culture data to help guide therapy  Agree with plan to consult ID for antibiotic planning    Pharmacy took the following actions: Called/paged provider, Electronic note created.    Discussed with ID Staff: Dr. Filiberto Vance, PharmD, BCIDP    Vital Signs/Clinical Features:  Vitals         05/05 0700  05/06 0659 05/06 0700  05/07 0659 05/07 0700  05/07 1013   Most Recent      Temp ( F) 97.6 -  98.4    97.6 -  98.6       98.3 (36.8) 05/07 0547    Pulse 73 -  81    68 -  81       81 05/06 1947    Resp 16 -  20    12 -  20       18 05/07 0547    /66 -  153/85    115/63 -  156/98       156/98 05/07 0547    SpO2 (%) 98 -  100    98 -  100       98 05/07 0547            Labs  Estimated Creatinine Clearance: 54.5 mL/min (A) (based on SCr of 1.37 mg/dL (H)).  Recent Labs   Lab Test 03/23/25  0557 05/03/25  1641 05/04/25  0819 05/05/25  1200 05/06/25  0752 05/07/25  0633   CR 1.44* 2.15* 1.69* 1.43* 1.44* 1.37*        Recent Labs   Lab Test 02/25/25  1232 02/26/25  0723 03/20/25  0332 03/20/25  0922 03/21/25  0452 03/21/25  0813 03/23/25  0557 05/03/25  1641 05/04/25  0819 05/05/25  1200 05/06/25  0752 05/07/25  0633   WBC 10.1   < > 6.4   < > 10.0   < > 8.2 5.0 4.4 4.6 5.9 5.2   ANEU 8.6*  --  5.0  --  8.6*  --   --  3.7  --   --   --   --    ALYM 0.5*  --  0.4*  --  0.3*  --   --  0.5*  --   --   --   --    ERIC 0.9  --  0.7  --  0.9  --   --  0.6  --   --   --   --    AEOS 0.1  --  0.2  --  0.0  --   --  0.2  --   --   --   --    HGB 12.3*   < > 9.0*   < > 8.9*   < > 8.6* 11.6* 10.9* 12.2* 11.8* 11.8*   HCT 39.8*   < > 29.5*   < > 28.7*   < > 27.7* 37.7* 35.2* 40.0 37.9* 38.1*   MCV 85   < > 84   < > 82   < > 82 83 82 83 81 83      < > 266  --  184   < > 249 229 225 214 220 200    < > = values in this interval not displayed.       Recent Labs   Lab Test 02/25/25  1232 03/04/25  0540 03/17/25  0712 03/18/25  0721 03/19/25  0623 03/20/25  0332 05/03/25  1641 05/04/25  0819   BILITOTAL 0.4  --   --   --   --   --  0.3  --    ALKPHOS 122  --   --   --   --   --  89  --    ALBUMIN 3.9   < > 3.4* 3.1* 3.1* 3.0* 3.8 3.7   AST 19  --   --   --   --   --  20  --    ALT 13  --   --   --   --   --  9  --     < > = values in this interval not displayed.       Recent Labs   Lab Test 03/20/25  1035 03/20/25  1118 03/20/25  1224 03/20/25  1319 03/21/25  0635 05/03/25  1641   PCAL  --   --   --   --   --  0.15   LACT 1.5 1.5 1.6 1.4 1.5 1.9   CRPI  --   --   --   --   --  <3.00   SED  --   --   --   --   --  18       Recent Labs   Lab Test 05/04/25  0819 05/05/25  1200   VANCOMYCIN  --  6.2   TACROL 5.5  --        Culture Results:  7-Day Micro Results       Procedure Component Value Units Date/Time    MRSA MSSA PCR, Nasal Swab [[id number]] Collected: 05/04/25 1543    Order Status: Completed Lab Status: Final result Updated: 05/04/25 2598    Specimen: Swab from Nares, Bilateral      MRSA Target DNA Negative     SA Target DNA  Negative    Narrative:      The [employer]  Xpert SA Nasal Complete assay performed in the [employer]  Dx System is a qualitative in vitro diagnostic test designed for rapid detection of Staphylococcus aureus (SA) and methicillin-resistant Staphylococcus aureus (MRSA) from nasal swabs in patients at risk for nasal colonization. The test utilizes automated real-time polymerase chain reaction (PCR) to detect MRSA/SA DNA. The Xpert SA Nasal Complete assay is intended to aid in the prevention and control of MRSA/SA infections in healthcare settings. The assay is not intended to diagnose, guide or monitor treatment for MRSA/SA infections, or provide results of susceptibility to methicillin. A negative result does not preclude MRSA/SA nasal colonization.     Blood Culture Peripheral Blood [[id number]]  (Normal) Collected: 05/03/25 1833    Order Status: Completed Lab Status: Preliminary result Updated: 05/06/25 1846    Specimen: Peripheral Blood      Culture No growth after 3 days    Blood Culture Peripheral Blood [[id number]]  (Normal) Collected: 05/03/25 1641    Order Status: Completed Lab Status: Preliminary result Updated: 05/06/25 1716    Specimen: Peripheral Blood      Culture No growth after 3 days            Recent Labs   Lab Test 03/03/25  1508 03/22/25  1837 03/24/25  1036 05/04/25  1538   URINEPH 6.5 7.0 7.0 7.0   NITRITE Negative Negative Negative Negative   LEUKEST Trace* Large* Trace* Negative   WBCU 8* 36* 4 2       Recent Labs   Lab Test 03/05/25  1343 03/05/25  1344   CWBC 0  --    CRBC 0  --    CGLU  --  182*   CTP  --  161.7*                   Imaging: US Renal Transplant with Doppler  Result Date: 5/3/2025  EXAM: US RENAL TRANSPLANT WITH DOPPLER LOCATION: [facility] DATE: 5/3/2025 INDICATION: Acute kidney injury, hx renal transplant COMPARISON: 3/3/2025 TECHNIQUE: Ultrasound of the renal transplant with Doppler waveform spectral analysis. FINDINGS: The  transplant kidney is located in the right lower quadrant. The transplant kidney is normal in echogenicity. There is no cortical thinning. Moderate hydronephrosis. This slightly improved post void. There is no calculus, cyst or mass. There is no perinephric fluid. The urinary bladder is normal. There is no post-void residual. TRANSPLANT DOPPLER: The main renal artery peak systolic velocity is normal (less than 200 cm/sec). The intra-renal transplant resistive index (RI) are abnormal (>0.8).     IMPRESSION: 1.  Moderate hydronephrosis of the transplant kidney. This slightly improved post void. 2.  Elevated resistive indices suggestive of renal transplant dysfunction.     US Lower Extremity Venous Duplex Right  Result Date: 5/3/2025  EXAM: US LOWER EXTREMITY VENOUS DUPLEX RIGHT LOCATION: [facility] DATE: 5/3/2025 INDICATION: Right lower leg edema. COMPARISON: None. TECHNIQUE: Venous Duplex ultrasound of the right lower extremity with and without compression, augmentation and duplex. Color flow and spectral Doppler with waveform analysis performed. FINDINGS: Exam includes the common femoral, femoral, popliteal, and contralateral common femoral veins as well as segmentally visualized deep calf veins and greater saphenous vein. RIGHT: No deep vein thrombosis. No superficial thrombophlebitis. No popliteal cyst.     IMPRESSION: No deep venous thrombosis in the right lower extremity.    CT Foot Right w Contrast  Result Date: 5/3/2025  EXAM: CT FOOT RIGHT W CONTRAST LOCATION: [facility] DATE: 05/03/2025 INDICATION: History of gangrene, right foot; progressive wounds, erythema, edema. COMPARISON: Same-day radiographs. TECHNIQUE: IV contrast. Axial, sagittal, and coronal thin-section reconstruction. Dose reduction techniques were used. CONTRAST: 99 mL Iso 370. FINDINGS: Again seen are changes of advanced Charcot arthropathy with  fragmentation, fracture, and malalignment within the midtarsal and midfoot region as well as the 1st MTP joint. Dorsal dislocation of the 2nd MTP joint again seen. Remote healed fracture deformity of the distal fibular shaft. Bones are demineralized. There is some soft tissue swelling and soft tissue gas along the superficial aspect of the plantar great toe, presumably associated with an area of ulceration. Along the distal phalanx/tuft of the great toe there is soft tissue gas at an area of skin breakdown/ulceration which abuts the bony cortex of the distal phalanx where there is underlying erosive change, concerning for distal phalangeal osteomyelitis. Small foci of soft tissue gas extend proximally to the level of the proximal phalanx of the great toe. There is soft tissue stranding and irregularity along the distal aspect of the 2nd toe without soft tissue gas or well-defined abscess. There is no well-defined rim-enhancing fluid collection on CT with attention to the great toe, although poorly-defined abscess is not excluded. Fatty atrophy of intrinsic musculature of the foot. More global diffuse soft tissue swelling in the subcutaneous tissues with skin thickening. Chronic bony proliferative change along the distal syndesmosis of the ankle.     IMPRESSION: 1.  Soft tissue deficiency, ulceration, and gas along the plantar aspect of the great toe with underlying erosive change of the distal phalanx, concerning for osteomyelitis. Soft tissue gas extends proximally to the level of the proximal phalanx of the great toe where there is soft tissue irregularity. Findings are favored to be related to gas from direct extension from the area of ulceration as opposed to a gas-forming organism, although this is difficult to completely rule-out on CT alone. 2.  Soft tissue stranding and irregularity distal aspect of the 2nd toe without discrete abscess or evidence for osteomyelitis at this time. Note is made that there is  dorsal dislocation of the 2nd MTP joint. 3.  No well-defined rim-enhancing fluid collection on CT with attention to the great toe, although poorly-defined abscess is not excluded as there is poorly-defined circumferential soft tissue swelling. 4.  More global soft tissue swelling about the foot, which can be seen with cellulitis as well as noninfectious causes of edema. 5.  Advanced Charcot arthropathy as described. NOTE: ABNORMAL REPORT THE DICTATION ABOVE DESCRIBES AN ABNORMALITY FOR WHICH FOLLOWUP IS NEEDED.     XR Foot Right 2 Views  Result Date: 5/3/2025  EXAM: XR FOOT RIGHT 2 VIEWS LOCATION: [facility] DATE: 5/3/2025 INDICATION: Question of subcutaneous gas on the foot. COMPARISON: 06/21/2016.     IMPRESSION:  Severe Charcot arthropathy involving the midfoot and midtarsal region, with fracture, fragmentation and malalignment as well as soft tissue swelling, progressed from 06/21/2016. There is midfoot collapse and soft tissue swelling. Dorsal dislocation 2nd MTP joint. Osteonecrosis distal tibial shaft and healed fracture deformity distal fibular shaft. Extensive atherosclerotic vascular calcifications. No definitive evidence for soft tissue gas on these 2 views, noting there is soft tissue swelling.    US art low ext uni right (vascular lab)  Result Date: 5/2/2025  BILATERAL LOWER EXTREMITY DUPLEX ARTERIAL ULTRASOUND 5/2/2025 3:24 PM CLINICAL HISTORY: Previous right common femoral artery to anterior tibial artery bypass. COMPARISONS: Largest remedy ultrasound 3/14/2025, 3/11/2023, 3/10/2025 REFERRING PROVIDER: JENNIFER GONZALEZ TECHNIQUE: Grayscale, color Doppler, Doppler waveform ultrasound evaluation of right external iliac arteries through anterior and posterior tibial arteries. FINDINGS: RIGHT:      EXTERNAL ILIAC ARTERY: 73/0 cm/s, biphasic      COMMON FEMORAL ARTERY: 76/0 cm/s, biphasic      PROFUNDUS FEMORAL ARTERY: 40/0 cm/s, biphasic      SUPERFICIAL FEMORAL  ARTERY, proximal: 31/0 cm/s, biphasic GRAFT:      CFA ANASTOMOSIS: 58/0 cm/s, measures approximately 7 mm in diameter.      PROXIMAL: 65/0 cm/s      MID-DISTAL THIGH: 47/0 cm/s      MID-KNEE: 32/0 cm/s      DISTAL: 29/0 cm/s      MARYELLEN ANASTOMOSIS: 32/3 cm/s, measures approximately 4 mm in diameter.      ANTERIOR TIBIAL ARTERY, distal to anastomosis: 73/6 cm/s monophasic        ANTERIOR TIBIAL ARTERY, ankle: 96/10 cm/s, monophasic         POSTERIOR TIBIAL ARTERY, ankle: 96/10 cm/s, monophasic     IMPRESSION: RIGHT: Patent right common femoral artery to anterior tibial artery bypass graft with no focal areas of stenosis. Guidelines: [facility] duplex criteria for lower limb arterial occlusive disease -Percent stenosis- Normal (1-19%): Peak systolic velocity (cm/s): <150, End-diastolic velocity (cm/s): <40, Velocity ratio (Vr): <1.5, Distal arterial waveform: Triphasic -Percent stenosis- 20-49%: Peak systolic velocity (cm/s): 150-200, End-diastolic velocity (cm/s): <40, Velocity ratio (Vr): 1.5-2.0, Distal arterial waveform: Triphasic -Percent stenosis- 50-75%: Peak systolic velocity (cm/s): 200-300, End-diastolic velocity (cm/s): <90, Velocity ratio (Vr): 2.0-3.9, Distal arterial waveform: Poststenotic turbulence distal to stenosis, monophasic distal waveform -Percent stenosis- >75%: Peak systolic velocity (cm/s): >300, End-diastolic velocity (cm/s): <90, Velocity ratio (Vr): >4.0, Distal arterial waveform: Dampened distal waveform and low PSV/EDV* in the stenosis -Percent stenosis- Occlusion: Absent flow by color Doppler/pulsed Doppler spectral analysis; length of occlusion estimated from distance between exit and reentry collateral arteries *PSV = peak systolic velocity, EDV = end-diastolic velocity http://link.loaiza.com/chapter/10.1007/[email or web address]_23/fulltext html I have personally reviewed the examination and initial interpretation and I agree with the findings. CARMEL ARGUELLO MD   SYSTEM  ID:  [id number]     AYAN with PPG wo Exercise Right  Result Date: 5/2/2025  RESTING AYAN, TBI, PVR 5/2/2025 3:24 PM CLINICAL HISTORY: s/p vascular bypass; PAD (peripheral artery disease); Status post vascular bypass. R arm AVF. COMPARISONS: Lower extremity arterial ultrasound same day. REFERRING PROVIDER: JENNIFER GONZALEZ TECHNIQUE: Right AYAN, TBI, PVR obtained. FINDINGS: RIGHT:      Brachial (LEFT): 144 mmHg      Ankle (PT): >240 mmHg      Ankle (DP): >240 mmHg      1st Digit: >240 mmHg      AYAN: N/A      TBI: N/A Toe PPGs:           1st digit: Normal           2nd digit: Normal           3rd digit: flat           4th digit: severely diminished           5th digit: Normal     IMPRESSION: 1. RIGHT:      A. Resting AYAN, Non-compressible vessels.      B. Flat PPG of the 3rd digit, severely diminished PPG of 4th digit. Normal first, second and fifth digit PPGs. ------------- AYAN Interpretation:    Normal: 1.00 - 1.40    Borderline: 0.91 - 0.99    Abnormal: ? 0.90    Noncompressible: > 1.40 TBI Interpretation:    Normal: > 0.70    Abnormal: ? 0.70 Brian HL, Alyx HD, Isaac PP, Nikko S, et al.; Peer Review Committee Members. 2024 ACC/AHA/AACVPR/APMA/ABC/SCAI/SVM/SVN/SVS/SIR/VESS Guideline for the Management of Lower Extremity Peripheral Artery Disease: A Report of the American College of Cardiology/American Heart Association Joint Committee on Clinical Practice Guidelines. Circulation. 2024 Jun 11;149(24):v0166-g0699. doi: 10.1161/CIR.1722397279231299. Epub 2024 May 14. PMID: [id number]. I have personally reviewed the examination and initial interpretation and I agree with the findings. CARMEL ARGUELLO MD   SYSTEM ID:  [id number]

## 2025-05-08 ENCOUNTER — APPOINTMENT (OUTPATIENT)
Dept: CT IMAGING | Facility: CLINIC | Age: 65
DRG: 239 | End: 2025-05-08
Attending: STUDENT IN AN ORGANIZED HEALTH CARE EDUCATION/TRAINING PROGRAM
Payer: MEDICARE

## 2025-05-08 VITALS
TEMPERATURE: 98.6 F | RESPIRATION RATE: 18 BRPM | SYSTOLIC BLOOD PRESSURE: 111 MMHG | HEART RATE: 72 BPM | BODY MASS INDEX: 22.78 KG/M2 | WEIGHT: 167.99 LBS | DIASTOLIC BLOOD PRESSURE: 70 MMHG | OXYGEN SATURATION: 99 %

## 2025-05-08 LAB
ANION GAP SERPL CALCULATED.3IONS-SCNC: 12 MMOL/L (ref 7–15)
B-OH-BUTYR SERPL-SCNC: <0.18 MMOL/L
BACTERIA BLD CULT: NO GROWTH
BACTERIA BLD CULT: NO GROWTH
BACTERIA TISS BX CULT: NO GROWTH
BACTERIA TISS BX CULT: NORMAL
BACTERIA TISS BX CULT: NORMAL
BASE EXCESS BLDV CALC-SCNC: -2.9 MMOL/L (ref -3–3)
BUN SERPL-MCNC: 35.6 MG/DL (ref 8–23)
CALCIUM SERPL-MCNC: 9.8 MG/DL (ref 8.8–10.4)
CHLORIDE SERPL-SCNC: 105 MMOL/L (ref 98–107)
CREAT SERPL-MCNC: 1.55 MG/DL (ref 0.67–1.17)
EGFRCR SERPLBLD CKD-EPI 2021: 49 ML/MIN/1.73M2
ERYTHROCYTE [DISTWIDTH] IN BLOOD BY AUTOMATED COUNT: 14.9 % (ref 10–15)
ERYTHROCYTE [DISTWIDTH] IN BLOOD BY AUTOMATED COUNT: 15.1 % (ref 10–15)
GLUCOSE BLDC GLUCOMTR-MCNC: 204 MG/DL (ref 70–99)
GLUCOSE BLDC GLUCOMTR-MCNC: 249 MG/DL (ref 70–99)
GLUCOSE BLDC GLUCOMTR-MCNC: 273 MG/DL (ref 70–99)
GLUCOSE BLDC GLUCOMTR-MCNC: 277 MG/DL (ref 70–99)
GLUCOSE BLDC GLUCOMTR-MCNC: 307 MG/DL (ref 70–99)
GLUCOSE BLDC GLUCOMTR-MCNC: 348 MG/DL (ref 70–99)
GLUCOSE SERPL-MCNC: 298 MG/DL (ref 70–99)
HCO3 BLDV-SCNC: 22 MMOL/L (ref 21–28)
HCO3 SERPL-SCNC: 18 MMOL/L (ref 22–29)
HCT VFR BLD AUTO: 37.7 % (ref 40–53)
HCT VFR BLD AUTO: 39.3 % (ref 40–53)
HGB BLD-MCNC: 11.6 G/DL (ref 13.3–17.7)
HGB BLD-MCNC: 12.1 G/DL (ref 13.3–17.7)
MCH RBC QN AUTO: 25.2 PG (ref 26.5–33)
MCH RBC QN AUTO: 25.4 PG (ref 26.5–33)
MCHC RBC AUTO-ENTMCNC: 30.8 G/DL (ref 31.5–36.5)
MCHC RBC AUTO-ENTMCNC: 30.8 G/DL (ref 31.5–36.5)
MCV RBC AUTO: 82 FL (ref 78–100)
MCV RBC AUTO: 83 FL (ref 78–100)
O2/TOTAL GAS SETTING VFR VENT: 21 %
OXYHGB MFR BLDV: 65 % (ref 70–75)
PCO2 BLDV: 39 MM HG (ref 40–50)
PH BLDV: 7.37 [PH] (ref 7.32–7.43)
PLATELET # BLD AUTO: 225 10E3/UL (ref 150–450)
PLATELET # BLD AUTO: 248 10E3/UL (ref 150–450)
PO2 BLDV: 37 MM HG (ref 25–47)
POTASSIUM SERPL-SCNC: 5 MMOL/L (ref 3.4–5.3)
RBC # BLD AUTO: 4.57 10E6/UL (ref 4.4–5.9)
RBC # BLD AUTO: 4.8 10E6/UL (ref 4.4–5.9)
SAO2 % BLDV: 66.2 % (ref 70–75)
SODIUM SERPL-SCNC: 135 MMOL/L (ref 135–145)
UFH PPP CHRO-ACNC: 0.15 IU/ML (ref ?–1.1)
UFH PPP CHRO-ACNC: 0.25 IU/ML (ref ?–1.1)
VANCOMYCIN SERPL-MCNC: 21.5 UG/ML (ref ?–25)
WBC # BLD AUTO: 10.1 10E3/UL (ref 4–11)
WBC # BLD AUTO: 9.4 10E3/UL (ref 4–11)

## 2025-05-08 PROCEDURE — 85018 HEMOGLOBIN: CPT | Performed by: STUDENT IN AN ORGANIZED HEALTH CARE EDUCATION/TRAINING PROGRAM

## 2025-05-08 PROCEDURE — 250N000011 HC RX IP 250 OP 636: Performed by: STUDENT IN AN ORGANIZED HEALTH CARE EDUCATION/TRAINING PROGRAM

## 2025-05-08 PROCEDURE — 70450 CT HEAD/BRAIN W/O DYE: CPT

## 2025-05-08 PROCEDURE — 70450 CT HEAD/BRAIN W/O DYE: CPT | Mod: 26 | Performed by: RADIOLOGY

## 2025-05-08 PROCEDURE — 99233 SBSQ HOSP IP/OBS HIGH 50: CPT | Performed by: STUDENT IN AN ORGANIZED HEALTH CARE EDUCATION/TRAINING PROGRAM

## 2025-05-08 PROCEDURE — 120N000002 HC R&B MED SURG/OB UMMC

## 2025-05-08 PROCEDURE — 80202 ASSAY OF VANCOMYCIN: CPT | Performed by: STUDENT IN AN ORGANIZED HEALTH CARE EDUCATION/TRAINING PROGRAM

## 2025-05-08 PROCEDURE — 82010 KETONE BODYS QUAN: CPT | Performed by: STUDENT IN AN ORGANIZED HEALTH CARE EDUCATION/TRAINING PROGRAM

## 2025-05-08 PROCEDURE — 85041 AUTOMATED RBC COUNT: CPT

## 2025-05-08 PROCEDURE — 250N000012 HC RX MED GY IP 250 OP 636 PS 637: Performed by: STUDENT IN AN ORGANIZED HEALTH CARE EDUCATION/TRAINING PROGRAM

## 2025-05-08 PROCEDURE — 250N000011 HC RX IP 250 OP 636: Mod: JZ | Performed by: HOSPITALIST

## 2025-05-08 PROCEDURE — 36415 COLL VENOUS BLD VENIPUNCTURE: CPT

## 2025-05-08 PROCEDURE — 82947 ASSAY GLUCOSE BLOOD QUANT: CPT

## 2025-05-08 PROCEDURE — 36415 COLL VENOUS BLD VENIPUNCTURE: CPT | Performed by: STUDENT IN AN ORGANIZED HEALTH CARE EDUCATION/TRAINING PROGRAM

## 2025-05-08 PROCEDURE — 250N000013 HC RX MED GY IP 250 OP 250 PS 637: Performed by: HOSPITALIST

## 2025-05-08 PROCEDURE — 85520 HEPARIN ASSAY: CPT | Performed by: STUDENT IN AN ORGANIZED HEALTH CARE EDUCATION/TRAINING PROGRAM

## 2025-05-08 PROCEDURE — 999N000248 HC STATISTIC IV INSERT WITH US BY RN

## 2025-05-08 PROCEDURE — 99207 PR NO BILLABLE SERVICE THIS VISIT: CPT | Performed by: STUDENT IN AN ORGANIZED HEALTH CARE EDUCATION/TRAINING PROGRAM

## 2025-05-08 PROCEDURE — 99024 POSTOP FOLLOW-UP VISIT: CPT | Performed by: ASSISTANT, PODIATRIC

## 2025-05-08 PROCEDURE — 258N000003 HC RX IP 258 OP 636: Performed by: STUDENT IN AN ORGANIZED HEALTH CARE EDUCATION/TRAINING PROGRAM

## 2025-05-08 PROCEDURE — 999N000127 HC STATISTIC PERIPHERAL IV START W US GUIDANCE

## 2025-05-08 PROCEDURE — 99223 1ST HOSP IP/OBS HIGH 75: CPT | Mod: 24 | Performed by: INTERNAL MEDICINE

## 2025-05-08 PROCEDURE — 250N000012 HC RX MED GY IP 250 OP 636 PS 637: Performed by: HOSPITALIST

## 2025-05-08 PROCEDURE — 82374 ASSAY BLOOD CARBON DIOXIDE: CPT

## 2025-05-08 PROCEDURE — 82805 BLOOD GASES W/O2 SATURATION: CPT | Performed by: STUDENT IN AN ORGANIZED HEALTH CARE EDUCATION/TRAINING PROGRAM

## 2025-05-08 PROCEDURE — 85014 HEMATOCRIT: CPT | Performed by: STUDENT IN AN ORGANIZED HEALTH CARE EDUCATION/TRAINING PROGRAM

## 2025-05-08 PROCEDURE — 85014 HEMATOCRIT: CPT

## 2025-05-08 RX ORDER — VANCOMYCIN HYDROCHLORIDE 1 G/200ML
1000 INJECTION, SOLUTION INTRAVENOUS EVERY 24 HOURS
Status: DISCONTINUED | OUTPATIENT
Start: 2025-05-08 | End: 2025-05-08

## 2025-05-08 RX ORDER — HEPARIN SODIUM 10000 [USP'U]/100ML
0-5000 INJECTION, SOLUTION INTRAVENOUS CONTINUOUS
Status: DISPENSED | OUTPATIENT
Start: 2025-05-08

## 2025-05-08 RX ORDER — DEXTROSE MONOHYDRATE 25 G/50ML
25-50 INJECTION, SOLUTION INTRAVENOUS
Status: DISCONTINUED | OUTPATIENT
Start: 2025-05-08 | End: 2025-05-08

## 2025-05-08 RX ORDER — HEPARIN SODIUM 10000 [USP'U]/100ML
0-5000 INJECTION, SOLUTION INTRAVENOUS CONTINUOUS
Status: DISCONTINUED | OUTPATIENT
Start: 2025-05-08 | End: 2025-05-08

## 2025-05-08 RX ORDER — NICOTINE POLACRILEX 4 MG
15-30 LOZENGE BUCCAL
Status: DISCONTINUED | OUTPATIENT
Start: 2025-05-08 | End: 2025-05-08

## 2025-05-08 RX ADMIN — LATANOPROST 1 DROP: 50 SOLUTION/ DROPS OPHTHALMIC at 22:40

## 2025-05-08 RX ADMIN — FERROUS SULFATE TAB 325 MG (65 MG ELEMENTAL FE) 325 MG: 325 (65 FE) TAB at 08:46

## 2025-05-08 RX ADMIN — METRONIDAZOLE 500 MG: 500 INJECTION, SOLUTION INTRAVENOUS at 09:53

## 2025-05-08 RX ADMIN — SODIUM BICARBONATE 1300 MG: 650 TABLET ORAL at 16:49

## 2025-05-08 RX ADMIN — TACROLIMUS 2 MG: 1 CAPSULE ORAL at 18:06

## 2025-05-08 RX ADMIN — TAMSULOSIN HYDROCHLORIDE 0.8 MG: 0.4 CAPSULE ORAL at 08:46

## 2025-05-08 RX ADMIN — HEPARIN SODIUM 850 UNITS/HR: 10000 INJECTION, SOLUTION INTRAVENOUS at 13:45

## 2025-05-08 RX ADMIN — SODIUM BICARBONATE 1300 MG: 650 TABLET ORAL at 08:46

## 2025-05-08 RX ADMIN — METRONIDAZOLE 500 MG: 500 INJECTION, SOLUTION INTRAVENOUS at 02:36

## 2025-05-08 RX ADMIN — CEFEPIME 2 G: 2 INJECTION, POWDER, FOR SOLUTION INTRAVENOUS at 03:34

## 2025-05-08 RX ADMIN — METOPROLOL SUCCINATE 75 MG: 25 TABLET, EXTENDED RELEASE ORAL at 08:46

## 2025-05-08 RX ADMIN — METRONIDAZOLE 500 MG: 500 INJECTION, SOLUTION INTRAVENOUS at 18:06

## 2025-05-08 RX ADMIN — TACROLIMUS 2 MG: 1 CAPSULE ORAL at 08:45

## 2025-05-08 RX ADMIN — CALCITRIOL CAPSULES 0.25 MCG 0.25 MCG: 0.25 CAPSULE ORAL at 09:23

## 2025-05-08 RX ADMIN — MYCOPHENOLATE MOFETIL 500 MG: 500 TABLET, FILM COATED ORAL at 08:45

## 2025-05-08 RX ADMIN — ATORVASTATIN CALCIUM 40 MG: 40 TABLET, FILM COATED ORAL at 22:40

## 2025-05-08 RX ADMIN — PANTOPRAZOLE SODIUM 40 MG: 40 TABLET, DELAYED RELEASE ORAL at 08:46

## 2025-05-08 RX ADMIN — BRINZOLAMIDE/BRIMONIDINE TARTRATE 1 DROP: 10; 2 SUSPENSION/ DROPS OPHTHALMIC at 20:42

## 2025-05-08 RX ADMIN — BRINZOLAMIDE/BRIMONIDINE TARTRATE 1 DROP: 10; 2 SUSPENSION/ DROPS OPHTHALMIC at 09:26

## 2025-05-08 RX ADMIN — INSULIN ASPART 4 UNITS: 100 INJECTION, SOLUTION INTRAVENOUS; SUBCUTANEOUS at 16:46

## 2025-05-08 RX ADMIN — ASPIRIN 81 MG CHEWABLE TABLET 81 MG: 81 TABLET CHEWABLE at 08:46

## 2025-05-08 RX ADMIN — MYCOPHENOLATE MOFETIL 500 MG: 500 TABLET, FILM COATED ORAL at 20:42

## 2025-05-08 RX ADMIN — SODIUM BICARBONATE 1300 MG: 650 TABLET ORAL at 20:42

## 2025-05-08 RX ADMIN — SODIUM CHLORIDE, SODIUM LACTATE, POTASSIUM CHLORIDE, AND CALCIUM CHLORIDE 1000 ML: .6; .31; .03; .02 INJECTION, SOLUTION INTRAVENOUS at 10:58

## 2025-05-08 ASSESSMENT — ACTIVITIES OF DAILY LIVING (ADL)
ADLS_ACUITY_SCORE: 44

## 2025-05-08 NOTE — PROGRESS NOTES
Podiatry Progress Note    Date: May 8, 2025      ASSESSMENT/PLAN:  Patient seen postop day 1 from right transmetatarsal amputation with percutaneous tendo Achilles lengthening procedure.    Patient states that he is doing well, no issues overnight other than being woken up by the staff multiple times.  States that pain is well-controlled and minimal, states that he is tolerating using the boot at all times without any issues.    The incision and surgical sites at this time are stable without any signs of early necrosis, hematoma or bleeding.  I replaced a dressing over the area today, made it slightly less bulky so that he fits better in to the cam boot without risk of rubbing or pressure.  I also placed a Mepilex pad over the posterior heel wound area as well as covering the posterior portion of the calcaneus to help with offloading    Can restart anticoagulation per team/vascular surgery recommendations.  At this time I do not have any further surgical plans.    - Patient will need to be strict nonweightbearing given he has major amputation site to the right foot as well as percutaneous Achilles tendon release, nonweightbearing likely to be minimum of 3 to 4 weeks.  Once stitches are out, which will likely be at the 3 or 4-week connor he can begin doing light therapies upright in the cam boot at all times for the next 2 to 3 weeks and then can slowly transition out of the cam boot with easing into stretching of the Achilles with physical therapy.  I would like the cam boot on at all times during this initial 3 to 4 weeks as it will help keep the ankle joint fixated 90 degrees given tendo Achilles lengthening.  At any time if patient notes that he is unable to wear the cam boot we can transition to something like a posterior splint.  But the cam boot can be easily taken on and off and requires a less bulky dressing be much easier for dressing changes for the surgical site.    - I did take intraoperative cultures as  "well as pathology margins of the 1st and 2nd metatarsal bones, I did not appreciate any signs or symptoms of infection proximally but for good measure we could follow-up on the cultures.  From my operative standpoint can narrow antibiotics/discontinue pending culture.    - Recommend continuing to work with patient on tighter glycemic control as this will be crucial to his healing      OBJECTIVE:    /80   Pulse 85   Temp 98.1  F (36.7  C) (Oral)   Resp (!) 0   SpO2 99%      Lab Results   Component Value Date    WBC 10.1 05/08/2025     Lab Results   Component Value Date    RBC 4.57 05/08/2025     Lab Results   Component Value Date    HGB 11.6 05/08/2025     Lab Results   Component Value Date    HCT 37.7 05/08/2025     No components found for: \"MCT\"  Lab Results   Component Value Date    MCV 83 05/08/2025     Lab Results   Component Value Date    MCH 25.4 05/08/2025     Lab Results   Component Value Date    MCHC 30.8 05/08/2025     Lab Results   Component Value Date    RDW 15.1 05/08/2025     Lab Results   Component Value Date     05/08/2025       PHYSICAL EXAM:    Derm: Skin overall to the right lower extremity unchanged, he does have a incision line to the right transmetatarsal amputation intact with all sutures intact and tight skin edges with out maceration or significant bleeding, no signs of hematoma, he does have 3 stab incisions middle and distal portion of the right Achilles with sutures in place, no bleeding no redness, no signs of hematoma here as well        Vascular: Right lower extremity is warm to touch, there is good cap refill to the amputation site, no dusky appearance, edema improved    MSK: Status post transmetatarsal amputation right side, no bruising noted    Neuro: Patient with right lower extremity              Kwesi Arreola DPM  Pager: [phone]          "

## 2025-05-08 NOTE — PROVIDER NOTIFICATION
Paged Dr. Gonzalez: , patient wants to restart Jardiance 10mg that was on hold instead of taking insulin.

## 2025-05-08 NOTE — CONSULTS
Transplant Infectious Diseases Inpatient Consultation      Tad Zaman MRN# [id number]   YOB: 1960 Age: 64 year old   Date of Admission and time: 5/3/2025  2:55 PM     Reason for consult: I was asked by [facility]5 team to evaluate this patient for ABx management.             Recommendations:   1. Discontinue vancomycin, flagyl, and cefepime.   2. Will follow pathology.         Synopsis of Immune Status and Presentation:   This patient is a 64 year old male with DM s/p kidney transplant in 9/2022 in OSH. On TAC/MMF.   Underwent right TMA on 5/7/25 due to dry gangrene.         Active Problems and Infectious Diseases Issues:   1. 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   The OM areas according to MRI involved the tips of the distal phalanges of the 1st, 2nd, 3rd and 4th toes. The OR note reported no purulence and no evidence of infectious process involving the metatarsals proximal to the amputation line.   Given the concerns of ABx-induced adverse events and given the high likelihood that the infected bone is resected, I would discontinue all ABx.   Will follow on pathology in case there is evidence of infection at the amputation line.   Underwent right femoral-anterior tibial bypass on 3/2025.     2. Encephalopathy.   No evidence of encephalopathy when I evaluated the patient.   It looks like the patient develops encephalopathy with [facility] with unremarkable extensive workup.   I wonder if the patient has low threshold for delirium and polypharmaceuticals-induced encephalopathy.         Old Problems and Infectious Diseases Issues:   1. Left BKA.   2. Transient encephalitis [facility] and again at [facility] in 3/2025 with extensive unremarkable workup at both institutions including MRI of the brain, LP with negative CRAG, JCV, ACE, NMDA, paraneoplastic panel, meningitis panel, though with high WBC of 21 and protein of 173 at [facility] but normal WBC at [facility].      Other Infectious Disease issues include:  - QTc: 486 as of 5/6/25 with normal QRS.   - Toxoplasma serostatus: IgG ?  - Viral serostatus: CMV D-/R-, EBV D?/R+  - PJP (and possibly Toxoplasma) prophylaxis:   - Immunization status: up-to-date.   - Gamma globulin status: ?      Thank you very much for involving me in the care of Mr. Tad Zaman. Please do not hesitate to call me for any question.     Attestation:  Total duration of visit including chart review, reviewing labs and imaging independently, interviewing and examining the patient, documentation, and sending communication to the primary treating team, all at the same day of this encounter, is: 80 minutes.   This encounter also qualifies for  code since I assessed Complex antimicrobial therapy counseling and treatment.     Porter Rodriguez MD  [facility]  Contact information available via [facility] Paging/Directory    05/08/2025             History of Present Illness:   Transplants:  N/A, Postoperative day:       This patient is a 64 year old male with vascular disease with hx of L BKA and more recently right femoral-anterior tibial bypass on 3/20/25. He has right foot dry gangrene involving the digits with underlying chronic OM. The plan was to proceed with right TMA electively but the patient presented to ED on 5/6/25 with foul smelling discharge from the dry gangrene. The patient underwent TMA on 5/7/25.  The patient was noted to have encephalopathy since surgery with concerns of ABx-induced encephalopathy.     The patient is poor historian but alert and oriented to person, date, place.             Review of Systems:      As mentioned in the HPI otherwise negative by reviewing constitutional symptoms, central and peripheral neurological systems, respiratory system, cardiac system, GI system,  system, musculoskeletal, skin, allergy, and lymphatics.                  Past Medical History:     Past Medical  History:   Diagnosis Date    Chronic kidney disease     Diabetes (H)     Hypertension     Myocardial infarction (H)             Past Surgical History:     Past Surgical History:   Procedure Laterality Date    AMPUTATE FOOT Right 2025    Procedure: TRANSMETATARSAL AMPUTATION;  Surgeon: Kwesi Arreola DPM;  Location: [facility] OR    ANGIOGRAM Right 3/17/2025    Procedure: ANGIOGRAM;  Surgeon: Messi Irving MD;  Location: [facility] OR    ANGIOPLASTY Right 3/17/2025    Procedure: ANGIOPLASTY;  Surgeon: Messi Irving MD;  Location: [facility] OR    BYPASS GRAFT FEMOROTIBIAL Right 3/20/2025    Procedure: Right Femoral Anterior Tibial Bypass using Cryo preserve artery;  Surgeon: Messi Irving MD;  Location: [facility] OR    IR OR ANGIOGRAM  3/17/2025    IR OR ANGIOGRAM  3/20/2025    LENGTHEN TENDON ACHILLES Right 2025    Procedure: LENGTHENING, TENDON, ACHILLES;  Surgeon: Kwesi Arreola DPM;  Location: [facility] OR    OR ANGIOGRAM, LOWER EXTREMITY Right 3/20/2025    Procedure: COMPLETION ANGIOGRAM;  Surgeon: Messi Irving MD;  Location: [facility] OR            Social History:     Social History     Tobacco Use    Smoking status: Former     Current packs/day: 0.00     Average packs/day: 0.5 packs/day for 19.0 years (9.5 ttl pk-yrs)     Types: Cigarettes     Start date: 1972     Quit date: 1991     Years since quittin.3     Passive exposure: Never    Smokeless tobacco: Never   Substance Use Topics    Alcohol use: Not on file            Family History:   History reviewed. No pertinent family history.         Immunizations:     Immunization History   Administered Date(s) Administered    COVID-19 12+ (Pfizer) 2024, 10/29/2024    COVID-19 MONOVALENT 12+ (Pfizer) 2021, 2021, 2021    Hepatitis B, Adult (Energix-B/Recombivax HB) 2013    Influenza (High Dose) Trivalent,PF (Fluzone) 10/29/2024    Influenza (IIV3) PF 10/29/2001, 2005, 2014, 2018, 2018, 10/01/2019    Influenza Vaccine 65+ (Fluzone HD)  09/22/2023    Influenza Vaccine >6 months,quad, PF 09/23/2020, 09/15/2021, 10/11/2022    Pneumo Conj 13-V (2010&after) 01/01/2016, 09/08/2016    Pneumococcal 23 valent 10/21/2013, 07/16/2018, 12/17/2024    TDAP (Adacel,Boostrix) 05/02/2011, 11/22/2022    Td (Adult), Adsorbed 05/30/2001    Zoster recombinant adjuvanted (Shingrix) 07/16/2021, 09/24/2021            Allergies:     Allergies   Allergen Reactions    Penicillins Hives             Medications:   Medications that Require Transfusion:   Current Facility-Administered Medications   Medication Dose Route Frequency Provider Last Rate Last Admin    heparin 25,000 units in 0.45% NaCl 250 mL ANTICOAGULANT infusion  0-5,000 Units/hr Intravenous Continuous Usama Umanzor MD 10 mL/hr at 05/08/25 1645 1,000 Units/hr at 05/08/25 1645     Scheduled Medications:   Current Facility-Administered Medications   Medication Dose Route Frequency Provider Last Rate Last Admin    aspirin (ASA) chewable tablet 81 mg  81 mg Oral Daily Vinh Crews MD   81 mg at 05/08/25 0846    atorvastatin (LIPITOR) tablet 40 mg  40 mg Oral At Bedtime Vinh Crews MD   40 mg at 05/06/25 2137    brinzolamide-brimonidine (SIMBRINZA) 1-0.2 % ophthalmic suspension 1 drop  1 drop Ophthalmic BID Vinh Crews MD   1 drop at 05/08/25 0926    calcitRIOL (ROCALTROL) capsule 0.25 mcg  0.25 mcg Oral Once per day on Monday Tuesday Wednesday Thursday Friday Vinh Crews MD   0.25 mcg at 05/08/25 0923    [Held by provider] ceFEPIme (MAXIPIME) 2 g vial to attach to  mL bag for ADULTS or NS 50 mL bag for PEDS  2 g Intravenous Q12H Vinh Crews MD 0 mL/hr at 05/06/25 0700 2 g at 05/08/25 0334    [Held by provider] empagliflozin (JARDIANCE) tablet 10 mg  10 mg Oral Eugenio Amezcua PA-C   10 mg at 05/06/25 0824    ferrous sulfate (FEROSUL) tablet 325 mg  325 mg Oral Every Other Day Vinh Crews MD   325 mg at 05/08/25 0846    insulin aspart (NovoLOG) injection (RAPID  ACTING)  1-7 Units Subcutaneous TID AC Eugenio Trivedi PA-C   4 Units at 05/08/25 1646    insulin aspart (NovoLOG) injection (RAPID ACTING)  1-5 Units Subcutaneous At Bedtime Eugenio Trivedi PA-C        latanoprost (XALATAN) 0.005 % ophthalmic solution 1 drop  1 drop Both Eyes At Bedtime Vinh Crews MD   1 drop at 05/05/25 2148    metoprolol succinate ER (TOPROL XL) 24 hr tablet 75 mg  75 mg Oral Daily Vinh Crews MD   75 mg at 05/08/25 0846    metroNIDAZOLE (FLAGYL) infusion 500 mg  500 mg Intravenous Q8H Vinh Crews MD 0 mL/hr at 05/06/25 1232 500 mg at 05/08/25 0953    mycophenolate (GENERIC EQUIVALENT) tablet 500 mg  500 mg Oral BID Vinh Crews MD   500 mg at 05/08/25 0845    pantoprazole (PROTONIX) EC tablet 40 mg  40 mg Oral Daily Vinh Crews MD   40 mg at 05/08/25 0846    [START ON 5/10/2025] patiromer (VELTASSA) packet 8.4 g  8.4 g Oral Weekly Vinh Crews MD        sodium bicarbonate tablet 1,300 mg  1,300 mg Oral TID Vinh Crews MD   1,300 mg at 05/08/25 1649    sodium chloride (PF) 0.9% PF flush 3 mL  3 mL Intracatheter Q8H Vinh Crews MD   3 mL at 05/07/25 1840    tacrolimus (GENERIC EQUIVALENT) capsule 2 mg  2 mg Oral BID IS Vinh Crews MD   2 mg at 05/08/25 0845    tamsulosin (FLOMAX) capsule 0.8 mg  0.8 mg Oral Daily Vinh Crews MD   0.8 mg at 05/08/25 0846    [Held by provider] torsemide (DEMADEX) tablet 10 mg  10 mg Oral BID Vinh Crews MD        vancomycin (VANCOCIN) 1,000 mg in 200 mL dextrose intermittent infusion  1,000 mg Intravenous Q24H Usama Umanzor MD                   Physical Exam:   Temp: 98.8  F (37.1  C) Temp src: Oral BP: 101/63 Pulse: 72   Resp: 16 SpO2: 97 % O2 Device: None (Room air) Oxygen Delivery: 2 LPM    Wt Readings from Last 4 Encounters:   05/08/25 76.2 kg (167 lb 15.9 oz)   04/10/25 72.6 kg (160 lb)   04/08/25 66.2 kg (146 lb)   03/26/25 66.5 kg (146 lb 8 oz)     Constitutional: awake, alert,  "cooperative, no apparent distress and appears at stated age, well nourished.   Head, ENT, Eyes, and Neck: Normocephalic, sinuses non-tender to palpation, external ears without lesions, moist buccal mucosa without oral thrush or ulcers, tonsils without swelling, erythema, or exudate, edentulous in the maxillary ridge, gums without necrosis or abscesses.   CVS: RRR, normal S1/S2, no murmur, PMI was not displaced.   Abdomen: non-tender, non-distended, no masses, no bruit, no shifting dullness, normal BS.   Musculoskeletal and skin: the right foot is covered with a dressing. No skin abnormalities of the LBKA stump.            Data:   No results found for: \"ACD4\"    Inflammatory Markers    Recent Labs   Lab Test 05/03/25  1641   SED 18       Immune Globulin Studies   No lab results found.    Metabolic Studies       Recent Labs   Lab Test 05/08/25  1649 05/08/25  1455 05/08/25  1301 05/08/25  0659 05/08/25  0616 05/08/25  0225 05/07/25  1129 05/07/25  0633 05/06/25  1454 05/06/25  0752 05/05/25  2354 05/05/25  1200 05/04/25  1117 05/04/25  0819 05/04/25  0232 05/03/25  1641 03/23/25  0557 03/21/25  0812 03/21/25  0635 03/04/25  0540 03/03/25  0939   NA  --   --   --  135  --   --   --  137  --  136  --  138  --  137  --  138 136   < >  --    < >  --    POTASSIUM  --   --   --  5.0  --   --   --  5.3  --  4.9  --  4.4  --  4.1  --  4.6 4.8   < >  --    < >  --    CHLORIDE  --   --   --  105  --   --   --  108*  --  106  --  107  --  106  --  103 104   < >  --    < >  --    CO2  --   --   --  18*  --   --   --  17*  --  20*  --  20*  --  21*  --  23 21*   < >  --    < >  --    ANIONGAP  --   --   --  12  --   --   --  12  --  10  --  11  --  10  --  12 11   < >  --    < >  --    BUN  --   --   --  35.6*  --   --   --  29.7*  --  29.3*  --  31.2*  --  37.2*  --  42.4* 31.6*   < >  --    < >  --    CR  --   --   --  1.55*  --   --   --  1.37*  --  1.44*  --  1.43*  --  1.69*  --  2.15* 1.44*   < >  --    < >  --  "   GFRESTIMATED  --   --   --  49*  --   --   --  57*  --  54*  --  54*  --  44*  --  33* 54*   < >  --    < >  --    * 273* 249* 298* 277* 348*   < > 200*   < > 186*   < > 169*   < > 166*   < > 225* 169*   < >  --    < >  --    A1C  --   --   --   --   --   --   --   --   --   --   --   --   --   --   --   --   --   --   --   --  7.9*   QUE  --   --   --  9.8  --   --   --  9.6  --  9.6  --  9.4  --  9.2  --  9.5 9.8   < >  --    < >  --    PHOS  --   --   --   --   --   --   --   --   --   --   --   --   --  3.4  --   --  3.1  --   --    < >  --    MAG  --   --   --   --   --   --   --   --   --   --   --   --   --  1.8  --  2.0 1.7  --   --    < >  --    LACT  --   --   --   --   --   --   --   --   --   --   --   --   --   --   --  1.9  --   --  1.5   < >  --     < > = values in this interval not displayed.       Hepatic Studies    Recent Labs   Lab Test 05/04/25  0819 05/03/25  1641 03/20/25  0332 03/19/25  0623 03/18/25  0721 03/17/25  0712 03/04/25  0540 02/25/25  1232   BILITOTAL  --  0.3  --   --   --   --   --  0.4   ALKPHOS  --  89  --   --   --   --   --  122   ALBUMIN 3.7 3.8 3.0* 3.1* 3.1* 3.4*   < > 3.9   AST  --  20  --   --   --   --   --  19   ALT  --  9  --   --   --   --   --  13    < > = values in this interval not displayed.       Pancreatitis testing    No lab results found.    Hematology Studies      Recent Labs   Lab Test 05/08/25  0904 05/08/25  0659 05/07/25  0633 05/06/25  0752 05/05/25  1200 05/04/25  0819 05/03/25  1641 03/21/25  0813 03/21/25  0452 03/20/25  0922 03/20/25  0332 02/26/25  0723 02/25/25  1232   WBC 10.1 9.4 5.2 5.9 4.6 4.4 5.0   < > 10.0   < > 6.4   < > 10.1   ANEU  --   --   --   --   --   --  3.7  --  8.6*  --  5.0  --  8.6*   ALYM  --   --   --   --   --   --  0.5*  --  0.3*  --  0.4*  --  0.5*   ERIC  --   --   --   --   --   --  0.6  --  0.9  --  0.7  --  0.9   AEOS  --   --   --   --   --   --  0.2  --  0.0  --  0.2  --  0.1   HGB 11.6* 12.1* 11.8* 11.8*  "12.2* 10.9* 11.6*   < > 8.9*   < > 9.0*   < > 12.3*   HCT 37.7* 39.3* 38.1* 37.9* 40.0 35.2* 37.7*   < > 28.7*   < > 29.5*   < > 39.8*    248 200 220 214 225 229   < > 184  --  266   < > 350    < > = values in this interval not displayed.       Clotting Studies    Recent Labs   Lab Test 05/03/25  1641 03/17/25  0712 03/16/25  0043 03/15/25  1431 03/11/25  1533 03/05/25  0636   INR 1.61*  --   --   --   --  1.29*   PTT 34 30 76* 73*   < >  --     < > = values in this interval not displayed.       Arterial Blood Gas Testing    Recent Labs   Lab Test 05/08/25  1041 03/23/25  0557 03/20/25  1319 03/20/25  1224 03/20/25  1118 03/20/25  1035 03/20/25  0922   PH  --   --  7.40 7.40 7.44 7.48* 7.44   PCO2  --   --  41 41 37 34* 38   PO2  --   --  139* 129* 146* 143* 207*   HCO3  --   --  25 25 25 25 26   O2PER 21 21 35.0 35.0 35.0 40.0 40.0        Urine Studies     Recent Labs   Lab Test 05/04/25  1538 03/24/25  1036 03/22/25  1837 03/03/25  1508   URINEPH 7.0 7.0 7.0 6.5   NITRITE Negative Negative Negative Negative   LEUKEST Negative Trace* Large* Trace*   WBCU 2 4 36* 8*       Vancomycin Levels     Recent Labs   Lab Test 05/08/25  1157 05/05/25  1200   VANCOMYCIN 21.5 6.2       Tobramycin levels     No lab results found.    Gentamicin levels    No lab results found.    Tacrolimus levels    Invalid input(s): \"TACROLIMUS\", \"TAC\", \"TACR\"      Latest Ref Rng & Units 5/8/2025     9:04 AM 5/8/2025     6:59 AM 5/7/2025     6:33 AM 5/6/2025     7:52 AM 5/5/2025    12:00 PM   Transplant Immunosuppression Labs   Creat 0.67 - 1.17 mg/dL  1.55  1.37  1.44  1.43    Urea Nitrogen 8.0 - 23.0 mg/dL  35.6  29.7  29.3  31.2    WBC 4.0 - 11.0 10e3/uL 10.1  9.4  5.2  5.9  4.6        Cyclosporine levels    Invalid input(s): \"CYCLOSPORINE\", \"CYC\"    Mycophenolate levels    Invalid input(s): \"MYPA\", \"MYP\"    Sirolimus levels    Invalid input(s): \"SIROLIMUS\", \"SIR\", \"RAPA\"    CSF testing     Recent Labs   Lab Test 03/05/25  1344 " "03/05/25  1343   CWBC  --  0   CRBC  --  0   CGLU 182*  --    .7*  --          Microbiology:  OR cx pending.   Last check of C difficile  No results found for: \"CDBPCT\"    Virology:  CMV viral loads    CMV DNA IU/mL   Date Value Ref Range Status   01/28/2025 Not Detected Not Detected IU/mL Final   01/25/2025 Not Detected Not Detected IU/mL Final     Viral loads    Recent Labs   Lab Test 01/28/25  0700 01/25/25  0712   CMVQNT Not Detected Not Detected       CMV viral loads    CMV DNA IU/mL   Date Value Ref Range Status   01/28/2025 Not Detected Not Detected IU/mL Final   01/25/2025 Not Detected Not Detected IU/mL Final       CMV resistance testing  No lab results found.  No results found for: \"CMVCID\", \"CMVFOS\", \"CMVGAN\", \"CMVDRUGRES\"     No results found for: \"H6RES\"    No results found for: \"EBVDN\", \"EBRES\", \"EBVSP\", \"EBVPC\", \"EBVPCR\"    No results found for: \"CMVIGG\", \"CMVM\", \"CMIG\", \"CMVG\", \"CMIGG\", \"CMIM\", \"CMVIGM\", \"CMLTX\", \"HSVG1\", \"HSVG2\", \"HSVTP1\", \"YF2245\", \"HS12M\", \"HS12GR\", \"HS1GR\", \"HS2GR\", \"HSIM\", \"HSIG\", \"HSIGR\", \"HSVIGMAB\", \"VARICZOSAB\", \"EBVIGG\", \"EBIGG\", \"EBVAGN\", \"UR6362\"    No results found for: \"EBIG2\", \"EBIGM\", \"TOXG\"      Imaging:  CT R foot W 5/3/25  IMPRESSION:  1.  Soft tissue deficiency, ulceration, and gas along the plantar aspect of the great toe with underlying erosive change of the distal phalanx, concerning for osteomyelitis. Soft tissue gas extends proximally to the level of the proximal phalanx of the   great toe where there is soft tissue irregularity. Findings are favored to be related to gas from direct extension from the area of ulceration as opposed to a gas-forming organism, although this is difficult to completely rule-out on CT alone.  2.  Soft tissue stranding and irregularity distal aspect of the 2nd toe without discrete abscess or evidence for osteomyelitis at this time. Note is made that there is dorsal dislocation of the 2nd MTP joint.  3.  No well-defined " rim-enhancing fluid collection on CT with attention to the great toe, although poorly-defined abscess is not excluded as there is poorly-defined circumferential soft tissue swelling.  4.  More global soft tissue swelling about the foot, which can be seen with cellulitis as well as noninfectious causes of edema.  5.  Advanced Charcot arthropathy as described.   MRI Rfoot WO 4/18/25  Impression:  1. Finding most consistent with great toe distal phalangeal  osteomyelitis.  2. Assuming no prior amputation surgery in this area, finding  consistent with second distal phalangeal and to the level of mid  middle phalangeal shaft osteomyelitis. Radiographical correlation  might be helpful to identify residual bone.  3. Apparent exposed third distal phalanx with marrow abnormality  suspicious for osteomyelitis down to the base of middle phalanx.  4. Query forth distal phalangeal tuft acroosteolysis vs. sequale of  osteomyelitis.  5. First proximal phalangeal osteonecrosis.   6. Charcot arthropathy with multiple erosions, possibly related to  superimposed inflammatory arthritis.        Porter Rodriguez MD  [facility]  Contact information available via [facility] Paging/Directory     05/08/2025

## 2025-05-08 NOTE — PLAN OF CARE
/84 (BP Location: Left arm)   Pulse 62   Temp 97.6  F (36.4  C) (Oral)   Resp 18   SpO2 100%     Status: R foot gangrene s/p metatarsal amputation  Activity: NWB RLE, previous L BKA  Neuros: A&Ox4, no deficits noted.  Cardiac: WDL, denies chest pain.  Respiratory: WDL on RA, denies SOB.  GI/: +BS, LBM 5/6, voids spont using bedside urinal.  Diet: Tolerating regular diet.  Skin/Incisions: WDL ex R foot aputation site wrapped in surgical dressing and in boot, CDI.  Lines/Drains: PIV TKO + abx.  Pain/Nausea: Denies.  New Changes: No acute changes this shift.

## 2025-05-08 NOTE — PROGRESS NOTES
"[facility]    Medicine Progress Note - Hospitalist Service, GOLD TEAM 4    Date of Admission:  5/3/2025    Assessment & Plan    Tad Zaman is a 64 year old male with hypertension, chronic systolic heart failure, type 2 diabetes, peripheral artery disease status post left BKA, right femoral-tibial bypass (3/20/2025), right diabetic foot ulcer, osteomyelitis, history of MI, status post renal transplant, CKD stage III, with with complaints of progressive right foot pain and swelling and being admitted for progressive R foot severe diabetic foot ulcer and possible R TMA.     Today:  - Tolerated R TMA yesterday without issue   - On my exam this morning pt was alert but orientated to self-only, not to date and only loosely to location (\"[facility]\") or situation (thought he had a transplant, not amputation, of his foot). This is an acute change from preoperatively. No focal neuro deficits were present on exam  - Low suspicion for acute intracranial pathology, however, with resumption of AC today we did first check a CT head w/o which showed no acute abnormality  - Per discussion with nursing pt only slept a few hours last night after anesthesia. Will continue to monitor mentation   - Cr up today so we will give 1 liter fluid bolus.  - Hgb trending up this morning  - Discussed AC with pharmacy. Will start low intensity heparin this morning and plan to monitor for bleeding throughout the day. If no evidence of bleeding tomorrow morning will resume pt's PTA Eliquis 5 mg BID and discontinue heparin drip  - Transplant ID consult placed now that tissue has been obtained    - Will HOLD Cefepime pending input from ID      # Gangrene and osteomyelitis of right foot s/p Transmetatarsal Amputation 5/7  # PAD s/p Left BKA and Right F-T stent  Presents with history of progressive R diabetic foot ulcer with outpatient plans for R TMA, but having concern for worsening swelling and foul " "odor. He had recent Arterial doppler on 5/2 with good stent flow. Workup in ED reassuring with WBC WNL, Lactate 1.9, CRP and ESR WNL. LLE Duplex negative for DVT. CT with contrast concerning for soft tissue gas which is favored to be from direct extension from the area of ulceration as opposed to gas-formin organism.  Appreciate assistance from podiatry and vascular surgery.  Vascular surgery did not recommend acute vascular procedure at this time, advised that transmetatarsal amputation is a viable option at this time, but patient does remain at high risk of needing further amputation in the future.  Podiatry performed transmetatarsal amputation on 5/7.  - Continue Cefepime (allergy profile), vancomycin and flagyl (5/3/25 - ### )  - Follow 5/3 Bcx, NGTD  - Follow bone aerobic and anaerobic cultures, fungal culture, and pathology  - Transplant ID consulted   -Resume anticoagulation with heparin bridge and Eliquis on 5/8, tentatively  - Vascular surgery input appreciated  - Blood flow is tenuous to R foot. Per our conversation, it is reasonable to pursue TMA but note pt is high risk of needing additional amputation in the future   - Will need lifelong AC following surgery   - Follow up with Vascular in 3 months, sooner if TMA fails to heal as he may require further angioplasty    - Continue PTA ASA for now   - Daily CBC, BMP      # Disorientation   On my exam the morning of 5/8 following TMA< pt was alert but orientated to self-only, not to date and only loosely to location (\"[facility]\") or situation (thought he had a transplant, not amputation, of his foot). This is an acute change from preoperatively. No focal neuro deficits were present on exam.  Prior to resumption of anticoagulation we did check a CT head which was negative for acute abnormality, does note chronic right cerebellar infarct.  - VBG unremarkable, ketones normal.  No other significant electrolyte abnormalities.  - Per discussion with nursing pt " only slept a few hours last night after anesthesia. Will allow patient to rest and continue to monitor mentation   - Note, pt is on Cefepime which can contribute to confusion at times. Will continue for now pending input from ID regarding Abx plan for discharge      # S/P Renal transplant  # MOY, recurrent   # CKD3  Cr baseline around ~1.6. Cr on admit 2.15. Renal transplant US 5/3 with moderate hydronephrosis of the transplant kidney, that improved post void. Elevated resistive indices suggestive of renal transplant dysfunction. Was given 2L NS on admit and was started on 125ml/hr overnight with improvement in Cr  - Cr trending up the day after surgery - will give 1 L NS bolus   - Renal transplant consulted  - Continue PTA Tacro 2 mg BID  - Continue PTA Mycophenolate 500 mg BID for now  - HOLDing PTA Torsemide and there has been no evidence of fluid overload.  - Continue PTA sodium bicarb and Patiromer        # DM2  Last A1C 7.9 on 3/3/25. Outpatient regimen includes Metformin and Jardiance. Was started on MSSI on admit but patient refusing so this was discontinued. Last 24h trend:   - HOLD PTA Metformin  - Resume Jaurdiance, resume once renal function stable   - Sliding scale insuline added 5/8   - Hypoglycemia protocol  - Follow BG trend, will continue to discuss with patient the importance for BG control in perioperative setting    Recent Labs   Lab 05/08/25  0659 05/08/25  0616 05/08/25  0225 05/07/25  1839 05/07/25  1129 05/07/25  0633   * 277* 348* 172* 160* 200*        History of CVA/TIA  Chronic AC, ASA  He is on dual coverage with Apixaban and ASA for history of CVA/TIA and PAD per family. Apixaban held on admission given need for procedure.   - AC as above   - ASA plan above     Hypertension  Chronic systolic heart failure/HFmrEF  History of chronic systolic heart failure, MI but no clear history of CAD or cardiac revascularization. He had TTE on 2/25/25 with EF of 40-45% and mild pulmonary  hypertension likely Group 2. No signs of acute HF exacerbation.  - Continue PTA Metoprolol   - Continue PTA Statin       BPH   - Continue PTA Tamsulosin     Glaucoma   - Continue PTA gtts                Diet: Regular Diet Adult    DVT Prophylaxis: held in anticipation of upcoming TMA on 5/7  Zaman Catheter: Not present  Lines: None     Cardiac Monitoring: None  Code Status: Full Code        Disposition Plan:  Medically Ready for Discharge: Anticipated in 5+ Days    Expected Discharge Date: 05/12/2025      Destination: inpatient rehabilitation facility;other (comment) (Transitional Care)            The patient's care was discussed with staff physician, Bedside Nurse, Patient, and podiatry and vascular Consultant(s).    Eugenio Trivedi PA-C  Hospitalist Service, GOLD TEAM [address] [place] [address]  Securely message with [employer] (more info)  Text page via [employer] Paging/Directory     Clinically Significant Risk Factors          # Hyperchloremia: Highest Cl = 108 mmol/L in last 2 days, will monitor as appropriate                        # DMII: A1C = 7.9 % (Ref range: <5.7 %) within past 6 months         # Financial/Environmental Concerns: none         ______________________________________________________________________    Interval History     Patient reports overall feeling well this morning.  However, he is notably disorientated this morning.  He is only orientated to name but loosely to situation and place.    Data reviewed today: I reviewed all medications, new labs and imaging results over the last 24 hours. Please see discussion of these results in the A/P.    Physical Exam    Vital Signs: Temp: 98.1  F (36.7  C) Temp src: Oral BP: 131/84 Pulse: 68     Resp: (!) 0 (pt took off nc) SpO2: 99 % O2 Device: None (Room air) Oxygen Delivery: 2 LPM  Weight: 0 lbs 0 oz    Constitutional: awake, alert, cooperative, in no acute distress. A&O to self only, not orientated to time and only  loosely to place and situation as of 5/8  Eyes: EOM, PERRLA. Sclera clear, conjunctiva normal. Anicteric   HENT: Normocephalic, without obvious abnormality, atraumatic.   Respiratory: No increased work of breathing.   Cards: RRR, no murmer appreciated   GI: Soft, non-tender without rebound or guarding.   Musculoskeletal:  Full range of motion noted, hx L BKA. Now s/p R transmetatarsal amputation 5/7. Dressing is c/d/i  Neurologic: Cranial nerves II-XII are grossly intact.   Neuropsychiatric: General: normal, calm and normal eye contact. Normal affect        5/5 images, pre amputation:                  Data   Recent Labs   Lab 05/08/25  0659 05/08/25  0616 05/08/25  0225 05/07/25  1129 05/07/25  0633 05/06/25  1454 05/06/25  0752 05/04/25  1117 05/04/25  0819 05/04/25  0232 05/03/25  1641   WBC 9.4  --   --   --  5.2  --  5.9   < > 4.4  --  5.0   HGB 12.1*  --   --   --  11.8*  --  11.8*   < > 10.9*  --  11.6*   MCV 82  --   --   --  83  --  81   < > 82  --  83     --   --   --  200  --  220   < > 225  --  229   INR  --   --   --   --   --   --   --   --   --   --  1.61*     --   --   --  137  --  136   < > 137  --  138   POTASSIUM 5.0  --   --   --  5.3  --  4.9   < > 4.1  --  4.6   CHLORIDE 105  --   --   --  108*  --  106   < > 106  --  103   CO2 18*  --   --   --  17*  --  20*   < > 21*  --  23   BUN 35.6*  --   --   --  29.7*  --  29.3*   < > 37.2*  --  42.4*   CR 1.55*  --   --   --  1.37*  --  1.44*   < > 1.69*  --  2.15*   ANIONGAP 12  --   --   --  12  --  10   < > 10  --  12   QUE 9.8  --   --   --  9.6  --  9.6   < > 9.2  --  9.5   * 277* 348*   < > 200*   < > 186*   < > 166*   < > 225*   ALBUMIN  --   --   --   --   --   --   --   --  3.7  --  3.8   PROTTOTAL  --   --   --   --   --   --   --   --   --   --  6.9   BILITOTAL  --   --   --   --   --   --   --   --   --   --  0.3   ALKPHOS  --   --   --   --   --   --   --   --   --   --  89   ALT  --   --   --   --   --   --   --   --    --   --  9   AST  --   --   --   --   --   --   --   --   --   --  20    < > = values in this interval not displayed.       Recent Results (from the past 24 hours)   POC US Guidance Needle Placement    Impression    Right popliteal sciatic nerve block and right adductor canal nerve block

## 2025-05-08 NOTE — PHARMACY-VANCOMYCIN DOSING SERVICE
Pharmacy Vancomycin Note  Date of Service May 8, 2025  Patient's  1960   64 year old, male    Indication: Osteomyelitis  Day of Therapy: 6  Current vancomycin regimen:  1000 mg IV q18h  Current vancomycin monitoring method: AUC  Current vancomycin therapeutic monitoring goal: 400-600 mg*h/L    [employer] Prediction of Current Vancomycin Regimen  Loading dose: N/A  Regimen: 1000 mg IV every 18 hours.  Start time: 20:09 on 2025  Exposure target: AUC24 (range)400-600 mg/L.hr   AUC24,ss: 700 mg/L.hr  Probability of AUC24 > 400: 100 %  Ctrough,ss: 23.7 mg/L  Probability of Ctrough,ss > 20: 81 %  Probability of nephrotoxicity (Lodise KENTRELL ): 25 %      Current estimated CrCl = Estimated Creatinine Clearance: 50.5 mL/min (A) (based on SCr of 1.55 mg/dL (H)).    Creatinine for last 3 days  2025:  7:52 AM Creatinine 1.44 mg/dL  2025:  6:33 AM Creatinine 1.37 mg/dL  2025:  6:59 AM Creatinine 1.55 mg/dL    Recent Vancomycin Levels (past 3 days)  2025: 11:57 AM Vancomycin 21.5 ug/mL    Vancomycin IV Administrations (past 72 hours)                     vancomycin (VANCOCIN) 1,000 mg in 200 mL dextrose intermittent infusion (mg) 1,000 mg New Bag 25     1,000 mg New Bag  0219     1,000 mg New Bag 25 0824                    Nephrotoxins and other renal medications (From now, onward)      Start     Dose/Rate Route Frequency Ordered Stop    25  vancomycin (VANCOCIN) 1,000 mg in 200 mL dextrose intermittent infusion         1,000 mg  200 mL/hr over 1 Hours Intravenous EVERY 24 HOURS 25 1501      25 0800  [Held by provider]  empagliflozin (JARDIANCE) tablet 10 mg        (On hold since 2025 at 1615 until manually unheld; held by Eugenio Trivedi PA-CHold Reason: Transfer to a procedural area)   Note to Pharmacy: PTA Sig:Take 12.5 mg by mouth every morning. TAKE ONE-HALF TABLET BY MOUTH EVERY MORNING FOR DIABETES AND KIDNEY PROTECTION      10 mg Oral EVERY  MORNING 05/03/25 1858 05/03/25 2000  [Held by provider]  torsemide (DEMADEX) tablet 10 mg        (On hold since Sat 5/3/2025 at 1947 until manually unheld; held by Vinh Crews MDHold Reason: Acute Kidney Injury)   Note to Pharmacy: PTA Sig:Take 10 mg by mouth 2 times daily.      10 mg Oral 2 TIMES DAILY 05/03/25 1858 05/03/25 1920  tacrolimus (GENERIC EQUIVALENT) capsule 2 mg        Note to Pharmacy: PTA Sig:Take 2 capsules (2 mg) by mouth 2 times daily.      2 mg Oral 2 TIMES DAILY. 05/03/25 1858                 Contrast Orders - past 72 hours (72h ago, onward)      None            Interpretation of levels and current regimen:  Vancomycin level is reflective of AUC greater than 600    Has serum creatinine changed greater than 50% in last 72 hours: No    Urine output:  unable to determine    Renal Function: Stable    [employer] Prediction of Planned New Vancomycin Regimen  Loading dose: N/A  Regimen: 1000 mg IV every 24 hours.  Start time: 20:09 on 05/08/2025  Exposure target: AUC24 (range)400-600 mg/L.hr   AUC24,ss: 536 mg/L.hr  Probability of AUC24 > 400: 98 %  Ctrough,ss: 17.1 mg/L  Probability of Ctrough,ss > 20: 23 %  Probability of nephrotoxicity (Lodise KENTRELL 2009): 13 %      Plan:  Decrease Dose to vancomycin 1000 mg IV every 24 hours  Vancomycin monitoring method: AUC  Vancomycin therapeutic monitoring goal: 400-600 mg*h/L  Pharmacy will check vancomycin levels as appropriate in 1-3 Days.  Serum creatinine levels will be ordered daily for the first week of therapy and at least twice weekly for subsequent weeks.    Davidson Batista [facility]

## 2025-05-08 NOTE — PLAN OF CARE
Goal Outcome Evaluation:      Plan of Care Reviewed With: patient    Overall Patient Progress: improving    Outcome Evaluation: Patient oriented to self, service aware. CT scan completed this shift. Heparin drip infusing at 1000 units/hour redraw at 2245. Bed alarm on. Continue to monitor.

## 2025-05-08 NOTE — OP NOTE
Operative Note    Date of Operation: May 7, 2025      Pre-Op Diagnosis: Osteomyelitis right first, second, third toes, peripheral vascular disease, type 2 diabetes with peripheral neuropathy, chronic kidney disease status post renal transplant    Post-Op Diagnosis: Same as above    Procedure:   - Transmetatarsal amputation right foot  - Percutaneous tendo Achilles lengthening right leg    Surgeon: Kwesi Arreola DPM    Assistants: None    Anesthesia: MAC with regional anesthetic block given by the regional anesthesia team prior to operation    Estimated Blood Loss: 150 mL    Urine Output: N/A    IVF: Crystalloid    Specimens:  -Soft tissue culture of amputation site taken to be sent for microbiology  -First metatarsal bone margin to pathology  -Second metatarsal bone margin to pathology    Findings:  Necrosis of toes 1 through 3 on the right side, also some slight tightness in his Achilles tendon region although he does have relatively decent dorsiflexion.  During procedure he did have adequate bleeding with multiple pulsating vessels that were tied off.  He had good skin edge bleeding as well as cap refill at the end of the procedure today.  There were no obvious bleeders on closure there was some noted oozing as expected with a major foot amputation.  The entire incision site is closed, I did not appreciate any signs or symptoms of infection but we did take a tissue sample as well as 1st and 2nd metatarsal margins for pathology as these were the toe that did show osteomyelitis     Drains: None    Complications: None    Concerns: None concerns before or after procedure    Indication for Procedure:  Patient with prior vascular intervention for ischemia and peripheral vascular disease on the right side.  Patient is a long standing diabetic with history of major amputation, BKA, left side now admitted for worsening of right great toe, second, third toe wound areas and gangrene.  Patient and family note difficult to  take care of him at home, difficult to follow weightbearing instructions, loss of home nursing now with more red and malodorous wounds.  Patient followed up in the outpatient setting with podiatry who had plan to set him up within the next week or 2 for a transmetatarsal amputation with tendo Achilles lengthening, podiatry consulted for completion of procedure while he is here given multiple factors.  Given MRI confirmation of osteomyelitis and has known vascular disease I do agree with Dr. Miller's recommendation of bare minimum transmetatarsal amputation with tendo Achilles lengthening to offload forefoot pressure.  Will plan to complete this during inpatient stay      Description of Procedure:  Patient was seen in the preoperative area where consent was discussed and obtained for procedure as noted above.  I discussed with patient the risks and benefits of this procedure, he does have infection of the toes and source control given his history as well as immuno suppression would be beneficial.  Given multiple toes and history of vascular disease would recommend transmetatarsal amputation at minimum.  We discussed that we cannot make any guarantees on the long-term healing process, he will need to be strict nonweightbearing and continue to control his blood sugars.  If site were to become infected or fail he may need further debridement or further amputation and this could include loss of the right lower extremity.  We also discussed tendo Achilles lengthening as an adjunct procedure/prophylaxis for equinus that often times develops after major foot amputation given biomechanical imbalance, patient is amenable to this at this time and would like to proceed with procedure as noted above understanding the risks.    Patient was then brought into the operative room and placed in the operative bed in a supine position and made comfortable.  He was already administered regional anesthesia by the regional anesthesia team  to the right lower extremity for block, after induction of anesthetic by the anesthesiology team the right lower extremity was prepped to the tibial tuberosity using Betadine scrub and paint and draped in typical aseptic fashion.    Timeout was completed by all members of the operative team noting right side is laterality, seizure as noted above, specimen plan, no concerns were raised for the patient.  No tourniquet used for this procedure    Attention was directed to the right lower extremity, posterior side specifically at the Achilles tendon where we measured 2-1/2 cm proximally from the insertion and made a connor on the medial side of the tendon, we then measured 2-1/2 cm proximally and made a connor on the lateral side, then measured another 2-1/2 cm proximally and made a connor on the medial side of the Achilles tendon, 3 total marks were made, 2 medial and 1 lateral.  This is our planned areas for percutaneous tendo Achilles lengthening.  I then used a 15 blade and made a cut perpendicular to the Achilles tendon first starting at the most distal and medial connor made advancing the 15 blade through subcutaneous tissue and into the tendon and releasing only the medial half of the tendon at this point.  We then did the same steps for the lateral as well as the last medial Connor, ensuring to only release the medial portion whether the medial side or the lateral side of the tendon.  We then passively dorsiflexed the ankle, I did note improved dorsiflexion ability.  I then palpated along the substance of the Achilles tendon was still able to palpate along the entire area, there was no complete rupture noted.  I then flushed with 3 stab incision areas with saline and closed them superficially using 3-0 nylon.    Attention was then directed to the right foot where there was noted necrosis of toes 1 through 3 and a slight malodor.  A fishmouth type incision was planned and performed circumferentially at the level of the  distal metatarsals and carried down to the level of bone using a 15 blade as well as Bovie cautery and cut.  Care was taken on the plantar side especially to maintain good full-thickness flaps for closure. There was no pus encountered and we did note immediate skin edge bleeding circumferentially.  The digits were disarticulated at the metatarsal neck, maintaining anatomic parabola using sagittal saw, soft tissue attachments surrounding the metatarsals at the level of the neck were freed using sharp dissection.  We then continued to free tissue over the dorsal, medial, lateral and plantar side of the metatarsal staying close to the bone and ensuring full-thickness/thick flaps.  I then used a sagittal saw to remove another 3 to 4 mm of bone across metatarsals 1 through 5 maintaining parabola, this portion of bone from the 1st and 2nd metatarsal was then passed off the field to be sent as specimen in formalin to pathology as margin.  We then used a clean rongeur to take a soft tissue specimen near the middle and plantar portion of the amputation site and this was passed off the field to be sent as right amputation site specimen for Gram stain, aerobic, anaerobic, fungal.    I then evaluated the entire amputation site, there were a few areas of bleeding that were cauterized using electrocautery, a few areas of pulsatile bleeding that were tied off using 3-0 Monocryl in figure-of-eight fashion.  We then used sharp dissection to remove any remaining capsular tissue/tendon/avascular structures prior to final flush and closure.  Once final examination of amputation site was completed and unwanted tissue was removed we then flushed the area with copious amounts of saline, one last pass of examination and electrocautery used for a few areas of oozing and bleeding.    I closed deep within the amputation site using 2-0 Vicryl  The subcutaneous tissues were closed with 3-0 Vicryl and the skin was closed with 2-0 nylon.  All  counts were correct with the procedure, patient tolerated anesthesia and the procedure well.  There was good cap refill to the amputation site without any major blood pooling or pulsatile bleeding on closure.    A bulky dry sterile dressing was placed over the entire amputation site as well as to the posterior Achilles area, patient was then placed in a cam walker with ankle at 90 degrees fixed    Patient will spend a brief period in PACU versus being transferred to the floor.  Postop instructions dropped in progress note following procedure            Kwesi Arreola DPM  Podiatry Service - [facility]  Pager: [phone]

## 2025-05-08 NOTE — PLAN OF CARE
[id number]:    Vital signs:  Temp: 98  F (36.7  C) Temp src: Oral BP: 128/83 Pulse: 76   Resp 18 SpO2: 100 % O2 Device: None (Room air)     Problem: Glycemic Control Impaired  Goal: Blood Glucose Level Within Targeted Range  Intervention: Optimize Glycemic Control  Recent Flowsheet Documentation  Taken 5/8/2025 0330 by Yoon Fernando, RN  Hyperglycemia Management: blood glucose monitored     Problem: Adult Inpatient Plan of Care  Goal: Absence of Hospital-Acquired Illness or Injury  Intervention: Identify and Manage Fall Risk  Recent Flowsheet Documentation  Taken 5/8/2025 0021 by Yoon Fernando, RN  Safety Promotion/Fall Prevention:   activity supervised   increased rounding and observation   patient and family education   room near nurse's station   room organization consistent   safety round/check completed     Activity: Repositions self in bed.  Neuros: A & Ox4. Neuro intact. Denies numbness or tingling.  Cardiac: WDL. Asymptomatic.   Respiratory: LS clear. Denies SOB. Unlabored. O2 sats high 90s on RA.   GI/: Unable to assess, patient refused, no BM. Voiding adequate in bedside urinal.   Diet: Regular diet.   Skin: Right foot ace wrapped, c/d/I, and in rook boot, both legs elevated on pillows.   Lines: Flagyl and Cefepime administered via left PIV, otherwise TKO.   Drains: None.   Labs: Reviewed. , Dr. Gonzalez notified, patient refused insulin stating wanting Jardiance 10mg, Dr. Gonzalez stated will notify regular team, no new orders at this time.   Pain/nausea: Denies pain. Denies nausea.   New changes this shift: .   Plan: Continue POC.

## 2025-05-09 LAB
ACID FAST STAIN (ARUP): NORMAL
ACID FAST STAIN (ARUP): NORMAL
ANION GAP SERPL CALCULATED.3IONS-SCNC: 11 MMOL/L (ref 7–15)
BILIRUB DIRECT SERPL-MCNC: 0.19 MG/DL (ref 0–0.3)
BILIRUB SERPL-MCNC: 0.3 MG/DL
BUN SERPL-MCNC: 36.6 MG/DL (ref 8–23)
CALCIUM SERPL-MCNC: 9.5 MG/DL (ref 8.8–10.4)
CHLORIDE SERPL-SCNC: 108 MMOL/L (ref 98–107)
CREAT SERPL-MCNC: 1.67 MG/DL (ref 0.67–1.17)
EGFRCR SERPLBLD CKD-EPI 2021: 45 ML/MIN/1.73M2
ERYTHROCYTE [DISTWIDTH] IN BLOOD BY AUTOMATED COUNT: 15 % (ref 10–15)
GLUCOSE BLDC GLUCOMTR-MCNC: 160 MG/DL (ref 70–99)
GLUCOSE BLDC GLUCOMTR-MCNC: 178 MG/DL (ref 70–99)
GLUCOSE BLDC GLUCOMTR-MCNC: 187 MG/DL (ref 70–99)
GLUCOSE BLDC GLUCOMTR-MCNC: 191 MG/DL (ref 70–99)
GLUCOSE BLDC GLUCOMTR-MCNC: 202 MG/DL (ref 70–99)
GLUCOSE SERPL-MCNC: 193 MG/DL (ref 70–99)
HCO3 SERPL-SCNC: 19 MMOL/L (ref 22–29)
HCT VFR BLD AUTO: 33.7 % (ref 40–53)
HGB BLD-MCNC: 10.4 G/DL (ref 13.3–17.7)
MCH RBC QN AUTO: 25.4 PG (ref 26.5–33)
MCHC RBC AUTO-ENTMCNC: 30.9 G/DL (ref 31.5–36.5)
MCV RBC AUTO: 82 FL (ref 78–100)
PLATELET # BLD AUTO: 174 10E3/UL (ref 150–450)
POTASSIUM SERPL-SCNC: 4.3 MMOL/L (ref 3.4–5.3)
RBC # BLD AUTO: 4.1 10E6/UL (ref 4.4–5.9)
SODIUM SERPL-SCNC: 138 MMOL/L (ref 135–145)
UFH PPP CHRO-ACNC: 0.32 IU/ML (ref ?–1.1)
WBC # BLD AUTO: 7.2 10E3/UL (ref 4–11)

## 2025-05-09 PROCEDURE — 250N000013 HC RX MED GY IP 250 OP 250 PS 637: Performed by: HOSPITALIST

## 2025-05-09 PROCEDURE — 250N000011 HC RX IP 250 OP 636: Mod: JZ | Performed by: HOSPITALIST

## 2025-05-09 PROCEDURE — 250N000013 HC RX MED GY IP 250 OP 250 PS 637: Performed by: STUDENT IN AN ORGANIZED HEALTH CARE EDUCATION/TRAINING PROGRAM

## 2025-05-09 PROCEDURE — 85041 AUTOMATED RBC COUNT: CPT

## 2025-05-09 PROCEDURE — 82374 ASSAY BLOOD CARBON DIOXIDE: CPT

## 2025-05-09 PROCEDURE — 82565 ASSAY OF CREATININE: CPT

## 2025-05-09 PROCEDURE — 99024 POSTOP FOLLOW-UP VISIT: CPT | Performed by: ASSISTANT, PODIATRIC

## 2025-05-09 PROCEDURE — 99233 SBSQ HOSP IP/OBS HIGH 50: CPT | Mod: 24 | Performed by: INTERNAL MEDICINE

## 2025-05-09 PROCEDURE — 99232 SBSQ HOSP IP/OBS MODERATE 35: CPT | Performed by: STUDENT IN AN ORGANIZED HEALTH CARE EDUCATION/TRAINING PROGRAM

## 2025-05-09 PROCEDURE — 120N000002 HC R&B MED SURG/OB UMMC

## 2025-05-09 PROCEDURE — 36415 COLL VENOUS BLD VENIPUNCTURE: CPT | Performed by: STUDENT IN AN ORGANIZED HEALTH CARE EDUCATION/TRAINING PROGRAM

## 2025-05-09 PROCEDURE — 85520 HEPARIN ASSAY: CPT | Performed by: STUDENT IN AN ORGANIZED HEALTH CARE EDUCATION/TRAINING PROGRAM

## 2025-05-09 PROCEDURE — 82247 BILIRUBIN TOTAL: CPT | Performed by: STUDENT IN AN ORGANIZED HEALTH CARE EDUCATION/TRAINING PROGRAM

## 2025-05-09 PROCEDURE — 85014 HEMATOCRIT: CPT

## 2025-05-09 PROCEDURE — 250N000012 HC RX MED GY IP 250 OP 636 PS 637: Performed by: HOSPITALIST

## 2025-05-09 RX ORDER — TORSEMIDE 10 MG/1
10 TABLET ORAL 2 TIMES DAILY
Status: DISCONTINUED | OUTPATIENT
Start: 2025-05-09 | End: 2025-05-18 | Stop reason: HOSPADM

## 2025-05-09 RX ADMIN — BRINZOLAMIDE/BRIMONIDINE TARTRATE 1 DROP: 10; 2 SUSPENSION/ DROPS OPHTHALMIC at 09:36

## 2025-05-09 RX ADMIN — SODIUM BICARBONATE 1300 MG: 650 TABLET ORAL at 09:37

## 2025-05-09 RX ADMIN — METRONIDAZOLE 500 MG: 500 INJECTION, SOLUTION INTRAVENOUS at 10:10

## 2025-05-09 RX ADMIN — MYCOPHENOLATE MOFETIL 500 MG: 500 TABLET, FILM COATED ORAL at 20:36

## 2025-05-09 RX ADMIN — TACROLIMUS 2 MG: 1 CAPSULE ORAL at 09:37

## 2025-05-09 RX ADMIN — PANTOPRAZOLE SODIUM 40 MG: 40 TABLET, DELAYED RELEASE ORAL at 09:38

## 2025-05-09 RX ADMIN — APIXABAN 5 MG: 5 TABLET, FILM COATED ORAL at 11:46

## 2025-05-09 RX ADMIN — TACROLIMUS 2 MG: 1 CAPSULE ORAL at 17:32

## 2025-05-09 RX ADMIN — BRINZOLAMIDE/BRIMONIDINE TARTRATE 1 DROP: 10; 2 SUSPENSION/ DROPS OPHTHALMIC at 20:37

## 2025-05-09 RX ADMIN — ASPIRIN 81 MG CHEWABLE TABLET 81 MG: 81 TABLET CHEWABLE at 09:37

## 2025-05-09 RX ADMIN — METOPROLOL SUCCINATE 75 MG: 25 TABLET, EXTENDED RELEASE ORAL at 09:39

## 2025-05-09 RX ADMIN — ATORVASTATIN CALCIUM 40 MG: 40 TABLET, FILM COATED ORAL at 22:37

## 2025-05-09 RX ADMIN — APIXABAN 5 MG: 5 TABLET, FILM COATED ORAL at 20:36

## 2025-05-09 RX ADMIN — TORSEMIDE 10 MG: 10 TABLET ORAL at 20:36

## 2025-05-09 RX ADMIN — CALCITRIOL CAPSULES 0.25 MCG 0.25 MCG: 0.25 CAPSULE ORAL at 09:41

## 2025-05-09 RX ADMIN — METRONIDAZOLE 500 MG: 500 INJECTION, SOLUTION INTRAVENOUS at 02:25

## 2025-05-09 RX ADMIN — INSULIN ASPART 1 UNITS: 100 INJECTION, SOLUTION INTRAVENOUS; SUBCUTANEOUS at 18:46

## 2025-05-09 RX ADMIN — TAMSULOSIN HYDROCHLORIDE 0.8 MG: 0.4 CAPSULE ORAL at 09:37

## 2025-05-09 RX ADMIN — SODIUM BICARBONATE 1300 MG: 650 TABLET ORAL at 13:48

## 2025-05-09 RX ADMIN — SODIUM BICARBONATE 1300 MG: 650 TABLET ORAL at 20:36

## 2025-05-09 RX ADMIN — TORSEMIDE 10 MG: 10 TABLET ORAL at 13:48

## 2025-05-09 RX ADMIN — MYCOPHENOLATE MOFETIL 500 MG: 500 TABLET, FILM COATED ORAL at 09:38

## 2025-05-09 RX ADMIN — LATANOPROST 1 DROP: 50 SOLUTION/ DROPS OPHTHALMIC at 22:38

## 2025-05-09 ASSESSMENT — ACTIVITIES OF DAILY LIVING (ADL)
ADLS_ACUITY_SCORE: 44

## 2025-05-09 NOTE — PLAN OF CARE
[id number]:    Vital signs:  Temp: 98.6  F (37  C) Temp src: Oral BP: 111/70 Pulse: 72   Resp: 18 SpO2: 99 % O2 Device: None (Room air)    Weight: 76.2 kg (167 lb 15.9 oz)      Problem: Adult Inpatient Plan of Care  Goal: Absence of Hospital-Acquired Illness or Injury  Intervention: Identify and Manage Fall Risk  Recent Flowsheet Documentation  Taken 5/8/2025 2042 by Yoon Fernando RN  Safety Promotion/Fall Prevention:   activity supervised   lighting adjusted   nonskid shoes/slippers when out of bed   patient and family education   room near nurse's station   room organization consistent   safety round/check completed   supervised activity     Problem: Delirium  Goal: Improved Attention and Thought Clarity  Intervention: Maximize Cognitive Function  Recent Flowsheet Documentation  Taken 5/8/2025 2042 by Yoon Fernando RN  Sensory Stimulation Regulation:   care clustered   television on  Reorientation Measures:   calendar in view   clock in view   reorientation provided     Problem: Glycemic Control Impaired  Goal: Blood Glucose Level Within Targeted Range  Outcome: Progressing  Intervention: Optimize Glycemic Control  Recent Flowsheet Documentation  Taken 5/8/2025 2042 by Yoon Fernando, GREGORIO  Hyperglycemia Management:   blood glucose monitored   correctional insulin given  Goal: Minimize Risk of Hypoglycemia  Outcome: Progressing  Intervention: Management and Risk Reduction of Hypoglycemia  Recent Flowsheet Documentation  Taken 5/8/2025 2042 by Yoon Fernando RN  Hypoglycemia Management: blood glucose monitored    Activity: Repositions self in bed. Up to commode with assist of one and walker and left prosthetic.  Neuros: Disoriented to place, otherwise neuro intact. Denies numbness or tingling. Forgetful. Bed alarm on.  Cardiac: WDL. Asymptomatic.   Respiratory: LS clear. Denies SOB. O2 sats high 90s on RA.   GI/: BS+, passing flatus, had small BM. Voiding, not saving.  Diet: Regular diet.   Skin: Right foot ace wrapped, c/d/I, and  in rook boot, both legs elevated on pillows.   Lines: New PIV placed for IV antibiotics on left arm, Flagyl infused per orders, otherwise TKO. Continuous heparin drip infusing at 1000 units/hr.   Drains: None.   Labs: Reviewed. Hep Xa therapeutic at 0.25 at 2100.  at 0200.  Pain/nausea: Denies pain. Denies nausea.   New changes this shift: Hep Xa therapeutic x1, second hep Xa 0.32, next redraw tomorrow AM.  Plan: Continue POC.

## 2025-05-09 NOTE — PROGRESS NOTES
Transplant Infectious Diseases Inpatient Progress Note      Tad Zaman MRN# [id number]   YOB: 1960 Age: 64 year old   Date of Admission and time: 5/3/2025  2:55 PM      Addendum:   I was alert after writing my note that the surgical cx grew Alcaligenes faecalis. This Gram negative bacteria is rarely pathogenic. It was not seen on Gram stain. Its growth was only +1. The OR note indicated no purulence, the affected bone was resected by the TMA. Given all above, I would not treat this bacteria.          Recommendations:   1. Keep off of ABx.   2. Will follow pathology.     Dr. Kuhn is available over the weekend for questions. I will resume the patient's care on Monday.         Synopsis of Immune Status and Presentation:   This patient is a 64 year old male with DM s/p kidney transplant in 9/2022 in OSH. On TAC/MMF.   Underwent right TMA on 5/7/25 due to dry gangrene.         Active Problems and Infectious Diseases Issues:   1. 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   The OM areas according to MRI involved the tips of the distal phalanges of the 1st, 2nd, 3rd and 4th toes. The OR note reported no purulence and no evidence of infectious process involving the metatarsals proximal to the amputation line.   Given the concerns of ABx-induced adverse events and given the high likelihood that the infected bone is resected, we discontinued all ABx.   Will follow on pathology in case there is evidence of infection at the amputation line.    Of note, he had femoral-anterior tibial arterial bypass on 3/20/25.      2. Encephalopathy.   No evidence of encephalopathy when I evaluated the patient.   It looks like the patient develops encephalopathy with [facility] with unremarkable extensive workup.   I wonder if the patient has low threshold for delirium and polypharmaceuticals-induced encephalopathy.         Old Problems and Infectious Diseases Issues:   1. Left BKA.    2. [facility]ient [facility] and again at [facility] in 3/2025 with extensive unremarkable workup at both institutions including MRI of the brain, LP with negative CRAG, JCV, ACE, NMDA, paraneoplastic panel, meningitis panel, though with high WBC of 21 and protein of 173 at [facility] but normal WBC at [facility].     Other Infectious Disease issues include:  - QTc: 486 as of 5/6/25 with normal QRS.   - Toxoplasma serostatus: IgG ?  - Viral serostatus: CMV D-/R-, EBV D?/R+  - PJP (and possibly Toxoplasma) prophylaxis:   - Immunization status: up-to-date.   - Gamma globulin status: ?        Attestation:  Total duration of visit including chart review, reviewing labs and imaging independently, interviewing and examining the patient, documentation, and sending communication to the primary treating team, all at the same day of this encounter, is: 50 minutes.       Porter Rodriguez MD  [facility]  Contact information available via [facility] Paging/Directory    05/09/2025              Interim History:   No new events and no complaints.          History of Present Illness:   This patient is a 64 year old male with vascular disease with hx of L BKA and more recently right femoral-anterior tibial bypass on 3/20/25. He has right foot dry gangrene involving the digits with underlying chronic OM. The plan was to proceed with right TMA electively but the patient presented to ED on 5/6/25 with foul smelling discharge from the dry gangrene. The patient underwent TMA on 5/7/25.  The patient was noted to have encephalopathy since surgery with concerns of ABx-induced encephalopathy.     The patient is poor historian but alert and oriented to person, date, place.             Review of Systems:      As mentioned in the HPI otherwise negative by reviewing constitutional symptoms, central and peripheral neurological systems, respiratory system, cardiac system, GI system,  system,  musculoskeletal, skin, allergy, and lymphatics.           Allergies   Allergen Reactions    Penicillins Hives             Medications:   Medications that Require Transfusion:   Current Facility-Administered Medications   Medication Dose Route Frequency Provider Last Rate Last Admin     Scheduled Medications:   Current Facility-Administered Medications   Medication Dose Route Frequency Provider Last Rate Last Admin    apixaban ANTICOAGULANT (ELIQUIS) tablet 5 mg  5 mg Oral BID Eugenio Trivedi PA-C   5 mg at 05/09/25 1146    aspirin (ASA) chewable tablet 81 mg  81 mg Oral Daily Vinh Crews MD   81 mg at 05/09/25 0937    atorvastatin (LIPITOR) tablet 40 mg  40 mg Oral At Bedtime Vinh Crews MD   40 mg at 05/08/25 2240    brinzolamide-brimonidine (SIMBRINZA) 1-0.2 % ophthalmic suspension 1 drop  1 drop Ophthalmic BID Vinh Crews MD   1 drop at 05/09/25 0936    calcitRIOL (ROCALTROL) capsule 0.25 mcg  0.25 mcg Oral Once per day on Monday Tuesday Wednesday Thursday Friday Vinh Crews MD   0.25 mcg at 05/09/25 0941    empagliflozin (JARDIANCE) tablet 10 mg  10 mg Oral QAM Eugenio Trivedi PA-C   10 mg at 05/06/25 0824    ferrous sulfate (FEROSUL) tablet 325 mg  325 mg Oral Every Other Day Vinh Crews MD   325 mg at 05/08/25 0846    insulin aspart (NovoLOG) injection (RAPID ACTING)  1-7 Units Subcutaneous TID AC Eugenio Trivedi PA-C   4 Units at 05/08/25 1646    insulin aspart (NovoLOG) injection (RAPID ACTING)  1-5 Units Subcutaneous At Bedtime Eugenio Trivedi PA-C   1 Units at 05/08/25 2240    latanoprost (XALATAN) 0.005 % ophthalmic solution 1 drop  1 drop Both Eyes At Bedtime Vinh Crews MD   1 drop at 05/08/25 2240    metoprolol succinate ER (TOPROL XL) 24 hr tablet 75 mg  75 mg Oral Daily Vinh Crews MD   75 mg at 05/09/25 0939    mycophenolate (GENERIC EQUIVALENT) tablet 500 mg  500 mg Oral BID Vinh Crews MD   500 mg at 05/09/25 0998    pantoprazole  "(PROTONIX) EC tablet 40 mg  40 mg Oral Daily Vinh Crews MD   40 mg at 05/09/25 0938    [START ON 5/10/2025] patiromer (VELTASSA) packet 8.4 g  8.4 g Oral Weekly Vinh Crews MD        sodium bicarbonate tablet 1,300 mg  1,300 mg Oral TID Vinh Crews MD   1,300 mg at 05/09/25 0937    sodium chloride (PF) 0.9% PF flush 3 mL  3 mL Intracatheter Q8H Vinh Crews MD   3 mL at 05/09/25 0225    tacrolimus (GENERIC EQUIVALENT) capsule 2 mg  2 mg Oral BID IS Vinh Crews MD   2 mg at 05/09/25 0937    tamsulosin (FLOMAX) capsule 0.8 mg  0.8 mg Oral Daily Vinh Crews MD   0.8 mg at 05/09/25 0937    torsemide (DEMADEX) tablet 10 mg  10 mg Oral BID Eugenio Trivedi, PARobyC                   Physical Exam:   Temp: 98.6  F (37  C) Temp src: Oral BP: (!) 134/94 Pulse: 76   Resp: 18 SpO2: 98 % O2 Device: None (Room air)      Wt Readings from Last 4 Encounters:   05/08/25 76.2 kg (167 lb 15.9 oz)   04/10/25 72.6 kg (160 lb)   04/08/25 66.2 kg (146 lb)   03/26/25 66.5 kg (146 lb 8 oz)     Constitutional: awake, alert, cooperative, no apparent distress and appears at stated age, well nourished.            Data:   No results found for: \"ACD4\"    Inflammatory Markers    Recent Labs   Lab Test 05/03/25  1641   SED 18       Immune Globulin Studies   No lab results found.    Metabolic Studies       Recent Labs   Lab Test 05/09/25  1128 05/09/25  0550 05/09/25  0221 05/08/25  2207 05/08/25  1649 05/08/25  1455 05/08/25  1301 05/08/25  0659 05/07/25  1129 05/07/25  0633 05/06/25  1454 05/06/25  0752 05/05/25  2354 05/05/25  1200 05/04/25  1117 05/04/25  0819 05/04/25  0232 05/03/25  1641 03/23/25  0557 03/21/25  0812 03/21/25  0635 03/04/25  0540 03/03/25  0939   NA  --  138  --   --   --   --   --  135  --  137  --  136  --  138  --  137  --  138 136   < >  --    < >  --    POTASSIUM  --  4.3  --   --   --   --   --  5.0  --  5.3  --  4.9  --  4.4  --  4.1  --  4.6 4.8   < >  --    < >  --    CHLORIDE  -- "  108*  --   --   --   --   --  105  --  108*  --  106  --  107  --  106  --  103 104   < >  --    < >  --    CO2  --  19*  --   --   --   --   --  18*  --  17*  --  20*  --  20*  --  21*  --  23 21*   < >  --    < >  --    ANIONGAP  --  11  --   --   --   --   --  12  --  12  --  10  --  11  --  10  --  12 11   < >  --    < >  --    BUN  --  36.6*  --   --   --   --   --  35.6*  --  29.7*  --  29.3*  --  31.2*  --  37.2*  --  42.4* 31.6*   < >  --    < >  --    CR  --  1.67*  --   --   --   --   --  1.55*  --  1.37*  --  1.44*  --  1.43*  --  1.69*  --  2.15* 1.44*   < >  --    < >  --    GFRESTIMATED  --  45*  --   --   --   --   --  49*  --  57*  --  54*  --  54*  --  44*  --  33* 54*   < >  --    < >  --    * 193* 202* 204* 307* 273*   < > 298*   < > 200*   < > 186*   < > 169*   < > 166*   < > 225* 169*   < >  --    < >  --    A1C  --   --   --   --   --   --   --   --   --   --   --   --   --   --   --   --   --   --   --   --   --   --  7.9*   QUE  --  9.5  --   --   --   --   --  9.8  --  9.6  --  9.6  --  9.4  --  9.2  --  9.5 9.8   < >  --    < >  --    PHOS  --   --   --   --   --   --   --   --   --   --   --   --   --   --   --  3.4  --   --  3.1  --   --    < >  --    MAG  --   --   --   --   --   --   --   --   --   --   --   --   --   --   --  1.8  --  2.0 1.7  --   --    < >  --    LACT  --   --   --   --   --   --   --   --   --   --   --   --   --   --   --   --   --  1.9  --   --  1.5   < >  --     < > = values in this interval not displayed.       Hepatic Studies    Recent Labs   Lab Test 05/04/25  0819 05/03/25  1641 03/20/25  0332 03/19/25  0623 03/18/25  0721 03/17/25  0712 03/04/25  0540 02/25/25  1232   BILITOTAL  --  0.3  --   --   --   --   --  0.4   ALKPHOS  --  89  --   --   --   --   --  122   ALBUMIN 3.7 3.8 3.0* 3.1* 3.1* 3.4*   < > 3.9   AST  --  20  --   --   --   --   --  19   ALT  --  9  --   --   --   --   --  13    < > = values in this interval not displayed.        Pancreatitis testing    No lab results found.    Hematology Studies      Recent Labs   Lab Test 05/09/25  0550 05/08/25  0904 05/08/25  0659 05/07/25  0633 05/06/25  0752 05/05/25  1200 05/04/25  0819 05/03/25  1641 03/21/25  0813 03/21/25  0452 03/20/25  0922 03/20/25  0332 02/26/25  0723 02/25/25  1232   WBC 7.2 10.1 9.4 5.2 5.9 4.6   < > 5.0   < > 10.0   < > 6.4   < > 10.1   ANEU  --   --   --   --   --   --   --  3.7  --  8.6*  --  5.0  --  8.6*   ALYM  --   --   --   --   --   --   --  0.5*  --  0.3*  --  0.4*  --  0.5*   ERIC  --   --   --   --   --   --   --  0.6  --  0.9  --  0.7  --  0.9   AEOS  --   --   --   --   --   --   --  0.2  --  0.0  --  0.2  --  0.1   HGB 10.4* 11.6* 12.1* 11.8* 11.8* 12.2*   < > 11.6*   < > 8.9*   < > 9.0*   < > 12.3*   HCT 33.7* 37.7* 39.3* 38.1* 37.9* 40.0   < > 37.7*   < > 28.7*   < > 29.5*   < > 39.8*    225 248 200 220 214   < > 229   < > 184  --  266   < > 350    < > = values in this interval not displayed.       Clotting Studies    Recent Labs   Lab Test 05/03/25  1641 03/17/25  0712 03/16/25  0043 03/15/25  1431 03/11/25  1533 03/05/25  0636   INR 1.61*  --   --   --   --  1.29*   PTT 34 30 76* 73*   < >  --     < > = values in this interval not displayed.       Arterial Blood Gas Testing    Recent Labs   Lab Test 05/08/25  1041 03/23/25  0557 03/20/25  1319 03/20/25  1224 03/20/25  1118 03/20/25  1035 03/20/25  0922   PH  --   --  7.40 7.40 7.44 7.48* 7.44   PCO2  --   --  41 41 37 34* 38   PO2  --   --  139* 129* 146* 143* 207*   HCO3  --   --  25 25 25 25 26   O2PER 21 21 35.0 35.0 35.0 40.0 40.0        Urine Studies     Recent Labs   Lab Test 05/04/25  1538 03/24/25  1036 03/22/25  1837 03/03/25  1508   URINEPH 7.0 7.0 7.0 6.5   NITRITE Negative Negative Negative Negative   LEUKEST Negative Trace* Large* Trace*   WBCU 2 4 36* 8*       Vancomycin Levels     Recent Labs   Lab Test 05/08/25  1157 05/05/25  1200   VANCOMYCIN 21.5 6.2       Tobramycin levels    "  No lab results found.    Gentamicin levels    No lab results found.    Tacrolimus levels    Invalid input(s): \"TACROLIMUS\", \"TAC\", \"TACR\"      Latest Ref Rng & Units 5/9/2025     5:50 AM 5/8/2025     9:04 AM 5/8/2025     6:59 AM 5/7/2025     6:33 AM 5/6/2025     7:52 AM   Transplant Immunosuppression Labs   Creat 0.67 - 1.17 mg/dL 1.67   1.55  1.37  1.44    Urea Nitrogen 8.0 - 23.0 mg/dL 36.6   35.6  29.7  29.3    WBC 4.0 - 11.0 10e3/uL 7.2  10.1  9.4  5.2  5.9        Cyclosporine levels    Invalid input(s): \"CYCLOSPORINE\", \"CYC\"    Mycophenolate levels    Invalid input(s): \"MYPA\", \"MYP\"    Sirolimus levels    Invalid input(s): \"SIROLIMUS\", \"SIR\", \"RAPA\"    CSF testing     Recent Labs   Lab Test 03/05/25  1344 03/05/25  1343   CWBC  --  0   CRBC  --  0   CGLU 182*  --    .7*  --          Microbiology:  OR cx pending.   Last check of C difficile  No results found for: \"CDBPCT\"    Virology:  CMV viral loads    CMV DNA IU/mL   Date Value Ref Range Status   01/28/2025 Not Detected Not Detected IU/mL Final   01/25/2025 Not Detected Not Detected IU/mL Final     Viral loads    Recent Labs   Lab Test 01/28/25  0700 01/25/25  0712   CMVQNT Not Detected Not Detected       CMV viral loads    CMV DNA IU/mL   Date Value Ref Range Status   01/28/2025 Not Detected Not Detected IU/mL Final   01/25/2025 Not Detected Not Detected IU/mL Final       CMV resistance testing  No lab results found.  No results found for: \"CMVCID\", \"CMVFOS\", \"CMVGAN\", \"CMVDRUGRES\"     No results found for: \"H6RES\"    No results found for: \"EBVDN\", \"EBRES\", \"EBVSP\", \"EBVPC\", \"EBVPCR\"    No results found for: \"CMVIGG\", \"CMVM\", \"CMIG\", \"CMVG\", \"CMIGG\", \"CMIM\", \"CMVIGM\", \"CMLTX\", \"HSVG1\", \"HSVG2\", \"HSVTP1\", \"DT1922\", \"HS12M\", \"HS12GR\", \"HS1GR\", \"HS2GR\", \"HSIM\", \"HSIG\", \"HSIGR\", \"HSVIGMAB\", \"VARICZOSAB\", \"EBVIGG\", \"EBIGG\", \"EBVAGN\", \"IS4391\"    No results found for: \"EBIG2\", \"EBIGM\", \"TOXG\"      Imaging:  CT R foot W 5/3/25  IMPRESSION:  1.  Soft tissue " deficiency, ulceration, and gas along the plantar aspect of the great toe with underlying erosive change of the distal phalanx, concerning for osteomyelitis. Soft tissue gas extends proximally to the level of the proximal phalanx of the   great toe where there is soft tissue irregularity. Findings are favored to be related to gas from direct extension from the area of ulceration as opposed to a gas-forming organism, although this is difficult to completely rule-out on CT alone.  2.  Soft tissue stranding and irregularity distal aspect of the 2nd toe without discrete abscess or evidence for osteomyelitis at this time. Note is made that there is dorsal dislocation of the 2nd MTP joint.  3.  No well-defined rim-enhancing fluid collection on CT with attention to the great toe, although poorly-defined abscess is not excluded as there is poorly-defined circumferential soft tissue swelling.  4.  More global soft tissue swelling about the foot, which can be seen with cellulitis as well as noninfectious causes of edema.  5.  Advanced Charcot arthropathy as described.   MRI Rfoot WO 4/18/25  Impression:  1. Finding most consistent with great toe distal phalangeal  osteomyelitis.  2. Assuming no prior amputation surgery in this area, finding  consistent with second distal phalangeal and to the level of mid  middle phalangeal shaft osteomyelitis. Radiographical correlation  might be helpful to identify residual bone.  3. Apparent exposed third distal phalanx with marrow abnormality  suspicious for osteomyelitis down to the base of middle phalanx.  4. Query forth distal phalangeal tuft acroosteolysis vs. sequale of  osteomyelitis.  5. First proximal phalangeal osteonecrosis.   6. Charcot arthropathy with multiple erosions, possibly related to  superimposed inflammatory arthritis.        Porter Rodriguez MD  [facility]  Contact information available via [facility] Paging/Directory      05/09/2025     Yes

## 2025-05-09 NOTE — PROGRESS NOTES
Podiatry Progress Note    Date: May 9, 2025      ASSESSMENT/PLAN:  Patient seen postop day 2 from right transmetatarsal amputation with percutaneous tendo Achilles lengthening procedure.    Patient is doing well this morning, yesterday with some disorientation and confusion but was still able to discussed postoperative instruction was able to repeat them back to me unprompted yesterday, he was able to do that again today.  No pain on exam.  Do not appreciate any signs of hematoma or bleeding from the foot, the right foot surgery site is very stable today and I replaced with a dry sterile dressing.    His heel ulcer areas noted in the past was covered again with Mepilex, there were no new preulcerative areas to the posterior heel or leg or anywhere else on the foot today.  I will place dressing change orders to be done every other day, simple postoperative dressing, this will allow the dressing to be taken down and for skin checks to be completed by the nursing staff    DRESSING: Right transmetatarsal site, and right posterior Achilles  - To be changed every other day  1.)  Takedown soft dressing over the transmetatarsal amputation site as well as the 3 suture areas to the Achilles tendon region on the right side.  2.)  Can spray the incision areas with wound spray and dry.  3.)  Paint the TMA incision with Betadine, as well as the 3 small incision areas on the Achilles tendon region with Betadine.  Then cover these areas with Adaptic  4.)  For the posterior leg/Achilles area can cover with just a layer 2 of gauze.  For the TMA site can cover with a layer 2 of gauze as well as an ABD across the front.  5.)  Secure all of this with Kerlix and a light layer of Ace, if it appears that the Ace is uncomfortable or causing any sort of skin breakdown can discontinue Ace and cover with tetra net/Surgilast in order to keep the kerlix dressing intact    Please continue strict nonweightbearing to the right lower extremity,  please have patient keep the cam boot on at all times with heel securely fastened posteriorly and ankle at 90 degrees before strapping him in.    _____________________________________________  - Patient will need to be strict nonweightbearing given he has major amputation site to the right foot as well as percutaneous Achilles tendon release, nonweightbearing likely to be minimum of 3 to 4 weeks.  Once stitches are out, which will likely be at the 3 or 4-week connor he can begin doing light therapies upright in the cam boot at all times for the next 2 to 3 weeks and then can slowly transition out of the cam boot with easing into stretching of the Achilles with physical therapy.  I would like the cam boot on at all times during this initial 3 to 4 weeks as it will help keep the ankle joint fixated 90 degrees given tendo Achilles lengthening.  At any time if patient notes that he is unable to wear the cam boot we can transition to something like a posterior splint.  But the cam boot can be easily taken on and off and requires a less bulky dressing be much easier for dressing changes for the surgical site.    - I did take intraoperative cultures as well as pathology margins of the 1st and 2nd metatarsal bones, I did not appreciate any signs or symptoms of infection proximally but for good measure we could follow-up on the cultures.  From my operative standpoint can narrow antibiotics/discontinue pending culture.    - Recommend continuing to work with patient on tighter glycemic control as this will be crucial to his healing      OBJECTIVE:    BP (!) 134/94   Pulse 76   Temp 98.6  F (37  C) (Oral)   Resp 18   Wt 76.2 kg (167 lb 15.9 oz)   SpO2 98%   BMI 22.78 kg/m       Lab Results   Component Value Date    WBC 7.2 05/09/2025     Lab Results   Component Value Date    RBC 4.10 05/09/2025     Lab Results   Component Value Date    HGB 10.4 05/09/2025     Lab Results   Component Value Date    HCT 33.7 05/09/2025     Lab  Results   Component Value Date    MCV 82 05/09/2025     Lab Results   Component Value Date    MCH 25.4 05/09/2025     Lab Results   Component Value Date    MCHC 30.9 05/09/2025     Lab Results   Component Value Date    RDW 15.0 05/09/2025     Lab Results   Component Value Date     05/09/2025           PHYSICAL EXAM:    Derm:   Patient skin unchanged from yesterday's visit, photo below is from yesterday more or less was unchanged I do not appreciate any signs of hematoma, surrounding skin is soft and nontender, no oozing/bleeding, dressing with some light strikethrough in the initial layers but not soaking through.         Vascular: Right lower extremity is warm to touch, there is good cap refill to the amputation site, no dusky appearance, edema improved    MSK: Status post transmetatarsal amputation right side, no bruising noted    Neuro: Patient with right lower extremity              Kwesi Arreola DPM  Pager: [phone]           Yes

## 2025-05-09 NOTE — PROGRESS NOTES
[facility]    Medicine Progress Note - Hospitalist Service, GOLD TEAM 4    Date of Admission:  5/3/2025    Assessment & Plan    Tad Zaman is a 64 year old male with hypertension, chronic systolic heart failure, type 2 diabetes, peripheral artery disease status post left BKA, right femoral-tibial bypass (3/20/2025), right diabetic foot ulcer, osteomyelitis, history of MI, status post renal transplant, CKD stage III, with with complaints of progressive right foot pain and swelling and being admitted for progressive R foot severe diabetic foot ulcer and possible R TMA.     Today:  - Appreciate assistance from podiatry, infectious disease  - Preliminary  Tissue culture is now growing Alcaligenes faecalis - Discussed with ID   - Clean margins were obtained per pathology - no need for further Abx  - PT/OT consulted for dispo planning - currently recommending TCU vs ARU   - Cr trending up to 1.67 after 1 L fluid bolus yesterday. No appreciable edema on exam, is tolerating room air. However, he is net positive about 2 L iters over the past couple of days   - Will resume PTA Torsemide and Jardiance today and continue to trend Cr  - Stopping heparin drip and resuming PTA Eliquis 5 mg BID   - Hgb down 12.1->10.4 since yesterday, Hematocrit 39->33. Suspect likely dilutional, will continue to monitor       # Gangrene and osteomyelitis of right foot s/p Transmetatarsal Amputation 5/7  # PAD s/p Left BKA and Right F-T stent  Presents with history of progressive R diabetic foot ulcer with outpatient plans for R TMA, but having concern for worsening swelling and foul odor. He had recent Arterial doppler on 5/2 with good stent flow. Workup in ED reassuring with WBC WNL, Lactate 1.9, CRP and ESR WNL. LLE Duplex negative for DVT. CT with contrast concerning for soft tissue gas which is favored to be from direct extension from the area of ulceration as opposed to gas-formin organism.   "Appreciate assistance from podiatry and vascular surgery.  Vascular surgery did not recommend acute vascular procedure at this time, advised that transmetatarsal amputation is a viable option at this time, but patient does remain at high risk of needing further amputation in the future.  Podiatry performed transmetatarsal amputation on 5/7.   - Follow bone aerobic and anaerobic cultures, fungal culture, and pathology  - Transplant ID consulted   -Resume anticoagulation with heparin bridge and Eliquis on 5/8, tentatively  - Vascular surgery input appreciated  - Blood flow is tenuous to R foot. Per our conversation, it is reasonable to pursue TMA but note pt is high risk of needing additional amputation in the future   - Will need lifelong AC following surgery   - Follow up with Vascular in 3 months, sooner if TMA fails to heal as he may require further angioplasty    - Continue PTA ASA for now   - Daily CBC, BMP  - 5/3 blood culture negative     # Disorientation   On my exam the morning of 5/8 following TMA< pt was alert but orientated to self-only, not to date and only loosely to location (\"[facility]\") or situation (thought he had a transplant, not amputation, of his foot). This is an acute change from preoperatively. No focal neuro deficits were present on exam.  Prior to resumption of anticoagulation we did check a CT head which was negative for acute abnormality, does note chronic right cerebellar infarct. VBG unremarkable, ketones normal.  No other significant electrolyte abnormalities.  Considered cefepime as possible etiology and this was discontinued on 5/8.  -Per discussion with patient's daughter he has had similar episodes of disorientation during prior hospital stays and has taken a few days to improve  - Suspect patient has sensitive cognitive function in context of prior cerebellar infarct  -Continue to monitor    # S/P Renal transplant  # MOY, recurrent   # CKD3  Cr baseline around ~1.6. Cr on admit " 2.15. Renal transplant US 5/3 with moderate hydronephrosis of the transplant kidney, that improved post void. Elevated resistive indices suggestive of renal transplant dysfunction.   - Renal transplant following  - Continue PTA Tacro 2 mg BID  - Continue PTA Mycophenolate 500 mg BID for now  - Resuming torsemide 5/9  - Continue PTA sodium bicarb and Patiromer        # DM2  Last A1C 7.9 on 3/3/25. Outpatient regimen includes Metformin and Jardiance. Was started on MSSI on admit but patient refusing so this was discontinued. Last 24h trend:   - HOLD PTA Metformin  - Resume Jardiance 5/9  - Sliding scale insuline added 5/8  - Hypoglycemia protocol  - Follow BG trend, will continue to discuss with patient the importance for BG control in perioperative setting    Recent Labs   Lab 05/09/25  1128 05/09/25  0550 05/09/25  0221 05/08/25  2207 05/08/25  1649 05/08/25  1455   * 193* 202* 204* 307* 273*        History of CVA/TIA  Chronic AC, ASA  He is on dual coverage with Apixaban and ASA for history of CVA/TIA and PAD per family. Apixaban held on admission given need for procedure.   - AC as above   - ASA plan above     Hypertension  Chronic systolic heart failure/HFmrEF  History of chronic systolic heart failure, MI but no clear history of CAD or cardiac revascularization. He had TTE on 2/25/25 with EF of 40-45% and mild pulmonary hypertension likely Group 2. No signs of acute HF exacerbation.  - Continue PTA Metoprolol   - Continue PTA Statin       BPH   - Continue PTA Tamsulosin     Glaucoma   - Continue PTA gtts                Diet: Regular Diet Adult    DVT Prophylaxis: held in anticipation of upcoming TMA on 5/7  Zaman Catheter: Not present  Lines: None     Cardiac Monitoring: None  Code Status: Full Code        Disposition Plan:  Medically Ready for Discharge: Anticipated in 5+ Days     Expected Discharge Date: 05/13/2025      Destination: inpatient rehabilitation facility;other (comment) (Transitional Care)             The patient's care was discussed with staff physician, Bedside Nurse, Patient, and podiatry and vascular Consultant(s).    Eugenio Trivedi PA-C  Hospitalist Service, GOLD TEAM [address] [place] [address]  Securely message with [employer] (more info)  Text page via [facility] Paging/Directory     Clinically Significant Risk Factors          # Hyperchloremia: Highest Cl = 108 mmol/L in last 2 days, will monitor as appropriate                        # DMII: A1C = 7.9 % (Ref range: <5.7 %) within past 6 months         # Financial/Environmental Concerns: none         ______________________________________________________________________    Interval History     Patient overall doing well this morning.  Has no acute concerns.  Denies pain in his foot.  He remains somewhat confused since surgery and still only fully orientated to self and not orientated to location or time.  Loosely orientated to situation.  He does admit to feeling slightly confused.      Data reviewed today: I reviewed all medications, new labs and imaging results over the last 24 hours. Please see discussion of these results in the A/P.    Physical Exam    Vital Signs: Temp: 98.6  F (37  C) Temp src: Oral BP: (!) 134/94 Pulse: 76     Resp: 18 SpO2: 98 % O2 Device: None (Room air)    Weight: 167 lbs 15.85 oz    Constitutional: awake, alert, cooperative, in no acute distress. A&O to self only, not orientated to time and only loosely to place and situation as of 5/9  Eyes: EOM, PERRLA. Sclera clear, conjunctiva normal. Anicteric   HENT: Normocephalic, without obvious abnormality, atraumatic.   Respiratory: No increased work of breathing.   Cards: RRR, no murmer appreciated   GI: Soft, non-tender without rebound or guarding.   Musculoskeletal:  Full range of motion noted, hx L BKA. Now s/p R transmetatarsal amputation 5/7. Dressing is c/d/i  Neurologic: Cranial nerves II-XII are grossly intact.   Neuropsychiatric: General:  normal, calm and normal eye contact. Normal affect        5/5 images, pre amputation:                  Data   Recent Labs   Lab 05/09/25  1128 05/09/25  0550 05/09/25  0221 05/08/25  1301 05/08/25  0904 05/08/25  0659 05/07/25  1129 05/07/25  0633 05/04/25  1117 05/04/25  0819 05/04/25  0232 05/03/25  1641   WBC  --  7.2  --   --  10.1 9.4  --  5.2   < > 4.4  --  5.0   HGB  --  10.4*  --   --  11.6* 12.1*  --  11.8*   < > 10.9*  --  11.6*   MCV  --  82  --   --  83 82  --  83   < > 82  --  83   PLT  --  174  --   --  225 248  --  200   < > 225  --  229   INR  --   --   --   --   --   --   --   --   --   --   --  1.61*   NA  --  138  --   --   --  135  --  137   < > 137  --  138   POTASSIUM  --  4.3  --   --   --  5.0  --  5.3   < > 4.1  --  4.6   CHLORIDE  --  108*  --   --   --  105  --  108*   < > 106  --  103   CO2  --  19*  --   --   --  18*  --  17*   < > 21*  --  23   BUN  --  36.6*  --   --   --  35.6*  --  29.7*   < > 37.2*  --  42.4*   CR  --  1.67*  --   --   --  1.55*  --  1.37*   < > 1.69*  --  2.15*   ANIONGAP  --  11  --   --   --  12  --  12   < > 10  --  12   QUE  --  9.5  --   --   --  9.8  --  9.6   < > 9.2  --  9.5   * 193* 202*   < >  --  298*   < > 200*   < > 166*   < > 225*   ALBUMIN  --   --   --   --   --   --   --   --   --  3.7  --  3.8   PROTTOTAL  --   --   --   --   --   --   --   --   --   --   --  6.9   BILITOTAL  --   --   --   --   --   --   --   --   --   --   --  0.3   ALKPHOS  --   --   --   --   --   --   --   --   --   --   --  89   ALT  --   --   --   --   --   --   --   --   --   --   --  9   AST  --   --   --   --   --   --   --   --   --   --   --  20    < > = values in this interval not displayed.       No results found for this or any previous visit (from the past 24 hours).

## 2025-05-09 NOTE — PLAN OF CARE
Goal Outcome Evaluation:      Plan of Care Reviewed With: patient    Overall Patient Progress: improvingOverall Patient Progress: improving    Alert. Disoriented to time and place--able to reorient but forgetful. AVSS on RA. Denies pain and nausea. Regular diet--refused to eat all shift, Pt waiting for his daughter to bring in food. L BKA. RLE in ace wrap and cam boot. Repositions self in bed. Non weight bearing RLE. Up with Ax1 with walker and left prosthetic. Voiding adequate. LBM 5/9, passing flatus. L PIV x2 SL. Continue with POC.    Vitals: /82 (BP Location: Left arm, Cuff Size: Adult Regular)   Pulse 74   Temp 97.8  F (36.6  C) (Oral)   Resp 18   Wt 76.2 kg (167 lb 15.9 oz)   SpO2 100%   BMI 22.78 kg/m

## 2025-05-10 ENCOUNTER — APPOINTMENT (OUTPATIENT)
Dept: PHYSICAL THERAPY | Facility: CLINIC | Age: 65
End: 2025-05-10
Attending: STUDENT IN AN ORGANIZED HEALTH CARE EDUCATION/TRAINING PROGRAM
Payer: MEDICARE

## 2025-05-10 LAB
AMMONIA PLAS-SCNC: 20 UMOL/L (ref 16–60)
ANION GAP SERPL CALCULATED.3IONS-SCNC: 13 MMOL/L (ref 7–15)
BASE EXCESS BLDV CALC-SCNC: -1.5 MMOL/L (ref -3–3)
BUN SERPL-MCNC: 43.6 MG/DL (ref 8–23)
CALCIUM SERPL-MCNC: 9.6 MG/DL (ref 8.8–10.4)
CHLORIDE SERPL-SCNC: 106 MMOL/L (ref 98–107)
CREAT SERPL-MCNC: 2.09 MG/DL (ref 0.67–1.17)
CREAT UR-MCNC: 19.2 MG/DL
EGFRCR SERPLBLD CKD-EPI 2021: 34 ML/MIN/1.73M2
ERYTHROCYTE [DISTWIDTH] IN BLOOD BY AUTOMATED COUNT: 15 % (ref 10–15)
FRACT EXCRET NA UR+SERPL-RTO: 9.2 %
GLUCOSE BLDC GLUCOMTR-MCNC: 174 MG/DL (ref 70–99)
GLUCOSE BLDC GLUCOMTR-MCNC: 191 MG/DL (ref 70–99)
GLUCOSE BLDC GLUCOMTR-MCNC: 205 MG/DL (ref 70–99)
GLUCOSE BLDC GLUCOMTR-MCNC: 206 MG/DL (ref 70–99)
GLUCOSE SERPL-MCNC: 213 MG/DL (ref 70–99)
HCO3 BLDV-SCNC: 23 MMOL/L (ref 21–28)
HCO3 SERPL-SCNC: 19 MMOL/L (ref 22–29)
HCT VFR BLD AUTO: 33.2 % (ref 40–53)
HGB BLD-MCNC: 10.6 G/DL (ref 13.3–17.7)
MCH RBC QN AUTO: 25.7 PG (ref 26.5–33)
MCHC RBC AUTO-ENTMCNC: 31.9 G/DL (ref 31.5–36.5)
MCV RBC AUTO: 81 FL (ref 78–100)
O2/TOTAL GAS SETTING VFR VENT: 1 %
OXYHGB MFR BLDV: 82 % (ref 70–75)
PCO2 BLDV: 37 MM HG (ref 40–50)
PH BLDV: 7.41 [PH] (ref 7.32–7.43)
PLATELET # BLD AUTO: 194 10E3/UL (ref 150–450)
PO2 BLDV: 50 MM HG (ref 25–47)
POTASSIUM SERPL-SCNC: 4.3 MMOL/L (ref 3.4–5.3)
RBC # BLD AUTO: 4.12 10E6/UL (ref 4.4–5.9)
SAO2 % BLDV: 83.4 % (ref 70–75)
SODIUM SERPL-SCNC: 138 MMOL/L (ref 135–145)
SODIUM UR-SCNC: 116 MMOL/L
TACROLIMUS BLD-MCNC: 4 UG/L (ref 5–15)
TME LAST DOSE: ABNORMAL H
TME LAST DOSE: ABNORMAL H
UFH PPP CHRO-ACNC: >1.1 IU/ML (ref ?–1.1)
WBC # BLD AUTO: 6.9 10E3/UL (ref 4–11)

## 2025-05-10 PROCEDURE — 36415 COLL VENOUS BLD VENIPUNCTURE: CPT | Performed by: STUDENT IN AN ORGANIZED HEALTH CARE EDUCATION/TRAINING PROGRAM

## 2025-05-10 PROCEDURE — 36415 COLL VENOUS BLD VENIPUNCTURE: CPT

## 2025-05-10 PROCEDURE — 82805 BLOOD GASES W/O2 SATURATION: CPT | Performed by: STUDENT IN AN ORGANIZED HEALTH CARE EDUCATION/TRAINING PROGRAM

## 2025-05-10 PROCEDURE — 85027 COMPLETE CBC AUTOMATED: CPT

## 2025-05-10 PROCEDURE — 97161 PT EVAL LOW COMPLEX 20 MIN: CPT | Mod: GP

## 2025-05-10 PROCEDURE — 99232 SBSQ HOSP IP/OBS MODERATE 35: CPT | Performed by: STUDENT IN AN ORGANIZED HEALTH CARE EDUCATION/TRAINING PROGRAM

## 2025-05-10 PROCEDURE — 80197 ASSAY OF TACROLIMUS: CPT | Performed by: STUDENT IN AN ORGANIZED HEALTH CARE EDUCATION/TRAINING PROGRAM

## 2025-05-10 PROCEDURE — 250N000013 HC RX MED GY IP 250 OP 250 PS 637: Performed by: HOSPITALIST

## 2025-05-10 PROCEDURE — 99233 SBSQ HOSP IP/OBS HIGH 50: CPT | Mod: 24 | Performed by: STUDENT IN AN ORGANIZED HEALTH CARE EDUCATION/TRAINING PROGRAM

## 2025-05-10 PROCEDURE — 97530 THERAPEUTIC ACTIVITIES: CPT | Mod: GP

## 2025-05-10 PROCEDURE — 85520 HEPARIN ASSAY: CPT | Performed by: STUDENT IN AN ORGANIZED HEALTH CARE EDUCATION/TRAINING PROGRAM

## 2025-05-10 PROCEDURE — 120N000002 HC R&B MED SURG/OB UMMC

## 2025-05-10 PROCEDURE — 84300 ASSAY OF URINE SODIUM: CPT | Performed by: STUDENT IN AN ORGANIZED HEALTH CARE EDUCATION/TRAINING PROGRAM

## 2025-05-10 PROCEDURE — 250N000013 HC RX MED GY IP 250 OP 250 PS 637: Performed by: STUDENT IN AN ORGANIZED HEALTH CARE EDUCATION/TRAINING PROGRAM

## 2025-05-10 PROCEDURE — 250N000012 HC RX MED GY IP 250 OP 636 PS 637: Performed by: HOSPITALIST

## 2025-05-10 PROCEDURE — 82140 ASSAY OF AMMONIA: CPT | Performed by: STUDENT IN AN ORGANIZED HEALTH CARE EDUCATION/TRAINING PROGRAM

## 2025-05-10 PROCEDURE — 82310 ASSAY OF CALCIUM: CPT

## 2025-05-10 PROCEDURE — 82947 ASSAY GLUCOSE BLOOD QUANT: CPT

## 2025-05-10 RX ORDER — FUROSEMIDE 10 MG/ML
20 INJECTION INTRAMUSCULAR; INTRAVENOUS ONCE
Status: DISCONTINUED | OUTPATIENT
Start: 2025-05-10 | End: 2025-05-10

## 2025-05-10 RX ADMIN — APIXABAN 5 MG: 5 TABLET, FILM COATED ORAL at 09:45

## 2025-05-10 RX ADMIN — TACROLIMUS 2 MG: 1 CAPSULE ORAL at 09:45

## 2025-05-10 RX ADMIN — METOPROLOL SUCCINATE 75 MG: 25 TABLET, EXTENDED RELEASE ORAL at 09:44

## 2025-05-10 RX ADMIN — TAMSULOSIN HYDROCHLORIDE 0.8 MG: 0.4 CAPSULE ORAL at 09:45

## 2025-05-10 RX ADMIN — BRINZOLAMIDE/BRIMONIDINE TARTRATE 1 DROP: 10; 2 SUSPENSION/ DROPS OPHTHALMIC at 09:43

## 2025-05-10 RX ADMIN — MYCOPHENOLATE MOFETIL 500 MG: 500 TABLET, FILM COATED ORAL at 09:45

## 2025-05-10 RX ADMIN — BRINZOLAMIDE/BRIMONIDINE TARTRATE 1 DROP: 10; 2 SUSPENSION/ DROPS OPHTHALMIC at 20:03

## 2025-05-10 RX ADMIN — SODIUM BICARBONATE 1300 MG: 650 TABLET ORAL at 14:03

## 2025-05-10 RX ADMIN — PATIROMER 8.4 G: 8.4 POWDER, FOR SUSPENSION ORAL at 14:03

## 2025-05-10 RX ADMIN — ATORVASTATIN CALCIUM 40 MG: 40 TABLET, FILM COATED ORAL at 21:35

## 2025-05-10 RX ADMIN — SODIUM BICARBONATE 1300 MG: 650 TABLET ORAL at 20:03

## 2025-05-10 RX ADMIN — SODIUM BICARBONATE 1300 MG: 650 TABLET ORAL at 09:45

## 2025-05-10 RX ADMIN — EMPAGLIFLOZIN 10 MG: 10 TABLET, FILM COATED ORAL at 09:45

## 2025-05-10 RX ADMIN — ASPIRIN 81 MG CHEWABLE TABLET 81 MG: 81 TABLET CHEWABLE at 09:44

## 2025-05-10 RX ADMIN — TORSEMIDE 10 MG: 10 TABLET ORAL at 09:46

## 2025-05-10 RX ADMIN — APIXABAN 5 MG: 5 TABLET, FILM COATED ORAL at 20:03

## 2025-05-10 RX ADMIN — MYCOPHENOLATE MOFETIL 500 MG: 500 TABLET, FILM COATED ORAL at 20:03

## 2025-05-10 RX ADMIN — FERROUS SULFATE TAB 325 MG (65 MG ELEMENTAL FE) 325 MG: 325 (65 FE) TAB at 09:45

## 2025-05-10 RX ADMIN — LATANOPROST 1 DROP: 50 SOLUTION/ DROPS OPHTHALMIC at 21:35

## 2025-05-10 RX ADMIN — INSULIN ASPART 1 UNITS: 100 INJECTION, SOLUTION INTRAVENOUS; SUBCUTANEOUS at 21:35

## 2025-05-10 RX ADMIN — TACROLIMUS 2 MG: 1 CAPSULE ORAL at 17:53

## 2025-05-10 RX ADMIN — PANTOPRAZOLE SODIUM 40 MG: 40 TABLET, DELAYED RELEASE ORAL at 09:45

## 2025-05-10 ASSESSMENT — ACTIVITIES OF DAILY LIVING (ADL)
ADLS_ACUITY_SCORE: 44
ADLS_ACUITY_SCORE: 41
ADLS_ACUITY_SCORE: 44
ADLS_ACUITY_SCORE: 41
ADLS_ACUITY_SCORE: 44

## 2025-05-10 NOTE — PLAN OF CARE
[id number]  Goal Outcome Evaluation:      Plan of Care Reviewed With: patient    Overall Patient Progress: no changeOverall Patient Progress: no change     Vital signs:  Temp: 98.2  F (36.8  C) Temp src: Oral BP: 128/78 Pulse: 71   Resp: 16 SpO2: 99 % O2 Device: None (Room air)    Activity: Ax1 with walker and prosthetic leg   Neuro: A&O x4, forgetful but easily redirected  Cardiac: WDL, denies chest pain  Respiratory: denies SOB, on RA  GI/: voiding with bedside urinal, no BM this shift  Diet: Regular   Skin: L BKA with prosthetic leg, RLE ACE wrapped and CAM boot on, non- weight bearing   Lines: 2 PIV SL  Drains: none   Labs: reviewed  Pain/ nausea: denies both    New changes this shift: no acute changes     Plan:  continue POC

## 2025-05-10 NOTE — PROGRESS NOTES
05/10/25 0800   Appointment Info   Signing Clinician's Name / Credentials (PT) SUSAN Schmitt   Student Supervision On-site supervision provided   Rehab Comments (PT) strict NWB R LE, CAM boot at all times   Living Environment   People in Home sibling(s);child(gina), adult  (lives with disabled brother, daughter stays with him currently, brother and other daughter lives nearby.)   Current Living Arrangements house   Home Accessibility stairs to enter home;wheelchair accessible;stairs within home  (Has stairs in the back but does not use them. Ramp in front.)   Number of Stairs, Main Entrance other (see comments)   Stair Railings, Main Entrance none   Number of Stairs, Within Home, Primary greater than 10 stairs   Stair Railings, Within Home, Primary railing on right side (ascending)   Transportation Anticipated family or friend will provide   Living Environment Comments Pt washer and dryer downstairs- pt reports doing this himself but sometimes gets help if needed. At baseline uses manual wheelchair outside of the home by himself and with assistance at times. Pt reports using crutch at times, mainly on the stairs. Pt uses FWW he uses in his room and in the house. Pt reports useing 4WW when he goes to the bathroom. Walk-in shower with grab bars, with built in shower bench. Pt reports using sink to get up and down from toilet. Pt reports he has flat bed now but may be getting hospital bed soon.   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\"   General Information   Onset of Illness/Injury or Date of Surgery 05/03/25   Referring Physician Eugenio Trivedi, RAYMOND   Patient/Family Therapy Goals Statement (PT) \"Use my leg and go home\"   Pertinent History of Current Problem (include personal factors and/or comorbidities that impact the POC) Per EMR, \"Tad Zaman is a 64 year old male with hypertension, chronic systolic heart failure, type 2 diabetes, peripheral artery disease status post left BKA, right femoral-tibial bypass (3/20/2025), right diabetic foot ulcer, osteomyelitis, history of MI, status post renal transplant, CKD stage III, with with complaints of progressive right foot pain and swelling and being admitted for progressive R foot severe diabetic foot ulcer and possible R TMA. \"   Existing Precautions/Restrictions fall;brace worn when out of bed  (CAM boot R LE at all times, L LE prosthesis)   Weight-Bearing Status - LUE full weight-bearing   Weight-Bearing Status - RUE full weight-bearing   Weight-Bearing Status - LLE full weight-bearing   Weight-Bearing Status - RLE nonweight-bearing   General Observations Per 5/9/25 podiatry note (Dr. Kwesi Arreola) \"Patient will need to be strict nonweightbearing given he has major amputation site to the right foot as well as percutaneous Achilles tendon release, nonweightbearing likely to be minimum of 3 to 4 weeks.  Once stitches are out, which will likely be at the 3 or 4-week connor he can begin doing light therapies upright in the cam boot at all times for the next 2 to 3 weeks and then can slowly transition out of the cam boot with easing into stretching of the Achilles with physical therapy.  I would like the cam boot on at all times during this initial 3 to 4 weeks as it will help keep the ankle joint fixated 90 degrees given tendo Achilles lengthening.  At any time if patient notes that he is " "unable to wear the cam boot we can transition to something like a posterior splint.  But the cam boot can be easily taken on and off and requires a less bulky dressing be much easier for dressing changes for the surgical site.\"   Cognition   Affect/Mental Status (Cognition) WNL   Orientation Status (Cognition) oriented x 3;disoriented to;time  (states its 1960)   Follows Commands (Cognition) follows multi-step commands;75-90% accuracy;increased processing time needed;delayed response/completion;repetition of directions required;verbal cues/prompting required   Safety Deficit (Cognition) safety precautions follow-through/compliance   Cognitive Status Comments Pt incorrectly states he does not have NWB precautions, he states he believes he is able to bear weight through his R foot then he can \"go upstairs\" if he can tolerate it.   Pain Assessment   Patient Currently in Pain No   Integumentary/Edema   Integumentary/Edema Comments Not observed   Posture    Posture Forward head position;Protracted shoulders   Range of Motion (ROM)   Range of Motion ROM deficits secondary to surgical procedure;ROM deficits secondary to weakness   ROM Comment R ankle ROM restricted post op (CAM boot at all times)   Strength (Manual Muscle Testing)   Strength (Manual Muscle Testing) Deficits observed during functional mobility   Strength Comments Pt reliance on B UE sit<>stand   Bed Mobility   Comment, (Bed Mobility) Pt IND with bed mobility, SBA for eval today   Transfers   Maintains Weight-bearing Status (Transfers) unable to maintain   Transfer Safety Concerns Noted inability to maintain weight-bearing restrictions w/o assist   Comment, (Transfers) Pt able to perform sit<>stand with reliance of BUE, even with bed elevation. Pt unable to maintain R Le WB status throughout.   Gait/Stairs (Locomotion)   Comment, (Gait/Stairs) Pt unable to perform within NWB R LE   Balance   Balance Comments Pt able to sit EOB with no overt LOB. Pt in " standing unable to maintain balance without WB on R Le.   Sensory Examination   Sensory Perception patient reports no sensory changes   Sensory Perception Comments Pt reports no new numbness/tingling   Clinical Impression   Criteria for Skilled Therapeutic Intervention Yes, treatment indicated   PT Diagnosis (PT) Imparied functional mobility   Influenced by the following impairments decrease ROM, strength and compliance to WB status   Functional limitations due to impairments Pt unable to maintain standing, amb in home/community   Clinical Presentation (PT Evaluation Complexity) stable   Clinical Presentation Rationale per clinical judgement   Clinical Decision Making (Complexity) low complexity   Planned Therapy Interventions (PT) balance training;bed mobility training;gait training;home exercise program;neuromuscular re-education;patient/family education;prosthetic fitting/training;ROM (range of motion);stair training;strengthening;transfer training;wheelchair management/propulsion training;progressive activity/exercise;risk factor education;home program guidelines   Risk & Benefits of therapy have been explained evaluation/treatment results reviewed;care plan/treatment goals reviewed;risks/benefits reviewed;current/potential barriers reviewed;participants included;participants voiced agreement with care plan;patient   PT Total Evaluation Time   PT Eval, Low Complexity Minutes (14198) 18   Physical Therapy Goals   PT Frequency 6x/week   PT Predicted Duration/Target Date for Goal Attainment 05/17/25   PT Goals Transfers;Gait;Wheelchair Mobility;Stairs   PT: Transfers Modified independent;Within precautions   PT: Gait Supervision/stand-by assist;Standard walker;Within precautions;50 feet   PT: Stairs 1 stair;Supervision/stand-by assist;Assistive device;Within precautions  (For curb navigation)   PT: Wheelchair Mobility Greater than 500 feet;manual wheelchair;Caregiver SBA   Interventions   Interventions Quick Adds  "Therapeutic Activity   Therapeutic Activity   Therapeutic Activities: dynamic activities to improve functional performance Minutes (76758) 23   Symptoms Noted During/After Treatment Fatigue   Treatment Detail/Skilled Intervention Pt supine in bed upon arrival, agreeable to PT. Pt able to perform sit<>supine transfer x2 IND and no use of UE on bed rail when cued. Pt requesting assist to don/doff prosthesis intially, then when cued to perform IND pt able to do so. Facilitated transfer training within NWB status. PT reports multiple times through attempts to mobilize \" I will not be hopping around on one foot, the doctor said I can use my R heel\". Provided edu with pt on note provided with WB precautions to R LE, pt unamenable to current status of precautions. In order to trial gait, cued pt on knee flexion or hip F to assist with NWB status, pt continued to use R LE for support and use in gait. Edu continued on importance of WB status to pt, but he remained non-adherent and states \"I am not going to hop around so I am not going to move\". Pt VS taken and stable throughout session, but high, nurse notified on VS and adherence to NWB status and PT recommending STP transfers to commode.   PT Discharge Planning   PT Plan Clarify amount of support at home daughter can provide, transfers, balance and gait practice within WB status, manual WC mobility and safety (bring to room)   PT Discharge Recommendation (DC Rec) Transitional Care Facility   PT Rationale for DC Rec Pt currently functioning below pt reported baseline, with mixed mobility FWW/4WW in home and manual WC in community with assist. Pt non-compliant with NWB status currently. Variable assist at home per pt reports.   PT Brief overview of current status Stand pivot transfer Ax1 to commode, reminders for NWB R LE; no ambulation currently as pt unable to maintain NWB R LE   PT Total Distance Amb During Session (feet) 5   Physical Therapy Time and Intention   Timed " Code Treatment Minutes 23   Total Session Time (sum of timed and untimed services) 41

## 2025-05-10 NOTE — PROGRESS NOTES
[facility]    Medicine Progress Note - Hospitalist Service, GOLD TEAM 4    Date of Admission:  5/3/2025    Assessment & Plan    Tad Zaman is a 64 year old male with hypertension, chronic systolic heart failure, type 2 diabetes, peripheral artery disease status post left BKA, right femoral-tibial bypass (3/20/2025), right diabetic foot ulcer, osteomyelitis, history of MI, status post renal transplant, CKD stage III, with with complaints of progressive right foot pain and swelling and being admitted for progressive R foot severe diabetic foot ulcer and possible R TMA.     Today:  - Creatinine trending further up to 2.09.   - Ordered FENA 9.2% suggestive of ATN  - Appreciate input from nephrology - Hold torsemide today  - PT/OT recommending TCU vs ARU   - Appreciate assistance from HARLAN with dispo planning       # Gangrene and osteomyelitis of right foot s/p Transmetatarsal Amputation 5/7  # PAD s/p Left BKA and Right F-T stent  Presents with history of progressive R diabetic foot ulcer with outpatient plans for R TMA, but having concern for worsening swelling and foul odor. He had recent Arterial doppler on 5/2 with good stent flow. Workup in ED reassuring with WBC WNL, Lactate 1.9, CRP and ESR WNL. LLE Duplex negative for DVT. CT with contrast concerning for soft tissue gas which is favored to be from direct extension from the area of ulceration as opposed to gas-formin organism.  Appreciate assistance from podiatry and vascular surgery.  Vascular surgery did not recommend acute vascular procedure at this time, advised that transmetatarsal amputation is a viable option at this time, but patient does remain at high risk of needing further amputation in the future.  Podiatry performed transmetatarsal amputation on 5/7 and clean margins were obtained.  Antibiotics discontinued per ID recommendations.  On heparin for 1 day following amputation, PTA Eliquis 5 mg twice  "daily resumed 5/8.  - Podiatry and infectious disease input appreciated   - Follow bone aerobic and anaerobic cultures, fungal culture, and pathology  - Preliminary Tissue culture is now growing Alcaligenes faecalis - Discussed with ID   - Anaerobic cultures with enterococcus faecalis   - Vascular surgery input appreciated  - Blood flow is tenuous to R foot. Per our conversation, it is reasonable to pursue TMA but note pt is high risk of needing additional amputation in the future   - Will need lifelong AC following surgery   - Follow up with Vascular in 3 months, sooner if TMA fails to heal as he may require further angioplasty    - Continue Eliquis 5 mg BID   - Continue PTA ASA for now   - Daily CBC, BMP    # S/P Renal transplant  # MOY 2/2 Acute Tubular Necrosis, suspected   # CKD3  Cr baseline around ~1.4-1.6. Cr on admit 2.15. Renal transplant US 5/3 with moderate hydronephrosis of the transplant kidney, that improved post void. Elevated resistive indices suggestive of renal transplant dysfunction.  Creatinine did initially improve to 1.3 but trended up slightly to 1.55 postoperatively. Gave 1 L LR 5/8 with further increased to 1.67. Resumed PTA Torsemide 5/9 with further Cr increase to 2.09. Pattern concerning for ATN, checked FENA 5/10 which was elevated consistent with ATN.  - Renal transplant following, input appreciated   - Continue PTA Tacro 2 mg BID  - Continue PTA Mycophenolate 500 mg BID for now  - HOLDING Torsemide   - Continue PTA sodium bicarb and Patiromer    # Disorientation   On my exam the morning of 5/8 following TMA< pt was alert but orientated to self-only, not to date and only loosely to location (\"[facility]\") or situation (thought he had a transplant, not amputation, of his foot). This is an acute change from preoperatively. No focal neuro deficits were present on exam.  Prior to resumption of anticoagulation we did check a CT head which was negative for acute abnormality, does note " chronic right cerebellar infarct. VBG unremarkable, ketones normal.  No other significant electrolyte abnormalities.  Considered cefepime as possible etiology and this was discontinued on 5/8 but no change in disorientation.   - Per discussion with patient's daughter he has had similar episodes of disorientation during prior hospital stays and has taken a few days to improve  - Suspect patient has sensitive cognitive function in context of prior cerebellar infarct  - Continue to monitor    # DM2  Last A1C 7.9 on 3/3/25. Outpatient regimen includes Metformin and Jardiance. Was started on MSSI on admit but patient refusing so this was discontinued. Last 24h trend:   - HOLD PTA Metformin  - Resume Jardiance 5/9  - Sliding scale insuline added 5/8  - Hypoglycemia protocol  - Follow BG trend, will continue to discuss with patient the importance for BG control in perioperative setting    Recent Labs   Lab 05/10/25  0935 05/10/25  0750 05/10/25  0322 05/09/25  2227 05/09/25  1845 05/09/25  1437   * 213* 205* 191* 160* 187*        # History of CVA/TIA  # Chronic AC, ASA  He is on dual coverage with Apixaban and ASA for history of CVA/TIA and PAD per family. Apixaban held on admission given need for procedure.   - AC as above   - ASA plan above     Hypertension  Chronic systolic heart failure/HFmrEF  History of chronic systolic heart failure, MI but no clear history of CAD or cardiac revascularization. He had TTE on 2/25/25 with EF of 40-45% and mild pulmonary hypertension likely Group 2. No signs of acute HF exacerbation.  - Continue PTA Metoprolol   - Continue PTA Statin    BPH   - Continue PTA Tamsulosin     Glaucoma   - Continue PTA gtts         Diet: Regular Diet Adult    DVT Prophylaxis: held in anticipation of upcoming TMA on 5/7  Zaman Catheter: Not present  Lines: None     Cardiac Monitoring: None  Code Status: Full Code        Disposition Plan:  Medically Ready for Discharge: Anticipated in 5+  Days    Expected Discharge Date: 05/13/2025      Destination: inpatient rehabilitation facility;other (comment) (Transitional Care)            The patient's care was discussed with staff physician, Bedside Nurse, Patient, and podiatry and vascular Consultant(s).    Eugenio Trivedi PA-C  Hospitalist Service, GOLD TEAM [address] [place] [address]  Securely message with [employer] (more info)  Text page via [employer] Paging/Directory     Clinically Significant Risk Factors          # Hyperchloremia: Highest Cl = 108 mmol/L in last 2 days, will monitor as appropriate             # Acute Kidney Injury, unspecified: based on a >150% or 0.3 mg/dL increase in last creatinine compared to past 90 day average, will monitor renal function            # DMII: A1C = 7.9 % (Ref range: <5.7 %) within past 6 months         # Financial/Environmental Concerns: none         ______________________________________________________________________    Interval History     Patient doing about the same as the last 2 days.  Remains disorientated but no other evidence of confusion/encephalopathy.  He has no acute concerns.  Pain well-controlled.    Data reviewed today: I reviewed all medications, new labs and imaging results over the last 24 hours. Please see discussion of these results in the A/P.    Physical Exam    Vital Signs: Temp: 97.9  F (36.6  C) Temp src: Oral BP: 132/85 Pulse: 73     Resp: 16 SpO2: 99 % O2 Device: None (Room air)    Weight: 170 lbs 10.18 oz    Constitutional: awake, alert, cooperative, in no acute distress. A&O to self only, not orientated to time and only loosely to place and situation as of 5/9  Eyes: EOM, PERRLA. Sclera clear, conjunctiva normal. Anicteric   HENT: Normocephalic, without obvious abnormality, atraumatic.   Respiratory: No increased work of breathing.   Cards: RRR, no murmer appreciated. +JVD   GI: Soft, non-tender without rebound or guarding.   Musculoskeletal:  Full range of  motion noted, hx L BKA. Now s/p R transmetatarsal amputation 5/7. Dressing is c/d/i  Neurologic: Cranial nerves II-XII are grossly intact.   Neuropsychiatric: General: normal, calm and normal eye contact. Normal affect        5/5 images, pre amputation:                  Data   Recent Labs   Lab 05/10/25  0935 05/10/25  0750 05/10/25  0322 05/09/25  1128 05/09/25  0550 05/08/25  1301 05/08/25  0904 05/08/25  0659 05/04/25  1117 05/04/25  0819 05/04/25  0232 05/03/25  1641   WBC  --  6.9  --   --  7.2  --  10.1 9.4   < > 4.4  --  5.0   HGB  --  10.6*  --   --  10.4*  --  11.6* 12.1*   < > 10.9*  --  11.6*   MCV  --  81  --   --  82  --  83 82   < > 82  --  83   PLT  --  194  --   --  174  --  225 248   < > 225  --  229   INR  --   --   --   --   --   --   --   --   --   --   --  1.61*   NA  --  138  --   --  138  --   --  135   < > 137  --  138   POTASSIUM  --  4.3  --   --  4.3  --   --  5.0   < > 4.1  --  4.6   CHLORIDE  --  106  --   --  108*  --   --  105   < > 106  --  103   CO2  --  19*  --   --  19*  --   --  18*   < > 21*  --  23   BUN  --  43.6*  --   --  36.6*  --   --  35.6*   < > 37.2*  --  42.4*   CR  --  2.09*  --   --  1.67*  --   --  1.55*   < > 1.69*  --  2.15*   ANIONGAP  --  13  --   --  11  --   --  12   < > 10  --  12   QUE  --  9.6  --   --  9.5  --   --  9.8   < > 9.2  --  9.5   * 213* 205*   < > 193*   < >  --  298*   < > 166*   < > 225*   ALBUMIN  --   --   --   --   --   --   --   --   --  3.7  --  3.8   PROTTOTAL  --   --   --   --   --   --   --   --   --   --   --  6.9   BILITOTAL  --   --   --   --  0.3  --   --   --   --   --   --  0.3   ALKPHOS  --   --   --   --   --   --   --   --   --   --   --  89   ALT  --   --   --   --   --   --   --   --   --   --   --  9   AST  --   --   --   --   --   --   --   --   --   --   --  20    < > = values in this interval not displayed.       No results found for this or any previous visit (from the past 24 hours).

## 2025-05-10 NOTE — PLAN OF CARE
Goal Outcome Evaluation:      Plan of Care Reviewed With: patient    Overall Patient Progress: no changeOverall Patient Progress: no change    Alert. Disoriented to time and place--able to reorient but forgetful. AVSS on RA. Denies pain and nausea. Regular diet--refused to eat all shift, Pt waiting for his daughter to bring in food, daughter notified and will bring in dinner, daughter declined staff ordering meals. L BKA. RLE in ace wrap and cam boot. Repositions self in bed. Non weight bearing RLE. Stand and pivot to commode with Ax1, walker and left prosthetic. Voiding adequate with bedside urinal. LBM 5/9, passing flatus. L PIV x2 SL. Continue with POC.     Vitals: /85 (BP Location: Left arm)   Pulse 73   Temp 97.9  F (36.6  C) (Oral)   Resp 16   Wt 77.4 kg (170 lb 10.2 oz)   SpO2 99%   BMI 23.14 kg/m

## 2025-05-10 NOTE — PROGRESS NOTES
[facility]  Transplant Nephrology Progress Note  Date of Admission:  5/3/2025  Today's Date: 05/10/2025    Recommendations:   - Hold torsemide, reassess need in AM.  - Added tacrolimus trough levels to today's labs.  - Continue current immunosuppression.     Assessment & Plan   # DDKT and MOY: Trend down     - Baseline Creatinine: had been ~ 1.8-2 in Feb 2025, appears to be 1.4-1.6 since Mar 2025.    - Proteinuria: Not checked recently   - DSA Hx: Unknown due to being transplanted at another Transplant Center   - Last cPRA: unknown   - BK Viremia: Not checked recently due to time from transplant   - Kidney Tx Biopsy Hx: Unknown.     # Immunosuppression: Tacrolimus immediate release (goal 4-6) and Mycophenolate mofetil (dose 500 mg every 12 hours)   - Induction with Recent Transplant:  unknown, done at outside facility   - Continue with intensive monitoring of immunosuppression for efficacy and toxicity.   - Historical Changes in Immunosuppression: unknown   - Changes: No    # Infection Prevention:   Last CD4 Level: Unknown  - PJP: None      - CMV IgG Ab High Risk Discordance (D+/R-) at time of transplant: Unknown  Present CMV Serostatus: Unknown  - EBV IgG Ab High Risk Discordance (D+/R-) at time of transplant: Unknown  Present EBV Serostatus: Unknown    # Blood Pressure volume: Controlled;  Goal BP: < 130/80   - Overall euvolemic on exam.    - On Torsemide 10 mg every day and metoprolol succinate 75 mg every day.   - Changes: Not at this time    # Diabetes: Borderline control (HbA1c 7-9%) Last HbA1c: 7.9% 3/2025    # Anemia in Chronic Renal Disease: Hgb: Trend down      SIOBHAN: No   - Iron studies: Low iron saturation on Iron supplement    # Mineral Bone Disorder:    - Secondary renal hyperparathyroidism; PTH level: Not checked recently        On treatment: Calcitriol  - Vitamin D; level: Not checked recently        On supplement: No  - Calcium; level: Normal        On  supplement: No  - Phosphorus; level: Normal        On supplement: No    # Electrolytes:   - Potassium; level: Normal        On supplement:no, on veltassa  - Magnesium; level: Low normal        On supplement: No  - Bicarbonate; level: Low        On supplement: Yes     # Gangrene and osteomyelitis of right foot s/p Transmetatarsal Amputation 5/7    # Other Significant PMH:   - Atrial flutter: On Eliquis PTA-on hold for Possible intervention              - HFrEF: presumed ischemic given past maricel pre-transplant with area of ischemia and possible history of stent. On Toprol, jardiance and torsemide. With new drop in LVEF to ~ 45%, he may need further work-up.               - CAD: Possible history of stent.               - PAD s/p left AKA  - History of CVA/TIA               - Sickle cell trait              - History of hepatitis C.               - Urinary Retention: - Tamsulosin 0.8mg PO daily    # Transplant History:  Etiology of Kidney Failure: Diabetes mellitus type 2, HTN   Tx: DDKT  Transplant: N/A  Significant transplant-related complications: None    Recommendations were communicated to the primary team via [employer].    Milind Piper MD  Transplant Nephrology  Contact information via [employer] Web Console     Interval History  Mr. Zaman's creatinine is 2 (05/10); Trend up.  No events overnight. No chest pain or shortness of breath. No nausea and vomiting.  Bowel movements are regular. No fever, sweats or chills.   Good urine output. Weight overall stable.     Review of Systems   4 point ROS was obtained and negative except as noted in the Interval History.    MEDICATIONS:  Current Facility-Administered Medications   Medication Dose Route Frequency Provider Last Rate Last Admin    apixaban ANTICOAGULANT (ELIQUIS) tablet 5 mg  5 mg Oral BID Eugenio Trivedi PA-C   5 mg at 05/10/25 0945    aspirin (ASA) chewable tablet 81 mg  81 mg Oral Daily Vinh Crews MD   81 mg at 05/10/25 0944    atorvastatin  (LIPITOR) tablet 40 mg  40 mg Oral At Bedtime Vinh Crews MD   40 mg at 05/09/25 2237    brinzolamide-brimonidine (SIMBRINZA) 1-0.2 % ophthalmic suspension 1 drop  1 drop Ophthalmic BID Vinh Crews MD   1 drop at 05/10/25 0943    calcitRIOL (ROCALTROL) capsule 0.25 mcg  0.25 mcg Oral Once per day on Monday Tuesday Wednesday Thursday Friday Vinh Crews MD   0.25 mcg at 05/09/25 0941    empagliflozin (JARDIANCE) tablet 10 mg  10 mg Oral QAM Eugenio Trivedi PA-C   10 mg at 05/10/25 0945    ferrous sulfate (FEROSUL) tablet 325 mg  325 mg Oral Every Other Day Vinh Crews MD   325 mg at 05/10/25 0945    insulin aspart (NovoLOG) injection (RAPID ACTING)  1-7 Units Subcutaneous TID AC Eugenio Trivedi PA-C   1 Units at 05/09/25 1846    insulin aspart (NovoLOG) injection (RAPID ACTING)  1-5 Units Subcutaneous At Bedtime Eugenio Trivedi PA-C   1 Units at 05/08/25 2240    latanoprost (XALATAN) 0.005 % ophthalmic solution 1 drop  1 drop Both Eyes At Bedtime Vinh Crews MD   1 drop at 05/09/25 2238    metoprolol succinate ER (TOPROL XL) 24 hr tablet 75 mg  75 mg Oral Daily Vinh Crews MD   75 mg at 05/10/25 0944    mycophenolate (GENERIC EQUIVALENT) tablet 500 mg  500 mg Oral BID Vinh Crews MD   500 mg at 05/10/25 0945    pantoprazole (PROTONIX) EC tablet 40 mg  40 mg Oral Daily Vinh Crews MD   40 mg at 05/10/25 0945    patiromer (VELTASSA) packet 8.4 g  8.4 g Oral Weekly Vinh Crews MD        sodium bicarbonate tablet 1,300 mg  1,300 mg Oral TID Vinh Crews MD   1,300 mg at 05/10/25 0945    sodium chloride (PF) 0.9% PF flush 3 mL  3 mL Intracatheter Q8H Vinh Crews MD   3 mL at 05/10/25 0327    tacrolimus (GENERIC EQUIVALENT) capsule 2 mg  2 mg Oral BID IS Vinh Crews MD   2 mg at 05/10/25 0945    tamsulosin (FLOMAX) capsule 0.8 mg  0.8 mg Oral Daily Vinh Crews MD   0.8 mg at 05/10/25 0945    [Held by provider] torsemide (DEMADEX)  tablet 10 mg  10 mg Oral BID Eugenio Trivedi PA-C   10 mg at 05/10/25 0946     Current Facility-Administered Medications   Medication Dose Route Frequency Provider Last Rate Last Admin       Physical Exam   Temp  Av.8  F (36.6  C)  Min: 97.6  F (36.4  C)  Max: 98.2  F (36.8  C)      Pulse  Av.3  Min: 66  Max: 75 Resp  Av.5  Min: 18  Max: 20  SpO2  Av.2 %  Min: 97 %  Max: 100 %     BP (!) 153/93   Pulse 97   Temp 98.2  F (36.8  C) (Oral)   Resp 16   Wt 77.4 kg (170 lb 10.2 oz)   SpO2 96%   BMI 23.14 kg/m     Date 25 07 - 25 0659   Shift 0022-8274 4322-8558 0049-1821 24 Hour Total   INTAKE   P.O. 337   337   Shift Total 337   337   OUTPUT   Shift Total       Weight (kg)          Admit       GENERAL APPEARANCE: alert and no distress  HENT: mouth without ulcers or lesions  RESP: lungs clear to auscultation - no rales, rhonchi or wheezes  CV: regular rhythm, normal rate, no rub, no murmur  EDEMA: no LE edema bilaterally  ABDOMEN: soft, nondistended, nontender, bowel sounds normal  MS: Left BKA. Right TMA.  SKIN: Right foot with mild swelling, dry gangrenous toes to 1st -3rd digit.   TX KIDNEY: normal, surgical scar well approximated  Dialysis access: Right UE fistula    Data   All labs reviewed by me.  CMP  Recent Labs   Lab 05/10/25  0935 05/10/25  0750 05/10/25  0322 25  2227 25  1128 25  0550 25  1301 25  0659 25  1129 25  0633 25  1117 25  0819 25  0232 25  1641   NA  --  138  --   --   --  138  --  135  --  137   < > 137  --  138   POTASSIUM  --  4.3  --   --   --  4.3  --  5.0  --  5.3   < > 4.1  --  4.6   CHLORIDE  --  106  --   --   --  108*  --  105  --  108*   < > 106  --  103   CO2  --  19*  --   --   --  19*  --  18*  --  17*   < > 21*  --  23   ANIONGAP  --  13  --   --   --  11  --  12  --  12   < > 10  --  12   * 213* 205* 191*   < > 193*   < > 298*   < > 200*   < > 166*   < > 225*   BUN  --   43.6*  --   --   --  36.6*  --  35.6*  --  29.7*   < > 37.2*  --  42.4*   CR  --  2.09*  --   --   --  1.67*  --  1.55*  --  1.37*   < > 1.69*  --  2.15*   GFRESTIMATED  --  34*  --   --   --  45*  --  49*  --  57*   < > 44*  --  33*   QUE  --  9.6  --   --   --  9.5  --  9.8  --  9.6   < > 9.2  --  9.5   MAG  --   --   --   --   --   --   --   --   --   --   --  1.8  --  2.0   PHOS  --   --   --   --   --   --   --   --   --   --   --  3.4  --   --    PROTTOTAL  --   --   --   --   --   --   --   --   --   --   --   --   --  6.9   ALBUMIN  --   --   --   --   --   --   --   --   --   --   --  3.7  --  3.8   BILITOTAL  --   --   --   --   --  0.3  --   --   --   --   --   --   --  0.3   ALKPHOS  --   --   --   --   --   --   --   --   --   --   --   --   --  89   AST  --   --   --   --   --   --   --   --   --   --   --   --   --  20   ALT  --   --   --   --   --   --   --   --   --   --   --   --   --  9    < > = values in this interval not displayed.     CBC  Recent Labs   Lab 05/10/25  0750 05/09/25  0550 05/08/25  0904 05/08/25  0659   HGB 10.6* 10.4* 11.6* 12.1*   WBC 6.9 7.2 10.1 9.4   RBC 4.12* 4.10* 4.57 4.80   HCT 33.2* 33.7* 37.7* 39.3*   MCV 81 82 83 82   MCH 25.7* 25.4* 25.4* 25.2*   MCHC 31.9 30.9* 30.8* 30.8*   RDW 15.0 15.0 15.1* 14.9    174 225 248     INR  Recent Labs   Lab 05/03/25  1641   INR 1.61*   PTT 34     ABG  Recent Labs   Lab 05/08/25  1041   O2PER 21      Urine Studies  Recent Labs   Lab Test 05/04/25  1538 03/24/25  1036 03/22/25  1837 03/03/25  1508   COLOR Light Yellow Light Yellow Light Yellow Light Yellow   APPEARANCE Clear Clear Clear Clear   URINEGLC >=1000* >=1000* 300* >=1000*   URINEBILI Negative Negative Negative Negative   URINEKETONE Negative Negative Negative Negative   SG 1.023 1.010 1.008 1.016   UBLD Negative Negative Negative Negative   URINEPH 7.0 7.0 7.0 6.5   PROTEIN Negative Negative Negative Negative   NITRITE Negative Negative Negative Negative   LEUKEST  Negative Trace* Large* Trace*   RBCU <1 <1 <1 3*   WBCU 2 4 36* 8*     No lab results found.  PTH  No lab results found.  Iron Studies  Recent Labs   Lab Test 03/10/25  0744   IRON 23*   *   IRONSAT 17   HAMMAD 1,062*       IMAGING:  All imaging studies reviewed by me.

## 2025-05-11 ENCOUNTER — APPOINTMENT (OUTPATIENT)
Dept: ULTRASOUND IMAGING | Facility: CLINIC | Age: 65
DRG: 239 | End: 2025-05-11
Attending: STUDENT IN AN ORGANIZED HEALTH CARE EDUCATION/TRAINING PROGRAM
Payer: MEDICARE

## 2025-05-11 PROBLEM — N17.0 ACUTE KIDNEY FAILURE WITH TUBULAR NECROSIS: Status: ACTIVE | Noted: 2025-05-11

## 2025-05-11 LAB
ANION GAP SERPL CALCULATED.3IONS-SCNC: 12 MMOL/L (ref 7–15)
BUN SERPL-MCNC: 44.3 MG/DL (ref 8–23)
CALCIUM SERPL-MCNC: 9.7 MG/DL (ref 8.8–10.4)
CHLORIDE SERPL-SCNC: 106 MMOL/L (ref 98–107)
CREAT SERPL-MCNC: 2.11 MG/DL (ref 0.67–1.17)
EGFRCR SERPLBLD CKD-EPI 2021: 34 ML/MIN/1.73M2
ERYTHROCYTE [DISTWIDTH] IN BLOOD BY AUTOMATED COUNT: 15.1 % (ref 10–15)
GLUCOSE BLDC GLUCOMTR-MCNC: 129 MG/DL (ref 70–99)
GLUCOSE BLDC GLUCOMTR-MCNC: 142 MG/DL (ref 70–99)
GLUCOSE BLDC GLUCOMTR-MCNC: 158 MG/DL (ref 70–99)
GLUCOSE BLDC GLUCOMTR-MCNC: 169 MG/DL (ref 70–99)
GLUCOSE BLDC GLUCOMTR-MCNC: 190 MG/DL (ref 70–99)
GLUCOSE BLDC GLUCOMTR-MCNC: 197 MG/DL (ref 70–99)
GLUCOSE SERPL-MCNC: 171 MG/DL (ref 70–99)
HCO3 SERPL-SCNC: 21 MMOL/L (ref 22–29)
HCT VFR BLD AUTO: 34.3 % (ref 40–53)
HGB BLD-MCNC: 10.8 G/DL (ref 13.3–17.7)
MCH RBC QN AUTO: 25.5 PG (ref 26.5–33)
MCHC RBC AUTO-ENTMCNC: 31.5 G/DL (ref 31.5–36.5)
MCV RBC AUTO: 81 FL (ref 78–100)
PLATELET # BLD AUTO: 237 10E3/UL (ref 150–450)
POTASSIUM SERPL-SCNC: 3.9 MMOL/L (ref 3.4–5.3)
RBC # BLD AUTO: 4.23 10E6/UL (ref 4.4–5.9)
SODIUM SERPL-SCNC: 139 MMOL/L (ref 135–145)
WBC # BLD AUTO: 6.4 10E3/UL (ref 4–11)

## 2025-05-11 PROCEDURE — 120N000002 HC R&B MED SURG/OB UMMC

## 2025-05-11 PROCEDURE — 250N000012 HC RX MED GY IP 250 OP 636 PS 637: Performed by: STUDENT IN AN ORGANIZED HEALTH CARE EDUCATION/TRAINING PROGRAM

## 2025-05-11 PROCEDURE — 85048 AUTOMATED LEUKOCYTE COUNT: CPT

## 2025-05-11 PROCEDURE — 85018 HEMOGLOBIN: CPT

## 2025-05-11 PROCEDURE — 250N000013 HC RX MED GY IP 250 OP 250 PS 637: Performed by: STUDENT IN AN ORGANIZED HEALTH CARE EDUCATION/TRAINING PROGRAM

## 2025-05-11 PROCEDURE — 258N000003 HC RX IP 258 OP 636: Performed by: STUDENT IN AN ORGANIZED HEALTH CARE EDUCATION/TRAINING PROGRAM

## 2025-05-11 PROCEDURE — 250N000012 HC RX MED GY IP 250 OP 636 PS 637: Performed by: HOSPITALIST

## 2025-05-11 PROCEDURE — 99233 SBSQ HOSP IP/OBS HIGH 50: CPT | Mod: 24 | Performed by: STUDENT IN AN ORGANIZED HEALTH CARE EDUCATION/TRAINING PROGRAM

## 2025-05-11 PROCEDURE — 76775 US EXAM ABDO BACK WALL LIM: CPT | Mod: 26 | Performed by: RADIOLOGY

## 2025-05-11 PROCEDURE — 80048 BASIC METABOLIC PNL TOTAL CA: CPT

## 2025-05-11 PROCEDURE — 250N000013 HC RX MED GY IP 250 OP 250 PS 637: Performed by: HOSPITALIST

## 2025-05-11 PROCEDURE — 76775 US EXAM ABDO BACK WALL LIM: CPT

## 2025-05-11 PROCEDURE — 36415 COLL VENOUS BLD VENIPUNCTURE: CPT

## 2025-05-11 RX ADMIN — SODIUM CHLORIDE 1000 ML: 0.9 INJECTION, SOLUTION INTRAVENOUS at 15:17

## 2025-05-11 RX ADMIN — PANTOPRAZOLE SODIUM 40 MG: 40 TABLET, DELAYED RELEASE ORAL at 09:03

## 2025-05-11 RX ADMIN — INSULIN ASPART 1 UNITS: 100 INJECTION, SOLUTION INTRAVENOUS; SUBCUTANEOUS at 17:05

## 2025-05-11 RX ADMIN — SODIUM BICARBONATE 1300 MG: 650 TABLET ORAL at 09:03

## 2025-05-11 RX ADMIN — METOPROLOL SUCCINATE 75 MG: 25 TABLET, EXTENDED RELEASE ORAL at 09:05

## 2025-05-11 RX ADMIN — BRINZOLAMIDE/BRIMONIDINE TARTRATE 1 DROP: 10; 2 SUSPENSION/ DROPS OPHTHALMIC at 09:03

## 2025-05-11 RX ADMIN — APIXABAN 5 MG: 5 TABLET, FILM COATED ORAL at 09:03

## 2025-05-11 RX ADMIN — APIXABAN 5 MG: 5 TABLET, FILM COATED ORAL at 20:49

## 2025-05-11 RX ADMIN — LATANOPROST 1 DROP: 50 SOLUTION/ DROPS OPHTHALMIC at 23:10

## 2025-05-11 RX ADMIN — MYCOPHENOLATE MOFETIL 500 MG: 500 TABLET, FILM COATED ORAL at 20:49

## 2025-05-11 RX ADMIN — EMPAGLIFLOZIN 10 MG: 10 TABLET, FILM COATED ORAL at 09:03

## 2025-05-11 RX ADMIN — SODIUM BICARBONATE 1300 MG: 650 TABLET ORAL at 20:49

## 2025-05-11 RX ADMIN — INSULIN GLARGINE 3 UNITS: 100 INJECTION, SOLUTION SUBCUTANEOUS at 10:23

## 2025-05-11 RX ADMIN — ASPIRIN 81 MG CHEWABLE TABLET 81 MG: 81 TABLET CHEWABLE at 09:03

## 2025-05-11 RX ADMIN — SODIUM CHLORIDE 100 ML: 0.9 INJECTION, SOLUTION INTRAVENOUS at 11:01

## 2025-05-11 RX ADMIN — TAMSULOSIN HYDROCHLORIDE 0.8 MG: 0.4 CAPSULE ORAL at 09:03

## 2025-05-11 RX ADMIN — TACROLIMUS 2 MG: 1 CAPSULE ORAL at 17:05

## 2025-05-11 RX ADMIN — SODIUM BICARBONATE 1300 MG: 650 TABLET ORAL at 15:17

## 2025-05-11 RX ADMIN — MYCOPHENOLATE MOFETIL 500 MG: 500 TABLET, FILM COATED ORAL at 09:03

## 2025-05-11 RX ADMIN — ATORVASTATIN CALCIUM 40 MG: 40 TABLET, FILM COATED ORAL at 23:10

## 2025-05-11 RX ADMIN — BRINZOLAMIDE/BRIMONIDINE TARTRATE 1 DROP: 10; 2 SUSPENSION/ DROPS OPHTHALMIC at 20:49

## 2025-05-11 RX ADMIN — TACROLIMUS 2 MG: 1 CAPSULE ORAL at 09:03

## 2025-05-11 ASSESSMENT — ACTIVITIES OF DAILY LIVING (ADL)
ADLS_ACUITY_SCORE: 41

## 2025-05-11 NOTE — PROGRESS NOTES
Care Management Follow Up    Length of Stay (days): 8    Expected Discharge Date: 05/13/2025     Concerns to be Addressed: discharge planning     Patient plan of care discussed at interdisciplinary rounds: Yes    Anticipated Discharge Disposition: Acute Rehab, Transitional Care     Anticipated Discharge Services: None  Anticipated Discharge DME: None (No new DME)    Patient/family educated on Medicare website which has current facility and service quality ratings: yes  Education Provided on the Discharge Plan:  yes  Patient/Family in Agreement with the Plan: yes    Referrals Placed by CM/SW:  will send TCU referrals when choices are available  Private pay costs discussed: Not applicable    Discussed  Partnership in Safe Discharge Planning  document with patient/family: No     Handoff Completed: No, handoff not indicated or clinically appropriate    Additional Information:    Per the medicine updates, anticipate the pt will be med ready 1-2 days. Per chart review, the pt has TCU recs and pt/family agreed to it. Met with the pt at bedside, he confirmed would like to go to TCU to get stronger prior to go home. Writer provided the TCU list from medicare.gov and requested him to discuss with his daughter, Jarrod, when she comes see him this afternoon and provide us 5 TCU choices. Writer called and talked to Jarrod, she agreed to have 5 choices ready by tomorrow morning.    CM will obtain TCU choices in the morning and send out referrals.    Next Steps:     [] Obtain the TCU choices from the pt and daughter  [] EHO  [] JUAN MANUEL Ortiz, RN    7C RN Coordinator  Phone: [phone]  5/11/2025  Units: 7C Med Surg 7401 thru 7418 RNCC     Social Work and Care Management Department   SEARCHABLE in [employer] - search CARE COORDINATOR   [place] [facility] ([id number]) Saturday & Sunday; ([phone]) [facility] Recognized Holidays   Units: 5A Onc 5201 - 5219 RNCC,  5A Onc 5220 thru 5240 RNCC, 5C OFFSERVICE 6415-5250 RNCC & 5C OFF  SERVICE [id number] RNCC   Units: 6B Vocera, 6C Card 6401 thru 6420 RNCC, 6C Card 6502 thru 6514 RNCC & 6C Card 6515 thru 6519 RNCC    Units: 7A SOT RNCC Vocera, 7B Med Surg Vocera, & 7C Med Surg 7502 thru 4010 RNCC   Units: 6A Vocera & 4A CVICU Vocera, 4C MICU Vocera, and 4E SICU Vocera     Units: 5 Ortho Vocera & 5 Med Surg Vocera    Units: 6 Med Surg Vocera & 8 Med Surg Vocera

## 2025-05-11 NOTE — PROGRESS NOTES
[facility]  Transplant Nephrology Progress Note  Date of Admission:  5/3/2025  Today's Date: 05/11/2025    Recommendations:   - Hold torsemide.  - Hold empagliflozin.  - Kidney transplant US with post void residuals.  - 1 liter of NS or LR at ~ 100 ml/hr.    Assessment & Plan   # DDKT and MOY: Trend up since ~ 5/09. It seems to track with starting empagliflozin and torsemide. Patient overall euvolemic on exam so it's likely pre-renal / hemodynamic changes. We should also re-evaluate past moderate hydronephrosis seen on prior ultrasound and post void residual.     - Baseline Creatinine: had been ~ 1.8-2 in Feb 2025, appears to be 1.4-1.6 since Mar 2025.    - Proteinuria: Not checked recently   - DSA Hx: Unknown due to being transplanted at another Transplant Center   - Last cPRA: unknown   - BK Viremia: Not checked recently due to time from transplant   - Kidney Tx Biopsy Hx: Unknown.     # Immunosuppression: Tacrolimus immediate release (goal 4-6) and Mycophenolate mofetil (dose 500 mg every 12 hours)   - Induction with Recent Transplant:  unknown, done at outside facility   - Continue with intensive monitoring of immunosuppression for efficacy and toxicity.   - Historical Changes in Immunosuppression: unknown   - Changes: No    # Infection Prevention:   Last CD4 Level: Unknown  - PJP: None      - CMV IgG Ab High Risk Discordance (D+/R-) at time of transplant: Unknown  Present CMV Serostatus: Unknown  - EBV IgG Ab High Risk Discordance (D+/R-) at time of transplant: Unknown  Present EBV Serostatus: Unknown    # Blood Pressure volume: Controlled;  Goal BP: < 130/80   - Overall euvolemic on exam.    - On Torsemide 10 mg every day and metoprolol succinate 75 mg every day.   - Changes: Not at this time    # Diabetes: Borderline control (HbA1c 7-9%) Last HbA1c: 7.9% 3/2025    # Anemia in Chronic Renal Disease: Hgb: Trend down      SIOBHAN: No   - Iron studies: Low iron saturation  on Iron supplement    # Mineral Bone Disorder:    - Secondary renal hyperparathyroidism; PTH level: Not checked recently        On treatment: Calcitriol  - Vitamin D; level: Not checked recently        On supplement: No  - Calcium; level: Normal        On supplement: No  - Phosphorus; level: Normal        On supplement: No    # Electrolytes:   - Potassium; level: Normal        On supplement:no, on veltassa  - Magnesium; level: Low normal        On supplement: No  - Bicarbonate; level: Low        On supplement: Yes     # Gangrene and osteomyelitis of right foot s/p Transmetatarsal Amputation 5/7    # Other Significant PMH:   - Atrial flutter: On Eliquis PTA-on hold for Possible intervention              - HFrEF: presumed ischemic given past maricel pre-transplant with area of ischemia and possible history of stent. On Toprol, jardiance and torsemide. With new drop in LVEF to ~ 45%, he may need further work-up.               - CAD: Possible history of stent.               - PAD s/p left AKA  - History of CVA/TIA               - Sickle cell trait              - History of hepatitis C.               - Urinary Retention: - Tamsulosin 0.8mg PO daily    # Transplant History:  Etiology of Kidney Failure: Diabetes mellitus type 2, HTN   Tx: DDKT  Transplant: N/A  Significant transplant-related complications: None    Recommendations were communicated to the primary team via [employer].    Milind Piper MD  Transplant Nephrology  Contact information via [employer] Web Console     Interval History  No events overnight. No chest pain or shortness of breath. No nausea and vomiting.  Bowel movements are regular. No fever, sweats or chills.   Weight overall stable, though he was about ~ 3.4 L negative in the last 24 hours now that we have accurate urine output.     Review of Systems   4 point ROS was obtained and negative except as noted in the Interval History.    MEDICATIONS:  Current Facility-Administered Medications   Medication  Dose Route Frequency Provider Last Rate Last Admin    apixaban ANTICOAGULANT (ELIQUIS) tablet 5 mg  5 mg Oral BID Eugenio Trivedi PA-C   5 mg at 05/11/25 0903    aspirin (ASA) chewable tablet 81 mg  81 mg Oral Daily Vinh Crews MD   81 mg at 05/11/25 0903    atorvastatin (LIPITOR) tablet 40 mg  40 mg Oral At Bedtime Vinh Crews MD   40 mg at 05/10/25 2135    brinzolamide-brimonidine (SIMBRINZA) 1-0.2 % ophthalmic suspension 1 drop  1 drop Ophthalmic BID Vinh Crews MD   1 drop at 05/11/25 0903    calcitRIOL (ROCALTROL) capsule 0.25 mcg  0.25 mcg Oral Once per day on Monday Tuesday Wednesday Thursday Friday Vinh Crews MD   0.25 mcg at 05/09/25 0941    [Held by provider] empagliflozin (JARDIANCE) tablet 10 mg  10 mg Oral QAM Eugenio Trivedi PA-C   10 mg at 05/11/25 0903    ferrous sulfate (FEROSUL) tablet 325 mg  325 mg Oral Every Other Day Vinh Crews MD   325 mg at 05/10/25 0945    insulin aspart (NovoLOG) injection (RAPID ACTING)  1-7 Units Subcutaneous TID  Eugenio Trivedi PA-C   1 Units at 05/10/25 2135    insulin aspart (NovoLOG) injection (RAPID ACTING)  1-5 Units Subcutaneous At Bedtime Eugenio Trivedi PA-C   1 Units at 05/08/25 2240    insulin glargine (LANTUS PEN) injection 3 Units  3 Units Subcutaneous QAM  Eugenio Trivedi PA-C   3 Units at 05/11/25 1023    latanoprost (XALATAN) 0.005 % ophthalmic solution 1 drop  1 drop Both Eyes At Bedtime Vinh Crews MD   1 drop at 05/10/25 2135    metoprolol succinate ER (TOPROL XL) 24 hr tablet 75 mg  75 mg Oral Daily Vinh Crews MD   75 mg at 05/11/25 0905    mycophenolate (GENERIC EQUIVALENT) tablet 500 mg  500 mg Oral BID Vinh Crews MD   500 mg at 05/11/25 0903    pantoprazole (PROTONIX) EC tablet 40 mg  40 mg Oral Daily Vinh Crews MD   40 mg at 05/11/25 0903    patiromer (VELTASSA) packet 8.4 g  8.4 g Oral Weekly Vinh Crews MD   8.4 g at 05/10/25 1403    sodium bicarbonate tablet  1,300 mg  1,300 mg Oral TID Vinh Crews MD   1,300 mg at 25 0903    sodium chloride (PF) 0.9% PF flush 3 mL  3 mL Intracatheter Q8H Vinh Crews MD   3 mL at 25 1100    sodium chloride 0.9% BOLUS 100 mL  100 mL Intravenous Once Eugenio Trivedi PA-C 10 mL/hr at 25 1101 100 mL at 25 1101    tacrolimus (GENERIC EQUIVALENT) capsule 2 mg  2 mg Oral BID IS Vnih Crews MD   2 mg at 25 0903    tamsulosin (FLOMAX) capsule 0.8 mg  0.8 mg Oral Daily Vinh Crews MD   0.8 mg at 25 0903    [Held by provider] torsemide (DEMADEX) tablet 10 mg  10 mg Oral BID Eugenio Trivedi PA-C   10 mg at 05/10/25 0946     Current Facility-Administered Medications   Medication Dose Route Frequency Provider Last Rate Last Admin       Physical Exam   Temp  Av.8  F (36.6  C)  Min: 97.6  F (36.4  C)  Max: 98.2  F (36.8  C)      Pulse  Av.3  Min: 66  Max: 75 Resp  Av.5  Min: 18  Max: 20  SpO2  Av.2 %  Min: 97 %  Max: 100 %     BP (!) 145/56   Pulse 69   Temp 97.6  F (36.4  C) (Oral)   Resp 18   Wt 77.4 kg (170 lb 10.2 oz)   SpO2 99%   BMI 23.14 kg/m     Date 25 07 - 25 0659   Shift 3506-7676 9665-8209 8383-0364 24 Hour Total   INTAKE   P.O. 337   337   Shift Total 337   337   OUTPUT   Shift Total       Weight (kg)          Admit       GENERAL APPEARANCE: alert and no distress  HENT: mouth without ulcers or lesions  RESP: lungs clear to auscultation - no rales, rhonchi or wheezes  CV: JVP elevated but not collapsible (had to interpret as he likely has central stenosis), regular rhythm, normal rate, no rub, no murmur  EDEMA: no LE edema bilaterally  ABDOMEN: soft, nondistended, nontender, bowel sounds normal  MS: Left BKA. Right TMA.  SKIN: Right foot with mild swelling, dry gangrenous toes to 1st -3rd digit.   TX KIDNEY: normal, surgical scar well approximated  Dialysis access: Right UE fistula    Data   All labs reviewed by me.  CMP  Recent Labs    Lab 05/11/25  0809 05/11/25 0755 05/11/25  0003 05/10/25  2006 05/10/25  0935 05/10/25  0750 05/09/25  1128 05/09/25  0550 05/08/25  1301 05/08/25  0659   NA  --  139  --   --   --  138  --  138  --  135   POTASSIUM  --  3.9  --   --   --  4.3  --  4.3  --  5.0   CHLORIDE  --  106  --   --   --  106  --  108*  --  105   CO2  --  21*  --   --   --  19*  --  19*  --  18*   ANIONGAP  --  12  --   --   --  13  --  11  --  12   * 171* 197* 174*   < > 213*   < > 193*   < > 298*   BUN  --  44.3*  --   --   --  43.6*  --  36.6*  --  35.6*   CR  --  2.11*  --   --   --  2.09*  --  1.67*  --  1.55*   GFRESTIMATED  --  34*  --   --   --  34*  --  45*  --  49*   QUE  --  9.7  --   --   --  9.6  --  9.5  --  9.8   BILITOTAL  --   --   --   --   --   --   --  0.3  --   --     < > = values in this interval not displayed.     CBC  Recent Labs   Lab 05/11/25  0755 05/10/25  0750 05/09/25  0550 05/08/25  0904   HGB 10.8* 10.6* 10.4* 11.6*   WBC 6.4 6.9 7.2 10.1   RBC 4.23* 4.12* 4.10* 4.57   HCT 34.3* 33.2* 33.7* 37.7*   MCV 81 81 82 83   MCH 25.5* 25.7* 25.4* 25.4*   MCHC 31.5 31.9 30.9* 30.8*   RDW 15.1* 15.0 15.0 15.1*    194 174 225     INR  No lab results found in last 7 days.    ABG  Recent Labs   Lab 05/10/25  1146 05/08/25  1041   O2PER 1 21      Urine Studies  Recent Labs   Lab Test 05/04/25  1538 03/24/25  1036 03/22/25  1837 03/03/25  1508   COLOR Light Yellow Light Yellow Light Yellow Light Yellow   APPEARANCE Clear Clear Clear Clear   URINEGLC >=1000* >=1000* 300* >=1000*   URINEBILI Negative Negative Negative Negative   URINEKETONE Negative Negative Negative Negative   SG 1.023 1.010 1.008 1.016   UBLD Negative Negative Negative Negative   URINEPH 7.0 7.0 7.0 6.5   PROTEIN Negative Negative Negative Negative   NITRITE Negative Negative Negative Negative   LEUKEST Negative Trace* Large* Trace*   RBCU <1 <1 <1 3*   WBCU 2 4 36* 8*     No lab results found.  PTH  No lab results found.  Iron Studies  Recent  Labs   Lab Test 03/10/25  0744   IRON 23*   *   IRONSAT 17   HAMMAD 1,062*       IMAGING:  All imaging studies reviewed by me.

## 2025-05-11 NOTE — PLAN OF CARE
Goal Outcome Evaluation:      Plan of Care Reviewed With: patient    Overall Patient Progress: no changeOverall Patient Progress: no change    A&O x4, intermittent confusion.  AVSS on RA. Denies pain and nausea. Regular diet--ate half a sandwich at dinner. Encouraged PO fluids. L BKA. RLE in ace wrap and cam boot--dressing changed x1 this shift per orders, next dressing change due 5/13. Repositions self in bed. Non weight bearing RLE. Stand and pivot to commode with Ax1, walker and left prosthetic. Voiding spontaneously with bedside urinal--refused PVRs, provider aware. LBM 5/9, passing flatus. L PIV infusing 1L NS bolus at 100ml/hr. Continue with POC.     Vitals: /82 (BP Location: Left arm)   Pulse 75   Temp 97.9  F (36.6  C) (Oral)   Resp 16   Wt 77.4 kg (170 lb 10.2 oz)   SpO2 99%   BMI 23.14 kg/m

## 2025-05-11 NOTE — PHARMACY-CONSULT NOTE
Pharmacy Delirium Chart Review    Upon chart review, none of the patient's medications are high risk for contributing to delirium.     The following medications may contribute to delirium in certain circumstances as stated below, but these don't appear present in this patient based on chart review. These medications are also all home medications with no recent dose changes:   - metoprolol: may contribute to delirium in instances of hypotension or bradycardia  - torsemide: may contribute to delirium in instances of hypotension or electrolyte abnormalities  - tacrolimus: in cases of neurotoxicity, which is more likely to occur with increasing serum drug levels    Please consult unit pharmacist with further questions.    Kendrick Mayo RPH  Phone/Pager: [phone] or [place]

## 2025-05-11 NOTE — PROGRESS NOTES
[facility]    Medicine Progress Note - Hospitalist Service, GOLD TEAM 4    Date of Admission:  5/3/2025    Assessment & Plan    Tad Zaman is a 64 year old male with hypertension, chronic systolic heart failure, type 2 diabetes, peripheral artery disease status post left BKA, right femoral-tibial bypass (3/20/2025), right diabetic foot ulcer, osteomyelitis, history of MI, status post renal transplant, CKD stage III, with with complaints of progressive right foot pain and swelling and being admitted for progressive R foot severe diabetic foot ulcer and possible R TMA.     Today:  - Creatinine relatively stable 2.09->2.11 today.   - Appreciate input from nephrology - unclear if pre-renal MOY vs ATN  - Hold Jardiance, continue to hold Torsemide   - Give gentle fluid bolus today with 1 L over 10 hours   - Repeat BMP in the AM   - Repeat transplant renal US today showed improvement in hydronephrosis  - PT/OT recommending TCU vs ARU   - Appreciate assistance from HARLAN with dispo planning   - I attempted to pt's daughter over the phone today      # Gangrene and osteomyelitis of right foot s/p Transmetatarsal Amputation 5/7  # PAD s/p Left BKA and Right F-T stent  Presents with history of progressive R diabetic foot ulcer with outpatient plans for R TMA, but having concern for worsening swelling and foul odor. He had recent Arterial doppler on 5/2 with good stent flow. Workup in ED reassuring with WBC WNL, Lactate 1.9, CRP and ESR WNL. LLE Duplex negative for DVT. CT with contrast concerning for soft tissue gas which is favored to be from direct extension from the area of ulceration as opposed to gas-formin organism.  Appreciate assistance from podiatry and vascular surgery.  Vascular surgery did not recommend acute vascular procedure at this time, advised that transmetatarsal amputation is a viable option at this time, but patient does remain at high risk of needing further  amputation in the future.  Podiatry performed transmetatarsal amputation on 5/7 and clean margins were obtained.  Antibiotics discontinued per ID recommendations.  On heparin for 1 day following amputation, PTA Eliquis 5 mg twice daily resumed 5/8.  - Podiatry and infectious disease input appreciated   - Follow bone aerobic and anaerobic cultures, fungal culture, and pathology  - Preliminary Tissue culture is now growing Alcaligenes faecalis - Discussed with ID   - Anaerobic cultures with enterococcus faecalis   - Vascular surgery input appreciated  - Blood flow is tenuous to R foot. Per our conversation, it is reasonable to pursue TMA but note pt is high risk of needing additional amputation in the future   - Will need lifelong AC following surgery   - Follow up with Vascular in 3 months, sooner if TMA fails to heal as he may require further angioplasty    - Continue Eliquis 5 mg BID   - Continue PTA ASA for now   - Daily CBC, BMP    # S/P Renal transplant  # MOY 2/2 possible Acute Tubular Necrosis vs pre-renal MOY  # CKD3  Cr baseline around ~1.4-1.6. Cr on admit 2.15. Renal transplant US 5/3 with moderate hydronephrosis of the transplant kidney, that improved post void. Elevated resistive indices suggestive of renal transplant dysfunction.  Creatinine did initially improve to 1.3 but trended up slightly to 1.55 postoperatively, which is within pt baseline. Gave 1 L LR 5/8 w/ for increase in Cr and to see if would benefit pt's disorientation. Cr trended up further to 1.67, resumed PTA Jardiance and Torsemide, however, Cr then further increased to 2.11 - Jardiance and Torsemide placed back on hold.  Repeat renal ultrasound 5/11 showed improvement in hydronephrosis.  - Renal transplant following, input appreciated    - unclear if pre-renal MOY vs ATN  - Hold Jardiance, continue to hold Torsemide   - Give gentle fluid bolus today with 1 L over 10 hours   - Repeat BMP in the AM   - Continue PTA Tacro 2 mg BID  -  "Continue PTA Mycophenolate 500 mg BID for now  - Continue PTA sodium bicarb and Patiromer    # Disorientation   # Hospital acquired delirium   On my exam the morning of 5/8 following TMA< pt was alert but orientated to self-only, not to date and only loosely to location (\"[facility]\") or situation (thought he had a transplant, not amputation, of his foot). This is an acute change from preoperatively. No focal neuro deficits were present on exam.  Prior to resumption of anticoagulation we did check a CT head which was negative for acute abnormality, does note chronic right cerebellar infarct. VBG unremarkable, ketones normal.  No other significant electrolyte abnormalities.  Considered cefepime as possible etiology and this was discontinued on 5/8 but no change in disorientation.   - Disorientation persists as of 5/11, improved minimally over the past few days.  - Per discussion with patient's daughter he has had similar episodes of disorientation during prior hospital stays and has taken a few days to improve  - Suspect patient has sensitive cognitive function in context of prior cerebellar infarct  - Ordered delirium precautions   - Pharmacy consult to review medications prone to delirium     # DM2  Last A1C 7.9 on 3/3/25. Outpatient regimen includes Metformin and Jardiance, is not on home insulin.   - Continue to HOLD PTA Metformin  - Jardiance back on hold as of 5/11  - Sliding scale insuline added 5/8  - Added Low dose Lantuss 3 units on 5/11, titrate up/down pending BG trend and pending resumption of PO meds  - Hypoglycemia protocol  - Follow BG trend, will continue to discuss with patient the importance for BG control in perioperative setting    Recent Labs   Lab 05/11/25  0809 05/11/25  0755 05/11/25  0003 05/10/25  2006 05/10/25  1526 05/10/25  0935   * 171* 197* 174* 191* 206*        # History of CVA/TIA  # Chronic AC, ASA  He is on dual coverage with Apixaban and ASA for history of CVA/TIA and PAD " per family. Apixaban held on admission given need for procedure.   - AC as above   - ASA plan above     Hypertension  Chronic systolic heart failure/HFmrEF  History of chronic systolic heart failure, MI but no clear history of CAD or cardiac revascularization. He had TTE on 2/25/25 with EF of 40-45% and mild pulmonary hypertension likely Group 2. No signs of acute HF exacerbation.  - Continue PTA Metoprolol   - Continue PTA Statin    BPH   - Continue PTA Tamsulosin     Glaucoma   - Continue PTA gtts         Diet: Regular Diet Adult    DVT Prophylaxis: held in anticipation of upcoming TMA on 5/7  Zaman Catheter: Not present  Lines: None     Cardiac Monitoring: None  Code Status: Full Code        Disposition Plan:  Medically Ready for Discharge: Anticipated in 5+ Days     Expected Discharge Date: 05/13/2025      Destination: inpatient rehabilitation facility;other (comment) (Transitional Care)  Discharge Comments: Medically ready once Cr returns to baseline. Also closely watching mentation          The patient's care was discussed with staff physician, Bedside Nurse, Patient, and podiatry and vascular Consultant(s).    Eugenio Trivedi PA-C  Hospitalist Service, [address]  Securely message with [employer] (more info)  Text page via [employer] Paging/Directory     Clinically Significant Risk Factors                  # Acute Kidney Injury, unspecified: based on a >150% or 0.3 mg/dL increase in last creatinine compared to past 90 day average, will monitor renal function            # DMII: A1C = 7.9 % (Ref range: <5.7 %) within past 6 months         # Financial/Environmental Concerns: none         ______________________________________________________________________    Interval History   Resting comfortably in bed.  Has no acute concerns.  Remains disorientated but no other signs of confusion.  No other changes.  Denies fevers, chills, night sweats.  No abdominal pain,  nausea, vomiting.    Data reviewed today: I reviewed all medications, new labs and imaging results over the last 24 hours. Please see discussion of these results in the A/P.    Physical Exam    Vital Signs: Temp: 97.6  F (36.4  C) Temp src: Oral BP: (!) 145/56 Pulse: 69     Resp: 18 SpO2: 99 % O2 Device: None (Room air)    Weight: 170 lbs 10.18 oz    Constitutional: awake, alert, cooperative, in no acute distress. A&O to self only, not orientated to time and only loosely to place and situation as of 5/9  Eyes: EOM, PERRLA. Sclera clear, conjunctiva normal. Anicteric   HENT: Normocephalic, without obvious abnormality, atraumatic.   Respiratory: No increased work of breathing.   Cards: RRR, no murmer appreciated. +JVD   GI: Soft, non-tender without rebound or guarding.   Musculoskeletal:  Full range of motion noted, hx L BKA. Now s/p R transmetatarsal amputation 5/7. Dressing is c/d/i  Neurologic: Cranial nerves II-XII are grossly intact.   Neuropsychiatric: General: normal, calm and normal eye contact. Normal affect        5/5 images, pre amputation:                  Data   Recent Labs   Lab 05/11/25  0809 05/11/25  0755 05/11/25  0003 05/10/25  0935 05/10/25  0750 05/09/25  1128 05/09/25  0550   WBC  --  6.4  --   --  6.9  --  7.2   HGB  --  10.8*  --   --  10.6*  --  10.4*   MCV  --  81  --   --  81  --  82   PLT  --  237  --   --  194  --  174   NA  --  139  --   --  138  --  138   POTASSIUM  --  3.9  --   --  4.3  --  4.3   CHLORIDE  --  106  --   --  106  --  108*   CO2  --  21*  --   --  19*  --  19*   BUN  --  44.3*  --   --  43.6*  --  36.6*   CR  --  2.11*  --   --  2.09*  --  1.67*   ANIONGAP  --  12  --   --  13  --  11   QUE  --  9.7  --   --  9.6  --  9.5   * 171* 197*   < > 213*   < > 193*   BILITOTAL  --   --   --   --   --   --  0.3    < > = values in this interval not displayed.       No results found for this or any previous visit (from the past 24 hours).

## 2025-05-11 NOTE — PLAN OF CARE
[phone]  Goal Outcome Evaluation:      Plan of Care Reviewed With: patient    Overall Patient Progress: no changeOverall Patient Progress: no change     Vital signs:  Temp: 97.9  F (36.6  C) Temp src: Oral BP: 128/70 Pulse: 73   Resp: 18 SpO2: (!) 91 % O2 Device: None (Room air)     Activity: Ax1 with walker and prosthetic leg   Neuro: A&O x4, forgetful but easily redirected  Cardiac: WDL, denies chest pain  Respiratory: denies SOB, on RA  GI/: voiding with bedside urinal, no BM this shift  Diet: Regular, had dinner at 2100 when daughter brought food, per family and pt request that family place orders for meals or bring food.  Skin: L BKA with prosthetic leg, RLE ACE wrapped and CAM boot on, non- weight bearing   Lines: 2 PIV SL  Drains: none   Labs: reviewed  Pain/ nausea: denies both     New changes this shift: no acute changes      Plan:  continue POC

## 2025-05-11 NOTE — CONSULTS
Care Management Follow Up    Acknowledged the CM/SW for discharge planning/disposition. The initial CMA completed by the ED SW on 05/06/25. CM continue to monitor and support safe discharge planning.    Nikia Ortiz RN    7C RN Coordinator  Phone: [phone]  5/11/2025  Units: 7C Med Surg 7401 thru 7418 RNCC     Social Work and Care Management Department   SEARCHABLE in [facility] - search CARE COORDINATOR   [place] [employer] [place] ([phone]) Saturday & Sunday; ([phone]) [facility] Recognized Holidays   Units: 5A Onc 5201 - 5219 RNCC,  5A Onc 5220 thru 5240 RNCC, 5C OFFSERVICE [id number] RNCC & 5C OFF SERVICE [id number] RNCC   Units: 6B Vocera, 6C Card 6401 thru 6420 RNCC, 6C Card 6502 thru 6514 RNCC & 6C Card 6515 thru 6519 RNCC    Units: 7A SOT RNCC Vocera, 7B Med Surg Vocera, & 7C Med Surg 7502 thru 7521 RNCC   Units: 6A Vocera & 4A CVICU Vocera, 4C MICU Vocera, and 4E SICU Vocera     Units: 5 Ortho Vocera & 5 Med Surg Vocera    Units: 6 Med Surg Vocera & 8 Med Surg Vocera

## 2025-05-12 ENCOUNTER — APPOINTMENT (OUTPATIENT)
Dept: OCCUPATIONAL THERAPY | Facility: CLINIC | Age: 65
End: 2025-05-12
Attending: STUDENT IN AN ORGANIZED HEALTH CARE EDUCATION/TRAINING PROGRAM
Payer: MEDICARE

## 2025-05-12 ENCOUNTER — APPOINTMENT (OUTPATIENT)
Dept: PHYSICAL THERAPY | Facility: CLINIC | Age: 65
End: 2025-05-12
Payer: MEDICARE

## 2025-05-12 LAB
ANION GAP SERPL CALCULATED.3IONS-SCNC: 11 MMOL/L (ref 7–15)
BACTERIA TISS BX CULT: ABNORMAL
BACTERIA TISS BX CULT: ABNORMAL
BUN SERPL-MCNC: 34.4 MG/DL (ref 8–23)
CALCIUM SERPL-MCNC: 9.1 MG/DL (ref 8.8–10.4)
CHLORIDE SERPL-SCNC: 106 MMOL/L (ref 98–107)
CREAT SERPL-MCNC: 1.7 MG/DL (ref 0.67–1.17)
CYSTATIN C (ROCHE): 1.8 MG/L (ref 0.6–1)
EGFRCR SERPLBLD CKD-EPI 2021: 44 ML/MIN/1.73M2
ERYTHROCYTE [DISTWIDTH] IN BLOOD BY AUTOMATED COUNT: 15 % (ref 10–15)
GFR/BSA.PRED SERPLBLD CYS-BASED-ARV: 35 ML/MIN/1.73M2
GLUCOSE BLDC GLUCOMTR-MCNC: 166 MG/DL (ref 70–99)
GLUCOSE BLDC GLUCOMTR-MCNC: 191 MG/DL (ref 70–99)
GLUCOSE BLDC GLUCOMTR-MCNC: 194 MG/DL (ref 70–99)
GLUCOSE SERPL-MCNC: 187 MG/DL (ref 70–99)
HCO3 SERPL-SCNC: 21 MMOL/L (ref 22–29)
HCT VFR BLD AUTO: 32.8 % (ref 40–53)
HGB BLD-MCNC: 10.3 G/DL (ref 13.3–17.7)
MCH RBC QN AUTO: 25.2 PG (ref 26.5–33)
MCHC RBC AUTO-ENTMCNC: 31.4 G/DL (ref 31.5–36.5)
MCV RBC AUTO: 80 FL (ref 78–100)
PLATELET # BLD AUTO: 219 10E3/UL (ref 150–450)
POTASSIUM SERPL-SCNC: 3.8 MMOL/L (ref 3.4–5.3)
RBC # BLD AUTO: 4.08 10E6/UL (ref 4.4–5.9)
SODIUM SERPL-SCNC: 138 MMOL/L (ref 135–145)
WBC # BLD AUTO: 5.8 10E3/UL (ref 4–11)

## 2025-05-12 PROCEDURE — 120N000002 HC R&B MED SURG/OB UMMC

## 2025-05-12 PROCEDURE — 97530 THERAPEUTIC ACTIVITIES: CPT | Mod: GP | Performed by: PHYSICAL THERAPIST

## 2025-05-12 PROCEDURE — 99232 SBSQ HOSP IP/OBS MODERATE 35: CPT

## 2025-05-12 PROCEDURE — 97530 THERAPEUTIC ACTIVITIES: CPT | Mod: GO

## 2025-05-12 PROCEDURE — 82374 ASSAY BLOOD CARBON DIOXIDE: CPT

## 2025-05-12 PROCEDURE — 82310 ASSAY OF CALCIUM: CPT

## 2025-05-12 PROCEDURE — 85014 HEMATOCRIT: CPT

## 2025-05-12 PROCEDURE — 99232 SBSQ HOSP IP/OBS MODERATE 35: CPT | Mod: FS | Performed by: STUDENT IN AN ORGANIZED HEALTH CARE EDUCATION/TRAINING PROGRAM

## 2025-05-12 PROCEDURE — 250N000013 HC RX MED GY IP 250 OP 250 PS 637: Performed by: HOSPITALIST

## 2025-05-12 PROCEDURE — 250N000013 HC RX MED GY IP 250 OP 250 PS 637: Performed by: STUDENT IN AN ORGANIZED HEALTH CARE EDUCATION/TRAINING PROGRAM

## 2025-05-12 PROCEDURE — 99232 SBSQ HOSP IP/OBS MODERATE 35: CPT | Mod: 24 | Performed by: INTERNAL MEDICINE

## 2025-05-12 PROCEDURE — 250N000012 HC RX MED GY IP 250 OP 636 PS 637: Performed by: HOSPITALIST

## 2025-05-12 PROCEDURE — 99233 SBSQ HOSP IP/OBS HIGH 50: CPT | Mod: 24 | Performed by: NURSE PRACTITIONER

## 2025-05-12 PROCEDURE — 85018 HEMOGLOBIN: CPT

## 2025-05-12 PROCEDURE — 36415 COLL VENOUS BLD VENIPUNCTURE: CPT

## 2025-05-12 PROCEDURE — 82610 CYSTATIN C: CPT | Performed by: NURSE PRACTITIONER

## 2025-05-12 PROCEDURE — 97535 SELF CARE MNGMENT TRAINING: CPT | Mod: GO

## 2025-05-12 PROCEDURE — 99024 POSTOP FOLLOW-UP VISIT: CPT | Performed by: ASSISTANT, PODIATRIC

## 2025-05-12 PROCEDURE — 97165 OT EVAL LOW COMPLEX 30 MIN: CPT | Mod: GO

## 2025-05-12 RX ORDER — NALOXONE HYDROCHLORIDE 0.4 MG/ML
0.2 INJECTION, SOLUTION INTRAMUSCULAR; INTRAVENOUS; SUBCUTANEOUS
Status: DISCONTINUED | OUTPATIENT
Start: 2025-05-12 | End: 2025-05-18 | Stop reason: HOSPADM

## 2025-05-12 RX ORDER — NALOXONE HYDROCHLORIDE 0.4 MG/ML
0.4 INJECTION, SOLUTION INTRAMUSCULAR; INTRAVENOUS; SUBCUTANEOUS
Status: DISCONTINUED | OUTPATIENT
Start: 2025-05-12 | End: 2025-05-18 | Stop reason: HOSPADM

## 2025-05-12 RX ADMIN — ASPIRIN 81 MG CHEWABLE TABLET 81 MG: 81 TABLET CHEWABLE at 09:04

## 2025-05-12 RX ADMIN — INSULIN ASPART 1 UNITS: 100 INJECTION, SOLUTION INTRAVENOUS; SUBCUTANEOUS at 17:14

## 2025-05-12 RX ADMIN — TACROLIMUS 2 MG: 1 CAPSULE ORAL at 18:16

## 2025-05-12 RX ADMIN — CALCITRIOL CAPSULES 0.25 MCG 0.25 MCG: 0.25 CAPSULE ORAL at 09:11

## 2025-05-12 RX ADMIN — INSULIN ASPART 2 UNITS: 100 INJECTION, SOLUTION INTRAVENOUS; SUBCUTANEOUS at 12:10

## 2025-05-12 RX ADMIN — LATANOPROST 1 DROP: 50 SOLUTION/ DROPS OPHTHALMIC at 21:22

## 2025-05-12 RX ADMIN — SODIUM BICARBONATE 1300 MG: 650 TABLET ORAL at 16:59

## 2025-05-12 RX ADMIN — MYCOPHENOLATE MOFETIL 500 MG: 500 TABLET, FILM COATED ORAL at 19:53

## 2025-05-12 RX ADMIN — INSULIN ASPART 1 UNITS: 100 INJECTION, SOLUTION INTRAVENOUS; SUBCUTANEOUS at 09:10

## 2025-05-12 RX ADMIN — TAMSULOSIN HYDROCHLORIDE 0.8 MG: 0.4 CAPSULE ORAL at 09:04

## 2025-05-12 RX ADMIN — BRINZOLAMIDE/BRIMONIDINE TARTRATE 1 DROP: 10; 2 SUSPENSION/ DROPS OPHTHALMIC at 09:05

## 2025-05-12 RX ADMIN — BRINZOLAMIDE/BRIMONIDINE TARTRATE 1 DROP: 10; 2 SUSPENSION/ DROPS OPHTHALMIC at 19:53

## 2025-05-12 RX ADMIN — MYCOPHENOLATE MOFETIL 500 MG: 500 TABLET, FILM COATED ORAL at 09:04

## 2025-05-12 RX ADMIN — APIXABAN 5 MG: 5 TABLET, FILM COATED ORAL at 09:04

## 2025-05-12 RX ADMIN — TACROLIMUS 2 MG: 1 CAPSULE ORAL at 09:04

## 2025-05-12 RX ADMIN — ATORVASTATIN CALCIUM 40 MG: 40 TABLET, FILM COATED ORAL at 21:22

## 2025-05-12 RX ADMIN — SODIUM BICARBONATE 1300 MG: 650 TABLET ORAL at 09:04

## 2025-05-12 RX ADMIN — APIXABAN 5 MG: 5 TABLET, FILM COATED ORAL at 19:53

## 2025-05-12 RX ADMIN — SODIUM BICARBONATE 1300 MG: 650 TABLET ORAL at 19:53

## 2025-05-12 RX ADMIN — METOPROLOL SUCCINATE 75 MG: 25 TABLET, EXTENDED RELEASE ORAL at 09:04

## 2025-05-12 RX ADMIN — PANTOPRAZOLE SODIUM 40 MG: 40 TABLET, DELAYED RELEASE ORAL at 09:04

## 2025-05-12 RX ADMIN — FERROUS SULFATE TAB 325 MG (65 MG ELEMENTAL FE) 325 MG: 325 (65 FE) TAB at 09:04

## 2025-05-12 RX ADMIN — INSULIN GLARGINE 3 UNITS: 100 INJECTION, SOLUTION SUBCUTANEOUS at 09:11

## 2025-05-12 ASSESSMENT — ACTIVITIES OF DAILY LIVING (ADL)
ADLS_ACUITY_SCORE: 41

## 2025-05-12 NOTE — PROGRESS NOTES
Transplant Infectious Diseases Inpatient Progress Note      Tad Zaman MRN# [id number]   YOB: 1960 Age: 64 year old   Date of Admission and time: 5/3/2025  2:55 PM             Recommendations:   1. Keep off of ABx.         Synopsis of Immune Status and Presentation:   This patient is a 64 year old male with DM s/p kidney transplant in 9/2022 in OSH. On TAC/MMF.   Underwent right TMA on 5/7/25 due to dry gangrene.         Active Problems and Infectious Diseases Issues:   1. 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   The OM areas according to MRI involved the tips of the distal phalanges of the 1st, 2nd, 3rd and 4th toes. The OR note reported no purulence and no evidence of infectious process involving the metatarsals proximal to the amputation line.   The growth of Alcaligines faecalis and E faecalis are both of no clinica value given the healthy, non-infected remaining tissue and the elimination of the infected bone by TMA proximal to the infected area. We discontinued all ABx 5/9/25.     Of note, he had femoral-anterior tibial arterial bypass on 3/20/25.      Discussed with podiatry today.     2. Encephalopathy.   No evidence of encephalopathy when I evaluated the patient.   It looks like the patient develops encephalopathy with [facility] with unremarkable extensive workup.   I wonder if the patient has low threshold for delirium and polypharmaceuticals-induced encephalopathy.         Old Problems and Infectious Diseases Issues:   1. Left BKA.   2. Transient encephal[facility] at [facility] and again at [facility] in 3/2025 with extensive unremarkable workup at both institutions including MRI of the brain, LP with negative CRAG, JCV, ACE, NMDA, paraneoplastic panel, meningitis panel, though with high WBC of 21 and protein of 173 at [facility] but normal WBC at [facility].     Other Infectious Disease issues include:  - QTc: 486 as of 5/6/25 with normal QRS.   - Toxoplasma  serostatus: IgG ?  - Viral serostatus: CMV D-/R-, EBV D?/R+  - PJP (and possibly Toxoplasma) prophylaxis:   - Immunization status: up-to-date.   - Gamma globulin status: ?        Attestation:  Total duration of visit including chart review, reviewing labs and imaging independently, interviewing and examining the patient, documentation, and sending communication to the primary treating team as well as contacting Dr. Arreola from podiatry, all at the same day of this encounter, is: 45 minutes.       Porter Rodriguez MD  [facility]  Contact information available via [facility] Paging/Directory    05/12/2025              Interim History:   No new events and no complaints.          History of Present Illness:   This patient is a 64 year old male with vascular disease with hx of L BKA and more recently right femoral-anterior tibial bypass on 3/20/25. He has right foot dry gangrene involving the digits with underlying chronic OM. The plan was to proceed with right TMA electively but the patient presented to ED on 5/6/25 with foul smelling discharge from the dry gangrene. The patient underwent TMA on 5/7/25.  The patient was noted to have encephalopathy since surgery with concerns of ABx-induced encephalopathy.     The patient is poor historian but alert and oriented to person, date, place.             Review of Systems:      As mentioned in the HPI otherwise negative by reviewing constitutional symptoms, central and peripheral neurological systems, respiratory system, cardiac system, GI system,  system, musculoskeletal, skin, allergy, and lymphatics.           Allergies   Allergen Reactions    Penicillins Hives             Medications:   Medications that Require Transfusion:   Current Facility-Administered Medications   Medication Dose Route Frequency Provider Last Rate Last Admin     Scheduled Medications:   Current Facility-Administered Medications   Medication Dose Route  Frequency Provider Last Rate Last Admin    apixaban ANTICOAGULANT (ELIQUIS) tablet 5 mg  5 mg Oral BID Eugenio Trivedi PA-C   5 mg at 05/11/25 2049    aspirin (ASA) chewable tablet 81 mg  81 mg Oral Daily Vinh Crews MD   81 mg at 05/11/25 0903    atorvastatin (LIPITOR) tablet 40 mg  40 mg Oral At Bedtime Vinh Crews MD   40 mg at 05/11/25 2310    brinzolamide-brimonidine (SIMBRINZA) 1-0.2 % ophthalmic suspension 1 drop  1 drop Ophthalmic BID Vinh Crews MD   1 drop at 05/11/25 2049    calcitRIOL (ROCALTROL) capsule 0.25 mcg  0.25 mcg Oral Once per day on Monday Tuesday Wednesday Thursday Friday Vinh Crews MD   0.25 mcg at 05/09/25 0941    [Held by provider] empagliflozin (JARDIANCE) tablet 10 mg  10 mg Oral QAM Eugenio Trivedi PA-C   10 mg at 05/11/25 0903    ferrous sulfate (FEROSUL) tablet 325 mg  325 mg Oral Every Other Day Vinh Crews MD   325 mg at 05/10/25 0945    insulin aspart (NovoLOG) injection (RAPID ACTING)  1-7 Units Subcutaneous TID  Eugenio Trivedi PA-C   1 Units at 05/11/25 1705    insulin aspart (NovoLOG) injection (RAPID ACTING)  1-5 Units Subcutaneous At Bedtime Eugenio Trivedi PA-C   1 Units at 05/08/25 2240    insulin glargine (LANTUS PEN) injection 3 Units  3 Units Subcutaneous QAM  Eugenio Trivedi PA-C   3 Units at 05/11/25 1023    latanoprost (XALATAN) 0.005 % ophthalmic solution 1 drop  1 drop Both Eyes At Bedtime Vinh Crews MD   1 drop at 05/11/25 2310    metoprolol succinate ER (TOPROL XL) 24 hr tablet 75 mg  75 mg Oral Daily Vinh Crews MD   75 mg at 05/11/25 0905    mycophenolate (GENERIC EQUIVALENT) tablet 500 mg  500 mg Oral BID Vinh Crews MD   500 mg at 05/11/25 2049    pantoprazole (PROTONIX) EC tablet 40 mg  40 mg Oral Daily Vinh Crews MD   40 mg at 05/11/25 0903    patiromer (VELTASSA) packet 8.4 g  8.4 g Oral Weekly Vinh Crews MD   8.4 g at 05/10/25 1403    sodium bicarbonate tablet 1,300 mg   "1,300 mg Oral TID Vinh Crews MD   1,300 mg at 05/11/25 2049    sodium chloride (PF) 0.9% PF flush 3 mL  3 mL Intracatheter Q8H Vinh Crews MD   3 mL at 05/11/25 2050    tacrolimus (GENERIC EQUIVALENT) capsule 2 mg  2 mg Oral BID IS Vinh Crews MD   2 mg at 05/11/25 1705    tamsulosin (FLOMAX) capsule 0.8 mg  0.8 mg Oral Daily Vinh Crews MD   0.8 mg at 05/11/25 0903    [Held by provider] torsemide (DEMADEX) tablet 10 mg  10 mg Oral BID Eugenio Trivedi PA-C   10 mg at 05/10/25 0946               Physical Exam:   Temp: 98  F (36.7  C) Temp src: Oral BP: (!) 143/84 Pulse: 86   Resp: 18 SpO2: 99 % O2 Device: None (Room air)      Wt Readings from Last 4 Encounters:   05/10/25 77.4 kg (170 lb 10.2 oz)   04/10/25 72.6 kg (160 lb)   04/08/25 66.2 kg (146 lb)   03/26/25 66.5 kg (146 lb 8 oz)     Constitutional: awake, alert, cooperative, no apparent distress and appears at stated age, well nourished.   Extremities and skin: the wound of the right foot stump is not erythematous or dehisced. No induration, fluctuation or discharge.            Data:   No results found for: \"ACD4\"    Inflammatory Markers    Recent Labs   Lab Test 05/03/25  1641   SED 18       Immune Globulin Studies   No lab results found.    Metabolic Studies       Recent Labs   Lab Test 05/11/25  2211 05/11/25  1658 05/11/25  1521 05/11/25  1302 05/11/25  0809 05/11/25  0755 05/10/25  0935 05/10/25  0750 05/09/25  1128 05/09/25  0550 05/08/25  1301 05/08/25  0659 05/07/25  1129 05/07/25  0633 05/06/25  1454 05/06/25  0752 05/04/25  1117 05/04/25  0819 05/04/25  0232 05/03/25  1641 03/23/25  0557 03/21/25  0812 03/21/25  0635 03/04/25  0540 03/03/25  0939   NA  --   --   --   --   --  139  --  138  --  138  --  135  --  137  --  136   < > 137  --  138 136   < >  --    < >  --    POTASSIUM  --   --   --   --   --  3.9  --  4.3  --  4.3  --  5.0  --  5.3  --  4.9   < > 4.1  --  4.6 4.8   < >  --    < >  --    CHLORIDE  --   --   --   " --   --  106  --  106  --  108*  --  105  --  108*  --  106   < > 106  --  103 104   < >  --    < >  --    CO2  --   --   --   --   --  21*  --  19*  --  19*  --  18*  --  17*  --  20*   < > 21*  --  23 21*   < >  --    < >  --    ANIONGAP  --   --   --   --   --  12  --  13  --  11  --  12  --  12  --  10   < > 10  --  12 11   < >  --    < >  --    BUN  --   --   --   --   --  44.3*  --  43.6*  --  36.6*  --  35.6*  --  29.7*  --  29.3*   < > 37.2*  --  42.4* 31.6*   < >  --    < >  --    CR  --   --   --   --   --  2.11*  --  2.09*  --  1.67*  --  1.55*  --  1.37*  --  1.44*   < > 1.69*  --  2.15* 1.44*   < >  --    < >  --    GFRESTIMATED  --   --   --   --   --  34*  --  34*  --  45*  --  49*  --  57*  --  54*   < > 44*  --  33* 54*   < >  --    < >  --    * 142* 129* 158* 169* 171*   < > 213*   < > 193*   < > 298*   < > 200*   < > 186*   < > 166*   < > 225* 169*   < >  --    < >  --    A1C  --   --   --   --   --   --   --   --   --   --   --   --   --   --   --   --   --   --   --   --   --   --   --   --  7.9*   QUE  --   --   --   --   --  9.7  --  9.6  --  9.5  --  9.8  --  9.6  --  9.6   < > 9.2  --  9.5 9.8   < >  --    < >  --    PHOS  --   --   --   --   --   --   --   --   --   --   --   --   --   --   --   --   --  3.4  --   --  3.1  --   --    < >  --    MAG  --   --   --   --   --   --   --   --   --   --   --   --   --   --   --   --   --  1.8  --  2.0 1.7  --   --    < >  --    LACT  --   --   --   --   --   --   --   --   --   --   --   --   --   --   --   --   --   --   --  1.9  --   --  1.5   < >  --     < > = values in this interval not displayed.       Hepatic Studies    Recent Labs   Lab Test 05/09/25  0550 05/04/25  0819 05/03/25  1641 03/20/25  0332 03/19/25  0623 03/18/25  0721 03/17/25  0712 03/04/25  0540 02/25/25  1232   BILITOTAL 0.3  --  0.3  --   --   --   --   --  0.4   ALKPHOS  --   --  89  --   --   --   --   --  122   ALBUMIN  --  3.7 3.8 3.0* 3.1* 3.1* 3.4*   < > 3.9    AST  --   --  20  --   --   --   --   --  19   ALT  --   --  9  --   --   --   --   --  13    < > = values in this interval not displayed.       Pancreatitis testing    No lab results found.    Hematology Studies      Recent Labs   Lab Test 05/12/25  0709 05/11/25  0755 05/10/25  0750 05/09/25  0550 05/08/25  0904 05/08/25  0659 05/04/25  0819 05/03/25  1641 03/21/25  0813 03/21/25  0452 03/20/25  0922 03/20/25  0332 02/26/25  0723 02/25/25  1232   WBC 5.8 6.4 6.9 7.2 10.1 9.4   < > 5.0   < > 10.0   < > 6.4   < > 10.1   ANEU  --   --   --   --   --   --   --  3.7  --  8.6*  --  5.0  --  8.6*   ALYM  --   --   --   --   --   --   --  0.5*  --  0.3*  --  0.4*  --  0.5*   ERIC  --   --   --   --   --   --   --  0.6  --  0.9  --  0.7  --  0.9   AEOS  --   --   --   --   --   --   --  0.2  --  0.0  --  0.2  --  0.1   HGB 10.3* 10.8* 10.6* 10.4* 11.6* 12.1*   < > 11.6*   < > 8.9*   < > 9.0*   < > 12.3*   HCT 32.8* 34.3* 33.2* 33.7* 37.7* 39.3*   < > 37.7*   < > 28.7*   < > 29.5*   < > 39.8*    237 194 174 225 248   < > 229   < > 184  --  266   < > 350    < > = values in this interval not displayed.       Clotting Studies    Recent Labs   Lab Test 05/03/25  1641 03/17/25  0712 03/16/25  0043 03/15/25  1431 03/11/25  1533 03/05/25  0636   INR 1.61*  --   --   --   --  1.29*   PTT 34 30 76* 73*   < >  --     < > = values in this interval not displayed.       Arterial Blood Gas Testing    Recent Labs   Lab Test 05/10/25  1146 05/08/25  1041 03/23/25  0557 03/20/25  1319 03/20/25  1224 03/20/25  1118 03/20/25  1035 03/20/25  0922   PH  --   --   --  7.40 7.40 7.44 7.48* 7.44   PCO2  --   --   --  41 41 37 34* 38   PO2  --   --   --  139* 129* 146* 143* 207*   HCO3  --   --   --  25 25 25 25 26   O2PER 1 21 21 35.0 35.0 35.0 40.0 40.0        Urine Studies     Recent Labs   Lab Test 05/04/25  1538 03/24/25  1036 03/22/25  1837 03/03/25  1508   URINEPH 7.0 7.0 7.0 6.5   NITRITE Negative Negative Negative Negative  "  LEUKEST Negative Trace* Large* Trace*   WBCU 2 4 36* 8*       Vancomycin Levels     Recent Labs   Lab Test 05/08/25  1157 05/05/25  1200   VANCOMYCIN 21.5 6.2       Tobramycin levels     No lab results found.    Gentamicin levels    No lab results found.    Tacrolimus levels    Invalid input(s): \"TACROLIMUS\", \"TAC\", \"TACR\"      Latest Ref Rng & Units 5/12/2025     7:09 AM 5/11/2025     7:55 AM 5/10/2025     7:50 AM 5/9/2025     5:50 AM 5/8/2025     9:04 AM   Transplant Immunosuppression Labs   Creat 0.67 - 1.17 mg/dL  2.11  2.09  1.67     Urea Nitrogen 8.0 - 23.0 mg/dL  44.3  43.6  36.6     WBC 4.0 - 11.0 10e3/uL 5.8  6.4  6.9  7.2  10.1        Cyclosporine levels    Invalid input(s): \"CYCLOSPORINE\", \"CYC\"    Mycophenolate levels    Invalid input(s): \"MYPA\", \"MYP\"    Sirolimus levels    Invalid input(s): \"SIROLIMUS\", \"SIR\", \"RAPA\"    CSF testing     Recent Labs   Lab Test 03/05/25  1344 03/05/25  1343   CWBC  --  0   CRBC  --  0   CGLU 182*  --    .7*  --          Microbiology:  OR cx pending.   Last check of C difficile  No results found for: \"CDBPCT\"    Virology:  CMV viral loads    CMV DNA IU/mL   Date Value Ref Range Status   01/28/2025 Not Detected Not Detected IU/mL Final   01/25/2025 Not Detected Not Detected IU/mL Final     Viral loads    Recent Labs   Lab Test 01/28/25  0700 01/25/25  0712   CMVQNT Not Detected Not Detected       CMV viral loads    CMV DNA IU/mL   Date Value Ref Range Status   01/28/2025 Not Detected Not Detected IU/mL Final   01/25/2025 Not Detected Not Detected IU/mL Final       CMV resistance testing  No lab results found.  No results found for: \"CMVCID\", \"CMVFOS\", \"CMVGAN\", \"CMVDRUGRES\"     No results found for: \"H6RES\"    No results found for: \"EBVDN\", \"EBRES\", \"EBVSP\", \"EBVPC\", \"EBVPCR\"    No results found for: \"CMVIGG\", \"CMVM\", \"CMIG\", \"CMVG\", \"CMIGG\", \"CMIM\", \"CMVIGM\", \"CMLTX\", \"HSVG1\", \"HSVG2\", \"HSVTP1\", \"MH8388\", \"HS12M\", \"HS12GR\", \"HS1GR\", \"HS2GR\", \"HSIM\", \"HSIG\", " "\"HSIGR\", \"HSVIGMAB\", \"VARICZOSAB\", \"EBVIGG\", \"EBIGG\", \"EBVAGN\", \"PG7679\"    No results found for: \"EBIG2\", \"EBIGM\", \"TOXG\"      Imaging:  CT R foot W 5/3/25  IMPRESSION:  1.  Soft tissue deficiency, ulceration, and gas along the plantar aspect of the great toe with underlying erosive change of the distal phalanx, concerning for osteomyelitis. Soft tissue gas extends proximally to the level of the proximal phalanx of the   great toe where there is soft tissue irregularity. Findings are favored to be related to gas from direct extension from the area of ulceration as opposed to a gas-forming organism, although this is difficult to completely rule-out on CT alone.  2.  Soft tissue stranding and irregularity distal aspect of the 2nd toe without discrete abscess or evidence for osteomyelitis at this time. Note is made that there is dorsal dislocation of the 2nd MTP joint.  3.  No well-defined rim-enhancing fluid collection on CT with attention to the great toe, although poorly-defined abscess is not excluded as there is poorly-defined circumferential soft tissue swelling.  4.  More global soft tissue swelling about the foot, which can be seen with cellulitis as well as noninfectious causes of edema.  5.  Advanced Charcot arthropathy as described.   MRI Rfoot WO 4/18/25  Impression:  1. Finding most consistent with great toe distal phalangeal  osteomyelitis.  2. Assuming no prior amputation surgery in this area, finding  consistent with second distal phalangeal and to the level of mid  middle phalangeal shaft osteomyelitis. Radiographical correlation  might be helpful to identify residual bone.  3. Apparent exposed third distal phalanx with marrow abnormality  suspicious for osteomyelitis down to the base of middle phalanx.  4. Query forth distal phalangeal tuft acroosteolysis vs. sequale of  osteomyelitis.  5. First proximal phalangeal osteonecrosis.   6. Charcot arthropathy with multiple erosions, possibly related " to  superimposed inflammatory arthritis.        Porter Rodriguez MD  [facility]  Contact information available via [facility] Paging/Directory     05/12/2025

## 2025-05-12 NOTE — PROGRESS NOTES
05/12/25 1024   Appointment Info   Signing Clinician's Name / Credentials (OT) Yuli Crespo, OTR/L   Rehab Comments (OT) RLE NWB w/ CAM boot 24/7 w/ ankle at 90*; LLE: transtibial amp, prosthesis in room - 5/9: podiatry note w details re: progression of WB   Living Environment   People in Home sibling(s);child(gina), adult  (lives with disabled brother, daughter stays with him currently, brother and other daughter lives nearby.)   Current Living Arrangements house   Home Accessibility wheelchair accessible  (Has stairs in the back but does not use them. Ramp in front.)   Number of Stairs, Within Home, Primary greater than 10 stairs   Stair Railings, Within Home, Primary railing on right side (ascending)   Transportation Anticipated family or friend will provide   Living Environment Comments Pt reports several family members reside with him (daughter works 12-14 hr shifts though able to provide physical A when home). Ramp access into home, most needs on main level. Laundry in basement, can get A with if needed. BATHROOM: WIS, built in shower chair, w/ x3 grab bars, HHSH, regular height toilet with sink laterally.   Self-Care   Usual Activity Tolerance moderate   Current Activity Tolerance fair   Regular Exercise No   Equipment Currently Used at Home walker, rolling;crutches;prosthesis;shower chair;walker, standard;wheelchair, manual;grab bar, tub/shower  (built in shower chair, has knee scooter)   Fall history within last six months yes   Number of times patient has fallen within last six months 2  (fall in bathroom and brought him to the hospital, other fall walking outside with  and tripping on tree and got back up on his own)   Activity/Exercise/Self-Care Comment Pt reports being MOD I w/ i/ADLs and functional mobility. He is driving, manages his own meds, does housework. Baseline: manual w/c for community mobility (can self propel, int requires A), uses crutch or SPC when navigating stairs, uses FWW  "in room/around house, 4WW to/from BR (4WW in hospital room). Reports eager to get electric scooter, [facility] Prev worked as  at school. Retired from [employer].   General Information   Onset of Illness/Injury or Date of Surgery 05/03/25   Referring Physician Eugenio Trivedi, RAYMOND   Patient/Family Therapy Goal Statement (OT) get back to PLOF   Additional Occupational Profile Info/Pertinent History of Current Problem Tad Zaman is a 64 year old male with hypertension, chronic systolic heart failure, type 2 diabetes, peripheral artery disease status post left BKA, right femoral-tibial bypass (3/20/2025), right diabetic foot ulcer, osteomyelitis, history of MI, status post renal transplant, CKD stage III, with with complaints of progressive right foot pain and swelling and being admitted for progressive R foot severe diabetic foot ulcer now s/p Transmetatarsal Amputation 5/7   Existing Precautions/Restrictions fall;brace worn when out of bed;weight bearing;immunosuppressed   Left Lower Extremity (Weight-bearing Status)   (L transtibial amputation; prosthesis in room)   Right Lower Extremity (Weight-bearing Status) non weight-bearing (NWB)  (w/ CAM boot at all times)   Cognitive Status Examination   Cognitive Status Comments WFL, AOx3, follows 100% of commands, able to verbalize wants/needs. Freely recalls WB'ing precautions incl 3 week timeline for progression of mobility though states \"will not hop around\" - inquiring about heel WB'ing. Some difficulty noted problem solving safety + mobility. Of note, prev IP OT eval 3/23/25 pt scoring 14/30 on MoCA.   Visual Perception   Visual Impairment/Limitations WNL   Sensory   Sensory Comments declines acute changes   Range of Motion Comprehensive   Comment, General Range of Motion BUE grossly WFL; LLE limited by BKA, RLE ankle ROM limited to 90* with CAM boot   Strength Comprehensive (MMT)   Comment, General Manual Muscle Testing (MMT) " Assessment BUE WFL; LLE WFL w prosthetic; RLE n/t d/t WB prec   Bed Mobility   Bed Mobility supine-sit;sit-supine   Comment (Bed Mobility) MOD I   Transfers   Transfers sit-stand transfer;toilet transfer;shower transfer   Transfer Comments assist x 1/FWW - unable to maintain RLE NWB during transfers   Balance   Balance Comments static standing: assist x 1/FWW, BUE support   Activities of Daily Living   BADL Assessment/Intervention bathing;lower body dressing;grooming;toileting   Bathing Assessment/Intervention   Comment, (Bathing) assist x 1 per clinical judgement   Lower Body Dressing Assessment/Training   Comment, (Lower Body Dressing) SET UP A for LLE prosthetic mgmt, anticipate assist x1 for RLE CAM boot mgmt   Grooming Assessment/Training   Comment, (Grooming) SET UP A seated level   Toileting   Comment, (Toileting) MIN A per clinical judgement   Clinical Impression   Criteria for Skilled Therapeutic Interventions Met (OT) Yes, treatment indicated   OT Diagnosis impaired ADLs   OT Problem List-Impairments impacting ADL problems related to;activity tolerance impaired;balance;cognition;mobility;strength;post-surgical precautions   Assessment of Occupational Performance 3-5 Performance Deficits   Identified Performance Deficits bathing, dressing, g/h, toileting, functional mobility, home mgmt, leisure   Planned Therapy Interventions (OT) ADL retraining;balance training;bed mobility training;cognition;strengthening;transfer training;home program guidelines;progressive activity/exercise;risk factor education   Clinical Decision Making Complexity (OT) problem focused assessment/low complexity   Risk & Benefits of therapy have been explained evaluation/treatment results reviewed;care plan/treatment goals reviewed;risks/benefits reviewed;current/potential barriers reviewed;participants voiced agreement with care plan;participants included;patient   Clinical Impression Comments Pt remains below his baseline functional  status, will benefit from ongoing skilled OT while IP to progress IND, safety, and engagement within daily routine.   OT Total Evaluation Time   OT Eval, Low Complexity Minutes (22105) 6   OT Goals   Therapy Frequency (OT) 5 times/week   OT Predicted Duration/Target Date for Goal Attainment 06/11/25   OT Goals Hygiene/Grooming;Lower Body Dressing;Transfers;Toilet Transfer/Toileting;Cognition;OT Goal 1   OT: Hygiene/Grooming modified independent;within precautions   OT: Lower Body Dressing Modified independent;within precautions   OT: Transfer Modified independent;within precautions   OT: Toilet Transfer/Toileting Modified independent;toilet transfer;cleaning and garment management;using adaptive equipment;within precautions   OT: Cognitive Patient/caregiver will verbalize understanding of cognitive assessment results/recommendations as needed for safe discharge planning   OT: Goal 1 Pt will adhere to RLE NWB prec during daily routine, as evidenced by not requiring any cuing for adherence.   OT Discharge Planning   OT Plan trial slideboard>L>drop arm commode (ordered > room 5/12); STS w/ additional +1 to hold RLE off ground and progress heel/toe, consider using gait belt/theraband for loop in FWW to keep RLE off ground   OT Discharge Recommendation (DC Rec) Transitional Care Facility   OT Rationale for DC Rec Pt remains below his baseline functional status, primarily limited by generalized weakness/deconditioning, impaired balance, and new RLE NWB prec impacting IND within functional transfers. He is currently requiring assist x 1 for bADLs + functional mobility, not able to demo safe pivot transfers or ambulation while maintaining NWB prec. Rec TCU to progress IND + safety at this time. Of note, pt with known functional cognitive impairments, scoring 14/30 on MoCA during prev IP stay (3/23/25) - rec OP neuropsych testing for formal cognitive eval.   OT Brief overview of current status STS: Ax1/FWW, RLE: CAM boot,  LLE: prosthesis in room   OT Total Distance Amb During Session (feet) 0   OT Equipment Needed at Discharge   (TBD)   Total Session Time   Total Session Time (sum of timed and untimed services) 6

## 2025-05-12 NOTE — PROGRESS NOTES
CLINICAL NUTRITION SERVICES - ASSESSMENT NOTE      Registered Dietitian Interventions:  Supplements PRN with meals    Future/Additional Recommendations:  Encourage meals TID, encourage supplements with meals    Consider MVI with minerals for wound healing support with possible reduced oral intake     REASON FOR ASSESSMENT  LOS    INFORMATION OBTAINED  Assessed patient in room.    NUTRITION HISTORY  Pt reports he is currently eating less than he usually does at home.  He notes that he has more options at home.  He orders protein drinks from [employer] (formerly [employer]).      CURRENT NUTRITION ORDERS  Diet: Regular    CURRENT INTAKE/TOLERANCE  Oral intake mostly between 0% (sometimes refusing) - 50% of meals lately (from 5/7-5/12).    Pt today reports he is ordering 3 meals/day and eating 3 meals/day, but not eating all of the food that is ordered for him.  His daughter orders food for him.      LABS  Nutrition-relevant labs: Reviewed    MEDICATIONS  Nutrition-relevant medications: Med sliding scale insulin, Protonix, Ferrous sulfate 325 mg every other day, Jardiance (being held), Lantus 3 units    ANTHROPOMETRICS  Height: 6'  Admission Weight: 76.2 kg (167 lb 15.9 oz) (05/08/25 1143)   Most Recent Weight: 77.4 kg (170 lb 10.2 oz) (05/10/25 0613)  IBW: 76.1 kg (-5.9% for L BKA)  BMI: Body mass index is 23.14 kg/m .   Weight History: He reports he weighs more than he usually weighs right now.   Wt Readings from Last 15 Encounters:   05/10/25 77.4 kg (170 lb 10.2 oz)   04/10/25 72.6 kg (160 lb)   04/08/25 66.2 kg (146 lb)   03/26/25 66.5 kg (146 lb 8 oz)   Dosing Weight: 76 kg, based on admission wt    ASSESSED NUTRITION NEEDS  Estimated Energy Needs: 8649-4703 kcals/day (25 - 30 kcals/kg)  Justification: Maintenance  Estimated Protein Needs:  grams protein/day (1.2 - 1.5 grams of pro/kg)  Justification: Increased needs  Estimated Fluid Needs: 1 ml/kcal or per MD    SYSTEM AND PHYSICAL FINDINGS    GI symptoms: 1-2 BM  daily  Skin/wounds: Osteomyelitis of the right first, second, third toes, dry gangrene toes 1, 2, 3 on right side, peripheral vascular disease, type 2 diabetes with peripheral neuropathy, s/p Transmetatarsal amputation right foot, Percutaneous tendo Achilles lengthening right leg on 5/7/25    MALNUTRITION  % Intake: < 75% for > 7 days (moderate)  % Weight Loss: None noted  Subcutaneous Fat Loss: Orbital: Mild  Muscle Loss: Temples (temporalis muscle): Mild-moderate, Thigh (quadriceps): moderate-severe, and Calf (gastrocnemius): moderate-severe, lower leg muscle status likely related to bed bound/low activity of LE  Fluid Accumulation/Edema: None noted  Malnutrition Diagnosis: Moderate malnutrition in the context of chronic illness  Malnutrition Present on Admission: Yes    NUTRITION DIAGNOSIS  Inadequate oral intake related to prolonged LOS, recent surgery as evidenced by patient eating 0-50% of meals.     INTERVENTIONS  Medical food supplement therapy    GOALS  Patient to consume % of nutritionally adequate meal trays TID, or the equivalent with supplements/snacks.     MONITORING/EVALUATION  Progress toward goals will be monitored and evaluated per policy.    Veronica Londono, MS, RD, LD, CCTD, [facility]  7A + 7B (beds [id number])  Available on Vocera [7A or 7B Clinical Dietitian]   Weekend/Holiday: Vocera [Weekend Clinical Dietitian]

## 2025-05-12 NOTE — PROGRESS NOTES
[facility]  Transplant Nephrology Progress Note  Date of Admission:  5/3/2025  Today's Date: 05/12/2025    Recommendations:   - Continue to monitor PVRs, standing weights and I/Os.   - Straight cath for PVR > 300.   - Follow-up with urology outpatient.   - Continue to hold torsemide and empagliflozin today.   - Plan to restart empagliflozin in one week. If tolerating empagliflozin without issue for multiple days, can restart torsemide at 10 mg once daily.  If tolerating once daily, can increase to PTA twice daily dosing.   - Continue current immunosuppression.   - add cystatin C to labs    Assessment & Plan   # DDKT and MOY: Trend down. Good UO. The Cr rise seems to track with starting empagliflozin and torsemide, will continue to hold and restart slowly in time. Patient overall euvolemic on exam so it's likely pre-renal / hemodynamic changes. Moderate hydronephrosis seen on prior ultrasound and post void residual- will follow PVR, straight cath >300 and follow with urology.    - Baseline Creatinine: had been ~ 1.8-2 in Feb 2025, appears to be 1.4-1.6 since Mar 2025.    - Proteinuria: Not checked recently   - DSA Hx: Unknown due to being transplanted at another Transplant Center   - Last cPRA: unknown   - BK Viremia: Not checked recently due to time from transplant   - Kidney Tx Biopsy Hx: Unknown.     # Immunosuppression: Tacrolimus immediate release (goal 4-6) and Mycophenolate mofetil (dose 500 mg every 12 hours)   - Induction with Recent Transplant:  unknown, done at outside facility   - Continue with intensive monitoring of immunosuppression for efficacy and toxicity.   - Historical Changes in Immunosuppression: unknown   - Changes: No    # Infection Prevention:   Last CD4 Level: Unknown  - PJP: None      - CMV IgG Ab High Risk Discordance (D+/R-) at time of transplant: Unknown  Present CMV Serostatus: Unknown  - EBV IgG Ab High Risk Discordance (D+/R-) at time of  transplant: Unknown  Present EBV Serostatus: Unknown    # Blood Pressure volume: Controlled;  Goal BP: < 130/80   - Overall euvolemic on exam.    - On Torsemide 10 mg every day and metoprolol succinate 75 mg every day.   - Changes: Not at this time    # Diabetes: Borderline control (HbA1c 7-9%) Last HbA1c: 7.9% 3/2025    # Anemia in Chronic Renal Disease: Hgb: Trend down      SIOBHAN: No   - Iron studies: Low iron saturation on Iron supplement    # Mineral Bone Disorder:    - Secondary renal hyperparathyroidism; PTH level: Not checked recently        On treatment: Calcitriol  - Vitamin D; level: Not checked recently        On supplement: No  - Calcium; level: Normal        On supplement: No  - Phosphorus; level: Normal        On supplement: No    # Electrolytes:   - Potassium; level: Normal        On supplement:no, on veltassa  - Magnesium; level: Low normal        On supplement: No  - Bicarbonate; level: Low        On supplement: Yes     # Gangrene and osteomyelitis of right foot s/p Transmetatarsal Amputation 5/7    # Other Significant PMH:   - Atrial flutter: On Eliquis PTA              - HFrEF: presumed ischemic given past maricel pre-transplant with area of ischemia and possible history of stent. On Toprol, jardiance and torsemide. With new drop in LVEF to ~ 45%, he may need further work-up.               - CAD: Possible history of stent.               - PAD s/p left AKA  - History of CVA/TIA               - Sickle cell trait              - History of hepatitis C.               - Urinary Retention: - Tamsulosin 0.8mg PO daily    # Transplant History:  Etiology of Kidney Failure: Diabetes mellitus type 2, HTN   Tx: DDKT  Transplant: N/A  Significant transplant-related complications: None    Recommendations were communicated to the primary team via [employer].    RUFINO Billingsley CNP  Transplant Nephrology  Contact information via [employer] Web Console         Physician Attestation     I saw and evaluated Tad Zaman  as part of a shared APRN/PA visit.     I personally reviewed the vital signs, medications, labs, and imaging.    I personally provided a substantive portion of care for this patient and I approve the care plan as written by the REYNA.  I was involved with Medical Decision Making including: Please see A&P for additional details of medical decision making.  MANAGEMENT DISCUSSED with the following over the past 24 hours: Cr downtrending off SGLT2i/diuretics, MOY likely 2/2 prerenal azotemia. Continue to hold jardaince/torsemide today and will slowly resume within a week as tolerated     Mitzy Villarreal MD  Date of Service (when I saw the patient): 05/12/25    Interval History  Mr. Zaman's creatinine is 1.70 (05/12 0709); Trend down.  Good urine output.  Other significant labs/tests/vitals: VSS.   No events overnight.  No chest pain or shortness of breath.  No leg swelling.  No nausea and vomiting.  Bowel movements are on/off diarrhea.  No fever, sweats or chills.     Review of Systems   4 point ROS was obtained and negative except as noted in the Interval History.    MEDICATIONS:  Current Facility-Administered Medications   Medication Dose Route Frequency Provider Last Rate Last Admin    apixaban ANTICOAGULANT (ELIQUIS) tablet 5 mg  5 mg Oral BID Eugenio Trivedi PA-C   5 mg at 05/11/25 2049    aspirin (ASA) chewable tablet 81 mg  81 mg Oral Daily Vinh Crews MD   81 mg at 05/11/25 0903    atorvastatin (LIPITOR) tablet 40 mg  40 mg Oral At Bedtime Vinh Crews MD   40 mg at 05/11/25 2310    brinzolamide-brimonidine (SIMBRINZA) 1-0.2 % ophthalmic suspension 1 drop  1 drop Ophthalmic BID Vinh Crews MD   1 drop at 05/11/25 2049    calcitRIOL (ROCALTROL) capsule 0.25 mcg  0.25 mcg Oral Once per day on Monday Tuesday Wednesday Thursday Friday Vinh Crews MD   0.25 mcg at 05/09/25 0941    [Held by provider] empagliflozin (JARDIANCE) tablet 10 mg  10 mg Oral QAM Eugenio Trivedi PA-C   10 mg at  25 09    ferrous sulfate (FEROSUL) tablet 325 mg  325 mg Oral Every Other Day Vinh Crews MD   325 mg at 05/10/25 0945    insulin aspart (NovoLOG) injection (RAPID ACTING)  1-7 Units Subcutaneous TID  Eugenio Trivedi PA-C   1 Units at 25 1705    insulin aspart (NovoLOG) injection (RAPID ACTING)  1-5 Units Subcutaneous At Bedtime Eugenio Trivedi PA-C   1 Units at 25 2240    insulin glargine (LANTUS PEN) injection 3 Units  3 Units Subcutaneous QAM  Eugenio Trivedi PA-C   3 Units at 25 1023    latanoprost (XALATAN) 0.005 % ophthalmic solution 1 drop  1 drop Both Eyes At Bedtime Vinh Crews MD   1 drop at 25 2310    metoprolol succinate ER (TOPROL XL) 24 hr tablet 75 mg  75 mg Oral Daily Vinh Crews MD   75 mg at 25 09    mycophenolate (GENERIC EQUIVALENT) tablet 500 mg  500 mg Oral BID Vinh Crews MD   500 mg at 25    pantoprazole (PROTONIX) EC tablet 40 mg  40 mg Oral Daily Vinh Crews MD   40 mg at 25    patiromer (VELTASSA) packet 8.4 g  8.4 g Oral Weekly Vinh Crews MD   8.4 g at 05/10/25 1403    sodium bicarbonate tablet 1,300 mg  1,300 mg Oral TID Vinh Crews MD   1,300 mg at 25    sodium chloride (PF) 0.9% PF flush 3 mL  3 mL Intracatheter Q8H Vinh Crews MD   3 mL at 25 205    tacrolimus (GENERIC EQUIVALENT) capsule 2 mg  2 mg Oral BID IS Vinh Crews MD   2 mg at 25 170    tamsulosin (FLOMAX) capsule 0.8 mg  0.8 mg Oral Daily Vinh Crews MD   0.8 mg at 25 0903    [Held by provider] torsemide (DEMADEX) tablet 10 mg  10 mg Oral BID Eugenio Trivedi PA-C   10 mg at 05/10/25 0946     Current Facility-Administered Medications   Medication Dose Route Frequency Provider Last Rate Last Admin       Physical Exam   Temp  Av.8  F (36.6  C)  Min: 97.6  F (36.4  C)  Max: 98.2  F (36.8  C)      Pulse  Av.3  Min: 66  Max: 75 Resp  Av.5  Min: 18   Max: 20  SpO2  Av.2 %  Min: 97 %  Max: 100 %     BP (!) 143/84 (BP Location: Left arm, Patient Position: Sitting, Cuff Size: Adult Regular)   Pulse 86   Temp 98  F (36.7  C) (Oral)   Resp 18   Wt 77.4 kg (170 lb 10.2 oz)   SpO2 99%   BMI 23.14 kg/m     Date 25 07 - 25 0659   Shift [id number] [id number] 6916-7450 24 Hour Total   INTAKE   P.O. 337   337   Shift Total 337   337   OUTPUT   Shift Total       Weight (kg)          Admit       GENERAL APPEARANCE: alert and no distress  HENT: mouth without ulcers or lesions  RESP: lungs clear to auscultation - no rales, rhonchi or wheezes  CV: JVP elevated but not collapsible (had to interpret as he likely has central stenosis), regular rhythm, normal rate, no rub, no murmur  EDEMA: no LE edema bilaterally  ABDOMEN: soft, nondistended, nontender, bowel sounds normal  MS: Left BKA. Right TMA.  SKIN: Right foot with mild swelling, dry gangrenous toes to 1st -3rd digit.   TX KIDNEY: normal, surgical scar well approximated  Dialysis access: Right UE fistula    Data   All labs reviewed by me.  CMP  Recent Labs   Lab 25  0709 25  2211 25  1658 25  1521 25  0809 25  0755 05/10/25  0935 05/10/25  0750 25  1128 25  0550     --   --   --   --  139  --  138  --  138   POTASSIUM 3.8  --   --   --   --  3.9  --  4.3  --  4.3   CHLORIDE 106  --   --   --   --  106  --  106  --  108*   CO2 21*  --   --   --   --  21*  --  19*  --  19*   ANIONGAP 11  --   --   --   --  12  --  13  --  11   * 190* 142* 129*   < > 171*   < > 213*   < > 193*   BUN 34.4*  --   --   --   --  44.3*  --  43.6*  --  36.6*   CR 1.70*  --   --   --   --  2.11*  --  2.09*  --  1.67*   GFRESTIMATED 44*  --   --   --   --  34*  --  34*  --  45*   QUE 9.1  --   --   --   --  9.7  --  9.6  --  9.5   BILITOTAL  --   --   --   --   --   --   --   --   --  0.3    < > = values in this interval not displayed.     CBC  Recent Labs   Lab  05/12/25  0709 05/11/25  0755 05/10/25  0750 05/09/25  0550   HGB 10.3* 10.8* 10.6* 10.4*   WBC 5.8 6.4 6.9 7.2   RBC 4.08* 4.23* 4.12* 4.10*   HCT 32.8* 34.3* 33.2* 33.7*   MCV 80 81 81 82   MCH 25.2* 25.5* 25.7* 25.4*   MCHC 31.4* 31.5 31.9 30.9*   RDW 15.0 15.1* 15.0 15.0    237 194 174     INR  No lab results found in last 7 days.    ABG  Recent Labs   Lab 05/10/25  1146 05/08/25  1041   O2PER 1 21      Urine Studies  Recent Labs   Lab Test 05/04/25  1538 03/24/25  1036 03/22/25  1837 03/03/25  1508   COLOR Light Yellow Light Yellow Light Yellow Light Yellow   APPEARANCE Clear Clear Clear Clear   URINEGLC >=1000* >=1000* 300* >=1000*   URINEBILI Negative Negative Negative Negative   URINEKETONE Negative Negative Negative Negative   SG 1.023 1.010 1.008 1.016   UBLD Negative Negative Negative Negative   URINEPH 7.0 7.0 7.0 6.5   PROTEIN Negative Negative Negative Negative   NITRITE Negative Negative Negative Negative   LEUKEST Negative Trace* Large* Trace*   RBCU <1 <1 <1 3*   WBCU 2 4 36* 8*     No lab results found.  PTH  No lab results found.  Iron Studies  Recent Labs   Lab Test 03/10/25  0744   IRON 23*   *   IRONSAT 17   HAMMAD 1,062*       IMAGING:  All imaging studies reviewed by me.

## 2025-05-12 NOTE — PROGRESS NOTES
[facility]    Medicine Progress Note - Hospitalist Service, GOLD TEAM 4    Date of Admission:  5/3/2025    Assessment & Plan   Tad Zaman is a 64 year old male with hypertension, chronic systolic heart failure, type 2 diabetes, peripheral artery disease status post left BKA, right femoral-tibial bypass (3/20/2025), right diabetic foot ulcer, osteomyelitis, history of MI, status post renal transplant, CKD stage III, who was admitted to [facility] 5/3/2025 for further evaluation and treatment of right severe diabetic foot ulcer.      Changes Today:  - Increase Lantus to 5 units in the AM given blood sugar above goal  - Creatinine improving  - SW following for dispo     Gangrene and osteomyelitis of right foot s/p Transmetatarsal Amputation 5/7  PAD s/p Left BKA and Right F-T stent  Presented with history of progressive R diabetic foot ulcer with outpatient plans for R TMA, but having concern for worsening swelling and foul odor. He had recent Arterial doppler on 5/2 with good stent flow. Workup in ED reassuring with WBC WNL, Lactate 1.9, CRP and ESR WNL. LLE Duplex negative for DVT. CT with contrast concerning for soft tissue gas which is favored to be from direct extension from the area of ulceration as opposed to gas-formin organism. Vascular surgery did not recommend acute vascular procedure at this time, advised that transmetatarsal amputation is a viable option at this time, but patient does remain at high risk of needing further amputation in the future.  Podiatry performed transmetatarsal amputation on 5/7 and clean margins were obtained.  Antibiotics discontinued per ID recommendations.  On heparin for 1 day following amputation, PTA Eliquis 5 mg twice daily resumed 5/8.  - Podiatry and infectious disease following  - Follow bone aerobic and anaerobic cultures, fungal culture, and pathology  - Preliminary Tissue culture is now growing Alcaligenes faecalis -  Discussed with ID   - Anaerobic cultures with enterococcus faecalis   - Vascular surgery input appreciated  - Blood flow is tenuous to R foot. Per our conversation, it is reasonable to pursue TMA but note pt is high risk of needing additional amputation in the future   - Will need lifelong AC following surgery   - Follow up with Vascular in 3 months, sooner if TMA fails to heal as he may require further angioplasty    - Continue Eliquis 5 mg BID   - Continue PTA ASA for now   - Daily CBC, BMP     S/P Renal transplant  MOY 2/2 possible Acute Tubular Necrosis vs pre-renal MOY  CKD3  Cr baseline around ~1.4-1.6. Cr on admit 2.15. Renal transplant US 5/3 with moderate hydronephrosis of the transplant kidney, that improved post void. Elevated resistive indices suggestive of renal transplant dysfunction.  Creatinine very labile during hospital stay and transplant nephrology involved with care. Jardiance and Torsemide placed back on hold after being restarted.  Repeat renal ultrasound 5/11 showed improvement in hydronephrosis. IVF bolus given to patient per transplant nephrology recs.   - Transplant nephrology following   - Continue to hold torsemide and empagliflozin   - Plan to restart empagliflozin in one week  -  If tolerating empagliflozin without issue for multiple days, can restart torsemide at 10 mg once daily  - If tolerating once daily, can increase to PTA twice daily dosing.   - Continue PTA Tacro 2 mg BID  - Continue PTA Mycophenolate 500 mg BID for now  - Continue PTA sodium bicarb and Patiromer     Disorientation, ?improving   Hospital acquired delirium   Chronic Right Cerebellar infarct  Patient with acute onset disorientation the morning of 5/8 following TMA. No focal neuro deficits were noted at this time. Prior to resumption of anticoagulation, a CT head was obtained which was negative for acute abnormality (did show chronic infarct). Work-up otherwise unremarkable. Considered cefepime as possible  etiology and this was discontinued on 5/8 but no change in disorientation. Per discussion with patient's daughter he has had similar episodes of disorientation during prior hospital stays and has taken a few days to improve- suspect patient has sensitive cognitive function in context of prior cerebellar infarct.  - Continue to monitor   - Delirium precautions       DM2  Last A1C 7.9 on 3/3/25. Outpatient regimen includes Metformin and Jardiance, is not on home insulin. Metformin and Jardiance both on hold. Started on insulin.   - Hold PTA metformin  - Hold PTA Jardiance   - Increase Lantus to 5 units in the AM  - Continue medium intensity sliding scale  - Blood glucose checks QID and at bedtime  - Hypoglycemia protocol     Deconditioning  Patient with deconditioning in the setting of amputation, prolonged hospitalization. PT/OT recommended TCU.  - SW following for TCU placement  - PT/OT following    History of CVA/TIA  Chronic AC, ASA  He is on dual coverage with Apixaban and ASA for history of CVA/TIA and PAD per family. Apixaban held on admission given need for procedure.   - AC as above   - ASA plan above     Hypertension  Chronic systolic heart failure/HFmrEF  History of chronic systolic heart failure, MI but no clear history of CAD or cardiac revascularization. He had TTE on 2/25/25 with EF of 40-45% and mild pulmonary hypertension likely Group 2. No signs of acute HF exacerbation.  - Continue PTA Metoprolol   - Continue PTA Statin     BPH Continue PTA Tamsulosin.  Glaucoma Continue PTA gtts.            Diet: Regular Diet Adult    DVT Prophylaxis: DOAC  Zaman Catheter: Not present  Lines: None     Cardiac Monitoring: None  Code Status: Full Code      Clinically Significant Risk Factors                             # DMII: A1C = 7.9 % (Ref range: <5.7 %) within past 6 months       # Financial/Environmental Concerns: none         Social Drivers of Health    Tobacco Use: Medium Risk (4/22/2025)    Patient History      Smoking Tobacco Use: Former     Smokeless Tobacco Use: Never     Passive Exposure: Never          Disposition Plan     Medically Ready for Discharge: Anticipated Tomorrow           The patient's care was discussed with the Attending Physician, Dr. Umanzor, Care Coordinator/, Patient, and nephrology Consultant(s).    Tressa Barreto PA-C  Hospitalist Service, GOLD TEAM [address] [place] [address]  Securely message with [employer] (more info)  Text page via [facility] Paging/Directory   See signed in provider for up to date coverage information  ______________________________________________________________________    Interval History   Patient feeling well. Alert and oriented to situation and mostly to place (states he is in the hospital, but not the correct hospital). States the year is 2519. No other     Physical Exam   Vital Signs: Temp: 98  F (36.7  C) Temp src: Oral BP: (!) 143/84 Pulse: 86   Resp: 18 SpO2: 99 % O2 Device: None (Room air)    Weight: 170 lbs 10.18 oz    General Appearance: Comfortable, nontoxic appearing male seen laying in bed.  Eyes: PERRLA.  No conjunctival icterus.  HEENT: Atraumatic.  Respiratory: Breathing comfortably on room air.  Lymph/Hematologic: No bruising on exposed skin.  Skin: No lesions or rashes noted on exposed skin.  Musculoskeletal: Moving all extremities spontaneously.  Neurologic: Cranial nerves II through XII grossly intact. A&O to person, situation. In the hospital, but states the year is 2519.  Psychiatric: Mood appropriate.      Medical Decision Making       45 MINUTES SPENT BY ME on the date of service doing chart review, history, exam, documentation & further activities per the note.      Data   Imaging results reviewed over the past 24 hrs:   No results found for this or any previous visit (from the past 24 hours).  Recent Labs   Lab 05/12/25  1158 05/12/25  0709 05/11/25  2211 05/11/25  0809 05/11/25  0755 05/10/25  0935  05/10/25  0750 05/09/25  1128 05/09/25  0550   WBC  --  5.8  --   --  6.4  --  6.9  --  7.2   HGB  --  10.3*  --   --  10.8*  --  10.6*  --  10.4*   MCV  --  80  --   --  81  --  81  --  82   PLT  --  219  --   --  237  --  194  --  174   NA  --  138  --   --  139  --  138  --  138   POTASSIUM  --  3.8  --   --  3.9  --  4.3  --  4.3   CHLORIDE  --  106  --   --  106  --  106  --  108*   CO2  --  21*  --   --  21*  --  19*  --  19*   BUN  --  34.4*  --   --  44.3*  --  43.6*  --  36.6*   CR  --  1.70*  --   --  2.11*  --  2.09*  --  1.67*   ANIONGAP  --  11  --   --  12  --  13  --  11   QUE  --  9.1  --   --  9.7  --  9.6  --  9.5   * 187* 190*   < > 171*   < > 213*   < > 193*   BILITOTAL  --   --   --   --   --   --   --   --  0.3    < > = values in this interval not displayed.

## 2025-05-12 NOTE — PLAN OF CARE
[id number]  Goal Outcome Evaluation:      Plan of Care Reviewed With: patient    Overall Patient Progress: improvingOverall Patient Progress: improving     Vital signs:  Temp: 98.3  F (36.8  C) Temp src: Oral BP: 126/76 Pulse: 74   Resp: 16 SpO2: 100 % O2 Device: None (Room air)     Activity: Ax1 with walker and prosthetic leg   Neuro: A&O x4, forgetful but easily redirected  Cardiac: WDL, denies chest pain  Respiratory: denies SOB, on RA  GI/: voiding with bedside urinal and PVR scans done, 2  BM this shift to bathroom  Diet: Regular, fair appetite.  Skin: L BKA with prosthetic leg, RLE ACE wrapped and CAM boot on, non- weight bearing   Lines: 2 PIV SL  Drains: none   Labs: reviewed  Pain/ nausea: denies both     New changes this shift: no acute changes      Plan:  continue POC

## 2025-05-12 NOTE — PLAN OF CARE
Goal Outcome Evaluation:      Plan of Care Reviewed With: patient    Overall Patient Progress: no changeOverall Patient Progress: no change    Pt not eating food from hospital and has ate a sandwich all day.  No other food here from family.  Encouraging fluids and doing well.  BM x4 per patient. Pt up with walker to do ADL's at sink.  Pt and RN spoke with PT about patient using crutches due to no weight bearing on right foot.  PT will bring crutches tomorrow, pt says he uses them at home.  Pt bladder scanned post void for 300 ml.  Pt refusing to be straight cathed,unless MD does it.  Pt states he understands all the risks with urinary retention and does not like being cathed.  MD notified and said team will address tomorrow.              SOB, CP,m possible ACS , CHF

## 2025-05-12 NOTE — PROGRESS NOTES
Care Management Follow Up    Length of Stay (days): 9    Expected Discharge Date: 05/14/2025     Concerns to be Addressed: discharge planning     Patient plan of care discussed at interdisciplinary rounds: Yes    Anticipated Discharge Disposition: Acute Rehab, Transitional Care     Anticipated Discharge Services: None  Anticipated Discharge DME: None (No new DME)    Patient/family educated on Medicare website which has current facility and service quality ratings: no  Education Provided on the Discharge Plan:  yes  Patient/Family in Agreement with the Plan: yes    Referrals Placed by CM/SW:      [facility]  [address] [place]  [address]  P: [phone]  Admissions: [phone]  F: [phone]   F: [phone]   5/12: Initial referral placed by SW.     [facility]  [address] [place] [address]  P: [phone]  F: [phone]  5/12: SW placed initial referral.     [facility]   P: [phone]  F: [phone])  5/12: SW placed initial referral.     [facility]  [address] [place]  [address]  F: [phone]  Admissions ([place]ataliia): [phone]   5/12: SW placed initial referral.     [facility]  [address] [place] [address]  Admissions Tiffanie: [phone]  Fax: [phone]   5/12: SW placed initial referral.     [facility]  [address] [place]  [address]  P: [phone]  P: [phone] - Admissions  F: [phone]   5/12: SW placed initial referral.     Declined:     [facility]  [address] [place] [address]  [phone]   5/12: initial referral placed by SW. Cannot meet pt's needs.     [facility]  [address] [place] [address]  Ph: [phone]  F: [phone]   5/12: initial referral placed by SW. Acuity too high.     Private pay costs discussed: Not  applicable    Discussed  Partnership in Safe Discharge Planning  document with patient/family: Yes: ARU     Handoff Completed: No, handoff not indicated or clinically appropriate    Additional Information:  HARLAN talked to pt's daughter on the phone to confirmed that the list of options would be in pt's room. HARLAN then met with pt at bedside and was able to obtain the medicare list with the options. SW then made the referrals and informed the pt and daughter that he would keep them updated on accepting referrals. Pt's daughter confirmed that [facility] was the first choice.     Next Steps: HARLAN will continue to follow up with TCU referrals.     INES Jett   REILLY  [facility]  Available via Vocera  [address] HARLAN  Ph: [phone]

## 2025-05-12 NOTE — PROGRESS NOTES
Podiatry Progress Note    Date: May 9, 2025      ASSESSMENT/PLAN:  Patient seen s/p from right transmetatarsal amputation with percutaneous tendo Achilles lengthening procedure.    Remains stable, incision site stable with some more fresh blood oozing o dressing today, but minimal amount. Achilles areas stab incisions look great, and healing appropriately. No signs of early skin edge necrosis, TMA site is warm (appropriately) and soft/nontender to touch.     Discussed with Infectious Disease today. Intra-op cultures showing some bacterial growth but low amount and in broth, likely not pathogenic but contaminant. Intra-operatively I did not note any necrotic tissues, pus, discoloration at the soft tissue sample site, we did use a fresh rongeur as well for sampling. From a surgical intra-op standpoint do not feel we would need to treat this bacteria, I agree with ID.     No changes otherwise, strict NWB right side, CAM boot all times except for dressing changes. Nursing dressing orders placed, instructions in orders and below, will allow appropriate skins checks, if new areas arise let me know or consult WOC team for stable non-surgical wounds    DRESSING: Right transmetatarsal site, and right posterior Achilles  - To be changed every other day  1.)  Takedown soft dressing over the transmetatarsal amputation site as well as the 3 suture areas to the Achilles tendon region on the right side.  2.)  Can spray the incision areas with wound spray and dry.  3.)  Paint the TMA incision with Betadine, as well as the 3 small incision areas on the Achilles tendon region with Betadine.  Then cover these areas with Adaptic  4.)  For the posterior leg/Achilles area can cover with just a layer 2 of gauze.  For the TMA site can cover with a layer 2 of gauze as well as an ABD across the front.  5.)  Secure all of this with Kerlix and a light layer of Ace, if it appears that the Ace is uncomfortable or causing any sort of skin  breakdown can discontinue Ace and cover with tetra net/Surgilast in order to keep the kerlix dressing intact    Please continue strict nonweightbearing to the right lower extremity, please have patient keep the cam boot on at all times with heel securely fastened posteriorly and ankle at 90 degrees before strapping him in.    _____________________________________________  - Patient will need to be strict nonweightbearing given he has major amputation site to the right foot as well as percutaneous Achilles tendon release, nonweightbearing likely to be minimum of 3 to 4 weeks.  Once stitches are out, which will likely be at the 3 or 4-week connor he can begin doing light therapies upright in the cam boot at all times for the next 2 to 3 weeks and then can slowly transition out of the cam boot with easing into stretching of the Achilles with physical therapy.  I would like the cam boot on at all times during this initial 3 to 4 weeks as it will help keep the ankle joint fixated 90 degrees given tendo Achilles lengthening.  At any time if patient notes that he is unable to wear the cam boot we can transition to something like a posterior splint.  But the cam boot can be easily taken on and off and requires a less bulky dressing be much easier for dressing changes for the surgical site.    - I did take intraoperative cultures as well as pathology margins of the 1st and 2nd metatarsal bones, I did not appreciate any signs or symptoms of infection proximally but for good measure we could follow-up on the cultures.  From my operative standpoint can narrow antibiotics/discontinue pending culture.    - Recommend continuing to work with patient on tighter glycemic control as this will be crucial to his healing      OBJECTIVE:    /73 (BP Location: Left arm)   Pulse 66   Temp 97.5  F (36.4  C) (Oral)   Resp 18   Wt 77.4 kg (170 lb 10.2 oz)   SpO2 94%   BMI 23.14 kg/m       Lab Results   Component Value Date    WBC 7.2  05/09/2025     Lab Results   Component Value Date    RBC 4.10 05/09/2025     Lab Results   Component Value Date    HGB 10.4 05/09/2025     Lab Results   Component Value Date    HCT 33.7 05/09/2025     Lab Results   Component Value Date    MCV 82 05/09/2025     Lab Results   Component Value Date    MCH 25.4 05/09/2025     Lab Results   Component Value Date    MCHC 30.9 05/09/2025     Lab Results   Component Value Date    RDW 15.0 05/09/2025     Lab Results   Component Value Date     05/09/2025           PHYSICAL EXAM:    Derm:   Patient skin unchanged from yesterday's visit, photo below is from yesterday more or less was unchanged I do not appreciate any signs of hematoma, surrounding skin is soft and nontender, no oozing/bleeding, dressing with some light strikethrough in the initial layers but not soaking through.               Vascular: Right lower extremity is warm to touch, there is good cap refill to the amputation site, no dusky appearance, edema improved    MSK: Status post transmetatarsal amputation right side, no bruising noted    Neuro: Patient with right lower extremity              Kwesi Arreola DPM  Pager: [phone]

## 2025-05-13 ENCOUNTER — APPOINTMENT (OUTPATIENT)
Dept: OCCUPATIONAL THERAPY | Facility: CLINIC | Age: 65
End: 2025-05-13
Payer: MEDICARE

## 2025-05-13 ENCOUNTER — APPOINTMENT (OUTPATIENT)
Dept: PHYSICAL THERAPY | Facility: CLINIC | Age: 65
End: 2025-05-13
Payer: MEDICARE

## 2025-05-13 LAB
ANION GAP SERPL CALCULATED.3IONS-SCNC: 11 MMOL/L (ref 7–15)
BUN SERPL-MCNC: 23.5 MG/DL (ref 8–23)
CALCIUM SERPL-MCNC: 9.3 MG/DL (ref 8.8–10.4)
CHLORIDE SERPL-SCNC: 105 MMOL/L (ref 98–107)
CREAT SERPL-MCNC: 1.45 MG/DL (ref 0.67–1.17)
CYSTATIN C (ROCHE): 1.6 MG/L (ref 0.6–1)
EGFRCR SERPLBLD CKD-EPI 2021: 53 ML/MIN/1.73M2
ERYTHROCYTE [DISTWIDTH] IN BLOOD BY AUTOMATED COUNT: 15.1 % (ref 10–15)
GFR/BSA.PRED SERPLBLD CYS-BASED-ARV: 41 ML/MIN/1.73M2
GLUCOSE BLDC GLUCOMTR-MCNC: 151 MG/DL (ref 70–99)
GLUCOSE BLDC GLUCOMTR-MCNC: 152 MG/DL (ref 70–99)
GLUCOSE BLDC GLUCOMTR-MCNC: 189 MG/DL (ref 70–99)
GLUCOSE BLDC GLUCOMTR-MCNC: 201 MG/DL (ref 70–99)
GLUCOSE BLDC GLUCOMTR-MCNC: 215 MG/DL (ref 70–99)
GLUCOSE BLDC GLUCOMTR-MCNC: 228 MG/DL (ref 70–99)
GLUCOSE SERPL-MCNC: 165 MG/DL (ref 70–99)
HCO3 SERPL-SCNC: 21 MMOL/L (ref 22–29)
HCT VFR BLD AUTO: 34.4 % (ref 40–53)
HGB BLD-MCNC: 10.9 G/DL (ref 13.3–17.7)
MCH RBC QN AUTO: 25.3 PG (ref 26.5–33)
MCHC RBC AUTO-ENTMCNC: 31.7 G/DL (ref 31.5–36.5)
MCV RBC AUTO: 80 FL (ref 78–100)
PATH REPORT.COMMENTS IMP SPEC: NORMAL
PATH REPORT.COMMENTS IMP SPEC: NORMAL
PATH REPORT.FINAL DX SPEC: NORMAL
PATH REPORT.GROSS SPEC: NORMAL
PATH REPORT.MICROSCOPIC SPEC OTHER STN: NORMAL
PATH REPORT.RELEVANT HX SPEC: NORMAL
PHOTO IMAGE: NORMAL
PLATELET # BLD AUTO: 229 10E3/UL (ref 150–450)
POTASSIUM SERPL-SCNC: 3.9 MMOL/L (ref 3.4–5.3)
RBC # BLD AUTO: 4.3 10E6/UL (ref 4.4–5.9)
SODIUM SERPL-SCNC: 137 MMOL/L (ref 135–145)
WBC # BLD AUTO: 5 10E3/UL (ref 4–11)

## 2025-05-13 PROCEDURE — 99232 SBSQ HOSP IP/OBS MODERATE 35: CPT | Mod: FS | Performed by: STUDENT IN AN ORGANIZED HEALTH CARE EDUCATION/TRAINING PROGRAM

## 2025-05-13 PROCEDURE — 97116 GAIT TRAINING THERAPY: CPT | Mod: GP | Performed by: PHYSICAL THERAPIST

## 2025-05-13 PROCEDURE — 250N000013 HC RX MED GY IP 250 OP 250 PS 637: Performed by: HOSPITALIST

## 2025-05-13 PROCEDURE — 80048 BASIC METABOLIC PNL TOTAL CA: CPT

## 2025-05-13 PROCEDURE — 97530 THERAPEUTIC ACTIVITIES: CPT | Mod: GO | Performed by: OCCUPATIONAL THERAPIST

## 2025-05-13 PROCEDURE — 82610 CYSTATIN C: CPT

## 2025-05-13 PROCEDURE — 36415 COLL VENOUS BLD VENIPUNCTURE: CPT

## 2025-05-13 PROCEDURE — 85027 COMPLETE CBC AUTOMATED: CPT

## 2025-05-13 PROCEDURE — 120N000002 HC R&B MED SURG/OB UMMC

## 2025-05-13 PROCEDURE — 97535 SELF CARE MNGMENT TRAINING: CPT | Mod: GO | Performed by: OCCUPATIONAL THERAPIST

## 2025-05-13 PROCEDURE — 250N000012 HC RX MED GY IP 250 OP 636 PS 637: Performed by: HOSPITALIST

## 2025-05-13 PROCEDURE — 99207 PR APP CREDIT; MD BILLING SHARED VISIT: CPT | Mod: FS

## 2025-05-13 PROCEDURE — 250N000013 HC RX MED GY IP 250 OP 250 PS 637: Performed by: STUDENT IN AN ORGANIZED HEALTH CARE EDUCATION/TRAINING PROGRAM

## 2025-05-13 PROCEDURE — 97530 THERAPEUTIC ACTIVITIES: CPT | Mod: GP | Performed by: PHYSICAL THERAPIST

## 2025-05-13 PROCEDURE — 99231 SBSQ HOSP IP/OBS SF/LOW 25: CPT | Mod: 24 | Performed by: INTERNAL MEDICINE

## 2025-05-13 PROCEDURE — 99233 SBSQ HOSP IP/OBS HIGH 50: CPT | Mod: 24 | Performed by: NURSE PRACTITIONER

## 2025-05-13 RX ADMIN — SODIUM BICARBONATE 1300 MG: 650 TABLET ORAL at 08:05

## 2025-05-13 RX ADMIN — TAMSULOSIN HYDROCHLORIDE 0.8 MG: 0.4 CAPSULE ORAL at 08:05

## 2025-05-13 RX ADMIN — ATORVASTATIN CALCIUM 40 MG: 40 TABLET, FILM COATED ORAL at 22:32

## 2025-05-13 RX ADMIN — INSULIN ASPART 1 UNITS: 100 INJECTION, SOLUTION INTRAVENOUS; SUBCUTANEOUS at 12:04

## 2025-05-13 RX ADMIN — METOPROLOL SUCCINATE 75 MG: 25 TABLET, EXTENDED RELEASE ORAL at 08:06

## 2025-05-13 RX ADMIN — BRINZOLAMIDE/BRIMONIDINE TARTRATE 1 DROP: 10; 2 SUSPENSION/ DROPS OPHTHALMIC at 19:34

## 2025-05-13 RX ADMIN — SODIUM BICARBONATE 1300 MG: 650 TABLET ORAL at 14:51

## 2025-05-13 RX ADMIN — CALCITRIOL CAPSULES 0.25 MCG 0.25 MCG: 0.25 CAPSULE ORAL at 10:19

## 2025-05-13 RX ADMIN — APIXABAN 5 MG: 5 TABLET, FILM COATED ORAL at 19:34

## 2025-05-13 RX ADMIN — MYCOPHENOLATE MOFETIL 500 MG: 500 TABLET, FILM COATED ORAL at 08:06

## 2025-05-13 RX ADMIN — TACROLIMUS 2 MG: 1 CAPSULE ORAL at 18:59

## 2025-05-13 RX ADMIN — APIXABAN 5 MG: 5 TABLET, FILM COATED ORAL at 08:05

## 2025-05-13 RX ADMIN — TACROLIMUS 2 MG: 1 CAPSULE ORAL at 08:05

## 2025-05-13 RX ADMIN — MYCOPHENOLATE MOFETIL 500 MG: 500 TABLET, FILM COATED ORAL at 19:34

## 2025-05-13 RX ADMIN — INSULIN ASPART 2 UNITS: 100 INJECTION, SOLUTION INTRAVENOUS; SUBCUTANEOUS at 18:59

## 2025-05-13 RX ADMIN — ASPIRIN 81 MG CHEWABLE TABLET 81 MG: 81 TABLET CHEWABLE at 08:05

## 2025-05-13 RX ADMIN — SODIUM BICARBONATE 1300 MG: 650 TABLET ORAL at 19:34

## 2025-05-13 RX ADMIN — BRINZOLAMIDE/BRIMONIDINE TARTRATE 1 DROP: 10; 2 SUSPENSION/ DROPS OPHTHALMIC at 08:04

## 2025-05-13 RX ADMIN — LATANOPROST 1 DROP: 50 SOLUTION/ DROPS OPHTHALMIC at 22:32

## 2025-05-13 RX ADMIN — PANTOPRAZOLE SODIUM 40 MG: 40 TABLET, DELAYED RELEASE ORAL at 08:05

## 2025-05-13 ASSESSMENT — ACTIVITIES OF DAILY LIVING (ADL)
ADLS_ACUITY_SCORE: 41

## 2025-05-13 NOTE — PROGRESS NOTES
Transplant Infectious Diseases Inpatient Progress Note      Tad Zaman MRN# [id number]   YOB: 1960 Age: 64 year old   Date of Admission and time: 5/3/2025  2:55 PM             Recommendations:   1. Keep off of ABx.     ID will sign off, please call for questions.         Synopsis of Immune Status and Presentation:   This patient is a 64 year old male with DM s/p kidney transplant in 9/2022 in OSH. On TAC/MMF.   Underwent right TMA on 5/7/25 due to dry gangrene.         Active Problems and Infectious Diseases Issues:   1. 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   The OM areas according to MRI involved the tips of the distal phalanges of the 1st, 2nd, 3rd and 4th toes. The OR note reported no purulence and no evidence of infectious process involving the metatarsals proximal to the amputation line.   The growth of Alcaligines faecalis and E faecalis are both of no clinical value given the healthy, non-infected remaining tissue and the elimination of the infected bone by TMA proximal to the infected area. We discontinued all ABx 5/9/25.     Of note, he had femoral-anterior tibial arterial bypass on 3/20/25.      2. Encephalopathy.   No evidence of encephalopathy when I evaluated the patient.   It looks like the patient develops encephalopathy with [facility] with unremarkable extensive workup.   I wonder if the patient has low threshold for delirium and polypharmaceuticals-induced encephalopathy.         Old Problems and Infectious Diseases Issues:   1. Left BKA.   2. Transient encephalitis in [facility] at [facility] and again at [facility] in 3/2025 with extensive unremarkable workup at both institutions including MRI of the brain, LP with negative CRAG, JCV, ACE, NMDA, paraneoplastic panel, meningitis panel, though with high WBC of 21 and protein of 173 at [facility] but normal WBC at [facility].     Other Infectious Disease issues include:  - QTc: 486 as of 5/6/25 with normal QRS.    - Toxoplasma serostatus: IgG ?  - Viral serostatus: CMV D-/R-, EBV D?/R+  - PJP (and possibly Toxoplasma) prophylaxis:   - Immunization status: up-to-date.   - Gamma globulin status: ?        Attestation:  Total duration of visit including chart review, reviewing labs and imaging independently, interviewing and examining the patient, documentation, and sending communication to the primary treating team as well as contacting Dr. Arreola from podiatry, all at the same day of this encounter, is: 30 minutes.       Porter Rodriguez MD  [facility]  Contact information available via [facility] Paging/Directory    05/13/2025              Interim History:   No new events and no complaints.          History of Present Illness:   This patient is a 64 year old male with vascular disease with hx of L BKA and more recently right femoral-anterior tibial bypass on 3/20/25. He has right foot dry gangrene involving the digits with underlying chronic OM. The plan was to proceed with right TMA electively but the patient presented to ED on 5/6/25 with foul smelling discharge from the dry gangrene. The patient underwent TMA on 5/7/25.  The patient was noted to have encephalopathy since surgery with concerns of ABx-induced encephalopathy.     The patient is poor historian but alert and oriented to person, date, place.             Review of Systems:      As mentioned in the HPI otherwise negative by reviewing constitutional symptoms, central and peripheral neurological systems, respiratory system, cardiac system, GI system,  system, musculoskeletal, skin, allergy, and lymphatics.           Allergies   Allergen Reactions    Penicillins Hives             Medications:   Medications that Require Transfusion:   Current Facility-Administered Medications   Medication Dose Route Frequency Provider Last Rate Last Admin     Scheduled Medications:   Current Facility-Administered Medications   Medication Dose  Route Frequency Provider Last Rate Last Admin    apixaban ANTICOAGULANT (ELIQUIS) tablet 5 mg  5 mg Oral BID Eugenio Trivedi PA-C   5 mg at 05/13/25 0805    aspirin (ASA) chewable tablet 81 mg  81 mg Oral Daily Vinh Crews MD   81 mg at 05/13/25 0805    atorvastatin (LIPITOR) tablet 40 mg  40 mg Oral At Bedtime Vinh Crews MD   40 mg at 05/12/25 2122    brinzolamide-brimonidine (SIMBRINZA) 1-0.2 % ophthalmic suspension 1 drop  1 drop Ophthalmic BID Vinh Crews MD   1 drop at 05/13/25 0804    calcitRIOL (ROCALTROL) capsule 0.25 mcg  0.25 mcg Oral Once per day on Monday Tuesday Wednesday Thursday Friday Vinh Crews MD   0.25 mcg at 05/12/25 0911    [Held by provider] empagliflozin (JARDIANCE) tablet 10 mg  10 mg Oral QA Eugenio Trivedi PA-C   10 mg at 05/11/25 0903    ferrous sulfate (FEROSUL) tablet 325 mg  325 mg Oral Every Other Day Vinh Crews MD   325 mg at 05/12/25 0904    insulin aspart (NovoLOG) injection (RAPID ACTING)  1-7 Units Subcutaneous TID  Eugenio Trivedi PA-C   1 Units at 05/12/25 1714    insulin aspart (NovoLOG) injection (RAPID ACTING)  1-5 Units Subcutaneous At Bedtime Eugenio Trivedi PA-C   1 Units at 05/08/25 2240    insulin glargine (LANTUS PEN) injection 5 Units  5 Units Subcutaneous QAM AC Tressa Barreto PA-C   5 Units at 05/13/25 0806    latanoprost (XALATAN) 0.005 % ophthalmic solution 1 drop  1 drop Both Eyes At Bedtime Vinh Crews MD   1 drop at 05/12/25 2122    metoprolol succinate ER (TOPROL XL) 24 hr tablet 75 mg  75 mg Oral Daily Vinh Crews MD   75 mg at 05/13/25 0806    mycophenolate (GENERIC EQUIVALENT) tablet 500 mg  500 mg Oral BID Vinh Crews MD   500 mg at 05/13/25 0806    pantoprazole (PROTONIX) EC tablet 40 mg  40 mg Oral Daily Vinh Crews MD   40 mg at 05/13/25 0805    patiromer (VELTASSA) packet 8.4 g  8.4 g Oral Weekly Vinh Crews MD   8.4 g at 05/10/25 1403    sodium bicarbonate tablet 1,300  "mg  1,300 mg Oral TID Vinh Crews MD   1,300 mg at 05/13/25 0805    sodium chloride (PF) 0.9% PF flush 3 mL  3 mL Intracatheter Q8H Vinh Crews MD   3 mL at 05/12/25 1954    tacrolimus (GENERIC EQUIVALENT) capsule 2 mg  2 mg Oral BID IS Vinh Crews MD   2 mg at 05/13/25 0805    tamsulosin (FLOMAX) capsule 0.8 mg  0.8 mg Oral Daily Vinh Crews MD   0.8 mg at 05/13/25 0805    [Held by provider] torsemide (DEMADEX) tablet 10 mg  10 mg Oral BID Eugenio Trivedi PA-C   10 mg at 05/10/25 0946               Physical Exam:   Temp: 98.3  F (36.8  C) Temp src: Oral BP: 128/88 Pulse: 75   Resp: 18 SpO2: 96 % O2 Device: None (Room air)      Wt Readings from Last 4 Encounters:   05/10/25 77.4 kg (170 lb 10.2 oz)   04/10/25 72.6 kg (160 lb)   04/08/25 66.2 kg (146 lb)   03/26/25 66.5 kg (146 lb 8 oz)     Constitutional: awake, alert, cooperative, no apparent distress and appears at stated age, well nourished.   Extremities and skin: the wound of the right foot stump is not erythematous or dehisced. No induration, fluctuation or discharge.            Data:   No results found for: \"ACD4\"    Inflammatory Markers    Recent Labs   Lab Test 05/03/25  1641   SED 18       Immune Globulin Studies   No lab results found.    Metabolic Studies       Recent Labs   Lab Test 05/13/25  0802 05/13/25  0721 05/13/25  0206 05/12/25  2244 05/12/25  2124 05/12/25  1711 05/12/25  1158 05/12/25  0709 05/11/25  0809 05/11/25  0755 05/10/25  0935 05/10/25  0750 05/09/25  1128 05/09/25  0550 05/08/25  1301 05/08/25  0659 05/04/25  1117 05/04/25  0819 05/04/25  0232 05/03/25  1641 03/23/25  0557 03/21/25  0812 03/21/25  0635 03/04/25  0540 03/03/25  0939   NA  --  137  --   --   --   --   --  138  --  139  --  138  --  138  --  135   < > 137  --  138 136   < >  --    < >  --    POTASSIUM  --  3.9  --   --   --   --   --  3.8  --  3.9  --  4.3  --  4.3  --  5.0   < > 4.1  --  4.6 4.8   < >  --    < >  --    CHLORIDE  --  105  --  "  --   --   --   --  106  --  106  --  106  --  108*  --  105   < > 106  --  103 104   < >  --    < >  --    CO2  --  21*  --   --   --   --   --  21*  --  21*  --  19*  --  19*  --  18*   < > 21*  --  23 21*   < >  --    < >  --    ANIONGAP  --  11  --   --   --   --   --  11  --  12  --  13  --  11  --  12   < > 10  --  12 11   < >  --    < >  --    BUN  --  23.5*  --   --   --   --   --  34.4*  --  44.3*  --  43.6*  --  36.6*  --  35.6*   < > 37.2*  --  42.4* 31.6*   < >  --    < >  --    CR  --  1.45*  --   --   --   --   --  1.70*  --  2.11*  --  2.09*  --  1.67*  --  1.55*   < > 1.69*  --  2.15* 1.44*   < >  --    < >  --    GFRESTIMATED  --  53*  --   --   --   --   --  44*  --  34*  --  34*  --  45*  --  49*   < > 44*  --  33* 54*   < >  --    < >  --    * 165* 189* 215* 194* 166*   < > 187*   < > 171*   < > 213*   < > 193*   < > 298*   < > 166*   < > 225* 169*   < >  --    < >  --    A1C  --   --   --   --   --   --   --   --   --   --   --   --   --   --   --   --   --   --   --   --   --   --   --   --  7.9*   QUE  --  9.3  --   --   --   --   --  9.1  --  9.7  --  9.6  --  9.5  --  9.8   < > 9.2  --  9.5 9.8   < >  --    < >  --    PHOS  --   --   --   --   --   --   --   --   --   --   --   --   --   --   --   --   --  3.4  --   --  3.1  --   --    < >  --    MAG  --   --   --   --   --   --   --   --   --   --   --   --   --   --   --   --   --  1.8  --  2.0 1.7  --   --    < >  --    LACT  --   --   --   --   --   --   --   --   --   --   --   --   --   --   --   --   --   --   --  1.9  --   --  1.5   < >  --     < > = values in this interval not displayed.       Hepatic Studies    Recent Labs   Lab Test 05/09/25  0550 05/04/25  0819 05/03/25  1641 03/20/25  0332 03/19/25  0623 03/18/25  0721 03/17/25  0712 03/04/25  0540 02/25/25  1232   BILITOTAL 0.3  --  0.3  --   --   --   --   --  0.4   ALKPHOS  --   --  89  --   --   --   --   --  122   ALBUMIN  --  3.7 3.8 3.0* 3.1* 3.1* 3.4*   < > 3.9    AST  --   --  20  --   --   --   --   --  19   ALT  --   --  9  --   --   --   --   --  13    < > = values in this interval not displayed.       Pancreatitis testing    No lab results found.    Hematology Studies      Recent Labs   Lab Test 05/13/25  0720 05/12/25  0709 05/11/25  0755 05/10/25  0750 05/09/25  0550 05/08/25  0904 05/04/25  0819 05/03/25  1641 03/21/25  0813 03/21/25  0452 03/20/25  0922 03/20/25  0332 02/26/25  0723 02/25/25  1232   WBC 5.0 5.8 6.4 6.9 7.2 10.1   < > 5.0   < > 10.0   < > 6.4   < > 10.1   ANEU  --   --   --   --   --   --   --  3.7  --  8.6*  --  5.0  --  8.6*   ALYM  --   --   --   --   --   --   --  0.5*  --  0.3*  --  0.4*  --  0.5*   ERIC  --   --   --   --   --   --   --  0.6  --  0.9  --  0.7  --  0.9   AEOS  --   --   --   --   --   --   --  0.2  --  0.0  --  0.2  --  0.1   HGB 10.9* 10.3* 10.8* 10.6* 10.4* 11.6*   < > 11.6*   < > 8.9*   < > 9.0*   < > 12.3*   HCT 34.4* 32.8* 34.3* 33.2* 33.7* 37.7*   < > 37.7*   < > 28.7*   < > 29.5*   < > 39.8*    219 237 194 174 225   < > 229   < > 184  --  266   < > 350    < > = values in this interval not displayed.       Clotting Studies    Recent Labs   Lab Test 05/03/25  1641 03/17/25  0712 03/16/25  0043 03/15/25  1431 03/11/25  1533 03/05/25  0636   INR 1.61*  --   --   --   --  1.29*   PTT 34 30 76* 73*   < >  --     < > = values in this interval not displayed.       Arterial Blood Gas Testing    Recent Labs   Lab Test 05/10/25  1146 05/08/25  1041 03/23/25  0557 03/20/25  1319 03/20/25  1224 03/20/25  1118 03/20/25  1035 03/20/25  0922   PH  --   --   --  7.40 7.40 7.44 7.48* 7.44   PCO2  --   --   --  41 41 37 34* 38   PO2  --   --   --  139* 129* 146* 143* 207*   HCO3  --   --   --  25 25 25 25 26   O2PER 1 21 21 35.0 35.0 35.0 40.0 40.0        Urine Studies     Recent Labs   Lab Test 05/04/25  1538 03/24/25  1036 03/22/25  1837 03/03/25  1508   URINEPH 7.0 7.0 7.0 6.5   NITRITE Negative Negative Negative Negative  "  LEUKEST Negative Trace* Large* Trace*   WBCU 2 4 36* 8*       Vancomycin Levels     Recent Labs   Lab Test 05/08/25  1157 05/05/25  1200   VANCOMYCIN 21.5 6.2       Tobramycin levels     No lab results found.    Gentamicin levels    No lab results found.    Tacrolimus levels    Invalid input(s): \"TACROLIMUS\", \"TAC\", \"TACR\"      Latest Ref Rng & Units 5/13/2025     7:21 AM 5/13/2025     7:20 AM 5/12/2025     7:09 AM 5/11/2025     7:55 AM 5/10/2025     7:50 AM   Transplant Immunosuppression Labs   Creat 0.67 - 1.17 mg/dL 1.45   1.70  2.11  2.09    Urea Nitrogen 8.0 - 23.0 mg/dL 23.5   34.4  44.3  43.6    WBC 4.0 - 11.0 10e3/uL  5.0  5.8  6.4  6.9        Cyclosporine levels    Invalid input(s): \"CYCLOSPORINE\", \"CYC\"    Mycophenolate levels    Invalid input(s): \"MYPA\", \"MYP\"    Sirolimus levels    Invalid input(s): \"SIROLIMUS\", \"SIR\", \"RAPA\"    CSF testing     Recent Labs   Lab Test 03/05/25  1344 03/05/25  1343   CWBC  --  0   CRBC  --  0   CGLU 182*  --    .7*  --          Microbiology:  OR cx pending.   Last check of C difficile  No results found for: \"CDBPCT\"    Virology:  CMV viral loads    CMV DNA IU/mL   Date Value Ref Range Status   01/28/2025 Not Detected Not Detected IU/mL Final   01/25/2025 Not Detected Not Detected IU/mL Final     Viral loads    Recent Labs   Lab Test 01/28/25  0700 01/25/25  0712   CMVQNT Not Detected Not Detected       CMV viral loads    CMV DNA IU/mL   Date Value Ref Range Status   01/28/2025 Not Detected Not Detected IU/mL Final   01/25/2025 Not Detected Not Detected IU/mL Final       CMV resistance testing  No lab results found.  No results found for: \"CMVCID\", \"CMVFOS\", \"CMVGAN\", \"CMVDRUGRES\"     No results found for: \"H6RES\"    No results found for: \"EBVDN\", \"EBRES\", \"EBVSP\", \"EBVPC\", \"EBVPCR\"    No results found for: \"CMVIGG\", \"CMVM\", \"CMIG\", \"CMVG\", \"CMIGG\", \"CMIM\", \"CMVIGM\", \"CMLTX\", \"HSVG1\", \"HSVG2\", \"HSVTP1\", \"BT2934\", \"HS12M\", \"HS12GR\", \"HS1GR\", \"HS2GR\", \"HSIM\", \"HSIG\", " "\"HSIGR\", \"HSVIGMAB\", \"VARICZOSAB\", \"EBVIGG\", \"EBIGG\", \"EBVAGN\", \"FJ1529\"    No results found for: \"EBIG2\", \"EBIGM\", \"TOXG\"      Imaging:  CT R foot W 5/3/25  IMPRESSION:  1.  Soft tissue deficiency, ulceration, and gas along the plantar aspect of the great toe with underlying erosive change of the distal phalanx, concerning for osteomyelitis. Soft tissue gas extends proximally to the level of the proximal phalanx of the   great toe where there is soft tissue irregularity. Findings are favored to be related to gas from direct extension from the area of ulceration as opposed to a gas-forming organism, although this is difficult to completely rule-out on CT alone.  2.  Soft tissue stranding and irregularity distal aspect of the 2nd toe without discrete abscess or evidence for osteomyelitis at this time. Note is made that there is dorsal dislocation of the 2nd MTP joint.  3.  No well-defined rim-enhancing fluid collection on CT with attention to the great toe, although poorly-defined abscess is not excluded as there is poorly-defined circumferential soft tissue swelling.  4.  More global soft tissue swelling about the foot, which can be seen with cellulitis as well as noninfectious causes of edema.  5.  Advanced Charcot arthropathy as described.   MRI Rfoot WO 4/18/25  Impression:  1. Finding most consistent with great toe distal phalangeal  osteomyelitis.  2. Assuming no prior amputation surgery in this area, finding  consistent with second distal phalangeal and to the level of mid  middle phalangeal shaft osteomyelitis. Radiographical correlation  might be helpful to identify residual bone.  3. Apparent exposed third distal phalanx with marrow abnormality  suspicious for osteomyelitis down to the base of middle phalanx.  4. Query forth distal phalangeal tuft acroosteolysis vs. sequale of  osteomyelitis.  5. First proximal phalangeal osteonecrosis.   6. Charcot arthropathy with multiple erosions, possibly related " to  superimposed inflammatory arthritis.        Porter Rodriguez MD  [facility]  Contact information available via [facility] Paging/Directory     05/13/2025

## 2025-05-13 NOTE — PLAN OF CARE
/66 (BP Location: Left arm)   Pulse 68   Temp 97.5  F (36.4  C) (Oral)   Resp 18   Wt 77.4 kg (170 lb 10.2 oz)   SpO2 95%   BMI 23.14 kg/m      AVSS on RA. Denies pain and nausea. No void on shift per pt, refusing bladder scan.  No BM. Continue plan of care.     Problem: Adult Inpatient Plan of Care  Goal: Plan of Care Review  Description: The Plan of Care Review/Shift note should be completed every shift.  The Outcome Evaluation is a brief statement about your assessment that the patient is improving, declining, or no change.  This information will be displayed automatically on your shiftnote.  Outcome: Progressing   Goal Outcome Evaluation:

## 2025-05-13 NOTE — PLAN OF CARE
Goal Outcome Evaluation:      Plan of Care Reviewed With: patient    Overall Patient Progress: no changeOverall Patient Progress: no change       /66 (BP Location: Left arm)   Pulse 68   Temp 97.5  F (36.4  C) (Oral)   Resp 18   Wt 77.4 kg (170 lb 10.2 oz)   SpO2 95%   BMI 23.14 kg/m       Time: 5739-7205    Activity: SBA w/ walker, L leg prosthetic   Neuros: A&Ox4  Cardiac: WDL  Respiratory: WDL, denies SOB  GI/: voids spontaneously, hx of urinary retention, BM x1  Diet: Regular  Skin/Incisions:  RLE wrapped w/ CAM boot  Lines/Drains: 2 PIV SL  Pain/Nausea: denies pain, denies nausea  New Changes:  no changes, continue POC

## 2025-05-13 NOTE — PLAN OF CARE
Goal Outcome Evaluation:      Plan of Care Reviewed With: patient      /79 (BP Location: Left arm)   Pulse 65   Temp 98.2  F (36.8  C) (Oral)   Resp 18   Wt 77.4 kg (170 lb 10.2 oz)   SpO2 100%   BMI 23.14 kg/m          Neuros: Alert & oriented x 4, calls appropriately  Cardiac: WDL, denies cardiac chest pain  Respiratory: WDL, denies SOB  GI/: Voids spontaneously, hx of urinary retention, BM x1  Diet: Regular, tolerating, Blood glucose checks ACHS   Activity: SBA w/ walker, L leg prosthetic, ambulated with PT in [place]  Skin/Incisions:  RLE wrapped w/ CAM boot  Lines/Drains: 2 PIV SL  Pain/Nausea: denies pain or nausea  New Changes: No new change this shift  Plan: Continue with current POC

## 2025-05-13 NOTE — PROGRESS NOTES
[facility]  Transplant Nephrology Progress Note  Date of Admission:  5/3/2025  Today's Date: 05/13/2025    Recommendations:   - Continue to hold torsemide and empagliflozin.   - Plan to restart empagliflozin on Monday 05/19. If tolerating empagliflozin without issue for multiple days, can restart torsemide at 10 mg once daily.  If tolerating once daily, can increase to PTA twice daily dosing.   - Recommend follow-up with cardiology outpatient.   - Continue current immunosuppression.     Assessment & Plan   # DDKT and MOY: Trend down. Good UO. The Cr rise seems to track with starting empagliflozin and torsemide, will continue to hold and restart slowly in time. Patient overall euvolemic on exam so it's likely pre-renal / hemodynamic changes. Moderate hydronephrosis seen on ultrasound, PVRs 200-300 but patient refusing straight cath. Due to Cr at baseline, will continue to monitor and not escalate need to straight cath at this time.    - Baseline Creatinine: had been ~ 1.8-2 in Feb 2025, appears to be 1.4-1.6 since Mar 2025.    - Proteinuria: Not checked recently   - DSA Hx: Unknown due to being transplanted at another Transplant Center   - Last cPRA: unknown   - BK Viremia: Not checked recently due to time from transplant   - Kidney Tx Biopsy Hx: Unknown.     # Immunosuppression: Tacrolimus immediate release (goal 4-6) and Mycophenolate mofetil (dose 500 mg every 12 hours)   - Induction with Recent Transplant:  unknown, done at outside facility   - Continue with intensive monitoring of immunosuppression for efficacy and toxicity.   - Historical Changes in Immunosuppression: unknown   - Changes: No    # Infection Prevention:   Last CD4 Level: Unknown  - PJP: None      - CMV IgG Ab High Risk Discordance (D+/R-) at time of transplant: Unknown  Present CMV Serostatus: Unknown  - EBV IgG Ab High Risk Discordance (D+/R-) at time of transplant: Unknown  Present EBV Serostatus:  Unknown    # Blood Pressure volume: Controlled;  Goal BP: < 130/80   - Overall euvolemic on exam.    - On Torsemide 10 mg every day and metoprolol succinate 75 mg every day.   - Changes: Not at this time    # Diabetes: Borderline control (HbA1c 7-9%) Last HbA1c: 7.9% 3/2025    # Anemia in Chronic Renal Disease: Hgb: Trend down      SIOBHAN: No   - Iron studies: Low iron saturation on Iron supplement    # Mineral Bone Disorder:    - Secondary renal hyperparathyroidism; PTH level: Not checked recently        On treatment: Calcitriol  - Vitamin D; level: Not checked recently        On supplement: No  - Calcium; level: Normal        On supplement: No  - Phosphorus; level: Normal        On supplement: No    # Electrolytes:   - Potassium; level: Normal        On supplement:no, on veltassa  - Magnesium; level: Low normal        On supplement: No  - Bicarbonate; level: Low        On supplement: Yes     # Gangrene and osteomyelitis of right foot s/p Transmetatarsal Amputation 5/7    # Other Significant PMH:   - Atrial flutter: On Eliquis PTA              - HFrEF: presumed ischemic given past maricel pre-transplant with area of ischemia and possible history of stent. On Toprol, jardiance and torsemide. Echocardiogram 02/25/25 with new drop in LVEF to ~ 45%, he may need further work-up.               - CAD: Possible history of stent.               - PAD s/p left AKA  - History of CVA/TIA               - Sickle cell trait              - History of hepatitis C.               - Urinary Retention: - Tamsulosin 0.8mg PO daily    # Transplant History:  Etiology of Kidney Failure: Diabetes mellitus type 2, HTN   Tx: DDKT  Transplant: N/A  Significant transplant-related complications: None    Recommendations were communicated to the primary team via [email or web address].    RUFINO Billingsley CNP  Transplant Nephrology  Contact information via [email or web address] Web Console         Physician Attestation     I saw and evaluated Tad Zaman as part of a shared  APRN/PA visit.     I personally reviewed the vital signs, medications, labs, and imaging.    I personally provided a substantive portion of care for this patient and I approve the care plan as written by the REYNA.  I was involved with Medical Decision Making including: Please see A&P for additional details of medical decision making.  MANAGEMENT DISCUSSED with the following over the past 24 hours: Cr downtrending, good UOP, continue to hold torsemide/jardiance, no changes to immuonsuppression     Mitzy Villarreal MD  Date of Service (when I saw the patient): 05/13/25    Interval History  Mr. Zaman's creatinine is 1.45 (05/13 0721); Trend down.  Good urine output.  Other significant labs/tests/vitals: VSS  No events overnight.  No chest pain or shortness of breath.  No leg swelling.  No nausea and vomiting.  Bowel movements are formed.  No fever, sweats or chills.     Review of Systems   4 point ROS was obtained and negative except as noted in the Interval History.    MEDICATIONS:  Current Facility-Administered Medications   Medication Dose Route Frequency Provider Last Rate Last Admin    apixaban ANTICOAGULANT (ELIQUIS) tablet 5 mg  5 mg Oral BID Eugenio Trivedi PA-C   5 mg at 05/13/25 0805    aspirin (ASA) chewable tablet 81 mg  81 mg Oral Daily Vinh Crews MD   81 mg at 05/13/25 0805    atorvastatin (LIPITOR) tablet 40 mg  40 mg Oral At Bedtime Vinh Crews MD   40 mg at 05/12/25 2122    brinzolamide-brimonidine (SIMBRINZA) 1-0.2 % ophthalmic suspension 1 drop  1 drop Ophthalmic BID Vinh Crews MD   1 drop at 05/13/25 0804    calcitRIOL (ROCALTROL) capsule 0.25 mcg  0.25 mcg Oral Once per day on Monday Tuesday Wednesday Thursday Friday Vinh Crews MD   0.25 mcg at 05/12/25 0911    [Held by provider] empagliflozin (JARDIANCE) tablet 10 mg  10 mg Oral QAM Eugenio Trivedi PA-C   10 mg at 05/11/25 0903    ferrous sulfate (FEROSUL) tablet 325 mg  325 mg Oral Every Other Day Mars  MD Vinh   325 mg at 25 0904    insulin aspart (NovoLOG) injection (RAPID ACTING)  1-7 Units Subcutaneous TID AC Eugenio Trivedi PA-C   1 Units at 25 1714    insulin aspart (NovoLOG) injection (RAPID ACTING)  1-5 Units Subcutaneous At Bedtime Eugenio Trivedi PA-C   1 Units at 25 2240    insulin glargine (LANTUS PEN) injection 5 Units  5 Units Subcutaneous QAM AC Tressa Barreto PA-C   5 Units at 25 0806    latanoprost (XALATAN) 0.005 % ophthalmic solution 1 drop  1 drop Both Eyes At Bedtime Vinh Crews MD   1 drop at 25 212    metoprolol succinate ER (TOPROL XL) 24 hr tablet 75 mg  75 mg Oral Daily Vinh Crews MD   75 mg at 25 0806    mycophenolate (GENERIC EQUIVALENT) tablet 500 mg  500 mg Oral BID Vinh Crews MD   500 mg at 25 0806    pantoprazole (PROTONIX) EC tablet 40 mg  40 mg Oral Daily Vinh Crews MD   40 mg at 25 0805    patiromer (VELTASSA) packet 8.4 g  8.4 g Oral Weekly Vinh Crews MD   8.4 g at 05/10/25 1403    sodium bicarbonate tablet 1,300 mg  1,300 mg Oral TID Vinh Crews MD   1,300 mg at 25 0805    sodium chloride (PF) 0.9% PF flush 3 mL  3 mL Intracatheter Q8H Vinh Crews MD   3 mL at 25 1954    tacrolimus (GENERIC EQUIVALENT) capsule 2 mg  2 mg Oral BID IS Vinh Crews MD   2 mg at 25 0805    tamsulosin (FLOMAX) capsule 0.8 mg  0.8 mg Oral Daily Vinh Crews MD   0.8 mg at 25 0805    [Held by provider] torsemide (DEMADEX) tablet 10 mg  10 mg Oral BID Eugenio Trivedi PA-C   10 mg at 05/10/25 0946     Current Facility-Administered Medications   Medication Dose Route Frequency Provider Last Rate Last Admin       Physical Exam   Temp  Av.8  F (36.6  C)  Min: 97.6  F (36.4  C)  Max: 98.2  F (36.8  C)      Pulse  Av.3  Min: 66  Max: 75 Resp  Av.5  Min: 18  Max: 20  SpO2  Av.2 %  Min: 97 %  Max: 100 %     /88 (BP Location: Left arm)    Pulse 75   Temp 98.3  F (36.8  C) (Oral)   Resp 18   Wt 77.4 kg (170 lb 10.2 oz)   SpO2 96%   BMI 23.14 kg/m     Date 05/04/25 0700 - 05/05/25 0659   Shift 5001-4038 2352-3183 6081-3443 24 Hour Total   INTAKE   P.O. 337   337   Shift Total 337   337   OUTPUT   Shift Total       Weight (kg)          Admit       GENERAL APPEARANCE: alert and no distress  HENT: mouth without ulcers or lesions  RESP: lungs clear to auscultation - no rales, rhonchi or wheezes  CV: JVP elevated but not collapsible (had to interpret as he likely has central stenosis), regular rhythm, normal rate, no rub, no murmur  EDEMA: no LE edema bilaterally  ABDOMEN: soft, nondistended, nontender, bowel sounds normal  MS: Left BKA. Right TMA.  SKIN: Right foot with mild swelling, dry gangrenous toes to 1st -3rd digit.   TX KIDNEY: normal, surgical scar well approximated  Dialysis access: Right UE fistula    Data   All labs reviewed by me.  CMP  Recent Labs   Lab 05/13/25  0802 05/13/25  0721 05/13/25  0206 05/12/25  2244 05/12/25  1158 05/12/25  0709 05/11/25  0809 05/11/25  0755 05/10/25  0935 05/10/25  0750 05/09/25  1128 05/09/25  0550   NA  --  137  --   --   --  138  --  139  --  138  --  138   POTASSIUM  --  3.9  --   --   --  3.8  --  3.9  --  4.3  --  4.3   CHLORIDE  --  105  --   --   --  106  --  106  --  106  --  108*   CO2  --  21*  --   --   --  21*  --  21*  --  19*  --  19*   ANIONGAP  --  11  --   --   --  11  --  12  --  13  --  11   * 165* 189* 215*   < > 187*   < > 171*   < > 213*   < > 193*   BUN  --  23.5*  --   --   --  34.4*  --  44.3*  --  43.6*  --  36.6*   CR  --  1.45*  --   --   --  1.70*  --  2.11*  --  2.09*  --  1.67*   GFRESTIMATED  --  53*  --   --   --  44*  --  34*  --  34*  --  45*   QUE  --  9.3  --   --   --  9.1  --  9.7  --  9.6  --  9.5   BILITOTAL  --   --   --   --   --   --   --   --   --   --   --  0.3    < > = values in this interval not displayed.     CBC  Recent Labs   Lab 05/13/25  1817  05/12/25  0709 05/11/25  0755 05/10/25  0750   HGB 10.9* 10.3* 10.8* 10.6*   WBC 5.0 5.8 6.4 6.9   RBC 4.30* 4.08* 4.23* 4.12*   HCT 34.4* 32.8* 34.3* 33.2*   MCV 80 80 81 81   MCH 25.3* 25.2* 25.5* 25.7*   MCHC 31.7 31.4* 31.5 31.9   RDW 15.1* 15.0 15.1* 15.0    219 237 194     INR  No lab results found in last 7 days.    ABG  Recent Labs   Lab 05/10/25  1146 05/08/25  1041   O2PER 1 21      Urine Studies  Recent Labs   Lab Test 05/04/25  1538 03/24/25  1036 03/22/25  1837 03/03/25  1508   COLOR Light Yellow Light Yellow Light Yellow Light Yellow   APPEARANCE Clear Clear Clear Clear   URINEGLC >=1000* >=1000* 300* >=1000*   URINEBILI Negative Negative Negative Negative   URINEKETONE Negative Negative Negative Negative   SG 1.023 1.010 1.008 1.016   UBLD Negative Negative Negative Negative   URINEPH 7.0 7.0 7.0 6.5   PROTEIN Negative Negative Negative Negative   NITRITE Negative Negative Negative Negative   LEUKEST Negative Trace* Large* Trace*   RBCU <1 <1 <1 3*   WBCU 2 4 36* 8*     No lab results found.  PTH  No lab results found.  Iron Studies  Recent Labs   Lab Test 03/10/25  0744   IRON 23*   *   IRONSAT 17   HAMMAD 1,062*       IMAGING:  All imaging studies reviewed by me.

## 2025-05-13 NOTE — PROGRESS NOTES
Care Management Follow Up    Length of Stay (days): 10    Expected Discharge Date: 05/14/2025     Concerns to be Addressed: discharge planning     Patient plan of care discussed at interdisciplinary rounds: Yes    Anticipated Discharge Disposition: home with assist.      Anticipated Discharge Services: None  Anticipated Discharge DME: None (No new DME)    Patient/family educated on Medicare website which has current facility and service quality ratings: no  Education Provided on the Discharge Plan:  no  Patient/Family in Agreement with the Plan: yes    Referrals Placed by CM/SW:  none on this date  Private pay costs discussed: Not applicable    Discussed  Partnership in Safe Discharge Planning  document with patient/family: No     Handoff Completed: No, handoff not indicated or clinically appropriate    Additional Information:  SW followed up with all pending TCUs.    Considering:     [facility]  [address] [place] [address]  P: [phone]  F: [phone]  5/12: SW placed initial referral.     Pending:    [facility]   P: [phone]  F: [phone])  5/12: SW placed initial referral.   5/13: SW left a voicemail    [facility]  [address] [place] [address]  Admissions [place]: [phone]  Fax: [phone]   5/12: SW placed initial referral.   5/13: SW left a voicemail.     [facility]  [address] [place]  [address]  P: [phone]  F: [phone]   5/12:  Initial referral placed by SW.  5/13: SW left a voicemail.      HARLAN received a [employer] message from attending asking for an update on TCUs. HARLAN reported that not much has changed and that SW intends to call pt's daughter about the TCU that she works at. HARLAN then received an update from Cathy Granger that pt's discharge plan will be to go home with assist. PT Cathy Granger confirmed that pt continues to progress with therapies and home would be more  appropriate. RNCC was included in the [email or web address] message regarding the discharge update.     Next Steps: Pt will discharge home with assist.     INES Jett   REILLY  [facility]  Available via [email or web address]  [address]  Ph: [phone]

## 2025-05-13 NOTE — PROGRESS NOTES
[facility]    Medicine Progress Note - Hospitalist Service, GOLD TEAM 4    Date of Admission:  5/3/2025    Assessment & Plan   Tad Zaman is a 64 year old male with hypertension, chronic systolic heart failure, type 2 diabetes, peripheral artery disease status post left BKA, right femoral-tibial bypass (3/20/2025), right diabetic foot ulcer, osteomyelitis, history of MI, status post renal transplant, CKD stage III, who was admitted to [facility] 5/3/2025 for further evaluation and treatment of right severe diabetic foot ulcer.      Changes Today:  - Creatinine improving  - SW following for dispo     Gangrene and osteomyelitis of right foot s/p Transmetatarsal Amputation 5/7  PAD s/p Left BKA and Right F-T stent  Presented with history of progressive R diabetic foot ulcer with outpatient plans for R TMA, but having concern for worsening swelling and foul odor. He had recent Arterial doppler on 5/2 with good stent flow. Workup in ED reassuring with WBC WNL, Lactate 1.9, CRP and ESR WNL. LLE Duplex negative for DVT. CT with contrast concerning for soft tissue gas which is favored to be from direct extension from the area of ulceration as opposed to gas-formin organism. Vascular surgery did not recommend acute vascular procedure at this time, advised that transmetatarsal amputation is a viable option at this time, but patient does remain at high risk of needing further amputation in the future.  Podiatry performed transmetatarsal amputation on 5/7 and clean margins were obtained.   Antibiotics discontinued per ID recommendations.  On heparin for 1 day following amputation, PTA Eliquis 5 mg twice daily resumed 5/8.  - Podiatry following   - Strict non weight bearing of right lower extremity  - Follow bone aerobic and anaerobic cultures, fungal culture, and pathology  - Preliminary Tissue culture is now growing Alcaligenes faecalis (ID aware, no antibiotics needed)  - Anaerobic  cultures with enterococcus faecalis (ID aware, no antibiotics needed)  - Vascular surgery input appreciated  - Blood flow is tenuous to R foot. Per our conversation, it is reasonable to pursue TMA but note pt is high risk of needing additional amputation in the future   - Will need lifelong AC following surgery   - Follow up with Vascular in 3 months, sooner if TMA fails to heal as he may require further angioplasty    - Continue Eliquis 5 mg BID   - Continue PTA ASA for now   - Daily CBC, BMP     S/P Renal transplant  MOY 2/2 possible Acute Tubular Necrosis vs pre-renal MOY, improving  CKD3  Cr baseline around ~1.4-1.6. Cr on admit 2.15. Renal transplant US 5/3 with moderate hydronephrosis of the transplant kidney, that improved post void. Elevated resistive indices suggestive of renal transplant dysfunction.  Creatinine very labile during hospital stay and transplant nephrology involved with care. Jardiance and Torsemide placed back on hold after being restarted.  Repeat renal ultrasound 5/11 showed improvement in hydronephrosis. IVF bolus given to patient per transplant nephrology recs. Nephrology advised straight cathing for PVR of 300, but patient refused. Overall, kidney function improving despite this.   - Transplant nephrology following   - Follow creatine/cystatin C  - Continue to hold torsemide and empagliflozin   - Plan to restart empagliflozin in one week  - If tolerating empagliflozin without issue for multiple days, can restart torsemide at 10 mg once daily  - If tolerating once daily, can increase to PTA twice daily dosing.   - Continue PTA Tacro 2 mg BID  - Continue PTA Mycophenolate 500 mg BID for now  - Continue PTA sodium bicarb and Patiromer     Disorientation, ?improving   Hospital acquired delirium   Chronic Right Cerebellar infarct  Patient with acute onset disorientation the morning of 5/8 following TMA. No focal neuro deficits were noted at this time. Prior to resumption of anticoagulation,  a CT head was obtained which was negative for acute abnormality (did show chronic infarct). Work-up otherwise unremarkable. Considered cefepime as possible etiology and this was discontinued on 5/8 but no change in disorientation. Per discussion with patient's daughter he has had similar episodes of disorientation during prior hospital stays and has taken a few days to improve- suspect patient has sensitive cognitive function in context of prior cerebellar infarct.  - Continue to monitor   - Delirium precautions       DM2  Last A1C 7.9 on 3/3/25. Outpatient regimen includes Metformin and Jardiance, is not on home insulin. Metformin and Jardiance both on hold. Started on insulin.   - Hold PTA metformin  - Hold PTA Jardiance   - Continue Lantus to 5 units in the AM  - Continue medium intensity sliding scale  - Blood glucose checks QID and at bedtime  - Hypoglycemia protocol     Deconditioning  Patient with deconditioning in the setting of amputation, prolonged hospitalization. PT/OT recommended TCU.  - SW following for TCU placement  - PT/OT following    History of CVA/TIA  Chronic AC, ASA  He is on dual coverage with Apixaban and ASA for history of CVA/TIA and PAD per family. Apixaban held on admission given need for procedure.   - AC as above   - ASA plan above     Hypertension  Chronic systolic heart failure/HFmrEF  History of chronic systolic heart failure, MI but no clear history of CAD or cardiac revascularization. He had TTE on 2/25/25 with EF of 40-45% and mild pulmonary hypertension likely Group 2. No signs of acute HF exacerbation.  - Continue PTA Metoprolol   - Continue PTA Statin     BPH Continue PTA Tamsulosin.  Glaucoma Continue PTA gtts.            Diet: Regular Diet Adult  Snacks/Supplements Adult: Other; please allow patient/family to order supplements with meals; With Meals    DVT Prophylaxis: DOAC  Zaman Catheter: Not present  Lines: None     Cardiac Monitoring: None  Code Status: Full Code       Clinically Significant Risk Factors                             # DMII: A1C = 7.9 % (Ref range: <5.7 %) within past 6 months    # Moderate Malnutrition: based on nutrition assessment and treatment provided per dietitian's recommendations.      # Financial/Environmental Concerns: none         Social Drivers of Health    Tobacco Use: Medium Risk (4/22/2025)    Patient History     Smoking Tobacco Use: Former     Smokeless Tobacco Use: Never     Passive Exposure: Never          Disposition Plan     Medically Ready for Discharge: Anticipated Tomorrow           The patient's care was discussed with the Attending Physician, Dr. Ham, Care Coordinator/, Patient, and nephrology Consultant(s).    Tressa Barreto PA-C  Hospitalist Service, [address]  Securely message with [email or web address] (more info)  Text page via [facility] Paging/Directory   See signed in provider for up to date coverage information  ______________________________________________________________________    Interval History   Patient continues to feel well. States he is at [facility] and the year is 2519. No acute concerns. Discussed straight cathing and he reports he will not do it unless it is a doctor.     Physical Exam   Vital Signs: Temp: 98.3  F (36.8  C) Temp src: Oral BP: 128/88 Pulse: 75   Resp: 18 SpO2: 96 % O2 Device: None (Room air)    Weight: 170 lbs 10.18 oz    General Appearance: Comfortable, nontoxic appearing male seen laying in bed.  Eyes: PERRLA.  No conjunctival icterus.  HEENT: Atraumatic.  Respiratory: Breathing comfortably on room air.  Lymph/Hematologic: No bruising on exposed skin.  Skin: No lesions or rashes noted on exposed skin.  Musculoskeletal: Moving all extremities spontaneously.  Neurologic: Cranial nerves II through XII grossly intact. A&O to person, situation. In the hospital, but states the year is 2519.  Psychiatric: Mood  appropriate.      Medical Decision Making       45 MINUTES SPENT BY ME on the date of service doing chart review, history, exam, documentation & further activities per the note.      Data   Imaging results reviewed over the past 24 hrs:   No results found for this or any previous visit (from the past 24 hours).  Recent Labs   Lab 05/13/25  1118 05/13/25  0802 05/13/25  0721 05/13/25  0720 05/12/25  1158 05/12/25  0709 05/11/25  0809 05/11/25  0755 05/09/25  1128 05/09/25  0550   WBC  --   --   --  5.0  --  5.8  --  6.4   < > 7.2   HGB  --   --   --  10.9*  --  10.3*  --  10.8*   < > 10.4*   MCV  --   --   --  80  --  80  --  81   < > 82   PLT  --   --   --  229  --  219  --  237   < > 174   NA  --   --  137  --   --  138  --  139   < > 138   POTASSIUM  --   --  3.9  --   --  3.8  --  3.9   < > 4.3   CHLORIDE  --   --  105  --   --  106  --  106   < > 108*   CO2  --   --  21*  --   --  21*  --  21*   < > 19*   BUN  --   --  23.5*  --   --  34.4*  --  44.3*   < > 36.6*   CR  --   --  1.45*  --   --  1.70*  --  2.11*   < > 1.67*   ANIONGAP  --   --  11  --   --  11  --  12   < > 11   QUE  --   --  9.3  --   --  9.1  --  9.7   < > 9.5   * 151* 165*  --    < > 187*   < > 171*   < > 193*   BILITOTAL  --   --   --   --   --   --   --   --   --  0.3    < > = values in this interval not displayed.

## 2025-05-14 ENCOUNTER — APPOINTMENT (OUTPATIENT)
Dept: PHYSICAL THERAPY | Facility: CLINIC | Age: 65
End: 2025-05-14
Payer: MEDICARE

## 2025-05-14 LAB
ANION GAP SERPL CALCULATED.3IONS-SCNC: 11 MMOL/L (ref 7–15)
BACTERIA TISS BX CULT: ABNORMAL
BACTERIA TISS BX CULT: ABNORMAL
BUN SERPL-MCNC: 17.7 MG/DL (ref 8–23)
CALCIUM SERPL-MCNC: 9.6 MG/DL (ref 8.8–10.4)
CHLORIDE SERPL-SCNC: 103 MMOL/L (ref 98–107)
CREAT SERPL-MCNC: 1.49 MG/DL (ref 0.67–1.17)
EGFRCR SERPLBLD CKD-EPI 2021: 51 ML/MIN/1.73M2
ERYTHROCYTE [DISTWIDTH] IN BLOOD BY AUTOMATED COUNT: 14.7 % (ref 10–15)
GLUCOSE BLDC GLUCOMTR-MCNC: 141 MG/DL (ref 70–99)
GLUCOSE BLDC GLUCOMTR-MCNC: 160 MG/DL (ref 70–99)
GLUCOSE BLDC GLUCOMTR-MCNC: 253 MG/DL (ref 70–99)
GLUCOSE SERPL-MCNC: 186 MG/DL (ref 70–99)
HCO3 SERPL-SCNC: 23 MMOL/L (ref 22–29)
HCT VFR BLD AUTO: 37.5 % (ref 40–53)
HGB BLD-MCNC: 11.2 G/DL (ref 13.3–17.7)
MCH RBC QN AUTO: 25 PG (ref 26.5–33)
MCHC RBC AUTO-ENTMCNC: 29.9 G/DL (ref 31.5–36.5)
MCV RBC AUTO: 84 FL (ref 78–100)
PLATELET # BLD AUTO: 250 10E3/UL (ref 150–450)
POTASSIUM SERPL-SCNC: 4 MMOL/L (ref 3.4–5.3)
RBC # BLD AUTO: 4.48 10E6/UL (ref 4.4–5.9)
SODIUM SERPL-SCNC: 137 MMOL/L (ref 135–145)
WBC # BLD AUTO: 5.5 10E3/UL (ref 4–11)

## 2025-05-14 PROCEDURE — 99233 SBSQ HOSP IP/OBS HIGH 50: CPT | Mod: 24 | Performed by: INTERNAL MEDICINE

## 2025-05-14 PROCEDURE — 250N000013 HC RX MED GY IP 250 OP 250 PS 637: Performed by: STUDENT IN AN ORGANIZED HEALTH CARE EDUCATION/TRAINING PROGRAM

## 2025-05-14 PROCEDURE — 120N000002 HC R&B MED SURG/OB UMMC

## 2025-05-14 PROCEDURE — 99207 PR APP CREDIT; MD BILLING SHARED VISIT: CPT | Mod: FS

## 2025-05-14 PROCEDURE — 82374 ASSAY BLOOD CARBON DIOXIDE: CPT

## 2025-05-14 PROCEDURE — 97116 GAIT TRAINING THERAPY: CPT | Mod: GP

## 2025-05-14 PROCEDURE — 250N000012 HC RX MED GY IP 250 OP 636 PS 637: Performed by: HOSPITALIST

## 2025-05-14 PROCEDURE — 99232 SBSQ HOSP IP/OBS MODERATE 35: CPT | Mod: FS | Performed by: STUDENT IN AN ORGANIZED HEALTH CARE EDUCATION/TRAINING PROGRAM

## 2025-05-14 PROCEDURE — 97530 THERAPEUTIC ACTIVITIES: CPT | Mod: GP

## 2025-05-14 PROCEDURE — 85027 COMPLETE CBC AUTOMATED: CPT

## 2025-05-14 PROCEDURE — 250N000013 HC RX MED GY IP 250 OP 250 PS 637: Performed by: HOSPITALIST

## 2025-05-14 PROCEDURE — 36415 COLL VENOUS BLD VENIPUNCTURE: CPT

## 2025-05-14 PROCEDURE — 82310 ASSAY OF CALCIUM: CPT

## 2025-05-14 RX ORDER — METFORMIN HYDROCHLORIDE 500 MG/1
500 TABLET, EXTENDED RELEASE ORAL
Status: DISCONTINUED | OUTPATIENT
Start: 2025-05-14 | End: 2025-05-18 | Stop reason: HOSPADM

## 2025-05-14 RX ADMIN — ASPIRIN 81 MG CHEWABLE TABLET 81 MG: 81 TABLET CHEWABLE at 09:07

## 2025-05-14 RX ADMIN — SODIUM BICARBONATE 1300 MG: 650 TABLET ORAL at 09:08

## 2025-05-14 RX ADMIN — TACROLIMUS 2 MG: 1 CAPSULE ORAL at 09:08

## 2025-05-14 RX ADMIN — PANTOPRAZOLE SODIUM 40 MG: 40 TABLET, DELAYED RELEASE ORAL at 09:08

## 2025-05-14 RX ADMIN — METOPROLOL SUCCINATE 75 MG: 25 TABLET, EXTENDED RELEASE ORAL at 09:08

## 2025-05-14 RX ADMIN — APIXABAN 5 MG: 5 TABLET, FILM COATED ORAL at 09:08

## 2025-05-14 RX ADMIN — FERROUS SULFATE TAB 325 MG (65 MG ELEMENTAL FE) 325 MG: 325 (65 FE) TAB at 09:07

## 2025-05-14 RX ADMIN — BRINZOLAMIDE/BRIMONIDINE TARTRATE 1 DROP: 10; 2 SUSPENSION/ DROPS OPHTHALMIC at 09:07

## 2025-05-14 RX ADMIN — CALCITRIOL CAPSULES 0.25 MCG 0.25 MCG: 0.25 CAPSULE ORAL at 09:07

## 2025-05-14 RX ADMIN — MYCOPHENOLATE MOFETIL 500 MG: 500 TABLET, FILM COATED ORAL at 09:07

## 2025-05-14 RX ADMIN — TAMSULOSIN HYDROCHLORIDE 0.8 MG: 0.4 CAPSULE ORAL at 09:07

## 2025-05-14 RX ADMIN — INSULIN ASPART 3 UNITS: 100 INJECTION, SOLUTION INTRAVENOUS; SUBCUTANEOUS at 09:16

## 2025-05-14 ASSESSMENT — ACTIVITIES OF DAILY LIVING (ADL)
ADLS_ACUITY_SCORE: 41

## 2025-05-14 NOTE — PLAN OF CARE
Goal Outcome Evaluation:      Plan of Care Reviewed With: patient    Overall Patient Progress: no changeOverall Patient Progress: no change     BP (!) 141/81 (BP Location: Left arm)   Pulse 78   Temp 98.1  F (36.7  C) (Oral)   Resp 18   Wt 77.4 kg (170 lb 10.2 oz)   SpO2 100%   BMI 23.14 kg/m       Time: 8195-3432     Activity: SBA w/ walker, L leg prosthetic, daughter to bring crutches tomorrow   Neuros: A&Ox4  Cardiac: WDL  Respiratory: WDL, denies SOB  GI/: voids spontaneously, hx of urinary retention, BM x1  Diet: Regular, food from family in patient fridge, ate salad around 5am, no insulin given per provider  Skin/Incisions:  RLE wrapped w/ CAM boot  Lines/Drains: 2 PIV SL  Pain/Nausea: denies pain, denies nausea  New Changes:  no changes, continue POC

## 2025-05-14 NOTE — PLAN OF CARE
Goal Outcome Evaluation:      Plan of Care Reviewed With: patient    Overall Patient Progress: no change    Outcome Evaluation: continues to progress        Neuro:A/Ox4, forgetful  Respiratory:WDL on RA  Cardiac:WDL. Denies chest pain  Diet: reg  GI/:voiding spontaneously, passing gas  Incision/Drains:L BKA, R LE amputation w dressing-changed x1 by MD per pt  IV Access:L PIV x2 SL  Pain:denies  Activity:ax1     New Changes this shift:patient refusing cares, meds, and glucose checks-patient encouraged to participate  Plan: continue POC

## 2025-05-14 NOTE — CONSULTS
I spoke with him at length on how her plans to function at home. He appears to have a good appreciation of his disabilities and what sort of assistance he needs to help him accommodate at home to safely function. He also mentions that if things don't work out he knows to present to the ED, and then will go somewhere where is able to function.   In my opinion he does have dispositional capacity, and I agree with PT about his ability to return home.  He is so angry about staying that he is saying he is not going to cooperate with cares, including meds, insulin. I told him that this was a bad idea, asked him to cooperate at least until tomorrow, but I was unable to change his mind.       Contact me as needed.  Corbin Rahman M.D.   Consult Liaison Psychiatry   [facility]   Contact on Vocera  If I am not available, then [facility] line ([phone]) should know who   Is covering our consult service.       No charge encounter

## 2025-05-14 NOTE — PROGRESS NOTES
[facility]  Transplant Nephrology Progress Note  Date of Admission:  5/3/2025  Today's Date: 05/14/2025    Recommendations:   - Continue to hold torsemide and empagliflozin.   - Plan to restart empagliflozin on Monday 05/19. If tolerating empagliflozin without issue for multiple days, can restart torsemide at 10 mg once daily.  If tolerating once daily, can increase to PTA twice daily dosing.   - Recommend follow-up with cardiology outpatient.   - Continue current immunosuppression.     Assessment & Plan   # DDKT and MOY: Trend down. Good UO. The Cr rise seems to track with starting empagliflozin and torsemide, will continue to hold and restart slowly in time. Patient overall euvolemic on exam so it's likely pre-renal / hemodynamic changes. Moderate hydronephrosis seen on ultrasound, PVRs 200-300 but patient refusing straight cath. Due to Cr at baseline, will continue to monitor and not escalate need to straight cath at this time.    - Baseline Creatinine: had been ~ 1.8-2 in Feb 2025, appears to be 1.4-1.6 since Mar 2025.    - Proteinuria: Not checked recently   - DSA Hx: Unknown due to being transplanted at another Transplant Center   - Last cPRA: unknown   - BK Viremia: Not checked recently due to time from transplant   - Kidney Tx Biopsy Hx: Unknown.     # Immunosuppression: Tacrolimus immediate release (goal 4-6) and Mycophenolate mofetil (dose 500 mg every 12 hours)   - Induction with Recent Transplant:  unknown, done at outside facility   - Continue with intensive monitoring of immunosuppression for efficacy and toxicity.   - Historical Changes in Immunosuppression: unknown   - Changes: No    # Infection Prevention:   Last CD4 Level: Unknown  - PJP: None      - CMV IgG Ab High Risk Discordance (D+/R-) at time of transplant: Unknown  Present CMV Serostatus: Unknown  - EBV IgG Ab High Risk Discordance (D+/R-) at time of transplant: Unknown  Present EBV Serostatus:  Unknown    # Blood Pressure volume: Controlled;  Goal BP: < 130/80   - Overall euvolemic on exam.    - On Torsemide 10 mg every day and metoprolol succinate 75 mg every day.   - Changes: Not at this time    # Diabetes: Borderline control (HbA1c 7-9%) Last HbA1c: 7.9% 3/2025    # Anemia in Chronic Renal Disease: Hgb: Trend down      SIOBHAN: No   - Iron studies: Low iron saturation on Iron supplement    # Mineral Bone Disorder:    - Secondary renal hyperparathyroidism; PTH level: Not checked recently        On treatment: Calcitriol  - Vitamin D; level: Not checked recently        On supplement: No  - Calcium; level: Normal        On supplement: No  - Phosphorus; level: Normal        On supplement: No    # Electrolytes:   - Potassium; level: Normal        On supplement:no, on veltassa  - Magnesium; level: Low normal        On supplement: No  - Bicarbonate; level: Low        On supplement: Yes     # Gangrene and osteomyelitis of right foot s/p Transmetatarsal Amputation 5/7    # Other Significant PMH:   - Atrial flutter: On Eliquis PTA              - HFrEF: presumed ischemic given past maricel pre-transplant with area of ischemia and possible history of stent. On Toprol, jardiance and torsemide. Echocardiogram 02/25/25 with new drop in LVEF to ~ 45%, he may need further work-up.               - CAD: Possible history of stent.               - PAD s/p left AKA  - History of CVA/TIA               - Sickle cell trait              - History of hepatitis C.               - Urinary Retention: - Tamsulosin 0.8mg PO daily    # Transplant History:  Etiology of Kidney Failure: Diabetes mellitus type 2, HTN   Tx: DDKT  Transplant: N/A  Significant transplant-related complications: None    Recommendations were communicated to the primary team via LocalSort.    RUFINO Billingsley CNP  Transplant Nephrology  Contact information via LocalSort Web Console         Physician Attestation     I saw and evaluated Tad Zaman as part of a shared  APRN/PA visit.     I personally reviewed the vital signs, medications, labs, and imaging.    I personally provided a substantive portion of care for this patient and I approve the care plan as written by the REYNA.  I was involved with Medical Decision Making including: Please see A&P for additional details of medical decision making.  MANAGEMENT DISCUSSED with the following over the past 24 hours: stable renal function, good UOP, would resume SGLT2i/diuretics as OP with close cards f/up     Mitzy Villarreal MD  Date of Service (when I saw the patient): 05/14/25      Interval History  Mr. Callahan creatinine is 1.49 (05/14 0622); Stable.  Good urine output.  Other significant labs/tests/vitals: VSS  No events overnight.  No chest pain or shortness of breath.  No leg swelling.  no nausea and vomiting.    no fever, sweats or chills.     Review of Systems   4 point ROS was obtained and negative except as noted in the Interval History.    MEDICATIONS:  Current Facility-Administered Medications   Medication Dose Route Frequency Provider Last Rate Last Admin    apixaban ANTICOAGULANT (ELIQUIS) tablet 5 mg  5 mg Oral BID Eugenio Trivedi PA-C   5 mg at 05/13/25 1934    aspirin (ASA) chewable tablet 81 mg  81 mg Oral Daily Vinh Crews MD   81 mg at 05/13/25 0805    atorvastatin (LIPITOR) tablet 40 mg  40 mg Oral At Bedtime Vinh Crews MD   40 mg at 05/13/25 2232    brinzolamide-brimonidine (SIMBRINZA) 1-0.2 % ophthalmic suspension 1 drop  1 drop Ophthalmic BID Vinh Crews MD   1 drop at 05/13/25 1934    calcitRIOL (ROCALTROL) capsule 0.25 mcg  0.25 mcg Oral Once per day on Monday Tuesday Wednesday Thursday Friday Vinh Crews MD   0.25 mcg at 05/13/25 1019    [Held by provider] empagliflozin (JARDIANCE) tablet 10 mg  10 mg Oral QAM Eugenio Trivedi PA-C   10 mg at 05/11/25 0903    ferrous sulfate (FEROSUL) tablet 325 mg  325 mg Oral Every Other Day Vinh Crews MD   325 mg at 05/12/25 0904     insulin aspart (NovoLOG) injection (RAPID ACTING)  1-7 Units Subcutaneous TID AC Eugenio Trivedi PA-C   2 Units at 25 1859    insulin aspart (NovoLOG) injection (RAPID ACTING)  1-5 Units Subcutaneous At Bedtime Eugenio Trivedi PA-C   1 Units at 254    insulin glargine (LANTUS PEN) injection 5 Units  5 Units Subcutaneous QAM AC Tressa Barreto PA-C   5 Units at 25 0806    latanoprost (XALATAN) 0.005 % ophthalmic solution 1 drop  1 drop Both Eyes At Bedtime Vinh Crews MD   1 drop at 25 223    metoprolol succinate ER (TOPROL XL) 24 hr tablet 75 mg  75 mg Oral Daily Vinh Crews MD   75 mg at 25 0806    mycophenolate (GENERIC EQUIVALENT) tablet 500 mg  500 mg Oral BID Vinh Crews MD   500 mg at 25 193    pantoprazole (PROTONIX) EC tablet 40 mg  40 mg Oral Daily Vinh Crews MD   40 mg at 25 0805    patiromer (VELTASSA) packet 8.4 g  8.4 g Oral Weekly Vinh Crews MD   8.4 g at 05/10/25 1403    sodium bicarbonate tablet 1,300 mg  1,300 mg Oral TID Vinh Crews MD   1,300 mg at 25 193    sodium chloride (PF) 0.9% PF flush 3 mL  3 mL Intracatheter Q8H Vinh Crews MD   3 mL at 25 1938    tacrolimus (GENERIC EQUIVALENT) capsule 2 mg  2 mg Oral BID IS Vinh Crews MD   2 mg at 25 185    tamsulosin (FLOMAX) capsule 0.8 mg  0.8 mg Oral Daily Vinh Crews MD   0.8 mg at 25 0805    [Held by provider] torsemide (DEMADEX) tablet 10 mg  10 mg Oral BID Eugenio Trivedi PA-C   10 mg at 05/10/25 0946     Current Facility-Administered Medications   Medication Dose Route Frequency Provider Last Rate Last Admin       Physical Exam   Temp  Av.8  F (36.6  C)  Min: 97.6  F (36.4  C)  Max: 98.2  F (36.8  C)      Pulse  Av.3  Min: 66  Max: 75 Resp  Av.5  Min: 18  Max: 20  SpO2  Av.2 %  Min: 97 %  Max: 100 %     BP (!) 141/81 (BP Location: Left arm)   Pulse 78   Temp 98.1  F (36.7  C)  (Oral)   Resp 18   Wt 77.4 kg (170 lb 10.2 oz)   SpO2 100%   BMI 23.14 kg/m     Date 05/04/25 0700 - 05/05/25 0659   Shift 1356-9416 8116-9160 4596-8815 24 Hour Total   INTAKE   P.O. 337   337   Shift Total 337   337   OUTPUT   Shift Total       Weight (kg)          Admit       GENERAL APPEARANCE: alert and no distress  HENT: mouth without ulcers or lesions  RESP: lungs clear to auscultation - no rales, rhonchi or wheezes  CV: JVP elevated but not collapsible (had to interpret as he likely has central stenosis), regular rhythm, normal rate, no rub, no murmur  EDEMA: no LE edema bilaterally  ABDOMEN: soft, nondistended, nontender, bowel sounds normal  MS: Left BKA. Right TMA.  SKIN: Right foot with mild swelling, dry gangrenous toes to 1st -3rd digit.   TX KIDNEY: normal, surgical scar well approximated  Dialysis access: Right UE fistula    Data   All labs reviewed by me.  CMP  Recent Labs   Lab 05/14/25  0622 05/14/25  0433 05/14/25  0205 05/13/25  2228 05/13/25  0802 05/13/25  0721 05/12/25  1158 05/12/25  0709 05/11/25  0809 05/11/25  0755 05/09/25  1128 05/09/25  0550     --   --   --   --  137  --  138  --  139   < > 138   POTASSIUM 4.0  --   --   --   --  3.9  --  3.8  --  3.9   < > 4.3   CHLORIDE 103  --   --   --   --  105  --  106  --  106   < > 108*   CO2 23  --   --   --   --  21*  --  21*  --  21*   < > 19*   ANIONGAP 11  --   --   --   --  11  --  11  --  12   < > 11   * 160* 141* 201*   < > 165*   < > 187*   < > 171*   < > 193*   BUN 17.7  --   --   --   --  23.5*  --  34.4*  --  44.3*   < > 36.6*   CR 1.49*  --   --   --   --  1.45*  --  1.70*  --  2.11*   < > 1.67*   GFRESTIMATED 51*  --   --   --   --  53*  --  44*  --  34*   < > 45*   QUE 9.6  --   --   --   --  9.3  --  9.1  --  9.7   < > 9.5   BILITOTAL  --   --   --   --   --   --   --   --   --   --   --  0.3    < > = values in this interval not displayed.     CBC  Recent Labs   Lab 05/14/25  0622 05/13/25  0720 05/12/25  0709  05/11/25  0755   HGB 11.2* 10.9* 10.3* 10.8*   WBC 5.5 5.0 5.8 6.4   RBC 4.48 4.30* 4.08* 4.23*   HCT 37.5* 34.4* 32.8* 34.3*   MCV 84 80 80 81   MCH 25.0* 25.3* 25.2* 25.5*   MCHC 29.9* 31.7 31.4* 31.5   RDW 14.7 15.1* 15.0 15.1*    229 219 237     INR  No lab results found in last 7 days.    ABG  Recent Labs   Lab 05/10/25  1146 05/08/25  1041   O2PER 1 21      Urine Studies  Recent Labs   Lab Test 05/04/25  1538 03/24/25  1036 03/22/25  1837 03/03/25  1508   COLOR Light Yellow Light Yellow Light Yellow Light Yellow   APPEARANCE Clear Clear Clear Clear   URINEGLC >=1000* >=1000* 300* >=1000*   URINEBILI Negative Negative Negative Negative   URINEKETONE Negative Negative Negative Negative   SG 1.023 1.010 1.008 1.016   UBLD Negative Negative Negative Negative   URINEPH 7.0 7.0 7.0 6.5   PROTEIN Negative Negative Negative Negative   NITRITE Negative Negative Negative Negative   LEUKEST Negative Trace* Large* Trace*   RBCU <1 <1 <1 3*   WBCU 2 4 36* 8*     No lab results found.  PTH  No lab results found.  Iron Studies  Recent Labs   Lab Test 03/10/25  0744   IRON 23*   *   IRONSAT 17   HAMMAD 1,062*       IMAGING:  All imaging studies reviewed by me.

## 2025-05-14 NOTE — PROGRESS NOTES
[facility]    Medicine Progress Note - Hospitalist Service, GOLD TEAM 4    Date of Admission:  5/3/2025    Assessment & Plan   Tad Zaman is a 64 year old male with hypertension, chronic systolic heart failure, type 2 diabetes, peripheral artery disease status post left BKA, right femoral-tibial bypass (3/20/2025), right diabetic foot ulcer, osteomyelitis, history of MI, status post renal transplant, CKD stage III, who was admitted to [facility] 5/3/2025 for further evaluation and treatment of right severe diabetic foot ulcer.      Changes Today:  - Restart metformin this evening  - PT/OT now recommending home with assist     Gangrene and osteomyelitis of right foot s/p Transmetatarsal Amputation 5/7  PAD s/p Left BKA and Right F-T stent  Presented with history of progressive R diabetic foot ulcer with outpatient plans for R TMA, but having concern for worsening swelling and foul odor. He had recent Arterial doppler on 5/2 with good stent flow. Workup in ED reassuring with WBC WNL, Lactate 1.9, CRP and ESR WNL. LLE Duplex negative for DVT. CT with contrast concerning for soft tissue gas which is favored to be from direct extension from the area of ulceration as opposed to gas-formin organism. Vascular surgery did not recommend acute vascular procedure at this time, advised that transmetatarsal amputation is a viable option at this time, but patient does remain at high risk of needing further amputation in the future.  Podiatry performed transmetatarsal amputation on 5/7 and clean margins were obtained.   Antibiotics discontinued per ID recommendations.  On heparin for 1 day following amputation, PTA Eliquis 5 mg twice daily resumed 5/8.  - Podiatry following   - Strict non weight bearing of right lower extremity  - Follow bone aerobic and anaerobic cultures, fungal culture, and pathology  - Preliminary Tissue culture is now growing Alcaligenes faecalis (ID aware, no  antibiotics needed)  - Anaerobic cultures with enterococcus faecalis (ID aware, no antibiotics needed)  - Vascular surgery input appreciated  - Blood flow is tenuous to R foot. Per our conversation, it is reasonable to pursue TMA but note pt is high risk of needing additional amputation in the future   - Will need lifelong AC following surgery   - Follow up with Vascular in 3 months, sooner if TMA fails to heal as he may require further angioplasty    - Continue Eliquis 5 mg BID   - Continue PTA ASA for now   - Daily CBC, BMP     S/P Renal transplant  MOY 2/2 possible Acute Tubular Necrosis vs pre-renal MOY, improving  CKD3  Cr baseline around ~1.4-1.6. Cr on admit 2.15. Renal transplant US 5/3 with moderate hydronephrosis of the transplant kidney, that improved post void. Elevated resistive indices suggestive of renal transplant dysfunction.  Creatinine very labile during hospital stay and transplant nephrology involved with care. Jardiance and Torsemide placed back on hold after being restarted.  Repeat renal ultrasound 5/11 showed improvement in hydronephrosis. IVF bolus given to patient per transplant nephrology recs. Nephrology advised straight cathing for PVR of 300, but patient refused. Overall, kidney function improving despite this.   - Transplant nephrology following   - Follow creatine/cystatin C  - Continue to hold torsemide and empagliflozin   - Plan to restart empagliflozin 5/19  - If tolerating empagliflozin without issue for multiple days, can restart torsemide at 10 mg once daily  - If tolerating once daily, can increase to PTA twice daily dosing.   - Continue PTA Tacro 2 mg BID  - Continue PTA Mycophenolate 500 mg BID for now  - Continue PTA sodium bicarb and Patiromer     Disorientation, ?improving   Hospital acquired delirium   Chronic Right Cerebellar infarct  Underlying cognitive impairment  Patient with acute onset disorientation the morning of 5/8 following TMA. No focal neuro deficits were  noted at this time. Prior to resumption of anticoagulation, a CT head was obtained which was negative for acute abnormality (did show chronic infarct). Work-up otherwise unremarkable. Considered cefepime as possible etiology and this was discontinued on 5/8 but no change in disorientation. Per discussion with patient's daughter he has had similar episodes of disorientation during prior hospital stays and has taken a few days to improve- suspect patient has sensitive cognitive function in context of prior cerebellar infarct.  - Continue to monitor   - Delirium precautions       DM2  Last A1C 7.9 on 3/3/25. Outpatient regimen includes Metformin and Jardiance, is not on home insulin. Metformin and Jardiance both on hold. Started on insulin, but blood sugars remain above goal. Due to need for tight blood sugar control, will restart evening metformin. Of note, patient was on Insulin in the past, but has not been in several years. Family will likely need education on discharge if he is going home instead of TCU.  - Restart PTA metformin  - Hold PTA Jardiance   - Continue Lantus to 5 units in the AM  - Continue medium intensity sliding scale  - Blood glucose checks QID and at bedtime  - Hypoglycemia protocol   - Diabetes educator consult pending placement    Deconditioning  Patient with deconditioning in the setting of amputation, prolonged hospitalization. PT/OT initially recommended TCU, but family does not feel comfortable taking patient home. Ongoing discussions at this time.   - SW following for TCU placement  - PT/OT following    History of CVA/TIA  Chronic AC, ASA  He is on dual coverage with Apixaban and ASA for history of CVA/TIA and PAD per family. Apixaban held on admission given need for procedure.   - AC as above   - ASA plan above     Hypertension  Chronic systolic heart failure/HFmrEF  History of chronic systolic heart failure, MI but no clear history of CAD or cardiac revascularization. He had TTE on  2/25/25 with EF of 40-45% and mild pulmonary hypertension likely Group 2. No signs of acute HF exacerbation.  - Continue PTA Metoprolol   - Continue PTA Statin     BPH Continue PTA Tamsulosin.  Glaucoma Continue PTA gtts.            Diet: Regular Diet Adult  Snacks/Supplements Adult: Other; please allow patient/family to order supplements with meals; With Meals    DVT Prophylaxis: DOAC  Zaman Catheter: Not present  Lines: None     Cardiac Monitoring: None  Code Status: Full Code      Clinically Significant Risk Factors                             # DMII: A1C = 7.9 % (Ref range: <5.7 %) within past 6 months    # Moderate Malnutrition: based on nutrition assessment and treatment provided per dietitian's recommendations.      # Financial/Environmental Concerns: none         Social Drivers of Health    Tobacco Use: Medium Risk (4/22/2025)    Patient History     Smoking Tobacco Use: Former     Smokeless Tobacco Use: Never     Passive Exposure: Never          Disposition Plan     Medically Ready for Discharge: Anticipated Tomorrow           The patient's care was discussed with the Attending Physician, Dr. Ham, Care Coordinator/, patient's daughter, Patient, PT/OT and nephrology Consultant(s).    Tressa Barreto PA-C  Hospitalist Service, GOLD TEAM [address] [place] [address]  Securely message with [employer] (more info)  Text page via [facility] Paging/Directory   See signed in provider for up to date coverage information  ______________________________________________________________________    Interval History   Discussed possible discharge home today prior to elevated blood sugar reading. Told patient that he needs to stay for better blood sugar control and is now declining all cares until he goes home. Unable to redirect. If patient continues to decline cares, will need to have further discussion with daughter and family.     Physical Exam   Vital Signs: Temp: 98.1  F (36.7  C)  Temp src: Oral BP: 132/82 Pulse: 72   Resp: 18 SpO2: 100 % O2 Device: None (Room air)    Weight: 170 lbs 10.18 oz    General Appearance: Comfortable, nontoxic appearing male seen laying in bed.  Eyes: PERRLA.  No conjunctival icterus.  HEENT: Atraumatic.  Respiratory: Breathing comfortably on room air.  Lymph/Hematologic: No bruising on exposed skin.  Skin: No lesions or rashes noted on exposed skin.  Musculoskeletal: Moving all extremities spontaneously.  Neurologic: Cranial nerves II through XII grossly intact. A&O to person, situation. In the hospital, but states the year is 2519.  Psychiatric: Mood appropriate.      Medical Decision Making       50 MINUTES SPENT BY ME on the date of service doing chart review, history, exam, documentation & further activities per the note.      Data   Imaging results reviewed over the past 24 hrs:   No results found for this or any previous visit (from the past 24 hours).  Recent Labs   Lab 05/14/25  0915 05/14/25  0622 05/14/25  0433 05/13/25  0802 05/13/25  0721 05/13/25  0720 05/12/25  1158 05/12/25  0709 05/09/25  1128 05/09/25  0550   WBC  --  5.5  --   --   --  5.0  --  5.8   < > 7.2   HGB  --  11.2*  --   --   --  10.9*  --  10.3*   < > 10.4*   MCV  --  84  --   --   --  80  --  80   < > 82   PLT  --  250  --   --   --  229  --  219   < > 174   NA  --  137  --   --  137  --   --  138   < > 138   POTASSIUM  --  4.0  --   --  3.9  --   --  3.8   < > 4.3   CHLORIDE  --  103  --   --  105  --   --  106   < > 108*   CO2  --  23  --   --  21*  --   --  21*   < > 19*   BUN  --  17.7  --   --  23.5*  --   --  34.4*   < > 36.6*   CR  --  1.49*  --   --  1.45*  --   --  1.70*   < > 1.67*   ANIONGAP  --  11  --   --  11  --   --  11   < > 11   QUE  --  9.6  --   --  9.3  --   --  9.1   < > 9.5   * 186* 160*   < > 165*  --    < > 187*   < > 193*   BILITOTAL  --   --   --   --   --   --   --   --   --  0.3    < > = values in this interval not displayed.

## 2025-05-15 ENCOUNTER — APPOINTMENT (OUTPATIENT)
Dept: PHYSICAL THERAPY | Facility: CLINIC | Age: 65
End: 2025-05-15
Payer: MEDICARE

## 2025-05-15 VITALS
BODY MASS INDEX: 23.14 KG/M2 | RESPIRATION RATE: 16 BRPM | WEIGHT: 170.64 LBS | OXYGEN SATURATION: 95 % | SYSTOLIC BLOOD PRESSURE: 115 MMHG | TEMPERATURE: 98.2 F | HEART RATE: 67 BPM | DIASTOLIC BLOOD PRESSURE: 70 MMHG

## 2025-05-15 LAB
ANION GAP SERPL CALCULATED.3IONS-SCNC: 13 MMOL/L (ref 7–15)
BACTERIA TISS BX CULT: NORMAL
BUN SERPL-MCNC: 18.9 MG/DL (ref 8–23)
CALCIUM SERPL-MCNC: 9.4 MG/DL (ref 8.8–10.4)
CHLORIDE SERPL-SCNC: 104 MMOL/L (ref 98–107)
CREAT SERPL-MCNC: 1.51 MG/DL (ref 0.67–1.17)
EGFRCR SERPLBLD CKD-EPI 2021: 51 ML/MIN/1.73M2
ERYTHROCYTE [DISTWIDTH] IN BLOOD BY AUTOMATED COUNT: 14.8 % (ref 10–15)
GLUCOSE BLDC GLUCOMTR-MCNC: 146 MG/DL (ref 70–99)
GLUCOSE BLDC GLUCOMTR-MCNC: 160 MG/DL (ref 70–99)
GLUCOSE BLDC GLUCOMTR-MCNC: 184 MG/DL (ref 70–99)
GLUCOSE BLDC GLUCOMTR-MCNC: 194 MG/DL (ref 70–99)
GLUCOSE BLDC GLUCOMTR-MCNC: 209 MG/DL (ref 70–99)
GLUCOSE SERPL-MCNC: 220 MG/DL (ref 70–99)
HCO3 SERPL-SCNC: 21 MMOL/L (ref 22–29)
HCT VFR BLD AUTO: 34.9 % (ref 40–53)
HGB BLD-MCNC: 11 G/DL (ref 13.3–17.7)
MCH RBC QN AUTO: 25.1 PG (ref 26.5–33)
MCHC RBC AUTO-ENTMCNC: 31.5 G/DL (ref 31.5–36.5)
MCV RBC AUTO: 80 FL (ref 78–100)
PLATELET # BLD AUTO: 273 10E3/UL (ref 150–450)
POTASSIUM SERPL-SCNC: 3.8 MMOL/L (ref 3.4–5.3)
RBC # BLD AUTO: 4.38 10E6/UL (ref 4.4–5.9)
SODIUM SERPL-SCNC: 138 MMOL/L (ref 135–145)
WBC # BLD AUTO: 5.2 10E3/UL (ref 4–11)

## 2025-05-15 PROCEDURE — 36415 COLL VENOUS BLD VENIPUNCTURE: CPT

## 2025-05-15 PROCEDURE — 99207 PR APP CREDIT; MD BILLING SHARED VISIT: CPT | Mod: FS

## 2025-05-15 PROCEDURE — 250N000013 HC RX MED GY IP 250 OP 250 PS 637: Performed by: HOSPITALIST

## 2025-05-15 PROCEDURE — 85027 COMPLETE CBC AUTOMATED: CPT

## 2025-05-15 PROCEDURE — 99232 SBSQ HOSP IP/OBS MODERATE 35: CPT | Mod: FS | Performed by: STUDENT IN AN ORGANIZED HEALTH CARE EDUCATION/TRAINING PROGRAM

## 2025-05-15 PROCEDURE — 999N000248 HC STATISTIC IV INSERT WITH US BY RN

## 2025-05-15 PROCEDURE — 250N000012 HC RX MED GY IP 250 OP 636 PS 637: Performed by: HOSPITALIST

## 2025-05-15 PROCEDURE — 99233 SBSQ HOSP IP/OBS HIGH 50: CPT | Mod: 24 | Performed by: INTERNAL MEDICINE

## 2025-05-15 PROCEDURE — 97530 THERAPEUTIC ACTIVITIES: CPT | Mod: GP

## 2025-05-15 PROCEDURE — 97116 GAIT TRAINING THERAPY: CPT | Mod: GP

## 2025-05-15 PROCEDURE — 250N000013 HC RX MED GY IP 250 OP 250 PS 637

## 2025-05-15 PROCEDURE — 120N000002 HC R&B MED SURG/OB UMMC

## 2025-05-15 PROCEDURE — 250N000013 HC RX MED GY IP 250 OP 250 PS 637: Performed by: STUDENT IN AN ORGANIZED HEALTH CARE EDUCATION/TRAINING PROGRAM

## 2025-05-15 PROCEDURE — 80048 BASIC METABOLIC PNL TOTAL CA: CPT

## 2025-05-15 RX ADMIN — INSULIN ASPART 1 UNITS: 100 INJECTION, SOLUTION INTRAVENOUS; SUBCUTANEOUS at 15:11

## 2025-05-15 RX ADMIN — ASPIRIN 81 MG CHEWABLE TABLET 81 MG: 81 TABLET CHEWABLE at 09:43

## 2025-05-15 RX ADMIN — METOPROLOL SUCCINATE 75 MG: 25 TABLET, EXTENDED RELEASE ORAL at 09:43

## 2025-05-15 RX ADMIN — METFORMIN ER 500 MG 500 MG: 500 TABLET ORAL at 17:09

## 2025-05-15 RX ADMIN — SODIUM BICARBONATE 1300 MG: 650 TABLET ORAL at 15:11

## 2025-05-15 RX ADMIN — BRINZOLAMIDE/BRIMONIDINE TARTRATE 1 DROP: 10; 2 SUSPENSION/ DROPS OPHTHALMIC at 09:46

## 2025-05-15 RX ADMIN — APIXABAN 5 MG: 5 TABLET, FILM COATED ORAL at 19:47

## 2025-05-15 RX ADMIN — SODIUM BICARBONATE 1300 MG: 650 TABLET ORAL at 09:44

## 2025-05-15 RX ADMIN — BRINZOLAMIDE/BRIMONIDINE TARTRATE 1 DROP: 10; 2 SUSPENSION/ DROPS OPHTHALMIC at 19:47

## 2025-05-15 RX ADMIN — TACROLIMUS 2 MG: 1 CAPSULE ORAL at 09:43

## 2025-05-15 RX ADMIN — INSULIN ASPART 1 UNITS: 100 INJECTION, SOLUTION INTRAVENOUS; SUBCUTANEOUS at 17:14

## 2025-05-15 RX ADMIN — CALCITRIOL CAPSULES 0.25 MCG 0.25 MCG: 0.25 CAPSULE ORAL at 10:01

## 2025-05-15 RX ADMIN — ATORVASTATIN CALCIUM 40 MG: 40 TABLET, FILM COATED ORAL at 22:22

## 2025-05-15 RX ADMIN — APIXABAN 5 MG: 5 TABLET, FILM COATED ORAL at 09:44

## 2025-05-15 RX ADMIN — MYCOPHENOLATE MOFETIL 500 MG: 500 TABLET, FILM COATED ORAL at 09:43

## 2025-05-15 RX ADMIN — MYCOPHENOLATE MOFETIL 500 MG: 500 TABLET, FILM COATED ORAL at 19:47

## 2025-05-15 RX ADMIN — TAMSULOSIN HYDROCHLORIDE 0.8 MG: 0.4 CAPSULE ORAL at 09:44

## 2025-05-15 RX ADMIN — TACROLIMUS 2 MG: 1 CAPSULE ORAL at 17:09

## 2025-05-15 RX ADMIN — PANTOPRAZOLE SODIUM 40 MG: 40 TABLET, DELAYED RELEASE ORAL at 09:44

## 2025-05-15 RX ADMIN — SODIUM BICARBONATE 1300 MG: 650 TABLET ORAL at 19:47

## 2025-05-15 RX ADMIN — LATANOPROST 1 DROP: 50 SOLUTION/ DROPS OPHTHALMIC at 22:22

## 2025-05-15 RX ADMIN — INSULIN ASPART 2 UNITS: 100 INJECTION, SOLUTION INTRAVENOUS; SUBCUTANEOUS at 09:50

## 2025-05-15 ASSESSMENT — ACTIVITIES OF DAILY LIVING (ADL)
ADLS_ACUITY_SCORE: 41

## 2025-05-15 NOTE — CONSULTS
Discharge Pharmacy Test Claim    Patient has pharmacy benefits through the [facility] and must use the [facility] pharmacy. [facility] can fill a 14-day supply of medication listed on the Urgent/Emergent formulary (U/E). Insulin is available to fill through the U/E formulary. Testing supplies must go through the VA pharmacy.    Test Claim [place] Formulary? U/E Formulary?   Lantus Solostar Yes Yes   Insulin aspart flexpen Yes Yes   Glucometer supplies Yes No       Mely Alba  [facility] Pharmacy Liaison (A - L)  Phone: [phone] Fax: [phone]  Available on Teams & Vocera  Disclaimer: Pharmacy test claims are extimates and may not reflect final costs. Suggested alternatives aim to be cost-effective but may not be therapeutically equivalent as this consult is informational and does not constitute medical advice. Clinical decisions should be made by qualified healthcare providers.

## 2025-05-15 NOTE — PROGRESS NOTES
[facility]  Transplant Nephrology Progress Note  Date of Admission:  5/3/2025  Today's Date: 05/15/2025    Recommendations:   - Continue to hold torsemide and empagliflozin.   - Plan to restart empagliflozin on Monday 05/19. If tolerating empagliflozin without issue for multiple days, can restart torsemide at 10 mg once daily.  If tolerating once daily, can increase to PTA twice daily dosing.   - Please have BMP checked within a week of restarting empagliflozin.   - Recommend follow-up with cardiology outpatient.   - Continue current immunosuppression.     Assessment & Plan   # DDKT and MOY: Trend down. Good UO. The Cr rise seems to track with starting empagliflozin and torsemide, will continue to hold and restart slowly in time. Patient overall euvolemic on exam so it's likely pre-renal / hemodynamic changes. Moderate hydronephrosis seen on ultrasound, PVRs 200-300 but patient refusing straight cath. Due to Cr at baseline, will continue to monitor and not escalate need to straight cath at this time.    - Baseline Creatinine: had been ~ 1.8-2 in Feb 2025, appears to be 1.4-1.6 since Mar 2025.    - Proteinuria: Not checked recently   - DSA Hx: Unknown due to being transplanted at another Transplant Center   - Last cPRA: unknown   - BK Viremia: Not checked recently due to time from transplant   - Kidney Tx Biopsy Hx: Unknown.     # Immunosuppression: Tacrolimus immediate release (goal 4-6) and Mycophenolate mofetil (dose 500 mg every 12 hours)   - Induction with Recent Transplant:  unknown, done at outside facility   - Continue with intensive monitoring of immunosuppression for efficacy and toxicity.   - Historical Changes in Immunosuppression: unknown   - Changes: No    # Infection Prevention:   Last CD4 Level: Unknown  - PJP: None      - CMV IgG Ab High Risk Discordance (D+/R-) at time of transplant: Unknown  Present CMV Serostatus: Unknown  - EBV IgG Ab High Risk  Discordance (D+/R-) at time of transplant: Unknown  Present EBV Serostatus: Unknown    # Blood Pressure volume: Controlled;  Goal BP: < 130/80   - Overall euvolemic on exam.    - On Torsemide 10 mg every day and metoprolol succinate 75 mg every day.   - Changes: Not at this time    # Diabetes: Borderline control (HbA1c 7-9%) Last HbA1c: 7.9% 3/2025    # Anemia in Chronic Renal Disease: Hgb: Trend down      SIOBHAN: No   - Iron studies: Low iron saturation on Iron supplement    # Mineral Bone Disorder:    - Secondary renal hyperparathyroidism; PTH level: Not checked recently        On treatment: Calcitriol  - Vitamin D; level: Not checked recently        On supplement: No  - Calcium; level: Normal        On supplement: No  - Phosphorus; level: Normal        On supplement: No    # Electrolytes:   - Potassium; level: Normal        On supplement:no, on veltassa  - Magnesium; level: Low normal        On supplement: No  - Bicarbonate; level: Low        On supplement: Yes     # Gangrene and osteomyelitis of right foot s/p Transmetatarsal Amputation 5/7    # Other Significant PMH:   - Atrial flutter: On Eliquis PTA              - HFrEF: presumed ischemic given past maricel pre-transplant with area of ischemia and possible history of stent. On Toprol, jardiance and torsemide. Echocardiogram 02/25/25 with new drop in LVEF to ~ 45%, he may need further work-up.               - CAD: Possible history of stent.               - PAD s/p left AKA  - History of CVA/TIA               - Sickle cell trait              - History of hepatitis C.               - Urinary Retention: - Tamsulosin 0.8mg PO daily    # Transplant History:  Etiology of Kidney Failure: Diabetes mellitus type 2, HTN   Tx: DDKT  Transplant: N/A  Significant transplant-related complications: None    Recommendations were communicated to the primary team via Kids Note.    RUFINO Billingsley CNP  Transplant Nephrology  Contact information via Kids Note Web Console     Interval  History  Mr. Callahan creatinine is 1.51 (05/15 0939); Stable.  Good urine output.  Other significant labs/tests/vitals: VSS  no events overnight.  no chest pain or shortness of breath.  no leg swelling.  no nausea and vomiting.  Bowel movements are loose.  no fever, sweats or chills.     Review of Systems   4 point ROS was obtained and negative except as noted in the Interval History.    MEDICATIONS:  Current Facility-Administered Medications   Medication Dose Route Frequency Provider Last Rate Last Admin    apixaban ANTICOAGULANT (ELIQUIS) tablet 5 mg  5 mg Oral BID Eugenio Trivedi PA-C   5 mg at 05/15/25 0944    aspirin (ASA) chewable tablet 81 mg  81 mg Oral Daily Vinh Crews MD   81 mg at 05/15/25 0943    atorvastatin (LIPITOR) tablet 40 mg  40 mg Oral At Bedtime Vinh Crews MD   40 mg at 05/13/25 2232    brinzolamide-brimonidine (SIMBRINZA) 1-0.2 % ophthalmic suspension 1 drop  1 drop Ophthalmic BID Vinh Crews MD   1 drop at 05/15/25 0946    calcitRIOL (ROCALTROL) capsule 0.25 mcg  0.25 mcg Oral Once per day on Monday Tuesday Wednesday Thursday Friday Vinh Crews MD   0.25 mcg at 05/15/25 1001    [Held by provider] empagliflozin (JARDIANCE) tablet 10 mg  10 mg Oral QAM Eugenio Trivedi PA-C   10 mg at 05/11/25 0903    ferrous sulfate (FEROSUL) tablet 325 mg  325 mg Oral Every Other Day Vinh Crews MD   325 mg at 05/14/25 0907    insulin aspart (NovoLOG) injection (RAPID ACTING)  1-7 Units Subcutaneous TID Eugenio Kim PA-C   2 Units at 05/15/25 0950    insulin aspart (NovoLOG) injection (RAPID ACTING)  1-5 Units Subcutaneous At Bedtime Eugenio Trivedi PA-C   1 Units at 05/13/25 2234    insulin glargine (LANTUS PEN) injection 5 Units  5 Units Subcutaneous QAM Tressa Shaw PA-C   5 Units at 05/15/25 0946    latanoprost (XALATAN) 0.005 % ophthalmic solution 1 drop  1 drop Both Eyes At Bedtime Vinh Crews MD   1 drop at 05/13/25 8316    metFORMIN  (GLUCOPHAGE XR) 24 hr tablet 500 mg  500 mg Oral Daily with Tressa Singh PA-C        metoprolol succinate ER (TOPROL XL) 24 hr tablet 75 mg  75 mg Oral Daily Vinh Crews MD   75 mg at 05/15/25 0943    mycophenolate (GENERIC EQUIVALENT) tablet 500 mg  500 mg Oral BID Vinh Crews MD   500 mg at 05/15/25 0943    pantoprazole (PROTONIX) EC tablet 40 mg  40 mg Oral Daily Vinh Crews MD   40 mg at 05/15/25 0944    patiromer (VELTASSA) packet 8.4 g  8.4 g Oral Weekly Vinh Crews MD   8.4 g at 05/10/25 1403    sodium bicarbonate tablet 1,300 mg  1,300 mg Oral TID Vinh Crews MD   1,300 mg at 05/15/25 0944    sodium chloride (PF) 0.9% PF flush 3 mL  3 mL Intracatheter Q8H Vinh Crews MD   3 mL at 25 1938    tacrolimus (GENERIC EQUIVALENT) capsule 2 mg  2 mg Oral BID IS Vinh Crews MD   2 mg at 05/15/25 0943    tamsulosin (FLOMAX) capsule 0.8 mg  0.8 mg Oral Daily Vinh Crews MD   0.8 mg at 05/15/25 0944    [Held by provider] torsemide (DEMADEX) tablet 10 mg  10 mg Oral BID Eugenio Trivedi PA-C   10 mg at 05/10/25 0946     Current Facility-Administered Medications   Medication Dose Route Frequency Provider Last Rate Last Admin       Physical Exam   Temp  Av.8  F (36.6  C)  Min: 97.6  F (36.4  C)  Max: 98.2  F (36.8  C)      Pulse  Av.3  Min: 66  Max: 75 Resp  Av.5  Min: 18  Max: 20  SpO2  Av.2 %  Min: 97 %  Max: 100 %     BP (!) 144/85   Pulse 78   Temp 98.6  F (37  C) (Oral)   Resp 22   Wt 77.4 kg (170 lb 10.2 oz)   SpO2 100%   BMI 23.14 kg/m     Date 25 0700 - 25 0659   Shift 6696-5874 3105-2133 7805-3924 24 Hour Total   INTAKE   P.O. 337   337   Shift Total 337   337   OUTPUT   Shift Total       Weight (kg)          Admit       GENERAL APPEARANCE: alert and no distress  HENT: mouth without ulcers or lesions  RESP: lungs clear to auscultation - no rales, rhonchi or wheezes  CV: JVP elevated but not collapsible  (had to interpret as he likely has central stenosis), regular rhythm, normal rate, no rub, no murmur  EDEMA: no LE edema bilaterally  ABDOMEN: soft, nondistended, nontender, bowel sounds normal  MS: Left BKA. Right TMA.  SKIN: Right foot with mild swelling, dry gangrenous toes to 1st -3rd digit.   TX KIDNEY: normal, surgical scar well approximated  Dialysis access: Right UE fistula    Data   All labs reviewed by me.  CMP  Recent Labs   Lab 05/15/25  0949 05/15/25  0939 05/14/25  0915 05/14/25  0622 05/13/25  0802 05/13/25  0721 05/12/25  1158 05/12/25  0709 05/09/25  1128 05/09/25  0550   NA  --  138  --  137  --  137  --  138   < > 138   POTASSIUM  --  3.8  --  4.0  --  3.9  --  3.8   < > 4.3   CHLORIDE  --  104  --  103  --  105  --  106   < > 108*   CO2  --  21*  --  23  --  21*  --  21*   < > 19*   ANIONGAP  --  13  --  11  --  11  --  11   < > 11   * 220* 253* 186*   < > 165*   < > 187*   < > 193*   BUN  --  18.9  --  17.7  --  23.5*  --  34.4*   < > 36.6*   CR  --  1.51*  --  1.49*  --  1.45*  --  1.70*   < > 1.67*   GFRESTIMATED  --  51*  --  51*  --  53*  --  44*   < > 45*   QUE  --  9.4  --  9.6  --  9.3  --  9.1   < > 9.5   BILITOTAL  --   --   --   --   --   --   --   --   --  0.3    < > = values in this interval not displayed.     CBC  Recent Labs   Lab 05/15/25  0939 05/14/25  0622 05/13/25  0720 05/12/25  0709   HGB 11.0* 11.2* 10.9* 10.3*   WBC 5.2 5.5 5.0 5.8   RBC 4.38* 4.48 4.30* 4.08*   HCT 34.9* 37.5* 34.4* 32.8*   MCV 80 84 80 80   MCH 25.1* 25.0* 25.3* 25.2*   MCHC 31.5 29.9* 31.7 31.4*   RDW 14.8 14.7 15.1* 15.0    250 229 219     INR  No lab results found in last 7 days.    ABG  Recent Labs   Lab 05/10/25  1146 05/08/25  1041   O2PER 1 21      Urine Studies  Recent Labs   Lab Test 05/04/25  1538 03/24/25  1036 03/22/25  1837 03/03/25  1508   COLOR Light Yellow Light Yellow Light Yellow Light Yellow   APPEARANCE Clear Clear Clear Clear   URINEGLC >=1000* >=1000* 300* >=1000*    URINEBILI Negative Negative Negative Negative   URINEKETONE Negative Negative Negative Negative   SG 1.023 1.010 1.008 1.016   UBLD Negative Negative Negative Negative   URINEPH 7.0 7.0 7.0 6.5   PROTEIN Negative Negative Negative Negative   NITRITE Negative Negative Negative Negative   LEUKEST Negative Trace* Large* Trace*   RBCU <1 <1 <1 3*   WBCU 2 4 36* 8*     No lab results found.  PTH  No lab results found.  Iron Studies  Recent Labs   Lab Test 03/10/25  0744   IRON 23*   *   IRONSAT 17   HAMMAD 1,062*       IMAGING:  All imaging studies reviewed by me.

## 2025-05-15 NOTE — PLAN OF CARE
/83 (BP Location: Left arm, Cuff Size: Adult Regular)   Pulse 74   Temp 98.6  F (37  C) (Oral)   Resp 22   Wt 77.4 kg (170 lb 10.2 oz)   SpO2 100%   BMI 23.14 kg/m      Neuro: Alert and oriented x 4, lets needs known but refuse cares and medications because he could not go home. Patient educated on importance's of medication and impact of not asking medication. Patients provider informed  Cardiac:Wnl, denies chest pain  Respiratory:WNL, Denies SOB  GI/: Voiding AUOP, no BM via commode  Diet/Appetite:Tolerating diet, denies nausea,   Skin:no new skin deficient this shift  LDA: L PIV TKO   Labs: Reviewed.   Activity: A X 1  Pain:declined meds  Plan:Cont with POC          Goal Outcome Evaluation:Ongoing

## 2025-05-15 NOTE — PROGRESS NOTES
[facility]    Medicine Progress Note - Hospitalist Service, GOLD TEAM 4    Date of Admission:  5/3/2025    Assessment & Plan   Tad Zaman is a 64 year old male with hypertension, chronic systolic heart failure, type 2 diabetes, peripheral artery disease status post left BKA, right femoral-tibial bypass (3/20/2025), right diabetic foot ulcer, osteomyelitis, history of MI, status post renal transplant, CKD stage III, who was admitted to [facility] 5/3/2025 for further evaluation and treatment of right severe diabetic foot ulcer.      Changes Today:  - Patient agreeable to cares today  - Continue to monitor blood sugar  - Hopeful for discharge home in the next couple of days     Gangrene and osteomyelitis of right foot s/p Transmetatarsal Amputation 5/7  PAD s/p Left BKA and Right F-T stent  Presented with history of progressive R diabetic foot ulcer with outpatient plans for R TMA, but having concern for worsening swelling and foul odor. He had recent Arterial doppler on 5/2 with good stent flow. Workup in ED reassuring with WBC WNL, Lactate 1.9, CRP and ESR WNL. LLE Duplex negative for DVT. CT with contrast concerning for soft tissue gas which is favored to be from direct extension from the area of ulceration as opposed to gas-formin organism. Vascular surgery did not recommend acute vascular procedure at this time, advised that transmetatarsal amputation is a viable option at this time, but patient does remain at high risk of needing further amputation in the future.  Podiatry performed transmetatarsal amputation on 5/7 and clean margins were obtained.   Antibiotics discontinued per ID recommendations.  On heparin for 1 day following amputation, PTA Eliquis 5 mg twice daily resumed 5/8.  - Podiatry following   - Strict non weight bearing of right lower extremity  - Follow bone aerobic and anaerobic cultures, fungal culture, and pathology  - Preliminary Tissue culture is  now growing Alcaligenes faecalis (ID aware, no antibiotics needed)  - Anaerobic cultures with enterococcus faecalis (ID aware, no antibiotics needed)  - Vascular surgery input appreciated  - Blood flow is tenuous to R foot. Per our conversation, it is reasonable to pursue TMA but note pt is high risk of needing additional amputation in the future   - Will need lifelong AC following surgery   - Follow up with Vascular in 3 months, sooner if TMA fails to heal as he may require further angioplasty    - Continue Eliquis 5 mg BID   - Continue PTA ASA for now   - Daily CBC, BMP     S/P Renal transplant  MOY 2/2 possible Acute Tubular Necrosis vs pre-renal MOY, improving  CKD3  Cr baseline around ~1.4-1.6. Cr on admit 2.15. Renal transplant US 5/3 with moderate hydronephrosis of the transplant kidney, that improved post void. Elevated resistive indices suggestive of renal transplant dysfunction.  Creatinine very labile during hospital stay and transplant nephrology involved with care. Jardiance and Torsemide placed back on hold after being restarted.  Repeat renal ultrasound 5/11 showed improvement in hydronephrosis. IVF bolus given to patient per transplant nephrology recs. Nephrology advised straight cathing for PVR of 300, but patient refused. Overall, kidney function improving despite this.   - Transplant nephrology following   - Follow creatine/cystatin C  - Continue to hold torsemide and empagliflozin   - Plan to restart empagliflozin 5/19  - If tolerating empagliflozin without issue for multiple days, can restart torsemide at 10 mg once daily  - If tolerating once daily, can increase to PTA twice daily dosing.   - Continue PTA Tacro 2 mg BID  - Continue PTA Mycophenolate 500 mg BID for now  - Continue PTA sodium bicarb and Patiromer     Disorientation, ?improving   Hospital acquired delirium   Chronic Right Cerebellar infarct  Underlying cognitive impairment  Patient with acute onset disorientation the morning of  5/8 following TMA. No focal neuro deficits were noted at this time. Prior to resumption of anticoagulation, a CT head was obtained which was negative for acute abnormality (did show chronic infarct). Work-up otherwise unremarkable. Considered cefepime as possible etiology and this was discontinued on 5/8 but no change in disorientation. Per discussion with patient's daughter he has had similar episodes of disorientation during prior hospital stays and has taken a few days to improve- suspect patient has sensitive cognitive function in context of prior cerebellar infarct. Psych was consulted on 5/14 and determined that patient has capacity to make his own decisions.   - Continue to monitor   - Delirium precautions       DM2  Last A1C 7.9 on 3/3/25. Outpatient regimen includes Metformin and Jardiance, is not on home insulin. Metformin and Jardiance both on hold. Started on insulin, but blood sugars remain above goal. Due to need for tight blood sugar control, will restart evening metformin. Of note, patient was on Insulin in the past, but has not been in several years. Family will likely need education on discharge if he is going home instead of TCU.  - Continue PTA metformin  - Hold PTA Jardiance  (plan to restart 5/19)  - Continue Lantus to 5 units in the AM  - Continue medium intensity sliding scale  - Blood glucose checks QID and at bedtime  - Hypoglycemia protocol   - Diabetes educator consult     Deconditioning  Patient with deconditioning in the setting of amputation, prolonged hospitalization. PT/OT initially recommended TCU, but family unsure if they feel comfortable taking patient home. Ongoing discussions at this time.   - SW following for assistance with discharge  - PT/OT following    History of CVA/TIA  Chronic AC, ASA  He is on dual coverage with Apixaban and ASA for history of CVA/TIA and PAD per family. Apixaban held on admission given need for procedure.   - AC as above   - ASA plan above      Hypertension  Chronic systolic heart failure/HFmrEF  History of chronic systolic heart failure, MI but no clear history of CAD or cardiac revascularization. He had TTE on 2/25/25 with EF of 40-45% and mild pulmonary hypertension likely Group 2. No signs of acute HF exacerbation.  - Continue PTA Metoprolol   - Continue PTA Statin     BPH Continue PTA Tamsulosin.  Glaucoma Continue PTA gtts.            Diet: Regular Diet Adult  Snacks/Supplements Adult: Other; please allow patient/family to order supplements with meals; With Meals    DVT Prophylaxis: DOAC  Zaman Catheter: Not present  Lines: None     Cardiac Monitoring: None  Code Status: Full Code      Clinically Significant Risk Factors                             # DMII: A1C = 7.9 % (Ref range: <5.7 %) within past 6 months    # Moderate Malnutrition: based on nutrition assessment and treatment provided per dietitian's recommendations.      # Financial/Environmental Concerns: none         Social Drivers of Health    Tobacco Use: Medium Risk (4/22/2025)    Patient History     Smoking Tobacco Use: Former     Smokeless Tobacco Use: Never     Passive Exposure: Never          Disposition Plan     Medically Ready for Discharge: Anticipated Tomorrow           The patient's care was discussed with the Attending Physician, Dr. Ham, Care Coordinator/, patient's daughter, Patient, PT/OT and nephrology Consultant(s).    Tressa Barreto PA-C  Hospitalist Service, GOLD TEAM [address] [place] [address]  Securely message with Mindie (more info)  Text page via [facility] Paging/Directory   See signed in provider for up to date coverage information  ______________________________________________________________________    Interval History   Patient agreeable to cares today and agreeable to stay until diabetes under better control and safe dispo lined up. Apologized for yesterday. No other acute complaints.     Discussed dispo with  daughter, she will talk with family about plan.     Physical Exam   Vital Signs: Temp: 98.6  F (37  C) Temp src: Oral BP: (!) 144/85 Pulse: 78   Resp: 22 SpO2: 100 % O2 Device: None (Room air)    Weight: 170 lbs 10.18 oz    General Appearance: Comfortable, nontoxic appearing male seen laying in bed.  Eyes: PERRLA.  No conjunctival icterus.  HEENT: Atraumatic.  Respiratory: Breathing comfortably on room air.  Lymph/Hematologic: No bruising on exposed skin.  Skin: No lesions or rashes noted on exposed skin.  Musculoskeletal: Moving all extremities spontaneously.  Neurologic: Cranial nerves II through XII grossly intact. A&O to person, situation. In the hospital, but states the year is 2519.  Psychiatric: Mood appropriate.      Medical Decision Making       50 MINUTES SPENT BY ME on the date of service doing chart review, history, exam, documentation & further activities per the note.      Data   Imaging results reviewed over the past 24 hrs:   No results found for this or any previous visit (from the past 24 hours).  Recent Labs   Lab 05/15/25  0949 05/15/25  0939 05/14/25  0915 05/14/25  0622 05/13/25  0802 05/13/25  0721 05/13/25  0720 05/09/25  1128 05/09/25  0550   WBC  --  5.2  --  5.5  --   --  5.0   < > 7.2   HGB  --  11.0*  --  11.2*  --   --  10.9*   < > 10.4*   MCV  --  80  --  84  --   --  80   < > 82   PLT  --  273  --  250  --   --  229   < > 174   NA  --  138  --  137  --  137  --    < > 138   POTASSIUM  --  3.8  --  4.0  --  3.9  --    < > 4.3   CHLORIDE  --  104  --  103  --  105  --    < > 108*   CO2  --  21*  --  23  --  21*  --    < > 19*   BUN  --  18.9  --  17.7  --  23.5*  --    < > 36.6*   CR  --  1.51*  --  1.49*  --  1.45*  --    < > 1.67*   ANIONGAP  --  13  --  11  --  11  --    < > 11   QUE  --  9.4  --  9.6  --  9.3  --    < > 9.5   * 220* 253* 186*   < > 165*  --    < > 193*   BILITOTAL  --   --   --   --   --   --   --   --  0.3    < > = values in this interval not displayed.

## 2025-05-15 NOTE — PLAN OF CARE
Goal Outcome Evaluation:      Plan of Care Reviewed With: patient    Overall Patient Progress: no change  Outcome Evaluation: continues to progress        Neuro:A/Ox2, forgetful  Respiratory:WDL on RA  Cardiac:WDL. Denies chest pain  Diet: reg  GI/:voiding spontaneously, passing gas  Incision/Drains:L BKA, R LE amputation w dressing + Cam boot   IV Access:L PIV x2 SL  Pain:denies  Activity:ax1     New Changes this shift:none  Plan: continue POC

## 2025-05-15 NOTE — PROGRESS NOTES
Care Management Follow Up    Length of Stay (days): 12    Expected Discharge Date: 05/15/2025     Concerns to be Addressed: discharge planning     Patient plan of care discussed at interdisciplinary rounds: Yes    Anticipated Discharge Disposition: Home with services  Anticipated Discharge Services: Home Care    [facility](RN/PT/OT)  Phone: [phone]  Fax: [phone]    Anticipated Discharge DME: None (No new DME)    Patient/family educated on Medicare website which has current facility and service quality ratings: no  Education Provided on the Discharge Plan: yes   Patient/Family in Agreement with the Plan: yes    Referrals Placed by CM/SW: Home Care    Private pay costs discussed: Not applicable    Discussed  Partnership in Safe Discharge Planning  document with patient/family: No     Handoff Completed: No, handoff not indicated or clinically appropriate    Additional Information:  Patient is anticipated to be medically ready for discharge in the next 1-2 days.  PT/OT recommending home with home health care.  Received update that patient is open to [facility] for RN/PT/OT services.  RNCC will continue to follow and assist with discharge planning.    JOHN Villa  Phone: [phone]   Med Surg Vocera  Nurse Coordinator

## 2025-05-16 ENCOUNTER — APPOINTMENT (OUTPATIENT)
Dept: PHYSICAL THERAPY | Facility: CLINIC | Age: 65
End: 2025-05-16
Payer: MEDICARE

## 2025-05-16 ENCOUNTER — MEDICAL CORRESPONDENCE (OUTPATIENT)
Dept: HEALTH INFORMATION MANAGEMENT | Facility: CLINIC | Age: 65
End: 2025-05-16

## 2025-05-16 LAB
ANION GAP SERPL CALCULATED.3IONS-SCNC: 10 MMOL/L (ref 7–15)
BUN SERPL-MCNC: 22.2 MG/DL (ref 8–23)
CALCIUM SERPL-MCNC: 9.2 MG/DL (ref 8.8–10.4)
CHLORIDE SERPL-SCNC: 105 MMOL/L (ref 98–107)
CREAT SERPL-MCNC: 1.55 MG/DL (ref 0.67–1.17)
EGFRCR SERPLBLD CKD-EPI 2021: 49 ML/MIN/1.73M2
ERYTHROCYTE [DISTWIDTH] IN BLOOD BY AUTOMATED COUNT: 14.8 % (ref 10–15)
GLUCOSE BLDC GLUCOMTR-MCNC: 123 MG/DL (ref 70–99)
GLUCOSE BLDC GLUCOMTR-MCNC: 133 MG/DL (ref 70–99)
GLUCOSE BLDC GLUCOMTR-MCNC: 153 MG/DL (ref 70–99)
GLUCOSE SERPL-MCNC: 188 MG/DL (ref 70–99)
HCO3 SERPL-SCNC: 22 MMOL/L (ref 22–29)
HCT VFR BLD AUTO: 33.7 % (ref 40–53)
HGB BLD-MCNC: 10.4 G/DL (ref 13.3–17.7)
MCH RBC QN AUTO: 25.3 PG (ref 26.5–33)
MCHC RBC AUTO-ENTMCNC: 30.9 G/DL (ref 31.5–36.5)
MCV RBC AUTO: 82 FL (ref 78–100)
PLATELET # BLD AUTO: 228 10E3/UL (ref 150–450)
POTASSIUM SERPL-SCNC: 4.2 MMOL/L (ref 3.4–5.3)
RBC # BLD AUTO: 4.11 10E6/UL (ref 4.4–5.9)
SODIUM SERPL-SCNC: 137 MMOL/L (ref 135–145)
WBC # BLD AUTO: 5.6 10E3/UL (ref 4–11)

## 2025-05-16 PROCEDURE — 97116 GAIT TRAINING THERAPY: CPT | Mod: GP

## 2025-05-16 PROCEDURE — 99024 POSTOP FOLLOW-UP VISIT: CPT | Performed by: ASSISTANT, PODIATRIC

## 2025-05-16 PROCEDURE — 120N000002 HC R&B MED SURG/OB UMMC

## 2025-05-16 PROCEDURE — 97530 THERAPEUTIC ACTIVITIES: CPT | Mod: GP

## 2025-05-16 PROCEDURE — 85018 HEMOGLOBIN: CPT

## 2025-05-16 PROCEDURE — 250N000013 HC RX MED GY IP 250 OP 250 PS 637: Performed by: STUDENT IN AN ORGANIZED HEALTH CARE EDUCATION/TRAINING PROGRAM

## 2025-05-16 PROCEDURE — 36415 COLL VENOUS BLD VENIPUNCTURE: CPT

## 2025-05-16 PROCEDURE — 250N000012 HC RX MED GY IP 250 OP 636 PS 637: Performed by: HOSPITALIST

## 2025-05-16 PROCEDURE — 250N000013 HC RX MED GY IP 250 OP 250 PS 637: Performed by: HOSPITALIST

## 2025-05-16 PROCEDURE — 85041 AUTOMATED RBC COUNT: CPT

## 2025-05-16 PROCEDURE — 99232 SBSQ HOSP IP/OBS MODERATE 35: CPT | Mod: FS | Performed by: STUDENT IN AN ORGANIZED HEALTH CARE EDUCATION/TRAINING PROGRAM

## 2025-05-16 PROCEDURE — 82310 ASSAY OF CALCIUM: CPT

## 2025-05-16 PROCEDURE — 250N000013 HC RX MED GY IP 250 OP 250 PS 637

## 2025-05-16 PROCEDURE — 99233 SBSQ HOSP IP/OBS HIGH 50: CPT | Mod: 24 | Performed by: INTERNAL MEDICINE

## 2025-05-16 RX ADMIN — BRINZOLAMIDE/BRIMONIDINE TARTRATE 1 DROP: 10; 2 SUSPENSION/ DROPS OPHTHALMIC at 08:27

## 2025-05-16 RX ADMIN — INSULIN ASPART 1 UNITS: 100 INJECTION, SOLUTION INTRAVENOUS; SUBCUTANEOUS at 08:32

## 2025-05-16 RX ADMIN — TACROLIMUS 2 MG: 1 CAPSULE ORAL at 17:51

## 2025-05-16 RX ADMIN — APIXABAN 5 MG: 5 TABLET, FILM COATED ORAL at 20:32

## 2025-05-16 RX ADMIN — SODIUM BICARBONATE 1300 MG: 650 TABLET ORAL at 20:32

## 2025-05-16 RX ADMIN — MYCOPHENOLATE MOFETIL 500 MG: 500 TABLET, FILM COATED ORAL at 08:25

## 2025-05-16 RX ADMIN — TACROLIMUS 2 MG: 1 CAPSULE ORAL at 08:26

## 2025-05-16 RX ADMIN — MYCOPHENOLATE MOFETIL 500 MG: 500 TABLET, FILM COATED ORAL at 20:32

## 2025-05-16 RX ADMIN — FERROUS SULFATE TAB 325 MG (65 MG ELEMENTAL FE) 325 MG: 325 (65 FE) TAB at 08:26

## 2025-05-16 RX ADMIN — ATORVASTATIN CALCIUM 40 MG: 40 TABLET, FILM COATED ORAL at 21:18

## 2025-05-16 RX ADMIN — EMPAGLIFLOZIN 10 MG: 10 TABLET, FILM COATED ORAL at 13:56

## 2025-05-16 RX ADMIN — METFORMIN ER 500 MG 500 MG: 500 TABLET ORAL at 17:51

## 2025-05-16 RX ADMIN — LATANOPROST 1 DROP: 50 SOLUTION/ DROPS OPHTHALMIC at 21:18

## 2025-05-16 RX ADMIN — METOPROLOL SUCCINATE 75 MG: 25 TABLET, EXTENDED RELEASE ORAL at 08:34

## 2025-05-16 RX ADMIN — CALCITRIOL CAPSULES 0.25 MCG 0.25 MCG: 0.25 CAPSULE ORAL at 08:46

## 2025-05-16 RX ADMIN — BRINZOLAMIDE/BRIMONIDINE TARTRATE 1 DROP: 10; 2 SUSPENSION/ DROPS OPHTHALMIC at 20:32

## 2025-05-16 RX ADMIN — APIXABAN 5 MG: 5 TABLET, FILM COATED ORAL at 08:26

## 2025-05-16 RX ADMIN — SODIUM BICARBONATE 1300 MG: 650 TABLET ORAL at 13:56

## 2025-05-16 RX ADMIN — PANTOPRAZOLE SODIUM 40 MG: 40 TABLET, DELAYED RELEASE ORAL at 08:26

## 2025-05-16 RX ADMIN — ASPIRIN 81 MG CHEWABLE TABLET 81 MG: 81 TABLET CHEWABLE at 08:28

## 2025-05-16 RX ADMIN — TAMSULOSIN HYDROCHLORIDE 0.8 MG: 0.4 CAPSULE ORAL at 08:26

## 2025-05-16 RX ADMIN — SODIUM BICARBONATE 1300 MG: 650 TABLET ORAL at 08:26

## 2025-05-16 ASSESSMENT — ACTIVITIES OF DAILY LIVING (ADL)
ADLS_ACUITY_SCORE: 41

## 2025-05-16 NOTE — CONSULTS
"Diabetes CNS/Educator Consult    Tad Zaman is a 64 year old male per notes with hypertension, chronic systolic heart failure, type 2 diabetes, peripheral artery disease status post left BKA, right femoral-tibial bypass (3/20/2025), right diabetic foot ulcer, osteomyelitis, history of MI, status post renal transplant, CKD stage III, who was admitted to [facility] 5/3/2025 for further evaluation and treatment of right severe diabetic foot ulcer.     Diabetes CNS/Educator consulted for review of insulin (has been on in the past with T2DM. A1c 7.9% on 3/3/25. PTA was managing with metformin  mg daily and jardiance 12.5 mg daily. Was not monitoring BGs at home, but has used a glucose meters in the past.    Medicine confirmed plan for Lantus and sliding scale and patient now discharging today.     Met with Tad at bedside for education. Family working today and will not be here until later. Tad will be managing diabetes cares at home with support from daughters. He shared that one of his daughters is an EMT and familiar with diabetes management. He also said that he has had plenty of diabetes education repeated over and over again. Daughter was helping him with his medications PTA as well.     Diet: Regular Diet Adult  Snacks/Supplements Adult: Other; please allow patient/family to order supplements with meals; With Meals       Barriers to diabetes management: cognitive deficits (nursing notes indicate he is forgetful)    Diabetes Education Provided:  Tad declined talking more about T2DM stating he understands and has been told many times. Provided \"Understanding Diabetes\" handout.   Discussed metformin and jardiance, Tad says he is already familiar with them.  Discussed insulin glargine and asprt action, dose, onset, duration, and frequency. Proper insulin pen storage, preparation, and injection technique, pen needle disposal in sharps container. Site selection and rotation. Tad declined practicing " "with writer, states that he remembers how to do insulin injections and nursing has had him practice multiple times while he has been here. Provided \"4 mm pen needle - tips for good injection practice\" handout.   Frequency of BG testing discussed. BG targets discussed. Tad says he knows how to use Contour Next glucose meter already.  Discussed signs and symptoms of hypoglycemia. Reviewed treating blood glucose below 70 mg/dL with 15 grams of carbohydrates. Discussed examples of 15 grams of CHO. Reviewed treating blood glucose less than or equal to 50 mg/dL with 30 grams of CHO. Discussed rechecking BG 15 minutes after treatment and retreating if necessary. Advised he do not call doctor/daughter first to advise him, and he should treat himself as quickly as possible.  Advised calling provider for consistently elevated BGs and/or lows x2.    Discussed following up with PCP in 1-2 weeks after discharge.     Discussion:  Diabetes education completed. Daughters not able to be present today due to work but will be helping with cares. Tad stated he is very aware of diabetes and has had a lot of education on it in the past and while he has been here. He feels comfortable with plan and says he will have support from his daughters. All questions answered. Notified RN and asked that nursing review diabetes education with daughters when they are present. Also notified Medicine.     Supplies Needed at Discharge: please use the DIAB non-branded discharge supply order set ([id number])   Contour Next Glucose Meter  Contour Next Test Strips   Contour Microlet Lancets   Sharps Container   Alcohol Wipes   B-D 4 mm sudarshan pen needles   Lantus solostar pens*   Insulin aspart flexpens*   Jardiance per MD   Metformin XR per MD  *On urgent/emergent formulary. [facility] can fill a 14 day supply. All other supplies from [facility] forumul[facility]     Diabetes Plan Reviewed: instructed to review to discharge papers for final plan.  Blood glucose " monitoring:   Three times daily before meals, and at bedtime.  Blood glucose (BG) goal:  mg/dL before meals     Glucose Control Regimen:  Metformin (Glucophage XR) - take 500 mg daily with dinner.    Empagliflozin (Jardiance) - take 10 mg daily.    Long-acting insulin: glargine (Lantus) - take 5 units once daily at 8:00 AM. Take at same time each day.    Rapid-acting insulin: aspart (Novolog) correction - see chart below. Take for high blood glucoses three times daily before meals and at bedtime.  You may take the correction dose even if you skip a meal (as long as it has been 4 hours since previous correction dose).    Pre-Meal Correction Scale   Do Not give Correction Insulin if Pre-Meal BG less than 140.   For Pre-Meal  - 189 give 1 unit.   For Pre-Meal  - 239 give 2 units.   For Pre-Meal  - 289 give 3 units.   For Pre-Meal  - 339 give 4 units.   For Pre-Meal  - 389 give 5 units.   For Pre-Meal  - 439 give 6 units   For Pre-Meal BG greater than or equal to 440 give 7 units.     Bedtime Correction Scale   Do Not give Correction Insulin if BG less than 200.   For  - 249 give 1 unit.   For  - 299 give 2 units.   For  - 349 give 3 units.   For  - 399 give 4 units.   For BG greater than or equal to 400 give 5 units.       Outpatient Follow-Up:   Follow-up with your Primary Care Provider 1-2 weeks after discharge. Call sooner if blood glucose runs consistently greater than 200 mg/dL for one day or if having more than two episodes less than 70 mg/dL.     Your target A1c value is less than 7% to help prevent future complications from diabetes. Your most recent A1c is 7.9% (3/3/25).      Hypoglycemia (Low Blood Glucose) Management:  Signs/symptoms:  Shaking, sweating, fast heartbeat  Feeling dizzy, tired, or weak   Feeling anxious and easy to irritate  Feeling nervous, crabby, or confused  Hunger  Vision problems, headache  Numb or tingling mouth    If  blood glucose is 51 to 70:   Eat or drink 15 grams of carbohydrate. Examples:   1/2 cup (4 ounces) apple juice or regular soda pop, or   1 cup (8 ounces) milk, or   15 skittles, or   3 to 4 glucose tablets.   Re-check your blood glucose in 15 minutes.   Repeat these steps every 15 minutes until your blood glucose is above 80.       If blood glucose is 50 or less:   Eat or drink 30 grams of carbohydrate. Examples:   1 cup (8 ounces) apple juice or regular soda pop, or   2 cups (16 ounces) milk, or   1 banana, or   6 to 8 glucose tablets.   Re-check your blood glucose in 15 minutes.   Repeat these steps every 15 minutes until your blood glucose is above 80.     RUFINO Burdick, INGRID, [facility]  Diabetes Educator  Pager: [phone]  Office: [phone]  Securely message with [employer]

## 2025-05-16 NOTE — PROGRESS NOTES
[facility]  Transplant Nephrology Progress Note  Date of Admission:  5/3/2025  Today's Date: 05/16/2025    Recommendations:   - Okay to restart empagliflozin today.   - Continue to hold torsemide.   - Empagliflozin restarted on 05/16. If tolerating empagliflozin without issue for multiple days, can restart torsemide at 10 mg once daily.  If tolerating once daily, can increase to PTA twice daily dosing.   - Please have BMP checked within a week of restarting empagliflozin.   - Recommend close follow-up with cardiology outpatient.   - Continue current immunosuppression.     Assessment & Plan   # DDKT and MOY: Stable. Good UO. The Cr rise seemed to track with starting empagliflozin and torsemide, will continue to hold and restart slowly in time. Patient overall euvolemic on exam so it's likely pre-renal / hemodynamic changes. Moderate hydronephrosis seen on ultrasound, PVRs 200-300 but patient refusing straight cath. Due to Cr at baseline, will continue to monitor and not escalate need to straight cath at this time.    - Baseline Creatinine: had been ~ 1.8-2 in Feb 2025, appears to be 1.4-1.6 since Mar 2025.    - Proteinuria: Not checked recently   - DSA Hx: Unknown due to being transplanted at another Transplant Center   - Last cPRA: unknown   - BK Viremia: Not checked recently due to time from transplant   - Kidney Tx Biopsy Hx: Unknown.     # Immunosuppression: Tacrolimus immediate release (goal 4-6) and Mycophenolate mofetil (dose 500 mg every 12 hours)   - Induction with Recent Transplant:  unknown, done at outside facility   - Continue with intensive monitoring of immunosuppression for efficacy and toxicity.   - Historical Changes in Immunosuppression: unknown   - Changes: No    # Infection Prevention:   Last CD4 Level: Unknown  - PJP: None      - CMV IgG Ab High Risk Discordance (D+/R-) at time of transplant: Unknown  Present CMV Serostatus: Unknown  - EBV IgG Ab  High Risk Discordance (D+/R-) at time of transplant: Unknown  Present EBV Serostatus: Unknown    # Blood Pressure volume: Controlled;  Goal BP: < 130/80   - Overall euvolemic on exam.    - On Torsemide 10 mg every day and metoprolol succinate 75 mg every day.   - Changes: Not at this time    # Diabetes: Borderline control (HbA1c 7-9%) Last HbA1c: 7.9% 3/2025    # Anemia in Chronic Renal Disease: Hgb: Trend down      SIOBHAN: No   - Iron studies: Low iron saturation on Iron supplement    # Mineral Bone Disorder:    - Secondary renal hyperparathyroidism; PTH level: Not checked recently        On treatment: Calcitriol  - Vitamin D; level: Not checked recently        On supplement: No  - Calcium; level: Normal        On supplement: No  - Phosphorus; level: Normal        On supplement: No    # Electrolytes:   - Potassium; level: Normal        On supplement:no, on veltassa  - Magnesium; level: Low normal        On supplement: No  - Bicarbonate; level: Low        On supplement: Yes     # Gangrene and osteomyelitis of right foot s/p Transmetatarsal Amputation 5/7    # Other Significant PMH:   - Atrial flutter: On Eliquis PTA              - HFrEF: presumed ischemic given past maricel pre-transplant with area of ischemia and possible history of stent. On Toprol, jardiance and torsemide. Echocardiogram 02/25/25 with new drop in LVEF to ~ 45%, he may need further work-up.               - CAD: Possible history of stent.               - PAD s/p left AKA  - History of CVA/TIA               - Sickle cell trait              - History of hepatitis C.               - Urinary Retention: - Tamsulosin 0.8mg PO daily    # Transplant History:  Etiology of Kidney Failure: Diabetes mellitus type 2, HTN   Tx: DDKT  Transplant: N/A  Significant transplant-related complications: None    Recommendations were communicated to the primary team via [employer].    RUFINO Billingsley CNP  Transplant Nephrology  Contact information via Vocera Web Console      Interval History  Mr. Callahan creatinine is 1.55 (05/16 0711); Stable, near normal.  Good urine output.  Other significant labs/tests/vitals: VSS  no events overnight.  no chest pain or shortness of breath.  no leg swelling.  no nausea and vomiting.  Bowel movements are formed.  no fever, sweats or chills.     Review of Systems   4 point ROS was obtained and negative except as noted in the Interval History.    MEDICATIONS:  Current Facility-Administered Medications   Medication Dose Route Frequency Provider Last Rate Last Admin    apixaban ANTICOAGULANT (ELIQUIS) tablet 5 mg  5 mg Oral BID Eugenio Trivedi PA-C   5 mg at 05/15/25 1947    aspirin (ASA) chewable tablet 81 mg  81 mg Oral Daily Vinh Crews MD   81 mg at 05/15/25 0943    atorvastatin (LIPITOR) tablet 40 mg  40 mg Oral At Bedtime Vinh Crews MD   40 mg at 05/15/25 2222    brinzolamide-brimonidine (SIMBRINZA) 1-0.2 % ophthalmic suspension 1 drop  1 drop Ophthalmic BID Vinh Crews MD   1 drop at 05/15/25 1947    calcitRIOL (ROCALTROL) capsule 0.25 mcg  0.25 mcg Oral Once per day on Monday Tuesday Wednesday Thursday Friday Vinh Crews MD   0.25 mcg at 05/15/25 1001    [Held by provider] empagliflozin (JARDIANCE) tablet 10 mg  10 mg Oral QAM Eugenio Trivedi PA-C   10 mg at 05/11/25 0903    ferrous sulfate (FEROSUL) tablet 325 mg  325 mg Oral Every Other Day Vinh Crews MD   325 mg at 05/14/25 0907    insulin aspart (NovoLOG) injection (RAPID ACTING)  1-7 Units Subcutaneous TID Eugenio Kim PA-C   1 Units at 05/15/25 1714    insulin aspart (NovoLOG) injection (RAPID ACTING)  1-5 Units Subcutaneous At Bedtime Eugenio Trivedi PA-C   1 Units at 05/13/25 2234    insulin glargine (LANTUS PEN) injection 5 Units  5 Units Subcutaneous QAM Tressa Shaw PA-C   5 Units at 05/15/25 0946    latanoprost (XALATAN) 0.005 % ophthalmic solution 1 drop  1 drop Both Eyes At Bedtime Vinh Crews MD   1 drop at  05/15/25 2222    metFORMIN (GLUCOPHAGE XR) 24 hr tablet 500 mg  500 mg Oral Daily with Tressa Singh PA-C   500 mg at 05/15/25 1709    metoprolol succinate ER (TOPROL XL) 24 hr tablet 75 mg  75 mg Oral Daily Vinh Crews MD   75 mg at 05/15/25 0943    mycophenolate (GENERIC EQUIVALENT) tablet 500 mg  500 mg Oral BID Vinh Crews MD   500 mg at 05/15/25 1947    pantoprazole (PROTONIX) EC tablet 40 mg  40 mg Oral Daily Vinh Crews MD   40 mg at 05/15/25 0944    patiromer (VELTASSA) packet 8.4 g  8.4 g Oral Weekly Vinh Crews MD   8.4 g at 05/10/25 1403    sodium bicarbonate tablet 1,300 mg  1,300 mg Oral TID Vinh Crews MD   1,300 mg at 05/15/25 194    sodium chloride (PF) 0.9% PF flush 3 mL  3 mL Intracatheter Q8H Vinh Crews MD   3 mL at 25 1938    tacrolimus (GENERIC EQUIVALENT) capsule 2 mg  2 mg Oral BID IS Vinh Crews MD   2 mg at 05/15/25 170    tamsulosin (FLOMAX) capsule 0.8 mg  0.8 mg Oral Daily Vinh Crews MD   0.8 mg at 05/15/25 0944    [Held by provider] torsemide (DEMADEX) tablet 10 mg  10 mg Oral BID Eugenio Trivedi PA-C   10 mg at 05/10/25 0946     Current Facility-Administered Medications   Medication Dose Route Frequency Provider Last Rate Last Admin       Physical Exam   Temp  Av.8  F (36.6  C)  Min: 97.6  F (36.4  C)  Max: 98.2  F (36.8  C)      Pulse  Av.3  Min: 66  Max: 75 Resp  Av.5  Min: 18  Max: 20  SpO2  Av.2 %  Min: 97 %  Max: 100 %     BP (!) 144/88 (BP Location: Left arm, Patient Position: Semi-Osorio's, Cuff Size: Adult Regular)   Pulse 82   Temp 98.2  F (36.8  C) (Oral)   Resp 16   Wt 77.4 kg (170 lb 10.2 oz)   SpO2 95%   BMI 23.14 kg/m     Date 25 - 25 0659   Shift 8550-1667 8810-8566 4800-4075 24 Hour Total   INTAKE   P.O. 337   337   Shift Total 337   337   OUTPUT   Shift Total       Weight (kg)          Admit       GENERAL APPEARANCE: alert and no distress  HENT:  mouth without ulcers or lesions  RESP: lungs clear to auscultation - no rales, rhonchi or wheezes  CV: JVP elevated but not collapsible (had to interpret as he likely has central stenosis), regular rhythm, normal rate, no rub, no murmur  EDEMA: no LE edema bilaterally  ABDOMEN: soft, nondistended, nontender, bowel sounds normal  MS: Left BKA. Right TMA.  SKIN: Right foot with mild swelling, dry gangrenous toes to 1st -3rd digit.   TX KIDNEY: normal, surgical scar well approximated  Dialysis access: Right UE fistula    Data   All labs reviewed by me.  CMP  Recent Labs   Lab 05/16/25  0711 05/15/25  2207 05/15/25  1923 05/15/25  1713 05/15/25  0949 05/15/25  0939 05/14/25  0915 05/14/25  0622 05/13/25  0802 05/13/25  0721     --   --   --   --  138  --  137  --  137   POTASSIUM 4.2  --   --   --   --  3.8  --  4.0  --  3.9   CHLORIDE 105  --   --   --   --  104  --  103  --  105   CO2 22  --   --   --   --  21*  --  23  --  21*   ANIONGAP 10  --   --   --   --  13  --  11  --  11   * 194* 184* 146*   < > 220*   < > 186*   < > 165*   BUN 22.2  --   --   --   --  18.9  --  17.7  --  23.5*   CR 1.55*  --   --   --   --  1.51*  --  1.49*  --  1.45*   GFRESTIMATED 49*  --   --   --   --  51*  --  51*  --  53*   QUE 9.2  --   --   --   --  9.4  --  9.6  --  9.3    < > = values in this interval not displayed.     CBC  Recent Labs   Lab 05/16/25  0711 05/15/25  0939 05/14/25  0622 05/13/25  0720   HGB 10.4* 11.0* 11.2* 10.9*   WBC 5.6 5.2 5.5 5.0   RBC 4.11* 4.38* 4.48 4.30*   HCT 33.7* 34.9* 37.5* 34.4*   MCV 82 80 84 80   MCH 25.3* 25.1* 25.0* 25.3*   MCHC 30.9* 31.5 29.9* 31.7   RDW 14.8 14.8 14.7 15.1*    273 250 229     INR  No lab results found in last 7 days.    ABG  Recent Labs   Lab 05/10/25  1146   O2PER 1      Urine Studies  Recent Labs   Lab Test 05/04/25  1538 03/24/25  1036 03/22/25  1837 03/03/25  1508   COLOR Light Yellow Light Yellow Light Yellow Light Yellow   APPEARANCE Clear Clear Clear  Clear   URINEGLC >=1000* >=1000* 300* >=1000*   URINEBILI Negative Negative Negative Negative   URINEKETONE Negative Negative Negative Negative   SG 1.023 1.010 1.008 1.016   UBLD Negative Negative Negative Negative   URINEPH 7.0 7.0 7.0 6.5   PROTEIN Negative Negative Negative Negative   NITRITE Negative Negative Negative Negative   LEUKEST Negative Trace* Large* Trace*   RBCU <1 <1 <1 3*   WBCU 2 4 36* 8*     No lab results found.  PTH  No lab results found.  Iron Studies  Recent Labs   Lab Test 03/10/25  0744   IRON 23*   *   IRONSAT 17   HAMMAD 1,062*       IMAGING:  All imaging studies reviewed by me.

## 2025-05-16 NOTE — PLAN OF CARE
Goal Outcome Evaluation:      Plan of Care Reviewed With: patient    Overall Patient Progress: no changeOverall Patient Progress: no change       Time 9492-1976    /70 (BP Location: Left arm)   Pulse 67   Temp 98.2  F (36.8  C) (Oral)   Resp 16   Wt 77.4 kg (170 lb 10.2 oz)   SpO2 95%   BMI 23.14 kg/m      Reason for admission:   Activity: SBA w/ Gb/W  Pain: denies  Neuro: A/Ox3-4 int forgetful  Cardiac: WDL  Respiratory: Wdl on RA  GI/: voids spontaneously, frequency   Diet: regular  Lines: see LDA  Wounds: L BKA, R LE amputation w/ dressing and boot  Labs/imaging: ACHS BG      New changes this shift: no acute changes over shift      Plan: continue w/ POC

## 2025-05-16 NOTE — PROGRESS NOTES
Podiatry Progress Note    Date: May 16, 2025      ASSESSMENT/PLAN:  Patient seen s/p from right transmetatarsal amputation with percutaneous tendo Achilles lengthening procedure.    Changes in my recommendation, continue nonweightbearing    No changes otherwise, strict NWB right side, CAM boot all times except for dressing changes. Nursing dressing orders placed, instructions in orders and below, will allow appropriate skins checks, if new areas arise let me know or consult [facility] team for stable non-surgical wounds    DRESSING: Right transmetatarsal site, and right posterior Achilles  - To be changed every other day  1.)  Takedown soft dressing over the transmetatarsal amputation site as well as the 3 suture areas to the Achilles tendon region on the right side.  2.)  Can spray the incision areas with wound spray and dry.  3.)  Paint the TMA incision with Betadine, as well as the 3 small incision areas on the Achilles tendon region with Betadine.  Then cover these areas with Adaptic  4.)  For the posterior leg/Achilles area can cover with just a layer 2 of gauze.  For the TMA site can cover with a layer 2 of gauze as well as an ABD across the front.  5.)  Secure all of this with Kerlix and a light layer of Ace, if it appears that the Ace is uncomfortable or causing any sort of skin breakdown can discontinue Ace and cover with tetra net/Surgilast in order to keep the kerlix dressing intact    Please continue strict nonweightbearing to the right lower extremity, please have patient keep the cam boot on at all times with heel securely fastened posteriorly and ankle at 90 degrees before strapping him in.    _____________________________________________  - Patient will need to be strict nonweightbearing given he has major amputation site to the right foot as well as percutaneous Achilles tendon release, nonweightbearing likely to be minimum of 3 to 4 weeks.  Once stitches are out, which will likely be at the 3 or 4-week  connor he can begin doing light therapies upright in the cam boot at all times for the next 2 to 3 weeks and then can slowly transition out of the cam boot with easing into stretching of the Achilles with physical therapy.  I would like the cam boot on at all times during this initial 3 to 4 weeks as it will help keep the ankle joint fixated 90 degrees given tendo Achilles lengthening.  At any time if patient notes that he is unable to wear the cam boot we can transition to something like a posterior splint.  But the cam boot can be easily taken on and off and requires a less bulky dressing be much easier for dressing changes for the surgical site.    OBJECTIVE:    BP (!) 144/88 (BP Location: Left arm, Patient Position: Semi-Osorio's, Cuff Size: Adult Regular)   Pulse 82   Temp 98.2  F (36.8  C) (Oral)   Resp 16   Wt 77.4 kg (170 lb 10.2 oz)   SpO2 98%   BMI 23.14 kg/m       Lab Results   Component Value Date    WBC 7.2 05/09/2025     Lab Results   Component Value Date    RBC 4.10 05/09/2025     Lab Results   Component Value Date    HGB 10.4 05/09/2025     Lab Results   Component Value Date    HCT 33.7 05/09/2025     Lab Results   Component Value Date    MCV 82 05/09/2025     Lab Results   Component Value Date    MCH 25.4 05/09/2025     Lab Results   Component Value Date    MCHC 30.9 05/09/2025     Lab Results   Component Value Date    RDW 15.0 05/09/2025     Lab Results   Component Value Date     05/09/2025           PHYSICAL EXAM:    Derm: TMA incision site is stable at this time, all sutures intact, there is some mild strikethrough bleeding noted seems to be coming from the medial third portion of the incision line, there are no sutures that are popped, no signs or symptoms of infection, TMA site is soft and nontender.    The posterior heel wound is stable at this time, eschar like soft tissue covering, no drainage, no probe to bone, no deep structure involvement    Incision sites to the posterior  leg/Achilles are healing as expected without signs or symptoms of infection        Vascular: Right lower extremity is warm to touch, there is good cap refill to the amputation site, no dusky appearance, edema improved    MSK: Status post transmetatarsal amputation right side, no bruising noted    Neuro: Patient with right lower extremity              Kwesi Arreola DPM  Pager: [phone]

## 2025-05-16 NOTE — PROGRESS NOTES
[facility]    Medicine Progress Note - Hospitalist Service, GOLD TEAM 4    Date of Admission:  5/3/2025    Assessment & Plan   Tad Zaman is a 64 year old male with hypertension, chronic systolic heart failure, type 2 diabetes, peripheral artery disease status post left BKA, right femoral-tibial bypass (3/20/2025), right diabetic foot ulcer, osteomyelitis, history of MI, status post renal transplant, CKD stage III, who was admitted to [facility] 5/3/2025 for further evaluation and treatment of right severe diabetic foot ulcer.      Changes Today:  - Restart PTA Jardiance today   - Will need BMP recheck in one week  - Hopeful for discharge home tomorrow     Gangrene and osteomyelitis of right foot s/p Transmetatarsal Amputation 5/7  PAD s/p Left BKA and Right F-T stent  Presented with history of progressive R diabetic foot ulcer with outpatient plans for R TMA, but having concern for worsening swelling and foul odor. He had recent Arterial doppler on 5/2 with good stent flow. Workup in ED reassuring with WBC WNL, Lactate 1.9, CRP and ESR WNL. LLE Duplex negative for DVT. CT with contrast concerning for soft tissue gas which is favored to be from direct extension from the area of ulceration as opposed to gas-formin organism. Vascular surgery did not recommend acute vascular procedure at this time, advised that transmetatarsal amputation is a viable option at this time, but patient does remain at high risk of needing further amputation in the future.  Podiatry performed transmetatarsal amputation on 5/7 and clean margins were obtained.   Antibiotics discontinued per ID recommendations.  On heparin for 1 day following amputation, PTA Eliquis 5 mg twice daily resumed 5/8.  - Podiatry following   - Strict non weight bearing of right lower extremity  - Follow bone aerobic and anaerobic cultures, fungal culture, and pathology  - Preliminary Tissue culture is now growing  Alcaligenes faecalis (ID aware, no antibiotics needed)  - Anaerobic cultures with enterococcus faecalis (ID aware, no antibiotics needed)  - Vascular surgery input appreciated  - Blood flow is tenuous to R foot. Per our conversation, it is reasonable to pursue TMA but note pt is high risk of needing additional amputation in the future   - Will need lifelong AC following surgery   - Follow up with Vascular in 3 months, sooner if TMA fails to heal as he may require further angioplasty    - Continue Eliquis 5 mg BID   - Continue PTA ASA for now   - Daily CBC, BMP     S/P Renal transplant  MOY 2/2 possible Acute Tubular Necrosis vs pre-renal MOY, improving  CKD3  Cr baseline around ~1.4-1.6. Cr on admit 2.15. Renal transplant US 5/3 with moderate hydronephrosis of the transplant kidney, that improved post void. Elevated resistive indices suggestive of renal transplant dysfunction.  Creatinine very labile during hospital stay and transplant nephrology involved with care. Jardiance and Torsemide placed back on hold after being restarted.  Repeat renal ultrasound 5/11 showed improvement in hydronephrosis. IVF bolus given to patient per transplant nephrology recs. Nephrology advised straight cathing for PVR of 300, but patient refused. Overall, kidney function improving despite this.   - Transplant nephrology following   - Follow creatine/cystatin C  - Restart Jardiance today 5/19  - Continue to hold torsemide  - If tolerating empagliflozin without issue for multiple days, can restart torsemide at 10 mg once daily  - If tolerating once daily, can increase to PTA twice daily dosing.   - Continue PTA Tacro 2 mg BID  - Continue PTA Mycophenolate 500 mg BID for now  - Continue PTA sodium bicarb and Patiromer     Disorientation, ?improving   Hospital acquired delirium   Chronic Right Cerebellar infarct  Underlying cognitive impairment  Patient with acute onset disorientation the morning of 5/8 following TMA. No focal neuro  deficits were noted at this time. Prior to resumption of anticoagulation, a CT head was obtained which was negative for acute abnormality (did show chronic infarct). Work-up otherwise unremarkable. Considered cefepime as possible etiology and this was discontinued on 5/8 but no change in disorientation. Per discussion with patient's daughter he has had similar episodes of disorientation during prior hospital stays and has taken a few days to improve- suspect patient has sensitive cognitive function in context of prior cerebellar infarct. Psych was consulted on 5/14 and determined that patient has capacity to make his own decisions.   - Continue to monitor   - Delirium precautions       DM2  Last A1C 7.9 on 3/3/25. Outpatient regimen includes Metformin and Jardiance, is not on home insulin. Metformin and Jardiance both on hold. Started on insulin, but blood sugars remain above goal. Due to need for tight blood sugar control, will restart evening metformin. Of note, patient was on Insulin in the past, but has not been in several years. Diabetes educator met with patient 5/16 and patient comfortable   - Continue PTA metformin  - Restart Jardiance today  - Continue Lantus to 5 units in the AM  - Continue medium intensity sliding scale  - Blood glucose checks QID and at bedtime  - Hypoglycemia protocol   - Diabetes educator consult     Deconditioning  Patient with deconditioning in the setting of amputation, prolonged hospitalization. PT/OT initially recommended TCU, but family unsure if they feel comfortable taking patient home. Ongoing discussions at this time.   - SW following for assistance with discharge  - PT/OT following    History of CVA/TIA  Chronic AC, ASA  He is on dual coverage with Apixaban and ASA for history of CVA/TIA and PAD per family. Apixaban held on admission given need for procedure.   - AC as above   - ASA plan above     Hypertension  Chronic systolic heart failure/HFmrEF  History of chronic  systolic heart failure, MI but no clear history of CAD or cardiac revascularization. He had TTE on 2/25/25 with EF of 40-45% and mild pulmonary hypertension likely Group 2. No signs of acute HF exacerbation.  - Continue PTA Metoprolol   - Continue PTA Statin     BPH Continue PTA Tamsulosin.  Glaucoma Continue PTA gtts.            Diet: Regular Diet Adult  Snacks/Supplements Adult: Other; please allow patient/family to order supplements with meals; With Meals    DVT Prophylaxis: DOAC  Zaman Catheter: Not present  Lines: None     Cardiac Monitoring: None  Code Status: Full Code      Clinically Significant Risk Factors                             # DMII: A1C = 7.9 % (Ref range: <5.7 %) within past 6 months    # Moderate Malnutrition: based on nutrition assessment and treatment provided per dietitian's recommendations.      # Financial/Environmental Concerns: none         Social Drivers of Health    Tobacco Use: Medium Risk (4/22/2025)    Patient History     Smoking Tobacco Use: Former     Smokeless Tobacco Use: Never     Passive Exposure: Never          Disposition Plan     Medically Ready for Discharge: Anticipated Tomorrow           The patient's care was discussed with the Attending Physician, Dr. Ham, Care Coordinator/,  Patient, PT/OT and nephrology Consultant(s).    Tressa Barreto PA-C  Hospitalist Service, GOLD TEAM 4  [facility]  Securely message with [employer] (more info)  Text page via [employer] Paging/Directory   See signed in provider for up to date coverage information  ______________________________________________________________________    Interval History   Patient feeling well. Comfortable about discharging home, but has not been able to get in touch with his family. Hoping for tomorrow.     Physical Exam   Vital Signs: Temp: 98.2  F (36.8  C) Temp src: Oral BP: (!) 144/88 Pulse: 82   Resp: 16 SpO2: 98 % O2 Device: None (Room air)    Weight: 170  lbs 10.18 oz    General Appearance: Comfortable, nontoxic appearing male seen laying in bed.  Eyes: PERRLA.  No conjunctival icterus.  HEENT: Atraumatic.  Respiratory: Breathing comfortably on room air.  Lymph/Hematologic: No bruising on exposed skin.  Skin: No lesions or rashes noted on exposed skin.  Musculoskeletal: Moving all extremities spontaneously.  Neurologic: Cranial nerves II through XII grossly intact. A&O to person, situation. In the hospital, but states the year is 2519.  Psychiatric: Mood appropriate.      Medical Decision Making       45 MINUTES SPENT BY ME on the date of service doing chart review, history, exam, documentation & further activities per the note.      Data   Imaging results reviewed over the past 24 hrs:   No results found for this or any previous visit (from the past 24 hours).  Recent Labs   Lab 05/16/25  1342 05/16/25  0711 05/15/25  2207 05/15/25  0949 05/15/25  0939 05/14/25  0915 05/14/25  0622   WBC  --  5.6  --   --  5.2  --  5.5   HGB  --  10.4*  --   --  11.0*  --  11.2*   MCV  --  82  --   --  80  --  84   PLT  --  228  --   --  273  --  250   NA  --  137  --   --  138  --  137   POTASSIUM  --  4.2  --   --  3.8  --  4.0   CHLORIDE  --  105  --   --  104  --  103   CO2  --  22  --   --  21*  --  23   BUN  --  22.2  --   --  18.9  --  17.7   CR  --  1.55*  --   --  1.51*  --  1.49*   ANIONGAP  --  10  --   --  13  --  11   QUE  --  9.2  --   --  9.4  --  9.6   * 188* 194*   < > 220*   < > 186*    < > = values in this interval not displayed.

## 2025-05-16 NOTE — PLAN OF CARE
[id number]  Goal Outcome Evaluation:      Plan of Care Reviewed With: patient    Overall Patient Progress: no changeOverall Patient Progress: no change     Vital signs:  Temp: 98.2  F (36.8  C) Temp src: Oral BP: 115/70 Pulse: 67   Resp: 16 SpO2: 95 % O2 Device: None (Room air)    Activity: Ax1 with walker and prosthetic leg   Neuro: A&O x4, forgetful but easily redirected  Cardiac: WDL, denies chest pain  Respiratory: denies SOB, on RA  GI/: voiding with bedside urinal, x1 smear BM this shift  Diet: Regular, no intake this shift.  Skin: L BKA with prosthetic leg, RLE ACE wrapped and CAM boot on, non- weight bearing   Lines:  PIV SL  Drains: none   Labs: reviewed  Pain/ nausea: denies both     New changes this shift: no acute changes      Plan:  continue POC

## 2025-05-16 NOTE — PLAN OF CARE
Goal Outcome Evaluation:  Vitals:    05/15/25 1506 05/15/25 2202 05/16/25 0645 05/16/25 1651   BP: 136/80 115/70 (!) 144/88 135/81   BP Location: Left arm Left arm Left arm Right arm   Patient Position:   Semi-Osorio's    Cuff Size:   Adult Regular    Pulse: 72 67 82 73   Resp: 16 16 16 16   Temp: 98  F (36.7  C) 98.2  F (36.8  C) 98.2  F (36.8  C) 98.6  F (37  C)   TempSrc: Oral Oral Oral Oral   SpO2: 99% 95% 98% 97%   Weight:           Neuro: alert and oriented     VS:afebrile vitally stable sating 97% on room air non labor breathing     BG: with sliding scale coverage, teaching has been done by endocrine     Cardiac: 73    Pain/Nausea: denies any nausea or pain     Lines/Drains: left PIV , right fistula thrill and bruit     Incision/Wound: right transmetatarsal amputation dressing changed this AM     GI/: adequate urine out put, had large BM     Diet/Appetite:tolerating intake     Activity:up with gait belt, walker and left prosthesis,     Plan:  possible discharge tomorrow, continue with plan of care.

## 2025-05-17 ENCOUNTER — APPOINTMENT (OUTPATIENT)
Dept: PHYSICAL THERAPY | Facility: CLINIC | Age: 65
End: 2025-05-17
Payer: MEDICARE

## 2025-05-17 LAB
ANION GAP SERPL CALCULATED.3IONS-SCNC: 11 MMOL/L (ref 7–15)
BUN SERPL-MCNC: 24 MG/DL (ref 8–23)
CALCIUM SERPL-MCNC: 9.3 MG/DL (ref 8.8–10.4)
CHLORIDE SERPL-SCNC: 106 MMOL/L (ref 98–107)
CREAT SERPL-MCNC: 1.57 MG/DL (ref 0.67–1.17)
EGFRCR SERPLBLD CKD-EPI 2021: 48 ML/MIN/1.73M2
ERYTHROCYTE [DISTWIDTH] IN BLOOD BY AUTOMATED COUNT: 15.1 % (ref 10–15)
GLUCOSE BLDC GLUCOMTR-MCNC: 100 MG/DL (ref 70–99)
GLUCOSE BLDC GLUCOMTR-MCNC: 166 MG/DL (ref 70–99)
GLUCOSE BLDC GLUCOMTR-MCNC: 181 MG/DL (ref 70–99)
GLUCOSE SERPL-MCNC: 171 MG/DL (ref 70–99)
HCO3 SERPL-SCNC: 23 MMOL/L (ref 22–29)
HCT VFR BLD AUTO: 33.2 % (ref 40–53)
HGB BLD-MCNC: 10.4 G/DL (ref 13.3–17.7)
MCH RBC QN AUTO: 25.4 PG (ref 26.5–33)
MCHC RBC AUTO-ENTMCNC: 31.3 G/DL (ref 31.5–36.5)
MCV RBC AUTO: 81 FL (ref 78–100)
PLATELET # BLD AUTO: 248 10E3/UL (ref 150–450)
POTASSIUM SERPL-SCNC: 4 MMOL/L (ref 3.4–5.3)
RBC # BLD AUTO: 4.1 10E6/UL (ref 4.4–5.9)
SODIUM SERPL-SCNC: 140 MMOL/L (ref 135–145)
TACROLIMUS BLD-MCNC: 4.4 UG/L (ref 5–15)
TME LAST DOSE: ABNORMAL H
TME LAST DOSE: ABNORMAL H
WBC # BLD AUTO: 5.3 10E3/UL (ref 4–11)

## 2025-05-17 PROCEDURE — 250N000013 HC RX MED GY IP 250 OP 250 PS 637: Performed by: HOSPITALIST

## 2025-05-17 PROCEDURE — 36415 COLL VENOUS BLD VENIPUNCTURE: CPT | Performed by: NURSE PRACTITIONER

## 2025-05-17 PROCEDURE — 97530 THERAPEUTIC ACTIVITIES: CPT | Mod: GP

## 2025-05-17 PROCEDURE — 99232 SBSQ HOSP IP/OBS MODERATE 35: CPT | Performed by: STUDENT IN AN ORGANIZED HEALTH CARE EDUCATION/TRAINING PROGRAM

## 2025-05-17 PROCEDURE — 80197 ASSAY OF TACROLIMUS: CPT | Performed by: NURSE PRACTITIONER

## 2025-05-17 PROCEDURE — 120N000002 HC R&B MED SURG/OB UMMC

## 2025-05-17 PROCEDURE — 85014 HEMATOCRIT: CPT

## 2025-05-17 PROCEDURE — 250N000012 HC RX MED GY IP 250 OP 636 PS 637: Performed by: HOSPITALIST

## 2025-05-17 PROCEDURE — 99207 PR NO BILLABLE SERVICE THIS VISIT: CPT

## 2025-05-17 PROCEDURE — 250N000013 HC RX MED GY IP 250 OP 250 PS 637: Performed by: STUDENT IN AN ORGANIZED HEALTH CARE EDUCATION/TRAINING PROGRAM

## 2025-05-17 PROCEDURE — 80048 BASIC METABOLIC PNL TOTAL CA: CPT

## 2025-05-17 PROCEDURE — 999N000147 HC STATISTIC PT IP EVAL DEFER

## 2025-05-17 PROCEDURE — 99233 SBSQ HOSP IP/OBS HIGH 50: CPT | Mod: 24 | Performed by: INTERNAL MEDICINE

## 2025-05-17 PROCEDURE — 250N000013 HC RX MED GY IP 250 OP 250 PS 637

## 2025-05-17 RX ORDER — METFORMIN HYDROCHLORIDE 500 MG/1
500 TABLET, EXTENDED RELEASE ORAL
Qty: 14 TABLET | Refills: 0 | Status: SHIPPED | OUTPATIENT
Start: 2025-05-17

## 2025-05-17 RX ORDER — CALCITRIOL 0.25 UG/1
0.25 CAPSULE, LIQUID FILLED ORAL DAILY
Qty: 14 CAPSULE | Refills: 0 | Status: SHIPPED | OUTPATIENT
Start: 2025-05-17

## 2025-05-17 RX ORDER — BLOOD PRESSURE TEST KIT
KIT MISCELLANEOUS
Qty: 200 EACH | Refills: 1 | Status: SHIPPED | OUTPATIENT
Start: 2025-05-17

## 2025-05-17 RX ORDER — TAMSULOSIN HYDROCHLORIDE 0.4 MG/1
0.8 CAPSULE ORAL DAILY
Qty: 30 CAPSULE | Refills: 0 | Status: SHIPPED | OUTPATIENT
Start: 2025-05-17

## 2025-05-17 RX ORDER — PANTOPRAZOLE SODIUM 40 MG/1
40 TABLET, DELAYED RELEASE ORAL DAILY
Qty: 14 TABLET | Refills: 0 | Status: SHIPPED | OUTPATIENT
Start: 2025-05-17

## 2025-05-17 RX ORDER — FERROUS SULFATE 325(65) MG
325 TABLET ORAL EVERY OTHER DAY
Qty: 7 TABLET | Refills: 0 | Status: SHIPPED | OUTPATIENT
Start: 2025-05-17

## 2025-05-17 RX ORDER — METOPROLOL SUCCINATE 25 MG/1
75 TABLET, EXTENDED RELEASE ORAL DAILY
Qty: 42 TABLET | Refills: 0 | Status: SHIPPED | OUTPATIENT
Start: 2025-05-17

## 2025-05-17 RX ORDER — INSULIN ASPART 100 [IU]/ML
INJECTION, SOLUTION INTRAVENOUS; SUBCUTANEOUS
Qty: 15 ML | Refills: 0 | Status: SHIPPED | OUTPATIENT
Start: 2025-05-17

## 2025-05-17 RX ORDER — TACROLIMUS 1 MG/1
2 CAPSULE ORAL 2 TIMES DAILY
Qty: 56 CAPSULE | Refills: 0 | Status: SHIPPED | OUTPATIENT
Start: 2025-05-17

## 2025-05-17 RX ORDER — SODIUM BICARBONATE 650 MG/1
1300 TABLET ORAL 3 TIMES DAILY
Qty: 60 TABLET | Refills: 1 | Status: SHIPPED | OUTPATIENT
Start: 2025-05-17

## 2025-05-17 RX ORDER — MYCOPHENOLATE MOFETIL 500 MG/1
500 TABLET ORAL 2 TIMES DAILY
Qty: 28 TABLET | Refills: 0 | Status: SHIPPED | OUTPATIENT
Start: 2025-05-17

## 2025-05-17 RX ORDER — ASPIRIN 81 MG/1
81 TABLET, CHEWABLE ORAL DAILY
Qty: 14 TABLET | Refills: 0 | Status: SHIPPED | OUTPATIENT
Start: 2025-05-17

## 2025-05-17 RX ORDER — CONTAINER,EMPTY
EACH MISCELLANEOUS
Qty: 1 EACH | Refills: 1 | Status: SHIPPED | OUTPATIENT
Start: 2025-05-17

## 2025-05-17 RX ORDER — ATORVASTATIN CALCIUM 40 MG/1
40 TABLET, FILM COATED ORAL AT BEDTIME
Qty: 14 TABLET | Refills: 0 | Status: SHIPPED | OUTPATIENT
Start: 2025-05-17

## 2025-05-17 RX ADMIN — INSULIN ASPART 1 UNITS: 100 INJECTION, SOLUTION INTRAVENOUS; SUBCUTANEOUS at 07:34

## 2025-05-17 RX ADMIN — SODIUM BICARBONATE 1300 MG: 650 TABLET ORAL at 20:14

## 2025-05-17 RX ADMIN — LATANOPROST 1 DROP: 50 SOLUTION/ DROPS OPHTHALMIC at 22:43

## 2025-05-17 RX ADMIN — EMPAGLIFLOZIN 10 MG: 10 TABLET, FILM COATED ORAL at 07:35

## 2025-05-17 RX ADMIN — ATORVASTATIN CALCIUM 40 MG: 40 TABLET, FILM COATED ORAL at 22:43

## 2025-05-17 RX ADMIN — TACROLIMUS 2 MG: 1 CAPSULE ORAL at 18:00

## 2025-05-17 RX ADMIN — ASPIRIN 81 MG CHEWABLE TABLET 81 MG: 81 TABLET CHEWABLE at 07:32

## 2025-05-17 RX ADMIN — BRINZOLAMIDE/BRIMONIDINE TARTRATE 1 DROP: 10; 2 SUSPENSION/ DROPS OPHTHALMIC at 07:35

## 2025-05-17 RX ADMIN — MYCOPHENOLATE MOFETIL 500 MG: 500 TABLET, FILM COATED ORAL at 07:31

## 2025-05-17 RX ADMIN — TAMSULOSIN HYDROCHLORIDE 0.8 MG: 0.4 CAPSULE ORAL at 07:31

## 2025-05-17 RX ADMIN — APIXABAN 5 MG: 5 TABLET, FILM COATED ORAL at 07:32

## 2025-05-17 RX ADMIN — SODIUM BICARBONATE 1300 MG: 650 TABLET ORAL at 07:31

## 2025-05-17 RX ADMIN — PANTOPRAZOLE SODIUM 40 MG: 40 TABLET, DELAYED RELEASE ORAL at 07:31

## 2025-05-17 RX ADMIN — MYCOPHENOLATE MOFETIL 500 MG: 500 TABLET, FILM COATED ORAL at 20:14

## 2025-05-17 RX ADMIN — PATIROMER 8.4 G: 8.4 POWDER, FOR SUSPENSION ORAL at 10:55

## 2025-05-17 RX ADMIN — APIXABAN 5 MG: 5 TABLET, FILM COATED ORAL at 20:14

## 2025-05-17 RX ADMIN — METOPROLOL SUCCINATE 75 MG: 25 TABLET, EXTENDED RELEASE ORAL at 07:35

## 2025-05-17 RX ADMIN — TACROLIMUS 2 MG: 1 CAPSULE ORAL at 07:32

## 2025-05-17 RX ADMIN — METFORMIN ER 500 MG 500 MG: 500 TABLET ORAL at 17:00

## 2025-05-17 ASSESSMENT — ACTIVITIES OF DAILY LIVING (ADL)
ADLS_ACUITY_SCORE: 41

## 2025-05-17 NOTE — PLAN OF CARE
Goal Outcome Evaluation:  Vitals:    05/16/25 0645 05/16/25 1651 05/16/25 2208 05/17/25 0530   BP: (!) 144/88 135/81 119/67 124/71   BP Location: Left arm Left arm Left arm Left arm   Patient Position: Semi-Osorio's      Cuff Size: Adult Regular      Pulse: 82 73 74 80   Resp: 16 16 16 18   Temp: 98.2  F (36.8  C) 98.6  F (37  C) 98.2  F (36.8  C) 98.2  F (36.8  C)   TempSrc: Oral Oral Oral Oral   SpO2: 98% 97% 100% 100%   Weight:        Gangrene and Osteomyelitis of right foot, S/P transmetatarsal amputation,  Alert and forgetful at time, underlying cognitive Impairment, wound dressing changed   Left BKA prostheses, up with walker and gait belt, denies any pain or nausea,   Did not order meal this AM, PIV SL, right arm fistula thrill and bruit,   Voiding adequate, +BS passing gas +BM, discharge script written, family member wants pt to be discharge to TCU because of not comfortable helping with dressing change, the medical team aware of that,   As a result pt refused blood sugar check and to stop taking his schedule medication,   Currently waiting for his daughter to get here this afternoon to discuss with discharge planning.  Continue  with plan of care.

## 2025-05-17 NOTE — PROGRESS NOTES
Care Management Follow Up    Length of Stay (days): 14    Expected Discharge Date: 05/17/2025     Concerns to be Addressed: discharge planning     Patient plan of care discussed at interdisciplinary rounds: Yes    Anticipated Discharge Disposition: Home, Home Care    Anticipated Discharge Services: None  Anticipated Discharge DME: None    Patient/family educated on Medicare website which has current facility and service quality ratings: no  Education Provided on the Discharge Plan:  patient  Patient/Family in Agreement with the Plan: yes    Referrals Placed by CM/SW: External Care Coordination, Homecare  Private pay costs discussed: Not applicable    Discussed  Partnership in Safe Discharge Planning  document with patient/family: No     Handoff Completed: Yes, non-[facility] PCP: External handoff communication completed    Additional Information:  RNCC was contacted by [facility] with concerns about discharge to home with home care. Patient will need 3x week wound care. Per REYNA, family is not wanting patient to discharge home. Per therapies, patient able to go home and psych stated patient can make own decisions. REYNA asked when home care would see patient for SOC (PADMINI). RNCC called [facility] and spoke with on call team, Brie Vieyra RN, who stated they can see patient on Monday for PADMINI, wound care and teach caregiver.   RNCC updated REYNA.    [facility] CARE PADMINI RN/PT/OT  [address]  [facility] [address]  P: [phone]  F: [phone]  RNCC sent home care orders to agency.     Next Steps:   [x] Obtain Home Care PADMINI orders and send to [facility]  [] O Dr Julius Cesar at   Family to transport    Mary Grace Cervantes RNCC Float  5/17/2025  Nurse Coordinator      Social Work and Care Management Department   SEARCHABLE in [facility] - search CARE COORDINATOR   [facility] ([phone]) Saturday & Sunday; ([phone]) [facility] Recognized Holidays   Units: 5A Onc 5204 - 0344 RNCC,  5A Onc  5220 thru 5240 RNCC, 5C OFFSERVICE [phone] RNCC & 5C OFF SERVICE [phone] RNCC   Units: 6B Vocera, 6C Card 6401 thru 6420 RNCC, 6C Card 6502 thru 6514 RNCC & 6C Card 6515 thru 6519 RNCC    Units: 7A SOT RNCC Vocera, 7B Med Surg Vocera, 7C Med Surg 7401 thru 7418 RNCC & 7C Med Surg 7502 thru 7521 RNCC   Units: 6A Vocera & 4A CVICU Vocera, 4C MICU Vocera, and 4E SICU Vocera     Units: 5 Ortho Vocera & 5 Med Surg Vocera    Units: 6 Med Surg Vocera & 8 Med Surg Vocera        Mary Grace Cervantes

## 2025-05-17 NOTE — PROGRESS NOTES
HARLAN met with pt at bedside to provide a new IMM due to the previous one expiring. Pt agreed to the medicare rights and appeals and signed the document. Pt declined the copy as he already had a copy from 5/14. HARLAN then faxed document over and filed it in the pt's binder.     INES Jett   REILLY  [facility]  Available via Vocera  Unit [address]B HARLAN  Ph: [phone]

## 2025-05-17 NOTE — PROGRESS NOTES
[facility]  Transplant Nephrology Progress Note  Date of Admission:  5/3/2025  Today's Date: 05/17/2025    Recommendations:   - No new recommendations today.   - Continue to hold torsemide.   - Empagliflozin restarted on 05/16. If tolerating empagliflozin without issue for multiple days, can restart torsemide at 10 mg once daily.  If tolerating once daily, can increase to PTA twice daily dosing.   - Please have BMP checked within a week of restarting empagliflozin.   - Recommend close follow-up with cardiology outpatient.   - Continue current immunosuppression.     Assessment & Plan   # DDKT and MOY: Stable. Good UO. The Cr rise seemed to track with starting empagliflozin and torsemide, will continue to hold and restart slowly in time. Patient overall euvolemic on exam so it's likely pre-renal / hemodynamic changes. Moderate hydronephrosis seen on ultrasound, PVRs 200-300 but patient refusing straight cath. Due to Cr at baseline, will continue to monitor and not escalate need to straight cath at this time.    - Baseline Creatinine: had been ~ 1.8-2 in Feb 2025, appears to be 1.4-1.6 since Mar 2025.    - Proteinuria: Not checked recently   - DSA Hx: Unknown due to being transplanted at another Transplant Center   - Last cPRA: unknown   - BK Viremia: Not checked recently due to time from transplant   - Kidney Tx Biopsy Hx: Unknown.     # Immunosuppression: Tacrolimus immediate release (goal 4-6) and Mycophenolate mofetil (dose 500 mg every 12 hours)   - Induction with Recent Transplant:  unknown, done at outside facility   - Continue with intensive monitoring of immunosuppression for efficacy and toxicity.   - Historical Changes in Immunosuppression: unknown   - Changes: No    # Infection Prevention:   Last CD4 Level: Unknown  - PJP: None      - CMV IgG Ab High Risk Discordance (D+/R-) at time of transplant: Unknown  Present CMV Serostatus: Unknown  - EBV IgG Ab High Risk  Discordance (D+/R-) at time of transplant: Unknown  Present EBV Serostatus: Unknown    # Blood Pressure volume: Controlled;  Goal BP: < 130/80   - Overall euvolemic on exam.    - Changes: Not at this time    # Diabetes: Borderline control (HbA1c 7-9%) Last HbA1c: 7.9% 3/2025    # Anemia in Chronic Renal Disease: Hgb: Stable, low      SIOBHAN: No   - Iron studies: Low iron saturation on Iron supplement    # Mineral Bone Disorder:    - Secondary renal hyperparathyroidism; PTH level: Not checked recently        On treatment: Calcitriol  - Vitamin D; level: Not checked recently        On supplement: No  - Calcium; level: Normal        On supplement: No  - Phosphorus; level: Normal        On supplement: No    # Electrolytes:   - Potassium; level: Normal        On supplement:no, on veltassa  - Magnesium; level: Low normal        On supplement: No  - Bicarbonate; level: Normal        On supplement: Yes     # Gangrene and osteomyelitis of right foot s/p Transmetatarsal Amputation 5/7    # Other Significant PMH:   - Atrial flutter: On Eliquis PTA              - HFrEF: presumed ischemic given past maricel pre-transplant with area of ischemia and possible history of stent. On Toprol, jardiance and torsemide. Echocardiogram 02/25/25 with new drop in LVEF to ~ 45%, he may need further work-up.               - CAD: Possible history of stent.               - PAD s/p left AKA  - History of CVA/TIA               - Sickle cell trait              - History of hepatitis C.               - Urinary Retention: - Tamsulosin 0.8mg PO daily    # Transplant History:  Etiology of Kidney Failure: Diabetes mellitus type 2, HTN   Tx: DDKT  Transplant: N/A  Significant transplant-related complications: None    Recommendations were communicated to the primary team via this note.    Seen and discussed with Dr. Trung Chamorro, PA-C  Transplant Nephrology    Interval History  Mr. Zaman's creatinine is 1.57 (05/17 0616); Stable.  Fair urine  output.  Other significant labs/tests/vitals: tacrolimus level 4.4 ~12 hr trough  No events overnight.  No chest pain or shortness of breath.  No leg swelling.  No nausea and vomiting.  No fever, sweats or chills.      Review of Systems   4 point ROS was obtained and negative except as noted in the Interval History.    MEDICATIONS:  Current Facility-Administered Medications   Medication Dose Route Frequency Provider Last Rate Last Admin    apixaban ANTICOAGULANT (ELIQUIS) tablet 5 mg  5 mg Oral BID Eugenio Trivedi PA-C   5 mg at 05/17/25 0732    aspirin (ASA) chewable tablet 81 mg  81 mg Oral Daily Vinh Crews MD   81 mg at 05/17/25 0732    atorvastatin (LIPITOR) tablet 40 mg  40 mg Oral At Bedtime Vinh Crews MD   40 mg at 05/16/25 2118    brinzolamide-brimonidine (SIMBRINZA) 1-0.2 % ophthalmic suspension 1 drop  1 drop Ophthalmic BID Vinh Crews MD   1 drop at 05/17/25 0735    calcitRIOL (ROCALTROL) capsule 0.25 mcg  0.25 mcg Oral Once per day on Monday Tuesday Wednesday Thursday Friday Vinh rCews MD   0.25 mcg at 05/16/25 0846    empagliflozin (JARDIANCE) tablet 10 mg  10 mg Oral QAM Tressa Barreto PA-C   10 mg at 05/17/25 0735    ferrous sulfate (FEROSUL) tablet 325 mg  325 mg Oral Every Other Day Vinh Crews MD   325 mg at 05/16/25 0826    insulin aspart (NovoLOG) injection (RAPID ACTING)  1-7 Units Subcutaneous TID AC Eugenio Trivedi PA-C   1 Units at 05/17/25 0734    insulin aspart (NovoLOG) injection (RAPID ACTING)  1-5 Units Subcutaneous At Bedtime Eugenio Trivedi PA-C   1 Units at 05/13/25 2234    insulin glargine (LANTUS PEN) injection 5 Units  5 Units Subcutaneous QAM Tressa Shaw PA-C   5 Units at 05/17/25 0732    latanoprost (XALATAN) 0.005 % ophthalmic solution 1 drop  1 drop Both Eyes At Bedtime Vinh Crews MD   1 drop at 05/16/25 2118    metFORMIN (GLUCOPHAGE XR) 24 hr tablet 500 mg  500 mg Oral Daily with Tressa Singh PA-C    500 mg at 25 1751    metoprolol succinate ER (TOPROL XL) 24 hr tablet 75 mg  75 mg Oral Daily Vinh Crews MD   75 mg at 25 0735    mycophenolate (GENERIC EQUIVALENT) tablet 500 mg  500 mg Oral BID Vinh Crews MD   500 mg at 25 0731    pantoprazole (PROTONIX) EC tablet 40 mg  40 mg Oral Daily Vinh Crews MD   40 mg at 25 0731    patiromer (VELTASSA) packet 8.4 g  8.4 g Oral Weekly Vinh Crews MD   8.4 g at 25 1055    sodium bicarbonate tablet 1,300 mg  1,300 mg Oral TID Vinh Crews MD   1,300 mg at 25 0731    sodium chloride (PF) 0.9% PF flush 3 mL  3 mL Intracatheter Q8H Vinh Crews MD   3 mL at 25 1058    tacrolimus (GENERIC EQUIVALENT) capsule 2 mg  2 mg Oral BID IS Vinh Crews MD   2 mg at 25 0732    tamsulosin (FLOMAX) capsule 0.8 mg  0.8 mg Oral Daily Vinh Crews MD   0.8 mg at 25 0731    [Held by provider] torsemide (DEMADEX) tablet 10 mg  10 mg Oral BID Eugenio Trivedi PA-C   10 mg at 05/10/25 0946     Current Facility-Administered Medications   Medication Dose Route Frequency Provider Last Rate Last Admin       Physical Exam   Temp  Av.8  F (36.6  C)  Min: 97.6  F (36.4  C)  Max: 98.2  F (36.8  C)      Pulse  Av.3  Min: 66  Max: 75 Resp  Av.5  Min: 18  Max: 20  SpO2  Av.2 %  Min: 97 %  Max: 100 %     /71 (BP Location: Left arm)   Pulse 80   Temp 98.2  F (36.8  C) (Oral)   Resp 18   Wt 77.4 kg (170 lb 10.2 oz)   SpO2 100%   BMI 23.14 kg/m     Date 25 0700 - 25 0659   Shift 0948-6421 [id number] 24 Hour Total   INTAKE   P.O. 337   337   Shift Total 337   337   OUTPUT   Shift Total       Weight (kg)          Admit       GENERAL APPEARANCE: alert and no distress  HENT: mouth without ulcers or lesions  RESP: lungs clear to auscultation - no rales, rhonchi or wheezes  CV: regular rhythm, normal rate, no rub, no murmur  EDEMA: no LE edema  bilaterally  ABDOMEN: soft, nondistended, nontender, bowel sounds normal  MS: Left BKA. Right TMA.  TX KIDNEY: normal, surgical scar well approximated  Dialysis access: Right UE fistula    Data   All labs reviewed by me.  CMP  Recent Labs   Lab 05/17/25  0734 05/17/25  0616 05/17/25  0245 05/16/25  2202 05/16/25  1342 05/16/25  0711 05/15/25  0949 05/15/25  0939 05/14/25  0915 05/14/25  0622   NA  --  140  --   --   --  137  --  138  --  137   POTASSIUM  --  4.0  --   --   --  4.2  --  3.8  --  4.0   CHLORIDE  --  106  --   --   --  105  --  104  --  103   CO2  --  23  --   --   --  22  --  21*  --  23   ANIONGAP  --  11  --   --   --  10  --  13  --  11   * 171* 181* 153*   < > 188*   < > 220*   < > 186*   BUN  --  24.0*  --   --   --  22.2  --  18.9  --  17.7   CR  --  1.57*  --   --   --  1.55*  --  1.51*  --  1.49*   GFRESTIMATED  --  48*  --   --   --  49*  --  51*  --  51*   QUE  --  9.3  --   --   --  9.2  --  9.4  --  9.6    < > = values in this interval not displayed.     CBC  Recent Labs   Lab 05/17/25  0616 05/16/25  0711 05/15/25  0939 05/14/25 0622   HGB 10.4* 10.4* 11.0* 11.2*   WBC 5.3 5.6 5.2 5.5   RBC 4.10* 4.11* 4.38* 4.48   HCT 33.2* 33.7* 34.9* 37.5*   MCV 81 82 80 84   MCH 25.4* 25.3* 25.1* 25.0*   MCHC 31.3* 30.9* 31.5 29.9*   RDW 15.1* 14.8 14.8 14.7    228 273 250     INR  No lab results found in last 7 days.    ABG  Recent Labs   Lab 05/10/25  1146   O2PER 1      Urine Studies  Recent Labs   Lab Test 05/04/25  1538 03/24/25  1036 03/22/25  1837 03/03/25  1508   COLOR Light Yellow Light Yellow Light Yellow Light Yellow   APPEARANCE Clear Clear Clear Clear   URINEGLC >=1000* >=1000* 300* >=1000*   URINEBILI Negative Negative Negative Negative   URINEKETONE Negative Negative Negative Negative   SG 1.023 1.010 1.008 1.016   UBLD Negative Negative Negative Negative   URINEPH 7.0 7.0 7.0 6.5   PROTEIN Negative Negative Negative Negative   NITRITE Negative Negative Negative Negative    LEUKEST Negative Trace* Large* Trace*   RBCU <1 <1 <1 3*   WBCU 2 4 36* 8*     No lab results found.  PTH  No lab results found.  Iron Studies  Recent Labs   Lab Test 03/10/25  0744   IRON 23*   *   IRONSAT 17   HAMMAD 1,062*       IMAGING:  All imaging studies reviewed by me.

## 2025-05-17 NOTE — DISCHARGE SUMMARY
[facility]  Hospitalist Discharge Summary      Date of Admission:  5/3/2025  Date of Discharge:  5/18/2025  Discharging Provider: Bahman Ham MD  Discharge Service: Hospitalist Service, GOLD TEAM 4    Discharge Diagnoses   Gangrene and osteomyelitis of right foot s/p Transmetatarsal Amputation 5/7  PAD s/p Left BKA and Right F-T stent  S/P Renal transplant  MOY 2/2 possible Acute Tubular Necrosis vs pre-renal MOY, resolved  CKD3  Disorientation, ?improving   Hospital acquired delirium   Chronic Right Cerebellar infarct  Underlying cognitive impairment    Disposition  Deconditioning    Clinically Significant Risk Factors     # DMII: A1C = 7.9 % (Ref range: <5.7 %) within past 6 months  # Moderate Malnutrition: based on nutrition assessment and treatment provided per dietitian's recommendations.      Follow-ups Needed After Discharge   Follow-up Appointments       ADULT [facility] Specialty Follow-up and recommended labs and tests      - Vascular Surgery: Follow-up with vascular surgery on 8/13  - Podiatry: You will be contacted to schedule follow-up with podiatry. [facility] Please call to schedule something with them in the next 1-2 weeks.  - Cardiology: Please schedule follow-up with [facility] within 1-2 weeks given medications were changed during hospital stay.   - Nephrology: Please see your [facility]rologist [facility] as you do routinely.     Appointments on [place] and/or [facility] (with [facility] or [facility] provider or service). Call [phone] if you haven't heard regarding these appointments within 7 days of discharge.        Hospital Follow-up with Existing Primary Care Provider (PCP)          Schedule Primary Care visit within: 7 Days   Recommended labs and Imaging (to be ordered by Primary Care Provider): BMP               Unresulted Labs Ordered in the Past 30 Days of this Admission       Date and Time Order  Name Status Description    5/7/2025  4:07 PM Fungal or Yeast Culture Routine Preliminary     5/7/2025 10:25 AM Acid-Fast Bacilli Culture and Stain In process         These results will be followed up by hospitalist result pool    Discharge Disposition   Discharged to home with family assist  Condition at discharge: Stable    Hospital Course   Tad Zaman is a 64 year old male with hypertension, chronic systolic heart failure, type 2 diabetes, peripheral artery disease status post left BKA, right femoral-tibial bypass (3/20/2025), right diabetic foot ulcer, osteomyelitis, history of MI, status post renal transplant, CKD stage III, who was admitted to [facility] 5/3/2025-5/17/2025 for further evaluation and treatment of right severe diabetic foot ulcer and underwent transmetatarsal amputation. Discharging home with family assist.      Gangrene and osteomyelitis of right foot s/p Transmetatarsal Amputation 5/7  PAD s/p Left BKA and Right F-T stent  Presented with history of progressive R diabetic foot ulcer with outpatient plans for R TMA, but having concern for worsening swelling and foul odor. He had recent Arterial doppler on 5/2 with good stent flow. Workup in ED reassuring with WBC WNL, Lactate 1.9, CRP and ESR WNL. LLE Duplex negative for DVT. CT with contrast concerning for soft tissue gas which was favored to be from direct extension from the area of ulceration as opposed to gas-formin organism. Vascular surgery did not recommend acute vascular procedure at this time, advised that transmetatarsal amputation is a viable option at this time, but patient does remain at high risk of needing further amputation in the future.  Podiatry performed transmetatarsal amputation on 5/7 and clean margins were obtained.   Antibiotics discontinued per ID recommendations (this was despite growing Alcaligenes faecalis + enterococcus faecalis) given clear margins.  Restarted on PTA Eliquis. Will follow-[facility] with [facility] (referral placed to the  and message sent to Dr. Arreola). He will also follow up with vascular surgery in August. Return precautions provided.     Disposition  Deconditioning  Patient with deconditioning in the setting of amputation, prolonged hospitalization. PT/OT initially recommended TCU, but patient continued to progress with therapies and it was recommended that patient go home with family assist. Concern from family regarding underlying cognitive impairment and that he will not be able to follow instructions at home. They reported dissatisfaction as they were previously told by a vascular surgeon that the patient would go to TCU following amputation. Multiple physical therapists discussed their recommendations with family, but family continues to request TCU. They do not wish for patient to be placed in long term care. Planning for discharge home with family assist 5/17 and close home care with nursing, PT, OT. [facility] also [facility]. Family instructed to present to the ED if any safety concerns.     S/P Renal transplant  MOY 2/2 possible Acute Tubular Necrosis vs pre-renal MOY, resolved  CKD3  Cr baseline around ~1.4-1.6. Cr on admit 2.15. Renal transplant US 5/3 with moderate hydronephrosis of the transplant kidney, that improved post void. Elevated resistive indices suggestive of renal transplant dysfunction.  Creatinine very labile during hospital stay and transplant nephrology involved with care. Jardiance and Torsemide titrated during stay and patient also received IVF. Ultimately was able to restart Jardiance on 5/16. Further plan per transplant at noted below. Recommended he schedule an appointment with [facility] within one week. Immunosuppression medications continued.  Kidney function at baseline on discharge.  - Continue to hold torsemide  - Can restart torsemide at 10 mg once daily per PCP if BMP stable at recheck  - If tolerating once daily, can increase to PTA  twice daily dosing.      Disorientation, ?improving   Hospital acquired delirium   Chronic Right Cerebellar infarct  Underlying cognitive impairment  Patient with underlying cognitive impairment and had MoCA score of 14/30 in March 2025, which was improved from 6/30 in January 2025. Patient with acute onset disorientation the morning of 5/8 following TMA. No focal neuro deficits were noted at this time. Prior to resumption of anticoagulation, a CT head was obtained which was negative for acute abnormality (did show chronic infarct). Work-up otherwise unremarkable. Considered cefepime as possible etiology and this was discontinued on 5/8 but no change in disorientation. Per discussion with patient's daughter, he has had similar episodes of disorientation during prior hospital stays and has taken a few days to improve- suspect patient has sensitive cognitive function in context of prior cerebellar infarct. Psych was consulted on 5/14 and determined that patient has capacity to make his own decisions.       DM2  Last A1C 7.9 on 3/3/25. Outpatient regimen includes Metformin and Jardiance, is not on home insulin. Metformin and Jardiance were initially on hold. Started on insulin, but blood sugars remained above goal. Due to need for tight blood sugar control, restarted metformin and then eventually Jardiance. Electing to send patient home on insulin with close PCP follow-up next week. Plan as noted below. Diabetes educator met with patient and instructions provided (Of note, patient was on Insulin in the past).  - Continue PTA metformin  - Continue PTA Jardiance   - Continue Lantus to 5 units in the AM  - Continue medium intensity sliding scale  - Blood glucose checks four times daily (QID before meals and at bedtime)  - Hypoglycemia protocol   - Follow-up with PCP for ongoing management      History of CVA/TIA  Chronic AC, ASA  He is on dual coverage with Apixaban and ASA for history of CVA/TIA and PAD per family.       Hypertension  Chronic systolic heart failure/HFmrEF  History of chronic systolic heart failure, MI but no clear history of CAD or cardiac revascularization. He had TTE on 2/25/25 with EF of 40-45% and mild pulmonary hypertension likely Group 2. No signs of acute HF exacerbation. PTA HF medications were all continued on discharge.      BPH  PTA Tamsulosin continued.  Glaucoma PTA gtts continued.      Consultations This Hospital Stay   ORTHOPAEDIC SURGERY ADULT/PEDS IP CONSULT  PHARMACY TO DOSE VANCO  VASCULAR SURGERY ADULT IP CONSULT  NURSING TO CONSULT FOR VASCULAR ACCESS CARE IP CONSULT  NEPHROLOGY KIDNEY/PANCREAS TRANSPLANT ADULT IP CONSULT  PHARMACY TO DOSE VANCO  PODIATRY IP CONSULT  WOUND OSTOMY CONTINENCE NURSE  IP CONSULT  NURSING TO CONSULT FOR VASCULAR ACCESS CARE IP CONSULT  NURSING TO CONSULT FOR VASCULAR ACCESS CARE IP CONSULT  CARE MANAGEMENT / SOCIAL WORK IP CONSULT  NURSING TO CONSULT FOR VASCULAR ACCESS CARE IP CONSULT  PHARMACY IP CONSULT  INFECTIOUS DISEASE TRANSPLANT SOT ADULT IP CONSULT  NURSING TO CONSULT FOR VASCULAR ACCESS CARE IP CONSULT  PHYSICAL THERAPY ADULT IP CONSULT  OCCUPATIONAL THERAPY ADULT IP CONSULT  CARE MANAGEMENT / SOCIAL WORK IP CONSULT  PHARMACY IP CONSULT  PSYCHIATRY IP CONSULT  DIABETES EDUCATION IP CONSULT  PHARMACY LIAISON FOR MEDICATION COVERAGE CONSULT  NURSING TO CONSULT FOR VASCULAR ACCESS CARE IP CONSULT    Code Status   Full Code    Time Spent on this Encounter   I, Bahman Ham MD, personally saw the patient today and spent greater than 30 minutes discharging this patient.       Tressa Barreto PA-C  [facility] [address] [address]  Phone: [phone]  ______________________________________________________________________    Physical Exam   Vital Signs: Temp: 98.2  F (36.8  C) Temp src: Oral BP: 124/71 Pulse: 80   Resp: 18 SpO2: 100 % O2 Device: None (Room air)    Weight: 170 lbs 10.18 oz  General Appearance:  Comfortable, nontoxic appearing male seen laying in bed.  Eyes: PERRLA.  No conjunctival icterus.  HEENT: Atraumatic.  Respiratory: Breathing comfortably on room air.  Lung sounds clear to auscultation throughout.  No wheezes, rhonchi, rales.  Cardiovascular: RRR.  No murmurs, rubs, gallops.  GI: Bowel sounds present throughout.  Abdomen soft, nontender.  Lymph/Hematologic: No bruising on exposed skin.  Skin: No lesions or rashes noted on exposed skin.  Musculoskeletal: Moving all extremities spontaneously. S/p BKA left and right TMA in CAM boot.   Neurologic: Cranial nerves II through XII grossly intact.  Psychiatric: Mood appropriate.         Primary Care Physician   Julius Cesar    Discharge Orders      US Lower Extremity Arterial Duplex Bilateral     US AYAN Doppler No Exercise     Home Care Referral      Orthopedic  Referral      Home Care Referral      Activity    Your activity upon discharge: Please continue strict nonweightbearing to the right lower extremity, please have patient keep the cam boot on at all times with heel securely fastened posteriorly and ankle at 90 degrees before strapping him in.     Wound care and dressings    Instructions to care for your wound at home:   DRESSING: Right transmetatarsal site, and right posterior Achilles  - To be changed every other day  1.)  Takedown soft dressing over the transmetatarsal amputation site as well as the 3 suture areas to the Achilles tendon region on the right side.  2.)  Can spray the incision areas with wound spray and dry.  3.)  Paint the TMA incision with Betadine, as well as the 3 small incision areas on the Achilles tendon region with Betadine.  Then cover these areas with Adaptic  4.)  For the posterior leg/Achilles area can cover with just a layer 2 of gauze.  For the TMA site can cover with a layer 2 of gauze as well as an ABD across the front.  5.)  Secure all of this with Kerlix and a light layer of Ace, if it appears that the Ace  is uncomfortable or causing any sort of skin breakdown can discontinue Ace and cover with tetra net/Surgilast in order to keep the kerlix dressing intact     Please continue strict nonweightbearing to the right lower extremity, please have patient keep the cam boot on at all times with heel securely fastened posteriorly and ankle at 90 degrees before strapping him in.     Resume Home Care Services     ADULT [facility] Specialty Follow-up and recommended labs and tests    - Vascular Surgery: Follow-up with vascular surgery on 8/13  - Podiatry: You will be contacted to schedule follow-up with podiatry. [facility]. Please call to schedule something with them in the next 1-2 weeks.  - Cardiology: Please schedule follow-up with your cardiologist at the [facility] within 1-2 weeks given medications were changed during hospital stay.   - Nephrology: Please see your nephrologist at the [facility] as you do routinely.     Appointments on [place] and/or [facility] (with [facility] or [facility] provider or service). Call [phone] if you haven't heard regarding these appointments within 7 days of discharge.     Reason for your hospital stay    Admitted for a partial amputation of your foot. You will need to do wound cares every other day as instructed in your discharge instructions until you see podiatry (at the  or at the [facility]) for follow-up. The home health nurse can assist with this- they will visit on Monday, 5/19.     Your medications were continued for the most part except for torsemide, which was held as your kidneys were irritated. You should see your primary care doctor within one week for hospital follow-up and repeat labs. At that time, they can consider restarting your Torsemide.    You were also started on long acting and short acting insulin as noted in your discharge instructions. Your blood sugars were slightly above goal, so you will need to monitor this while at home. You should follow-up  with your PCP for further instructions.    Please seek medical attention if you develop fever, shortness of breath, increasing pain/redness/discharge from your lower extremity, shortness of breath or chest pain.     Diet    Follow this diet upon discharge: Current Diet:Orders Placed This Encounter      Snacks/Supplements Adult: Other; please allow patient/family to order supplements with meals; With Meals      Regular Diet Adult     Hospital Follow-up with Existing Primary Care Provider (PCP)            Significant Results and Procedures   Most Recent 3 CBC's:  Recent Labs   Lab Test 05/17/25  0616 05/16/25  0711 05/15/25  0939   WBC 5.3 5.6 5.2   HGB 10.4* 10.4* 11.0*   MCV 81 82 80    228 273     Most Recent 3 BMP's:  Recent Labs   Lab Test 05/17/25  0734 05/17/25  0616 05/17/25  0245 05/16/25  1342 05/16/25  0711 05/15/25  0949 05/15/25  0939   NA  --  140  --   --  137  --  138   POTASSIUM  --  4.0  --   --  4.2  --  3.8   CHLORIDE  --  106  --   --  105  --  104   CO2  --  23  --   --  22  --  21*   BUN  --  24.0*  --   --  22.2  --  18.9   CR  --  1.57*  --   --  1.55*  --  1.51*   ANIONGAP  --  11  --   --  10  --  13   QUE  --  9.3  --   --  9.2  --  9.4   * 171* 181*   < > 188*   < > 220*    < > = values in this interval not displayed.   ,   Results for orders placed or performed during the hospital encounter of 05/03/25   CT Foot Right w Contrast    Narrative    EXAM: CT FOOT RIGHT W CONTRAST  LOCATION: [facility]  DATE: 05/03/2025    INDICATION: History of gangrene, right foot; progressive wounds, erythema, edema.  COMPARISON: Same-day radiographs.  TECHNIQUE: IV contrast. Axial, sagittal, and coronal thin-section reconstruction. Dose reduction techniques were used.   CONTRAST: 99 mL Iso 370.    FINDINGS: Again seen are changes of advanced Charcot arthropathy with fragmentation, fracture, and malalignment within the midtarsal and midfoot region  as well as the 1st MTP joint. Dorsal dislocation of the 2nd MTP joint again seen.    Remote healed fracture deformity of the distal fibular shaft. Bones are demineralized.    There is some soft tissue swelling and soft tissue gas along the superficial aspect of the plantar great toe, presumably associated with an area of ulceration. Along the distal phalanx/tuft of the great toe there is soft tissue gas at an area of skin   breakdown/ulceration which abuts the bony cortex of the distal phalanx where there is underlying erosive change, concerning for distal phalangeal osteomyelitis. Small foci of soft tissue gas extend proximally to the level of the proximal phalanx of the   great toe.    There is soft tissue stranding and irregularity along the distal aspect of the 2nd toe without soft tissue gas or well-defined abscess.    There is no well-defined rim-enhancing fluid collection on CT with attention to the great toe, although poorly-defined abscess is not excluded.    Fatty atrophy of intrinsic musculature of the foot.    More global diffuse soft tissue swelling in the subcutaneous tissues with skin thickening.    Chronic bony proliferative change along the distal syndesmosis of the ankle.      Impression    IMPRESSION:  1.  Soft tissue deficiency, ulceration, and gas along the plantar aspect of the great toe with underlying erosive change of the distal phalanx, concerning for osteomyelitis. Soft tissue gas extends proximally to the level of the proximal phalanx of the   great toe where there is soft tissue irregularity. Findings are favored to be related to gas from direct extension from the area of ulceration as opposed to a gas-forming organism, although this is difficult to completely rule-out on CT alone.  2.  Soft tissue stranding and irregularity distal aspect of the 2nd toe without discrete abscess or evidence for osteomyelitis at this time. Note is made that there is dorsal dislocation of the 2nd MTP  joint.  3.  No well-defined rim-enhancing fluid collection on CT with attention to the great toe, although poorly-defined abscess is not excluded as there is poorly-defined circumferential soft tissue swelling.  4.  More global soft tissue swelling about the foot, which can be seen with cellulitis as well as noninfectious causes of edema.  5.  Advanced Charcot arthropathy as described.     NOTE: ABNORMAL REPORT    THE DICTATION ABOVE DESCRIBES AN ABNORMALITY FOR WHICH FOLLOWUP IS NEEDED.    XR Foot Right 2 Views    Narrative    EXAM: XR FOOT RIGHT 2 VIEWS  LOCATION: [facility]  DATE: 5/3/2025    INDICATION: Question of subcutaneous gas on the foot.  COMPARISON: 06/21/2016.      Impression    IMPRESSION:  Severe Charcot arthropathy involving the midfoot and midtarsal region, with fracture, fragmentation and malalignment as well as soft tissue swelling, progressed from 06/21/2016. There is midfoot collapse and soft tissue swelling. Dorsal   dislocation 2nd MTP joint. Osteonecrosis distal tibial shaft and healed fracture deformity distal fibular shaft.    Extensive atherosclerotic vascular calcifications. No definitive evidence for soft tissue gas on these 2 views, noting there is soft tissue swelling.   US Lower Extremity Venous Duplex Right    Narrative    EXAM: US LOWER EXTREMITY VENOUS DUPLEX RIGHT  LOCATION: [facility]  DATE: 5/3/2025    INDICATION: Right lower leg edema.  COMPARISON: None.  TECHNIQUE: Venous Duplex ultrasound of the right lower extremity with and without compression, augmentation and duplex. Color flow and spectral Doppler with waveform analysis performed.    FINDINGS: Exam includes the common femoral, femoral, popliteal, and contralateral common femoral veins as well as segmentally visualized deep calf veins and greater saphenous vein.     RIGHT: No deep vein thrombosis. No superficial thrombophlebitis.  No popliteal cyst.      Impression    IMPRESSION:  No deep venous thrombosis in the right lower extremity.   US Renal Transplant with Doppler    Narrative    EXAM: US RENAL TRANSPLANT WITH DOPPLER  LOCATION: [facility]  DATE: 5/3/2025    INDICATION: Acute kidney injury, hx renal transplant  COMPARISON: 3/3/2025  TECHNIQUE: Ultrasound of the renal transplant with Doppler waveform spectral analysis.    FINDINGS: The transplant kidney is located in the right lower quadrant. The transplant kidney is normal in echogenicity. There is no cortical thinning. Moderate hydronephrosis. This slightly improved post void. There is no calculus, cyst or mass. There   is no perinephric fluid.    The urinary bladder is normal. There is no post-void residual.    TRANSPLANT DOPPLER:    The main renal artery peak systolic velocity is normal (less than 200 cm/sec). The intra-renal transplant resistive index (RI) are abnormal (>0.8).       Impression    IMPRESSION:   1.  Moderate hydronephrosis of the transplant kidney. This slightly improved post void.  2.  Elevated resistive indices suggestive of renal transplant dysfunction.     POC US Guidance Needle Placement    Impression    Right popliteal sciatic nerve block and right adductor canal nerve block   CT Head w/o Contrast    Narrative    EXAM: Head CT without contrast 5/8/2025 11:28 AM    HISTORY: Acute altered mental status    COMPARISON: Brain MRI without contrast 2/26/2025. Outside institution  head CT without contrast 1/18/2025, brain MRI 1/29/2025, 1/24/2025.    TECHNIQUE: Helical CT images from the skull base to the vertex were  obtained without the intravenous administration of iodinated contrast  media.     FINDINGS:  No intracranial hemorrhage, mass effect, midline shift, or extra-axial  fluid collection. Gray/white matter differentiation in both cerebral  hemispheres is normal. Ventricles are proportionate to the cerebral  sulci.  Mild/moderate cerebral atrophy. Basal cisterns are clear.  Chronic lacunar infarct right cerebellar hemisphere. Basal ganglia  calcifications. Diffuse vascular calcifications. Minimal  leukoaraiosis.     Atraumatic calvarium and skull base. Atlantodental degenerative  changes. Bilateral pseudophakia. Visualized portions of the paranasal  sinuses are clear. Mastoid air cells are clear. No soft tissue  swelling.       Impression    IMPRESSION:   1. No acute intracranial pathology.   2. Chronic right cerebellar infarct.  3. Mild leukoaraiosis and mild to moderate cerebral atrophy.    I have personally reviewed the examination and initial interpretation  and I agree with the findings.    PRISCILA GARCIA MD         SYSTEM ID:  [id number]   US Renal Transplant without Doppler    Narrative    EXAMINATION:  RENAL TRANSPLANT,  5/11/2025 11:17 AM     COMPARISON: 5/3/2025    HISTORY: Follow up on hydronephrosis    TECHNIQUE:  Grey-scale, color Doppler and spectral flow analysis.    FINDINGS:  The transplant kidney is located in the right lower quadrant, and  measures 11.1 cm. Parenchyma is of normal thickness and echogenicity.  No focal lesions. No perinephric fluid collection. Mild to moderate  hydronephrosis, improved from prior scan. The bladder is well  distended. Post void images demonstrate residual urine within the  bladder and no change in transplant hydronephrosis.    Doppler evaluation not performed per transplant team.      Impression    IMPRESSION: Improved hydronephrosis of the transplant kidney but with  persistent pelvocaliectasis post void.    I have personally reviewed the examination and initial interpretation  and I agree with the findings.    CHARLOTTE MARTINO MD         SYSTEM ID:  [id number]       Discharge Medications   Current Discharge Medication List        START taking these medications    Details   Alcohol Swabs PADS Use to swab the area of the injection or didi as directed Per insurance  coverage  Qty: 200 each, Refills: 1    Associated Diagnoses: Type 2 diabetes mellitus with other diabetic kidney complication, without long-term current use of insulin (H)      blood glucose (NO BRAND SPECIFIED) lancets standard To use to test glucose level in the blood Use to test blood sugar four times a day (TID before meals and at bedtime). To accompany glucose monitor brands per insurance coverage.  Qty: 200 each, Refills: 1    Associated Diagnoses: Type 2 diabetes mellitus with other diabetic kidney complication, without long-term current use of insulin (H)      blood glucose (NO BRAND SPECIFIED) test strip To use to test glucose level in the blood Use to test blood sugar four times a day (TID before meals and at bedtime). To accompany glucose monitor brands per insurance coverage.  Qty: 50 strip, Refills: 0    Associated Diagnoses: Type 2 diabetes mellitus with other diabetic kidney complication, without long-term current use of insulin (H)      blood glucose monitoring (NO BRAND SPECIFIED) meter device kit Please check blood sugar four times a day (TID before meals and at bedtime).  Qty: 1 kit, Refills: 0    Associated Diagnoses: Type 2 diabetes mellitus with other diabetic kidney complication, without long-term current use of insulin (H)      insulin aspart (NOVOLOG FLEXPEN) 100 UNIT/ML pen For Pre-Meal  - 189 give 1 unit.  For Pre-Meal  - 239 give 2 units.  For Pre-Meal  - 289 give 3 units.  For Pre-Meal  - 339 give 4 units.  For Pre-Meal - 389 give 5 units.  For Pre-Meal -439 give 6 units For Pre-Meal BG greater than or equal to 440 give 7 units.  For BEDTIME: For  - 249 give 1 unit.  For  - 299 give 2 units.  For  - 349 give 3 units.  For  - 399 give 4 units.  For BG greater than or equal to 400 give 5 units.  Qty: 15 mL, Refills: 0    Associated Diagnoses: Type 2 diabetes mellitus with other diabetic kidney complication, without long-term  current use of insulin (H)      insulin glargine (LANTUS PEN) 100 UNIT/ML pen Inject 5 Units subcutaneously every morning.  Qty: 15 mL, Refills: 0    Comments: If Lantus is not covered by insurance, may substitute Basaglar or Semglee or other insulin glargine product per insurance preference at same dose and frequency.    Associated Diagnoses: Type 2 diabetes mellitus with other diabetic kidney complication, without long-term current use of insulin (H)      insulin pen needle (32G X 4 MM) 32G X 4 MM miscellaneous Use as directed by provider Per insurance coverage  Qty: 200 each, Refills: 1    Associated Diagnoses: Type 2 diabetes mellitus with other diabetic kidney complication, without long-term current use of insulin (H)      Sharps Container MISC Use as directed to dispose of needles, lancets and other sharps  Qty: 1 each, Refills: 1    Associated Diagnoses: Type 2 diabetes mellitus with other diabetic kidney complication, without long-term current use of insulin (H)           CONTINUE these medications which have CHANGED    Details   apixaban ANTICOAGULANT (ELIQUIS) 5 MG tablet Take 1 tablet (5 mg) by mouth 2 times daily.  Qty: 28 tablet, Refills: 0    Associated Diagnoses: Anticoagulated; History of MI (myocardial infarction)      aspirin (ASA) 81 MG chewable tablet Take 1 tablet (81 mg) by mouth daily.  Qty: 14 tablet, Refills: 0    Associated Diagnoses: PAD (peripheral artery disease)      atorvastatin (LIPITOR) 40 MG tablet Take 1 tablet (40 mg) by mouth at bedtime.  Qty: 14 tablet, Refills: 0    Associated Diagnoses: PAD (peripheral artery disease)      brinzolamide-brimonidine (SIMBRINZA) 1-0.2 % ophthalmic suspension Apply 1 drop to eye 2 times daily.  Qty: 8 mL, Refills: 0    Associated Diagnoses: Glaucoma suspect, bilateral      calcitRIOL (ROCALTROL) 0.25 MCG capsule Take 1 capsule (0.25 mcg) by mouth daily. TAKE ONE CAPSULE BY MOUTH MONDAY THROUGH FRIDAY FOR BONE HEALTH ** DOSE INCREASE  Qty: 14  capsule, Refills: 0    Associated Diagnoses: Acute kidney failure with tubular necrosis      empagliflozin (JARDIANCE) 25 MG TABS tablet Take 0.5 tablets (12.5 mg) by mouth every morning. TAKE ONE-HALF TABLET BY MOUTH EVERY MORNING FOR DIABETES AND KIDNEY PROTECTION  Qty: 14 tablet, Refills: 1    Associated Diagnoses: Type 2 diabetes mellitus with other diabetic kidney complication, without long-term current use of insulin (H)      ferrous sulfate (FEROSUL) 325 (65 Fe) MG tablet Take 1 tablet (325 mg) by mouth every other day.  Qty: 7 tablet, Refills: 0    Associated Diagnoses: Iron deficiency anemia, unspecified iron deficiency anemia type      Latanoprost PF 0.005 % SOLN Apply 1 drop to eye at bedtime. INSTILL 1 DROP IN BOTH EYES AT BEDTIME FOR GLAUCOMA  Qty: 10 each, Refills: 0    Associated Diagnoses: Glaucoma suspect, bilateral      metFORMIN (GLUCOPHAGE XR) 500 MG 24 hr tablet Take 1 tablet (500 mg) by mouth daily (with dinner).  Qty: 14 tablet, Refills: 0    Associated Diagnoses: Type 2 diabetes mellitus with other diabetic kidney complication, without long-term current use of insulin (H)      metoprolol succinate ER (TOPROL XL) 25 MG 24 hr tablet Take 3 tablets (75 mg) by mouth daily. TAKE THREE TABLETS BY MOUTH EVERY DAY FOR HEART RATE  Qty: 42 tablet, Refills: 0    Associated Diagnoses: History of MI (myocardial infarction)      mycophenolate (GENERIC EQUIVALENT) 500 MG tablet Take 1 tablet (500 mg) by mouth 2 times daily.  Qty: 28 tablet, Refills: 0    Associated Diagnoses: Acute kidney failure with tubular necrosis      pantoprazole (PROTONIX) 40 MG EC tablet Take 1 tablet (40 mg) by mouth daily. TAKE ONE TABLET BY MOUTH EVERY DAY PREVENT GI BLEED  Qty: 14 tablet, Refills: 0    Associated Diagnoses: Gastroesophageal reflux disease with esophagitis without hemorrhage      patiromer (VELTASSA) 8.4 g packet Take 8.4 g by mouth once a week. Take 1 packet (8.4 grams) by mouth once weekly for high  potassium  Qty: 2 packet, Refills: 0    Associated Diagnoses: Acute kidney failure with tubular necrosis      sodium bicarbonate 650 MG tablet Take 2 tablets (1,300 mg) by mouth 3 times daily.  Qty: 60 tablet, Refills: 1    Associated Diagnoses: Renal transplant, status post      tacrolimus (GENERIC EQUIVALENT) 1 MG capsule Take 2 capsules (2 mg) by mouth 2 times daily.  Qty: 56 capsule, Refills: 0    Associated Diagnoses: Renal transplant, status post      tamsulosin (FLOMAX) 0.4 MG capsule Take 2 capsules (0.8 mg) by mouth daily.  Qty: 30 capsule, Refills: 0    Associated Diagnoses: Benign localized prostatic hyperplasia with lower urinary tract symptoms (LUTS)           STOP taking these medications       torsemide (DEMADEX) 10 MG tablet Comments:   Reason for Stopping:             Allergies   Allergies   Allergen Reactions    Penicillins Hives

## 2025-05-17 NOTE — PLAN OF CARE
Goal Outcome Evaluation:      Plan of Care Reviewed With: patient    Overall Patient Progress: no changeOverall Patient Progress: no change    Vitals: Blood pressure 119/67, pulse 74, temperature 98.2  F (36.8  C), temperature source Oral, resp. rate 16, weight 77.4 kg (170 lb 10.2 oz), SpO2 100%.     Pt is A/O x 4, calls appropriately and makes needs known. L BKA and R TMA, Up with assist x1, gait belt, walker, and prosthesis. O2 > 94% on RA. Denies pain and nausea at this time. Tolerating regular diet. No BM this shift, LBM 05/16/25. Voiding without difficulties. R TMA wound dressing CDI. No significant changes this shift, continue POC.

## 2025-05-18 VITALS
HEART RATE: 68 BPM | OXYGEN SATURATION: 100 % | SYSTOLIC BLOOD PRESSURE: 121 MMHG | WEIGHT: 170.64 LBS | RESPIRATION RATE: 16 BRPM | TEMPERATURE: 97 F | BODY MASS INDEX: 23.14 KG/M2 | DIASTOLIC BLOOD PRESSURE: 79 MMHG

## 2025-05-18 LAB
ANION GAP SERPL CALCULATED.3IONS-SCNC: 11 MMOL/L (ref 7–15)
BUN SERPL-MCNC: 26.1 MG/DL (ref 8–23)
CALCIUM SERPL-MCNC: 9.5 MG/DL (ref 8.8–10.4)
CHLORIDE SERPL-SCNC: 106 MMOL/L (ref 98–107)
CREAT SERPL-MCNC: 1.64 MG/DL (ref 0.67–1.17)
EGFRCR SERPLBLD CKD-EPI 2021: 46 ML/MIN/1.73M2
ERYTHROCYTE [DISTWIDTH] IN BLOOD BY AUTOMATED COUNT: 15.3 % (ref 10–15)
GLUCOSE BLDC GLUCOMTR-MCNC: 106 MG/DL (ref 70–99)
GLUCOSE BLDC GLUCOMTR-MCNC: 111 MG/DL (ref 70–99)
GLUCOSE SERPL-MCNC: 95 MG/DL (ref 70–99)
HCO3 SERPL-SCNC: 22 MMOL/L (ref 22–29)
HCT VFR BLD AUTO: 34.5 % (ref 40–53)
HGB BLD-MCNC: 10.7 G/DL (ref 13.3–17.7)
MCH RBC QN AUTO: 25.1 PG (ref 26.5–33)
MCHC RBC AUTO-ENTMCNC: 31 G/DL (ref 31.5–36.5)
MCV RBC AUTO: 81 FL (ref 78–100)
PLATELET # BLD AUTO: 248 10E3/UL (ref 150–450)
POTASSIUM SERPL-SCNC: 4.5 MMOL/L (ref 3.4–5.3)
RBC # BLD AUTO: 4.26 10E6/UL (ref 4.4–5.9)
SODIUM SERPL-SCNC: 139 MMOL/L (ref 135–145)
WBC # BLD AUTO: 4.7 10E3/UL (ref 4–11)

## 2025-05-18 PROCEDURE — 99239 HOSP IP/OBS DSCHRG MGMT >30: CPT | Performed by: STUDENT IN AN ORGANIZED HEALTH CARE EDUCATION/TRAINING PROGRAM

## 2025-05-18 PROCEDURE — 82374 ASSAY BLOOD CARBON DIOXIDE: CPT

## 2025-05-18 PROCEDURE — 85014 HEMATOCRIT: CPT

## 2025-05-18 PROCEDURE — 250N000013 HC RX MED GY IP 250 OP 250 PS 637

## 2025-05-18 PROCEDURE — 250N000012 HC RX MED GY IP 250 OP 636 PS 637: Performed by: HOSPITALIST

## 2025-05-18 PROCEDURE — 250N000013 HC RX MED GY IP 250 OP 250 PS 637: Performed by: HOSPITALIST

## 2025-05-18 PROCEDURE — 250N000013 HC RX MED GY IP 250 OP 250 PS 637: Performed by: STUDENT IN AN ORGANIZED HEALTH CARE EDUCATION/TRAINING PROGRAM

## 2025-05-18 PROCEDURE — 82947 ASSAY GLUCOSE BLOOD QUANT: CPT

## 2025-05-18 PROCEDURE — 36415 COLL VENOUS BLD VENIPUNCTURE: CPT

## 2025-05-18 RX ADMIN — TACROLIMUS 2 MG: 1 CAPSULE ORAL at 09:02

## 2025-05-18 RX ADMIN — METOPROLOL SUCCINATE 75 MG: 25 TABLET, EXTENDED RELEASE ORAL at 09:02

## 2025-05-18 RX ADMIN — TAMSULOSIN HYDROCHLORIDE 0.8 MG: 0.4 CAPSULE ORAL at 09:02

## 2025-05-18 RX ADMIN — EMPAGLIFLOZIN 10 MG: 10 TABLET, FILM COATED ORAL at 09:02

## 2025-05-18 RX ADMIN — SODIUM BICARBONATE 1300 MG: 650 TABLET ORAL at 09:01

## 2025-05-18 RX ADMIN — APIXABAN 5 MG: 5 TABLET, FILM COATED ORAL at 09:02

## 2025-05-18 RX ADMIN — BRINZOLAMIDE/BRIMONIDINE TARTRATE 1 DROP: 10; 2 SUSPENSION/ DROPS OPHTHALMIC at 09:04

## 2025-05-18 RX ADMIN — FERROUS SULFATE TAB 325 MG (65 MG ELEMENTAL FE) 325 MG: 325 (65 FE) TAB at 09:01

## 2025-05-18 RX ADMIN — MYCOPHENOLATE MOFETIL 500 MG: 500 TABLET, FILM COATED ORAL at 09:02

## 2025-05-18 RX ADMIN — PANTOPRAZOLE SODIUM 40 MG: 40 TABLET, DELAYED RELEASE ORAL at 09:02

## 2025-05-18 RX ADMIN — ASPIRIN 81 MG CHEWABLE TABLET 81 MG: 81 TABLET CHEWABLE at 09:05

## 2025-05-18 ASSESSMENT — ACTIVITIES OF DAILY LIVING (ADL)
ADLS_ACUITY_SCORE: 41

## 2025-05-18 NOTE — PLAN OF CARE
Physical Therapy Discharge Summary    Reason for therapy discharge:    Discharged to home with home therapy.    Progress towards therapy goal(s). See goals on Care Plan in [facility] electronic health record for goal details.  Goals partially met.  Barriers to achieving goals:   discharge from facility.    Therapy recommendation(s):    Continued therapy is recommended.  Rationale/Recommendations:  recommend continued  PT services to progress pt's overall activity tolerance and safety with R LE NWB restrictions. Recommend continued crutch training within pt's home environment for safety. Recommend overall strength and endurance training with pt. Recommend continued supervision of mobility by family to ensure NWB precaution adherence, confirmed with pt's daughter's about providing 24/7 support for pt..

## 2025-05-18 NOTE — PROGRESS NOTES
Care Management Discharge Note    Discharge Date: 05/18/2025     Discharge Disposition: Home, Home Care    Discharge Services: Home Care    Discharge DME: None    Discharge Transportation: family or friend will provide    Private pay costs discussed: not at this encounter    Does the patient's insurance plan have a 3 day qualifying hospital stay waiver?  No    PAS Confirmation Code: NA  Patient/family educated on Medicare website which has current facility and service quality ratings: no    Education Provided on the Discharge Plan: Yes  Persons Notified of Discharge Plans: patient  Patient/Family in Agreement with the Plan: yes    Handoff Referral Completed: Yes, non-[facility] PCP: External handoff communication completed Dr Julius Ba at     Additional Information:  Patient discharging today due to patient was unable to secure a ride last night. Per charge, patient's daughter coming today at 8AM.   RNCC sent AVS to [facility] care yesterday and SOC will be 5/19 with RN.   HC RN will change wound dressing and teach caregivers. HC will draw labs per orders.     [facility] RN/PT/OT  [address]  [facility] [address]  P: [phone]  F: [phone]    Mary Grace Cervantes RNCC Float  5/18/2025  Nurse Coordinator  Covering for 7B  Phone [phone]      Social Work and Care Management Department   SEARCHABLE in [facility] - search CARE COORDINATOR   [facility] ([phone]) Saturday & Sunday; ([phone]) [facility] Recognized Holidays   Units: 5A Onc 5201 - [phone] RNCC,  5A Onc 5220 thru 5240 RNCC, 5C OFFSERVICE [phone] RNCC & 5C OFF SERVICE [phone] RNCC   Units: 6B Vocera, 6C Card 6401 thru 6420 RNCC, 6C Card 6502 thru 6514 RNCC & 6C Card 6515 thru 6519 RNCC    Units: 7A SOT RNCC Vocera, 7B Med Surg Vocera, 7C Med Surg 7401 thru 7418 RNCC & 7C Med Surg 7502 thru 7521 RNCC   Units: 6A Vocera & 4A CVICU Vocera, 4C MICU Vocera, and 4E SICU Vocera     Units: 5 Ortho Vocera & 5 Med Surg  Vocera    Units: 6 Med Surg Vocera & 8 Med Surg Vocera

## 2025-05-18 NOTE — PLAN OF CARE
Occupational Therapy Discharge Summary    Reason for therapy discharge:    Discharged to home with home therapy.    Progress towards therapy goal(s). See goals on Care Plan in [facility] electronic health record for goal details.  Goals partially met.  Barriers to achieving goals:   discharge from facility.    Therapy recommendation(s):    Continued therapy is recommended.  Rationale/Recommendations:  Pt would benefit from  OT to promote increased independence with ADL and IADL.  Continue home exercise program.

## 2025-05-18 NOTE — PLAN OF CARE
Goal Outcome Evaluation:  Vitals:    05/17/25 2220 05/18/25 0625 05/18/25 0747 05/18/25 1116   BP: (!) 149/83 135/83 (!) 145/78 121/79   BP Location: Left arm Left arm Left arm Left arm   Pulse: 75 74 88 68   Resp: 16 16 16 16   Temp: 98.2  F (36.8  C) 97.9  F (36.6  C) 97.6  F (36.4  C) 97  F (36.1  C)   TempSrc: Oral Oral Oral Oral   SpO2: 100% 100% 100% 100%   Weight:           Neuro:alert and oriented, underlying cognitive impairment    VS: afebrile vitally stable, sating 100% on room air, non labor breathing     BG: with in parameter, Lantus 5 unit this AM     Cardiac: 68    Pain/Nausea: denies nausea, denies pain     Lines/Drains: PIV removed     Incision/Wound: dressing changed     GI/: voiding adequate, had BM     Diet/Appetite: tolerating intake    Activity: up ambulated with walker    Plan:  discharge script written, teaching has been done, a copy of discharge provided to pt, waiting on family member, continue with plan of care

## 2025-05-18 NOTE — PLAN OF CARE
Evaluated patient this morning.    No acute events overnight.  Hemodynamically stable.  Labs are stable.    No changes to plan. Awaiting family to arrive to discharge today.    Bahman Ham MD  Internal Medicine, Hospitalist Service

## 2025-05-18 NOTE — PLAN OF CARE
Goal Outcome Evaluation:      Plan of Care Reviewed With: patient    Overall Patient Progress: no changeOverall Patient Progress: no change    Vitals: Blood pressure (!) 149/83, pulse 75, temperature 98.2  F (36.8  C), temperature source Oral, resp. rate 16, weight 77.4 kg (170 lb 10.2 oz), SpO2 100%.     Pt is A/O x 4, calls appropriately and makes needs known. L BKA and R TMA, Up with assist x1, gait belt, walker, and prosthesis. O2 > 94% on RA. Denies pain and nausea at this time. Tolerating regular diet. No BM this shift, LBM 05/17/25. Voiding without difficulties. R TMA wound dressing CDI. No significant changes this shift, continue POC           non-distended/non-tender

## 2025-05-19 ENCOUNTER — TELEPHONE (OUTPATIENT)
Dept: FAMILY MEDICINE | Facility: CLINIC | Age: 65
End: 2025-05-19
Payer: MEDICARE

## 2025-05-19 ENCOUNTER — PATIENT OUTREACH (OUTPATIENT)
Dept: CARE COORDINATION | Facility: CLINIC | Age: 65
End: 2025-05-19
Payer: MEDICARE

## 2025-05-19 DIAGNOSIS — Z09 HOSPITAL DISCHARGE FOLLOW-UP: ICD-10-CM

## 2025-05-19 NOTE — PROGRESS NOTES
[facility]    Medicine Progress Note - Hospitalist Service, GOLD TEAM 4    Date of Admission:  5/3/2025    Assessment & Plan   Tad Zaman is a 64 year old male with hypertension, chronic systolic heart failure, type 2 diabetes, peripheral artery disease status post left BKA, right femoral-tibial bypass (3/20/2025), right diabetic foot ulcer, osteomyelitis, history of MI, status post renal transplant, CKD stage III, who was admitted [facility] 5/3/2025 for further evaluation and treatment of right severe diabetic foot ulcer.      Changes Today:  - Attempted discharge today but family did not pick him up.      Gangrene and osteomyelitis of right foot s/p Transmetatarsal Amputation 5/7  PAD s/p Left BKA and Right F-T stent  Presented with history of progressive R diabetic foot ulcer with outpatient plans for R TMA, but having concern for worsening swelling and foul odor. He had recent Arterial doppler on 5/2 with good stent flow. Workup in ED reassuring with WBC WNL, Lactate 1.9, CRP and ESR WNL. LLE Duplex negative for DVT. CT with contrast concerning for soft tissue gas which is favored to be from direct extension from the area of ulceration as opposed to gas-formin organism. Vascular surgery did not recommend acute vascular procedure at this time, advised that transmetatarsal amputation is a viable option at this time, but patient does remain at high risk of needing further amputation in the future.  Podiatry performed transmetatarsal amputation on 5/7 and clean margins were obtained.   Antibiotics discontinued per ID recommendations.  On heparin for 1 day following amputation, PTA Eliquis 5 mg twice daily resumed 5/8.  - Podiatry following              - Strict non weight bearing of right lower extremity  - Follow bone aerobic and anaerobic cultures, fungal culture, and pathology  - Preliminary Tissue culture is now growing Alcaligenes faecalis (ID aware, no  antibiotics needed)  - Anaerobic cultures with enterococcus faecalis (ID aware, no antibiotics needed)  - Vascular surgery input appreciated  - Blood flow is tenuous to R foot. Per our conversation, it is reasonable to pursue TMA but note pt is high risk of needing additional amputation in the future   - Will need lifelong AC following surgery   - Follow up with Vascular in 3 months, sooner if TMA fails to heal as he may require further angioplasty    - Continue Eliquis 5 mg BID   - Continue PTA ASA for now   - Daily CBC, BMP     S/P Renal transplant  MOY 2/2 possible Acute Tubular Necrosis vs pre-renal MOY, improving  CKD3  Cr baseline around ~1.4-1.6. Cr on admit 2.15. Renal transplant US 5/3 with moderate hydronephrosis of the transplant kidney, that improved post void. Elevated resistive indices suggestive of renal transplant dysfunction.  Creatinine very labile during hospital stay and transplant nephrology involved with care. Jardiance and Torsemide placed back on hold after being restarted.  Repeat renal ultrasound 5/11 showed improvement in hydronephrosis. IVF bolus given to patient per transplant nephrology recs. Nephrology advised straight cathing for PVR of 300, but patient refused. Overall, kidney function improving despite this.   - Transplant nephrology following   - Follow creatine/cystatin C  - Restart Jardiance today 5/19  - Continue to hold torsemide  - If tolerating empagliflozin without issue for multiple days, can restart torsemide at 10 mg once daily  - If tolerating once daily, can increase to PTA twice daily dosing.   - Continue PTA Tacro 2 mg BID  - Continue PTA Mycophenolate 500 mg BID for now  - Continue PTA sodium bicarb and Patiromer     Disorientation, ?improving   Hospital acquired delirium   Chronic Right Cerebellar infarct  Underlying cognitive impairment  Patient with acute onset disorientation the morning of 5/8 following TMA. No focal neuro deficits were noted at this time. Prior  to resumption of anticoagulation, a CT head was obtained which was negative for acute abnormality (did show chronic infarct). Work-up otherwise unremarkable. Considered cefepime as possible etiology and this was discontinued on 5/8 but no change in disorientation. Per discussion with patient's daughter he has had similar episodes of disorientation during prior hospital stays and has taken a few days to improve- suspect patient has sensitive cognitive function in context of prior cerebellar infarct. Psych was consulted on 5/14 and determined that patient has capacity to make his own decisions.   - Continue to monitor   - Delirium precautions       DM2  Last A1C 7.9 on 3/3/25. Outpatient regimen includes Metformin and Jardiance, is not on home insulin. Metformin and Jardiance both on hold. Started on insulin, but blood sugars remain above goal. Due to need for tight blood sugar control, will restart evening metformin. Of note, patient was on Insulin in the past, but has not been in several years. Diabetes educator met with patient 5/16 and patient comfortable   - Continue PTA metformin  - Restart Jardiance today  - Continue Lantus to 5 units in the AM  - Continue medium intensity sliding scale  - Blood glucose checks QID and at bedtime  - Hypoglycemia protocol   - Diabetes educator consult      Deconditioning  Patient with deconditioning in the setting of amputation, prolonged hospitalization. PT/OT initially recommended TCU, but family unsure if they feel comfortable taking patient home. Ongoing discussions at this time.   - SW following for assistance with discharge  - PT/OT following     History of CVA/TIA  Chronic AC, ASA  He is on dual coverage with Apixaban and ASA for history of CVA/TIA and PAD per family. Apixaban held on admission given need for procedure.   - AC as above   - ASA plan above     Hypertension  Chronic systolic heart failure/HFmrEF  History of chronic systolic heart failure, MI but no clear  history of CAD or cardiac revascularization. He had TTE on 2/25/25 with EF of 40-45% and mild pulmonary hypertension likely Group 2. No signs of acute HF exacerbation.  - Continue PTA Metoprolol   - Continue PTA Statin     BPH Continue PTA Tamsulosin.  Glaucoma Continue PTA gtts.          Diet: Regular Diet Adult  Snacks/Supplements Adult: Other; please allow patient/family to order supplements with meals; With Meals    DVT Prophylaxis: DOAC  Zaman Catheter: Not present  Lines: None     Cardiac Monitoring: None  Code Status: Full Code      Clinically Significant Risk Factors                             # DMII: A1C = 7.9 % (Ref range: <5.7 %) within past 6 months    # Moderate Malnutrition: based on nutrition assessment and treatment provided per dietitian's recommendations.    # Financial/Environmental Concerns: none         Social Drivers of Health    Tobacco Use: Medium Risk (4/22/2025)    Patient History     Smoking Tobacco Use: Former     Smokeless Tobacco Use: Never     Passive Exposure: Never          Disposition Plan     Medically Ready for Discharge: Ready Now             Bahman Ham MD  Hospitalist Service, GOLD TEAM [address] [place] [address]  Securely message with [facility] (more info)  Text page via [employer] Paging/Directory   See signed in provider for up to date coverage information  ______________________________________________________________________    Interval History   -No acute events overnight  -Ongoing discussions about discharge under care of his family    Physical Exam   Vital Signs:                    Weight: 170 lbs 10.18 oz    General Appearance: Comfortable, nontoxic appearing male seen laying in bed.  Eyes: PERRLA.  No conjunctival icterus.  HEENT: Atraumatic.  Respiratory: Breathing comfortably on room air.  Lymph/Hematologic: No bruising on exposed skin.  Skin: No lesions or rashes noted on exposed skin.  Musculoskeletal: Moving all extremities  spontaneously.    Medical Decision Making       35 MINUTES SPENT BY ME on the date of service doing chart review, history, exam, documentation & further activities per the note.      Data   ------------------------- PAST 24 HR DATA REVIEWED -----------------------------------------------

## 2025-05-19 NOTE — TELEPHONE ENCOUNTER
Forms/Letter Request    Type of form/letter: OTHER: Physician Order # [id number] Order date 5/19/2025       Do we have the form/letter: Yes:     Who is the form from? [facility] (if other please explain)    Where did/will the form come from? form was faxed in    When is form/letter needed by: ASAP    How would you like the form/letter returned: Fax : [phone]    Patient Notified form requests are processed in 5-7 business days:Yes    Okay to leave a detailed message?: Yes at Cell number on file:    Telephone Information:   Mobile [phone]

## 2025-05-19 NOTE — PROGRESS NOTES
"Clinic Care Coordination Contact  Transitions of Care Outreach  Chief Complaint   Patient presents with    Clinic Care Coordination - Post Hospital       Most Recent Admission Date: 5/3/2025   Most Recent Admission Diagnosis: Uremia - N19  MOY (acute kidney injury) - N17.9  Gangrene of right foot (H) - I96  Gangrene of toe of right foot (H) - I96  Osteomyelitis of right foot, unspecified type (H) - M86.9     Most Recent Discharge Date: 5/18/2025   Most Recent Discharge Diagnosis: Gangrene of right foot (H) - I96  Osteomyelitis of right foot, unspecified type (H) - M86.9  MOY (acute kidney injury) - N17.9  Uremia - N19  Gangrene of toe of right foot (H) - I96  Anticoagulated - Z79.01  Swelling of limb - M79.89  Type 2 diabetes mellitus with other diabetic kidney complication, without long-term current use of insulin (H) - E11.29  History of transmetatarsal amputation of foot (H) - Z89.439  History of left below knee amputation (H) - Z89.512  PAD (peripheral artery disease) - I73.9  Iron deficiency anemia, unspecified iron deficiency anemia type - D50.9  Renal transplant, status post - Z94.0  Benign localized prostatic hyperplasia with lower urinary tract symptoms (LUTS) - N40.1  Acute kidney failure with tubular necrosis - N17.0  History of MI (myocardial infarction) - I25.2  Gastroesophageal reflux disease with esophagitis without hemorrhage - K21.00  Glaucoma suspect, bilateral - H40.003     Transitions of Care Assessment    Discharge Assessment  How are you doing now that you are home?: \"Doing pretty welll...the nurse is here right now and we are going over anything.\"  How are your symptoms? (Red Flag symptoms escalate to triage hotline per guidelines): Improved  Do you know how to contact your clinic care team if you have future questions or changes to your health status? : Yes  Does the patient have their discharge instructions? : Yes  Does the patient have questions regarding their discharge instructions? : " No  Were you started on any new medications or were there changes to any of your previous medications? : Yes  Does the patient have all of their medications?: Yes  Do you have questions regarding any of your medications? : No  Do you have all of your needed medical supplies or equipment (DME)?  (i.e. oxygen tank, CPAP, cane, etc.): Yes    Post-op ([facility] Only)  If the patient had a surgery or procedure, do they have any questions for a nurse?: No         [facility] Explained and offered Care Coordination support to eligible patients: Yes    Patient accepted? No    Follow up Plan     Discharge Follow-Up  Discharge follow up appointment scheduled in alignment with recommended follow up timeframe or Transitions of Risk Category? (Low = within 30 days; Moderate= within 14 days; High= within 7 days): Yes  Discharge Follow Up Appointment Date: 06/05/25  Discharge Follow Up Appointment Scheduled with?: Primary Care Provider    Future Appointments   Date Time Provider Department Center   8/13/2025  7:10 AM [id number]   8/13/2025  8:10 AM [address]   8/13/2025 10:00 AM Bahman Diego MD [facility]       Outpatient Plan as outlined on AVS reviewed with patient.    For any urgent concerns, please contact our 24 hour nurse triage line: [phone] ([phone])       BEN Trivedi  [phone]  [facility]

## 2025-05-20 NOTE — TELEPHONE ENCOUNTER
Received provider signed forms from TC bin and faxed to  [facility] at [phone] on 05/20/2025. Right fax confirmed at 8:26AM.    Copy in abstract and MIKE foley.    Jan Shore

## 2025-05-21 ENCOUNTER — TELEPHONE (OUTPATIENT)
Dept: PHARMACY | Facility: OTHER | Age: 65
End: 2025-05-21
Payer: MEDICARE

## 2025-05-21 NOTE — TELEPHONE ENCOUNTER
MTM referral from: Transitions of Care (recent hospital discharge, TCU discharge, or ED visit)    MTM referral outreach attempt #2 on May 21, 2025 at 1:35 PM      Outcome: Patient not reachable after several attempts, routed to Pharmacist Team/Provider as an FYI    Use dior for the carrier/Plan on the flowsheet      MT Practitioner please send patient letter    DIETER Pabon   [phone]

## 2025-05-22 LAB — BACTERIA TISS BX CULT: NORMAL

## 2025-05-27 ENCOUNTER — TELEPHONE (OUTPATIENT)
Dept: FAMILY MEDICINE | Facility: CLINIC | Age: 65
End: 2025-05-27
Payer: MEDICARE

## 2025-05-27 ENCOUNTER — MEDICAL CORRESPONDENCE (OUTPATIENT)
Dept: HEALTH INFORMATION MANAGEMENT | Facility: CLINIC | Age: 65
End: 2025-05-27
Payer: MEDICARE

## 2025-05-27 NOTE — TELEPHONE ENCOUNTER
Forms/Letter Request    Type of form/letter: OTHER: Missed Visit Order # [id number] and PADMINI Order [id number] Order date 5/19/2025       Do we have the form/letter: Yes:     Who is the form from? [facility] / [facility] (if other please explain)    Where did/will the form come from? form was faxed in    When is form/letter needed by: ASAP    How would you like the form/letter returned: Fax : [phone]    Patient Notified form requests are processed in 5-7 business days:Yes    Okay to leave a detailed message?: Yes at Cell number on file:    Telephone Information:   Mobile [phone]

## 2025-05-28 NOTE — TELEPHONE ENCOUNTER
Form faxed to [facility] [phone] on 5/28/2025 at 7:49am. Copy placed in abstract and original in TC copy bin.   Alvina Diana

## 2025-05-29 ENCOUNTER — TELEPHONE (OUTPATIENT)
Dept: VASCULAR SURGERY | Facility: CLINIC | Age: 65
End: 2025-05-29
Payer: MEDICARE

## 2025-05-29 ENCOUNTER — APPOINTMENT (OUTPATIENT)
Dept: CT IMAGING | Facility: CLINIC | Age: 65
End: 2025-05-29
Attending: EMERGENCY MEDICINE
Payer: MEDICARE

## 2025-05-29 ENCOUNTER — HOSPITAL ENCOUNTER (EMERGENCY)
Facility: CLINIC | Age: 65
Discharge: HOME | End: 2025-05-29
Attending: EMERGENCY MEDICINE
Payer: MEDICARE

## 2025-05-29 VITALS
HEART RATE: 82 BPM | SYSTOLIC BLOOD PRESSURE: 141 MMHG | RESPIRATION RATE: 18 BRPM | OXYGEN SATURATION: 99 % | DIASTOLIC BLOOD PRESSURE: 96 MMHG | TEMPERATURE: 98.2 F

## 2025-05-29 DIAGNOSIS — Z89.439 HISTORY OF TRANSMETATARSAL AMPUTATION OF FOOT (H): ICD-10-CM

## 2025-05-29 DIAGNOSIS — R41.0 CONFUSION: ICD-10-CM

## 2025-05-29 DIAGNOSIS — L97.412 ULCER OF HEEL AND MIDFOOT, RIGHT, WITH FAT LAYER EXPOSED (H): Primary | ICD-10-CM

## 2025-05-29 DIAGNOSIS — N39.0 URINARY TRACT INFECTION IN MALE: ICD-10-CM

## 2025-05-29 LAB
ALBUMIN SERPL BCG-MCNC: 3.9 G/DL (ref 3.5–5.2)
ALBUMIN UR-MCNC: 30 MG/DL
ALP SERPL-CCNC: 117 U/L (ref 40–150)
ALT SERPL W P-5'-P-CCNC: 11 U/L (ref 0–70)
ANION GAP SERPL CALCULATED.3IONS-SCNC: 11 MMOL/L (ref 7–15)
APPEARANCE UR: CLEAR
AST SERPL W P-5'-P-CCNC: 22 U/L (ref 0–45)
BACTERIA #/AREA URNS HPF: ABNORMAL /HPF
BACTERIA TISS BX CULT: NORMAL
BASOPHILS # BLD AUTO: 0 10E3/UL (ref 0–0.2)
BASOPHILS NFR BLD AUTO: 1 %
BILIRUB SERPL-MCNC: 0.6 MG/DL
BILIRUB UR QL STRIP: NEGATIVE
BUN SERPL-MCNC: 18.2 MG/DL (ref 8–23)
CALCIUM SERPL-MCNC: 9.7 MG/DL (ref 8.8–10.4)
CHLORIDE SERPL-SCNC: 107 MMOL/L (ref 98–107)
COLOR UR AUTO: YELLOW
CREAT SERPL-MCNC: 1.67 MG/DL (ref 0.67–1.17)
EGFRCR SERPLBLD CKD-EPI 2021: 45 ML/MIN/1.73M2
EOSINOPHIL # BLD AUTO: 0.1 10E3/UL (ref 0–0.7)
EOSINOPHIL NFR BLD AUTO: 4 %
ERYTHROCYTE [DISTWIDTH] IN BLOOD BY AUTOMATED COUNT: 15.2 % (ref 10–15)
GLUCOSE SERPL-MCNC: 149 MG/DL (ref 70–99)
GLUCOSE UR STRIP-MCNC: >=1000 MG/DL
HCO3 SERPL-SCNC: 21 MMOL/L (ref 22–29)
HCT VFR BLD AUTO: 36.6 % (ref 40–53)
HGB BLD-MCNC: 11.4 G/DL (ref 13.3–17.7)
HGB UR QL STRIP: NEGATIVE
IMM GRANULOCYTES # BLD: 0 10E3/UL
IMM GRANULOCYTES NFR BLD: 1 %
KETONES UR STRIP-MCNC: NEGATIVE MG/DL
LEUKOCYTE ESTERASE UR QL STRIP: ABNORMAL
LYMPHOCYTES # BLD AUTO: 0.3 10E3/UL (ref 0.8–5.3)
LYMPHOCYTES NFR BLD AUTO: 8 %
MCH RBC QN AUTO: 24.9 PG (ref 26.5–33)
MCHC RBC AUTO-ENTMCNC: 31.1 G/DL (ref 31.5–36.5)
MCV RBC AUTO: 80 FL (ref 78–100)
MONOCYTES # BLD AUTO: 0.5 10E3/UL (ref 0–1.3)
MONOCYTES NFR BLD AUTO: 13 %
MUCOUS THREADS #/AREA URNS LPF: PRESENT /LPF
NEUTROPHILS # BLD AUTO: 2.9 10E3/UL (ref 1.6–8.3)
NEUTROPHILS NFR BLD AUTO: 74 %
NITRATE UR QL: NEGATIVE
NRBC # BLD AUTO: 0 10E3/UL
NRBC BLD AUTO-RTO: 0 /100
PH UR STRIP: 6 [PH] (ref 5–7)
PLATELET # BLD AUTO: 211 10E3/UL (ref 150–450)
POTASSIUM SERPL-SCNC: 3.7 MMOL/L (ref 3.4–5.3)
PROT SERPL-MCNC: 6.7 G/DL (ref 6.4–8.3)
RBC # BLD AUTO: 4.57 10E6/UL (ref 4.4–5.9)
RBC URINE: <1 /HPF
SODIUM SERPL-SCNC: 139 MMOL/L (ref 135–145)
SP GR UR STRIP: 1.02 (ref 1–1.03)
SQUAMOUS EPITHELIAL: <1 /HPF
UROBILINOGEN UR STRIP-MCNC: 2 MG/DL
WBC # BLD AUTO: 3.9 10E3/UL (ref 4–11)
WBC CLUMPS #/AREA URNS HPF: PRESENT /HPF
WBC URINE: 12 /HPF

## 2025-05-29 PROCEDURE — 250N000013 HC RX MED GY IP 250 OP 250 PS 637: Performed by: EMERGENCY MEDICINE

## 2025-05-29 PROCEDURE — 70450 CT HEAD/BRAIN W/O DYE: CPT | Mod: 26 | Performed by: STUDENT IN AN ORGANIZED HEALTH CARE EDUCATION/TRAINING PROGRAM

## 2025-05-29 PROCEDURE — 99285 EMERGENCY DEPT VISIT HI MDM: CPT | Performed by: EMERGENCY MEDICINE

## 2025-05-29 PROCEDURE — 87086 URINE CULTURE/COLONY COUNT: CPT | Performed by: EMERGENCY MEDICINE

## 2025-05-29 PROCEDURE — 99284 EMERGENCY DEPT VISIT MOD MDM: CPT | Mod: 25 | Performed by: EMERGENCY MEDICINE

## 2025-05-29 PROCEDURE — 85004 AUTOMATED DIFF WBC COUNT: CPT | Performed by: EMERGENCY MEDICINE

## 2025-05-29 PROCEDURE — 84075 ASSAY ALKALINE PHOSPHATASE: CPT | Performed by: EMERGENCY MEDICINE

## 2025-05-29 PROCEDURE — 70450 CT HEAD/BRAIN W/O DYE: CPT

## 2025-05-29 PROCEDURE — 81001 URINALYSIS AUTO W/SCOPE: CPT | Performed by: EMERGENCY MEDICINE

## 2025-05-29 PROCEDURE — 36415 COLL VENOUS BLD VENIPUNCTURE: CPT | Performed by: EMERGENCY MEDICINE

## 2025-05-29 RX ORDER — CEFDINIR 300 MG/1
300 CAPSULE ORAL 2 TIMES DAILY
Qty: 14 CAPSULE | Refills: 0 | Status: SHIPPED | OUTPATIENT
Start: 2025-05-29 | End: 2025-06-05

## 2025-05-29 RX ORDER — CEFDINIR 300 MG/1
300 CAPSULE ORAL ONCE
Status: COMPLETED | OUTPATIENT
Start: 2025-05-29 | End: 2025-05-29

## 2025-05-29 RX ADMIN — CEFDINIR 300 MG: 300 CAPSULE ORAL at 23:46

## 2025-05-29 ASSESSMENT — COLUMBIA-SUICIDE SEVERITY RATING SCALE - C-SSRS
6. HAVE YOU EVER DONE ANYTHING, STARTED TO DO ANYTHING, OR PREPARED TO DO ANYTHING TO END YOUR LIFE?: NO
1. IN THE PAST MONTH, HAVE YOU WISHED YOU WERE DEAD OR WISHED YOU COULD GO TO SLEEP AND NOT WAKE UP?: NO
2. HAVE YOU ACTUALLY HAD ANY THOUGHTS OF KILLING YOURSELF IN THE PAST MONTH?: NO

## 2025-05-29 ASSESSMENT — ACTIVITIES OF DAILY LIVING (ADL)
ADLS_ACUITY_SCORE: 57

## 2025-05-29 NOTE — TELEPHONE ENCOUNTER
Spoke to patient daughter. She stated she is going to be bring the patient back to the hospital for he is being non-compliant with the non-weight bearing orders. Call back again tomorrow to schedule AYAN and visit with Dr. Miller. [phone]   ___________________________________________________  ----- Message from Shari WALDEN sent at 5/29/2025  1:40 PM CDT -----  Discussed with VS- Maxwell asked VS to do post-ops. Please schedule appt with VS and ABIs. Thanks!  ----- Message -----  From: Ryan Miller DPM  Sent: 5/29/2025  12:35 PM CDT  To: [facility] Support Pool    Would someone please reach out to this patient and find out why ABIs have not been completed.  I would also like him scheduled for follow-up with me after AYAN visit has been scheduled.  Thank you

## 2025-05-29 NOTE — ED PROVIDER NOTES
"    [place] EMERGENCY DEPARTMENT ([facility])    5/29/25       ED PROVIDER NOTE    History   No chief complaint on file.    LAVERN Zaman is a 65 year old male who has a history of generalized weakness, MI, stage III kidney disease, status post kidney transplant, peripheral artery disease, osteomyelitis of the right foot now status post partial foot amputation, who presents to the emergency department today complaining of confusion.  He is accompanied by the patient's daughter who provides most of the history.    He was admitted on 5/3/25 and discharged on 5/18/25. He was diagnosed with gangrene and osteomyelitis of the R foot s/p transmetatarsal amputation.     Daughter has accompanied him here today. She reports that he has been confused about the medications he has been taking. Daughter reports that he is using \"insulin for diarrhea\" and he believes he has \"bone cancer\". Patient lives in his own home and family is trying to assist him at home since his discharge. He has family members there 24 hours a day and has home health/wound care coming out twice per week. Daughter says that prior to his hospitalization he was a little bit confused but during the hospitalization he was felt safe to discharge and it was felt that he could manage at home with family. Since discharge, daughter reports that he has had increasing confusion especially about the medications. Family sets up his meds and gives him his meds. With regard to insulin, he was just started on this during his hospitalization. Family states his blood sugar has been \"good\" - between 100-218 and he hasn't been getting insulin at home. Patient thinks that he is on insulin for diarrhea and he should be taking it \"four times per week\". He also thinks it is for blood pressure. He told daughter that this morning he thought his blood sugar was \"746\".     He has had a fairly poor appetite since discharge. No fevers or chills. No rhinorrhea or sore " throat. No cough or SOB. Daughter thinks his breathing is labored when he lays down. No chest pain or abdominal pain. No nausea or vomiting, has had diarrhea for the past 3 days - says he has about 5 episodes in the past day, denies blood in the stool. No dysuria or hematuria.     He is supposed to be NWB (4-6 weeks) on his R foot but has evidently been using his R foot for transferring. He is in bed for the most part at home.    During previous hospitalization, psych was consulted and it was felt that he did have capacity to make his own decision.     Daughter believes that his confusion is worse now than it was in the hospital.     Past Medical History:   Diagnosis Date    Chronic kidney disease     Diabetes (H)     Hypertension     Myocardial infarction (H)        Past Surgical History:   Procedure Laterality Date    AMPUTATE FOOT Right 2025    Procedure: TRANSMETATARSAL AMPUTATION;  Surgeon: Kwesi Arreola DPM;  Location: [facility]    ANGIOGRAM Right 3/17/2025    Procedure: ANGIOGRAM;  Surgeon: Messi Irving MD;  Location: [facility]    ANGIOPLASTY Right 3/17/2025    Procedure: ANGIOPLASTY;  Surgeon: Messi Irving MD;  Location: [facility]    BYPASS GRAFT FEMOROTIBIAL Right 3/20/2025    Procedure: Right Femoral Anterior Tibial Bypass using Cryo preserve artery;  Surgeon: Messi Irving MD;  Location: [facility]    IR OR ANGIOGRAM  3/17/2025    IR OR ANGIOGRAM  3/20/2025    LENGTHEN TENDON ACHILLES Right 2025    Procedure: LENGTHENING, TENDON, ACHILLES;  Surgeon: Kwesi Arreola DPM;  Location: [facility]    OR ANGIOGRAM, LOWER EXTREMITY Right 3/20/2025    Procedure: COMPLETION ANGIOGRAM;  Surgeon: Messi Irving MD;  Location: [facility]       No family history on file.    Social History     Tobacco Use    Smoking status: Former     Current packs/day: 0.00     Average packs/day: 0.5 packs/day for 19.0 years (9.5 ttl pk-yrs)     Types: Cigarettes     Start date: 1972     Quit date: 1991     Years since quittin.4     Passive  exposure: Never    Smokeless tobacco: Never   Substance Use Topics    Alcohol use: Not on file     A medically appropriate review of systems was performed with pertinent positives and negatives noted in the HPI, and all other systems negative.    Physical Exam   BP: (!) 141/88  Pulse: 76  Temp: 98.1  F (36.7  C)  Resp: 18  SpO2: 96 %    Physical Exam  Vitals and nursing note reviewed.   Constitutional:       General: He is not in acute distress.     Appearance: He is not diaphoretic.      Comments: Adult male, sitting in seated walker, cooperative, appears confused at times.   HENT:      Head: Atraumatic.      Mouth/Throat:      Mouth: Mucous membranes are moist.      Pharynx: Oropharynx is clear. No oropharyngeal exudate.   Eyes:      General: No scleral icterus.     Pupils: Pupils are equal, round, and reactive to light.   Cardiovascular:      Rate and Rhythm: Normal rate.      Pulses: Normal pulses.      Heart sounds: No murmur heard.  Pulmonary:      Effort: No respiratory distress.      Breath sounds: Normal breath sounds.   Abdominal:      General: Bowel sounds are normal.      Palpations: Abdomen is soft.      Tenderness: There is no abdominal tenderness.   Musculoskeletal:         General: No tenderness.   Skin:     General: Skin is warm.      Findings: No rash.   Neurological:      GCS: GCS eye subscore is 4. GCS verbal subscore is 4. GCS motor subscore is 6.      Cranial Nerves: Cranial nerves 2-12 are intact.      Sensory: Sensation is intact.      Motor: Motor function is intact.      Coordination: Coordination is intact.      Comments: Patient is confused to place and time.  He believes he is at [facility].  Cannot identify current month or year, cannot identify current city.   Psychiatric:         Mood and Affect: Mood normal.       ED Course, Procedures, & Data      Procedures                 Results for orders placed or performed during the hospital encounter of 05/29/25   Head CT  w/o contrast     Status: None    Narrative    EXAM: CT HEAD W/O CONTRAST  5/29/2025 7:00 PM     HISTORY:  confusion       COMPARISON:  None.    TECHNIQUE: Using multidetector thin collimation helical acquisition  technique, axial, coronal and sagittal CT images from the skull base  to the vertex were obtained without intravenous contrast.   (topogram) image(s) also obtained and reviewed.    FINDINGS:  No acute intracranial hemorrhage, mass effect, or midline shift. No  acute loss of gray-white matter differentiation in the cerebral  hemispheres. The ventricles are proportionate to the cerebral sulci.  Clear basal cisterns. Basal ganglia mineralization. Bilateral  vertebral artery and carotid siphon calcifications. Chronic right  cerebellar infarct. Atherosclerotic calcifications externally iliac  branches within the scalp. Mild cerebral volume loss.      The bony calvaria in the bones of the skull base are normal. The  visualized portions of the paranasal sinuses and mastoid air cells are  clear. Bilateral pseudophakia.      Impression    IMPRESSION:   No acute intracranial pathology. Chronic findings as detailed above.     I have personally reviewed the examination and initial interpretation  and I agree with the findings.    KAILA TELLEZ MD         SYSTEM ID:  [id number]   Comprehensive metabolic panel     Status: Abnormal   Result Value Ref Range    Sodium 139 135 - 145 mmol/L    Potassium 3.7 3.4 - 5.3 mmol/L    Carbon Dioxide (CO2) 21 (L) 22 - 29 mmol/L    Anion Gap 11 7 - 15 mmol/L    Urea Nitrogen 18.2 8.0 - 23.0 mg/dL    Creatinine 1.67 (H) 0.67 - 1.17 mg/dL    GFR Estimate 45 (L) >60 mL/min/1.73m2    Calcium 9.7 8.8 - 10.4 mg/dL    Chloride 107 98 - 107 mmol/L    Glucose 149 (H) 70 - 99 mg/dL    Alkaline Phosphatase 117 40 - 150 U/L    AST 22 0 - 45 U/L    ALT 11 0 - 70 U/L    Protein Total 6.7 6.4 - 8.3 g/dL    Albumin 3.9 3.5 - 5.2 g/dL    Bilirubin Total 0.6 <=1.2 mg/dL   UA with Microscopic reflex  to Culture     Status: Abnormal    Specimen: Urine, Midstream   Result Value Ref Range    Color Urine Yellow Colorless, Straw, Light Yellow, Yellow    Appearance Urine Clear Clear    Glucose Urine >=1000 (A) Negative mg/dL    Bilirubin Urine Negative Negative    Ketones Urine Negative Negative mg/dL    Specific Gravity Urine 1.020 1.003 - 1.035    Blood Urine Negative Negative    pH Urine 6.0 5.0 - 7.0    Protein Albumin Urine 30 (A) Negative mg/dL    Urobilinogen Urine 2.0 (A) Normal mg/dL    Nitrite Urine Negative Negative    Leukocyte Esterase Urine Small (A) Negative    Bacteria Urine Few (A) None Seen /HPF    WBC Clumps Urine Present (A) None Seen /HPF    Mucus Urine Present (A) None Seen /LPF    RBC Urine <1 <=2 /HPF    WBC Urine 12 (H) <=5 /HPF    Squamous Epithelials Urine <1 <=1 /HPF    Narrative    Urine Culture ordered based on laboratory criteria   CBC with platelets and differential     Status: Abnormal   Result Value Ref Range    WBC Count 3.9 (L) 4.0 - 11.0 10e3/uL    RBC Count 4.57 4.40 - 5.90 10e6/uL    Hemoglobin 11.4 (L) 13.3 - 17.7 g/dL    Hematocrit 36.6 (L) 40.0 - 53.0 %    MCV 80 78 - 100 fL    MCH 24.9 (L) 26.5 - 33.0 pg    MCHC 31.1 (L) 31.5 - 36.5 g/dL    RDW 15.2 (H) 10.0 - 15.0 %    Platelet Count 211 150 - 450 10e3/uL    % Neutrophils 74 %    % Lymphocytes 8 %    % Monocytes 13 %    % Eosinophils 4 %    % Basophils 1 %    % Immature Granulocytes 1 %    NRBCs per 100 WBC 0 <1 /100    Absolute Neutrophils 2.9 1.6 - 8.3 10e3/uL    Absolute Lymphocytes 0.3 (L) 0.8 - 5.3 10e3/uL    Absolute Monocytes 0.5 0.0 - 1.3 10e3/uL    Absolute Eosinophils 0.1 0.0 - 0.7 10e3/uL    Absolute Basophils 0.0 0.0 - 0.2 10e3/uL    Absolute Immature Granulocytes 0.0 <=0.4 10e3/uL    Absolute NRBCs 0.0 10e3/uL   CBC with Platelets & Differential     Status: Abnormal    Narrative    The following orders were created for panel order CBC with Platelets & Differential.  Procedure                                Abnormality         Status                     ---------                               -----------         ------                     CBC with platelets and ...[[id number]]  Abnormal            Final result                 Please view results for these tests on the individual orders.     Medications   cefdinir (OMNICEF) capsule 300 mg (has no administration in time range)     Labs Ordered and Resulted from Time of ED Arrival to Time of ED Departure   COMPREHENSIVE METABOLIC PANEL - Abnormal       Result Value    Sodium 139      Potassium 3.7      Carbon Dioxide (CO2) 21 (*)     Anion Gap 11      Urea Nitrogen 18.2      Creatinine 1.67 (*)     GFR Estimate 45 (*)     Calcium 9.7      Chloride 107      Glucose 149 (*)     Alkaline Phosphatase 117      AST 22      ALT 11      Protein Total 6.7      Albumin 3.9      Bilirubin Total 0.6     ROUTINE UA WITH MICROSCOPIC REFLEX TO CULTURE - Abnormal    Color Urine Yellow      Appearance Urine Clear      Glucose Urine >=1000 (*)     Bilirubin Urine Negative      Ketones Urine Negative      Specific Gravity Urine 1.020      Blood Urine Negative      pH Urine 6.0      Protein Albumin Urine 30 (*)     Urobilinogen Urine 2.0 (*)     Nitrite Urine Negative      Leukocyte Esterase Urine Small (*)     Bacteria Urine Few (*)     WBC Clumps Urine Present (*)     Mucus Urine Present (*)     RBC Urine <1      WBC Urine 12 (*)     Squamous Epithelials Urine <1     CBC WITH PLATELETS AND DIFFERENTIAL - Abnormal    WBC Count 3.9 (*)     RBC Count 4.57      Hemoglobin 11.4 (*)     Hematocrit 36.6 (*)     MCV 80      MCH 24.9 (*)     MCHC 31.1 (*)     RDW 15.2 (*)     Platelet Count 211      % Neutrophils 74      % Lymphocytes 8      % Monocytes 13      % Eosinophils 4      % Basophils 1      % Immature Granulocytes 1      NRBCs per 100 WBC 0      Absolute Neutrophils 2.9      Absolute Lymphocytes 0.3 (*)     Absolute Monocytes 0.5      Absolute Eosinophils 0.1      Absolute Basophils 0.0       Absolute Immature Granulocytes 0.0      Absolute NRBCs 0.0     GLUCOSE MONITOR NURSING POCT   URINE CULTURE     Head CT w/o contrast   Final Result   IMPRESSION:    No acute intracranial pathology. Chronic findings as detailed above.       I have personally reviewed the examination and initial interpretation   and I agree with the findings.      KAILA TELLEZ MD            SYSTEM ID:  [id number]             Critical care was not performed.     Medical Decision Making  The patient's presentation was of high complexity (a chronic illness severe exacerbation, progression, or side effect of treatment).    The patient's evaluation involved:  an assessment requiring an independent historian (see separate area of note for details)  review of external note(s) from 3+ sources (see separate area of note for details)  review of 3+ test result(s) ordered prior to this encounter (see separate area of note for details)  ordering and/or review of 3+ test(s) in this encounter (see separate area of note for details)    The patient's management necessitated high risk (a decision regarding hospitalization).    Assessment & Plan    This is a 65-year-old male with a recent hospitalization for transmetatarsal amputation who is brought into the emergency department by his daughter today due to confusion.  Record review does show that the patient has some underlying cognitive impairment, and was found to have hospital-acquired delirium, with what was documented as improving disorientation.  It appears that discussion was had with the patient and family about recommendation that the patient would need TCU placement, but the patient and family declined this.  Family was instructed to present to the ED if there were any safety concerns.  I also see that there was evidence of a chronic right cerebellar infarct evident on imaging during his hospitalization.    Now coming in with ongoing but apparently worsening confusion according to his  daughter.  Certainly this could be an exacerbation of his chronic underlying cognitive impairment, which would not be surprising with acute illness and acute surgical intervention.  Alternatively, need to consider the possibility of acute process including metabolic abnormality, infection, versus acute CNS problem.    We did establish IV access and we did draw blood for laboratory analysis.  CBC shows white count of 3.9, hemoglobin of 11.4, consistent with prior, normal platelet count.  Comprehensive metabolic panel shows a creatinine of 1.67 consistent with CKD, and consistent with previous values from 11 days ago.  UA shows 12 white cells, small LE, and some white blood cell clumps, negative nitrites.  This will be cultured.  Head CT was read by radiology as no acute intracranial pathology, there are chronic findings of basal ganglia mineralization, bilateral vertebral artery and carotid siphon calcifications, and chronic right cerebellar infarct.    I did have a discussion with the patient and his daughter where I explained that there could be multifactorial reasons for his confusion which appears to potentially be waxing and waning and somewhat acute on chronic based on previous notes.  I did offer to admit him to the hospital and the patient is adamant that he does not want to be admitted to the hospital.  I do see that he had a psychiatric evaluation during his most recent hospitalization and he was found to have capacity.    Again had discussion with the patient and daughter and at this point we will plan to discharge home as the patient is adamantly opposed to the idea of being admitted to the hospital at this time.  I did give him a dose of cefdinir here in the emergency department and I will discharge him with a prescription for this.  Daughter was encouraged to call his primary clinic to make an ED follow-up appointment as soon as possible, return precautions discussed.  Patient and daughter verbalized  understanding    I have reviewed the nursing notes. I have reviewed the findings, diagnosis, plan and need for follow up with the patient.    New Prescriptions    CEFDINIR (OMNICEF) 300 MG CAPSULE    Take 1 capsule (300 mg) by mouth 2 times daily for 7 days.       Final diagnoses:   Urinary tract infection in male   Confusion       Jodi Fraser MD  [facility] EMERGENCY DEPARTMENT  5/29/2025     Jodi Fraser MD  05/29/25 0334

## 2025-05-29 NOTE — ED TRIAGE NOTES
"Patient to triage with his daughter with c/o confusion with medications. Patient was discharged after amputation on right foot and is unable to manage medications at home. Patient state \"I'm taking insulin for diarrhea\".      Triage Assessment (Adult)       Row Name 05/29/25 [id number]          Triage Assessment    Airway WDL WDL        Respiratory WDL    Respiratory WDL WDL        Skin Circulation/Temperature WDL    Skin Circulation/Temperature WDL X  surgical wound to right foot and and wound on right hand finger        Cardiac WDL    Cardiac WDL WDL        Peripheral/Neurovascular WDL    Peripheral Neurovascular WDL X        Cognitive/Neuro/Behavioral WDL    Cognitive/Neuro/Behavioral WDL WDL                     "

## 2025-05-30 NOTE — DISCHARGE INSTRUCTIONS
You have been seen in the emergency department today for confusion.  Your blood work is normal for you.  Your urine sample shows that you may have an early bladder infection.  Your head CT does not show any changes compared to your most recent head CT scan.    Sometimes bladder infections can cause confusion, particularly in older patients.  We have discussed the possibility of bringing you into the hospital and you have declined admission at this time.  We have given you a dose of antibiotics in the emergency department for a bladder infection.  We are discharging you with a prescription for an antibiotic, please fill this tomorrow and take it until it is gone unless you are instructed to stop by a physician.    Please call your primary clinic tomorrow to make an ER follow-up appointment to be seen in the next few days.  Return to the emergency department for new or worsening symptoms.

## 2025-05-31 ENCOUNTER — RESULTS FOLLOW-UP (OUTPATIENT)
Dept: NURSING | Facility: CLINIC | Age: 65
End: 2025-05-31

## 2025-05-31 LAB — BACTERIA UR CULT: NORMAL

## 2025-06-04 LAB — BACTERIA TISS BX CULT: NO GROWTH

## 2025-06-05 ENCOUNTER — TELEPHONE (OUTPATIENT)
Dept: FAMILY MEDICINE | Facility: CLINIC | Age: 65
End: 2025-06-05

## 2025-06-05 ENCOUNTER — MEDICAL CORRESPONDENCE (OUTPATIENT)
Dept: HEALTH INFORMATION MANAGEMENT | Facility: CLINIC | Age: 65
End: 2025-06-05
Payer: MEDICARE

## 2025-06-05 NOTE — TELEPHONE ENCOUNTER
Forms/Letter Request    Type of form/letter: Home Health Certification and Plan of Care Order #: [id number] Cert Period: 5/26/2025-7/24/2025 and Recert Order Order # [id number]      Do we have the form/letter: Yes:     Who is the form from? [facility] (if other please explain)    Where did/will the form come from? form was faxed in    When is form/letter needed by: ASAP    How would you like the form/letter returned: Fax : [phone]    Patient Notified form requests are processed in 5-7 business days:Yes    Okay to leave a detailed message?: Yes at Cell number on file:    Telephone Information:   Mobile [phone]

## 2025-06-05 NOTE — TELEPHONE ENCOUNTER
Received provider signed forms from TC bin and faxed to [facility] at [phone] on 06/05/2025. Right fax confirmed at 10:23AM.    Jan Shore

## 2025-06-09 ENCOUNTER — TELEPHONE (OUTPATIENT)
Dept: FAMILY MEDICINE | Facility: CLINIC | Age: 65
End: 2025-06-09
Payer: MEDICARE

## 2025-06-09 NOTE — TELEPHONE ENCOUNTER
Forms/Letter Request    Type of form/letter: OTHER: Missed Visit Order--Order #: [id number] Order Date: 6/6/2025       Do we have the form/letter: Yes:     Who is the form from? [facility] (if other please explain)    Where did/will the form come from? form was faxed in    When is form/letter needed by: ASAP    How would you like the form/letter returned: Fax : [phone]    Patient Notified form requests are processed in 5-7 business days:Yes    Okay to leave a detailed message?: Yes at Cell number on file:    Telephone Information:   Mobile [phone]

## 2025-06-10 ENCOUNTER — TELEPHONE (OUTPATIENT)
Dept: FAMILY MEDICINE | Facility: CLINIC | Age: 65
End: 2025-06-10
Payer: MEDICARE

## 2025-06-10 NOTE — TELEPHONE ENCOUNTER
Form faxed to [facility] [phone] on 6/10/2025 at 11:03am. Copy placed in abstract and original in TC copy bin.    Alvina Diana

## 2025-06-10 NOTE — TELEPHONE ENCOUNTER
Form faxed  Susy Morocho MA/MIKE    Received provider signed  Missed visit order [id number]  and faxed to [facility], [phone], right fax confirmed at 2:50 pm today, 6/10/2025. Copy to TC and abstracting.  Susy Morocho MA/  [facility]   Primary Care

## 2025-06-10 NOTE — TELEPHONE ENCOUNTER
Forms/Letter Request    Type of form/letter: -Transfer order-#[id number]    Do we have the form/letter: Yes: Ho's box    Who is the form from? [facility]    Where did/will the form come from? form was faxed in    When is form/letter needed by: Not indicated    How would you like the form/letter returned: Fax : [phone]    Susy Morocho MA/  [facility]   Primary Care

## 2025-06-10 NOTE — TELEPHONE ENCOUNTER
Forms/Letter Request    Type of form/letter: OTHER: Missed visit order [id number]       Do we have the form/letter: Yes: Placed in Ho's bin    Who is the form from? [facility]     Where did/will the form come from? form was faxed in    When is form/letter needed by: 6/15/2025    How would you like the form/letter returned: Fax : [phone]    Patient Notified form requests are processed in 5-7 business days:N/A    Okay to leave a detailed message?: Yes at Cell number on file:    Telephone Information:   Mobile [phone]      Michelle Chaudhry    [facility]

## 2025-06-11 ENCOUNTER — MEDICAL CORRESPONDENCE (OUTPATIENT)
Dept: HEALTH INFORMATION MANAGEMENT | Facility: CLINIC | Age: 65
End: 2025-06-11
Payer: MEDICARE

## 2025-06-11 NOTE — TELEPHONE ENCOUNTER
Form was faxed to [facility] on 6/11/2025 to [phone] right fax confirmed at 7:45am. Copy placed in abstracting original in TC bin.      Michelle Chaudhry    [facility]

## 2025-06-13 ENCOUNTER — TELEPHONE (OUTPATIENT)
Dept: FAMILY MEDICINE | Facility: CLINIC | Age: 65
End: 2025-06-13
Payer: MEDICARE

## 2025-06-13 NOTE — TELEPHONE ENCOUNTER
Forms/Letter Request     Type of form/letter: [facility] - Change Order--Order #:[id number]  Order Date: 6/13/2025     Do we have the form/letter: Yes:      Who is the form from? [facility] (if other please explain)     Where did/will the form come from? form was faxed in     When is form/letter needed by: ASAP     How would you like the form/letter returned: Fax : [phone]     Patient Notified form requests are processed in 5-7 business days:Yes     Okay to leave a detailed message?: Yes at Cell number on file:        Telephone Information:   Mobile [phone]

## 2025-06-17 ENCOUNTER — OFFICE VISIT (OUTPATIENT)
Dept: VASCULAR SURGERY | Facility: CLINIC | Age: 65
End: 2025-06-17
Payer: MEDICARE

## 2025-06-17 VITALS
RESPIRATION RATE: 16 BRPM | OXYGEN SATURATION: 99 % | HEART RATE: 60 BPM | TEMPERATURE: 98.2 F | SYSTOLIC BLOOD PRESSURE: 146 MMHG | DIASTOLIC BLOOD PRESSURE: 84 MMHG

## 2025-06-17 DIAGNOSIS — L89.610 UNSTAGEABLE PRESSURE ULCER OF RIGHT HEEL (H): ICD-10-CM

## 2025-06-17 DIAGNOSIS — I73.9 PAD (PERIPHERAL ARTERY DISEASE): Primary | ICD-10-CM

## 2025-06-17 DIAGNOSIS — I96 GANGRENE OF TOE OF RIGHT FOOT (H): ICD-10-CM

## 2025-06-17 DIAGNOSIS — M86.171 ACUTE OSTEOMYELITIS OF TOE, RIGHT (H): ICD-10-CM

## 2025-06-17 DIAGNOSIS — Z89.439 HISTORY OF TRANSMETATARSAL AMPUTATION OF FOOT (H): ICD-10-CM

## 2025-06-17 PROCEDURE — 1126F AMNT PAIN NOTED NONE PRSNT: CPT | Performed by: PODIATRIST

## 2025-06-17 PROCEDURE — 99024 POSTOP FOLLOW-UP VISIT: CPT | Performed by: PODIATRIST

## 2025-06-17 PROCEDURE — G0463 HOSPITAL OUTPT CLINIC VISIT: HCPCS | Performed by: PODIATRIST

## 2025-06-17 PROCEDURE — 3077F SYST BP >= 140 MM HG: CPT | Performed by: PODIATRIST

## 2025-06-17 PROCEDURE — 3079F DIAST BP 80-89 MM HG: CPT | Performed by: PODIATRIST

## 2025-06-17 ASSESSMENT — PAIN SCALES - GENERAL: PAINLEVEL_OUTOF10: NO PAIN (0)

## 2025-06-17 NOTE — PROGRESS NOTES
FOOT AND ANKLE SURGERY/PODIATRY Progress Note      ASSESSMENT:   Unstageable pressure ulceration right heel  Status post TMA with FRANCESCO right  DM2  CKD        TREATMENT:  -I discussed with the patient and his daughter today that the surgical sites along the right foot and ankle appear to be progressing well.  Sutures removed today, Steri-Strips applied.    -Pathology report right foot indicates clean proximal margins.    -Patient does have stable eschar along the posterior aspect of the right heel consistent with a pressure ulceration.  Based on current presentation I recommend application of iodine on a daily basis.    -I discussed with the patient and his daughter that after the eschar falls away he will likely develop a ulceration which we will treat accordingly.  Recommend aggressive offloading of the right heel at all times.  I refer the patient for preferably for offloading.  Also recommend floating the right foot at all times including overnight.    -Continue nonweightbearing right foot    -Patient will follow-up with me in 3 weeks    30 minutes spent on the day of encounter doing chart review, history and exam, documentation, and further activities as noted.     Ryan Miller DPM  [facility] [place]      HPI: Tad Zaman was seen again today for follow-up TMA with FRANCESCO right.  Patient underwent transmetatarsal amputation with Achilles tendon lengthening with Dr. Arreola on 5/7.  He has returned home and [facility] but [facility].  Patient's daughter is present today.    Past Medical History:   Diagnosis Date    Chronic kidney disease     Diabetes (H)     Hypertension     Myocardial infarction (H)        Past Surgical History:   Procedure Laterality Date    AMPUTATE FOOT Right 5/7/2025    Procedure: TRANSMETATARSAL AMPUTATION;  Surgeon: Kwesi Arreola DPM;  [facility]    ANGIOGRAM Right 3/17/2025    Procedure: ANGIOGRAM;  Surgeon: Messi Irving MD;  Location:  [facility] OR    ANGIOPLASTY Right 3/17/2025    Procedure: ANGIOPLASTY;  Surgeon: Messi Irving MD;  Location: [facility] OR    BYPASS GRAFT FEMOROTIBIAL Right 3/20/2025    Procedure: Right Femoral Anterior Tibial Bypass using Cryo preserve artery;  Surgeon: Messi Irving MD;  Location: [facility] OR    IR OR ANGIOGRAM  3/17/2025    IR OR ANGIOGRAM  3/20/2025    LENGTHEN TENDON ACHILLES Right 5/7/2025    Procedure: LENGTHENING, TENDON, ACHILLES;  Surgeon: Kwesi Arreola DPM;  Location: [facility] OR    OR ANGIOGRAM, LOWER EXTREMITY Right 3/20/2025    Procedure: COMPLETION ANGIOGRAM;  Surgeon: Messi Irving MD;  [facility]: [facility]       Allergies   Allergen Reactions    Penicillins Hives         Current Outpatient Medications:     Alcohol Swabs PADS, Use to swab the area of the injection or didi as directed Per insurance coverage, Disp: 200 each, Rfl: 1    apixaban ANTICOAGULANT (ELIQUIS) 5 MG tablet, Take 1 tablet (5 mg) by mouth 2 times daily., Disp: 28 tablet, Rfl: 0    aspirin (ASA) 81 MG chewable tablet, Take 1 tablet (81 mg) by mouth daily., Disp: 14 tablet, Rfl: 0    atorvastatin (LIPITOR) 40 MG tablet, Take 1 tablet (40 mg) by mouth at bedtime., Disp: 14 tablet, Rfl: 0    blood glucose (NO BRAND SPECIFIED) lancets standard, Use to test blood sugar four times a day (TID before meals and at bedtime). To accompany glucose monitor brands per insurance coverage., Disp: 200 each, Rfl: 1    blood glucose (NO BRAND SPECIFIED) test strip, Use to test blood sugar four times a day (TID before meals and at bedtime). To accompany glucose monitor brands per insurance coverage., Disp: 50 strip, Rfl: 0    blood glucose monitoring (NO BRAND SPECIFIED) meter device kit, Please check blood sugar four times a day (TID before meals and at bedtime)., Disp: 1 kit, Rfl: 0    brinzolamide-brimonidine (SIMBRINZA) 1-0.2 % ophthalmic suspension, Apply 1 drop to eye 2 times daily., Disp: 8 mL, Rfl: 0    calcitRIOL (ROCALTROL) 0.25 MCG capsule, Take 1 capsule (0.25 mcg) by  mouth daily. TAKE ONE CAPSULE BY MOUTH MONDAY THROUGH FRIDAY FOR BONE HEALTH ** DOSE INCREASE, Disp: 14 capsule, Rfl: 0    empagliflozin (JARDIANCE) 25 MG TABS tablet, Take 0.5 tablets (12.5 mg) by mouth every morning. TAKE ONE-HALF TABLET BY MOUTH EVERY MORNING FOR DIABETES AND KIDNEY PROTECTION, Disp: 14 tablet, Rfl: 1    ferrous sulfate (FEROSUL) 325 (65 Fe) MG tablet, Take 1 tablet (325 mg) by mouth every other day., Disp: 7 tablet, Rfl: 0    insulin aspart (NOVOLOG FLEXPEN) 100 UNIT/ML pen, For Pre-Meal  - 189 give 1 unit.  For Pre-Meal  - 239 give 2 units.  For Pre-Meal  - 289 give 3 units.  For Pre-Meal  - 339 give 4 units.  For Pre-Meal - 389 give 5 units.  For Pre-Meal -439 give 6 units For Pre-Meal BG greater than or equal to 440 give 7 units.  For BEDTIME: For  - 249 give 1 unit.  For  - 299 give 2 units.  For  - 349 give 3 units.  For  - 399 give 4 units.  For BG greater than or equal to 400 give 5 units., Disp: 15 mL, Rfl: 0    insulin glargine (LANTUS PEN) 100 UNIT/ML pen, Inject 5 Units subcutaneously every morning., Disp: 15 mL, Rfl: 0    insulin pen needle (32G X 4 MM) 32G X 4 MM miscellaneous, Use as directed by provider Per insurance coverage, Disp: 200 each, Rfl: 1    Latanoprost PF 0.005 % SOLN, Apply 1 drop to eye at bedtime. INSTILL 1 DROP IN BOTH EYES AT BEDTIME FOR GLAUCOMA, Disp: 10 each, Rfl: 0    metFORMIN (GLUCOPHAGE XR) 500 MG 24 hr tablet, Take 1 tablet (500 mg) by mouth daily (with dinner)., Disp: 14 tablet, Rfl: 0    metoprolol succinate ER (TOPROL XL) 25 MG 24 hr tablet, Take 3 tablets (75 mg) by mouth daily. TAKE THREE TABLETS BY MOUTH EVERY DAY FOR HEART RATE, Disp: 42 tablet, Rfl: 0    mycophenolate (GENERIC EQUIVALENT) 500 MG tablet, Take 1 tablet (500 mg) by mouth 2 times daily., Disp: 28 tablet, Rfl: 0    pantoprazole (PROTONIX) 40 MG EC tablet, Take 1 tablet (40 mg) by mouth daily. TAKE ONE TABLET BY MOUTH EVERY DAY  PREVENT GI BLEED, Disp: 14 tablet, Rfl: 0    patiromer (VELTASSA) 8.4 g packet, Take 8.4 g by mouth once a week. Take 1 packet (8.4 grams) by mouth once weekly for high potassium, Disp: 2 packet, Rfl: 0    Sharps Container MISC, Use as directed to dispose of needles, lancets and other sharps, Disp: 1 each, Rfl: 1    sodium bicarbonate 650 MG tablet, Take 2 tablets (1,300 mg) by mouth 3 times daily., Disp: 60 tablet, Rfl: 1    tacrolimus (GENERIC EQUIVALENT) 1 MG capsule, Take 2 capsules (2 mg) by mouth 2 times daily., Disp: 56 capsule, Rfl: 0    tamsulosin (FLOMAX) 0.4 MG capsule, Take 2 capsules (0.8 mg) by mouth daily., Disp: 30 capsule, Rfl: 0    Review of Systems - 10 point Review of Systems is negative except for status post right TMA which is noted in HPI.      OBJECTIVE:  BP (!) 146/84   Pulse 60   Temp 98.2  F (36.8  C)   Resp 16   SpO2 99%   General appearance: Patient is alert and fully cooperative with history & exam.  No sign of distress is noted during the visit.    Vascular: Dorsalis pedis non-palpableRight.  Dermatologic:    Negative Pressure Wound Therapy Thigh Anterior;Right (Active)       VASC Wound R hallux (Active)   Pre Size Length 1.2 04/17/25 1400   Pre Size Width 0.7 04/17/25 1400   Pre Size Depth 0.1 04/17/25 1400   Pre Total Sq cm 0.84 04/17/25 1400       VASC Wound right heel (Active)   Post Size Width 2 04/17/25 1500   Post Size Depth 1 04/17/25 1500   Post Total Sq cm 0.1 04/17/25 1500   Undermined 2 04/17/25 1500       Incision/Surgical Site 03/17/25 Left Groin (Active)       Incision/Surgical Site 03/20/25 Anterior;Right Thigh (Active)       Incision/Surgical Site 03/20/25 Right;Outer Tibial (Active)       Incision/Surgical Site 05/07/25 Right Foot (Active)   Incision Assessment UTV 05/16/25 0909   Dressing Per Plan of Care 05/16/25 0909   Munira-Incision Assessment Warm 05/16/25 0909   Closure Sutures 05/16/25 0909   Incision Drainage Amount Moderate 05/13/25 1651   Drainage  Description Sanguinous 05/11/25 1552   Incision Care Other (Comment) 05/14/25 0926   Dressing Intervention Clean, dry, intact 05/16/25 0909   Surgical sites along the right midfoot and heel have intact sutures with skin edges well coapted.  Stable eschar along posterior aspect of the right heel.  No erythema right lower extremity.  Neurologic: Diminished to light touch Right.  Musculoskeletal: TMA right foot.      Picture:

## 2025-06-17 NOTE — TELEPHONE ENCOUNTER
Form received again via fax    Placed form in covering provider bin to address since pcp is on vacation.

## 2025-06-17 NOTE — PATIENT INSTRUCTIONS
*Wheelchairs are never guaranteed at the front door, if you have your own wheel chair or knee walker, please bring that with you to your appointment. Thank you for your understanding!*    Wound care supplies were not ordered or needed today.    Important lnstructions      WEIGHT BEARING STATUS: You are to remain NON WEIGHT BEARING on your right foot. NON WEIGHT BEARING MEANS NO PRESSURE ON YOUR FOOT OR HEEL AT ANY TIME FOR ANY REASON!      2. OFFLOADING DEVICE: Must use either a Wheelchair at all times! (do not use affected foot to push wheelchair)    3. STABILIZATION DEVICE: Use a Prafo boot or a prevalon boot. You will need to WEAR THIS AT ALL TIMES EVEN WHILE IN BED.     4. ELEVATE: Elevating your leg means laying with your head on a pillow and your foot ABOVE YOUR HEART.     5. DO NOT MOVE YOUR FOOT.  There is a risk of worsening the wound or incision. To give yourself a higher chance of healing, please DO NOT swing foot back and forth and wiggle foot/toes especially when inside a stabilization device. Limited movement is allowable with therapy as recommended by the doctor.     6. TAKE A PROTEIN SHAKE TWICE A DAY.  (For ex: Boost, Ensure, Glucerna)    7. KEEP YOUR WOUND DRY AT ALL TIMES    Use a shower bag or a cast cover to keep your foot/leg dry during showers. These can be purchased on [employer] or any pharmacy.         Dressing Change lnstructions    Right heel- Daily paint eschar with betadine, allow to air dry. Cover with dry gauze as needed. You can purchase betadine in large bottles at [employer].      Allow the steri-strips to fall off on their own.    SEEK MEDICAL CARE IF:  You have an increase in swelling, pain, or redness around the wound.  You have an increase in the amount of pus coming from the wound.  There is a bad smell coming from the wound.  The wound appears to be worsening/enlarging  You have a fever greater than 101.5 F      It is ok to continue current wound care treatment/products for  the next 2-3 days until new wound care supplies are ordered and arrive. If longer than this please contact our office at [phone].      We want to hear from you!   In the next few weeks, you should receive a call or email to complete a survey about your visit at [facility]. Please help us improve your appointment experience by letting us know how we did today. We strive to make your experience good and value any ways in which we could do better.      We value your input and suggestions.    Thank you for choosing the [facility]!

## 2025-06-18 ENCOUNTER — MEDICAL CORRESPONDENCE (OUTPATIENT)
Dept: HEALTH INFORMATION MANAGEMENT | Facility: CLINIC | Age: 65
End: 2025-06-18
Payer: MEDICARE

## 2025-06-18 ENCOUNTER — TELEPHONE (OUTPATIENT)
Dept: FAMILY MEDICINE | Facility: CLINIC | Age: 65
End: 2025-06-18
Payer: MEDICARE

## 2025-06-18 NOTE — TELEPHONE ENCOUNTER
Form faxed  Susy Morocho MA/TC    Received provider (Dr Krishnan for Dr Rodney Anthony) signed Change Order--Order #:[id number] form and faxed to [facility], [phone], right fax confirmed at 11:21 am today, 6/18/2025. Copy to TC and abstracting.  Susy Morocho MA/  [facility]   Primary Care

## 2025-06-18 NOTE — TELEPHONE ENCOUNTER
Forms/Letter Request    Type of form/letter: [facility] - Change Order   Order No: [id number]    Do we have the form/letter: Yes: placed in [facility] bin to sign for Ho    Who is the form from? Home care    Where did/will the form come from? form was faxed in    When is form/letter needed by: asap    How would you like the form/letter returned: Fax : [phone]    Patient Notified form requests are processed in 5-7 business days:N/A    Okay to leave a detailed message?: N/A at Cell number on file:    Telephone Information:   Mobile [phone]

## 2025-06-19 ENCOUNTER — MEDICAL CORRESPONDENCE (OUTPATIENT)
Dept: HEALTH INFORMATION MANAGEMENT | Facility: CLINIC | Age: 65
End: 2025-06-19
Payer: MEDICARE

## 2025-06-19 NOTE — TELEPHONE ENCOUNTER
Received provider signed forms from TC bin and faxed to [facility] at [phone] on 06/19/225. Right fax confirmed at 12:08PM.    Jan Shore

## 2025-06-20 ENCOUNTER — MEDICAL CORRESPONDENCE (OUTPATIENT)
Dept: HEALTH INFORMATION MANAGEMENT | Facility: CLINIC | Age: 65
End: 2025-06-20
Payer: MEDICARE

## 2025-06-26 ENCOUNTER — TELEPHONE (OUTPATIENT)
Dept: FAMILY MEDICINE | Facility: CLINIC | Age: 65
End: 2025-06-26
Payer: MEDICARE

## 2025-06-26 NOTE — TELEPHONE ENCOUNTER
Forms/Letter Request    Type of form/letter: Missed visit order ([id number])       Do we have the form/letter: Yes: Placed in Ho's bin    Who is the form from? [facility]     Where did/will the form come from? form was faxed in    When is form/letter needed by: 5 Days    How would you like the form/letter returned: Fax : [phone]    Patient Notified form requests are processed in 5-7 business days:N/A    Okay to leave a detailed message?: Yes at Cell number on file:    Telephone Information:   Mobile [phone]     Michelle Chaudhry    [facility]

## 2025-06-30 ENCOUNTER — MEDICAL CORRESPONDENCE (OUTPATIENT)
Dept: HEALTH INFORMATION MANAGEMENT | Facility: CLINIC | Age: 65
End: 2025-06-30
Payer: MEDICARE

## 2025-07-01 ENCOUNTER — TELEPHONE (OUTPATIENT)
Dept: FAMILY MEDICINE | Facility: CLINIC | Age: 65
End: 2025-07-01
Payer: MEDICARE

## 2025-07-01 NOTE — TELEPHONE ENCOUNTER
Form faxed  Susy Morocho MA/TC    Received provider signed Missed visit order ([id number]) [facility] [phone], right fax confirmed at 7:07 am today, 7/1/2025. Copy to TC and abstracting.  Susy Morocho MA/  [facility]   Primary Care

## 2025-07-01 NOTE — TELEPHONE ENCOUNTER
Forms/Letter Request    Type of form/letter: OTHER: Missed visit order # [id number]       Do we have the form/letter: Yes: Placed in Ho's bin    Who [facility] the form from[facility]     Where did/will the form come from? form was faxed in    When is form/letter needed by: 5 days    How would you like the form/letter returned: Fax : [phone]    Patient Notified form requests are processed in 5-7 business days:N/A    Okay to leave a detailed message?: Yes at Cell number on file:    Telephone Information:   Mobile [phone]      Michelle Chaudhry    [facility]

## 2025-07-01 NOTE — TELEPHONE ENCOUNTER
Completed form faxed to [facility] care on 07/01/2025 to [phone], right fax confirmed at 11:28am, copy placed in abstracting, original in tc og Chaudhry    [facility]

## 2025-07-03 ENCOUNTER — TELEPHONE (OUTPATIENT)
Dept: FAMILY MEDICINE | Facility: CLINIC | Age: 65
End: 2025-07-03
Payer: MEDICARE

## 2025-07-03 LAB
ACID FAST STAIN (ARUP): NORMAL

## 2025-07-03 NOTE — TELEPHONE ENCOUNTER
Forms/Letter Request    Type of form/letter: OTHER: Discharge Order #[id number] Order Date 6/30/2025       Do we have the form/letter: Yes:     Who is the form from? [facility]  (if other please explain)    Where did/will the form come from? form was faxed in    When is form/letter needed by: ASAP    How would you like the form/letter returned: Fax : [phone]    Patient Notified form requests are processed in 5-7 business days:Yes    Okay to leave a detailed message?: Yes at Cell number on file:    Telephone Information:   Mobile [phone]

## 2025-07-08 NOTE — TELEPHONE ENCOUNTER
Form faxed  Susy Morocho MA/MIKE     Received provider signed Discharge Order #[id number] Order Date 6/30/2025  form and faxed to [facility], [phone], right fax confirmed at 9:20 am today, 7/8/2025. Copy to TC and abstracting.  Susy Morocho MA/  [facility]   Primary Care   [phone]

## 2025-07-08 NOTE — PATIENT INSTRUCTIONS
"*Wheelchairs are never guaranteed at the front door, if you have your own wheel chair or knee walker, please bring that with you to your appointment. Thank you for your understanding!*    Wound care supplies were not ordered or needed today.    Important lnstructions      WEIGHT BEARING STATUS: You are to remain NON WEIGHT BEARING on your right foot. NON WEIGHT BEARING MEANS NO PRESSURE ON YOUR FOOT OR HEEL AT ANY TIME FOR ANY REASON!      2. OFFLOADING DEVICE: Must use either a Wheelchair at all times! (do not use affected foot to push wheelchair)    3. STABILIZATION DEVICE: Use a Prafo boot or a prevalon boot. You will need to WEAR THIS AT ALL TIMES EVEN WHILE IN BED.     4. ELEVATE: Elevating your leg means laying with your head on a pillow and your foot ABOVE YOUR HEART.     5. DO NOT MOVE YOUR FOOT.  There is a risk of worsening the wound or incision. To give yourself a higher chance of healing, please DO NOT swing foot back and forth and wiggle foot/toes especially when inside a stabilization device. Limited movement is allowable with therapy as recommended by the doctor.     6. TAKE A PROTEIN SHAKE TWICE A DAY.  (For ex: Boost, Ensure, Glucerna)    7. KEEP YOUR WOUND DRY AT ALL TIMES    Use a shower bag or a cast cover to keep your foot/leg dry during showers. These can be purchased on [employer] or any pharmacy.         Dressing Change lnstructions    Right heel- Daily paint eschar with betadine, allow to air dry. Cover with dry gauze as needed. You can purchase betadine in large bottles at [facility].    EVERY OTHER DAY or THREE TIMES WEEKLY and as needed, Cleanse your right foot wound(s) with Normal saline.    Pat Dry with non-sterile gauze    Primary Dressing: Apply Medihoney or Manuka honey into/onto the wounds    Secondary dressing: Cover with dry gauze    Secure with non-sterile roll gauze (4\" x 75\" roll) and tape (1\" roll tape) as needed; avoid adhesive directly on the skin    Allow the blood blister " to reabsorb on it's own.    SEEK MEDICAL CARE IF:  You have an increase in swelling, pain, or redness around the wound.  You have an increase in the amount of pus coming from the wound.  There is a bad smell coming from the wound.  The wound appears to be worsening/enlarging  You have a fever greater than 101.5 F      It is ok to continue current wound care treatment/products for the next 2-3 days until new wound care supplies are ordered and arrive. If longer than this please contact our office at [phone].      We want to hear from you!   In the next few weeks, you should receive a call or email to complete a survey about your visit at [facility]. Please help us improve your appointment experience by letting us know how we did today. We strive to make your experience good and value any ways in which we could do better.      We value your input and suggestions.    Thank you for choosing the [facility]!

## 2025-07-10 ENCOUNTER — OFFICE VISIT (OUTPATIENT)
Dept: VASCULAR SURGERY | Facility: CLINIC | Age: 65
End: 2025-07-10
Attending: PODIATRIST
Payer: MEDICARE

## 2025-07-10 VITALS
DIASTOLIC BLOOD PRESSURE: 89 MMHG | TEMPERATURE: 98 F | SYSTOLIC BLOOD PRESSURE: 146 MMHG | OXYGEN SATURATION: 100 % | HEART RATE: 77 BPM

## 2025-07-10 DIAGNOSIS — L89.610 UNSTAGEABLE PRESSURE ULCER OF RIGHT HEEL (H): ICD-10-CM

## 2025-07-10 DIAGNOSIS — E11.621 DIABETIC ULCER OF OTHER PART OF RIGHT FOOT ASSOCIATED WITH TYPE 2 DIABETES MELLITUS, LIMITED TO BREAKDOWN OF SKIN (H): Primary | ICD-10-CM

## 2025-07-10 DIAGNOSIS — S90.821A BLISTER OF RIGHT FOOT, INITIAL ENCOUNTER: ICD-10-CM

## 2025-07-10 DIAGNOSIS — L97.511 DIABETIC ULCER OF OTHER PART OF RIGHT FOOT ASSOCIATED WITH TYPE 2 DIABETES MELLITUS, LIMITED TO BREAKDOWN OF SKIN (H): Primary | ICD-10-CM

## 2025-07-10 PROCEDURE — G0463 HOSPITAL OUTPT CLINIC VISIT: HCPCS | Performed by: PODIATRIST

## 2025-07-10 PROCEDURE — 11042 DBRDMT SUBQ TIS 1ST 20SQCM/<: CPT | Performed by: PODIATRIST

## 2025-07-10 ASSESSMENT — PAIN SCALES - GENERAL: PAINLEVEL_OUTOF10: NO PAIN (0)

## 2025-07-10 NOTE — LETTER
[facility]  [address] [place] [address] [address]  [place], [place] [address]  [phone]      Fax [phone]    [facility]           FAX: [phone]            Customer Service: [phone]        Account #: [id number]    Wound Dressing Rx and Order Form  Order Status: new  Verbal: Shari  Date: July 10, 2025     Tad Zaman  Gender: male  : 1960  [address]  [facility] [address]  [phone] (home)     Medical Record: [id number]  Primary Care Provider: Julius Cesar      ICD-10-CM    1. Diabetic ulcer of other part of right foot associated with type 2 diabetes mellitus, limited to breakdown of skin (H)  E11.621 DEBRIDE SKIN/SUBQ TISSUE    L97.511       2. Unstageable pressure ulcer of right heel (H)  L89.610       3. Blister of right foot, initial encounter  S90.303F             Insurance Info:  INSURER: Payor: MEDICARE / Plan: MEDICARE / Product Type: Medicare /   Policy ID#:  [id number]  SECONDARY INSURANCE:    Secondary Policy ID#:  N/A        Physician Info:   Name:  JEFF LOPEZ     Dept Address/Phones:   [address] [place] [address], [facility] [address] [address] [phone]  Fax: [phone]    Lymphedema circumferential measurements (in cm):       No data to display                  Wound info:  Negative Pressure Wound Therapy Thigh Anterior;Right (Active)   Number of days: 112       VASC Wound right heel (Active)   Post Size Width 2 25 1500   Post Size Depth 1 25 1500   Post Total Sq cm 0.1 25 1500   Undermined 2 25 1500   Description eschar 07/10/25 1500   Number of days: 84       VASC Wound Right TMA site (Active)   Pre Size Length 0.2 07/10/25 1500   Pre Size Width 0.2 07/10/25 1500   Pre Size Depth 0.2 07/10/25 1500   Pre Total Sq cm 0.04 07/10/25 1500   Post Size Length 1 07/10/25 1500   Post Size Width 1 07/10/25 1500   Post Size Depth 0.3 07/10/25 1500   Post Total Sq cm 1 07/10/25 1500   Number of days: 0      "  Incision/Surgical Site 03/17/25 Left Groin (Active)   Number of days: 115       Incision/Surgical Site 03/20/25 Anterior;Right Thigh (Active)   Number of days: 112       Incision/Surgical Site 03/20/25 Right;Outer Tibial (Active)   Number of days: 112       Incision/Surgical Site 05/07/25 Right Foot (Active)   Number of days: 64        Drainage: moderate  Thickness:  full  Duration of Need: 30 DAYS  Days Supply: 30 DAYS  Start Date: 7/10/2025  Starter Kit, Ancillary Kit (saline, gloves, gauze): Yes   Qualifying wound/Debridement: Yes     DISPENSE AS WRITTEN.   Call [phone]. Please call patient for out-of-pocket costs and options.      Dressing Type Brand Size Frequency of change  Quantity   Primary [facility] supralite  4\"x5\" EVERY OTHER DAY and as needed      DISPENSE AS WRITTEN. Call [phone] with questions.       OK to forward to covered supplier.    Electronically Signed Physician:  JEFF LOPEZ             Date: July 10, 2025    "

## 2025-07-10 NOTE — PROGRESS NOTES
FOOT AND ANKLE SURGERY/PODIATRY Progress Note      ASSESSMENT:   Diabetic ulceration right foot and subcutaneous tissue  Unstageable pressure ulceration right heel  Blister right foot  Status post TMA with FRANCESCO right  DM2  CKD    A new wound was identified today: yes,  it is located lateral right foot.    TREATMENT:  - I discussed with the patient has new ulceration on the lateral right foot and stable blister along the plantar right foot are  likely due to excessive weightbearing on the right foot.      -We reviewed recommendation for application of Medihoney every other day with a gauze dressing.  Also reviewed associated risks of excessive weight-bear including delayed wound healing and need for additional surgical intervention.    -Right heel ulceration is stable.  Iodine applied today which he will reapply daily.  Continue with Prevalon boot for offloading.    -We will continue to monitor area of blistering for any open lesions.    -After discussion of risk factors, nursing staff removed dressing, cleansed wound and consent obtained 2% Lidocaine HCL jelly was applied, under clean conditions, the right foot ulceration(s) were debrided using currette or #15 blade scalpel.  Devitalized and nonviable tissue, along with any fibrin and slough, was removed to improve granulation tissue formation, stimulate wound healing, decrease overall bacteria load, disrupt biofilm formation and decrease edge senescence. Wound drainage was small Serosanguinous. Total excisional debridement was 1 sq cm into the subcutaneous tissue with a depth of 0.3 cm.   Ulcers were improved afterwards and .  Measures were as noted on the flow sheet.  Medihoney with gauze dressing applied today which he will reapply every other day.    -He will follow-up in 3 weeks    Ryan Miller DPM  [facility] [place]      HPI: Tad Zaman was seen again today status post TMA with FRANCESCO right and follow-up pressure ulceration right  heel.  Patient's daughter who is present today reports that he continues to ambulate on the right foot.    Past Medical History:   Diagnosis Date    Chronic kidney disease     Diabetes (H)     Hypertension     Myocardial infarction (H)        Past Surgical History:   Procedure Laterality Date    AMPUTATE FOOT Right 5/7/2025    Procedure: TRANSMETATARSAL AMPUTATION;  Surgeon: Kwesi Arreola DPM;  Location: [facility]    ANGIOGRAM Right 3/17/2025    Procedure: ANGIOGRAM;  Surgeon: Messi Irving MD;  Location: [facility]    ANGIOPLASTY Right 3/17/2025    Procedure: ANGIOPLASTY;  Surgeon: Messi Irving MD;  Location: [facility]    BYPASS GRAFT FEMOROTIBIAL Right 3/20/2025    Procedure: Right Femoral Anterior Tibial Bypass using Cryo preserve artery;  Surgeon: Messi Irving MD;  Location: [facility]    IR OR ANGIOGRAM  3/17/2025    IR OR ANGIOGRAM  3/20/2025    LENGTHEN TENDON ACHILLES Right 5/7/2025    Procedure: LENGTHENING, TENDON, ACHILLES;  Surgeon: Kwesi Arreola DPM;  Location: [facility]    OR ANGIOGRAM, LOWER EXTREMITY Right 3/20/2025    Procedure: COMPLETION ANGIOGRAM;  Surgeon: Messi Irving MD;  Location: [facility]       Allergies   Allergen Reactions    Penicillins Hives         Current Outpatient Medications:     Alcohol Swabs PADS, Use to swab the area of the injection or didi as directed Per insurance coverage, Disp: 200 each, Rfl: 1    apixaban ANTICOAGULANT (ELIQUIS) 5 MG tablet, Take 1 tablet (5 mg) by mouth 2 times daily., Disp: 28 tablet, Rfl: 0    aspirin (ASA) 81 MG chewable tablet, Take 1 tablet (81 mg) by mouth daily., Disp: 14 tablet, Rfl: 0    atorvastatin (LIPITOR) 40 MG tablet, Take 1 tablet (40 mg) by mouth at bedtime., Disp: 14 tablet, Rfl: 0    blood glucose (NO BRAND SPECIFIED) lancets standard, Use to test blood sugar four times a day (TID before meals and at bedtime). To accompany glucose monitor brands per insurance coverage., Disp: 200 each, Rfl: 1    blood glucose (NO BRAND SPECIFIED) test strip, Use to test  blood sugar four times a day (TID before meals and at bedtime). To accompany glucose monitor brands per insurance coverage., Disp: 50 strip, Rfl: 0    blood glucose monitoring (NO BRAND SPECIFIED) meter device kit, Please check blood sugar four times a day (TID before meals and at bedtime)., Disp: 1 kit, Rfl: 0    brinzolamide-brimonidine (SIMBRINZA) 1-0.2 % ophthalmic suspension, Apply 1 drop to eye 2 times daily., Disp: 8 mL, Rfl: 0    calcitRIOL (ROCALTROL) 0.25 MCG capsule, Take 1 capsule (0.25 mcg) by mouth daily. TAKE ONE CAPSULE BY MOUTH MONDAY THROUGH FRIDAY FOR BONE HEALTH ** DOSE INCREASE, Disp: 14 capsule, Rfl: 0    empagliflozin (JARDIANCE) 25 MG TABS tablet, Take 0.5 tablets (12.5 mg) by mouth every morning. TAKE ONE-HALF TABLET BY MOUTH EVERY MORNING FOR DIABETES AND KIDNEY PROTECTION, Disp: 14 tablet, Rfl: 1    ferrous sulfate (FEROSUL) 325 (65 Fe) MG tablet, Take 1 tablet (325 mg) by mouth every other day., Disp: 7 tablet, Rfl: 0    insulin aspart (NOVOLOG FLEXPEN) 100 UNIT/ML pen, For Pre-Meal  - 189 give 1 unit.  For Pre-Meal  - 239 give 2 units.  For Pre-Meal  - 289 give 3 units.  For Pre-Meal  - 339 give 4 units.  For Pre-Meal - 389 give 5 units.  For Pre-Meal -439 give 6 units For Pre-Meal BG greater than or equal to 440 give 7 units.  For BEDTIME: For  - 249 give 1 unit.  For  - 299 give 2 units.  For  - 349 give 3 units.  For  - 399 give 4 units.  For BG greater than or equal to 400 give 5 units., Disp: 15 mL, Rfl: 0    insulin glargine (LANTUS PEN) 100 UNIT/ML pen, Inject 5 Units subcutaneously every morning., Disp: 15 mL, Rfl: 0    insulin pen needle (32G X 4 MM) 32G X 4 MM miscellaneous, Use as directed by provider Per insurance coverage, Disp: 200 each, Rfl: 1    Latanoprost PF 0.005 % SOLN, Apply 1 drop to eye at bedtime. INSTILL 1 DROP IN BOTH EYES AT BEDTIME FOR GLAUCOMA, Disp: 10 each, Rfl: 0    metFORMIN (GLUCOPHAGE XR) 500  MG 24 hr tablet, Take 1 tablet (500 mg) by mouth daily (with dinner)., Disp: 14 tablet, Rfl: 0    metoprolol succinate ER (TOPROL XL) 25 MG 24 hr tablet, Take 3 tablets (75 mg) by mouth daily. TAKE THREE TABLETS BY MOUTH EVERY DAY FOR HEART RATE, Disp: 42 tablet, Rfl: 0    mycophenolate (GENERIC EQUIVALENT) 500 MG tablet, Take 1 tablet (500 mg) by mouth 2 times daily., Disp: 28 tablet, Rfl: 0    pantoprazole (PROTONIX) 40 MG EC tablet, Take 1 tablet (40 mg) by mouth daily. TAKE ONE TABLET BY MOUTH EVERY DAY PREVENT GI BLEED, Disp: 14 tablet, Rfl: 0    patiromer (VELTASSA) 8.4 g packet, Take 8.4 g by mouth once a week. Take 1 packet (8.4 grams) by mouth once weekly for high potassium, Disp: 2 packet, Rfl: 0    Sharps Container MISC, Use as directed to dispose of needles, lancets and other sharps, Disp: 1 each, Rfl: 1    sodium bicarbonate 650 MG tablet, Take 2 tablets (1,300 mg) by mouth 3 times daily., Disp: 60 tablet, Rfl: 1    tacrolimus (GENERIC EQUIVALENT) 1 MG capsule, Take 2 capsules (2 mg) by mouth 2 times daily., Disp: 56 capsule, Rfl: 0    tamsulosin (FLOMAX) 0.4 MG capsule, Take 2 capsules (0.8 mg) by mouth daily., Disp: 30 capsule, Rfl: 0    Review of Systems - 10 point Review of Systems is negative except for right heel ulcer which is noted in HPI.      OBJECTIVE:  BP (!) 146/89   Pulse 77   Temp 98  F (36.7  C) (Oral)   SpO2 100%   General appearance: Patient is alert and fully cooperative with history & exam.  No sign of distress is noted during the visit.    Vascular: Dorsalis pedis non-palpableRight.  Dermatologic:    Negative Pressure Wound Therapy Thigh Anterior;Right (Active)       VASC Wound right heel (Active)   Post Size Width 2 04/17/25 1500   Post Size Depth 1 04/17/25 1500   Post Total Sq cm 0.1 04/17/25 1500   Undermined 2 04/17/25 1500   Description eschar 07/10/25 1500       VASC Wound Right TMA site (Active)   Pre Size Length 0.2 07/10/25 1500   Pre Size Width 0.2 07/10/25 1500   Pre  Size Depth 0.2 07/10/25 1500   Pre Total Sq cm 0.04 07/10/25 1500   Post Size Length 1 07/10/25 1500   Post Size Width 1 07/10/25 1500   Post Size Depth 0.3 07/10/25 1500   Post Total Sq cm 1 07/10/25 1500       Incision/Surgical Site 03/17/25 Left Groin (Active)       Incision/Surgical Site 03/20/25 Anterior;Right Thigh (Active)       Incision/Surgical Site 03/20/25 Right;Outer Tibial (Active)       Incision/Surgical Site 05/07/25 Right Foot (Active)   Majority of the right foot TMA incision is well coapted with small areas of full-thickness dehiscence, greatest area of dehiscence along lateral incision resulting in a full-thickness ulceration.  Dried stable blister along plantar right foot foot.  Stable eschar posterior right heel.  No erythema right lower extremity.  Neurologic: Diminished to light touch Right.  Musculoskeletal: TMA right foot.      Picture:

## 2025-07-21 ENCOUNTER — TELEPHONE (OUTPATIENT)
Dept: VASCULAR SURGERY | Facility: CLINIC | Age: 65
End: 2025-07-21
Payer: MEDICARE

## 2025-07-21 NOTE — TELEPHONE ENCOUNTER
Caller: Nadinewes daughter    Provider: Dr. Ryan Miller    Detailed reason for call: daughter would like a call to go over what patient  should be doing. He has memory issues and she believes he is not following the weight bear restrictions. She is not sure if he should come sooner then 7/31 as well     Best phone number to contact: [phone]    Best time to contact: any    Ok to leave a detailed message: Yes    Ok to speak to authorized person if needed: No      (Noted to patient if reason is related to wound or incision, to please send a photo via email or Noemalifet.)

## 2025-07-23 NOTE — PATIENT INSTRUCTIONS
"*Wheelchairs are never guaranteed at the front door, if you have your own wheel chair or knee walker, please bring that with you to your appointment. Thank you for your understanding!*    Wound care supplies were not ordered or needed today.    Important lnstructions      WEIGHT BEARING STATUS: You are to remain NON WEIGHT BEARING on your right foot. NON WEIGHT BEARING MEANS NO PRESSURE ON YOUR FOOT OR HEEL AT ANY TIME FOR ANY REASON!      2. OFFLOADING DEVICE: Must use either a Wheelchair at all times! (do not use affected foot to push wheelchair)    3. STABILIZATION DEVICE: Use a Prafo boot or a prevalon boot. You will need to WEAR THIS AT ALL TIMES EVEN WHILE IN BED.     4. ELEVATE: Elevating your leg means laying with your head on a pillow and your foot ABOVE YOUR HEART.     5. DO NOT MOVE YOUR FOOT.  There is a risk of worsening the wound or incision. To give yourself a higher chance of healing, please DO NOT swing foot back and forth and wiggle foot/toes especially when inside a stabilization device. Limited movement is allowable with therapy as recommended by the doctor.     6. TAKE A PROTEIN SHAKE TWICE A DAY.  (For ex: Boost, Ensure, Glucerna)    7. KEEP YOUR WOUND DRY AT ALL TIMES    Use a shower bag or a cast cover to keep your foot/leg dry during showers. These can be purchased on [employer] or any pharmacy.         Dressing Change lnstructions    Right heel- Daily paint eschar with betadine, allow to air dry. Cover with dry gauze as needed. You can purchase betadine in large bottles at [facility].    EVERY OTHER DAY or THREE TIMES WEEKLY and as needed, Cleanse your right foot wound(s) with Normal saline.    Pat Dry with non-sterile gauze    Primary Dressing: Apply Medihoney or Manuka honey into/onto the wounds    Secondary dressing: Cover with dry gauze    Secure with non-sterile roll gauze (4\" x 75\" roll) and tape (1\" roll tape) as needed; avoid adhesive directly on the skin    SEEK MEDICAL CARE " IF:  You have an increase in swelling, pain, or redness around the wound.  You have an increase in the amount of pus coming from the wound.  There is a bad smell coming from the wound.  The wound appears to be worsening/enlarging  You have a fever greater than 101.5 F      It is ok to continue current wound care treatment/products for the next 2-3 days until new wound care supplies are ordered and arrive. If longer than this please contact our office at [phone].      We want to hear from you!   In the next few weeks, you should receive a call or email to complete a survey about your visit at [facility]. Please help us improve your appointment experience by letting us know how we did today. We strive to make your experience good and value any ways in which we could do better.      We value your input and suggestions.    Thank you for choosing the [facility]!

## 2025-07-31 ENCOUNTER — OFFICE VISIT (OUTPATIENT)
Dept: VASCULAR SURGERY | Facility: CLINIC | Age: 65
End: 2025-07-31
Attending: PODIATRIST
Payer: MEDICARE

## 2025-07-31 VITALS
TEMPERATURE: 97.7 F | OXYGEN SATURATION: 99 % | SYSTOLIC BLOOD PRESSURE: 137 MMHG | HEART RATE: 69 BPM | DIASTOLIC BLOOD PRESSURE: 84 MMHG

## 2025-07-31 DIAGNOSIS — L89.610 UNSTAGEABLE PRESSURE ULCER OF RIGHT HEEL (H): ICD-10-CM

## 2025-07-31 DIAGNOSIS — L97.511 DIABETIC ULCER OF OTHER PART OF RIGHT FOOT ASSOCIATED WITH TYPE 2 DIABETES MELLITUS, LIMITED TO BREAKDOWN OF SKIN (H): Primary | ICD-10-CM

## 2025-07-31 DIAGNOSIS — E11.621 DIABETIC ULCER OF OTHER PART OF RIGHT FOOT ASSOCIATED WITH TYPE 2 DIABETES MELLITUS, LIMITED TO BREAKDOWN OF SKIN (H): Primary | ICD-10-CM

## 2025-07-31 PROCEDURE — 11042 DBRDMT SUBQ TIS 1ST 20SQCM/<: CPT | Performed by: PODIATRIST

## 2025-07-31 ASSESSMENT — PAIN SCALES - GENERAL: PAINLEVEL_OUTOF10: NO PAIN (0)

## 2025-07-31 NOTE — LETTER
[facility]  [address] [place] [address] [address]  [place], [place] [address]  [phone]      Fax [phone]    [facility]           FAX: [phone]            Customer Service: [phone]        Account #: [id number]    Wound Dressing Rx and Order Form  Order Status: refill  Verbal: Shari  Date: 2025     Tadyvon Zaman  Gender: male  : 1960  [address]  [facility] [address]  [phone] (home)     Medical Record: [id number]  Primary Care Provider: Julius Cesar      ICD-10-CM    1. Diabetic ulcer of other part of right foot associated with type 2 diabetes mellitus, limited to breakdown of skin (H)  E11.621 DEBRIDE SKIN/SUBQ TISSUE    L97.511       2. Unstageable pressure ulcer of right heel (H)  L89.610             Insurance Info:  INSURER: Payor: MEDICARE / Plan: MEDICARE / Product Type: Medicare /   Policy ID#:  [id number]  SECONDARY INSURANCE:    Secondary Policy ID#:  N/A        Physician Info:   Name:  JEFF LOPEZ     Dept Address/Phones:   [address] [place] [address], [address]  [facility] [id number] [phone]  Fax: [phone]    Lymphedema circumferential measurements (in cm):       No data to display                  Wound info:  Negative Pressure Wound Therapy Thigh Anterior;Right (Active)   Number of days: 133       VASC Wound right heel (Active)   Post Size Width 2 25 1500   Post Size Depth 1 25 1500   Post Total Sq cm 0.1 25 1500   Undermined 2 25 1500   Description eschar 25 1500   Number of days: 105       VASC Wound Right TMA site (Active)   Pre Size Length 0.8 25 1500   Pre Size Width 0.6 25 1500   Pre Size Depth 0.3 25 1500   Pre Total Sq cm 0.18 25 1500   Post Size Length 0.8 25 1500   Post Size Width 0.6 25 1500   Post Size Depth 0.3 25 1500   Post Total Sq cm 0.18 25 1500   Number of days: 21       Incision/Surgical Site 25 Left Groin (Active)  "  Number of days: 136       Incision/Surgical Site 03/20/25 Anterior;Right Thigh (Active)   Number of days: 133       Incision/Surgical Site 03/20/25 Right;Outer Tibial (Active)   Number of days: 133       Incision/Surgical Site 05/07/25 Right Foot (Active)   Number of days: 85        Drainage: moderate  Thickness:  full  Duration of Need: 30 DAYS  Days Supply: 30 DAYS  Start Date: 7/31/2025  Starter Kit, Ancillary Kit (saline, gloves, gauze): Yes   Qualifying wound/Debridement: Yes     DISPENSE AS WRITTEN.   Call [phone]. Please call patient for out-of-pocket costs and options.      Dressing Type Brand Size Frequency of change  Quantity   Primary [place] med hd supralite  4\"x5\" EVERY OTHER DAY and as needed     Square gauze  4\"x4\" EVERY OTHER DAY and as needed     Sof form roll gauze  4\"x75\" EVERY OTHER DAY and as needed     Paper tape  2\" rolls       DISPENSE AS WRITTEN. Call [phone] with questions.       OK to forward to covered supplier.    Electronically Signed Physician:  JEFF LOPEZ             Date: July 31, 2025    "

## 2025-07-31 NOTE — PROGRESS NOTES
FOOT AND ANKLE SURGERY/PODIATRY Progress Note      ASSESSMENT:   Diabetic ulceration right foot into subcutaneous tissue  Unstageable pressure ulceration right heel  DM2  CKD        TREATMENT:  - I discussed with the patient that overall the right foot ulceration is stable without signs of infection.  The ulceration is measuring slightly larger than the previous visit.    -We again reviewed the importance of strict nonweightbearing on the right foot at all times.  Risks of excessive weightbearing including delayed wound healing.    -Iodine applied to the right heel today which I recommend he reapply daily.  Continue offloading the right heel at all times.    -After discussion of risk factors, nursing staff removed dressing, cleansed wound and consent obtained 2% Lidocaine HCL jelly was applied, under clean conditions, the right foot ulceration(s) were debrided using currette or #15 blade scalpel.  Devitalized and nonviable tissue, along with any fibrin and slough, was removed to improve granulation tissue formation, stimulate wound healing, decrease overall bacteria load, disrupt biofilm formation and decrease edge senescence. Wound drainage was small Serosanguinous. Total excisional debridement was 0.18 sq cm into the subcutaneous tissue with a depth of 0.3 cm.   Ulcers were improved afterwards and .  Measures were as noted on the flow sheet.  Medihoney applied today which he will reapply every other day.    - He will follow-up in 3 weeks    Ryan Miller DPM  [facility]      HPI: Tad Zaman was seen again today for a right foot ulceration and right heel ulceration.  Doing well today.  Patient's daughter is present for today's visit.  Patient admits weightbearing on the right foot.    Past Medical History:   Diagnosis Date    Chronic kidney disease     Diabetes (H)     Hypertension     Myocardial infarction (H)        Past Surgical History:   Procedure Laterality Date     AMPUTATE FOOT Right 5/7/2025    Procedure: TRANSMETATARSAL AMPUTATION;  Surgeon: Kwesi Arreola DPM;  Location: [facility] OR    ANGIOGRAM Right 3/17/2025    Procedure: ANGIOGRAM;  Surgeon: Messi Irving MD;  Location: [facility] OR    ANGIOPLASTY Right 3/17/2025    Procedure: ANGIOPLASTY;  Surgeon: Messi Irving MD;  Location: [facility] OR    BYPASS GRAFT FEMOROTIBIAL Right 3/20/2025    Procedure: Right Femoral Anterior Tibial Bypass using Cryo preserve artery;  Surgeon: Messi Irving MD;  Location: [facility] OR    IR OR ANGIOGRAM  3/17/2025    IR OR ANGIOGRAM  3/20/2025    LENGTHEN TENDON ACHILLES Right 5/7/2025    Procedure: LENGTHENING, TENDON, ACHILLES;  Surgeon: Kwesi Arreola DPM;  Location: [facility] OR    OR ANGIOGRAM, LOWER EXTREMITY Right 3/20/2025    Procedure: COMPLETION ANGIOGRAM;  Surgeon: Messi Irving MD;  Location: [facility] OR       Allergies   Allergen Reactions    Ketorolac Tromethamine Other (See Comments)    Penicillins Hives         Current Outpatient Medications:     Alcohol Swabs PADS, Use to swab the area of the injection or didi as directed Per insurance coverage, Disp: 200 each, Rfl: 1    apixaban ANTICOAGULANT (ELIQUIS) 5 MG tablet, Take 1 tablet (5 mg) by mouth 2 times daily., Disp: 28 tablet, Rfl: 0    aspirin (ASA) 81 MG chewable tablet, Take 1 tablet (81 mg) by mouth daily., Disp: 14 tablet, Rfl: 0    atorvastatin (LIPITOR) 40 MG tablet, Take 1 tablet (40 mg) by mouth at bedtime., Disp: 14 tablet, Rfl: 0    blood glucose (NO BRAND SPECIFIED) lancets standard, Use to test blood sugar four times a day (TID before meals and at bedtime). To accompany glucose monitor brands per insurance coverage., Disp: 200 each, Rfl: 1    blood glucose (NO BRAND SPECIFIED) test strip, Use to test blood sugar four times a day (TID before meals and at bedtime). To accompany glucose monitor brands per insurance coverage., Disp: 50 strip, Rfl: 0    blood glucose monitoring (NO BRAND SPECIFIED) meter device kit, Please check blood sugar four times a  day (TID before meals and at bedtime)., Disp: 1 kit, Rfl: 0    brinzolamide-brimonidine (SIMBRINZA) 1-0.2 % ophthalmic suspension, Apply 1 drop to eye 2 times daily., Disp: 8 mL, Rfl: 0    calcitRIOL (ROCALTROL) 0.25 MCG capsule, Take 1 capsule (0.25 mcg) by mouth daily. TAKE ONE CAPSULE BY MOUTH MONDAY THROUGH FRIDAY FOR BONE HEALTH ** DOSE INCREASE, Disp: 14 capsule, Rfl: 0    ferrous sulfate (FEROSUL) 325 (65 Fe) MG tablet, Take 1 tablet (325 mg) by mouth every other day., Disp: 7 tablet, Rfl: 0    Latanoprost PF 0.005 % SOLN, Apply 1 drop to eye at bedtime. INSTILL 1 DROP IN BOTH EYES AT BEDTIME FOR GLAUCOMA, Disp: 10 each, Rfl: 0    metFORMIN (GLUCOPHAGE XR) 500 MG 24 hr tablet, Take 1 tablet (500 mg) by mouth daily (with dinner)., Disp: 14 tablet, Rfl: 0    metoprolol succinate ER (TOPROL XL) 25 MG 24 hr tablet, Take 3 tablets (75 mg) by mouth daily. TAKE THREE TABLETS BY MOUTH EVERY DAY FOR HEART RATE, Disp: 42 tablet, Rfl: 0    mycophenolate (GENERIC EQUIVALENT) 500 MG tablet, Take 1 tablet (500 mg) by mouth 2 times daily., Disp: 28 tablet, Rfl: 0    pantoprazole (PROTONIX) 40 MG EC tablet, Take 1 tablet (40 mg) by mouth daily. TAKE ONE TABLET BY MOUTH EVERY DAY PREVENT GI BLEED, Disp: 14 tablet, Rfl: 0    patiromer (VELTASSA) 8.4 g packet, Take 8.4 g by mouth once a week. Take 1 packet (8.4 grams) by mouth once weekly for high potassium, Disp: 2 packet, Rfl: 0    Sharps Container MISC, Use as directed to dispose of needles, lancets and other sharps, Disp: 1 each, Rfl: 1    sodium bicarbonate 650 MG tablet, Take 2 tablets (1,300 mg) by mouth 3 times daily., Disp: 60 tablet, Rfl: 1    tacrolimus (GENERIC EQUIVALENT) 1 MG capsule, Take 2 capsules (2 mg) by mouth 2 times daily., Disp: 56 capsule, Rfl: 0    tamsulosin (FLOMAX) 0.4 MG capsule, Take 2 capsules (0.8 mg) by mouth daily., Disp: 30 capsule, Rfl: 0    empagliflozin (JARDIANCE) 25 MG TABS tablet, Take 0.5 tablets (12.5 mg) by mouth every morning. TAKE  ONE-HALF TABLET BY MOUTH EVERY MORNING FOR DIABETES AND KIDNEY PROTECTION (Patient not taking: Reported on 7/31/2025), Disp: 14 tablet, Rfl: 1    insulin aspart (NOVOLOG FLEXPEN) 100 UNIT/ML pen, For Pre-Meal  - 189 give 1 unit.  For Pre-Meal  - 239 give 2 units.  For Pre-Meal  - 289 give 3 units.  For Pre-Meal  - 339 give 4 units.  For Pre-Meal - 389 give 5 units.  For Pre-Meal -439 give 6 units For Pre-Meal BG greater than or equal to 440 give 7 units.  For BEDTIME: For  - 249 give 1 unit.  For  - 299 give 2 units.  For  - 349 give 3 units.  For  - 399 give 4 units.  For BG greater than or equal to 400 give 5 units. (Patient not taking: Reported on 7/31/2025), Disp: 15 mL, Rfl: 0    insulin glargine (LANTUS PEN) 100 UNIT/ML pen, Inject 5 Units subcutaneously every morning. (Patient not taking: Reported on 7/31/2025), Disp: 15 mL, Rfl: 0    insulin pen needle (32G X 4 MM) 32G X 4 MM miscellaneous, Use as directed by provider Per insurance coverage (Patient not taking: Reported on 7/31/2025), Disp: 200 each, Rfl: 1    Review of Systems - 10 point Review of Systems is negative except for right foot ulcer which is noted in HPI.      OBJECTIVE:  /84   Pulse 69   Temp 97.7  F (36.5  C) (Oral)   SpO2 99%   General appearance: Patient is alert and fully cooperative with history & exam.  No sign of distress is noted during the visit.    Vascular: Dorsalis pedis non-palpableRight.  Dermatologic:    Negative Pressure Wound Therapy Thigh Anterior;Right (Active)       VASC Wound right heel (Active)   Post Size Width 2 04/17/25 1500   Post Size Depth 1 04/17/25 1500   Post Total Sq cm 0.1 04/17/25 1500   Undermined 2 04/17/25 1500   Description eschar 07/31/25 1500       VASC Wound Right TMA site (Active)   Pre Size Length 0.8 07/31/25 1500   Pre Size Width 0.6 07/31/25 1500   Pre Size Depth 0.3 07/31/25 1500   Pre Total Sq cm 0.18 07/31/25 1500   Post Size  Length 1 07/10/25 1500   Post Size Width 1 07/10/25 1500   Post Size Depth 0.3 07/10/25 1500   Post Total Sq cm 1 07/10/25 1500       Incision/Surgical Site 03/17/25 Left Groin (Active)       Incision/Surgical Site 03/20/25 Anterior;Right Thigh (Active)       Incision/Surgical Site 03/20/25 Right;Outer Tibial (Active)       Incision/Surgical Site 05/07/25 Right Foot (Active)   Granular/fibrotic tissue ulceration lateral right midfoot.  Stable eschar right heel.  No erythema right lower extremity.  Neurologic: Diminished to light touch Right.  Musculoskeletal: TMA right foot.      Picture:

## 2025-08-13 ENCOUNTER — OFFICE VISIT (OUTPATIENT)
Dept: VASCULAR SURGERY | Facility: CLINIC | Age: 65
End: 2025-08-13
Payer: MEDICARE

## 2025-08-13 ENCOUNTER — ANCILLARY PROCEDURE (OUTPATIENT)
Dept: ULTRASOUND IMAGING | Facility: CLINIC | Age: 65
End: 2025-08-13
Payer: MEDICARE

## 2025-08-13 VITALS — OXYGEN SATURATION: 97 % | SYSTOLIC BLOOD PRESSURE: 134 MMHG | HEART RATE: 70 BPM | DIASTOLIC BLOOD PRESSURE: 66 MMHG

## 2025-08-13 DIAGNOSIS — I96 GANGRENE OF TOE OF RIGHT FOOT (H): ICD-10-CM

## 2025-08-13 DIAGNOSIS — I73.9 PAD (PERIPHERAL ARTERY DISEASE): Primary | ICD-10-CM

## 2025-08-13 PROCEDURE — 93925 LOWER EXTREMITY STUDY: CPT | Performed by: STUDENT IN AN ORGANIZED HEALTH CARE EDUCATION/TRAINING PROGRAM

## 2025-08-13 PROCEDURE — 93922 UPR/L XTREMITY ART 2 LEVELS: CPT | Performed by: STUDENT IN AN ORGANIZED HEALTH CARE EDUCATION/TRAINING PROGRAM

## 2025-08-13 ASSESSMENT — PAIN SCALES - GENERAL: PAINLEVEL_OUTOF10: NO PAIN (0)

## 2025-08-25 ENCOUNTER — LAB REQUISITION (OUTPATIENT)
Dept: LAB | Facility: CLINIC | Age: 65
End: 2025-08-25

## 2025-08-25 PROCEDURE — 80197 ASSAY OF TACROLIMUS: CPT

## 2025-08-26 LAB
TACROLIMUS BLD-MCNC: 7 UG/L (ref 5–15)
TME LAST DOSE: NORMAL H
TME LAST DOSE: NORMAL H

## (undated) DEVICE — Device

## (undated) DEVICE — DRSG KERLIX 4 1/2"X4YDS ROLL 6715

## (undated) DEVICE — CONNECTOR STOPCOCK 3 WAY MALE LL HI-FLO MX9311L

## (undated) DEVICE — TEST TUBE W/SCREW CAP 17361

## (undated) DEVICE — TUBE CULTURE ANEROBIC MEDIA TRANPORT 6ML AS-991

## (undated) DEVICE — SU PROLENE 6-0 C-1DA 30" 8706H

## (undated) DEVICE — SU VICRYL 3-0 SH 8X18" UND J864D

## (undated) DEVICE — SU DERMABOND ADVANCED .7ML DNX12

## (undated) DEVICE — SOL NACL 0.9% IRRIG 1000ML BOTTLE 2F7124

## (undated) DEVICE — DRSG PRIMAPORE 03 1/8X6" 66000318

## (undated) DEVICE — DRAPE C-ARM W/STRAPS 42X72" 07-CA104

## (undated) DEVICE — DECANTER BAG 2002S

## (undated) DEVICE — SU PROLENE 6-0 24" C-1 DA TAPER POINT BLUE 8726H

## (undated) DEVICE — ADH LIQUID MASTISOL TOPICAL VIAL 2-3ML 0523-48

## (undated) DEVICE — SU PROLENE 6-0 BV-1DA 18" 8709H

## (undated) DEVICE — INTRO SHEATH BRITE TIP 4FRX11CM 401-411M

## (undated) DEVICE — ESU PENCIL SMOKE EVAC W/ROCKER SWITCH 0703-047-000

## (undated) DEVICE — SU SILK 2-0 TIE 12X30" A305H

## (undated) DEVICE — TUBING PRESSURE 30"

## (undated) DEVICE — LINEN TOWEL PACK X6 WHITE 5487

## (undated) DEVICE — PREP CHLORAPREP 26ML TINTED HI-LITE ORANGE 930815

## (undated) DEVICE — DRSG PREVENA NEGATIVE PRESSURE WND 13CM SGL LF PRE1101US

## (undated) DEVICE — COVER EQUIPMENT 36X40" BANDED ELAS OPN LF 01-3640

## (undated) DEVICE — TOURNIQUET SGL BLADDER 18"X4" RED 5921-218-135

## (undated) DEVICE — VESSEL LOOPS DEV-O-LOOP WHITE MINI 31145728

## (undated) DEVICE — COVER NEOPROBE SOFTFLEX 5X96" W/BANDS 20-PC596

## (undated) DEVICE — ESU ELEC BLADE 2.75" COATED/INSULATED E1455

## (undated) DEVICE — STOPCOCK ANGIO 1-WAY MARQUIS MLL  H1RC

## (undated) DEVICE — SU VICRYL 2-0 SH 27" UND J417H

## (undated) DEVICE — ESU GROUND PAD ADULT REM W/15' CORD E7507DB

## (undated) DEVICE — DRSG PRIMAPORE 02X3" 7133

## (undated) DEVICE — BLADE KNIFE SURG 15 371115

## (undated) DEVICE — NDL COUNTER 20CT 31142493

## (undated) DEVICE — SU SILK 3-0 SH 30" K832H

## (undated) DEVICE — SU VICRYL+ 3-0 RB1 27IN UND VCP215H

## (undated) DEVICE — SUCTION MANIFOLD NEPTUNE 2 SYS 4 PORT 0702-020-000

## (undated) DEVICE — DRSG TELFA 3X8" 1238

## (undated) DEVICE — DRSG GAUZE 4X4" TRAY 6939

## (undated) DEVICE — DRSG ABDOMINAL PAD UNSTERILE 8X10" WND152764B

## (undated) DEVICE — BLADE SAW OSCIL/SAG STRK MICRO 9.0X31.0X0.38MM 2296-003-525

## (undated) DEVICE — SU MONOCRYL 3-0 PS-1 27" Y936H

## (undated) DEVICE — ESU ELEC BLADE E-SEP INSULATED NEPTUNE 70MM 0703-070-002

## (undated) DEVICE — TUBING SUCTION 10'X3/16" N510

## (undated) DEVICE — SU ETHILON 2-0 FS 18" 664H

## (undated) DEVICE — LINEN TOWEL PACK X5 5464

## (undated) DEVICE — SOL WATER IRRIG 1000ML BOTTLE 2F7114

## (undated) DEVICE — SU ETHILON 3-0 PS-1 18" 1663H

## (undated) DEVICE — SU VICRYL+ 3-0 27IN SH UND VCP416H

## (undated) DEVICE — PAD CHUX UNDERPAD 23X24" 7136

## (undated) DEVICE — GEL ULTRASOUND AQUASONIC 20GM 01-01

## (undated) DEVICE — TUNNELER SHEATH SCANLAN BLUE

## (undated) DEVICE — DRSG ADAPTIC 3X16"  6114

## (undated) DEVICE — SYR MEDRAD 150ML MARK 7 ARTERION ART700SYR

## (undated) DEVICE — DRAPE SHEET MED 44X70" 9355

## (undated) DEVICE — GLOVE BIOGEL PI MICRO INDICATOR UNDERGLOVE SZ 7.5 48975

## (undated) DEVICE — CLOSURE DEVICE 6FR VASC PROGLIDE MEDICATED SUTURE 12673-03

## (undated) DEVICE — STPL SKIN 35W ROTATING HEAD PRW35

## (undated) DEVICE — SUTURE BOOTS 051003PBX

## (undated) DEVICE — SU SILK 3-0 TIE 12X30" A304H

## (undated) DEVICE — SOL NACL 0.9% 10ML VIAL 0409-4888-02

## (undated) DEVICE — VESSEL LOOPS RED MINI 24000-01R

## (undated) DEVICE — DRAPE IOBAN INCISE 23X17" 6650EZ

## (undated) DEVICE — GLOVE BIOGEL PI MICRO SZ 7.5 48575

## (undated) DEVICE — MINOR SINGLE BASIN KIT CSR WRAPX2 7QT SSK3002

## (undated) DEVICE — PREP SKIN SCRUB TRAY 4461A

## (undated) DEVICE — SU PROLENE 5-0 24" BV-1 9702H

## (undated) DEVICE — CAST PADDING 4" STERILE 9044S

## (undated) DEVICE — INTRO SHEATH BRITE TIP 5FRX11CM 401-511M

## (undated) DEVICE — PACK EXTREMITY UMMC SOP32EXFC9

## (undated) DEVICE — DRAPE CONVERTORS U-DRAPE 60X72" 8476

## (undated) DEVICE — RAD CATH SOFT-VU OMNI FLUSH 5FRX90CM

## (undated) DEVICE — STRAP UNIVERSAL POSITIONING 2-PIECE 4X47X76" 91-287

## (undated) DEVICE — TUBING VINYL CONNECTING 14FR 30CM G02278

## (undated) DEVICE — PITCHER STERILE 1000ML  SSK9004A

## (undated) DEVICE — INTRO SHEATH MICRO PLATINUM TIP 4FRX40CM 7274

## (undated) DEVICE — GLIDEWIRE TERUMO RADIOFOCUS .035X260CM ANG EXCHANGE GR3509

## (undated) DEVICE — WIPES FOLEY CARE SURESTEP PROVON DFC100

## (undated) DEVICE — VESSEL LOOPS YELLOW MAXI 31145694

## (undated) DEVICE — DRAPE SHEET REV FOLD 3/4 9349

## (undated) DEVICE — DRAPE MAYO STAND 23X54 8337

## (undated) DEVICE — CLIP HORIZON SM RED WIDE SLOT 001201

## (undated) DEVICE — DRAPE ISOLATION BAG 1003

## (undated) DEVICE — SUCTION TIP YANKAUER STR K87

## (undated) DEVICE — CLIP HORIZON MED BLUE 002200

## (undated) DEVICE — SPONGE LAP 18X18" X8435

## (undated) DEVICE — COVER ULTRASOUND PROBE W/GEL FLEXI-FEEL 6"X58" LF  25-FF658

## (undated) DEVICE — DRSG MEDIPORE 3 1/2X13 3/4" 3573

## (undated) DEVICE — TUBING MEDRAD 48" HIGH PRESSURE MX694

## (undated) DEVICE — LINEN TOWEL PACK X30 5481

## (undated) DEVICE — CATH TRAY FOLEY SURESTEP 16FR W/URNE MTR STLK LATEX A303316A

## (undated) DEVICE — SUCTION TIP POOLE K770

## (undated) DEVICE — KNIFE HANDLE W/10 BLADE 371610

## (undated) DEVICE — ESU GROUND PAD ADULT W/CORD E7507

## (undated) RX ORDER — FENTANYL CITRATE 50 UG/ML
INJECTION, SOLUTION INTRAMUSCULAR; INTRAVENOUS
Status: DISPENSED
Start: 2025-03-17

## (undated) RX ORDER — CEFAZOLIN SODIUM 1 G/3ML
INJECTION, POWDER, FOR SOLUTION INTRAMUSCULAR; INTRAVENOUS
Status: DISPENSED
Start: 2025-03-20

## (undated) RX ORDER — HEPARIN SODIUM 1000 [USP'U]/ML
INJECTION, SOLUTION INTRAVENOUS; SUBCUTANEOUS
Status: DISPENSED
Start: 2025-03-20

## (undated) RX ORDER — PROPOFOL 10 MG/ML
INJECTION, EMULSION INTRAVENOUS
Status: DISPENSED
Start: 2025-03-20

## (undated) RX ORDER — CEFAZOLIN SODIUM/WATER 2 G/20 ML
SYRINGE (ML) INTRAVENOUS
Status: DISPENSED
Start: 2025-03-20

## (undated) RX ORDER — HEPARIN SODIUM (PORCINE) LOCK FLUSH IV SOLN 100 UNIT/ML 100 UNIT/ML
SOLUTION INTRAVENOUS
Status: DISPENSED
Start: 2025-03-20

## (undated) RX ORDER — FENTANYL CITRATE 50 UG/ML
INJECTION, SOLUTION INTRAMUSCULAR; INTRAVENOUS
Status: DISPENSED
Start: 2025-05-07

## (undated) RX ORDER — ONDANSETRON 2 MG/ML
INJECTION INTRAMUSCULAR; INTRAVENOUS
Status: DISPENSED
Start: 2025-03-20

## (undated) RX ORDER — FENTANYL CITRATE 50 UG/ML
INJECTION, SOLUTION INTRAMUSCULAR; INTRAVENOUS
Status: DISPENSED
Start: 2025-03-20

## (undated) RX ORDER — FENTANYL CITRATE-0.9 % NACL/PF 10 MCG/ML
PLASTIC BAG, INJECTION (ML) INTRAVENOUS
Status: DISPENSED
Start: 2025-03-20

## (undated) RX ORDER — HYDROMORPHONE HYDROCHLORIDE 1 MG/ML
INJECTION, SOLUTION INTRAMUSCULAR; INTRAVENOUS; SUBCUTANEOUS
Status: DISPENSED
Start: 2025-03-20

## (undated) RX ORDER — ACETAMINOPHEN 325 MG/1
TABLET ORAL
Status: DISPENSED
Start: 2025-03-20

## (undated) RX ORDER — HEPARIN SODIUM 1000 [USP'U]/ML
INJECTION, SOLUTION INTRAVENOUS; SUBCUTANEOUS
Status: DISPENSED
Start: 2025-03-17

## (undated) RX ORDER — IODIXANOL 320 MG/ML
INJECTION, SOLUTION INTRAVASCULAR
Status: DISPENSED
Start: 2025-03-17

## (undated) RX ORDER — REGADENOSON 0.08 MG/ML
INJECTION, SOLUTION INTRAVENOUS
Status: DISPENSED
Start: 2025-03-18

## (undated) RX ORDER — LIDOCAINE HYDROCHLORIDE 10 MG/ML
INJECTION, SOLUTION EPIDURAL; INFILTRATION; INTRACAUDAL; PERINEURAL
Status: DISPENSED
Start: 2025-03-17

## (undated) RX ORDER — CEFAZOLIN SODIUM 1 G/3ML
INJECTION, POWDER, FOR SOLUTION INTRAMUSCULAR; INTRAVENOUS
Status: DISPENSED
Start: 2025-03-17